# Patient Record
Sex: FEMALE | Race: WHITE | NOT HISPANIC OR LATINO
[De-identification: names, ages, dates, MRNs, and addresses within clinical notes are randomized per-mention and may not be internally consistent; named-entity substitution may affect disease eponyms.]

---

## 2018-03-26 ENCOUNTER — RESULT REVIEW (OUTPATIENT)
Age: 78
End: 2018-03-26

## 2018-03-27 ENCOUNTER — APPOINTMENT (OUTPATIENT)
Dept: OBGYN | Facility: CLINIC | Age: 78
End: 2018-03-27
Payer: MEDICARE

## 2018-03-27 ENCOUNTER — RESULT REVIEW (OUTPATIENT)
Age: 78
End: 2018-03-27

## 2018-03-27 PROCEDURE — 58100 BIOPSY OF UTERUS LINING: CPT

## 2018-03-27 PROCEDURE — 99243 OFF/OP CNSLTJ NEW/EST LOW 30: CPT | Mod: 25

## 2018-03-27 PROCEDURE — 99203 OFFICE O/P NEW LOW 30 MIN: CPT | Mod: 25

## 2018-04-10 ENCOUNTER — APPOINTMENT (OUTPATIENT)
Dept: ULTRASOUND IMAGING | Facility: CLINIC | Age: 78
End: 2018-04-10

## 2018-04-18 ENCOUNTER — OUTPATIENT (OUTPATIENT)
Dept: OUTPATIENT SERVICES | Facility: HOSPITAL | Age: 78
LOS: 1 days | End: 2018-04-18
Payer: COMMERCIAL

## 2018-04-18 ENCOUNTER — APPOINTMENT (OUTPATIENT)
Dept: ULTRASOUND IMAGING | Facility: CLINIC | Age: 78
End: 2018-04-18
Payer: MEDICARE

## 2018-04-18 DIAGNOSIS — Z00.8 ENCOUNTER FOR OTHER GENERAL EXAMINATION: ICD-10-CM

## 2018-04-18 PROCEDURE — 76856 US EXAM PELVIC COMPLETE: CPT

## 2018-04-18 PROCEDURE — 76830 TRANSVAGINAL US NON-OB: CPT | Mod: 26,59

## 2018-04-18 PROCEDURE — 76856 US EXAM PELVIC COMPLETE: CPT | Mod: 26

## 2018-04-18 PROCEDURE — 76830 TRANSVAGINAL US NON-OB: CPT

## 2018-04-23 ENCOUNTER — APPOINTMENT (OUTPATIENT)
Dept: OBGYN | Facility: CLINIC | Age: 78
End: 2018-04-23
Payer: MEDICARE

## 2018-04-23 PROCEDURE — 99214 OFFICE O/P EST MOD 30 MIN: CPT

## 2018-05-01 ENCOUNTER — APPOINTMENT (OUTPATIENT)
Dept: PULMONOLOGY | Facility: CLINIC | Age: 78
End: 2018-05-01
Payer: MEDICARE

## 2018-05-01 VITALS
HEIGHT: 64 IN | WEIGHT: 255 LBS | OXYGEN SATURATION: 95 % | BODY MASS INDEX: 43.54 KG/M2 | DIASTOLIC BLOOD PRESSURE: 70 MMHG | HEART RATE: 103 BPM | SYSTOLIC BLOOD PRESSURE: 120 MMHG

## 2018-05-01 DIAGNOSIS — Z86.79 PERSONAL HISTORY OF OTHER DISEASES OF THE CIRCULATORY SYSTEM: ICD-10-CM

## 2018-05-01 DIAGNOSIS — Z78.9 OTHER SPECIFIED HEALTH STATUS: ICD-10-CM

## 2018-05-01 DIAGNOSIS — Z80.42 FAMILY HISTORY OF MALIGNANT NEOPLASM OF PROSTATE: ICD-10-CM

## 2018-05-01 DIAGNOSIS — Z82.49 FAMILY HISTORY OF ISCHEMIC HEART DISEASE AND OTHER DISEASES OF THE CIRCULATORY SYSTEM: ICD-10-CM

## 2018-05-01 DIAGNOSIS — Z84.1 FAMILY HISTORY OF DISORDERS OF KIDNEY AND URETER: ICD-10-CM

## 2018-05-01 PROCEDURE — 99205 OFFICE O/P NEW HI 60 MIN: CPT | Mod: 25

## 2018-05-01 PROCEDURE — 71046 X-RAY EXAM CHEST 2 VIEWS: CPT

## 2018-05-01 PROCEDURE — 94618 PULMONARY STRESS TESTING: CPT

## 2018-05-01 PROCEDURE — 94729 DIFFUSING CAPACITY: CPT

## 2018-05-01 PROCEDURE — 94010 BREATHING CAPACITY TEST: CPT | Mod: 59

## 2018-05-01 RX ORDER — MESALAMINE 1.2 G/1
1.2 TABLET, DELAYED RELEASE ORAL
Qty: 60 | Refills: 0 | Status: ACTIVE | COMMUNITY
Start: 2017-12-27

## 2018-05-01 RX ORDER — METOLAZONE 2.5 MG/1
2.5 TABLET ORAL
Qty: 30 | Refills: 0 | Status: ACTIVE | COMMUNITY
Start: 2018-01-31

## 2018-05-01 RX ORDER — FENOFIBRIC ACID 135 MG/1
135 CAPSULE, DELAYED RELEASE ORAL
Qty: 90 | Refills: 0 | Status: ACTIVE | COMMUNITY
Start: 2017-03-29

## 2018-05-01 RX ORDER — DULOXETINE HYDROCHLORIDE 60 MG/1
60 CAPSULE, DELAYED RELEASE PELLETS ORAL
Qty: 90 | Refills: 0 | Status: ACTIVE | COMMUNITY
Start: 2017-04-26

## 2018-05-01 RX ORDER — AMIODARONE HYDROCHLORIDE 200 MG/1
200 TABLET ORAL
Qty: 90 | Refills: 0 | Status: ACTIVE | COMMUNITY
Start: 2017-10-18

## 2018-05-01 RX ORDER — CARVEDILOL 12.5 MG/1
12.5 TABLET, FILM COATED ORAL
Qty: 180 | Refills: 0 | Status: ACTIVE | COMMUNITY
Start: 2017-12-27

## 2018-05-01 RX ORDER — LEVOTHYROXINE SODIUM 0.15 MG/1
150 TABLET ORAL
Qty: 30 | Refills: 0 | Status: DISCONTINUED | COMMUNITY
Start: 2018-02-01

## 2018-05-01 RX ORDER — WARFARIN 5 MG/1
5 TABLET ORAL
Qty: 30 | Refills: 0 | Status: ACTIVE | COMMUNITY
Start: 2018-01-02

## 2018-05-01 RX ORDER — UMECLIDINIUM BROMIDE AND VILANTEROL TRIFENATATE 62.5; 25 UG/1; UG/1
62.5-25 POWDER RESPIRATORY (INHALATION) DAILY
Qty: 3 | Refills: 1 | Status: ACTIVE | COMMUNITY
Start: 2018-05-01 | End: 1900-01-01

## 2018-05-01 RX ORDER — LOSARTAN POTASSIUM 25 MG/1
25 TABLET, FILM COATED ORAL
Qty: 90 | Refills: 0 | Status: ACTIVE | COMMUNITY
Start: 2017-04-27

## 2018-05-01 RX ORDER — POLYMYXIN B SULFATE AND TRIMETHOPRIM 10000; 1 [USP'U]/ML; MG/ML
10000-0.1 SOLUTION OPHTHALMIC
Qty: 10 | Refills: 0 | Status: ACTIVE | COMMUNITY
Start: 2018-01-22

## 2018-05-01 RX ORDER — TORSEMIDE 100 MG/1
100 TABLET ORAL
Qty: 30 | Refills: 0 | Status: ACTIVE | COMMUNITY
Start: 2018-04-12

## 2018-05-01 RX ORDER — TORSEMIDE 20 MG/1
20 TABLET ORAL
Qty: 90 | Refills: 0 | Status: ACTIVE | COMMUNITY
Start: 2017-10-18

## 2018-05-01 RX ORDER — LEVOTHYROXINE SODIUM 0.14 MG/1
137 TABLET ORAL
Qty: 30 | Refills: 0 | Status: DISCONTINUED | COMMUNITY
Start: 2018-01-02

## 2018-05-01 RX ORDER — PEN NEEDLE, DIABETIC 29 G X1/2"
32G X 4 MM NEEDLE, DISPOSABLE MISCELLANEOUS
Qty: 200 | Refills: 0 | Status: ACTIVE | COMMUNITY
Start: 2017-08-25

## 2018-05-01 RX ORDER — OLOPATADINE HYDROCHLORIDE 665 UG/1
0.6 SPRAY, METERED NASAL
Qty: 3 | Refills: 1 | Status: ACTIVE | COMMUNITY
Start: 2018-05-01 | End: 1900-01-01

## 2018-05-01 RX ORDER — EXENATIDE 2 MG/.65ML
2 INJECTION, SUSPENSION, EXTENDED RELEASE SUBCUTANEOUS
Qty: 4 | Refills: 0 | Status: ACTIVE | COMMUNITY
Start: 2018-02-05

## 2018-05-01 RX ORDER — LEVOTHYROXINE SODIUM 0.17 MG/1
175 TABLET ORAL
Qty: 30 | Refills: 0 | Status: ACTIVE | COMMUNITY
Start: 2018-03-12

## 2018-05-01 RX ORDER — INSULIN DEGLUDEC INJECTION 100 U/ML
100 INJECTION, SOLUTION SUBCUTANEOUS
Qty: 15 | Refills: 0 | Status: ACTIVE | COMMUNITY
Start: 2017-12-04

## 2018-05-01 RX ORDER — NITROFURANTOIN (MONOHYDRATE/MACROCRYSTALS) 25; 75 MG/1; MG/1
100 CAPSULE ORAL
Qty: 180 | Refills: 0 | Status: ACTIVE | COMMUNITY
Start: 2017-12-05

## 2018-05-01 RX ORDER — ISOSORBIDE MONONITRATE 30 MG/1
30 TABLET, EXTENDED RELEASE ORAL
Qty: 90 | Refills: 0 | Status: ACTIVE | COMMUNITY
Start: 2018-04-12

## 2018-05-01 RX ORDER — INSULIN ASPART 100 [IU]/ML
100 INJECTION, SOLUTION INTRAVENOUS; SUBCUTANEOUS
Qty: 30 | Refills: 0 | Status: ACTIVE | COMMUNITY
Start: 2018-01-02

## 2018-05-11 ENCOUNTER — OUTPATIENT (OUTPATIENT)
Dept: OUTPATIENT SERVICES | Facility: HOSPITAL | Age: 78
LOS: 1 days | End: 2018-05-11
Payer: COMMERCIAL

## 2018-05-11 DIAGNOSIS — E66.2 MORBID (SEVERE) OBESITY WITH ALVEOLAR HYPOVENTILATION: ICD-10-CM

## 2018-05-11 DIAGNOSIS — R06.02 SHORTNESS OF BREATH: ICD-10-CM

## 2018-05-11 LAB
BASE EXCESS BLDA CALC-SCNC: 7.1 MMOL/L — HIGH (ref -2–2)
BLOOD GAS COMMENTS: SIGNIFICANT CHANGE UP
BLOOD GAS SOURCE: SIGNIFICANT CHANGE UP
HCO3 BLDA-SCNC: 31 MMOL/L — HIGH (ref 21–29)
HOROWITZ INDEX BLDA+IHG-RTO: 28 — SIGNIFICANT CHANGE UP
PCO2 BLDA: 47 MMHG — HIGH (ref 32–46)
PH BLD: 7.44 — SIGNIFICANT CHANGE UP (ref 7.35–7.45)
PO2 BLDA: 94 MMHG — SIGNIFICANT CHANGE UP (ref 74–108)
SAO2 % BLDA: 98 % — HIGH (ref 92–96)

## 2018-05-11 PROCEDURE — 82803 BLOOD GASES ANY COMBINATION: CPT

## 2018-05-11 PROCEDURE — 36415 COLL VENOUS BLD VENIPUNCTURE: CPT

## 2018-05-16 ENCOUNTER — OUTPATIENT (OUTPATIENT)
Dept: OUTPATIENT SERVICES | Facility: HOSPITAL | Age: 78
LOS: 1 days | End: 2018-05-16
Payer: COMMERCIAL

## 2018-05-16 VITALS
WEIGHT: 257.94 LBS | TEMPERATURE: 98 F | SYSTOLIC BLOOD PRESSURE: 141 MMHG | DIASTOLIC BLOOD PRESSURE: 82 MMHG | HEART RATE: 66 BPM | HEIGHT: 61 IN | RESPIRATION RATE: 18 BRPM | OXYGEN SATURATION: 100 %

## 2018-05-16 DIAGNOSIS — Z87.42 PERSONAL HISTORY OF OTHER DISEASES OF THE FEMALE GENITAL TRACT: ICD-10-CM

## 2018-05-16 DIAGNOSIS — N95.0 POSTMENOPAUSAL BLEEDING: ICD-10-CM

## 2018-05-16 DIAGNOSIS — D64.9 ANEMIA, UNSPECIFIED: ICD-10-CM

## 2018-05-16 DIAGNOSIS — D63.8 ANEMIA IN OTHER CHRONIC DISEASES CLASSIFIED ELSEWHERE: ICD-10-CM

## 2018-05-16 DIAGNOSIS — N18.9 CHRONIC KIDNEY DISEASE, UNSPECIFIED: ICD-10-CM

## 2018-05-16 DIAGNOSIS — I73.9 PERIPHERAL VASCULAR DISEASE, UNSPECIFIED: ICD-10-CM

## 2018-05-16 DIAGNOSIS — Z01.818 ENCOUNTER FOR OTHER PREPROCEDURAL EXAMINATION: ICD-10-CM

## 2018-05-16 DIAGNOSIS — E11.9 TYPE 2 DIABETES MELLITUS WITHOUT COMPLICATIONS: ICD-10-CM

## 2018-05-16 DIAGNOSIS — I48.0 PAROXYSMAL ATRIAL FIBRILLATION: ICD-10-CM

## 2018-05-16 DIAGNOSIS — G47.33 OBSTRUCTIVE SLEEP APNEA (ADULT) (PEDIATRIC): ICD-10-CM

## 2018-05-16 DIAGNOSIS — Z86.39 PERSONAL HISTORY OF OTHER ENDOCRINE, NUTRITIONAL AND METABOLIC DISEASE: ICD-10-CM

## 2018-05-16 DIAGNOSIS — J45.909 UNSPECIFIED ASTHMA, UNCOMPLICATED: ICD-10-CM

## 2018-05-16 DIAGNOSIS — I10 ESSENTIAL (PRIMARY) HYPERTENSION: ICD-10-CM

## 2018-05-16 DIAGNOSIS — Z95.3 PRESENCE OF XENOGENIC HEART VALVE: Chronic | ICD-10-CM

## 2018-05-16 DIAGNOSIS — Z98.891 HISTORY OF UTERINE SCAR FROM PREVIOUS SURGERY: Chronic | ICD-10-CM

## 2018-05-16 LAB
HBA1C BLD-MCNC: 6.4 % — HIGH (ref 4–5.6)
HCT VFR BLD CALC: 29.8 % — LOW (ref 34.5–45)
HGB BLD-MCNC: 9.3 G/DL — LOW (ref 11.5–15.5)
MCHC RBC-ENTMCNC: 29.8 PG — SIGNIFICANT CHANGE UP (ref 27–34)
MCHC RBC-ENTMCNC: 31.2 GM/DL — LOW (ref 32–36)
MCV RBC AUTO: 95.5 FL — SIGNIFICANT CHANGE UP (ref 80–100)
PLATELET # BLD AUTO: 214 K/UL — SIGNIFICANT CHANGE UP (ref 150–400)
RBC # BLD: 3.12 M/UL — LOW (ref 3.8–5.2)
RBC # FLD: 14.8 % — HIGH (ref 10.3–14.5)
WBC # BLD: 9.74 K/UL — SIGNIFICANT CHANGE UP (ref 3.8–10.5)
WBC # FLD AUTO: 9.74 K/UL — SIGNIFICANT CHANGE UP (ref 3.8–10.5)

## 2018-05-16 PROCEDURE — 83036 HEMOGLOBIN GLYCOSYLATED A1C: CPT

## 2018-05-16 PROCEDURE — G0463: CPT

## 2018-05-16 PROCEDURE — 85027 COMPLETE CBC AUTOMATED: CPT

## 2018-05-16 RX ORDER — INSULIN DEGLUDEC 100 U/ML
0 INJECTION, SOLUTION SUBCUTANEOUS
Qty: 0 | Refills: 0 | COMMUNITY

## 2018-05-16 NOTE — H&P PST ADULT - REASON FOR ADMISSION
"I am having a D&C, I have been bleeding." "I am having a D&C, I have been bleeding sometime it is heavy sometime light." "I am having a D&C, I have been bleeding, sometimes it is heavy."

## 2018-05-16 NOTE — H&P PST ADULT - PROBLEM SELECTOR PLAN 3
has been evaluated by her cardiologist  last dose of coumadin 5/25/2018 has been evaluated by her cardiologist  last dose of coumadin 5/25/2018  INR DOS

## 2018-05-16 NOTE — H&P PST ADULT - PROBLEM SELECTOR PLAN 2
most ekg/ echo on chart   patient to hold coumadin 5 days prior to surgery as per her cardiologist, last INR 3.8 most ekg/ echo on chart   patient to hold coumadin 5 days prior to surgery as per her cardiologist, last INR 3.8; repeat INR DOS

## 2018-05-16 NOTE — H&P PST ADULT - HISTORY OF PRESENT ILLNESS
77 year old female with diastolic CHF (EF 56% (4/2017) and last admission 2 years ago, mitral stenosis s/p MVR (bioprosthetic valve 2014), PAF (coumadin), PAD s/p peripheral stent placed (2-4 years ago), HTN, RAD (oxygen 2 liters nc), VICKY, asthma, T2DM, neuropathy, morbidly obese, spinal stenosis, arthritis, Hypothyroidism, GERD, CKD, is being seen in preadmission testing today for scheduled D&C/ diagnostic hysteroscopy for postmenopausal bleeding. 77 year old female with diastolic CHF (EF 56% (4/2017) and last admission 2 years ago, mitral stenosis s/p MVR (bioprosthetic valve 2014), PAF (coumadin), PAD s/p peripheral stent placed (2-4 years ago), HTN, RAD (oxygen 2 liters nc x24 hrs), VICKY, asthma, anemia of chronic dz, T2DM,  peripheral neuropathy, morbidly obese, spinal stenosis, arthritis, Hypothyroidism, GERD, CKD, is being seen in preadmission testing today for scheduled D&C/ diagnostic hysteroscopy for postmenopausal bleeding on 5/30/18.

## 2018-05-16 NOTE — H&P PST ADULT - OTHER CARE PROVIDERS
Cardiologist: Dr. Feliz Aiken # [phone]                Pulmonologist: Dr. Fito Montgomery # [phone]

## 2018-05-16 NOTE — H&P PST ADULT - PSH
Amputated toe    H/O  section  x 2  History of mitral valve replacement with bioprosthetic valve  x 4 years ago

## 2018-05-16 NOTE — H&P PST ADULT - PMH
Arthritis of spine    Chronic atrial fibrillation  h/o rapid ventricular rate 2 years ago, was admitted at [facility], no intervention, control with medication  Chronic diastolic CHF (congestive heart failure)    Chronic low back pain    Depression (emotion)    Diabetes mellitus  T2DM last A1C 6.7 mg/dl in January   fs range 59- 200 mg/dl  Edema    GERD (gastroesophageal reflux disease)    H/O: hypothyroidism    Herniated disc  lumbar  Hypertension    Macular degeneration of left eye  receiving injection  Morbid obesity with BMI of 45.0-49.9, adult    Neuropathy    VICKY (obstructive sleep apnea)    Peripheral arterial disease  s/p remote stent. not on antiplatelet meds for a while.  Ulcerative colitis Anemia of chronic disease    Arthritis of spine    Asthma  PFT (4/2018)  Chronic diastolic CHF (congestive heart failure)  EF 56% (4/2017)  Chronic kidney disease (CKD)    Chronic low back pain    Depression (emotion)    Diabetes mellitus  T2DM last A1C 6.7 mg/dl in January   fs range 59- 200 mg/dl  Dyslipidemia associated with type 2 diabetes mellitus    Edema    GERD (gastroesophageal reflux disease)    H/O: hypothyroidism    Herniated disc  lumbar  History of postmenopausal bleeding    Hypertension    Macular degeneration of left eye  receiving injection  Morbid obesity with BMI of 45.0-49.9, adult    Neuropathy    On home oxygen therapy  2  liters nasal cannula  VICKY (obstructive sleep apnea)    Paroxysmal atrial fibrillation  h/o rapid ventricular rate 2 years ago, admitted at [facility], controlled with medication as per patient.  last INR 2.0  Peripheral arterial disease  s/p remote stent. not on antiplatelet meds for a while.  Ulcerative colitis

## 2018-05-16 NOTE — H&P PST ADULT - NS MD HP INPLANTS MED DEV
biopros Mitral valve, left peripheral artery stent bioprosthetic Mitral valve, left peripheral artery stent

## 2018-05-16 NOTE — H&P PST ADULT - PROBLEM SELECTOR PLAN 1
D&C/ Diagnostic hysteroscopy D&C/ Diagnostic hysteroscopy.   cardiac evaluation and pulmonologist evaluation report on chart   patient is scheduled to see her pcp prior to surgery

## 2018-05-30 ENCOUNTER — APPOINTMENT (OUTPATIENT)
Dept: OBGYN | Facility: HOSPITAL | Age: 78
End: 2018-05-30

## 2018-06-06 ENCOUNTER — TRANSCRIPTION ENCOUNTER (OUTPATIENT)
Age: 78
End: 2018-06-06

## 2018-06-07 ENCOUNTER — RESULT REVIEW (OUTPATIENT)
Age: 78
End: 2018-06-07

## 2018-06-07 ENCOUNTER — APPOINTMENT (OUTPATIENT)
Dept: OBGYN | Facility: HOSPITAL | Age: 78
End: 2018-06-07

## 2018-06-07 ENCOUNTER — OUTPATIENT (OUTPATIENT)
Dept: OUTPATIENT SERVICES | Facility: HOSPITAL | Age: 78
LOS: 1 days | End: 2018-06-07
Payer: COMMERCIAL

## 2018-06-07 VITALS
SYSTOLIC BLOOD PRESSURE: 145 MMHG | RESPIRATION RATE: 16 BRPM | TEMPERATURE: 98 F | DIASTOLIC BLOOD PRESSURE: 66 MMHG | OXYGEN SATURATION: 100 % | HEART RATE: 76 BPM

## 2018-06-07 VITALS
RESPIRATION RATE: 18 BRPM | HEART RATE: 70 BPM | WEIGHT: 257.94 LBS | TEMPERATURE: 98 F | DIASTOLIC BLOOD PRESSURE: 60 MMHG | SYSTOLIC BLOOD PRESSURE: 121 MMHG | HEIGHT: 61 IN

## 2018-06-07 DIAGNOSIS — N95.0 POSTMENOPAUSAL BLEEDING: ICD-10-CM

## 2018-06-07 DIAGNOSIS — Z98.891 HISTORY OF UTERINE SCAR FROM PREVIOUS SURGERY: Chronic | ICD-10-CM

## 2018-06-07 DIAGNOSIS — Z95.3 PRESENCE OF XENOGENIC HEART VALVE: Chronic | ICD-10-CM

## 2018-06-07 LAB
GLUCOSE BLDC GLUCOMTR-MCNC: 107 MG/DL — HIGH (ref 70–99)
GLUCOSE BLDC GLUCOMTR-MCNC: 78 MG/DL — SIGNIFICANT CHANGE UP (ref 70–99)
GLUCOSE BLDC GLUCOMTR-MCNC: 79 MG/DL — SIGNIFICANT CHANGE UP (ref 70–99)
GLUCOSE BLDC GLUCOMTR-MCNC: 85 MG/DL — SIGNIFICANT CHANGE UP (ref 70–99)
INR BLD: 1.23 RATIO — HIGH (ref 0.88–1.16)
PROTHROM AB SERPL-ACNC: 13.3 SEC — HIGH (ref 9.8–12.7)

## 2018-06-07 PROCEDURE — 86901 BLOOD TYPING SEROLOGIC RH(D): CPT

## 2018-06-07 PROCEDURE — 85610 PROTHROMBIN TIME: CPT

## 2018-06-07 PROCEDURE — 58120 DILATION AND CURETTAGE: CPT

## 2018-06-07 PROCEDURE — 88305 TISSUE EXAM BY PATHOLOGIST: CPT | Mod: 26

## 2018-06-07 PROCEDURE — 86850 RBC ANTIBODY SCREEN: CPT

## 2018-06-07 PROCEDURE — 86900 BLOOD TYPING SEROLOGIC ABO: CPT

## 2018-06-07 PROCEDURE — 58558 HYSTEROSCOPY BIOPSY: CPT

## 2018-06-07 PROCEDURE — 88305 TISSUE EXAM BY PATHOLOGIST: CPT

## 2018-06-07 PROCEDURE — 82962 GLUCOSE BLOOD TEST: CPT

## 2018-06-07 RX ORDER — UMECLIDINIUM BROMIDE AND VILANTEROL TRIFENATATE 62.5; 25 UG/1; UG/1
1 POWDER RESPIRATORY (INHALATION)
Qty: 0 | Refills: 0 | COMMUNITY

## 2018-06-07 RX ORDER — ONDANSETRON 8 MG/1
4 TABLET, FILM COATED ORAL EVERY 8 HOURS
Qty: 0 | Refills: 0 | Status: DISCONTINUED | OUTPATIENT
Start: 2018-06-07 | End: 2018-06-07

## 2018-06-07 RX ORDER — SODIUM CHLORIDE 9 MG/ML
1000 INJECTION, SOLUTION INTRAVENOUS
Qty: 0 | Refills: 0 | Status: DISCONTINUED | OUTPATIENT
Start: 2018-06-07 | End: 2018-06-22

## 2018-06-07 RX ORDER — HYDROMORPHONE HYDROCHLORIDE 2 MG/ML
0.2 INJECTION INTRAMUSCULAR; INTRAVENOUS; SUBCUTANEOUS
Qty: 0 | Refills: 0 | Status: DISCONTINUED | OUTPATIENT
Start: 2018-06-07 | End: 2018-06-07

## 2018-06-07 NOTE — BRIEF OPERATIVE NOTE - PROCEDURE
<<-----Click on this checkbox to enter Procedure Hysteroscopy with dilation and curettage of uterus  06/07/2018    Active  MICHELLE

## 2018-06-07 NOTE — ASU DISCHARGE PLAN (ADULT/PEDIATRIC). - MEDICATION SUMMARY - MEDICATIONS TO TAKE
I will START or STAY ON the medications listed below when I get home from the hospital:    mesalamine 1.2 g oral delayed release tablet  -- Indication: For home med    Imdur 30 mg oral tablet, extended release  -- 1 tab(s) by mouth once a day (in the morning)  -- Indication: For home med    amiodarone 200 mg oral tablet  -- 1 tab(s) by mouth once a day  -- Indication: For home med    warfarin 2.5 mg oral tablet  -- 1 tab(s) by mouth once a day  -- Indication: For home med    DULoxetine 60 mg oral delayed release capsule  -- 1 cap(s) by mouth once a day  -- Indication: For home med    Bydureon Pen 2 mg subcutaneous injection, extended release  -- on Sunday   -- Indication: For home med    NovoLOG FlexPen 100 units/mL injectable solution  -- with meal   -- Indication: For home med    Tresiba FlexTouch 100 units/mL subcutaneous solution  -- at night  -- Indication: For home med    carvedilol 12.5 mg oral tablet  -- 1 tab(s) by mouth 2 times a day  -- Indication: For home med    Coreg 6.25 mg oral tablet  -- 1 tab(s) by mouth 2 times a day  -- Indication: For home med    torsemide  -- 50 mg twice a day   -- Indication: For home med    metOLazone 2.5 mg oral tablet  -- 1 tab(s) by mouth once a day  -- Indication: For home med    bumetanide  -- unknown dosage   -- Indication: For home med    Colace 100 mg oral capsule  -- once a day   -- Indication: For home med    Slow-Mag  -- Indication: For home med    cyclobenzaprine 5 mg oral tablet  -- Indication: For home med    olopatadine 665 mcg/inh nasal spray  -- one spray each nostril twice a day   -- Indication: For home med    Coenzyme Q10 200 mg oral capsule  -- Indication: For home med    omeprazole 40 mg oral delayed release capsule  -- 1 cap(s) by mouth once a day  -- Indication: For home med    levothyroxine 175 mcg (0.175 mg) oral tablet  -- 1 tab(s) by mouth once a day  -- Indication: For home med    Macrobid 100 mg oral capsule  -- daily x 2 years   -- Indication: For home med    Metanx oral tablet  -- 1 tab(s) by mouth once a day  -- Indication: For home med    Vitamin D3 1000 intl units oral tablet  -- 1 tab(s) by mouth once a day  -- Indication: For home med

## 2018-06-07 NOTE — ASU DISCHARGE PLAN (ADULT/PEDIATRIC). - NOTIFY
Inability to Tolerate Liquids or Foods/Pain not relieved by Medications/GYN Fever>100.4/Bleeding that does not stop

## 2018-06-12 ENCOUNTER — APPOINTMENT (OUTPATIENT)
Dept: OBGYN | Facility: CLINIC | Age: 78
End: 2018-06-12

## 2018-06-14 LAB — SURGICAL PATHOLOGY STUDY: SIGNIFICANT CHANGE UP

## 2018-06-25 ENCOUNTER — APPOINTMENT (OUTPATIENT)
Dept: OBGYN | Facility: CLINIC | Age: 78
End: 2018-06-25

## 2018-07-05 ENCOUNTER — APPOINTMENT (OUTPATIENT)
Dept: PULMONOLOGY | Facility: CLINIC | Age: 78
End: 2018-07-05

## 2018-07-17 PROBLEM — I48.0 PAROXYSMAL ATRIAL FIBRILLATION: Chronic | Status: ACTIVE | Noted: 2018-05-16

## 2018-07-17 PROBLEM — I50.32 CHRONIC DIASTOLIC (CONGESTIVE) HEART FAILURE: Chronic | Status: ACTIVE | Noted: 2018-05-16

## 2018-07-23 ENCOUNTER — APPOINTMENT (OUTPATIENT)
Dept: PULMONOLOGY | Facility: CLINIC | Age: 78
End: 2018-07-23
Payer: MEDICARE

## 2018-07-23 VITALS
HEIGHT: 64 IN | HEART RATE: 78 BPM | OXYGEN SATURATION: 98 % | RESPIRATION RATE: 17 BRPM | BODY MASS INDEX: 26.29 KG/M2 | SYSTOLIC BLOOD PRESSURE: 110 MMHG | WEIGHT: 154 LBS | DIASTOLIC BLOOD PRESSURE: 64 MMHG

## 2018-07-23 DIAGNOSIS — R06.89 OTHER ABNORMALITIES OF BREATHING: ICD-10-CM

## 2018-07-23 PROBLEM — Z87.42 PERSONAL HISTORY OF OTHER DISEASES OF THE FEMALE GENITAL TRACT: Chronic | Status: ACTIVE | Noted: 2018-05-16

## 2018-07-23 PROBLEM — Z99.81 DEPENDENCE ON SUPPLEMENTAL OXYGEN: Chronic | Status: ACTIVE | Noted: 2018-05-16

## 2018-07-23 PROBLEM — H35.30 UNSPECIFIED MACULAR DEGENERATION: Chronic | Status: ACTIVE | Noted: 2018-05-16

## 2018-07-23 PROBLEM — F32.9 MAJOR DEPRESSIVE DISORDER, SINGLE EPISODE, UNSPECIFIED: Chronic | Status: ACTIVE | Noted: 2018-05-16

## 2018-07-23 PROBLEM — E11.69 TYPE 2 DIABETES MELLITUS WITH OTHER SPECIFIED COMPLICATION: Chronic | Status: ACTIVE | Noted: 2018-05-16

## 2018-07-23 PROBLEM — J45.909 UNSPECIFIED ASTHMA, UNCOMPLICATED: Chronic | Status: ACTIVE | Noted: 2018-05-16

## 2018-07-23 PROBLEM — M46.90 UNSPECIFIED INFLAMMATORY SPONDYLOPATHY, SITE UNSPECIFIED: Chronic | Status: ACTIVE | Noted: 2018-05-16

## 2018-07-23 PROBLEM — N18.9 CHRONIC KIDNEY DISEASE, UNSPECIFIED: Chronic | Status: ACTIVE | Noted: 2018-05-16

## 2018-07-23 PROBLEM — Z86.39 PERSONAL HISTORY OF OTHER ENDOCRINE, NUTRITIONAL AND METABOLIC DISEASE: Chronic | Status: ACTIVE | Noted: 2018-05-16

## 2018-07-23 PROBLEM — D63.8 ANEMIA IN OTHER CHRONIC DISEASES CLASSIFIED ELSEWHERE: Chronic | Status: ACTIVE | Noted: 2018-05-16

## 2018-07-23 PROBLEM — E66.01 MORBID (SEVERE) OBESITY DUE TO EXCESS CALORIES: Chronic | Status: ACTIVE | Noted: 2018-05-16

## 2018-07-23 PROCEDURE — 94010 BREATHING CAPACITY TEST: CPT

## 2018-07-23 PROCEDURE — 99214 OFFICE O/P EST MOD 30 MIN: CPT | Mod: 25

## 2018-07-23 PROCEDURE — 94729 DIFFUSING CAPACITY: CPT

## 2018-09-05 ENCOUNTER — APPOINTMENT (OUTPATIENT)
Dept: CT IMAGING | Facility: CLINIC | Age: 78
End: 2018-09-05

## 2018-09-26 ENCOUNTER — RESULT REVIEW (OUTPATIENT)
Age: 78
End: 2018-09-26

## 2018-09-26 ENCOUNTER — CLINICAL ADVICE (OUTPATIENT)
Age: 78
End: 2018-09-26

## 2018-09-26 DIAGNOSIS — N63.0 UNSPECIFIED LUMP IN UNSPECIFIED BREAST: ICD-10-CM

## 2018-09-28 ENCOUNTER — LABORATORY RESULT (OUTPATIENT)
Age: 78
End: 2018-09-28

## 2018-10-01 ENCOUNTER — APPOINTMENT (OUTPATIENT)
Dept: OBGYN | Facility: CLINIC | Age: 78
End: 2018-10-01

## 2018-10-01 ENCOUNTER — APPOINTMENT (OUTPATIENT)
Dept: THORACIC SURGERY | Facility: CLINIC | Age: 78
End: 2018-10-01
Payer: MEDICARE

## 2018-10-01 VITALS
HEIGHT: 62 IN | BODY MASS INDEX: 46.01 KG/M2 | HEART RATE: 75 BPM | SYSTOLIC BLOOD PRESSURE: 129 MMHG | DIASTOLIC BLOOD PRESSURE: 72 MMHG | OXYGEN SATURATION: 95 % | WEIGHT: 250 LBS | RESPIRATION RATE: 17 BRPM

## 2018-10-01 DIAGNOSIS — Z86.010 PERSONAL HISTORY OF COLONIC POLYPS: ICD-10-CM

## 2018-10-01 DIAGNOSIS — Z80.9 FAMILY HISTORY OF MALIGNANT NEOPLASM, UNSPECIFIED: ICD-10-CM

## 2018-10-01 DIAGNOSIS — Z99.81 DEPENDENCE ON SUPPLEMENTAL OXYGEN: ICD-10-CM

## 2018-10-01 DIAGNOSIS — Z87.448 PERSONAL HISTORY OF OTHER DISEASES OF URINARY SYSTEM: ICD-10-CM

## 2018-10-01 LAB
CCP AB SER IA-ACNC: <8 UNITS
ENA JO1 AB SER IA-ACNC: <0.2 AL
ENA RNP AB SER IA-ACNC: <0.2 AL
ENA RNP AB SER IA-ACNC: <0.2 AL
ENA SCL70 IGG SER IA-ACNC: <0.2 AL
ENA SM AB SER IA-ACNC: <0.2 AL
ENA SS-A AB SER IA-ACNC: <0.2 AL
ENA SS-B AB SER IA-ACNC: <0.2 AL
ERYTHROCYTE [SEDIMENTATION RATE] IN BLOOD BY WESTERGREN METHOD: 48 MM/HR
MPO AB + PR3 PNL SER: NORMAL
RF+CCP IGG SER-IMP: NEGATIVE
RHEUMATOID FACT SER QL: 17 IU/ML

## 2018-10-01 PROCEDURE — 99205 OFFICE O/P NEW HI 60 MIN: CPT

## 2018-10-02 ENCOUNTER — RESULT REVIEW (OUTPATIENT)
Age: 78
End: 2018-10-02

## 2018-10-02 LAB
ANA SER IF-ACNC: NEGATIVE
ANCA AB SER-IMP: ABNORMAL
C-ANCA SER-ACNC: NEGATIVE
HP PNL SER: NORMAL
P-ANCA TITR SER IF: NEGATIVE

## 2018-10-03 ENCOUNTER — RESULT REVIEW (OUTPATIENT)
Age: 78
End: 2018-10-03

## 2018-10-03 DIAGNOSIS — R76.8 OTHER SPECIFIED ABNORMAL IMMUNOLOGICAL FINDINGS IN SERUM: ICD-10-CM

## 2018-10-22 ENCOUNTER — APPOINTMENT (OUTPATIENT)
Dept: THORACIC SURGERY | Facility: CLINIC | Age: 78
End: 2018-10-22

## 2018-11-23 ENCOUNTER — NON-APPOINTMENT (OUTPATIENT)
Age: 78
End: 2018-11-23

## 2018-11-23 ENCOUNTER — APPOINTMENT (OUTPATIENT)
Dept: PULMONOLOGY | Facility: CLINIC | Age: 78
End: 2018-11-23
Payer: MEDICARE

## 2018-11-23 VITALS
SYSTOLIC BLOOD PRESSURE: 109 MMHG | WEIGHT: 250 LBS | HEART RATE: 101 BPM | HEIGHT: 62 IN | BODY MASS INDEX: 46.01 KG/M2 | OXYGEN SATURATION: 98 % | DIASTOLIC BLOOD PRESSURE: 56 MMHG

## 2018-11-23 DIAGNOSIS — K21.9 GASTRO-ESOPHAGEAL REFLUX DISEASE W/OUT ESOPHAGITIS: ICD-10-CM

## 2018-11-23 DIAGNOSIS — E66.2 MORBID (SEVERE) OBESITY WITH ALVEOLAR HYPOVENTILATION: ICD-10-CM

## 2018-11-23 DIAGNOSIS — J30.9 ALLERGIC RHINITIS, UNSPECIFIED: ICD-10-CM

## 2018-11-23 DIAGNOSIS — R09.02 HYPOXEMIA: ICD-10-CM

## 2018-11-23 DIAGNOSIS — J96.12 CHRONIC RESPIRATORY FAILURE WITH HYPOXIA: ICD-10-CM

## 2018-11-23 DIAGNOSIS — R06.83 SNORING: ICD-10-CM

## 2018-11-23 DIAGNOSIS — R76.8 OTHER SPECIFIED ABNORMAL IMMUNOLOGICAL FINDINGS IN SERUM: ICD-10-CM

## 2018-11-23 DIAGNOSIS — J45.909 UNSPECIFIED ASTHMA, UNCOMPLICATED: ICD-10-CM

## 2018-11-23 DIAGNOSIS — Z79.899 OTHER LONG TERM (CURRENT) DRUG THERAPY: ICD-10-CM

## 2018-11-23 DIAGNOSIS — J96.11 CHRONIC RESPIRATORY FAILURE WITH HYPOXIA: ICD-10-CM

## 2018-11-23 DIAGNOSIS — R06.02 SHORTNESS OF BREATH: ICD-10-CM

## 2018-11-23 DIAGNOSIS — R26.89 OTHER ABNORMALITIES OF GAIT AND MOBILITY: ICD-10-CM

## 2018-11-23 DIAGNOSIS — Z99.81 HYPOXEMIA: ICD-10-CM

## 2018-11-23 DIAGNOSIS — J84.9 INTERSTITIAL PULMONARY DISEASE, UNSPECIFIED: ICD-10-CM

## 2018-11-23 DIAGNOSIS — R93.89 ABNORMAL FINDINGS ON DIAGNOSTIC IMAGING OF OTHER SPECIFIED BODY STRUCTURES: ICD-10-CM

## 2018-11-23 PROCEDURE — 99214 OFFICE O/P EST MOD 30 MIN: CPT | Mod: 25

## 2018-11-23 PROCEDURE — 94010 BREATHING CAPACITY TEST: CPT

## 2018-11-23 RX ORDER — PREDNISONE 10 MG/1
10 TABLET ORAL
Qty: 100 | Refills: 1 | Status: ACTIVE | COMMUNITY
Start: 2018-11-23 | End: 1900-01-01

## 2018-11-23 NOTE — PROCEDURE
[FreeTextEntry1] : Chest CT (9.13.18) revealed increased interstitial markings and peripheral areas of scarring in both lungs. Diffuse coronary artery calcifications. Cholelithiasis\par \par PFT - spi reveals severe restrictive dysfunction; FEV1 is 0.85 which is 45% of predicted, normal flow volume loop

## 2018-11-23 NOTE — ASSESSMENT
[FreeTextEntry1] : Ms. Alvarado is a 76 y/o female with a history of mitral stenosis, bioprosthetic MVR, PAF, HLD, HTN, Type II DM with CKD, CAD, nonsmoker, obesity, hypothyroidism, GERD, ILDz (CT scan 9/18), allergies who now comes in for pulmonary re-evaluation. She is now off of amiodarone therapy. 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 Proper dietary restraint is necessary specifically salt containing foods, if any reaction may occur should be reported.\par \par Etiology will depend on the causative agent possibilities include: idiopathic pulmonary fibrosis (UIP), NSIP (nonspecific interstitial pneumonia) , respiratory bronchiolitis in someone who is a smoker, drug induced lung disease, hypersensitivity pneumonitis, BOOP, sarcoidosis, chronic congestive heart failure, rheumatologic disorder induced interstitial lung disease. Optimal diagnosing will include rheumatological panel which would include ESR, SILVIO, ANCA, ARNP, CCP, rheumatoid factor, hypersensitivity panel, JOE1, scleroderma antibodies, sjogren's syndrome antibodies; biopsy will be necessary to definitively determine the etiology unless the CT scan findings are specific for UIP. Therapy will be based on diagnostics.\par \par problem 2: asthma\par -continue Anoro 1 inhalaton QD- noncompliant\par \par -Inhaler technique reviewed as well as oral hygiene techniques reviewed with patient. Avoidance of cold air, extremes of temperature, rescue inhaler should be used before exercise. Order of medication reviewed with patient. Recommended use of a cool mist humidifier in the bedroom. \par \par Asthma is believed to be caused by inherited (genetic) and environmental factor, but its exact cause is unknown. Asthma may be triggered by allergens, lung infections, or irritants in the air. Asthma triggers are different for each person.\par \par problem 3: GERD\par -Rule of 2's- Avoid eating too much, too late, too poorly, too spicy, or two hours before bed \par -Things to avoid including overeating, spicy foods, tight clothing, eating within three hours of bed, this list is not all inclusive. \par -For treatment of reflux, possible options discussed including diet control, H2 blockers, PPIs, as well as coating motility agents discussed as treatment options. Timing of meals and proximity of last meal to sleep were discussed. If symptoms persist, a formal gastrointestinal evaluation is needed.\par \par problem 4: allergies/sinus\par -add olopatadine 0.6% 1 sniff each nostril BID\par -blood work: asthma panel (+), food allergen panel (-), IgE level (+), eosinophil level (-), vitamin D level (-)\par Environmental measures for allergies were encouraged including mattress and pillow cover, air purifier, and environmental controls.\par \par Problem 5: elevated IgE\par - Due to her elevated IgE level (128), she is a Xolair candidate\par -Xolair is a recombinant DNA- derived humanized IgG1K monoclonal antibody that selectively binds ot human immunoglobulin E (IgE). Xolair is produced by a Chinese hamster ovary cell suspension culture in nutrient medium containing the antibiotic gentamicin. Gentamicin is not detectable in the final product. Xolair is a sterile, white, preservative free, lyophilized powder contained in a single use vial that is reconstituted with sterile water for suspension. Side effects include: wheezing, tightness of the chest, trouble breathing, hives, skin rash, feeling anxious or light-headed, fainting, warmth or tingling under skin, or swelling of face, lips, or tongue \par \par problem 6: overweight\par Weight loss, exercise, and diet control were discussed and are highly encouraged. Treatment options were given such as, aqua therapy, and contacting a nutritionist. Recommended to use the elliptical, stationary bike, less use of treadmill. Mindful eating was explained to the patient Obesity is associated with worsening asthma, shortness of breath, and potential for cardiac disease, diabetes, and other underlying medical conditions.\par \par problem 5: r/o obesity hypoventilation syndrome\par - ABG c/w mild hypercapnia\par \par problem 6: poor breathing mechanics\par -Proper breathing techniques were reviewed with an emphasis of exhalation. Patient instructed to breath in for 1 second and out for four seconds. Patient was encouraged to not talk while walking.\par \par problem 7: cardiac disease\par -f/u with Dr. Millan \par \par problem 6: s/p amiodarone therapy\par -off therapy\par \par problem 7: r/o VICKY\par -set up home sleep study\par \par Sleep apnea is associated with adverse clinical consequences which an affect most organ systems. Cardiovascular disease risk includes arrhythmias, atrial fibrillation, hypertension, coronary artery disease, and stroke. Metabolic disorders include diabetes type 2, non-alcoholic fatty liver disease. Mood disorder especially depression; and cognitive decline especially in the elderly. Associations with chronic reflux/Aiken’s esophagus some but not all inclusive. \par -Reasons include arousal consistent with hypopnea; respiratory events most prominent in REM sleep or supine position; therefore sleep staging and body position are important for accurate diagnosis and estimation of AHI.\par \par problem 8: hypoxemia\par -The patient qualifies for supplemental oxygen and is using 2 L/min O2\par \par problem 9: health maintenance\par -s/p yearly flu shot after October 15 - 2018\par -recommended strep pneumonia vaccines: Prevnar-13 vaccine, followed by Pneumo vaccine 23 on year following\par -recommended early intervention for URIs\par -recommended osteoporosis evaluations\par -recommended early dermatological evaluations\par -recommended after the age of 50 to the age of 70, colonoscopy every 5 years\par -encouraged early intervention\par \par problem 9: preoperation clearance (DNC)\par -at this point in time there are no absolute pulmonary contraindications to go forward with the planned procedure\par -at the time of surgery s/he should have optimal pain control, incentive spirometry, early ambulation, DVT and GI prophylaxis.\par \par Follow up in 6-8 weeks.\par The patient was encouraged to call with any changes, concerns, or questions.

## 2018-11-23 NOTE — PHYSICAL EXAM

## 2018-11-23 NOTE — REASON FOR VISIT
[Follow-Up] : a follow-up visit [FreeTextEntry1] : allergic rhinitis, anemia, GERD, hypoxemia, obesity, poor balance, RAD, snoring, and SOB

## 2018-11-23 NOTE — ADDENDUM
[FreeTextEntry1] : All medical record entries made by seb Monae were at Dr. Fito Montgomery's, direction and personally dictated by me on (11/23/2018). I have reviewed the chart and agree that the record accurately reflects my personal performance of the history, physical exam, assessment and plan. I have also personally directed, reviewed, and agree with the discharge instructions.

## 2018-11-23 NOTE — HISTORY OF PRESENT ILLNESS
[FreeTextEntry1] : Ms. Alvarado is a 78 y/o female who presents to the office for a follow up visit regarding her history of allergic rhinitis, anemia, GERD, hypoxemia, obesity, poor balance, RAD, snoring, and SOB. Her chief complaint is shortness of breath. \par -she state that she has been feeling lousy, as her energy level has been very low\par -she notes that when she stands up after sitting, she will frequently become light headed\par -she states that her mouth is constantly dry\par -she reports that she has constant leg swelling and pains\par -she states that she has been taking 4 stool softener pills per day, and her bowels have been regular\par -she notes that her sense of smell and taste have been normal\par -she states that she has had some voice hoarseness\par -she notes that she has not been having any palpitations\par -she reports that when she is sitting down her breathing is fine, however once she stands up and walks around she will become short of breath\par -she states that she has not been coughing or wheezing\par -she reports that she has been taken off amiodarone therapy since 10/15/18, and has been feeling better since \par -she notes that her weight has decreased slightly\par - He/ she denies any headaches, nausea, vomiting, fever, chills, sweats, chest pain, chest pressure, palpitations, coughing, wheezing, fatigue, diarrhea, constipation, dysphagia, myalgias, dizziness, itchy eyes, itchy ears, heartburn, reflux, or sour taste in the mouth cough, wheeze, palpitations

## 2019-01-29 ENCOUNTER — APPOINTMENT (OUTPATIENT)
Dept: OBGYN | Facility: CLINIC | Age: 79
End: 2019-01-29
Payer: MEDICARE

## 2019-01-29 PROCEDURE — 99215 OFFICE O/P EST HI 40 MIN: CPT

## 2019-03-11 ENCOUNTER — APPOINTMENT (OUTPATIENT)
Dept: OBGYN | Facility: CLINIC | Age: 79
End: 2019-03-11
Payer: MEDICARE

## 2019-03-11 PROCEDURE — 99214 OFFICE O/P EST MOD 30 MIN: CPT

## 2019-03-22 ENCOUNTER — INPATIENT (INPATIENT)
Facility: HOSPITAL | Age: 79
LOS: 4 days | Discharge: HOME | DRG: 291 | End: 2019-03-27
Attending: INTERNAL MEDICINE | Admitting: INTERNAL MEDICINE
Payer: COMMERCIAL

## 2019-03-22 VITALS
TEMPERATURE: 98 F | SYSTOLIC BLOOD PRESSURE: 141 MMHG | HEART RATE: 107 BPM | RESPIRATION RATE: 24 BRPM | WEIGHT: 240.08 LBS | HEIGHT: 62 IN | OXYGEN SATURATION: 90 % | DIASTOLIC BLOOD PRESSURE: 73 MMHG

## 2019-03-22 DIAGNOSIS — R06.02 SHORTNESS OF BREATH: ICD-10-CM

## 2019-03-22 DIAGNOSIS — Z95.3 PRESENCE OF XENOGENIC HEART VALVE: Chronic | ICD-10-CM

## 2019-03-22 DIAGNOSIS — Z98.891 HISTORY OF UTERINE SCAR FROM PREVIOUS SURGERY: Chronic | ICD-10-CM

## 2019-03-22 LAB
ALBUMIN SERPL ELPH-MCNC: 3.8 G/DL — SIGNIFICANT CHANGE UP (ref 3.3–5)
ALP SERPL-CCNC: 75 U/L — SIGNIFICANT CHANGE UP (ref 40–120)
ALT FLD-CCNC: 26 U/L — SIGNIFICANT CHANGE UP (ref 10–45)
ANION GAP SERPL CALC-SCNC: 14 MMOL/L — SIGNIFICANT CHANGE UP (ref 5–17)
ANION GAP SERPL CALC-SCNC: 18 MMOL/L — HIGH (ref 5–17)
APTT BLD: 32.4 SEC — SIGNIFICANT CHANGE UP (ref 27.5–36.3)
APTT BLD: 43 SEC — HIGH (ref 27.5–36.3)
AST SERPL-CCNC: 73 U/L — HIGH (ref 10–40)
BASOPHILS # BLD AUTO: 0 K/UL — SIGNIFICANT CHANGE UP (ref 0–0.2)
BASOPHILS NFR BLD AUTO: 0.3 % — SIGNIFICANT CHANGE UP (ref 0–2)
BILIRUB SERPL-MCNC: 0.4 MG/DL — SIGNIFICANT CHANGE UP (ref 0.2–1.2)
BLD GP AB SCN SERPL QL: NEGATIVE — SIGNIFICANT CHANGE UP
BUN SERPL-MCNC: 43 MG/DL — HIGH (ref 7–23)
BUN SERPL-MCNC: 44 MG/DL — HIGH (ref 7–23)
CALCIUM SERPL-MCNC: 9.5 MG/DL — SIGNIFICANT CHANGE UP (ref 8.4–10.5)
CALCIUM SERPL-MCNC: 9.8 MG/DL — SIGNIFICANT CHANGE UP (ref 8.4–10.5)
CHLORIDE SERPL-SCNC: 101 MMOL/L — SIGNIFICANT CHANGE UP (ref 96–108)
CHLORIDE SERPL-SCNC: 97 MMOL/L — SIGNIFICANT CHANGE UP (ref 96–108)
CO2 SERPL-SCNC: 23 MMOL/L — SIGNIFICANT CHANGE UP (ref 22–31)
CO2 SERPL-SCNC: 26 MMOL/L — SIGNIFICANT CHANGE UP (ref 22–31)
CREAT SERPL-MCNC: 1.83 MG/DL — HIGH (ref 0.5–1.3)
CREAT SERPL-MCNC: 1.84 MG/DL — HIGH (ref 0.5–1.3)
EOSINOPHIL # BLD AUTO: 0 K/UL — SIGNIFICANT CHANGE UP (ref 0–0.5)
EOSINOPHIL NFR BLD AUTO: 0.1 % — SIGNIFICANT CHANGE UP (ref 0–6)
GAS PNL BLDV: SIGNIFICANT CHANGE UP
GAS PNL BLDV: SIGNIFICANT CHANGE UP
GLUCOSE SERPL-MCNC: 197 MG/DL — HIGH (ref 70–99)
GLUCOSE SERPL-MCNC: 213 MG/DL — HIGH (ref 70–99)
HCT VFR BLD CALC: 28.8 % — LOW (ref 34.5–45)
HCT VFR BLD CALC: 31.3 % — LOW (ref 34.5–45)
HGB BLD-MCNC: 9.2 G/DL — LOW (ref 11.5–15.5)
HGB BLD-MCNC: 9.9 G/DL — LOW (ref 11.5–15.5)
INR BLD: 2.16 RATIO — HIGH (ref 0.88–1.16)
INR BLD: 2.19 RATIO — HIGH (ref 0.88–1.16)
LYMPHOCYTES # BLD AUTO: 1.4 K/UL — SIGNIFICANT CHANGE UP (ref 1–3.3)
LYMPHOCYTES # BLD AUTO: 9.5 % — LOW (ref 13–44)
MCHC RBC-ENTMCNC: 29.8 PG — SIGNIFICANT CHANGE UP (ref 27–34)
MCHC RBC-ENTMCNC: 30 PG — SIGNIFICANT CHANGE UP (ref 27–34)
MCHC RBC-ENTMCNC: 31.7 GM/DL — LOW (ref 32–36)
MCHC RBC-ENTMCNC: 32 GM/DL — SIGNIFICANT CHANGE UP (ref 32–36)
MCV RBC AUTO: 93.2 FL — SIGNIFICANT CHANGE UP (ref 80–100)
MCV RBC AUTO: 94.7 FL — SIGNIFICANT CHANGE UP (ref 80–100)
MONOCYTES # BLD AUTO: 0.8 K/UL — SIGNIFICANT CHANGE UP (ref 0–0.9)
MONOCYTES NFR BLD AUTO: 5 % — SIGNIFICANT CHANGE UP (ref 2–14)
NEUTROPHILS # BLD AUTO: 13 K/UL — HIGH (ref 1.8–7.4)
NEUTROPHILS NFR BLD AUTO: 85.2 % — HIGH (ref 43–77)
NT-PROBNP SERPL-SCNC: 3196 PG/ML — HIGH (ref 0–300)
PLATELET # BLD AUTO: 191 K/UL — SIGNIFICANT CHANGE UP (ref 150–400)
PLATELET # BLD AUTO: 232 K/UL — SIGNIFICANT CHANGE UP (ref 150–400)
POTASSIUM SERPL-MCNC: 4.4 MMOL/L — SIGNIFICANT CHANGE UP (ref 3.5–5.3)
POTASSIUM SERPL-MCNC: 5.7 MMOL/L — HIGH (ref 3.5–5.3)
POTASSIUM SERPL-SCNC: 4.4 MMOL/L — SIGNIFICANT CHANGE UP (ref 3.5–5.3)
POTASSIUM SERPL-SCNC: 5.7 MMOL/L — HIGH (ref 3.5–5.3)
PROT SERPL-MCNC: 8 G/DL — SIGNIFICANT CHANGE UP (ref 6–8.3)
PROTHROM AB SERPL-ACNC: 25.4 SEC — HIGH (ref 10–12.9)
PROTHROM AB SERPL-ACNC: 25.8 SEC — HIGH (ref 10–12.9)
RBC # BLD: 3.09 M/UL — LOW (ref 3.8–5.2)
RBC # BLD: 3.31 M/UL — LOW (ref 3.8–5.2)
RBC # FLD: 14.8 % — HIGH (ref 10.3–14.5)
RBC # FLD: 15.2 % — HIGH (ref 10.3–14.5)
RH IG SCN BLD-IMP: POSITIVE — SIGNIFICANT CHANGE UP
SODIUM SERPL-SCNC: 138 MMOL/L — SIGNIFICANT CHANGE UP (ref 135–145)
SODIUM SERPL-SCNC: 141 MMOL/L — SIGNIFICANT CHANGE UP (ref 135–145)
TROPONIN T, HIGH SENSITIVITY RESULT: 67 NG/L — HIGH (ref 0–51)
TROPONIN T, HIGH SENSITIVITY RESULT: 67 NG/L — HIGH (ref 0–51)
WBC # BLD: 12.4 K/UL — HIGH (ref 3.8–10.5)
WBC # BLD: 15.2 K/UL — HIGH (ref 3.8–10.5)
WBC # FLD AUTO: 12.4 K/UL — HIGH (ref 3.8–10.5)
WBC # FLD AUTO: 15.2 K/UL — HIGH (ref 3.8–10.5)

## 2019-03-22 PROCEDURE — 99223 1ST HOSP IP/OBS HIGH 75: CPT

## 2019-03-22 PROCEDURE — 93010 ELECTROCARDIOGRAM REPORT: CPT

## 2019-03-22 PROCEDURE — 99285 EMERGENCY DEPT VISIT HI MDM: CPT | Mod: 25

## 2019-03-22 PROCEDURE — 71045 X-RAY EXAM CHEST 1 VIEW: CPT | Mod: 26

## 2019-03-22 NOTE — ED ADULT TRIAGE NOTE - CCCP TRG CHIEF CMPLNT
diff breathing x 1 month, on home 02, O2 89-90% on 2L NC increased to 98% on 4L NC in triage, received iron infusion today, scheduled for blood transfusion on Monday.

## 2019-03-22 NOTE — ED PROVIDER NOTE - CLINICAL SUMMARY MEDICAL DECISION MAKING FREE TEXT BOX
78F concern is for worsening CHF, last echo in 2017 showing EF 56%, p/w increasing QUESADA, SOB and leg swelling. Non-compliant with meds. Pt is also noted to be tachy and is not taking a/c (for afib?) due to vaginal bleeding (denies h/o DVT/PE). Concern for PE. Will need V/Q in setting of SAPPHIRE.

## 2019-03-22 NOTE — ED PROVIDER NOTE - OBJECTIVE STATEMENT
78F pmh HTN, DM, HFpEF, anemia, p/w shortness of breath. Pt states for past few months she has become increasingly short of breath. She often skips her torsemide doses due to it being inconvenient. Pt states she has been getting iron infusion due to her worsening anemia. She has a h/o vaginal bleeding for which she had a d&c. Bleeding stopped for a period of time but has since returned. Today when pt went to leave iron infusion she felt very sob after only a few steps and came to ER. Denies CP, denies f/c, n/v/d, syncope, numbness/weakness.

## 2019-03-22 NOTE — ED ADULT NURSE NOTE - NSIMPLEMENTINTERV_GEN_ALL_ED
Implemented All Fall Risk Interventions:  [place] to call system. Call bell, personal items and telephone within reach. Instruct patient to call for assistance. Room bathroom lighting operational. Non-slip footwear when patient is off stretcher. Physically safe environment: no spills, clutter or unnecessary equipment. Stretcher in lowest position, wheels locked, appropriate side rails in place. Provide visual cue, wrist band, yellow gown, etc. Monitor gait and stability. Monitor for mental status changes and reorient to person, place, and time. Review medications for side effects contributing to fall risk. Reinforce activity limits and safety measures with patient and family.

## 2019-03-22 NOTE — ED ADULT NURSE NOTE - NSFALLRSKINDICATORS_ED_ALL_ED
----- Message from Gabriela Gonzales DO sent at 4/6/2017  4:06 PM CDT -----  Repeat urine no longer shows any signs of blood or white blood cells.   no

## 2019-03-22 NOTE — ED PROVIDER NOTE - PHYSICAL EXAMINATION
General: Well developed, well nourished, in no acute distress   HEENT: Normocephalic and atraumatic, EOMI, Trachea midline.   Cardiac: Normal S1 and S2 w/ tachycardia. No MRG.  Pulmonary: CTA bilaterally. No increased WOB. satting 95% on RA   Abdominal: Soft, NTND  Neurologic: No focal sensory or motor deficits.  Musculoskeletal: No limited ROM.  Vascular: 2+ pitting edema in bilateral legs.  Skin: Color appropriate for race.   Psychiatric: Appropriate mood and affect. No apparent risk to self or others.  Lucio Proctor, PGY-1

## 2019-03-22 NOTE — ED PROVIDER NOTE - ATTENDING CONTRIBUTION TO CARE
Attending MD Tony.  Agree with above.  Pt is a 77 yo female with pmhx of HTN, DM, heart failure with reportedly preserved ejection fraction and anemia 2/2 iron deficiency, MV replacement 2014, poss paroxysmal afib only on ASA per pt.  Pt presents with increasing SOB over the last couple of mos.  Often skips torsemide 2/2 inconvenience.  Gets frequent iron infusions for this.  Was leaving her iron infusion and began to feel so SOB she felt she needed to come to ED.  Pt endorses persistent vaginal bleeding despite D & C in 2017.  States was evaluated for vaginal bleeding and was told it is a ‘polyp’.  On exam pt is sating 96% on room air.  Pt is on home O2 2L by her allergist for unclear reasons.  No hx of COPD/smoking/asthma.  Breath sounds clear.  Bialteral LE’s are very swollen/edematous.  Pt had an EF of 57% in 2017.

## 2019-03-22 NOTE — ED PROVIDER NOTE - CARE PLAN
Principal Discharge DX:	Shortness of breath  Secondary Diagnosis:	Edema of both lower extremities  Secondary Diagnosis:	Chest discomfort

## 2019-03-22 NOTE — ED PROVIDER NOTE - NS ED ROS FT
Constitution: No Fever or chills  Eyes: No visual changes  HEENT: No URI symptoms  Cardio: No Chest pain  Resp: +SOB, QUESADA  GI: No abdominal pain  : +vaginal bleeding. No dysuria  MSK: No Back pain  Neuro: No Headache  Skin: No rashes  All other ROS as per HPI  Lucio Proctor, PGY-1

## 2019-03-22 NOTE — ED PROVIDER NOTE - PMH
Anemia of chronic disease    Arthritis of spine    Asthma  PFT (4/2018)  Chronic diastolic CHF (congestive heart failure)  EF 56% (4/2017)  Chronic kidney disease (CKD)    Chronic low back pain    Depression (emotion)    Diabetes mellitus  T2DM last A1C 6.7 mg/dl in January   fs range 59- 200 mg/dl  Dyslipidemia associated with type 2 diabetes mellitus    Edema    GERD (gastroesophageal reflux disease)    H/O: hypothyroidism    Herniated disc  lumbar  History of postmenopausal bleeding    Hypertension    Macular degeneration of left eye  receiving injection  Morbid obesity with BMI of 45.0-49.9, adult    Neuropathy    On home oxygen therapy  2  liters nasal cannula  VICKY (obstructive sleep apnea)    Paroxysmal atrial fibrillation  h/o rapid ventricular rate 2 years ago, admitted at [facility], controlled with medication as per patient.  last INR 2.0  Peripheral arterial disease  s/p remote stent. not on antiplatelet meds for a while.  Ulcerative colitis Anemia of chronic disease    Arthritis of spine    Asthma  PFT (4/2018)  Chronic diastolic CHF (congestive heart failure)  EF 56% (4/2017)  Chronic kidney disease (CKD)    Chronic low back pain    Depression (emotion)    Diabetes mellitus  T2DM last A1C 6.7 mg/dl in January   fs range 59- 200 mg/dl  Dyslipidemia associated with type 2 diabetes mellitus    Edema    GERD (gastroesophageal reflux disease)    H/O: hypothyroidism    Herniated disc  lumbar  History of postmenopausal bleeding    Hypertension    Macular degeneration of left eye  receiving injection  Morbid obesity with BMI of 45.0-49.9, adult    Neuropathy    On home oxygen therapy  2  liters nasal cannula  VICKY (obstructive sleep apnea)    Paroxysmal atrial fibrillation  h/o rapid ventricular rate 2 years ago, admitted at [facility], controlled with medication as per patient.  last INR 2.0  Peripheral arterial disease  s/p remote stent. not on antiplatelet meds for a while.  Ulcerative colitis

## 2019-03-22 NOTE — ED PROVIDER NOTE - PROGRESS NOTE DETAILS
Attending MD Tony.  Call placed to Dr. Guerra, unable to reach Dr. Lopez.  Message left for Dr. Lopez.  Dr. Guerra requesting Dr. Lopez be given time to call back. Attending MD Tony.  Dr. Lopez reached and accepting pt to her service.

## 2019-03-22 NOTE — ED ADULT NURSE NOTE - OBJECTIVE STATEMENT
78 yr old female came in with sob, received iron transfusion today scheduled for blood transfusion monday. on assessment a and o x 3 lungs clear on o2 at home and here. no h/o of copd. pt is morbidly obese requires wheelchair. no significant swelling in extremities. no fevers no nausea vomiting no other complaints no chest pain.

## 2019-03-23 DIAGNOSIS — E03.9 HYPOTHYROIDISM, UNSPECIFIED: ICD-10-CM

## 2019-03-23 DIAGNOSIS — N93.9 ABNORMAL UTERINE AND VAGINAL BLEEDING, UNSPECIFIED: ICD-10-CM

## 2019-03-23 DIAGNOSIS — N18.3 CHRONIC KIDNEY DISEASE, STAGE 3 (MODERATE): ICD-10-CM

## 2019-03-23 DIAGNOSIS — K51.919 ULCERATIVE COLITIS, UNSPECIFIED WITH UNSPECIFIED COMPLICATIONS: ICD-10-CM

## 2019-03-23 DIAGNOSIS — E11.59 TYPE 2 DIABETES MELLITUS WITH OTHER CIRCULATORY COMPLICATIONS: ICD-10-CM

## 2019-03-23 DIAGNOSIS — Z87.42 PERSONAL HISTORY OF OTHER DISEASES OF THE FEMALE GENITAL TRACT: ICD-10-CM

## 2019-03-23 DIAGNOSIS — D50.0 IRON DEFICIENCY ANEMIA SECONDARY TO BLOOD LOSS (CHRONIC): ICD-10-CM

## 2019-03-23 DIAGNOSIS — R74.8 ABNORMAL LEVELS OF OTHER SERUM ENZYMES: ICD-10-CM

## 2019-03-23 DIAGNOSIS — I87.2 VENOUS INSUFFICIENCY (CHRONIC) (PERIPHERAL): ICD-10-CM

## 2019-03-23 DIAGNOSIS — I48.0 PAROXYSMAL ATRIAL FIBRILLATION: ICD-10-CM

## 2019-03-23 DIAGNOSIS — I50.33 ACUTE ON CHRONIC DIASTOLIC (CONGESTIVE) HEART FAILURE: ICD-10-CM

## 2019-03-23 LAB
ANION GAP SERPL CALC-SCNC: 13 MMOL/L — SIGNIFICANT CHANGE UP (ref 5–17)
BASE EXCESS BLDV CALC-SCNC: 4.8 MMOL/L — HIGH (ref -2–2)
BUN SERPL-MCNC: 43 MG/DL — HIGH (ref 7–23)
CA-I SERPL-SCNC: 1.16 MMOL/L — SIGNIFICANT CHANGE UP (ref 1.12–1.3)
CALCIUM SERPL-MCNC: 9.9 MG/DL — SIGNIFICANT CHANGE UP (ref 8.4–10.5)
CHLORIDE BLDV-SCNC: 107 MMOL/L — SIGNIFICANT CHANGE UP (ref 96–108)
CHLORIDE SERPL-SCNC: 102 MMOL/L — SIGNIFICANT CHANGE UP (ref 96–108)
CO2 BLDV-SCNC: 33 MMOL/L — HIGH (ref 22–30)
CO2 SERPL-SCNC: 28 MMOL/L — SIGNIFICANT CHANGE UP (ref 22–31)
CREAT SERPL-MCNC: 1.84 MG/DL — HIGH (ref 0.5–1.3)
FERRITIN SERPL-MCNC: 1223 NG/ML — HIGH (ref 15–150)
GAS PNL BLDV: 140 MMOL/L — SIGNIFICANT CHANGE UP (ref 136–145)
GAS PNL BLDV: SIGNIFICANT CHANGE UP
GLUCOSE BLDC GLUCOMTR-MCNC: 123 MG/DL — HIGH (ref 70–99)
GLUCOSE BLDC GLUCOMTR-MCNC: 178 MG/DL — HIGH (ref 70–99)
GLUCOSE BLDC GLUCOMTR-MCNC: 180 MG/DL — HIGH (ref 70–99)
GLUCOSE BLDC GLUCOMTR-MCNC: 281 MG/DL — HIGH (ref 70–99)
GLUCOSE BLDV-MCNC: 115 MG/DL — HIGH (ref 70–99)
GLUCOSE SERPL-MCNC: 119 MG/DL — HIGH (ref 70–99)
HCO3 BLDV-SCNC: 31 MMOL/L — HIGH (ref 21–29)
HCT VFR BLD CALC: 30.6 % — LOW (ref 34.5–45)
HCT VFR BLDA CALC: 38 % — LOW (ref 39–50)
HGB BLD CALC-MCNC: 12.5 G/DL — SIGNIFICANT CHANGE UP (ref 11.5–15.5)
HGB BLD-MCNC: 9.3 G/DL — LOW (ref 11.5–15.5)
INR BLD: 1.76 RATIO — HIGH (ref 0.88–1.16)
IRON SATN MFR SERPL: 236 UG/DL — HIGH (ref 30–160)
LACTATE BLDV-MCNC: 1.7 MMOL/L — SIGNIFICANT CHANGE UP (ref 0.7–2)
MCHC RBC-ENTMCNC: 29.6 PG — SIGNIFICANT CHANGE UP (ref 27–34)
MCHC RBC-ENTMCNC: 30.4 GM/DL — LOW (ref 32–36)
MCV RBC AUTO: 97.5 FL — SIGNIFICANT CHANGE UP (ref 80–100)
PCO2 BLDV: 58 MMHG — HIGH (ref 35–50)
PH BLDV: 7.35 — SIGNIFICANT CHANGE UP (ref 7.35–7.45)
PLATELET # BLD AUTO: 212 K/UL — SIGNIFICANT CHANGE UP (ref 150–400)
PO2 BLDV: 28 MMHG — SIGNIFICANT CHANGE UP (ref 25–45)
POTASSIUM BLDV-SCNC: 3.6 MMOL/L — SIGNIFICANT CHANGE UP (ref 3.5–5.3)
POTASSIUM SERPL-MCNC: 3.9 MMOL/L — SIGNIFICANT CHANGE UP (ref 3.5–5.3)
POTASSIUM SERPL-SCNC: 3.9 MMOL/L — SIGNIFICANT CHANGE UP (ref 3.5–5.3)
PROTHROM AB SERPL-ACNC: 20.2 SEC — HIGH (ref 10–13.1)
RBC # BLD: 3.14 M/UL — LOW (ref 3.8–5.2)
RBC # FLD: 15.9 % — HIGH (ref 10.3–14.5)
SAO2 % BLDV: 39 % — LOW (ref 67–88)
SODIUM SERPL-SCNC: 143 MMOL/L — SIGNIFICANT CHANGE UP (ref 135–145)
TROPONIN T, HIGH SENSITIVITY RESULT: 75 NG/L — HIGH (ref 0–51)
WBC # BLD: 11.98 K/UL — HIGH (ref 3.8–10.5)
WBC # FLD AUTO: 11.98 K/UL — HIGH (ref 3.8–10.5)

## 2019-03-23 RX ORDER — CHOLECALCIFEROL (VITAMIN D3) 125 MCG
1000 CAPSULE ORAL DAILY
Qty: 0 | Refills: 0 | Status: DISCONTINUED | OUTPATIENT
Start: 2019-03-23 | End: 2019-03-27

## 2019-03-23 RX ORDER — ISOSORBIDE MONONITRATE 60 MG/1
30 TABLET, EXTENDED RELEASE ORAL DAILY
Qty: 0 | Refills: 0 | Status: DISCONTINUED | OUTPATIENT
Start: 2019-03-23 | End: 2019-03-27

## 2019-03-23 RX ORDER — PANTOPRAZOLE SODIUM 20 MG/1
40 TABLET, DELAYED RELEASE ORAL
Qty: 0 | Refills: 0 | Status: DISCONTINUED | OUTPATIENT
Start: 2019-03-23 | End: 2019-03-27

## 2019-03-23 RX ORDER — DULOXETINE HYDROCHLORIDE 30 MG/1
60 CAPSULE, DELAYED RELEASE ORAL DAILY
Qty: 0 | Refills: 0 | Status: DISCONTINUED | OUTPATIENT
Start: 2019-03-23 | End: 2019-03-27

## 2019-03-23 RX ORDER — CYCLOBENZAPRINE HYDROCHLORIDE 10 MG/1
0 TABLET, FILM COATED ORAL
Qty: 0 | Refills: 0 | COMMUNITY

## 2019-03-23 RX ORDER — FUROSEMIDE 40 MG
60 TABLET ORAL ONCE
Qty: 0 | Refills: 0 | Status: COMPLETED | OUTPATIENT
Start: 2019-03-23 | End: 2019-03-23

## 2019-03-23 RX ORDER — FUROSEMIDE 40 MG
40 TABLET ORAL DAILY
Qty: 0 | Refills: 0 | Status: DISCONTINUED | OUTPATIENT
Start: 2019-03-23 | End: 2019-03-25

## 2019-03-23 RX ORDER — LEVOTHYROXINE SODIUM 125 MCG
175 TABLET ORAL DAILY
Qty: 0 | Refills: 0 | Status: DISCONTINUED | OUTPATIENT
Start: 2019-03-23 | End: 2019-03-27

## 2019-03-23 RX ORDER — AMIODARONE HYDROCHLORIDE 400 MG/1
200 TABLET ORAL DAILY
Qty: 0 | Refills: 0 | Status: DISCONTINUED | OUTPATIENT
Start: 2019-03-23 | End: 2019-03-27

## 2019-03-23 RX ORDER — DEXTROSE 50 % IN WATER 50 %
25 SYRINGE (ML) INTRAVENOUS ONCE
Qty: 0 | Refills: 0 | Status: DISCONTINUED | OUTPATIENT
Start: 2019-03-23 | End: 2019-03-27

## 2019-03-23 RX ORDER — DEXTROSE 50 % IN WATER 50 %
15 SYRINGE (ML) INTRAVENOUS ONCE
Qty: 0 | Refills: 0 | Status: DISCONTINUED | OUTPATIENT
Start: 2019-03-23 | End: 2019-03-27

## 2019-03-23 RX ORDER — CYCLOBENZAPRINE HYDROCHLORIDE 10 MG/1
5 TABLET, FILM COATED ORAL THREE TIMES A DAY
Qty: 0 | Refills: 0 | Status: DISCONTINUED | OUTPATIENT
Start: 2019-03-23 | End: 2019-03-27

## 2019-03-23 RX ORDER — DEXTROSE 50 % IN WATER 50 %
12.5 SYRINGE (ML) INTRAVENOUS ONCE
Qty: 0 | Refills: 0 | Status: DISCONTINUED | OUTPATIENT
Start: 2019-03-23 | End: 2019-03-27

## 2019-03-23 RX ORDER — INSULIN LISPRO 100/ML
VIAL (ML) SUBCUTANEOUS
Qty: 0 | Refills: 0 | Status: DISCONTINUED | OUTPATIENT
Start: 2019-03-23 | End: 2019-03-25

## 2019-03-23 RX ORDER — MESALAMINE 400 MG
2400 TABLET, DELAYED RELEASE (ENTERIC COATED) ORAL DAILY
Qty: 0 | Refills: 0 | Status: DISCONTINUED | OUTPATIENT
Start: 2019-03-23 | End: 2019-03-23

## 2019-03-23 RX ORDER — INSULIN ASPART 100 [IU]/ML
0 INJECTION, SOLUTION SUBCUTANEOUS
Qty: 0 | Refills: 0 | COMMUNITY

## 2019-03-23 RX ORDER — MESALAMINE 400 MG
2.4 TABLET, DELAYED RELEASE (ENTERIC COATED) ORAL DAILY
Qty: 0 | Refills: 0 | Status: DISCONTINUED | OUTPATIENT
Start: 2019-03-23 | End: 2019-03-27

## 2019-03-23 RX ORDER — CARVEDILOL PHOSPHATE 80 MG/1
6.25 CAPSULE, EXTENDED RELEASE ORAL EVERY 12 HOURS
Qty: 0 | Refills: 0 | Status: DISCONTINUED | OUTPATIENT
Start: 2019-03-23 | End: 2019-03-27

## 2019-03-23 RX ORDER — MESALAMINE 400 MG
0 TABLET, DELAYED RELEASE (ENTERIC COATED) ORAL
Qty: 0 | Refills: 0 | COMMUNITY

## 2019-03-23 RX ORDER — WARFARIN SODIUM 2.5 MG/1
1 TABLET ORAL
Qty: 0 | Refills: 0 | COMMUNITY

## 2019-03-23 RX ORDER — DOCUSATE SODIUM 100 MG
0 CAPSULE ORAL
Qty: 0 | Refills: 0 | COMMUNITY

## 2019-03-23 RX ORDER — BUMETANIDE 0.25 MG/ML
0 INJECTION INTRAMUSCULAR; INTRAVENOUS
Qty: 0 | Refills: 0 | COMMUNITY

## 2019-03-23 RX ORDER — INSULIN LISPRO 100/ML
VIAL (ML) SUBCUTANEOUS AT BEDTIME
Qty: 0 | Refills: 0 | Status: DISCONTINUED | OUTPATIENT
Start: 2019-03-23 | End: 2019-03-27

## 2019-03-23 RX ORDER — CARVEDILOL PHOSPHATE 80 MG/1
1 CAPSULE, EXTENDED RELEASE ORAL
Qty: 0 | Refills: 0 | COMMUNITY

## 2019-03-23 RX ORDER — GLUCAGON INJECTION, SOLUTION 0.5 MG/.1ML
1 INJECTION, SOLUTION SUBCUTANEOUS ONCE
Qty: 0 | Refills: 0 | Status: DISCONTINUED | OUTPATIENT
Start: 2019-03-23 | End: 2019-03-27

## 2019-03-23 RX ADMIN — Medication 2.4 GRAM(S): at 14:29

## 2019-03-23 RX ADMIN — Medication 1: at 14:28

## 2019-03-23 RX ADMIN — Medication 60 MILLIGRAM(S): at 00:34

## 2019-03-23 RX ADMIN — Medication 175 MICROGRAM(S): at 05:00

## 2019-03-23 RX ADMIN — ISOSORBIDE MONONITRATE 30 MILLIGRAM(S): 60 TABLET, EXTENDED RELEASE ORAL at 11:28

## 2019-03-23 RX ADMIN — PANTOPRAZOLE SODIUM 40 MILLIGRAM(S): 20 TABLET, DELAYED RELEASE ORAL at 05:00

## 2019-03-23 RX ADMIN — Medication 1: at 22:09

## 2019-03-23 RX ADMIN — DULOXETINE HYDROCHLORIDE 60 MILLIGRAM(S): 30 CAPSULE, DELAYED RELEASE ORAL at 11:28

## 2019-03-23 RX ADMIN — Medication 1000 UNIT(S): at 11:28

## 2019-03-23 RX ADMIN — CARVEDILOL PHOSPHATE 6.25 MILLIGRAM(S): 80 CAPSULE, EXTENDED RELEASE ORAL at 17:45

## 2019-03-23 RX ADMIN — Medication 1: at 19:02

## 2019-03-23 RX ADMIN — CARVEDILOL PHOSPHATE 6.25 MILLIGRAM(S): 80 CAPSULE, EXTENDED RELEASE ORAL at 05:00

## 2019-03-23 RX ADMIN — Medication 40 MILLIGRAM(S): at 09:10

## 2019-03-23 RX ADMIN — AMIODARONE HYDROCHLORIDE 200 MILLIGRAM(S): 400 TABLET ORAL at 05:00

## 2019-03-23 NOTE — H&P ADULT - PROBLEM SELECTOR PLAN 1
-give furosemide 60mg IV now and then will do 40mg IV BID  -check TTE  -troponin x2 67  -will need cards eval  -daily wt  -i/os  -likely from nonadherence and not following salt restricted diet (eats pretzels, salty steak).

## 2019-03-23 NOTE — H&P ADULT - NSHPPHYSICALEXAM_GEN_ALL_CORE
PHYSICAL EXAM:  Vital Signs Last 24 Hrs  T(C): 37.1 (03-22-19 @ 22:59)  T(F): 98.7 (03-22-19 @ 22:59), Max: 98.7 (03-22-19 @ 22:59)  HR: 89 (03-22-19 @ 22:59) (89 - 107)  BP: 145/76 (03-22-19 @ 22:59)  BP(mean): --  RR: 20 (03-22-19 @ 23:27) (20 - 24)  SpO2: 99% (03-22-19 @ 23:27) (90% - 99%)  Wt(kg): --    Constitutional: NAD, awake and alert, obese appearing   EYES: EOMI  ENT:  Normal Hearing, no tonsillar exudates   Neck: Soft and supple , no thyromegaly   Respiratory: +crackles bilaterally, no distress   Cardiovascular: S1 and S2, regular rate and rhythm, no Murmurs, gallops or rubs, +JVD  Gastrointestinal: Bowel Sounds present, soft, nontender, nondistended, no guarding, no rebound  Extremities: No cyanosis or clubbing; warm to touch  Vascular: 2+ peripheral pulses lower ex  Neurological: No focal deficits, CN II-XII intact bilaterally, sensation to light touch intact in all extremities, gait intact. Pupils are equally reactive to light and symmetrical in size.   Musculoskeletal: 5/5 strength b/l upper and lower extremities; no joint swelling. left foot digit amputation  Skin: +chronic venous stasis of legs   Psych: no depression or anhedonia, AAOx3  HEME: no bruises, no nose bleeds

## 2019-03-23 NOTE — CONSULT NOTE ADULT - SUBJECTIVE AND OBJECTIVE BOX
Patient is a 78y old  Female who presents with a chief complaint of shortness of breath (23 Mar 2019 13:16)    78 year old woman with a history of bioprosthetic MVR for MS, severe pulmonary HTN, on home O2, diastolic CHF, PAF on coumadin, DM, CRI, presenting with progressive increased dyspnea, LE edema, PND and orthopnea.  Patient has a history of medication and dietary noncompliance.        PAST MEDICAL & SURGICAL HISTORY:  Chronic kidney disease (CKD)  Anemia of chronic disease  On home oxygen therapy: 2  liters nasal cannula  Asthma: PFT (2018)  History of postmenopausal bleeding  Dyslipidemia associated with type 2 diabetes mellitus  Paroxysmal atrial fibrillation: h/o rapid ventricular rate 2 years ago, admitted at [facility], controlled with medication as per patient.  last INR 2.0  Morbid obesity with BMI of 45.0-49.9, adult  H/O: hypothyroidism  Chronic diastolic CHF (congestive heart failure): EF 56% (2017)  Depression (emotion)  Arthritis of spine  Macular degeneration of left eye: receiving injection  Neuropathy  Peripheral arterial disease: s/p remote stent. not on antiplatelet meds for a while.  Hypertension  Edema  GERD (gastroesophageal reflux disease)  Chronic low back pain  Herniated disc: lumbar  VICKY (obstructive sleep apnea)  Ulcerative colitis  Diabetes mellitus: T2DM last A1C 6.7 mg/dl in January   fs range 59- 200 mg/dl  History of mitral valve replacement with bioprosthetic valve: x 4 years ago  H/O  section: x 2  Amputated toe    Allergies    Augmentin (Swelling)  cephalexin (Swelling)    Intolerances      MEDICATIONS  (STANDING):  amiodarone    Tablet 200 milliGRAM(s) Oral daily  carvedilol 6.25 milliGRAM(s) Oral every 12 hours  cholecalciferol 1000 Unit(s) Oral daily  dextrose 50% Injectable 12.5 Gram(s) IV Push once  dextrose 50% Injectable 25 Gram(s) IV Push once  dextrose 50% Injectable 25 Gram(s) IV Push once  DULoxetine 60 milliGRAM(s) Oral daily  furosemide   Injectable 40 milliGRAM(s) IV Push daily  insulin lispro (HumaLOG) corrective regimen sliding scale   SubCutaneous three times a day before meals  insulin lispro (HumaLOG) corrective regimen sliding scale   SubCutaneous at bedtime  isosorbide   mononitrate ER Tablet (IMDUR) 30 milliGRAM(s) Oral daily  levothyroxine 175 MICROGram(s) Oral daily  mesalamine DR (24-Hour) Tablet 2.4 Gram(s) Oral daily  pantoprazole    Tablet 40 milliGRAM(s) Oral before breakfast    MEDICATIONS  (PRN):  cyclobenzaprine 5 milliGRAM(s) Oral three times a day PRN Muscle Spasm  dextrose 40% Gel 15 Gram(s) Oral once PRN Blood Glucose LESS THAN 70 milliGRAM(s)/deciliter  glucagon  Injectable 1 milliGRAM(s) IntraMuscular once PRN Glucose LESS THAN 70 milligrams/deciliter    FAMILY HISTORY:  No pertinent family history in first degree relatives      ROS:  Positive:    General: Denies weight loss, fevers, rash, decreased hearing  Cardiac:  See above   Resp: Denies cough, sputum, wheezing, hemoptysis  GI: Denies change in bowel habits, diarrhea, weight loss, melena, tarry stools,   nausea, vomiting, jaundice, abdominal pain, dysphagia  : Denies dysuria, nocturia, hematuria  Neuro: Denies tinnitus, headache, visual changes, weakness, dizziness or vertigo  Musculoskeletal: Denies neck pain back pain joint pain.  Skin: Denies rash, itching, dryness.  Endocrine: Denies polydipsia, polyuria  Psychiatric: Denies depression, anxiety  All other review of systems is negative unless indicated above.    PHYSICAL EXAM:  Vital Signs Last 24 Hrs  T(C): 36.9 (23 Mar 2019 08:47), Max: 37.1 (22 Mar 2019 22:59)  T(F): 98.4 (23 Mar 2019 08:47), Max: 98.7 (22 Mar 2019 22:59)  HR: 104 (23 Mar 2019 08:47) (89 - 109)  BP: 132/82 (23 Mar 2019 08:47) (132/82 - 147/85)  BP(mean): --  RR: 19 (23 Mar 2019 08:47) (19 - 24)  SpO2: 100% (23 Mar 2019 08:47) (90% - 100%)  I&O's Summary      General Appearance: 	 Alert, cooperative, no distress  HEENT: normocephalic, atraumatic, PERRLA, EOMI, conjunctiva normal, sclera anicteric,   Neck: no JVD,  carotid 2+  bilaterally without bruits, thyroid normal to inspection and palpation, no adenopathy, trachea midline  Lungs:  clear to auscultation and percussion bilaterally  Cor:  pmi 5th ICS MCL, regular rate and rhythm, S1 normal intensity, S2 normal intensity, no gallops, mumrus or rubs  Abdomen:	 soft, non-tender; bowel sounds normal; no masses,  no organomegaly  Extremities: without cyanosis, clubbing or edema  Vasc: 2-+ PT and DP pulses; no varicosities  Neurologic: A&O x 3 (time, place, person). Symmetric strength; limited exam  Musculoskeletal: no kyphosis, scoliosis; normal gait, normal tone  Skin: no rashes; limited exam    EKG:  Sinus tachycardia, 107, ST depressions possibly consistent with IL ischemia  Telemetry:    LABS:                        9.3    11.98 )-----------( 212      ( 23 Mar 2019 14:08 )             30.6     03-    143  |  102  |  43<H>  ----------------------------<  119<H>  3.9   |  28  |  1.84<H>    Ca    9.9      23 Mar 2019 11:56    TPro  8.0  /  Alb  3.8  /  TBili  0.4  /  DBili  x   /  AST  73<H>  /  ALT  26  /  AlkPhos  75  03-22    CAPILLARY BLOOD GLUCOSE      POCT Blood Glucose.: 178 mg/dL (23 Mar 2019 13:30)  POCT Blood Glucose.: 123 mg/dL (23 Mar 2019 09:48)  POCT Blood Glucose.: 176 mg/dL (22 Mar 2019 20:15)    PT/INR - ( 22 Mar 2019 19:34 )   PT: 25.8 sec;   INR: 2.19 ratio         PTT - ( 22 Mar 2019 19:34 )  PTT:32.4 sec      CXR: mild pulmonary edema    Trop 67 to 67 to 75  BNP 3107 Patient is a 78y old  Female who presents with a chief complaint of shortness of breath (23 Mar 2019 13:16)    78 year old woman with a history of bioprosthetic MVR for MS, severe pulmonary HTN, on home O2, diastolic CHF, morbid obesity, PAF on coumadin, DM, CRI, chronic anemia followed by hematology presenting with one month history of progressive increased dyspnea, LE edema, PND and orthopnea.  Patient has a history of medication and dietary noncompliance.  She denies chest pain or palpitations, lightheadedness, presyncope, or any GI bleeding.    PAST MEDICAL & SURGICAL HISTORY:  Chronic kidney disease (CKD)  Anemia of chronic disease  On home oxygen therapy: 2  liters nasal cannula  Asthma: PFT (2018)  History of postmenopausal bleeding  Dyslipidemia associated with type 2 diabetes mellitus  Paroxysmal atrial fibrillation: h/o rapid ventricular rate 2 years ago, admitted at [facility], controlled with medication as per patient.  last INR 2.0  Morbid obesity with BMI of 45.0-49.9, adult  H/O: hypothyroidism  Chronic diastolic CHF (congestive heart failure): EF 56% (2017)  Depression (emotion)  Arthritis of spine  Macular degeneration of left eye: receiving injection  Neuropathy  Peripheral arterial disease: s/p remote stent. not on antiplatelet meds for a while.  Hypertension  Edema  GERD (gastroesophageal reflux disease)  Chronic low back pain  Herniated disc: lumbar  VICKY (obstructive sleep apnea)  Ulcerative colitis  Diabetes mellitus: T2DM last A1C 6.7 mg/dl in January   fs range 59- 200 mg/dl  History of mitral valve replacement with bioprosthetic valve: x 4 years ago  H/O  section: x 2  Amputated toe    Allergies    Augmentin (Swelling)  cephalexin (Swelling)    Intolerances      MEDICATIONS  (STANDING):  amiodarone    Tablet 200 milliGRAM(s) Oral daily  carvedilol 6.25 milliGRAM(s) Oral every 12 hours  cholecalciferol 1000 Unit(s) Oral daily  dextrose 50% Injectable 12.5 Gram(s) IV Push once  dextrose 50% Injectable 25 Gram(s) IV Push once  dextrose 50% Injectable 25 Gram(s) IV Push once  DULoxetine 60 milliGRAM(s) Oral daily  furosemide   Injectable 40 milliGRAM(s) IV Push daily  insulin lispro (HumaLOG) corrective regimen sliding scale   SubCutaneous three times a day before meals  insulin lispro (HumaLOG) corrective regimen sliding scale   SubCutaneous at bedtime  isosorbide   mononitrate ER Tablet (IMDUR) 30 milliGRAM(s) Oral daily  levothyroxine 175 MICROGram(s) Oral daily  mesalamine DR (24-Hour) Tablet 2.4 Gram(s) Oral daily  pantoprazole    Tablet 40 milliGRAM(s) Oral before breakfast    MEDICATIONS  (PRN):  cyclobenzaprine 5 milliGRAM(s) Oral three times a day PRN Muscle Spasm  dextrose 40% Gel 15 Gram(s) Oral once PRN Blood Glucose LESS THAN 70 milliGRAM(s)/deciliter  glucagon  Injectable 1 milliGRAM(s) IntraMuscular once PRN Glucose LESS THAN 70 milligrams/deciliter    FAMILY HISTORY:  No pertinent family history in first degree relatives      ROS:  Positive:    General: Denies weight loss, fevers, rash, decreased hearing  Cardiac:  See above   Resp: Denies cough, sputum, wheezing, hemoptysis  GI: Denies change in bowel habits, diarrhea, weight loss, melena, tarry stools,   nausea, vomiting, jaundice, abdominal pain, dysphagia  : Denies dysuria, nocturia, hematuria  Neuro: Denies tinnitus, headache, visual changes, weakness, dizziness or vertigo  Musculoskeletal: Denies neck pain back pain joint pain.  Skin: Denies rash, itching, dryness.  Endocrine: Denies polydipsia, polyuria  Psychiatric: Denies depression, anxiety  All other review of systems is negative unless indicated above.    PHYSICAL EXAM:  Vital Signs Last 24 Hrs  T(C): 36.9 (23 Mar 2019 08:47), Max: 37.1 (22 Mar 2019 22:59)  T(F): 98.4 (23 Mar 2019 08:47), Max: 98.7 (22 Mar 2019 22:59)  HR: 104 (23 Mar 2019 08:47) (89 - 109)  BP: 132/82 (23 Mar 2019 08:47) (132/82 - 147/85)  BP(mean): --  RR: 19 (23 Mar 2019 08:47) (19 - 24)  SpO2: 100% (23 Mar 2019 08:47) (90% - 100%)  I&O's Summary      General Appearance: 	 Alert, cooperative, comfortable at rest, obese  Neck: JVP elevated to about 14 cm  Lungs:  Decreased BS at bases  Cor:  pmi 5th ICS MCL, regular rate and rhythm, S1 normal intensity, S2 normal intensity  Abdomen:	 softly distended, active BS, NT  Extremities: 1-2+ BL edema    EKG:  Sinus tachycardia, 107, ST depressions possibly consistent with IL ischemia  Telemetry:    LABS:                        9.3    11.98 )-----------( 212      ( 23 Mar 2019 14:08 )             30.6         143  |  102  |  43<H>  ----------------------------<  119<H>  3.9   |  28  |  1.84<H>    Ca    9.9      23 Mar 2019 11:56    TPro  8.0  /  Alb  3.8  /  TBili  0.4  /  DBili  x   /  AST  73<H>  /  ALT  26  /  AlkPhos  75      CAPILLARY BLOOD GLUCOSE      POCT Blood Glucose.: 178 mg/dL (23 Mar 2019 13:30)  POCT Blood Glucose.: 123 mg/dL (23 Mar 2019 09:48)  POCT Blood Glucose.: 176 mg/dL (22 Mar 2019 20:15)    PT/INR - ( 22 Mar 2019 19:34 )   PT: 25.8 sec;   INR: 2.19 ratio         PTT - ( 22 Mar 2019 19:34 )  PTT:32.4 sec      CXR: mild pulmonary edema    Trop 67 to 67 to 75  BNP 3107

## 2019-03-23 NOTE — CONSULT NOTE ADULT - SUBJECTIVE AND OBJECTIVE BOX
Patient is a 78y old  Female who presents with a chief complaint of shortness of breath (23 Mar 2019 00:40)      HPI:  77 yo F w/ hx of moderate MS s/p bioprosthetic mitral valve, severe pulmonary HTN, DM2, anemia, diastolic CHF, hypothyroidism, paroxysmal atrial fibrillation, HTN presenting with progressive SOB. Patient reports symptoms have gradually worsened over the course of months. She is not adherent to her torsemide. Sometimes she does not take it if she is out in stores. Also reports eating bag of pretzels and salty foods. She reports weight loss but unable to lay flat. She has to sit up when sleeping. She is not on home O2. She has noticed her legs are both more edematous. She has had no fever, chills, productive cough, and no chest pain/palpitation, nausea/vomiting, abdominal pain. Her ADLs are severely affected. She requires assistance by  for ambulation and is primarily more wheelchair bound. Pt also reports intermittent postmenopausal bleeding. More spotting but not overt hemorrhage. (23 Mar 2019 00:40)       ROS:  Negative except for:    PAST MEDICAL & SURGICAL HISTORY:  Chronic kidney disease (CKD)  Anemia of chronic disease  On home oxygen therapy: 2  liters nasal cannula  Asthma: PFT (2018)  History of postmenopausal bleeding  Dyslipidemia associated with type 2 diabetes mellitus  Paroxysmal atrial fibrillation: h/o rapid ventricular rate 2 years ago, admitted at [facility], controlled with medication as per patient.  last INR 2.0  Morbid obesity with BMI of 45.0-49.9, adult  H/O: hypothyroidism  Chronic diastolic CHF (congestive heart failure): EF 56% (2017)  Depression (emotion)  Arthritis of spine  Macular degeneration of left eye: receiving injection  Neuropathy  Peripheral arterial disease: s/p remote stent. not on antiplatelet meds for a while.  Hypertension  Edema  GERD (gastroesophageal reflux disease)  Chronic low back pain  Herniated disc: lumbar  VICKY (obstructive sleep apnea)  Ulcerative colitis  Diabetes mellitus: T2DM last A1C 6.7 mg/dl in January   fs range 59- 200 mg/dl  History of mitral valve replacement with bioprosthetic valve: x 4 years ago  H/O  section: x 2  Amputated toe      SOCIAL HISTORY:    FAMILY HISTORY:  No pertinent family history in first degree relatives      MEDICATIONS  (STANDING):  amiodarone    Tablet 200 milliGRAM(s) Oral daily  carvedilol 6.25 milliGRAM(s) Oral every 12 hours  cholecalciferol 1000 Unit(s) Oral daily  dextrose 50% Injectable 12.5 Gram(s) IV Push once  dextrose 50% Injectable 25 Gram(s) IV Push once  dextrose 50% Injectable 25 Gram(s) IV Push once  DULoxetine 60 milliGRAM(s) Oral daily  furosemide   Injectable 40 milliGRAM(s) IV Push daily  insulin lispro (HumaLOG) corrective regimen sliding scale   SubCutaneous three times a day before meals  insulin lispro (HumaLOG) corrective regimen sliding scale   SubCutaneous at bedtime  isosorbide   mononitrate ER Tablet (IMDUR) 30 milliGRAM(s) Oral daily  levothyroxine 175 MICROGram(s) Oral daily  mesalamine DR (24-Hour) Tablet 2.4 Gram(s) Oral daily  pantoprazole    Tablet 40 milliGRAM(s) Oral before breakfast    MEDICATIONS  (PRN):  cyclobenzaprine 5 milliGRAM(s) Oral three times a day PRN Muscle Spasm  dextrose 40% Gel 15 Gram(s) Oral once PRN Blood Glucose LESS THAN 70 milliGRAM(s)/deciliter  glucagon  Injectable 1 milliGRAM(s) IntraMuscular once PRN Glucose LESS THAN 70 milligrams/deciliter      Allergies    Augmentin (Swelling)  cephalexin (Swelling)    Intolerances        Vital Signs Last 24 Hrs  T(C): 36.9 (23 Mar 2019 08:47), Max: 37.1 (22 Mar 2019 22:59)  T(F): 98.4 (23 Mar 2019 08:47), Max: 98.7 (22 Mar 2019 22:59)  HR: 104 (23 Mar 2019 08:47) (89 - 109)  BP: 132/82 (23 Mar 2019 08:47) (132/82 - 147/85)  BP(mean): --  RR: 19 (23 Mar 2019 08:47) (19 - 24)  SpO2: 100% (23 Mar 2019 08:47) (90% - 100%)    REVIEW OF SYSTEMS:    CONSTITUTIONAL: No weakness, fevers or chills  EYES/ENT: No visual changes;  No vertigo or throat pain   NECK: No pain or stiffness  RESPIRATORY: No cough, wheezing, hemoptysis; No shortness of breath  CARDIOVASCULAR: No chest pain or palpitations  GASTROINTESTINAL: No abdominal or epigastric pain. No nausea, vomiting, or hematemesis; No diarrhea or constipation. No melena or hematochezia.  GENITOURINARY: No dysuria, frequency or hematuria  NEUROLOGICAL: No numbness or weakness  SKIN: No itching, burning, rashes, or lesions     PHYSICAL EXAM  General: adult in NAD  HEENT: clear oropharynx, anicteric sclera, pink conjunctiva  Neck: supple  CV: normal S1/S2 with no murmur rubs or gallops  Lungs: positive air movement b/l ant lungs,clear to auscultation, no wheezes, no rales  Abdomen: soft non-tender non-distended, no hepatosplenomegaly  Ext: no clubbing cyanosis or edema  Skin: no rashes and no petechiae  Neuro: alert and oriented X 4, no focal deficits      LABS:                          9.2    12.4  )-----------( 191      ( 22 Mar 2019 19:34 )             28.8         Mean Cell Volume : 93.2 fl  Mean Cell Hemoglobin : 29.8 pg  Mean Cell Hemoglobin Concentration : 32.0 gm/dL  Auto Neutrophil # : x  Auto Lymphocyte # : x  Auto Monocyte # : x  Auto Eosinophil # : x  Auto Basophil # : x  Auto Neutrophil % : x  Auto Lymphocyte % : x  Auto Monocyte % : x  Auto Eosinophil % : x  Auto Basophil % : x      03-23    143  |  102  |  43<H>  ----------------------------<  119<H>  3.9   |  28  |  1.84<H>    Ca    9.9      23 Mar 2019 11:56    TPro  8.0  /  Alb  3.8  /  TBili  0.4  /  DBili  x   /  AST  73<H>  /  ALT  26  /  AlkPhos  75  03-22      PT/INR - ( 22 Mar 2019 19:34 )   PT: 25.8 sec;   INR: 2.19 ratio         PTT - ( 22 Mar 2019 19:34 )  PTT:32.4 sec Patient is a 78y old  Female who presents with a chief complaint of shortness of breath (23 Mar 2019 00:40)      HPI:  77 yo F w/ hx of moderate MS s/p bioprosthetic mitral valve, severe pulmonary HTN, DM2, anemia, diastolic CHF, hypothyroidism, paroxysmal atrial fibrillation, HTN presenting with progressive SOB. Patient reports symptoms have gradually worsened over the course of months. Patient has had ongoing vaginal bleeding  s/p D and C last year and scheduled for pelvic US 3/29/19. Patient has received 2 doses of injectafer - 3/15/19, 3/22/19 - Hgb 8.4 on 3/22 - down from 9.7 3/15/19. Patient was scheduled for PRBC transfusion on monday. However patient felt progressively worse at home with increasing SOB and decided to come to ER. She has noticed her legs are both more edematous. She has had no fever, chills, productive cough, and no chest pain/palpitation, nausea/vomiting, abdominal pain. Her ADLs are severely affected. She requires assistance by  for ambulation and is primarily more wheelchair bound.        PAST MEDICAL & SURGICAL HISTORY:  Chronic kidney disease (CKD)  Anemia of chronic disease  On home oxygen therapy: 2  liters nasal cannula  Asthma: PFT (2018)  History of postmenopausal bleeding  Dyslipidemia associated with type 2 diabetes mellitus  Paroxysmal atrial fibrillation: h/o rapid ventricular rate 2 years ago, admitted at [facility], controlled with medication as per patient.  last INR 2.0  Morbid obesity with BMI of 45.0-49.9, adult  H/O: hypothyroidism  Chronic diastolic CHF (congestive heart failure): EF 56% (2017)  Depression (emotion)  Arthritis of spine  Macular degeneration of left eye: receiving injection  Neuropathy  Peripheral arterial disease: s/p remote stent. not on antiplatelet meds for a while.  Hypertension  Edema  GERD (gastroesophageal reflux disease)  Chronic low back pain  Herniated disc: lumbar  VICKY (obstructive sleep apnea)  Ulcerative colitis  Diabetes mellitus: T2DM last A1C 6.7 mg/dl in January   fs range 59- 200 mg/dl  History of mitral valve replacement with bioprosthetic valve: x 4 years ago  H/O  section: x 2  Amputated toe      SOCIAL HISTORY:  former smoker - quit 1963  No etoh  lives with     FAMILY HISTORY:  Mother - stroke, HTN, CAD  Father HTN, prostate cancer  Brother HTN, DM      MEDICATIONS  (STANDING):  amiodarone    Tablet 200 milliGRAM(s) Oral daily  carvedilol 6.25 milliGRAM(s) Oral every 12 hours  cholecalciferol 1000 Unit(s) Oral daily  dextrose 50% Injectable 12.5 Gram(s) IV Push once  dextrose 50% Injectable 25 Gram(s) IV Push once  dextrose 50% Injectable 25 Gram(s) IV Push once  DULoxetine 60 milliGRAM(s) Oral daily  furosemide   Injectable 40 milliGRAM(s) IV Push daily  insulin lispro (HumaLOG) corrective regimen sliding scale   SubCutaneous three times a day before meals  insulin lispro (HumaLOG) corrective regimen sliding scale   SubCutaneous at bedtime  isosorbide   mononitrate ER Tablet (IMDUR) 30 milliGRAM(s) Oral daily  levothyroxine 175 MICROGram(s) Oral daily  mesalamine DR (24-Hour) Tablet 2.4 Gram(s) Oral daily  pantoprazole    Tablet 40 milliGRAM(s) Oral before breakfast    MEDICATIONS  (PRN):  cyclobenzaprine 5 milliGRAM(s) Oral three times a day PRN Muscle Spasm  dextrose 40% Gel 15 Gram(s) Oral once PRN Blood Glucose LESS THAN 70 milliGRAM(s)/deciliter  glucagon  Injectable 1 milliGRAM(s) IntraMuscular once PRN Glucose LESS THAN 70 milligrams/deciliter      Allergies    Augmentin (Swelling)  cephalexin (Swelling)    Intolerances        Vital Signs Last 24 Hrs  T(C): 36.9 (23 Mar 2019 08:47), Max: 37.1 (22 Mar 2019 22:59)  T(F): 98.4 (23 Mar 2019 08:47), Max: 98.7 (22 Mar 2019 22:59)  HR: 104 (23 Mar 2019 08:47) (89 - 109)  BP: 132/82 (23 Mar 2019 08:47) (132/82 - 147/85)  BP(mean): --  RR: 19 (23 Mar 2019 08:47) (19 - 24)  SpO2: 100% (23 Mar 2019 08:47) (90% - 100%)    REVIEW OF SYSTEMS:    CONSTITUTIONAL: No fevers or chills.  weakness +  EYES/ENT: No visual changes;  No vertigo or throat pain   NECK: No pain or stiffness  RESPIRATORY: No wheezing, hemoptysis; chronic cough,  shortness of breath +  CARDIOVASCULAR: No chest pain or palpitations. chronic edema +  GASTROINTESTINAL: No abdominal or epigastric pain. No nausea, vomiting, or hematemesis; No diarrhea or constipation. No melena or hematochezia.  GENITOURINARY: No dysuria, frequency or hematuria  NEUROLOGICAL: No numbness or weakness  SKIN: No itching, burning, rashes, or lesions     PHYSICAL EXAM  General: adult chronically ill, with nasal O2, in wheelchair  HEENT: clear oropharynx, anicteric sclera  Neck: supple  CV: normal S1/S2 with no murmur rubs or gallops  Lungs: positive air movement b/l ant lungs,clear to auscultation, b/l rales +  Abdomen: soft obese non-tender non-distended, no hepatosplenomegaly  Ext: no clubbing cyanosis, b/l edema with chronic stasis changes  Skin: no rashes and no petechiae  Neuro: alert and oriented X 4, no focal deficits      LABS:                          9.2    12.4  )-----------( 191      ( 22 Mar 2019 19:34 )             28.8         Mean Cell Volume : 93.2 fl  Mean Cell Hemoglobin : 29.8 pg  Mean Cell Hemoglobin Concentration : 32.0 gm/dL        143  |  102  |  43<H>  ----------------------------<  119<H>  3.9   |  28  |  1.84<H>    Ca    9.9      23 Mar 2019 11:56    TPro  8.0  /  Alb  3.8  /  TBili  0.4  /  DBili  x   /  AST  73<H>  /  ALT  26  /  AlkPhos  75  03-22      PT/INR - ( 22 Mar 2019 19:34 )   PT: 25.8 sec;   INR: 2.19 ratio         PTT - ( 22 Mar 2019 19:34 )  PTT:32.4 sec    Iron Total, Serum (19 @ 14:29)    Iron Total, Serum: 236 ug/dL    Ferritin, Serum (19 @ 14:21)    Ferritin, Serum: 1223 ng/mL    Troponin T, High Sensitivity (19 @ 11:56)    Troponin T, High Sensitivity Result: 75    < from: Xray Chest 1 View AP/PA (19 @ 18:23) >  IMPRESSION:    Mild interstitial edema.

## 2019-03-23 NOTE — CONSULT NOTE ADULT - PROBLEM SELECTOR RECOMMENDATION 9
with improved Hgb compared to yesterday and worsening sob, likely cardiac issue  clinically no e/o infection  cardiology input re  CHF management

## 2019-03-23 NOTE — H&P ADULT - PROBLEM SELECTOR PLAN 8
-does not appear to be infected despite leukocytosis it is improving . Was 15K now 12K and received no abx. Pt also afebrile and no signs of necrotizing fasciitis. Monitor off abx for now

## 2019-03-23 NOTE — H&P ADULT - NSHPLABSRESULTS_GEN_ALL_CORE
9.2    12.4  )-----------( 191      ( 22 Mar 2019 19:34 )             28.8       03-22    141  |  101  |  44<H>  ----------------------------<  197<H>  4.4   |  26  |  1.83<H>    Ca    9.5      22 Mar 2019 19:34    TPro  8.0  /  Alb  3.8  /  TBili  0.4  /  DBili  x   /  AST  73<H>  /  ALT  26  /  AlkPhos  75  03-22        POCT Blood Glucose.: 176 mg/dL (22 Mar 2019 20:15)      PT/INR - ( 22 Mar 2019 19:34 )   PT: 25.8 sec;   INR: 2.19 ratio         PTT - ( 22 Mar 2019 19:34 )  PTT:32.4 sec    I personally reviewed & interpreted the lab findings above;  I personally reviewed & interpreted the radiographic findings;  I personally reviewed & interpreted the EKG findings;

## 2019-03-23 NOTE — H&P ADULT - PROBLEM SELECTOR PLAN 4
-Pt reports prolonged hx of intermittent bleeding, improved, now reoccurs. H&H stable. Will c/w warfarin for afib for now from risk of stroke higher. Pt had TVUS in 2018 which was not fully complete 2/2 to pt unable to lay flat. Will need to repeat sonogram when pt actually can lay after optimizing diuresis. Please followup inpatient. Pt aware this could be endometrial CA v. hyperplasia.

## 2019-03-23 NOTE — H&P ADULT - PROBLEM SELECTOR PLAN 5
-Hold basal insulin for now given risk of hypoglycemia is greater than hyperglycemia  -do low-dose humalog sliding scale TID and bedtime.

## 2019-03-23 NOTE — H&P ADULT - NSICDXPASTMEDICALHX_GEN_ALL_CORE_FT
PAST MEDICAL HISTORY:  Anemia of chronic disease     Arthritis of spine     Asthma PFT (4/2018)    Chronic diastolic CHF (congestive heart failure) EF 56% (4/2017)    Chronic kidney disease (CKD)     Chronic low back pain     Depression (emotion)     Diabetes mellitus T2DM last A1C 6.7 mg/dl in January   fs range 59- 200 mg/dl    Dyslipidemia associated with type 2 diabetes mellitus     Edema     GERD (gastroesophageal reflux disease)     H/O: hypothyroidism     Herniated disc lumbar    History of postmenopausal bleeding     Hypertension     Macular degeneration of left eye receiving injection    Morbid obesity with BMI of 45.0-49.9, adult     Neuropathy     On home oxygen therapy 2  liters nasal cannula    VICKY (obstructive sleep apnea)     Paroxysmal atrial fibrillation h/o rapid ventricular rate 2 years ago, admitted at [facility], controlled with medication as per patient.  last INR 2.0    Peripheral arterial disease s/p remote stent. not on antiplatelet meds for a while.    Ulcerative colitis

## 2019-03-23 NOTE — H&P ADULT - NSHPREVIEWOFSYSTEMS_GEN_ALL_CORE
REVIEW OF SYSTEMS:    CONSTITUTIONAL: No fevers or chills  ENMT:  No ear pain, No vertigo or throat pain  EYES: No visual changes  or photophobia  NECK: No pain or stiffness  RESPIRATORY: see HPI   CARDIOVASCULAR: No chest pain or palpitations  GASTROINTESTINAL: No abdominal or epigastric pain. No nausea, vomiting, or hematemesis; No diarrhea or constipation. No melena or hematochezia.  MUSCULOSKELETAL: No joint swelling  or warmth.  GENITOURINARY: No dysuria, frequency or hematuria  NEUROLOGICAL: No numbness or syncope   PSYCHIATRIC: No depression or anhedonia.  ENDOCRINE: No sx hypoglycemic episodes.  SKIN: rash of both legs.

## 2019-03-23 NOTE — H&P ADULT - HISTORY OF PRESENT ILLNESS
79 yo F w/ hx of moderate MS s/p bioprosthetic mitral valve, severe pulmonary HTN, DM2, anemia, diastolic CHF, hypothyroidism, paroxysmal atrial fibrillation, HTN presenting with progressive SOB. Patient reports symptoms have gradually worsened over the course of months. She is not adherent to her torsemide. Sometimes she does not take it if she is out in stores. She reports weight loss but unable to lay flat. She has to sit up when sleeping. She is not on home O2. She has noticed her legs are both more edematous. She has had no fever, chills, productive cough, and no chest pain/palpitation, nausea/vomiting, abdominal pain. Her ADLs are severely affected. She requires assistance by  for ambulation and is primarily more wheelchair bound. 79 yo F w/ hx of moderate MS s/p bioprosthetic mitral valve, severe pulmonary HTN, DM2, anemia, diastolic CHF, hypothyroidism, paroxysmal atrial fibrillation, HTN presenting with progressive SOB. Patient reports symptoms have gradually worsened over the course of months. She is not adherent to her torsemide. Sometimes she does not take it if she is out in stores. Also reports eating bag of pretzels and salty foods. She reports weight loss but unable to lay flat. She has to sit up when sleeping. She is not on home O2. She has noticed her legs are both more edematous. She has had no fever, chills, productive cough, and no chest pain/palpitation, nausea/vomiting, abdominal pain. Her ADLs are severely affected. She requires assistance by  for ambulation and is primarily more wheelchair bound. Pt also reports intermittent postmenopausal bleeding. More spotting but not overt hemorrhage.

## 2019-03-23 NOTE — CONSULT NOTE ADULT - ASSESSMENT
Impression:    1. Acute on chronic diastolic CHF Impression:    1. Acute on chronic diastolic CHF  2. Stable CRI  3. Chronic anemia of chronic disease    Plan:  IV diuresis  Transfusions as per hematology  Pulmonary consultation    Russell Jimenez MD  [facility]

## 2019-03-23 NOTE — H&P ADULT - NSICDXPASTSURGICALHX_GEN_ALL_CORE_FT
PAST SURGICAL HISTORY:  Amputated toe     H/O  section x 2    History of mitral valve replacement with bioprosthetic valve x 4 years ago

## 2019-03-23 NOTE — H&P ADULT - ATTENDING COMMENTS
Patient assigned to me by night hospitalist in charge for management and care for patient for this evening only. Care to be resumed by day hospitalist (Dr. Abbott) in the morning and thereafter.     Patient's care rendered on 3/22 at 11:30 PM

## 2019-03-24 DIAGNOSIS — I50.32 CHRONIC DIASTOLIC (CONGESTIVE) HEART FAILURE: ICD-10-CM

## 2019-03-24 LAB
ANION GAP SERPL CALC-SCNC: 15 MMOL/L — SIGNIFICANT CHANGE UP (ref 5–17)
BUN SERPL-MCNC: 45 MG/DL — HIGH (ref 7–23)
CALCIUM SERPL-MCNC: 9.5 MG/DL — SIGNIFICANT CHANGE UP (ref 8.4–10.5)
CHLORIDE SERPL-SCNC: 99 MMOL/L — SIGNIFICANT CHANGE UP (ref 96–108)
CO2 SERPL-SCNC: 26 MMOL/L — SIGNIFICANT CHANGE UP (ref 22–31)
CREAT SERPL-MCNC: 1.9 MG/DL — HIGH (ref 0.5–1.3)
GLUCOSE BLDC GLUCOMTR-MCNC: 173 MG/DL — HIGH (ref 70–99)
GLUCOSE BLDC GLUCOMTR-MCNC: 372 MG/DL — HIGH (ref 70–99)
GLUCOSE BLDC GLUCOMTR-MCNC: 384 MG/DL — HIGH (ref 70–99)
GLUCOSE SERPL-MCNC: 228 MG/DL — HIGH (ref 70–99)
HCT VFR BLD CALC: 28.4 % — LOW (ref 34.5–45)
HGB BLD-MCNC: 8.3 G/DL — LOW (ref 11.5–15.5)
INR BLD: 1.46 RATIO — HIGH (ref 0.88–1.16)
MCHC RBC-ENTMCNC: 28.7 PG — SIGNIFICANT CHANGE UP (ref 27–34)
MCHC RBC-ENTMCNC: 29.2 GM/DL — LOW (ref 32–36)
MCV RBC AUTO: 98.3 FL — SIGNIFICANT CHANGE UP (ref 80–100)
PLATELET # BLD AUTO: 177 K/UL — SIGNIFICANT CHANGE UP (ref 150–400)
POTASSIUM SERPL-MCNC: 3.9 MMOL/L — SIGNIFICANT CHANGE UP (ref 3.5–5.3)
POTASSIUM SERPL-SCNC: 3.9 MMOL/L — SIGNIFICANT CHANGE UP (ref 3.5–5.3)
PROTHROM AB SERPL-ACNC: 17 SEC — HIGH (ref 10–12.9)
RBC # BLD: 2.89 M/UL — LOW (ref 3.8–5.2)
RBC # FLD: 15.9 % — HIGH (ref 10.3–14.5)
SODIUM SERPL-SCNC: 140 MMOL/L — SIGNIFICANT CHANGE UP (ref 135–145)
WBC # BLD: 11.31 K/UL — HIGH (ref 3.8–10.5)
WBC # FLD AUTO: 11.31 K/UL — HIGH (ref 3.8–10.5)

## 2019-03-24 RX ORDER — NORETHINDRONE 0.35 MG/1
5 TABLET ORAL
Qty: 0 | Refills: 0 | Status: DISCONTINUED | OUTPATIENT
Start: 2019-03-24 | End: 2019-03-26

## 2019-03-24 RX ORDER — DOCUSATE SODIUM 100 MG
100 CAPSULE ORAL DAILY
Qty: 0 | Refills: 0 | Status: DISCONTINUED | OUTPATIENT
Start: 2019-03-24 | End: 2019-03-27

## 2019-03-24 RX ORDER — SENNA PLUS 8.6 MG/1
2 TABLET ORAL AT BEDTIME
Qty: 0 | Refills: 0 | Status: DISCONTINUED | OUTPATIENT
Start: 2019-03-24 | End: 2019-03-27

## 2019-03-24 RX ORDER — WARFARIN SODIUM 2.5 MG/1
3 TABLET ORAL ONCE
Qty: 0 | Refills: 0 | Status: COMPLETED | OUTPATIENT
Start: 2019-03-24 | End: 2019-03-24

## 2019-03-24 RX ADMIN — Medication 100 MILLIGRAM(S): at 16:46

## 2019-03-24 RX ADMIN — WARFARIN SODIUM 3 MILLIGRAM(S): 2.5 TABLET ORAL at 21:46

## 2019-03-24 RX ADMIN — SENNA PLUS 2 TABLET(S): 8.6 TABLET ORAL at 21:46

## 2019-03-24 RX ADMIN — ISOSORBIDE MONONITRATE 30 MILLIGRAM(S): 60 TABLET, EXTENDED RELEASE ORAL at 11:38

## 2019-03-24 RX ADMIN — Medication 2.4 GRAM(S): at 11:34

## 2019-03-24 RX ADMIN — DULOXETINE HYDROCHLORIDE 60 MILLIGRAM(S): 30 CAPSULE, DELAYED RELEASE ORAL at 11:34

## 2019-03-24 RX ADMIN — NORETHINDRONE 5 MILLIGRAM(S): 0.35 TABLET ORAL at 21:46

## 2019-03-24 RX ADMIN — Medication 1000 UNIT(S): at 11:34

## 2019-03-24 RX ADMIN — Medication 175 MICROGRAM(S): at 05:20

## 2019-03-24 RX ADMIN — CARVEDILOL PHOSPHATE 6.25 MILLIGRAM(S): 80 CAPSULE, EXTENDED RELEASE ORAL at 18:16

## 2019-03-24 RX ADMIN — Medication 40 MILLIGRAM(S): at 05:20

## 2019-03-24 RX ADMIN — AMIODARONE HYDROCHLORIDE 200 MILLIGRAM(S): 400 TABLET ORAL at 05:20

## 2019-03-24 RX ADMIN — Medication 1: at 11:38

## 2019-03-24 RX ADMIN — Medication 5: at 18:15

## 2019-03-24 RX ADMIN — Medication 3: at 21:47

## 2019-03-24 RX ADMIN — CARVEDILOL PHOSPHATE 6.25 MILLIGRAM(S): 80 CAPSULE, EXTENDED RELEASE ORAL at 05:20

## 2019-03-24 RX ADMIN — PANTOPRAZOLE SODIUM 40 MILLIGRAM(S): 20 TABLET, DELAYED RELEASE ORAL at 05:20

## 2019-03-24 NOTE — PROGRESS NOTE ADULT - SUBJECTIVE AND OBJECTIVE BOX
Patient is a 78y old  Female who presents with a chief complaint of shortness of breath (23 Mar 2019 15:03)       Pt is seen and examined  pt is awake and lying in bed/out of bed to chair  pt seems comfortable and denies any complaints at this time    REVIEW OF SYSTEMS:    CONSTITUTIONAL: No fevers or chills.  weakness +  EYES/ENT: No visual changes;  No vertigo or throat pain   NECK: No pain or stiffness  RESPIRATORY: No wheezing, hemoptysis; chronic cough,  shortness of breath +  CARDIOVASCULAR: No chest pain or palpitations. chronic edema +  GASTROINTESTINAL: No abdominal or epigastric pain. No nausea, vomiting, or hematemesis; No diarrhea or constipation. No melena or hematochezia.  GENITOURINARY: No dysuria, frequency or hematuria  NEUROLOGICAL: No numbness or weakness  SKIN: No itching, burning, rashes, or lesions     PHYSICAL EXAM  General: adult chronically ill, with nasal O2, in wheelchair  HEENT: clear oropharynx, anicteric sclera  Neck: supple  CV: normal S1/S2 with no murmur rubs or gallops  Lungs: positive air movement b/l ant lungs,clear to auscultation, b/l rales +  Abdomen: soft obese non-tender non-distended, no hepatosplenomegaly  Ext: no clubbing cyanosis, b/l edema with chronic stasis changes  Skin: no rashes and no petechiae  Neuro: alert and oriented X 4, no focal deficits        MEDICATIONS  (STANDING):  amiodarone    Tablet 200 milliGRAM(s) Oral daily  carvedilol 6.25 milliGRAM(s) Oral every 12 hours  cholecalciferol 1000 Unit(s) Oral daily  dextrose 50% Injectable 12.5 Gram(s) IV Push once  dextrose 50% Injectable 25 Gram(s) IV Push once  dextrose 50% Injectable 25 Gram(s) IV Push once  DULoxetine 60 milliGRAM(s) Oral daily  furosemide   Injectable 40 milliGRAM(s) IV Push daily  insulin lispro (HumaLOG) corrective regimen sliding scale   SubCutaneous three times a day before meals  insulin lispro (HumaLOG) corrective regimen sliding scale   SubCutaneous at bedtime  isosorbide   mononitrate ER Tablet (IMDUR) 30 milliGRAM(s) Oral daily  levothyroxine 175 MICROGram(s) Oral daily  mesalamine DR (24-Hour) Tablet 2.4 Gram(s) Oral daily  pantoprazole    Tablet 40 milliGRAM(s) Oral before breakfast    MEDICATIONS  (PRN):  cyclobenzaprine 5 milliGRAM(s) Oral three times a day PRN Muscle Spasm  dextrose 40% Gel 15 Gram(s) Oral once PRN Blood Glucose LESS THAN 70 milliGRAM(s)/deciliter  glucagon  Injectable 1 milliGRAM(s) IntraMuscular once PRN Glucose LESS THAN 70 milligrams/deciliter      Allergies    Augmentin (Swelling)  cephalexin (Swelling)    Intolerances        Vital Signs Last 24 Hrs  T(C): 36.7 (24 Mar 2019 08:03), Max: 36.8 (23 Mar 2019 16:30)  T(F): 98.1 (24 Mar 2019 08:03), Max: 98.2 (23 Mar 2019 16:30)  HR: 101 (24 Mar 2019 09:30) (101 - 107)  BP: 134/93 (24 Mar 2019 09:30) (122/69 - 153/89)  BP(mean): --  RR: 20 (24 Mar 2019 09:30) (20 - 21)  SpO2: 100% (24 Mar 2019 09:30) (98% - 100%)    LABS:                        8.3    11.31 )-----------( 177      ( 24 Mar 2019 09:54 )             28.4         Mean Cell Volume : 98.3 fl  Mean Cell Hemoglobin : 28.7 pg  Mean Cell Hemoglobin Concentration : 29.2 gm/dL    03-24    140  |  99  |  45<H>  ----------------------------<  228<H>  3.9   |  26  |  1.90<H>    Ca    9.5      24 Mar 2019 07:12    TPro  8.0  /  Alb  3.8  /  TBili  0.4  /  DBili  x   /  AST  73<H>  /  ALT  26  /  AlkPhos  75  03-22      PT/INR - ( 23 Mar 2019 14:21 )   PT: 20.2 sec;   INR: 1.76 ratio         PTT - ( 22 Mar 2019 19:34 )  PTT:32.4 sec      Ferritin, Serum: 1223 ng/mL (03-23 @ 14:21)    Iron Total, Serum (03.23.19 @ 14:29)    Iron Total, Serum: 236 ug/dL Patient is a 78y old  Female who presents with a chief complaint of shortness of breath (23 Mar 2019 15:03)       Pt is seen and examined  pt is awake and sitting in bed  chronic sob, leg swelling persist  no worsening vaginal bleeding  all other systems reviewed and negative    REVIEW OF SYSTEMS:    CONSTITUTIONAL: No fevers or chills.  weakness +  EYES/ENT: No visual changes;  No vertigo or throat pain   NECK: No pain or stiffness  RESPIRATORY: No wheezing, hemoptysis; chronic cough,  shortness of breath +  CARDIOVASCULAR: No chest pain or palpitations. chronic edema +  GASTROINTESTINAL: No abdominal or epigastric pain. No nausea, vomiting, or hematemesis; No diarrhea or constipation. No melena or hematochezia.  GENITOURINARY: No dysuria, frequency or hematuria  NEUROLOGICAL: No numbness or weakness  SKIN: No itching, burning, rashes, or lesions     PHYSICAL EXAM  General: adult chronically ill, with nasal O2, in wheelchair  HEENT: clear oropharynx, anicteric sclera  Neck: supple  CV: normal S1/S2 with no murmur rubs or gallops  Lungs: positive air movement b/l ant lungs,clear to auscultation, b/l rales +  Abdomen: soft obese non-tender non-distended, no hepatosplenomegaly  Ext: no clubbing cyanosis, b/l edema with chronic stasis changes  Skin: no rashes and no petechiae  Neuro: alert and oriented X 4, no focal deficits        MEDICATIONS  (STANDING):  amiodarone    Tablet 200 milliGRAM(s) Oral daily  carvedilol 6.25 milliGRAM(s) Oral every 12 hours  cholecalciferol 1000 Unit(s) Oral daily  dextrose 50% Injectable 12.5 Gram(s) IV Push once  dextrose 50% Injectable 25 Gram(s) IV Push once  dextrose 50% Injectable 25 Gram(s) IV Push once  DULoxetine 60 milliGRAM(s) Oral daily  furosemide   Injectable 40 milliGRAM(s) IV Push daily  insulin lispro (HumaLOG) corrective regimen sliding scale   SubCutaneous three times a day before meals  insulin lispro (HumaLOG) corrective regimen sliding scale   SubCutaneous at bedtime  isosorbide   mononitrate ER Tablet (IMDUR) 30 milliGRAM(s) Oral daily  levothyroxine 175 MICROGram(s) Oral daily  mesalamine DR (24-Hour) Tablet 2.4 Gram(s) Oral daily  pantoprazole    Tablet 40 milliGRAM(s) Oral before breakfast    MEDICATIONS  (PRN):  cyclobenzaprine 5 milliGRAM(s) Oral three times a day PRN Muscle Spasm  dextrose 40% Gel 15 Gram(s) Oral once PRN Blood Glucose LESS THAN 70 milliGRAM(s)/deciliter  glucagon  Injectable 1 milliGRAM(s) IntraMuscular once PRN Glucose LESS THAN 70 milligrams/deciliter      Allergies    Augmentin (Swelling)  cephalexin (Swelling)    Intolerances        Vital Signs Last 24 Hrs  T(C): 36.7 (24 Mar 2019 08:03), Max: 36.8 (23 Mar 2019 16:30)  T(F): 98.1 (24 Mar 2019 08:03), Max: 98.2 (23 Mar 2019 16:30)  HR: 101 (24 Mar 2019 09:30) (101 - 107)  BP: 134/93 (24 Mar 2019 09:30) (122/69 - 153/89)  BP(mean): --  RR: 20 (24 Mar 2019 09:30) (20 - 21)  SpO2: 100% (24 Mar 2019 09:30) (98% - 100%)    LABS:                        8.3    11.31 )-----------( 177      ( 24 Mar 2019 09:54 )             28.4         Mean Cell Volume : 98.3 fl  Mean Cell Hemoglobin : 28.7 pg  Mean Cell Hemoglobin Concentration : 29.2 gm/dL    03-24    140  |  99  |  45<H>  ----------------------------<  228<H>  3.9   |  26  |  1.90<H>    Ca    9.5      24 Mar 2019 07:12    TPro  8.0  /  Alb  3.8  /  TBili  0.4  /  DBili  x   /  AST  73<H>  /  ALT  26  /  AlkPhos  75  03-22      PT/INR - ( 23 Mar 2019 14:21 )   PT: 20.2 sec;   INR: 1.76 ratio         PTT - ( 22 Mar 2019 19:34 )  PTT:32.4 sec      Ferritin, Serum: 1223 ng/mL (03-23 @ 14:21)    Iron Total, Serum (03.23.19 @ 14:29)    Iron Total, Serum: 236 ug/dL

## 2019-03-24 NOTE — PROGRESS NOTE ADULT - PROBLEM SELECTOR PLAN 4
-Pt reports prolonged hx of intermittent bleeding, improved, now reoccurs. H&H stable. Will c/w warfarin for afib for now from risk of stroke higher. Pt had TVUS in 2018 which was not fully complete 2/2 to pt unable to lay flat. Will need to repeat sonogram when pt actually can lay after optimizing diuresis. ptn requesting 2nd opinion, wants GYN consult.

## 2019-03-24 NOTE — PROGRESS NOTE ADULT - ASSESSMENT
79 yo F w/ hx of moderate MS s/p bioprosthetic mitral valve, severe pulmonary HTN, DM2, anemia, diastolic CHF, hypothyroidism, paroxysmal atrial fibrillation, HTN presenting with progressive SOB 2/2 to acute on chronic diastolic congestive heart failure.

## 2019-03-24 NOTE — PROGRESS NOTE ADULT - PROBLEM SELECTOR PLAN 1
-cont diuresis w Lasix 40mg IV daily, awaiting TTE  ptn w poor compliance w diet and meds    -i/os  -likely from nonadherence and not following salt restricted diet (eats pretzels, salty steak).

## 2019-03-24 NOTE — PROGRESS NOTE ADULT - SUBJECTIVE AND OBJECTIVE BOX
TANIKA OBRIEN    Still dyspnea with minimal exertion.  No chest pain or palpitations.  Apparently had some vaginal bleeding which has improved.    Allergies    Augmentin (Swelling)  cephalexin (Swelling)    MEDICATIONS  (STANDING):  amiodarone    Tablet 200 milliGRAM(s) Oral daily  carvedilol 6.25 milliGRAM(s) Oral every 12 hours  cholecalciferol 1000 Unit(s) Oral daily  dextrose 50% Injectable 12.5 Gram(s) IV Push once  dextrose 50% Injectable 25 Gram(s) IV Push once  dextrose 50% Injectable 25 Gram(s) IV Push once  DULoxetine 60 milliGRAM(s) Oral daily  furosemide   Injectable 40 milliGRAM(s) IV Push daily  insulin lispro (HumaLOG) corrective regimen sliding scale   SubCutaneous three times a day before meals  insulin lispro (HumaLOG) corrective regimen sliding scale   SubCutaneous at bedtime  isosorbide   mononitrate ER Tablet (IMDUR) 30 milliGRAM(s) Oral daily  levothyroxine 175 MICROGram(s) Oral daily  mesalamine DR (24-Hour) Tablet 2.4 Gram(s) Oral daily  pantoprazole    Tablet 40 milliGRAM(s) Oral before breakfast    MEDICATIONS  (PRN):  cyclobenzaprine 5 milliGRAM(s) Oral three times a day PRN Muscle Spasm  dextrose 40% Gel 15 Gram(s) Oral once PRN Blood Glucose LESS THAN 70 milliGRAM(s)/deciliter  glucagon  Injectable 1 milliGRAM(s) IntraMuscular once PRN Glucose LESS THAN 70 milligrams/deciliter    PHYSICAL EXAM:  Vital Signs Last 24 Hrs  T(C): 36.7 (24 Mar 2019 08:03), Max: 36.8 (23 Mar 2019 16:30)  T(F): 98.1 (24 Mar 2019 08:03), Max: 98.2 (23 Mar 2019 16:30)  HR: 101 (24 Mar 2019 09:30) (101 - 107)  BP: 134/93 (24 Mar 2019 09:30) (122/69 - 153/89)  BP(mean): --  RR: 20 (24 Mar 2019 09:30) (20 - 21)  SpO2: 100% (24 Mar 2019 09:30) (98% - 100%)  Daily     Daily   I&O's Summary    24 Mar 2019 07:01  -  24 Mar 2019 16:00  --------------------------------------------------------  IN: 480 mL / OUT: 700 mL / NET: -220 mL    General Appearance: 	 Alert, cooperative, no distress at rest  HEENT: normocephalic, atraumatic, EOMI, conjunctiva normal, sclera anicteric,   Neck: JVP still elevated 12-14 cm  Lungs:  Clear decreased BS at bases  Cor:  pmi 5th ICS MCL, regular rate and rhythm, S1 normal intensity, S2 normal intensity  Abdomen:	 soft, non-tender; obese,bowel sounds normal  Extremities: 1-2+ edema BL      EKG:  Telemetry:  SR/ST    Labs:  CBC Full  -  ( 24 Mar 2019 09:54 )  WBC Count : 11.31 K/uL  Hemoglobin : 8.3 g/dL  Hematocrit : 28.4 %  Platelet Count - Automated : 177 K/uL  Mean Cell Volume : 98.3 fl  Mean Cell Hemoglobin : 28.7 pg  Mean Cell Hemoglobin Concentration : 29.2 gm/dL  Auto Neutrophil # : x  Auto Lymphocyte # : x  Auto Monocyte # : x  Auto Eosinophil # : x  Auto Basophil # : x  Auto Neutrophil % : x  Auto Lymphocyte % : x  Auto Monocyte % : x  Auto Eosinophil % : x  Auto Basophil % : x        PT/INR - ( 23 Mar 2019 14:21 )   PT: 20.2 sec;   INR: 1.76 ratio         PTT - ( 22 Mar 2019 19:34 )  PTT:32.4 sec    Basic Metabolic Panel (03.24.19 @ 07:12)    Sodium, Serum: 140 mmol/L    Potassium, Serum: 3.9 mmol/L    Chloride, Serum: 99 mmol/L    Carbon Dioxide, Serum: 26 mmol/L    Anion Gap, Serum: 15 mmol/L    Blood Urea Nitrogen, Serum: 45 mg/dL    Creatinine, Serum: 1.90 mg/dL    Glucose, Serum: 228 mg/dL    Calcium, Total Serum: 9.5 mg/dL    eGFR if Non : 25: Interpretative comment  The units for eGFR are mL/min/1.73M2 (normalized body surface area). The  eGFR is calculated from a serum creatinine using the CKD-EPI equation.  Other variables required for calculation are race, age and sex. Among  patients with chronic kidney disease (CKD), the eGFR is useful in  determining the stage of disease according to KDOQI CKD classification.  All eGFR results are reported numerically with the following  interpretation.          GFR                    With                 Without     (ml/min/1.73 m2)    Kidney Damage       Kidney Damage        >= 90                    Stage 1                     Normal        60-89                    Stage 2                     Decreased GFR        30-59     Stage 3                     Stage 3        15-29                    Stage 4                     Stage 4        < 15                      Stage 5                     Stage 5  Each stage of CKD assumes that the associated GFR level has been in  effect for at least 3 months. Determination of stages one and two (with  eGFR > 59 ml/min/m2) requires estimation of kidney damage for at least 3  months as defined by structural or functional abnormalities.  Limitations: All estimates of GFR will be less accurate for patients at  extremes of muscle mass (including but not limited to frail elderly,  critically ill, or cancer patients), those with unusual diets, and those  with conditions associated with reduced secretion or extrarenal  elimination of creatinine. The eGFR equation is not recommended for use  in patients with unstable creatinine levels. mL/min/1.73M2    eGFR if African American: 29 mL/min/1.73M2

## 2019-03-24 NOTE — PROGRESS NOTE ADULT - SUBJECTIVE AND OBJECTIVE BOX
Patient is a 78y old  Female who presents with a chief complaint of shortness of breath (24 Mar 2019 15:59)      SUBJECTIVE / OVERNIGHT EVENTS:dyspnea has improves, states at baseline always dyspneic, started having vag bleed this umer, hasnt taken " hormonal gyn" meds since 3/21, wants a second opinion, requests GYN consult    MEDICATIONS  (STANDING):  amiodarone    Tablet 200 milliGRAM(s) Oral daily  carvedilol 6.25 milliGRAM(s) Oral every 12 hours  cholecalciferol 1000 Unit(s) Oral daily  dextrose 50% Injectable 12.5 Gram(s) IV Push once  dextrose 50% Injectable 25 Gram(s) IV Push once  dextrose 50% Injectable 25 Gram(s) IV Push once  docusate sodium 100 milliGRAM(s) Oral daily  DULoxetine 60 milliGRAM(s) Oral daily  furosemide   Injectable 40 milliGRAM(s) IV Push daily  insulin lispro (HumaLOG) corrective regimen sliding scale   SubCutaneous three times a day before meals  insulin lispro (HumaLOG) corrective regimen sliding scale   SubCutaneous at bedtime  isosorbide   mononitrate ER Tablet (IMDUR) 30 milliGRAM(s) Oral daily  levothyroxine 175 MICROGram(s) Oral daily  mesalamine DR (24-Hour) Tablet 2.4 Gram(s) Oral daily  pantoprazole    Tablet 40 milliGRAM(s) Oral before breakfast  senna 2 Tablet(s) Oral at bedtime  warfarin 3 milliGRAM(s) Oral once    MEDICATIONS  (PRN):  cyclobenzaprine 5 milliGRAM(s) Oral three times a day PRN Muscle Spasm  dextrose 40% Gel 15 Gram(s) Oral once PRN Blood Glucose LESS THAN 70 milliGRAM(s)/deciliter  glucagon  Injectable 1 milliGRAM(s) IntraMuscular once PRN Glucose LESS THAN 70 milligrams/deciliter      Vital Signs Last 24 Hrs  T(F): 98 (03-24-19 @ 17:14), Max: 98.2 (03-24-19 @ 01:52)  HR: 102 (03-24-19 @ 17:14) (101 - 104)  BP: 132/86 (03-24-19 @ 17:14) (122/69 - 134/93)  RR: 21 (03-24-19 @ 17:14) (20 - 21)  SpO2: 99% (03-24-19 @ 17:14) (98% - 100%)  Telemetry:   CAPILLARY BLOOD GLUCOSE      POCT Blood Glucose.: 372 mg/dL (24 Mar 2019 18:12)  POCT Blood Glucose.: 173 mg/dL (24 Mar 2019 10:39)  POCT Blood Glucose.: 281 mg/dL (23 Mar 2019 21:52)    I&O's Summary    24 Mar 2019 07:01  -  24 Mar 2019 21:05  --------------------------------------------------------  IN: 480 mL / OUT: 700 mL / NET: -220 mL        PHYSICAL EXAM:  GENERAL: NAD, well-developed  HEAD:  Atraumatic, Normocephalic  EYES: EOMI, PERRLA, conjunctiva and sclera clear  NECK: Supple, No JVD  CHEST/LUNG: Clear to auscultation bilaterally; No wheeze  HEART: Regular rate and rhythm; No murmurs, rubs, or gallops  ABDOMEN: Soft, Nontender, Nondistended; Bowel sounds present  EXTREMITIES:  2+ Peripheral Pulses, No clubbing, cyanosis, or edema  PSYCH: AAOx3  NEUROLOGY: non-focal  SKIN: No rashes or lesions    LABS:                        8.3    11.31 )-----------( 177      ( 24 Mar 2019 09:54 )             28.4     03-24    140  |  99  |  45<H>  ----------------------------<  228<H>  3.9   |  26  |  1.90<H>    Ca    9.5      24 Mar 2019 07:12      PT/INR - ( 24 Mar 2019 19:22 )   PT: 17.0 sec;   INR: 1.46 ratio                   RADIOLOGY & ADDITIONAL TESTS:    Imaging Personally Reviewed:    Consultant(s) Notes Reviewed:      Care Discussed with Consultants/Other Providers:

## 2019-03-24 NOTE — PROGRESS NOTE ADULT - PROBLEM SELECTOR PLAN 1
Hgb dropped  no significant bleeding overnight  adequate iron studies  reassess cbc in am - prn prbc transfusion in view of cardiac co morbidities

## 2019-03-25 DIAGNOSIS — J45.20 MILD INTERMITTENT ASTHMA, UNCOMPLICATED: ICD-10-CM

## 2019-03-25 DIAGNOSIS — E11.65 TYPE 2 DIABETES MELLITUS WITH HYPERGLYCEMIA: ICD-10-CM

## 2019-03-25 DIAGNOSIS — E03.9 HYPOTHYROIDISM, UNSPECIFIED: ICD-10-CM

## 2019-03-25 DIAGNOSIS — J96.11 CHRONIC RESPIRATORY FAILURE WITH HYPOXIA: ICD-10-CM

## 2019-03-25 DIAGNOSIS — J84.9 INTERSTITIAL PULMONARY DISEASE, UNSPECIFIED: ICD-10-CM

## 2019-03-25 DIAGNOSIS — Z29.9 ENCOUNTER FOR PROPHYLACTIC MEASURES, UNSPECIFIED: ICD-10-CM

## 2019-03-25 DIAGNOSIS — R06.02 SHORTNESS OF BREATH: ICD-10-CM

## 2019-03-25 DIAGNOSIS — I27.20 PULMONARY HYPERTENSION, UNSPECIFIED: ICD-10-CM

## 2019-03-25 DIAGNOSIS — E66.2 MORBID (SEVERE) OBESITY WITH ALVEOLAR HYPOVENTILATION: ICD-10-CM

## 2019-03-25 LAB
GLUCOSE BLDC GLUCOMTR-MCNC: 214 MG/DL — HIGH (ref 70–99)
GLUCOSE BLDC GLUCOMTR-MCNC: 266 MG/DL — HIGH (ref 70–99)
GLUCOSE BLDC GLUCOMTR-MCNC: 284 MG/DL — HIGH (ref 70–99)
GLUCOSE BLDC GLUCOMTR-MCNC: 315 MG/DL — HIGH (ref 70–99)
HBA1C BLD-MCNC: 7.4 % — HIGH (ref 4–5.6)
HCT VFR BLD CALC: 26.3 % — LOW (ref 34.5–45)
HGB BLD-MCNC: 8.6 G/DL — LOW (ref 11.5–15.5)
INR BLD: 1.34 RATIO — HIGH (ref 0.88–1.16)
MCHC RBC-ENTMCNC: 30.6 PG — SIGNIFICANT CHANGE UP (ref 27–34)
MCHC RBC-ENTMCNC: 32.6 GM/DL — SIGNIFICANT CHANGE UP (ref 32–36)
MCV RBC AUTO: 93.7 FL — SIGNIFICANT CHANGE UP (ref 80–100)
PLATELET # BLD AUTO: 192 K/UL — SIGNIFICANT CHANGE UP (ref 150–400)
PROTHROM AB SERPL-ACNC: 15.6 SEC — HIGH (ref 10–12.9)
RBC # BLD: 2.81 M/UL — LOW (ref 3.8–5.2)
RBC # FLD: 14.8 % — HIGH (ref 10.3–14.5)
TSH SERPL-MCNC: 5.27 UIU/ML — HIGH (ref 0.27–4.2)
WBC # BLD: 10.2 K/UL — SIGNIFICANT CHANGE UP (ref 3.8–10.5)
WBC # FLD AUTO: 10.2 K/UL — SIGNIFICANT CHANGE UP (ref 3.8–10.5)

## 2019-03-25 PROCEDURE — 93306 TTE W/DOPPLER COMPLETE: CPT | Mod: 26

## 2019-03-25 PROCEDURE — 99223 1ST HOSP IP/OBS HIGH 75: CPT

## 2019-03-25 RX ORDER — INSULIN LISPRO 100/ML
16 VIAL (ML) SUBCUTANEOUS
Qty: 0 | Refills: 0 | Status: DISCONTINUED | OUTPATIENT
Start: 2019-03-25 | End: 2019-03-25

## 2019-03-25 RX ORDER — INSULIN GLARGINE 100 [IU]/ML
24 INJECTION, SOLUTION SUBCUTANEOUS AT BEDTIME
Qty: 0 | Refills: 0 | Status: DISCONTINUED | OUTPATIENT
Start: 2019-03-25 | End: 2019-03-26

## 2019-03-25 RX ORDER — SPIRONOLACTONE 25 MG/1
25 TABLET, FILM COATED ORAL
Qty: 0 | Refills: 0 | Status: DISCONTINUED | OUTPATIENT
Start: 2019-03-25 | End: 2019-03-27

## 2019-03-25 RX ORDER — INSULIN LISPRO 100/ML
8 VIAL (ML) SUBCUTANEOUS
Qty: 0 | Refills: 0 | Status: DISCONTINUED | OUTPATIENT
Start: 2019-03-25 | End: 2019-03-26

## 2019-03-25 RX ORDER — WARFARIN SODIUM 2.5 MG/1
6 TABLET ORAL ONCE
Qty: 0 | Refills: 0 | Status: DISCONTINUED | OUTPATIENT
Start: 2019-03-25 | End: 2019-03-25

## 2019-03-25 RX ORDER — INSULIN LISPRO 100/ML
VIAL (ML) SUBCUTANEOUS
Qty: 0 | Refills: 0 | Status: DISCONTINUED | OUTPATIENT
Start: 2019-03-25 | End: 2019-03-27

## 2019-03-25 RX ORDER — WARFARIN SODIUM 2.5 MG/1
5 TABLET ORAL ONCE
Qty: 0 | Refills: 0 | Status: COMPLETED | OUTPATIENT
Start: 2019-03-25 | End: 2019-03-25

## 2019-03-25 RX ADMIN — Medication 2: at 21:43

## 2019-03-25 RX ADMIN — INSULIN GLARGINE 24 UNIT(S): 100 INJECTION, SOLUTION SUBCUTANEOUS at 21:53

## 2019-03-25 RX ADMIN — Medication 40 MILLIGRAM(S): at 18:15

## 2019-03-25 RX ADMIN — Medication 175 MICROGRAM(S): at 05:19

## 2019-03-25 RX ADMIN — NORETHINDRONE 5 MILLIGRAM(S): 0.35 TABLET ORAL at 05:21

## 2019-03-25 RX ADMIN — PANTOPRAZOLE SODIUM 40 MILLIGRAM(S): 20 TABLET, DELAYED RELEASE ORAL at 05:19

## 2019-03-25 RX ADMIN — Medication 2: at 08:11

## 2019-03-25 RX ADMIN — DULOXETINE HYDROCHLORIDE 60 MILLIGRAM(S): 30 CAPSULE, DELAYED RELEASE ORAL at 11:12

## 2019-03-25 RX ADMIN — Medication 100 MILLIGRAM(S): at 11:12

## 2019-03-25 RX ADMIN — SENNA PLUS 2 TABLET(S): 8.6 TABLET ORAL at 21:43

## 2019-03-25 RX ADMIN — CARVEDILOL PHOSPHATE 6.25 MILLIGRAM(S): 80 CAPSULE, EXTENDED RELEASE ORAL at 05:19

## 2019-03-25 RX ADMIN — SPIRONOLACTONE 25 MILLIGRAM(S): 25 TABLET, FILM COATED ORAL at 18:15

## 2019-03-25 RX ADMIN — Medication 1000 UNIT(S): at 11:12

## 2019-03-25 RX ADMIN — CARVEDILOL PHOSPHATE 6.25 MILLIGRAM(S): 80 CAPSULE, EXTENDED RELEASE ORAL at 18:15

## 2019-03-25 RX ADMIN — AMIODARONE HYDROCHLORIDE 200 MILLIGRAM(S): 400 TABLET ORAL at 05:19

## 2019-03-25 RX ADMIN — Medication 40 MILLIGRAM(S): at 05:20

## 2019-03-25 RX ADMIN — Medication 2.4 GRAM(S): at 11:12

## 2019-03-25 RX ADMIN — ISOSORBIDE MONONITRATE 30 MILLIGRAM(S): 60 TABLET, EXTENDED RELEASE ORAL at 11:12

## 2019-03-25 RX ADMIN — WARFARIN SODIUM 5 MILLIGRAM(S): 2.5 TABLET ORAL at 21:43

## 2019-03-25 RX ADMIN — Medication 3: at 11:55

## 2019-03-25 RX ADMIN — Medication 3: at 18:14

## 2019-03-25 RX ADMIN — NORETHINDRONE 5 MILLIGRAM(S): 0.35 TABLET ORAL at 18:15

## 2019-03-25 NOTE — CONSULT NOTE ADULT - ASSESSMENT
79 yo F w/ hx of moderate MS s/p bioprosthetic mitral valve, severe pulmonary HTN, chronic hypoxemic respiratory failure and suspected ILD, DM2, anemia, diastolic CHF, hypothyroidism, paroxysmal atrial fibrillation, HTN presenting with progressive SOB 2/2 to acute on chronic diastolic congestive heart failure in setting of medication nonadherence.

## 2019-03-25 NOTE — CONSULT NOTE ADULT - PROBLEM SELECTOR RECOMMENDATION 4
- patient is reported as having some evidence of ILD but I do not see any CT scans in PACS  - she does not have any noticeable inspiratory crackles on my exam  - would suggest a noncontrast CT chest for further evaluation - if in fact there is evidence of ILD may need to consider discontinuing Amiodarone  - Patient was previously scheduled for a lung biopsy 10/2018 but this never happened  - Prior serologic workup only notable for a slightly elevated RF and ESR

## 2019-03-25 NOTE — PROGRESS NOTE ADULT - PROBLEM SELECTOR PLAN 1
Hgb stable  adequate iron studies  reassess cbc in am - prn prbc transfusion in view of cardiac co morbidities

## 2019-03-25 NOTE — CONSULT NOTE ADULT - PROBLEM SELECTOR RECOMMENDATION 3
- Continue rate control  - Would consider holding AC in setting of vaginal bleeding  - Watchman evaluation as per Dr. Aiken

## 2019-03-25 NOTE — PROGRESS NOTE ADULT - ASSESSMENT
77 yo female with prior bioprosthetic MVR,  PAF on amiodarone, CKD, VICKY, Obesity and past 3-6 months main issues with recurrent vaginal bleeding and anemia with increasing QUESADA. BNP as outpatient has been normal with stable CKD on chronic diuretic therapy. She has not felt to be candidate for RAFAELA if indicated.  Again her symptoms are likely multifactorial. No CAD prior to MVR.    Rec:  To review outpatient diuretic regimen and to resume zaroxlyn and/or aldactone at that time  Continue IV lasix  Monitor CBC  Gyn evaluation  Hold coumadin; was stopped as outpatient 2 weeks ago.  TTE; usually very limited studies.  Will consider watchman device electively.

## 2019-03-25 NOTE — PROGRESS NOTE ADULT - SUBJECTIVE AND OBJECTIVE BOX
Patient is a 78y old  Female who presents with a chief complaint of shortness of breath (23 Mar 2019 15:03)       Pt is seen and examined  pt is awake and sitting in bed  chronic sob, leg swelling persist  no worsening vaginal bleeding  all other systems reviewed and negative    REVIEW OF SYSTEMS:    CONSTITUTIONAL: No fevers or chills.  weakness +  EYES/ENT: No visual changes;  No vertigo or throat pain   NECK: No pain or stiffness  RESPIRATORY: No wheezing, hemoptysis; chronic cough,  shortness of breath +  CARDIOVASCULAR: No chest pain or palpitations. chronic edema +  GASTROINTESTINAL: No abdominal or epigastric pain. No nausea, vomiting, or hematemesis; No diarrhea or constipation. No melena or hematochezia.  GENITOURINARY: No dysuria, frequency or hematuria  NEUROLOGICAL: No numbness or weakness  SKIN: No itching, burning, rashes, or lesions     PHYSICAL EXAM  General: adult chronically ill, with nasal O2, in wheelchair  HEENT: clear oropharynx, anicteric sclera  Neck: supple  CV: normal S1/S2 with no murmur rubs or gallops  Lungs: positive air movement b/l ant lungs,clear to auscultation, b/l rales +  Abdomen: soft obese non-tender non-distended, no hepatosplenomegaly  Ext: no clubbing cyanosis, b/l edema with chronic stasis changes  Skin: no rashes and no petechiae  Neuro: alert and oriented X 4, no focal deficits        MEDICATIONS  (STANDING):  amiodarone    Tablet 200 milliGRAM(s) Oral daily  carvedilol 6.25 milliGRAM(s) Oral every 12 hours  cholecalciferol 1000 Unit(s) Oral daily  dextrose 50% Injectable 12.5 Gram(s) IV Push once  dextrose 50% Injectable 25 Gram(s) IV Push once  dextrose 50% Injectable 25 Gram(s) IV Push once  docusate sodium 100 milliGRAM(s) Oral daily  DULoxetine 60 milliGRAM(s) Oral daily  furosemide   Injectable 40 milliGRAM(s) IV Push daily  insulin lispro (HumaLOG) corrective regimen sliding scale   SubCutaneous three times a day before meals  insulin lispro (HumaLOG) corrective regimen sliding scale   SubCutaneous at bedtime  isosorbide   mononitrate ER Tablet (IMDUR) 30 milliGRAM(s) Oral daily  levothyroxine 175 MICROGram(s) Oral daily  mesalamine DR (24-Hour) Tablet 2.4 Gram(s) Oral daily  norethindrone acetate 5 milliGRAM(s) Oral two times a day  pantoprazole    Tablet 40 milliGRAM(s) Oral before breakfast  senna 2 Tablet(s) Oral at bedtime    MEDICATIONS  (PRN):  cyclobenzaprine 5 milliGRAM(s) Oral three times a day PRN Muscle Spasm  dextrose 40% Gel 15 Gram(s) Oral once PRN Blood Glucose LESS THAN 70 milliGRAM(s)/deciliter  glucagon  Injectable 1 milliGRAM(s) IntraMuscular once PRN Glucose LESS THAN 70 milligrams/deciliter      Allergies    Augmentin (Swelling)  cephalexin (Swelling)    Intolerances        Vital Signs Last 24 Hrs  T(C): 36.8 (25 Mar 2019 05:06), Max: 36.8 (25 Mar 2019 05:06)  T(F): 98.3 (25 Mar 2019 05:06), Max: 98.3 (25 Mar 2019 05:06)  HR: 102 (25 Mar 2019 05:06) (101 - 104)  BP: 128/86 (25 Mar 2019 05:06) (122/78 - 134/93)  BP(mean): --  RR: 20 (25 Mar 2019 05:06) (20 - 21)  SpO2: 99% (25 Mar 2019 05:06) (99% - 100%)      LABS:                          8.6    10.2  )-----------( 192      ( 25 Mar 2019 07:05 )             26.3         Mean Cell Volume : 93.7 fl  Mean Cell Hemoglobin : 30.6 pg  Mean Cell Hemoglobin Concentration : 32.6 gm/dL  Auto Neutrophil # : x  Auto Lymphocyte # : x  Auto Monocyte # : x  Auto Eosinophil # : x  Auto Basophil # : x  Auto Neutrophil % : x  Auto Lymphocyte % : x  Auto Monocyte % : x  Auto Eosinophil % : x  Auto Basophil % : x    Serial CBC's  03-25 @ 07:05  Hct-26.3 / Hgb-8.6 / Plat-192 / RBC-2.81 / WBC-10.2    03-24    140  |  99  |  45<H>  ----------------------------<  228<H>  3.9   |  26  |  1.90<H>    Ca    9.5      24 Mar 2019 07:12        PT/INR - ( 25 Mar 2019 07:05 )   PT: 15.6 sec;   INR: 1.34 ratio        Ferritin, Serum: 1223 ng/mL (03-23 @ 14:21)    Iron Total, Serum (03.23.19 @ 14:29)    Iron Total, Serum: 236 ug/dL Patient is a 78y old  Female who presents with a chief complaint of shortness of breath (23 Mar 2019 15:03)       Pt is seen and examined  pt is awake and sitting in bed  chronic sob, leg swelling persist  increase in  vaginal bleeding  all other systems reviewed and negative    REVIEW OF SYSTEMS:    CONSTITUTIONAL: No fevers or chills.  weakness +  EYES/ENT: No visual changes;  No vertigo or throat pain   NECK: No pain or stiffness  RESPIRATORY: No wheezing, hemoptysis; chronic cough,  shortness of breath +  CARDIOVASCULAR: No chest pain or palpitations. chronic edema +  GASTROINTESTINAL: No abdominal or epigastric pain. No nausea, vomiting, or hematemesis; No diarrhea or constipation. No melena or hematochezia.  GENITOURINARY: No dysuria, frequency or hematuria  NEUROLOGICAL: No numbness or weakness  SKIN: No itching, burning, rashes, or lesions     PHYSICAL EXAM  General: adult chronically ill, with nasal O2, in wheelchair  HEENT: clear oropharynx, anicteric sclera  Neck: supple  CV: normal S1/S2 with no murmur rubs or gallops  Lungs: positive air movement b/l ant lungs,clear to auscultation, b/l rales +  Abdomen: soft obese non-tender non-distended, no hepatosplenomegaly  Ext: no clubbing cyanosis, b/l edema with chronic stasis changes  Skin: no rashes and no petechiae  Neuro: alert and oriented X 4, no focal deficits        MEDICATIONS  (STANDING):  amiodarone    Tablet 200 milliGRAM(s) Oral daily  carvedilol 6.25 milliGRAM(s) Oral every 12 hours  cholecalciferol 1000 Unit(s) Oral daily  dextrose 50% Injectable 12.5 Gram(s) IV Push once  dextrose 50% Injectable 25 Gram(s) IV Push once  dextrose 50% Injectable 25 Gram(s) IV Push once  docusate sodium 100 milliGRAM(s) Oral daily  DULoxetine 60 milliGRAM(s) Oral daily  furosemide   Injectable 40 milliGRAM(s) IV Push daily  insulin lispro (HumaLOG) corrective regimen sliding scale   SubCutaneous three times a day before meals  insulin lispro (HumaLOG) corrective regimen sliding scale   SubCutaneous at bedtime  isosorbide   mononitrate ER Tablet (IMDUR) 30 milliGRAM(s) Oral daily  levothyroxine 175 MICROGram(s) Oral daily  mesalamine DR (24-Hour) Tablet 2.4 Gram(s) Oral daily  norethindrone acetate 5 milliGRAM(s) Oral two times a day  pantoprazole    Tablet 40 milliGRAM(s) Oral before breakfast  senna 2 Tablet(s) Oral at bedtime    MEDICATIONS  (PRN):  cyclobenzaprine 5 milliGRAM(s) Oral three times a day PRN Muscle Spasm  dextrose 40% Gel 15 Gram(s) Oral once PRN Blood Glucose LESS THAN 70 milliGRAM(s)/deciliter  glucagon  Injectable 1 milliGRAM(s) IntraMuscular once PRN Glucose LESS THAN 70 milligrams/deciliter      Allergies    Augmentin (Swelling)  cephalexin (Swelling)    Intolerances        Vital Signs Last 24 Hrs  T(C): 36.8 (25 Mar 2019 05:06), Max: 36.8 (25 Mar 2019 05:06)  T(F): 98.3 (25 Mar 2019 05:06), Max: 98.3 (25 Mar 2019 05:06)  HR: 102 (25 Mar 2019 05:06) (101 - 104)  BP: 128/86 (25 Mar 2019 05:06) (122/78 - 134/93)  BP(mean): --  RR: 20 (25 Mar 2019 05:06) (20 - 21)  SpO2: 99% (25 Mar 2019 05:06) (99% - 100%)      LABS:                          8.6    10.2  )-----------( 192      ( 25 Mar 2019 07:05 )             26.3         Mean Cell Volume : 93.7 fl  Mean Cell Hemoglobin : 30.6 pg  Mean Cell Hemoglobin Concentration : 32.6 gm/dL  Auto Neutrophil # : x  Auto Lymphocyte # : x  Auto Monocyte # : x  Auto Eosinophil # : x  Auto Basophil # : x  Auto Neutrophil % : x  Auto Lymphocyte % : x  Auto Monocyte % : x  Auto Eosinophil % : x  Auto Basophil % : x    Serial CBC's  03-25 @ 07:05  Hct-26.3 / Hgb-8.6 / Plat-192 / RBC-2.81 / WBC-10.2    03-24    140  |  99  |  45<H>  ----------------------------<  228<H>  3.9   |  26  |  1.90<H>    Ca    9.5      24 Mar 2019 07:12        PT/INR - ( 25 Mar 2019 07:05 )   PT: 15.6 sec;   INR: 1.34 ratio        Ferritin, Serum: 1223 ng/mL (03-23 @ 14:21)    Iron Total, Serum (03.23.19 @ 14:29)    Iron Total, Serum: 236 ug/dL

## 2019-03-25 NOTE — CONSULT NOTE ADULT - SUBJECTIVE AND OBJECTIVE BOX
HPI:  77 yo F w/ hx of moderate MS s/p bioprosthetic mitral valve, severe pulmonary HTN, DM2, anemia, diastolic CHF, hypothyroidism, paroxysmal atrial fibrillation, HTN presenting with progressive SOB. Patient reports symptoms have gradually worsened over the course of months. She is not adherent to her torsemide. Sometimes she does not take it if she is out in stores. Also reports eating bag of pretzels and salty foods. She reports weight loss but unable to lay flat. She has to sit up when sleeping. She is not on home O2. She has noticed her legs are both more edematous. She has had no fever, chills, productive cough, and no chest pain/palpitation, nausea/vomiting, abdominal pain. Her ADLs are severely affected. She requires assistance by  for ambulation and is primarily more wheelchair bound. Pt also reports intermittent postmenopausal bleeding. More spotting but not overt hemorrhage. (23 Mar 2019 00:40)      Admit Diagnosis  Shortness of breath      ENDOCRINE HPI: 77 y/o Type 2 DM, hypothyroid, admitted with CHF exacerbation.    Type 2 DM insulin resistant at home on Tresiba insulin 60 units daily, and humalog 30 units before meals. Other treatment includes Bydureon 2 mg weekly.  Pt's HbA1c was 7.4 % on admission. Says her PO intake here is small. Patient started on scale insulin only, FS were 100-200 mg/dL the first day but increasing since.  Patient afebrile. Treated with increased diuresis.    Hypothyroidism- treated with synthroid 175 mcg daily. Last test as outpatient recently reported Good, Here with mild elevated TSH 5.27.  C/O fatigue, weight stable till recently. Says she is compliant with medications.       PAST MEDICAL & SURGICAL HISTORY:  Chronic kidney disease (CKD)  Anemia of chronic disease  On home oxygen therapy: 2  liters nasal cannula  Asthma: PFT (2018)  History of postmenopausal bleeding  Dyslipidemia associated with type 2 diabetes mellitus  Paroxysmal atrial fibrillation: h/o rapid ventricular rate 2 years ago, admitted at [facility], controlled with medication as per patient.  last INR 2.0  Morbid obesity with BMI of 45.0-49.9, adult  H/O: hypothyroidism  Chronic diastolic CHF (congestive heart failure): EF 56% (2017)  Depression (emotion)  Arthritis of spine  Macular degeneration of left eye: receiving injection  Neuropathy  Peripheral arterial disease: s/p remote stent. not on antiplatelet meds for a while.  Hypertension  Edema  GERD (gastroesophageal reflux disease)  Chronic low back pain  Herniated disc: lumbar  VICKY (obstructive sleep apnea)  Ulcerative colitis  Diabetes mellitus: T2DM last A1C 6.7 mg/dl in January   fs range 59- 200 mg/dl  History of mitral valve replacement with bioprosthetic valve: x 4 years ago  H/O  section: x 2  Amputated toe      FAMILY HISTORY:  No pertinent family history in first degree relatives      Social History: lives home with .    Outpatient Medications: as above.    MEDICATIONS  (STANDING):  amiodarone    Tablet 200 milliGRAM(s) Oral daily  carvedilol 6.25 milliGRAM(s) Oral every 12 hours  cholecalciferol 1000 Unit(s) Oral daily  dextrose 50% Injectable 12.5 Gram(s) IV Push once  dextrose 50% Injectable 25 Gram(s) IV Push once  dextrose 50% Injectable 25 Gram(s) IV Push once  docusate sodium 100 milliGRAM(s) Oral daily  DULoxetine 60 milliGRAM(s) Oral daily  insulin lispro (HumaLOG) corrective regimen sliding scale   SubCutaneous three times a day before meals  insulin lispro (HumaLOG) corrective regimen sliding scale   SubCutaneous at bedtime  insulin lispro Injectable (HumaLOG) 16 Unit(s) SubCutaneous three times a day before meals  isosorbide   mononitrate ER Tablet (IMDUR) 30 milliGRAM(s) Oral daily  levothyroxine 175 MICROGram(s) Oral daily  mesalamine DR (24-Hour) Tablet 2.4 Gram(s) Oral daily  metolazone 2.5 milliGRAM(s) Oral <User Schedule>  norethindrone acetate 5 milliGRAM(s) Oral two times a day  pantoprazole    Tablet 40 milliGRAM(s) Oral before breakfast  senna 2 Tablet(s) Oral at bedtime  spironolactone 25 milliGRAM(s) Oral two times a day  torsemide 40 milliGRAM(s) Oral two times a day    MEDICATIONS  (PRN):  cyclobenzaprine 5 milliGRAM(s) Oral three times a day PRN Muscle Spasm  dextrose 40% Gel 15 Gram(s) Oral once PRN Blood Glucose LESS THAN 70 milliGRAM(s)/deciliter  glucagon  Injectable 1 milliGRAM(s) IntraMuscular once PRN Glucose LESS THAN 70 milligrams/deciliter      Allergies    Augmentin (Swelling)  cephalexin (Swelling)    Intolerances        Review of Systems:  Constitutional: No fever, no chills, SOB Weight stable.  Eyes: No blurry vision  Neuro: some tremors  HEENT: No pain  Cardiovascular: No chest pain, palpitations  Respiratory: SOB, orthopnea. Some cough  GI: No nausea, vomiting, abdominal pain  : No dysuria  Skin: no rash  Psych: no depression  Endocrine: no polyuria, polydipsia    ALL OTHER SYSTEMS REVIEWED AND NEGATIVE    PHYSICAL EXAM:  VITALS: T(C): 36.8 (19 @ 05:06)  T(F): 98.3 (19 @ 05:06), Max: 98.3 (19 @ 05:06)  HR: 101 (19 @ 15:45) (101 - 102)  BP: 148/80 (19 @ 15:45) (128/86 - 148/80)  RR:  (20 - 20)  SpO2:  (99% - 100%)  GENERAL: obese, SOB, NAD, well-groomed.  EYES: No proptosis, no lid lag, anicteric  HEENT:  Atraumatic, Normocephalic, moist mucous membranes  THYROID: Normal size, no palpable nodules  RESPIRATORY: Clear to auscultation bilaterally; decreased breath sounds bases, mild wheezing.  CARDIOVASCULAR: Regular rate and rhythm; No murmurs; + peripheral edema  GI: Soft, nontender, non distended, normal bowel sounds  SKIN: Dry, intact, No rashes or lesions  MUSCULOSKELETAL: Full range of motion, normal strength  NEURO: sensation intact, extraocular movements intact, no tremor  PSYCH: Alert and oriented x 3, normal affect, normal mood    POCT Blood Glucose.: 266 mg/dL (19 @ 17:52)  POCT Blood Glucose.: 284 mg/dL (19 @ 11:46)  POCT Blood Glucose.: 214 mg/dL (19 @ 07:57)  POCT Blood Glucose.: 384 mg/dL (19 @ 21:28)  POCT Blood Glucose.: 372 mg/dL (19 @ 18:12)  POCT Blood Glucose.: 173 mg/dL (19 @ 10:39)  POCT Blood Glucose.: 281 mg/dL (19 @ 21:52)  POCT Blood Glucose.: 180 mg/dL (19 @ 18:13)  POCT Blood Glucose.: 178 mg/dL (19 @ 13:30)  POCT Blood Glucose.: 123 mg/dL (19 @ 09:48)                            8.6    10.2  )-----------( 192      ( 25 Mar 2019 07:05 )             26.3           140  |  99  |  45<H>  ----------------------------<  228<H>  3.9   |  26  |  1.90<H>    Ca    9.5      24 Mar 2019 07:12        Thyroid Function Tests:   @ 08:52 TSH 5.27 FreeT4 -- T3 -- Anti TPO -- Anti Thyroglobulin Ab -- TSI --      Hemoglobin A1C, Whole Blood: 7.4 % <H> [4.0 - 5.6] (19 @ 08:55)          Radiology:

## 2019-03-25 NOTE — PROGRESS NOTE ADULT - SUBJECTIVE AND OBJECTIVE BOX
Patient is a 78y old  Female who presents with a chief complaint of shortness of breath (23 Mar 2019 15:03)       Pt is seen and examined  pt is awake and sitting in bed  chronic sob, leg swelling persist  no worsening vaginal bleeding  all other systems reviewed and negative    REVIEW OF SYSTEMS:    CONSTITUTIONAL: No fevers or chills.  weakness +  EYES/ENT: No visual changes;  No vertigo or throat pain   NECK: No pain or stiffness  RESPIRATORY: No wheezing, hemoptysis; chronic cough,  shortness of breath +  CARDIOVASCULAR: No chest pain or palpitations. chronic edema +  GASTROINTESTINAL: No abdominal or epigastric pain. No nausea, vomiting, or hematemesis; No diarrhea or constipation. No melena or hematochezia.  GENITOURINARY: No dysuria, frequency or hematuria  NEUROLOGICAL: No numbness or weakness  SKIN: No itching, burning, rashes, or lesions     PHYSICAL EXAM  General: adult chronically ill, with nasal O2, in wheelchair  HEENT: clear oropharynx, anicteric sclera  Neck: supple  CV: normal S1/S2 with no murmur rubs or gallops  Lungs: positive air movement b/l ant lungs,clear to auscultation, b/l rales +  Abdomen: soft obese non-tender non-distended, no hepatosplenomegaly  Ext: no clubbing cyanosis, b/l edema with chronic stasis changes  Skin: no rashes and no petechiae  Neuro: alert and oriented X 4, no focal deficits        MEDICATIONS  (STANDING):  amiodarone    Tablet 200 milliGRAM(s) Oral daily  carvedilol 6.25 milliGRAM(s) Oral every 12 hours  cholecalciferol 1000 Unit(s) Oral daily  dextrose 50% Injectable 12.5 Gram(s) IV Push once  dextrose 50% Injectable 25 Gram(s) IV Push once  dextrose 50% Injectable 25 Gram(s) IV Push once  docusate sodium 100 milliGRAM(s) Oral daily  DULoxetine 60 milliGRAM(s) Oral daily  insulin lispro (HumaLOG) corrective regimen sliding scale   SubCutaneous three times a day before meals  insulin lispro (HumaLOG) corrective regimen sliding scale   SubCutaneous at bedtime  isosorbide   mononitrate ER Tablet (IMDUR) 30 milliGRAM(s) Oral daily  levothyroxine 175 MICROGram(s) Oral daily  mesalamine DR (24-Hour) Tablet 2.4 Gram(s) Oral daily  metolazone 2.5 milliGRAM(s) Oral <User Schedule>  norethindrone acetate 5 milliGRAM(s) Oral two times a day  pantoprazole    Tablet 40 milliGRAM(s) Oral before breakfast  senna 2 Tablet(s) Oral at bedtime  spironolactone 25 milliGRAM(s) Oral two times a day  torsemide 40 milliGRAM(s) Oral two times a day    MEDICATIONS  (PRN):  cyclobenzaprine 5 milliGRAM(s) Oral three times a day PRN Muscle Spasm  dextrose 40% Gel 15 Gram(s) Oral once PRN Blood Glucose LESS THAN 70 milliGRAM(s)/deciliter  glucagon  Injectable 1 milliGRAM(s) IntraMuscular once PRN Glucose LESS THAN 70 milligrams/deciliter      Allergies    Augmentin (Swelling)  cephalexin (Swelling)    Intolerances        Vital Signs Last 24 Hrs  T(C): 36.8 (25 Mar 2019 05:06), Max: 36.8 (25 Mar 2019 05:06)  T(F): 98.3 (25 Mar 2019 05:06), Max: 98.3 (25 Mar 2019 05:06)  HR: 102 (25 Mar 2019 05:06) (102 - 102)  BP: 128/86 (25 Mar 2019 05:06) (128/86 - 132/86)  BP(mean): --  RR: 20 (25 Mar 2019 05:06) (20 - 21)  SpO2: 99% (25 Mar 2019 05:06) (99% - 99%)      LABS:                          8.6    10.2  )-----------( 192      ( 25 Mar 2019 07:05 )             26.3         Mean Cell Volume : 93.7 fl  Mean Cell Hemoglobin : 30.6 pg  Mean Cell Hemoglobin Concentration : 32.6 gm/dL    03-24    140  |  99  |  45<H>  ----------------------------<  228<H>  3.9   |  26  |  1.90<H>    Ca    9.5      24 Mar 2019 07:12        PT/INR - ( 25 Mar 2019 07:05 )   PT: 15.6 sec;   INR: 1.34 ratio        PT/INR - ( 25 Mar 2019 07:05 )   PT: 15.6 sec;   INR: 1.34 ratio        Ferritin, Serum: 1223 ng/mL (03-23 @ 14:21)    Iron Total, Serum (03.23.19 @ 14:29)    Iron Total, Serum: 236 ug/dL

## 2019-03-25 NOTE — PROGRESS NOTE ADULT - SUBJECTIVE AND OBJECTIVE BOX
Patient is a 78y old  Female who presents with a chief complaint of shortness of breath (25 Mar 2019 11:18)      SUBJECTIVE / OVERNIGHT EVENTS: awaiting gyn consult for DUB    MEDICATIONS  (STANDING):  amiodarone    Tablet 200 milliGRAM(s) Oral daily  carvedilol 6.25 milliGRAM(s) Oral every 12 hours  cholecalciferol 1000 Unit(s) Oral daily  dextrose 50% Injectable 12.5 Gram(s) IV Push once  dextrose 50% Injectable 25 Gram(s) IV Push once  dextrose 50% Injectable 25 Gram(s) IV Push once  docusate sodium 100 milliGRAM(s) Oral daily  DULoxetine 60 milliGRAM(s) Oral daily  insulin lispro (HumaLOG) corrective regimen sliding scale   SubCutaneous three times a day before meals  insulin lispro (HumaLOG) corrective regimen sliding scale   SubCutaneous at bedtime  isosorbide   mononitrate ER Tablet (IMDUR) 30 milliGRAM(s) Oral daily  levothyroxine 175 MICROGram(s) Oral daily  mesalamine DR (24-Hour) Tablet 2.4 Gram(s) Oral daily  metolazone 2.5 milliGRAM(s) Oral <User Schedule>  norethindrone acetate 5 milliGRAM(s) Oral two times a day  pantoprazole    Tablet 40 milliGRAM(s) Oral before breakfast  senna 2 Tablet(s) Oral at bedtime  spironolactone 25 milliGRAM(s) Oral two times a day  torsemide 40 milliGRAM(s) Oral two times a day  warfarin 6 milliGRAM(s) Oral once    MEDICATIONS  (PRN):  cyclobenzaprine 5 milliGRAM(s) Oral three times a day PRN Muscle Spasm  dextrose 40% Gel 15 Gram(s) Oral once PRN Blood Glucose LESS THAN 70 milliGRAM(s)/deciliter  glucagon  Injectable 1 milliGRAM(s) IntraMuscular once PRN Glucose LESS THAN 70 milligrams/deciliter      Vital Signs Last 24 Hrs  T(F): 98.3 (03-25-19 @ 05:06), Max: 98.3 (03-25-19 @ 05:06)  HR: 102 (03-25-19 @ 05:06) (102 - 102)  BP: 128/86 (03-25-19 @ 05:06) (128/86 - 132/86)  RR: 20 (03-25-19 @ 05:06) (20 - 21)  SpO2: 99% (03-25-19 @ 05:06) (99% - 99%)  Telemetry:   CAPILLARY BLOOD GLUCOSE      POCT Blood Glucose.: 284 mg/dL (25 Mar 2019 11:46)  POCT Blood Glucose.: 214 mg/dL (25 Mar 2019 07:57)  POCT Blood Glucose.: 384 mg/dL (24 Mar 2019 21:28)  POCT Blood Glucose.: 372 mg/dL (24 Mar 2019 18:12)    I&O's Summary    24 Mar 2019 07:01  -  25 Mar 2019 07:00  --------------------------------------------------------  IN: 480 mL / OUT: 700 mL / NET: -220 mL    25 Mar 2019 07:01  -  25 Mar 2019 14:38  --------------------------------------------------------  IN: 240 mL / OUT: 0 mL / NET: 240 mL        PHYSICAL EXAM:  GENERAL: NAD, well-developed  HEAD:  Atraumatic, Normocephalic  EYES: EOMI, PERRLA, conjunctiva and sclera clear  NECK: Supple, No JVD  CHEST/LUNG: Clear to auscultation bilaterally; No wheeze  HEART: Regular rate and rhythm; No murmurs, rubs, or gallops  ABDOMEN: Soft, Nontender, Nondistended; Bowel sounds present  EXTREMITIES:  2+ Peripheral Pulses, No clubbing, cyanosis, or edema  PSYCH: AAOx3  NEUROLOGY: non-focal  SKIN: No rashes or lesions    LABS:                        8.6    10.2  )-----------( 192      ( 25 Mar 2019 07:05 )             26.3     03-24    140  |  99  |  45<H>  ----------------------------<  228<H>  3.9   |  26  |  1.90<H>    Ca    9.5      24 Mar 2019 07:12      PT/INR - ( 25 Mar 2019 07:05 )   PT: 15.6 sec;   INR: 1.34 ratio                   RADIOLOGY & ADDITIONAL TESTS:    Imaging Personally Reviewed:    Consultant(s) Notes Reviewed:      Care Discussed with Consultants/Other Providers:

## 2019-03-25 NOTE — CHART NOTE - NSCHARTNOTEFT_GEN_A_CORE
Patient was taking:  Demadex 50mg bid  Aldactone 25mg bid  Zaroxlyn 2.5mg tiw    meds adjusted.    Feliz Aiken MD.

## 2019-03-25 NOTE — CONSULT NOTE ADULT - PROBLEM SELECTOR RECOMMENDATION 6
- Continue supplemental O2   - Maintain O2 sat > 90%  - Incentive spirometer and Acapella  - OOB and PT

## 2019-03-25 NOTE — CONSULT NOTE ADULT - SUBJECTIVE AND OBJECTIVE BOX
[facility] DIVISION OF PULMONARY, CRITICAL CARE AND SLEEP MEDICINE  PULMONARY CONSULTATION NOTE  OFFICE NUMBER: [phone]    NAME: TANIKA OBRIEN  MEDICAL RECORD NUMBER: [id number]    CHIEF COMPLAINT: Patient is a 78y old  Female who presents with a chief complaint of shortness of breath (25 Mar 2019 08:33)      HISTORY OF PRESENT ILLNESS:   78F moderate MS s/p bioprosthetic MV, severe pulmonary HTN, DM2, diastolic CHF, PAF, HTN< obesity who presents for progressive worsening of SOB. She has had chronic dyspnea and is mostly limited to a wheelchair. She has been on supplemental O2 for a few months no and uses 2L )2 ATC. She is supposed to be on a diuretic regimen but is not adherent to therapy and is also not adherent to a low salt diet. She has noted orthopnea  and has had increasing LE edema.    With regards to her pulmonary history she is suspected of having ILD. She was seen by Dr. Rizvi of [facility]  and was being pepared for a VATs/lung biopsy but this never occurred. She had a serologic workup for ILD which was mostly negative except for an elevated RF and ESR. At that time he was advised by Dr. Montgomery to stop the Amiodarone as this was thought to bbe a potential cause. She also is noted to have restriction on her PFTS and is supsected of having asthma vs small airway disease. She was started on Anoro but does not use this. She presently denies any cough, sputum production, or heptysis. She has noted that her SOB is limiting her quality of life.    She has had a history of vaginal bleeding which is thought due to her AC.    ====================BACKGROUND INFORMATION============================    FAMILY HISTORY: FAMILY HISTORY:  Father - CKD and Bladder Cancer -       PAST MEDICAL AND SURGICAL HISTORY: PAST MEDICAL & SURGICAL HISTORY:  Chronic kidney disease (CKD)  Anemia of chronic disease  On home oxygen therapy: 2  liters nasal cannula  Asthma: PFT (2018)  History of postmenopausal bleeding  Dyslipidemia associated with type 2 diabetes mellitus  Paroxysmal atrial fibrillation: h/o rapid ventricular rate 2 years ago, admitted at [facility], controlled with medication as per patient.  last INR 2.0  Morbid obesity with BMI of 45.0-49.9, adult  H/O: hypothyroidism  Chronic diastolic CHF (congestive heart failure): EF 56% (2017)  Depression (emotion)  Arthritis of spine  Macular degeneration of left eye: receiving injection  Neuropathy  Peripheral arterial disease: s/p remote stent. not on antiplatelet meds for a while.  Hypertension  Edema  GERD (gastroesophageal reflux disease)  Chronic low back pain  Herniated disc: lumbar  VICKY (obstructive sleep apnea)  Ulcerative colitis  Diabetes mellitus: T2DM last A1C 6.7 mg/dl in January   fs range 59- 200 mg/dl  History of mitral valve replacement with bioprosthetic valve: x 4 years ago  H/O  section: x 2  Amputated toe      ALLERGIES:Allergies    Augmentin (Swelling)  cephalexin (Swelling)    Intolerances        HOME MEDICATIONS: Reviewed     OUTPATIENT PULMONARY DOCTOR: Fito Montgomery MD    SOCIAL HISTORY:  TOBACCO USE: Denied   ALCOHOL: Denied   DRUGS: Denied   MARITAL STATUS:    EMPLOYMENT: Retired   EXPOSURES: Denied   RECENT TRAVELS: Denied   PETS: Denie d  CODE STATUS: Full Code     ====================REVIEW OF SYSTEMS=====================================  CONSTITUTIONAL: Feels tired   CARDIOVASCULAR: Denies chest pain, Intermittent palpitations  PULMONARY: Baseline SOB, denies cough or hemoptysis   GASTROINTESTINAL: Denies nausea, vomiting, or abdominal pain.   [x] ALL OTHER REVIEW OF SYSTEMS ARE NEGATIVE except for vaginal bleeding  [] UNABLE TO OBTAIN REVIEW OF SYSTEMS DUE TO ______________      ====================PHYSICAL EXAM=========================================    VITALS: ICU Vital Signs Last 24 Hrs  T(C): 36.8 (25 Mar 2019 05:06), Max: 36.8 (25 Mar 2019 05:06)  T(F): 98.3 (25 Mar 2019 05:06), Max: 98.3 (25 Mar 2019 05:06)  HR: 102 (25 Mar 2019 05:06) (102 - 102)  BP: 128/86 (25 Mar 2019 05:06) (128/86 - 132/86)  RR: 20 (25 Mar 2019 05:06) (20 - 21)  SpO2: 99% (25 Mar 2019 05:06) (99% - 99%)      INTAKE and OUTPUT: I&O's Summary    24 Mar 2019 07:01  -  25 Mar 2019 07:00  --------------------------------------------------------  IN: 480 mL / OUT: 700 mL / NET: -220 mL        WEIGHT: Daily     Daily     GLUCOSE: CAPILLARY BLOOD GLUCOSE: POCT Blood Glucose.: 214 mg/dL (25 Mar 2019 07:57)      VENTILATOR SETTINGS: N/A     GENERAL: AAOx3, NAD sitting in wheelchair, on O2  HEENT: NCAT, EOMI, anicteric, MMM, nares clear, trachea midline   NECK: supple, no JVD   LYMPH NODES: no palpable supraclavicular LAD   CARDIOVASCULAR: irreg, S1S2   PULMONARY: decrease BS at base, no wheeze or rhonchi, no accessory muscle use   ABDOMEN: soft, obese, NT, ND, +BS  SKIN: warm and dry   EXTREMITIES: no clubbing or cyanosis, chronic venous stasis changes LE   NEUROLOGIC: nonfocal exam   PSYCHIATRIC: calm    ====================MEDICATIONS===========================================  MEDICATIONS  (STANDING):  amiodarone    Tablet 200 milliGRAM(s) Oral daily  carvedilol 6.25 milliGRAM(s) Oral every 12 hours  cholecalciferol 1000 Unit(s) Oral daily  dextrose 50% Injectable 12.5 Gram(s) IV Push once  dextrose 50% Injectable 25 Gram(s) IV Push once  dextrose 50% Injectable 25 Gram(s) IV Push once  docusate sodium 100 milliGRAM(s) Oral daily  DULoxetine 60 milliGRAM(s) Oral daily  insulin lispro (HumaLOG) corrective regimen sliding scale   SubCutaneous three times a day before meals  insulin lispro (HumaLOG) corrective regimen sliding scale   SubCutaneous at bedtime  isosorbide   mononitrate ER Tablet (IMDUR) 30 milliGRAM(s) Oral daily  levothyroxine 175 MICROGram(s) Oral daily  mesalamine DR (24-Hour) Tablet 2.4 Gram(s) Oral daily  metolazone 2.5 milliGRAM(s) Oral <User Schedule>  norethindrone acetate 5 milliGRAM(s) Oral two times a day  pantoprazole    Tablet 40 milliGRAM(s) Oral before breakfast  senna 2 Tablet(s) Oral at bedtime  spironolactone 25 milliGRAM(s) Oral two times a day  torsemide 40 milliGRAM(s) Oral two times a day      MEDICATIONS  (PRN):  cyclobenzaprine 5 milliGRAM(s) Oral three times a day PRN Muscle Spasm  dextrose 40% Gel 15 Gram(s) Oral once PRN Blood Glucose LESS THAN 70 milliGRAM(s)/deciliter  glucagon  Injectable 1 milliGRAM(s) IntraMuscular once PRN Glucose LESS THAN 70 milligrams/deciliter      ====================LABORATORY RESULTS====================================  CBC Full  -  ( 25 Mar 2019 07:05 )  WBC Count : 10.2 K/uL  Hemoglobin : 8.6 g/dL  Hematocrit : 26.3 %  Platelet Count - Automated : 192 K/uL  Mean Cell Volume : 93.7 fl  Mean Cell Hemoglobin : 30.6 pg  Mean Cell Hemoglobin Concentration : 32.6 gm/dL                                          140  |  99  |  45<H>  ----------------------------<  228<H>  3.9   |  26  |  1.90<H>    Ca    9.5      24 Mar 2019 07:12          PT/INR - ( 25 Mar 2019 07:05 )   PT: 15.6 sec;   INR: 1.34 ratio        Creatinine Trend: 1.90<--, 1.84<--, 1.83<--, 1.84<--      ====================RADIOLOGY and ECHOCARDIOGRAPHY=======================    CXR: < from: Xray Chest 1 View AP/PA (19 @ 18:23) >  IMPRESSION:    Mild interstitial edema.    < end of copied text >      CT CHEST:     ECHOCARDIOGRAPHY:

## 2019-03-25 NOTE — CONSULT NOTE ADULT - PROBLEM SELECTOR RECOMMENDATION 2
As above
- Continue diuretics as per Dr. Aiken  - Monitor I/O and daily weights  - Dietary adherence discussed with patient  - Consider 2D echo
Hgb improved  s/p iron supplementation

## 2019-03-25 NOTE — PROGRESS NOTE ADULT - PROBLEM SELECTOR PLAN 4
-Pt reports prolonged hx of intermittent bleeding, improved, now reoccurs. H&H stable. Will c/w warfarin for afib for now from risk of stroke higher. Pt had TVUS in 2018 which was not fully complete 2/2 to pt unable to lay flat. Will need to repeat sonogram when pt actually can lay after optimizing diuresis. ptn requesting 2nd opinion, awaiting  GYN consult.

## 2019-03-25 NOTE — CONSULT NOTE ADULT - PROBLEM SELECTOR RECOMMENDATION 7
- No evidence of asthma exacerbation at present and patient does not use any bronchodilator therapy regularly  - She was prescribed Anoro in 2018 but has not been using it  - Can use Duoneb as necessary but at this time would hold bronchodilator therapy unless patient develops evidence of asthma or reactive airway disease

## 2019-03-25 NOTE — CONSULT NOTE ADULT - PROBLEM SELECTOR RECOMMENDATION 9
- Shortness of breath is likely multifactorial due to underlying heart disease, lung disease, obesity and OHS, pulmonary HTN, deconditioning, and poor breathing mechanics  - Maintain o2 sat > 90%  - Incentive spirometer  - OOB and PT as able - consider holding AC  - consider GYN evaluation

## 2019-03-25 NOTE — PROGRESS NOTE ADULT - PROBLEM SELECTOR PLAN 3
in SR, cont amiodarone and  warfarin, check PT/INR daily in SR, cont amiodarone and  DC warfarin 2/2 vaginal bleeding, today nearly normal INR at 1.3

## 2019-03-25 NOTE — CONSULT NOTE ADULT - PROBLEM SELECTOR PROBLEM 1
Uncontrolled type 2 diabetes mellitus with hyperglycemia
Acute on chronic diastolic congestive heart failure
Shortness of breath

## 2019-03-25 NOTE — PROGRESS NOTE ADULT - PROBLEM SELECTOR PLAN 1
-cont diuresis w Zarosxlyn Aldactone and Torsemide po, awaiting TTE  ptn w poor compliance w diet and meds

## 2019-03-25 NOTE — CONSULT NOTE ADULT - PROBLEM SELECTOR RECOMMENDATION 5
- 2D echo  - cardiac cath 2013 - mPAP 70 with PCWP 32 (though in report it states mPAP 56). The TPG was > 15 (38 or 24 depending on mPAP) and PVR was 7.8 Woods unit suggesting a combined precapillary pulmonary HTN and PH of LHD

## 2019-03-25 NOTE — CONSULT NOTE ADULT - ASSESSMENT
79 yo F w/ hx of moderate MS s/p bioprosthetic mitral valve, severe pulmonary HTN, DM2, anemia, diastolic CHF, hypothyroidism, paroxysmal atrial fibrillation, HTN presenting with progressive SOB 2/2 to acute on chronic diastolic congestive heart failure.     Type 2 DM insulin resistant at home on Tresiba insulin 60 units daily, and humalog 30 units before meals. Other treatment includes Bydureon 2 mg weekly.  Pt's HbA1c was 7.4 % on admission. Says her PO intake here is small. Patient started on scale insulin only, FS were 100-200 mg/dL the first day but increasing since.  Patient afebrile. Treated with increased diuresis.    Hypothyroidism- treated with synthroid 175 mcg daily. Last test as outpatient recently reported Good, Here with mild elevated TSH 5.27.  C/O fatigue, weight stable till recently. Says she is compliant with medications.     Expect high insulin requirements patient is on to decrease due to CHF, with decreased insulin clearance, and long action of Tresiba insulin. As patient hyperglycemic will start lower dose insulin basal bolus with higher scale coverage.  As out-patient consider SGLT-2's if not failed prior and no UTI risk, to decrease insulin requirements and moderate weight loss in this patient with CHF.    Hypothyroidism- despite mild increase TSH keep synthroid at 175 mcg daily. Mild elevation could be due to decreased absorption due to CHF.    Suggest:  Lantus insulin 24 units at HS.  Humalog insulin 8 units per meal.  Mid scale humalog.  Continue synthroid 175 mcg daily.  Will follow to adjust.

## 2019-03-25 NOTE — CONSULT NOTE ADULT - PROBLEM SELECTOR RECOMMENDATION 8
- outpatient followup for PSG  - would check ABG if patient has change in respiratory status to evaluate for hypercapnia

## 2019-03-25 NOTE — PROGRESS NOTE ADULT - SUBJECTIVE AND OBJECTIVE BOX
Patient is a 78y old  Female who presents with a chief complaint of shortness of breath (24 Mar 2019 21:04)    She denies c/o chest pain, increasing SOB or palpitations. She is sitting in a chair. She has chronic QUESADA.  Further gyn bleeding yesterday.     Allergies    Augmentin (Swelling)  cephalexin (Swelling)    Intolerances      MEDICATIONS  (STANDING):  amiodarone    Tablet 200 milliGRAM(s) Oral daily  carvedilol 6.25 milliGRAM(s) Oral every 12 hours  cholecalciferol 1000 Unit(s) Oral daily  dextrose 50% Injectable 12.5 Gram(s) IV Push once  dextrose 50% Injectable 25 Gram(s) IV Push once  dextrose 50% Injectable 25 Gram(s) IV Push once  docusate sodium 100 milliGRAM(s) Oral daily  DULoxetine 60 milliGRAM(s) Oral daily  furosemide   Injectable 40 milliGRAM(s) IV Push daily  insulin lispro (HumaLOG) corrective regimen sliding scale   SubCutaneous three times a day before meals  insulin lispro (HumaLOG) corrective regimen sliding scale   SubCutaneous at bedtime  isosorbide   mononitrate ER Tablet (IMDUR) 30 milliGRAM(s) Oral daily  levothyroxine 175 MICROGram(s) Oral daily  mesalamine DR (24-Hour) Tablet 2.4 Gram(s) Oral daily  norethindrone acetate 5 milliGRAM(s) Oral two times a day  pantoprazole    Tablet 40 milliGRAM(s) Oral before breakfast  senna 2 Tablet(s) Oral at bedtime    MEDICATIONS  (PRN):  cyclobenzaprine 5 milliGRAM(s) Oral three times a day PRN Muscle Spasm  dextrose 40% Gel 15 Gram(s) Oral once PRN Blood Glucose LESS THAN 70 milliGRAM(s)/deciliter  glucagon  Injectable 1 milliGRAM(s) IntraMuscular once PRN Glucose LESS THAN 70 milligrams/deciliter      PHYSICAL EXAM:  Vital Signs Last 24 Hrs  T(C): 36.8 (25 Mar 2019 05:06), Max: 36.8 (25 Mar 2019 05:06)  T(F): 98.3 (25 Mar 2019 05:06), Max: 98.3 (25 Mar 2019 05:06)  HR: 102 (25 Mar 2019 05:06) (101 - 104)  BP: 128/86 (25 Mar 2019 05:06) (122/78 - 134/93)  BP(mean): --  RR: 20 (25 Mar 2019 05:06) (20 - 21)  SpO2: 99% (25 Mar 2019 05:06) (99% - 100%)  Daily     Daily   I&O's Summary    24 Mar 2019 07:01  -  25 Mar 2019 07:00  --------------------------------------------------------  IN: 480 mL / OUT: 700 mL / NET: -220 mL        General Appearance: 	 Alert, cooperative, no distress  HEENT: normocephalic, atraumatic,   Neck: no JVD,  carotid 2+  bilaterally without bruits  Lungs:  clear to auscultation and percussion bilaterally  Cor:  pmi 5th ICS MCL, regular rate and rhythm, S1 normal intensity, S2 normal intensity, no gallops, murmurs or rubs  Abdomen: obese, soft, non-tender; bowel sounds normal; no masses,  no organomegaly  Extremities: without cyanosis, clubbing; 2+ LE edema  Vasc: trace PT and DP pulses;     Telemetry: probable sinus tachycardia  Labs:  CBC Full  -  ( 24 Mar 2019 09:54 )  WBC Count : 11.31 K/uL  Hemoglobin : 8.3 g/dL  Hematocrit : 28.4 %  Platelet Count - Automated : 177 K/uL  Mean Cell Volume : 98.3 fl  Mean Cell Hemoglobin : 28.7 pg  Mean Cell Hemoglobin Concentration : 29.2 gm/dL  Auto Neutrophil # : x  Auto Lymphocyte # : x  Auto Monocyte # : x  Auto Eosinophil # : x  Auto Basophil # : x  Auto Neutrophil % : x  Auto Lymphocyte % : x  Auto Monocyte % : x  Auto Eosinophil % : x  Auto Basophil % : x    03-24    140  |  99  |  45<H>  ----------------------------<  228<H>  3.9   |  26  |  1.90<H>    Ca    9.5      24 Mar 2019 07:12          PT/INR - ( 24 Mar 2019 19:22 )   PT: 17.0 sec;   INR: 1.46 ratio               Radiology/Imaging:        < from: Xray Chest 1 View AP/PA (03.22.19 @ 18:23) >  EXAM:  XR CHEST AP OR PA 1V                            PROCEDURE DATE:  03/22/2019            INTERPRETATION:  CLINICAL INFORMATION: Shortness of breath.    TECHNIQUE: AP view of the chest.    COMPARISON: CT chest 9/13/2018.    FINDINGS:     Sternotomy with CABG and mitral valve replacement.    The lungs are clear. No pleural effusions or pneumothoraces. Interstitial   edema. Pulmonary vascular congestion.    The heart remains enlarged    IMPRESSION:    Mild interstitial edema.                AR PRAKASH M.D., RADIOLOGY RESIDENT  This document has been electronically signed.  BEBETO BLOUNT M.D., ATTENDING RADIOLOGIST  This document has been electronically signed. Mar 22 2019  7:29PM    < end of copied text >          Feliz Aiken MD [facility]  [phone]

## 2019-03-26 LAB
ANION GAP SERPL CALC-SCNC: 13 MMOL/L — SIGNIFICANT CHANGE UP (ref 5–17)
APTT BLD: 28.8 SEC — SIGNIFICANT CHANGE UP (ref 27.5–36.3)
BUN SERPL-MCNC: 46 MG/DL — HIGH (ref 7–23)
CALCIUM SERPL-MCNC: 9.3 MG/DL — SIGNIFICANT CHANGE UP (ref 8.4–10.5)
CHLORIDE SERPL-SCNC: 98 MMOL/L — SIGNIFICANT CHANGE UP (ref 96–108)
CO2 SERPL-SCNC: 28 MMOL/L — SIGNIFICANT CHANGE UP (ref 22–31)
CREAT SERPL-MCNC: 1.76 MG/DL — HIGH (ref 0.5–1.3)
GLUCOSE BLDC GLUCOMTR-MCNC: 261 MG/DL — HIGH (ref 70–99)
GLUCOSE BLDC GLUCOMTR-MCNC: 265 MG/DL — HIGH (ref 70–99)
GLUCOSE BLDC GLUCOMTR-MCNC: 281 MG/DL — HIGH (ref 70–99)
GLUCOSE BLDC GLUCOMTR-MCNC: 94 MG/DL — SIGNIFICANT CHANGE UP (ref 70–99)
GLUCOSE SERPL-MCNC: 271 MG/DL — HIGH (ref 70–99)
HCT VFR BLD CALC: 25.6 % — LOW (ref 34.5–45)
HGB BLD-MCNC: 8.3 G/DL — LOW (ref 11.5–15.5)
INR BLD: 1.41 RATIO — HIGH (ref 0.88–1.16)
MAGNESIUM SERPL-MCNC: 2.2 MG/DL — SIGNIFICANT CHANGE UP (ref 1.6–2.6)
MCHC RBC-ENTMCNC: 30.1 PG — SIGNIFICANT CHANGE UP (ref 27–34)
MCHC RBC-ENTMCNC: 32.3 GM/DL — SIGNIFICANT CHANGE UP (ref 32–36)
MCV RBC AUTO: 93.2 FL — SIGNIFICANT CHANGE UP (ref 80–100)
PLATELET # BLD AUTO: 154 K/UL — SIGNIFICANT CHANGE UP (ref 150–400)
POTASSIUM SERPL-MCNC: 3.6 MMOL/L — SIGNIFICANT CHANGE UP (ref 3.5–5.3)
POTASSIUM SERPL-SCNC: 3.6 MMOL/L — SIGNIFICANT CHANGE UP (ref 3.5–5.3)
PROTHROM AB SERPL-ACNC: 16.3 SEC — HIGH (ref 10–12.9)
RBC # BLD: 2.75 M/UL — LOW (ref 3.8–5.2)
RBC # FLD: 15.3 % — HIGH (ref 10.3–14.5)
SODIUM SERPL-SCNC: 139 MMOL/L — SIGNIFICANT CHANGE UP (ref 135–145)
WBC # BLD: 10.1 K/UL — SIGNIFICANT CHANGE UP (ref 3.8–10.5)
WBC # FLD AUTO: 10.1 K/UL — SIGNIFICANT CHANGE UP (ref 3.8–10.5)

## 2019-03-26 PROCEDURE — 71250 CT THORAX DX C-: CPT | Mod: 26

## 2019-03-26 PROCEDURE — 99232 SBSQ HOSP IP/OBS MODERATE 35: CPT

## 2019-03-26 PROCEDURE — 76856 US EXAM PELVIC COMPLETE: CPT | Mod: 26

## 2019-03-26 RX ORDER — NORETHINDRONE 0.35 MG/1
5 TABLET ORAL DAILY
Qty: 0 | Refills: 0 | Status: DISCONTINUED | OUTPATIENT
Start: 2019-03-26 | End: 2019-03-27

## 2019-03-26 RX ORDER — INSULIN LISPRO 100/ML
5 VIAL (ML) SUBCUTANEOUS
Qty: 0 | Refills: 0 | Status: DISCONTINUED | OUTPATIENT
Start: 2019-03-26 | End: 2019-03-27

## 2019-03-26 RX ORDER — INSULIN GLARGINE 100 [IU]/ML
28 INJECTION, SOLUTION SUBCUTANEOUS AT BEDTIME
Qty: 0 | Refills: 0 | Status: DISCONTINUED | OUTPATIENT
Start: 2019-03-26 | End: 2019-03-27

## 2019-03-26 RX ADMIN — Medication 1000 UNIT(S): at 18:31

## 2019-03-26 RX ADMIN — Medication 6: at 18:32

## 2019-03-26 RX ADMIN — Medication 2.4 GRAM(S): at 18:30

## 2019-03-26 RX ADMIN — CARVEDILOL PHOSPHATE 6.25 MILLIGRAM(S): 80 CAPSULE, EXTENDED RELEASE ORAL at 18:31

## 2019-03-26 RX ADMIN — Medication 1: at 22:15

## 2019-03-26 RX ADMIN — SPIRONOLACTONE 25 MILLIGRAM(S): 25 TABLET, FILM COATED ORAL at 18:31

## 2019-03-26 RX ADMIN — Medication 100 MILLIGRAM(S): at 05:00

## 2019-03-26 RX ADMIN — INSULIN GLARGINE 28 UNIT(S): 100 INJECTION, SOLUTION SUBCUTANEOUS at 22:15

## 2019-03-26 RX ADMIN — CARVEDILOL PHOSPHATE 6.25 MILLIGRAM(S): 80 CAPSULE, EXTENDED RELEASE ORAL at 05:00

## 2019-03-26 RX ADMIN — ISOSORBIDE MONONITRATE 30 MILLIGRAM(S): 60 TABLET, EXTENDED RELEASE ORAL at 22:15

## 2019-03-26 RX ADMIN — Medication 40 MILLIGRAM(S): at 18:30

## 2019-03-26 RX ADMIN — NORETHINDRONE 5 MILLIGRAM(S): 0.35 TABLET ORAL at 18:31

## 2019-03-26 RX ADMIN — DULOXETINE HYDROCHLORIDE 60 MILLIGRAM(S): 30 CAPSULE, DELAYED RELEASE ORAL at 18:31

## 2019-03-26 RX ADMIN — SPIRONOLACTONE 25 MILLIGRAM(S): 25 TABLET, FILM COATED ORAL at 05:00

## 2019-03-26 RX ADMIN — PANTOPRAZOLE SODIUM 40 MILLIGRAM(S): 20 TABLET, DELAYED RELEASE ORAL at 05:00

## 2019-03-26 RX ADMIN — Medication 40 MILLIGRAM(S): at 04:59

## 2019-03-26 RX ADMIN — Medication 8 UNIT(S): at 07:59

## 2019-03-26 RX ADMIN — Medication 175 MICROGRAM(S): at 04:59

## 2019-03-26 RX ADMIN — Medication 6: at 07:59

## 2019-03-26 RX ADMIN — AMIODARONE HYDROCHLORIDE 200 MILLIGRAM(S): 400 TABLET ORAL at 05:00

## 2019-03-26 RX ADMIN — NORETHINDRONE 5 MILLIGRAM(S): 0.35 TABLET ORAL at 05:00

## 2019-03-26 RX ADMIN — Medication 8 UNIT(S): at 18:32

## 2019-03-26 NOTE — PROGRESS NOTE ADULT - SUBJECTIVE AND OBJECTIVE BOX
Chief Complaint: 79 y/o Type 2 DM, hypothyroid, admitted with CHF exacerbation.    Patient started on Lantus 24 units at HS, Humalog 8 units per meal.  PO intake good.  FS improved, low at lunch- humalog held high again at pm.      MEDICATIONS  (STANDING):  amiodarone    Tablet 200 milliGRAM(s) Oral daily  carvedilol 6.25 milliGRAM(s) Oral every 12 hours  cholecalciferol 1000 Unit(s) Oral daily  dextrose 50% Injectable 12.5 Gram(s) IV Push once  dextrose 50% Injectable 25 Gram(s) IV Push once  dextrose 50% Injectable 25 Gram(s) IV Push once  docusate sodium 100 milliGRAM(s) Oral daily  DULoxetine 60 milliGRAM(s) Oral daily  insulin glargine Injectable (LANTUS) 28 Unit(s) SubCutaneous at bedtime  insulin lispro (HumaLOG) corrective regimen sliding scale   SubCutaneous three times a day before meals  insulin lispro (HumaLOG) corrective regimen sliding scale   SubCutaneous at bedtime  insulin lispro Injectable (HumaLOG) 5 Unit(s) SubCutaneous three times a day before meals  isosorbide   mononitrate ER Tablet (IMDUR) 30 milliGRAM(s) Oral daily  levothyroxine 175 MICROGram(s) Oral daily  mesalamine DR (24-Hour) Tablet 2.4 Gram(s) Oral daily  metolazone 2.5 milliGRAM(s) Oral <User Schedule>  norethindrone acetate 5 milliGRAM(s) Oral daily  pantoprazole    Tablet 40 milliGRAM(s) Oral before breakfast  senna 2 Tablet(s) Oral at bedtime  spironolactone 25 milliGRAM(s) Oral two times a day  torsemide 40 milliGRAM(s) Oral two times a day    MEDICATIONS  (PRN):  cyclobenzaprine 5 milliGRAM(s) Oral three times a day PRN Muscle Spasm  dextrose 40% Gel 15 Gram(s) Oral once PRN Blood Glucose LESS THAN 70 milliGRAM(s)/deciliter  glucagon  Injectable 1 milliGRAM(s) IntraMuscular once PRN Glucose LESS THAN 70 milligrams/deciliter      Allergies    Augmentin (Swelling)  cephalexin (Swelling)    Intolerances        PHYSICAL EXAM:  VITALS: T(C): 37 (03-26-19 @ 19:44)  T(F): 98.6 (03-26-19 @ 19:44), Max: 98.6 (03-26-19 @ 19:44)  HR: 103 (03-26-19 @ 19:44) (102 - 107)  BP: 157/73 (03-26-19 @ 19:44) (116/48 - 157/73)  RR:  (20 - 20)  SpO2:  (100% - 100%)  GENERAL: obese, SOB, NAD, well-groomed.  EYES: No proptosis, no lid lag, anicteric  HEENT:  Atraumatic, Normocephalic, moist mucous membranes  THYROID: Normal size, no palpable nodules  RESPIRATORY: Clear to auscultation bilaterally; decreased breath sounds bases, mild wheezing.  CARDIOVASCULAR: Regular rate and rhythm; No murmurs; + peripheral edema  GI: Soft, nontender, non distended, normal bowel sounds  SKIN: Dry, intact, No rashes or lesions  MUSCULOSKELETAL: Full range of motion, normal strength  NEURO: sensation intact, extraocular movements intact, no tremor  PSYCH: Alert and oriented x 3, normal affect, normal mood      POCT Blood Glucose.: 281 mg/dL (03-26-19 @ 21:46)  POCT Blood Glucose.: 261 mg/dL (03-26-19 @ 17:58)  POCT Blood Glucose.: 94 mg/dL (03-26-19 @ 12:23)  POCT Blood Glucose.: 265 mg/dL (03-26-19 @ 07:18)  POCT Blood Glucose.: 315 mg/dL (03-25-19 @ 21:20)  POCT Blood Glucose.: 266 mg/dL (03-25-19 @ 17:52)  POCT Blood Glucose.: 284 mg/dL (03-25-19 @ 11:46)  POCT Blood Glucose.: 214 mg/dL (03-25-19 @ 07:57)  POCT Blood Glucose.: 384 mg/dL (03-24-19 @ 21:28)  POCT Blood Glucose.: 372 mg/dL (03-24-19 @ 18:12)  POCT Blood Glucose.: 173 mg/dL (03-24-19 @ 10:39)                            8.3    10.1  )-----------( 154      ( 26 Mar 2019 04:59 )             25.6       03-26    139  |  98  |  46<H>  ----------------------------<  271<H>  3.6   |  28  |  1.76<H>    EGFR if : 32<L>  EGFR if non : 28<L>    Ca    9.3      03-26  Mg     2.2     03-26        Thyroid Function Tests:  03-25 @ 08:52 TSH 5.27 FreeT4 -- T3 -- Anti TPO -- Anti Thyroglobulin Ab -- TSI --      Hemoglobin A1C, Whole Blood: 7.4 % <H> [4.0 - 5.6] (03-25-19 @ 08:55)

## 2019-03-26 NOTE — PROGRESS NOTE ADULT - PROBLEM SELECTOR PLAN 5
-Pt reports prolonged hx of intermittent bleeding, improved, now reoccurs. H&H stable. Will c/w warfarin for afib for now from risk of stroke higher. Pt had TVUS in 2018 which was not fully complete 2/2 to pt unable to lay flat. Will need to repeat sonogram when pt actually can lay after optimizing diuresis. ptn requesting 2nd opinion, awaiting  GYN consult. pelvic sono nondiagnostic

## 2019-03-26 NOTE — PROGRESS NOTE ADULT - ASSESSMENT
77 yo F w/ hx of moderate MS s/p bioprosthetic mitral valve, severe pulmonary HTN, DM2, anemia, diastolic CHF, hypothyroidism, paroxysmal atrial fibrillation, HTN presenting with progressive SOB 2/2 to acute on chronic diastolic congestive heart failure.

## 2019-03-26 NOTE — PROGRESS NOTE ADULT - ASSESSMENT
77 yo F w/ hx of moderate MS s/p bioprosthetic mitral valve, severe pulmonary HTN, DM2, anemia, diastolic CHF, hypothyroidism, paroxysmal atrial fibrillation, HTN presenting with progressive SOB 2/2 to acute on chronic diastolic congestive heart failure.     Type 2 DM insulin resistant at home on Tresiba insulin 60 units daily, and humalog 30 units before meals. Other treatment includes Bydureon 2 mg weekly.  Pt's HbA1c was 7.4 % on admission. Says her PO intake here is small. Patient started on scale insulin only, FS were 100-200 mg/dL the first day but increasing since.  Patient afebrile. Treated with increased diuresis.    Hypothyroidism- treated with synthroid 175 mcg daily. Last test as outpatient recently reported Good, Here with mild elevated TSH 5.27.  C/O fatigue, weight stable till recently. Says she is compliant with medications.     Expect high insulin requirements patient is on to decrease due to CHF, with decreased insulin clearance, and long action of Tresiba insulin. As patient hyperglycemic will start lower dose insulin basal bolus with higher scale coverage.  As out-patient consider SGLT-2's if not failed prior and no UTI risk, to decrease insulin requirements and moderate weight loss in this patient with CHF.    Hypothyroidism- despite mild increase TSH keep synthroid at 175 mcg daily. Mild elevation could be due to decreased absorption due to CHF.    Patient started on Lantus 24 units at HS, Humalog 8 units per meal.  PO intake good.  FS improved, low at lunch- humalog held high again at pm.      Suggest:  Increase Lantus insulin 24 units at HS.  Lower Humalog insulin 5 units per meal.  Mid scale humalog.  Continue synthroid 175 mcg daily.  Can be D/Sixto on this dose insulin (switch lantus to Tresiba)

## 2019-03-26 NOTE — CONSULT NOTE ADULT - SUBJECTIVE AND OBJECTIVE BOX
R2 GYNECOLOGIC CONSULT NOTE    77yo , postmenopausal, PMH significant for  prior bioprosthetic MVR,  PAF on amiodarone, CKD, VICKY, Obesity, abnormal uterine bleeding and anemia, presented on  with increasing QUESADA on 3/22/19, a/w acute on chronic diastolic congestive heart failure in setting of medication nonadherence. GYN consulted as pt c/o of intermittent heavy vaginal bleeding since Friday.     Pt states she has had spotting that has required use of 1-2 pads/day. She was last seen by Dr. Chavira on 3/11, when she was prescribed norethindrone 5mg. She was compliant as recommended for about 5 days, VB improved. Since admission, pt has not taken her norethindrone, and VB restarted. She has no pain w the bleeding.  Pt denied abd pain, fever, dizziness, lightheadedness. Pt states she has SOB 2/2 her cardiac issues.     Pt is s/p D&C for AUB in 2018-pathology significant for benign mucinous mucosa with marked acute and chronic inflammation and endometrial and mucinous tissue with marked acute and chronic inflammation and features compatible with lower uterine segment /cervical polyp      OB/GYN HISTORY: GYN=Cait  G1-, pltcs  G2- rltcs  menarche at age 13, menopause in her late 40's. Of note, she had a D+C in 2018 for endometrial polyp removal.   Pt denied hx of fibroids, cysts, abnormal pap smears, STI's.     Surgical History:    History of mitral valve replacement with bioprosthetic valve  H/O  section  Amputated toe      Past Medical History:   Chronic kidney disease (CKD)  Anemia of chronic disease  On home oxygen therapy  VICKY (obstructive sleep apnea)  Asthma  History of postmenopausal bleeding  Dyslipidemia associated with type 2 diabetes mellitus  Paroxysmal atrial fibrillation  Morbid obesity with BMI of 45.0-49.9, adult  Chronic atrial fibrillation  H/O: hypothyroidism  Chronic diastolic CHF (congestive heart failure)  Depression (emotion)  Arthritis of spine  Colitis, nonspecific  Macular degeneration of left eye  Neuropathy  Peripheral arterial disease  Hypertension  Edema  GERD (gastroesophageal reflux disease)  Chronic low back pain  Herniated disc  VICKY (obstructive sleep apnea)  Ulcerative colitis  Diabetes mellitus    Allergy  Augmentin (Swelling)  cephalexin (Swelling)      amiodarone    Tablet 200 milliGRAM(s) Oral daily  carvedilol 6.25 milliGRAM(s) Oral every 12 hours  cholecalciferol 1000 Unit(s) Oral daily  cyclobenzaprine 5 milliGRAM(s) Oral three times a day PRN  dextrose 40% Gel 15 Gram(s) Oral once PRN  dextrose 50% Injectable 12.5 Gram(s) IV Push once  dextrose 50% Injectable 25 Gram(s) IV Push once  dextrose 50% Injectable 25 Gram(s) IV Push once  docusate sodium 100 milliGRAM(s) Oral daily  DULoxetine 60 milliGRAM(s) Oral daily  glucagon  Injectable 1 milliGRAM(s) IntraMuscular once PRN  insulin glargine Injectable (LANTUS) 24 Unit(s) SubCutaneous at bedtime  insulin lispro (HumaLOG) corrective regimen sliding scale   SubCutaneous three times a day before meals  insulin lispro (HumaLOG) corrective regimen sliding scale   SubCutaneous at bedtime  insulin lispro Injectable (HumaLOG) 8 Unit(s) SubCutaneous three times a day before meals  isosorbide   mononitrate ER Tablet (IMDUR) 30 milliGRAM(s) Oral daily  levothyroxine 175 MICROGram(s) Oral daily  mesalamine DR (24-Hour) Tablet 2.4 Gram(s) Oral daily  metolazone 2.5 milliGRAM(s) Oral <User Schedule>  norethindrone acetate 5 milliGRAM(s) Oral two times a day  pantoprazole    Tablet 40 milliGRAM(s) Oral before breakfast  senna 2 Tablet(s) Oral at bedtime  spironolactone 25 milliGRAM(s) Oral two times a day  torsemide 40 milliGRAM(s) Oral two times a day      FAMILY HISTORY:  No pertinent family history in first degree relatives      Social History:   Denies    REVIEW OF SYSTEMS: WNL except as stated in HPI    MEDICATIONS  (STANDING):  amiodarone    Tablet 200 milliGRAM(s) Oral daily  carvedilol 6.25 milliGRAM(s) Oral every 12 hours  cholecalciferol 1000 Unit(s) Oral daily  dextrose 50% Injectable 12.5 Gram(s) IV Push once  dextrose 50% Injectable 25 Gram(s) IV Push once  dextrose 50% Injectable 25 Gram(s) IV Push once  docusate sodium 100 milliGRAM(s) Oral daily  DULoxetine 60 milliGRAM(s) Oral daily  insulin glargine Injectable (LANTUS) 24 Unit(s) SubCutaneous at bedtime  insulin lispro (HumaLOG) corrective regimen sliding scale   SubCutaneous three times a day before meals  insulin lispro (HumaLOG) corrective regimen sliding scale   SubCutaneous at bedtime  insulin lispro Injectable (HumaLOG) 8 Unit(s) SubCutaneous three times a day before meals  isosorbide   mononitrate ER Tablet (IMDUR) 30 milliGRAM(s) Oral daily  levothyroxine 175 MICROGram(s) Oral daily  mesalamine DR (24-Hour) Tablet 2.4 Gram(s) Oral daily  metolazone 2.5 milliGRAM(s) Oral <User Schedule>  norethindrone acetate 5 milliGRAM(s) Oral two times a day  pantoprazole    Tablet 40 milliGRAM(s) Oral before breakfast  senna 2 Tablet(s) Oral at bedtime  spironolactone 25 milliGRAM(s) Oral two times a day  torsemide 40 milliGRAM(s) Oral two times a day    MEDICATIONS  (PRN):  cyclobenzaprine 5 milliGRAM(s) Oral three times a day PRN Muscle Spasm  dextrose 40% Gel 15 Gram(s) Oral once PRN Blood Glucose LESS THAN 70 milliGRAM(s)/deciliter  glucagon  Injectable 1 milliGRAM(s) IntraMuscular once PRN Glucose LESS THAN 70 milligrams/deciliter      OBJECTIVE FINDINGS:    Vital Signs Last 24 Hrs  T(C): 36.8 (26 Mar 2019 14:12), Max: 37.1 (25 Mar 2019 21:33)  T(F): 98.3 (26 Mar 2019 14:12), Max: 98.8 (25 Mar 2019 21:33)  HR: 107 (26 Mar 2019 14:12) (101 - 107)  BP: 140/72 (26 Mar 2019 14:12) (116/48 - 144/68)  BP(mean): --  RR: 20 (26 Mar 2019 14:12) (20 - 20)  SpO2: 100% (26 Mar 2019 14:12) (100% - 100%)    PHYSICAL EXAM:    GENERAL: NAD, well-developed  ABDOMEN: Soft, Nontender, Nondistended; Bowel sounds present, No rebound, No guarding  PELVIC: deferred  : examination of pad shows no blood on pad. Pt states she has had it on for 2 hours.      LABS:               8.3    10.1  )-----------( 154      (  @ 04:59 )             25.6                8.6    10.2  )-----------( 192      (  @ 07:05 )             26.3                8.3    11.31 )-----------( 177      (  @ 09:54 )             28.4             139  |  98  |  46<H>  ----------------------------<  271<H>  3.6   |  28  |  1.76<H>    Ca    9.3      26 Mar 2019 04:59  Mg     2.2           PT/INR - ( 26 Mar 2019 04:59 )   PT: 16.3 sec;   INR: 1.41 ratio         PTT - ( 26 Mar 2019 04:59 )  PTT:28.8 sec      RADIOLOGY & ADDITIONAL STUDIES:  < from: US Transvaginal (18 @ 12:24) >  EXAM:  US TRANSVAGINAL      EXAM:  US PELVIC COMPLETE        PROCEDURE DATE:  2018           INTERPRETATION:  CLINICAL INFORMATION: Postmenopausal bleeding.    LMP: Postmenopausal.    COMPARISON: None available.    TECHNIQUE:     Transabdominal and Endovaginal pelvic sonogram. Markedly limited   evaluation due to patient's inability to lay supine for the examination.    FINDINGS:    Uterus: Approximately 7 x 3.3 cm. Trace fluid is present within the   endometrial cavity. cm. Within normallimits.    Endometrium: Approximately 5 mm. Poorly visualized.    Ovaries not visualized.    Fluid: None.    IMPRESSION:    Markedly limited evaluation.    < end of copied text >

## 2019-03-26 NOTE — PROGRESS NOTE ADULT - PROBLEM SELECTOR PLAN 4
multifactorial, prob 2/2 CKD( not a candidate for procrit 2/2 possible uterine Ca), iron deff from blood loss or AOCD, s/p iron infusions

## 2019-03-26 NOTE — PROGRESS NOTE ADULT - PROBLEM SELECTOR PLAN 2
gyn f/up for post menopausal bleeding gyn f/up, pelvic US for post menopausal bleeding gyn f/up, pelvic US for post menopausal bleeding  coumadin on hold per cardiology

## 2019-03-26 NOTE — PROGRESS NOTE ADULT - PROBLEM SELECTOR PLAN 1
-cont diuresis w Zarosxlyn Aldactone and Torsemide po,  TTE is benign, minimal urine output as per charted Is and Os  ptn w poor compliance w diet and meds on outptn

## 2019-03-26 NOTE — CONSULT NOTE ADULT - ASSESSMENT
79yo , postmenopausal, PMH significant for  prior bioprosthetic MVR,  PAF on amiodarone, CKD, VICKY, Obesity, abnormal uterine bleeding and anemia, presented on  with increasing QUESADA on 3/22/19, a/w acute on chronic diastolic congestive heart failure in setting of medication nonadherence. GYN consulted as pt c/o of intermittent heavy vaginal bleeding since Friday. VB likely not 2/2 anticoagulation, given INR nontherapeutic, and pt has known h/o AUB.    -Abnormal uterine bleeding known, and in the process of being further evaluated.  -Restart norethindrone 5mg qD. Pt to continue indefinitely  -Origin of bleeding unknown, however pt is a very poor surgical candidate for hysterectomy given her significant co-morbidities  -F/u outpatient with Dr. Chavira after discharge for possible outpatient referral to GYN oncology.    D/w Dr. Cait Barnett PGY2

## 2019-03-26 NOTE — PROVIDER CONTACT NOTE (OTHER) - ACTION/TREATMENT ORDERED:
NP aware, no interventions at this time as pt has CKD and is on potassium sparing diuretic. Will cont to monitor.

## 2019-03-26 NOTE — PROGRESS NOTE ADULT - SUBJECTIVE AND OBJECTIVE BOX
Patient is a 78y old  Female who presents with a chief complaint of shortness of breath (23 Mar 2019 15:03)       Pt is seen and examined  pt is awake and sitting in bed  chronic sob, leg swelling persist  no worsening vaginal bleeding  all other systems reviewed and negative    REVIEW OF SYSTEMS:    CONSTITUTIONAL: No fevers or chills.  weakness +  EYES/ENT: No visual changes;  No vertigo or throat pain   NECK: No pain or stiffness  RESPIRATORY: No wheezing, hemoptysis; chronic cough,  shortness of breath +  CARDIOVASCULAR: No chest pain or palpitations. chronic edema +  GASTROINTESTINAL: No abdominal or epigastric pain. No nausea, vomiting, or hematemesis; No diarrhea or constipation. No melena or hematochezia.  GENITOURINARY: No dysuria, frequency or hematuria  NEUROLOGICAL: No numbness or weakness  SKIN: No itching, burning, rashes, or lesions     PHYSICAL EXAM  General: adult chronically ill, with nasal O2, in wheelchair  HEENT: clear oropharynx, anicteric sclera  Neck: supple  CV: normal S1/S2 with no murmur rubs or gallops  Lungs: positive air movement b/l ant lungs,clear to auscultation, b/l rales +  Abdomen: soft obese non-tender non-distended, no hepatosplenomegaly  Ext: no clubbing cyanosis, b/l edema with chronic stasis changes  Skin: no rashes and no petechiae  Neuro: alert and oriented X 4, no focal deficits          MEDICATIONS  (STANDING):  amiodarone    Tablet 200 milliGRAM(s) Oral daily  carvedilol 6.25 milliGRAM(s) Oral every 12 hours  cholecalciferol 1000 Unit(s) Oral daily  dextrose 50% Injectable 12.5 Gram(s) IV Push once  dextrose 50% Injectable 25 Gram(s) IV Push once  dextrose 50% Injectable 25 Gram(s) IV Push once  docusate sodium 100 milliGRAM(s) Oral daily  DULoxetine 60 milliGRAM(s) Oral daily  insulin glargine Injectable (LANTUS) 24 Unit(s) SubCutaneous at bedtime  insulin lispro (HumaLOG) corrective regimen sliding scale   SubCutaneous three times a day before meals  insulin lispro (HumaLOG) corrective regimen sliding scale   SubCutaneous at bedtime  insulin lispro Injectable (HumaLOG) 8 Unit(s) SubCutaneous three times a day before meals  isosorbide   mononitrate ER Tablet (IMDUR) 30 milliGRAM(s) Oral daily  levothyroxine 175 MICROGram(s) Oral daily  mesalamine DR (24-Hour) Tablet 2.4 Gram(s) Oral daily  metolazone 2.5 milliGRAM(s) Oral <User Schedule>  norethindrone acetate 5 milliGRAM(s) Oral two times a day  pantoprazole    Tablet 40 milliGRAM(s) Oral before breakfast  senna 2 Tablet(s) Oral at bedtime  spironolactone 25 milliGRAM(s) Oral two times a day  torsemide 40 milliGRAM(s) Oral two times a day    MEDICATIONS  (PRN):  cyclobenzaprine 5 milliGRAM(s) Oral three times a day PRN Muscle Spasm  dextrose 40% Gel 15 Gram(s) Oral once PRN Blood Glucose LESS THAN 70 milliGRAM(s)/deciliter  glucagon  Injectable 1 milliGRAM(s) IntraMuscular once PRN Glucose LESS THAN 70 milligrams/deciliter      Allergies    Augmentin (Swelling)  cephalexin (Swelling)    Intolerances        Vital Signs Last 24 Hrs  T(C): 36.8 (26 Mar 2019 04:57), Max: 37.1 (25 Mar 2019 21:33)  T(F): 98.3 (26 Mar 2019 04:57), Max: 98.8 (25 Mar 2019 21:33)  HR: 102 (26 Mar 2019 04:57) (101 - 102)  BP: 144/68 (26 Mar 2019 04:57) (118/59 - 148/80)  BP(mean): --  RR: 20 (26 Mar 2019 04:57) (20 - 20)  SpO2: 100% (26 Mar 2019 04:57) (100% - 100%)      LABS:                          8.3    10.1  )-----------( 154      ( 26 Mar 2019 04:59 )             25.6         Mean Cell Volume : 93.2 fl  Mean Cell Hemoglobin : 30.1 pg  Mean Cell Hemoglobin Concentration : 32.3 gm/dL    03-26    139  |  98  |  46<H>  ----------------------------<  271<H>  3.6   |  28  |  1.76<H>    Ca    9.3      26 Mar 2019 04:59  Mg     2.2     03-26        PT/INR - ( 26 Mar 2019 04:59 )   PT: 16.3 sec;   INR: 1.41 ratio         PTT - ( 26 Mar 2019 04:59 )  PTT:28.8 sec    Ferritin, Serum: 1223 ng/mL (03-23 @ 14:21)    Iron Total, Serum (03.23.19 @ 14:29)    Iron Total, Serum: 236 ug/dL Patient is a 78y old  Female who presents with a chief complaint of shortness of breath (23 Mar 2019 15:03)       Pt is seen and examined  pt is awake and sitting in bed  chronic sob, leg swelling persist  minor vaginal bleeding  all other systems reviewed and negative    REVIEW OF SYSTEMS:    CONSTITUTIONAL: No fevers or chills.  weakness +  EYES/ENT: No visual changes;  No vertigo or throat pain   NECK: No pain or stiffness  RESPIRATORY: No wheezing, hemoptysis; chronic cough,  shortness of breath +  CARDIOVASCULAR: No chest pain or palpitations. chronic edema +  GASTROINTESTINAL: No abdominal or epigastric pain. No nausea, vomiting, or hematemesis; No diarrhea or constipation. No melena or hematochezia.  GENITOURINARY: No dysuria, frequency or hematuria  NEUROLOGICAL: No numbness or weakness  SKIN: No itching, burning, rashes, or lesions     PHYSICAL EXAM  General: adult chronically ill, with nasal O2, in wheelchair  HEENT: clear oropharynx, anicteric sclera  Neck: supple  CV: normal S1/S2 with no murmur rubs or gallops  Lungs: positive air movement b/l ant lungs,clear to auscultation, b/l rales +  Abdomen: soft obese non-tender non-distended, no hepatosplenomegaly  Ext: no clubbing cyanosis, b/l edema with chronic stasis changes - dressing right leg +  Skin: no rashes and no petechiae.   Neuro: alert and oriented X 4, no focal deficits          MEDICATIONS  (STANDING):  amiodarone    Tablet 200 milliGRAM(s) Oral daily  carvedilol 6.25 milliGRAM(s) Oral every 12 hours  cholecalciferol 1000 Unit(s) Oral daily  dextrose 50% Injectable 12.5 Gram(s) IV Push once  dextrose 50% Injectable 25 Gram(s) IV Push once  dextrose 50% Injectable 25 Gram(s) IV Push once  docusate sodium 100 milliGRAM(s) Oral daily  DULoxetine 60 milliGRAM(s) Oral daily  insulin glargine Injectable (LANTUS) 24 Unit(s) SubCutaneous at bedtime  insulin lispro (HumaLOG) corrective regimen sliding scale   SubCutaneous three times a day before meals  insulin lispro (HumaLOG) corrective regimen sliding scale   SubCutaneous at bedtime  insulin lispro Injectable (HumaLOG) 8 Unit(s) SubCutaneous three times a day before meals  isosorbide   mononitrate ER Tablet (IMDUR) 30 milliGRAM(s) Oral daily  levothyroxine 175 MICROGram(s) Oral daily  mesalamine DR (24-Hour) Tablet 2.4 Gram(s) Oral daily  metolazone 2.5 milliGRAM(s) Oral <User Schedule>  norethindrone acetate 5 milliGRAM(s) Oral two times a day  pantoprazole    Tablet 40 milliGRAM(s) Oral before breakfast  senna 2 Tablet(s) Oral at bedtime  spironolactone 25 milliGRAM(s) Oral two times a day  torsemide 40 milliGRAM(s) Oral two times a day    MEDICATIONS  (PRN):  cyclobenzaprine 5 milliGRAM(s) Oral three times a day PRN Muscle Spasm  dextrose 40% Gel 15 Gram(s) Oral once PRN Blood Glucose LESS THAN 70 milliGRAM(s)/deciliter  glucagon  Injectable 1 milliGRAM(s) IntraMuscular once PRN Glucose LESS THAN 70 milligrams/deciliter      Allergies    Augmentin (Swelling)  cephalexin (Swelling)    Intolerances        Vital Signs Last 24 Hrs  T(C): 36.8 (26 Mar 2019 04:57), Max: 37.1 (25 Mar 2019 21:33)  T(F): 98.3 (26 Mar 2019 04:57), Max: 98.8 (25 Mar 2019 21:33)  HR: 102 (26 Mar 2019 04:57) (101 - 102)  BP: 144/68 (26 Mar 2019 04:57) (118/59 - 148/80)  BP(mean): --  RR: 20 (26 Mar 2019 04:57) (20 - 20)  SpO2: 100% (26 Mar 2019 04:57) (100% - 100%)      LABS:                          8.3    10.1  )-----------( 154      ( 26 Mar 2019 04:59 )             25.6         Mean Cell Volume : 93.2 fl  Mean Cell Hemoglobin : 30.1 pg  Mean Cell Hemoglobin Concentration : 32.3 gm/dL    03-26    139  |  98  |  46<H>  ----------------------------<  271<H>  3.6   |  28  |  1.76<H>    Ca    9.3      26 Mar 2019 04:59  Mg     2.2     03-26        PT/INR - ( 26 Mar 2019 04:59 )   PT: 16.3 sec;   INR: 1.41 ratio         PTT - ( 26 Mar 2019 04:59 )  PTT:28.8 sec    Ferritin, Serum: 1223 ng/mL (03-23 @ 14:21)    Iron Total, Serum (03.23.19 @ 14:29)    Iron Total, Serum: 236 ug/dL Patient is a 78y old  Female who presents with a chief complaint of shortness of breath (23 Mar 2019 15:03)       Pt is seen and examined  pt is awake and sitting in chair  chronic sob better, leg swelling persist  minor vaginal bleeding  all other systems reviewed and negative    REVIEW OF SYSTEMS:    CONSTITUTIONAL: No fevers or chills.  weakness +  EYES/ENT: No visual changes;  No vertigo or throat pain   NECK: No pain or stiffness  RESPIRATORY: No wheezing, hemoptysis; chronic cough,  shortness of breath +  CARDIOVASCULAR: No chest pain or palpitations. chronic edema +  GASTROINTESTINAL: No abdominal or epigastric pain. No nausea, vomiting, or hematemesis; No diarrhea or constipation. No melena or hematochezia.  GENITOURINARY: No dysuria, frequency or hematuria  NEUROLOGICAL: No numbness or weakness  SKIN: No itching, burning, rashes, or lesions     PHYSICAL EXAM  General: adult chronically ill, with nasal O2, in wheelchair  HEENT: clear oropharynx, anicteric sclera  Neck: supple  CV: normal S1/S2 with no murmur rubs or gallops  Lungs: positive air movement b/l ant lungs,clear to auscultation, b/l rales +  Abdomen: soft obese non-tender non-distended, no hepatosplenomegaly  Ext: no clubbing cyanosis, b/l edema with chronic stasis changes - dressing right leg +  Skin: no rashes and no petechiae.   Neuro: alert and oriented X 4, no focal deficits          MEDICATIONS  (STANDING):  amiodarone    Tablet 200 milliGRAM(s) Oral daily  carvedilol 6.25 milliGRAM(s) Oral every 12 hours  cholecalciferol 1000 Unit(s) Oral daily  dextrose 50% Injectable 12.5 Gram(s) IV Push once  dextrose 50% Injectable 25 Gram(s) IV Push once  dextrose 50% Injectable 25 Gram(s) IV Push once  docusate sodium 100 milliGRAM(s) Oral daily  DULoxetine 60 milliGRAM(s) Oral daily  insulin glargine Injectable (LANTUS) 24 Unit(s) SubCutaneous at bedtime  insulin lispro (HumaLOG) corrective regimen sliding scale   SubCutaneous three times a day before meals  insulin lispro (HumaLOG) corrective regimen sliding scale   SubCutaneous at bedtime  insulin lispro Injectable (HumaLOG) 8 Unit(s) SubCutaneous three times a day before meals  isosorbide   mononitrate ER Tablet (IMDUR) 30 milliGRAM(s) Oral daily  levothyroxine 175 MICROGram(s) Oral daily  mesalamine DR (24-Hour) Tablet 2.4 Gram(s) Oral daily  metolazone 2.5 milliGRAM(s) Oral <User Schedule>  norethindrone acetate 5 milliGRAM(s) Oral two times a day  pantoprazole    Tablet 40 milliGRAM(s) Oral before breakfast  senna 2 Tablet(s) Oral at bedtime  spironolactone 25 milliGRAM(s) Oral two times a day  torsemide 40 milliGRAM(s) Oral two times a day    MEDICATIONS  (PRN):  cyclobenzaprine 5 milliGRAM(s) Oral three times a day PRN Muscle Spasm  dextrose 40% Gel 15 Gram(s) Oral once PRN Blood Glucose LESS THAN 70 milliGRAM(s)/deciliter  glucagon  Injectable 1 milliGRAM(s) IntraMuscular once PRN Glucose LESS THAN 70 milligrams/deciliter      Allergies    Augmentin (Swelling)  cephalexin (Swelling)    Intolerances        Vital Signs Last 24 Hrs  T(C): 36.8 (26 Mar 2019 04:57), Max: 37.1 (25 Mar 2019 21:33)  T(F): 98.3 (26 Mar 2019 04:57), Max: 98.8 (25 Mar 2019 21:33)  HR: 102 (26 Mar 2019 04:57) (101 - 102)  BP: 144/68 (26 Mar 2019 04:57) (118/59 - 148/80)  BP(mean): --  RR: 20 (26 Mar 2019 04:57) (20 - 20)  SpO2: 100% (26 Mar 2019 04:57) (100% - 100%)      LABS:                          8.3    10.1  )-----------( 154      ( 26 Mar 2019 04:59 )             25.6         Mean Cell Volume : 93.2 fl  Mean Cell Hemoglobin : 30.1 pg  Mean Cell Hemoglobin Concentration : 32.3 gm/dL    03-26    139  |  98  |  46<H>  ----------------------------<  271<H>  3.6   |  28  |  1.76<H>    Ca    9.3      26 Mar 2019 04:59  Mg     2.2     03-26        PT/INR - ( 26 Mar 2019 04:59 )   PT: 16.3 sec;   INR: 1.41 ratio         PTT - ( 26 Mar 2019 04:59 )  PTT:28.8 sec    Ferritin, Serum: 1223 ng/mL (03-23 @ 14:21)    Iron Total, Serum (03.23.19 @ 14:29)    Iron Total, Serum: 236 ug/dL

## 2019-03-26 NOTE — CONSULT NOTE ADULT - REASON FOR ADMISSION
shortness of breath
2

## 2019-03-26 NOTE — PROGRESS NOTE ADULT - ASSESSMENT
77 yo F w/ hx of moderate MS s/p bioprosthetic mitral valve, severe pulmonary HTN, chronic hypoxemic respiratory failure and suspected ILD, RADS, DM2, anemia, diastolic CHF, hypothyroidism, paroxysmal atrial fibrillation, HTN presenting with progressive SOB 2/2 to acute on chronic diastolic congestive heart failure in setting of medication nonadherence.

## 2019-03-26 NOTE — PROGRESS NOTE ADULT - PROBLEM SELECTOR PLAN 1
Hgb stable  adequate iron studies  reassess cbc in am - prn prbc transfusion in view of cardiac co morbidities Hgb stable - no improvement with iv iron supplementataion  check retic count in am  EPO level pending  With CRI, may benfit from procrit supplementataion, printed info given to patient and potential side effects reviewed  adequate iron studies  reassess cbc in am - prn prbc transfusion in view of cardiac co morbidities Hgb stable - no improvement with iv iron supplementataion  check retic count in am  EPO level pending  With CRI, may benfit from procrit supplementataion, printed info given to patient and potential side effects reviewed  adequate iron studies  reassess cbc in am - prn prbc transfusion in view of cardiac co morbidities  D/w NP

## 2019-03-26 NOTE — PROGRESS NOTE ADULT - SUBJECTIVE AND OBJECTIVE BOX
Patient is a 78y old  Female who presents with a chief complaint of shortness of breath (26 Mar 2019 06:13)    She denies c/o chest pain, increasing SOB or palpitations. Still with vaginal bleeding.     Allergies    Augmentin (Swelling)  cephalexin (Swelling)    Intolerances      MEDICATIONS  (STANDING):  amiodarone    Tablet 200 milliGRAM(s) Oral daily  carvedilol 6.25 milliGRAM(s) Oral every 12 hours  cholecalciferol 1000 Unit(s) Oral daily  dextrose 50% Injectable 12.5 Gram(s) IV Push once  dextrose 50% Injectable 25 Gram(s) IV Push once  dextrose 50% Injectable 25 Gram(s) IV Push once  docusate sodium 100 milliGRAM(s) Oral daily  DULoxetine 60 milliGRAM(s) Oral daily  insulin glargine Injectable (LANTUS) 24 Unit(s) SubCutaneous at bedtime  insulin lispro (HumaLOG) corrective regimen sliding scale   SubCutaneous three times a day before meals  insulin lispro (HumaLOG) corrective regimen sliding scale   SubCutaneous at bedtime  insulin lispro Injectable (HumaLOG) 8 Unit(s) SubCutaneous three times a day before meals  isosorbide   mononitrate ER Tablet (IMDUR) 30 milliGRAM(s) Oral daily  levothyroxine 175 MICROGram(s) Oral daily  mesalamine DR (24-Hour) Tablet 2.4 Gram(s) Oral daily  metolazone 2.5 milliGRAM(s) Oral <User Schedule>  norethindrone acetate 5 milliGRAM(s) Oral two times a day  pantoprazole    Tablet 40 milliGRAM(s) Oral before breakfast  senna 2 Tablet(s) Oral at bedtime  spironolactone 25 milliGRAM(s) Oral two times a day  torsemide 40 milliGRAM(s) Oral two times a day    MEDICATIONS  (PRN):  cyclobenzaprine 5 milliGRAM(s) Oral three times a day PRN Muscle Spasm  dextrose 40% Gel 15 Gram(s) Oral once PRN Blood Glucose LESS THAN 70 milliGRAM(s)/deciliter  glucagon  Injectable 1 milliGRAM(s) IntraMuscular once PRN Glucose LESS THAN 70 milligrams/deciliter      PHYSICAL EXAM:  Vital Signs Last 24 Hrs  T(C): 36.8 (26 Mar 2019 04:57), Max: 37.1 (25 Mar 2019 21:33)  T(F): 98.3 (26 Mar 2019 04:57), Max: 98.8 (25 Mar 2019 21:33)  HR: 102 (26 Mar 2019 04:57) (101 - 102)  BP: 144/68 (26 Mar 2019 04:57) (118/59 - 148/80)  BP(mean): --  RR: 20 (26 Mar 2019 04:57) (20 - 20)  SpO2: 100% (26 Mar 2019 04:57) (100% - 100%)  Daily     Daily   I&O's Summary    24 Mar 2019 07:01  -  25 Mar 2019 07:00  --------------------------------------------------------  IN: 480 mL / OUT: 700 mL / NET: -220 mL    25 Mar 2019 07:01  -  26 Mar 2019 06:59  --------------------------------------------------------  IN: 740 mL / OUT: 0 mL / NET: 740 mL    General Appearance: 	 Alert, cooperative, no distress  HEENT: normocephalic, atraumatic,   Neck: no JVD,  carotid 2+  bilaterally without bruits  Lungs:  clear to auscultation and percussion bilaterally  Cor:  pmi 5th ICS MCL, regular rate and rhythm, S1 normal intensity, S2 normal intensity, no gallops, murmurs or rubs  Abdomen: obese, soft, non-tender; bowel sounds normal; no masses,  no organomegaly  Extremities: without cyanosis, clubbing; 1+ LE edema  Vasc: trace PT and DP pulses;       Telemetry: probable sinus tachyardia  Labs:  CBC Full  -  ( 26 Mar 2019 04:59 )  WBC Count : 10.1 K/uL  Hemoglobin : 8.3 g/dL  Hematocrit : 25.6 %  Platelet Count - Automated : 154 K/uL  Mean Cell Volume : 93.2 fl  Mean Cell Hemoglobin : 30.1 pg  Mean Cell Hemoglobin Concentration : 32.3 gm/dL  Auto Neutrophil # : x  Auto Lymphocyte # : x  Auto Monocyte # : x  Auto Eosinophil # : x  Auto Basophil # : x  Auto Neutrophil % : x  Auto Lymphocyte % : x  Auto Monocyte % : x  Auto Eosinophil % : x  Auto Basophil % : x    03-26    139  |  98  |  46<H>  ----------------------------<  271<H>  3.6   |  28  |  1.76<H>    Ca    9.3      26 Mar 2019 04:59  Mg     2.2     03-26          PT/INR - ( 26 Mar 2019 04:59 )   PT: 16.3 sec;   INR: 1.41 ratio         PTT - ( 26 Mar 2019 04:59 )  PTT:28.8 sec      Radiology/Imaging:                Feliz Aiken MD [facility]  [phone]

## 2019-03-26 NOTE — CONSULT NOTE ADULT - CONSULT REASON
Pt of Dr. Aiken/CHF
Shortness of Breath    (Evaluation on behalf of Dr. Fito Montgomery)
Type 2 DM uncontrolled
postmenopausal bleeding
iron deficiency anemia, vaginal bleeding, CHF

## 2019-03-26 NOTE — PROGRESS NOTE ADULT - ASSESSMENT
· Assessment		  77 yo female with prior bioprosthetic MVR,  PAF on amiodarone, CKD, VICKY, Obesity and past 3-6 months main issues with recurrent vaginal bleeding and anemia with increasing QUESADA. BNP as outpatient has been normal with stable CKD on chronic diuretic therapy. She has not felt to be candidate for RAFAELA if indicated due to obesity. Her symptoms are likely multifactorial. No CAD prior to MVR. TTE has been TDS and NICOLA in past with normal functioning MV prosthesis. 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 Optimally patient should be on anticoagulation once gyn status and plan clarified.     Rec:  Continue current CV meds  Monitor CBC; lytes  Gyn evaluation  Hold coumadin; was stopped as outpatient 2 weeks ago.  TTE; usually very limited studies.  Will consider watchman device electively.  Pulmonary evaluation noted.

## 2019-03-26 NOTE — CONSULT NOTE ADULT - ATTENDING COMMENTS
I have discussed this patient with the resident. She is a patient well known to me with a long history of intermittent postmenopausal bleeding. Currently being managed with norethindrone due to being a very poor candidate for hysterectomy. Agree with restarting norethindrone if okay per primary team. Ultimately patient will need GYN Oncology consultation.
Would consider Noncontrast CT chest if patient can lay flat. Continue diuresis as per Dr. Aiken.  Dr. Montgomery to follow

## 2019-03-26 NOTE — PROGRESS NOTE ADULT - SUBJECTIVE AND OBJECTIVE BOX
CHIEF COMPLAINT: f/up for sob, PAH, RESP FAILURE,ILDz, amiodarone use--better overnight; no cough or wheeze    Interval Events: CSSU    REVIEW OF SYSTEMS:  Constitutional: No fevers or chills. No weight loss. + fatigue or generalized malaise.  Eyes: No itching or discharge from the eyes  ENT: No ear pain. No ear discharge. No nasal congestion. No post nasal drip. No epistaxis. No throat pain. No sore throat. No difficulty swallowing.   CV: No chest pain. No palpitations. No lightheadedness or dizziness.   Resp: No dyspnea at rest. + dyspnea on exertion. No orthopnea. No wheezing. No cough. No stridor. No sputum production. No chest pain with respiration.  GI: No nausea. No vomiting. No diarrhea.  MSK: No joint pain or pain in any extremities  Integumentary: No skin lesions. + pedal edema.  Neurological: No gross motor weakness. No sensory changes.  [ +] All other systems negative  [ ] Unable to assess ROS because ________    OBJECTIVE:  ICU Vital Signs Last 24 Hrs  T(C): 36.8 (26 Mar 2019 04:57), Max: 37.1 (25 Mar 2019 21:33)  T(F): 98.3 (26 Mar 2019 04:57), Max: 98.8 (25 Mar 2019 21:33)  HR: 102 (26 Mar 2019 04:57) (101 - 102)  BP: 144/68 (26 Mar 2019 04:57) (118/59 - 148/80)  BP(mean): --  ABP: --  ABP(mean): --  RR: 20 (26 Mar 2019 04:57) (20 - 20)  SpO2: 100% (26 Mar 2019 04:57) (100% - 100%)        03-24 @ 07:01  -  03-25 @ 07:00  --------------------------------------------------------  IN: 480 mL / OUT: 700 mL / NET: -220 mL    03-25 @ 07:01  -  03-26 @ 06:13  --------------------------------------------------------  IN: 620 mL / OUT: 0 mL / NET: 620 mL      CAPILLARY BLOOD GLUCOSE      POCT Blood Glucose.: 315 mg/dL (25 Mar 2019 21:20)      PHYSICAL EXAM: NAD in bed on O2 NC  General: Awake, alert, oriented X 3.   HEENT: Atraumatic, normocephalic.                 Mallampatti Grade 3                No nasal congestion.                No tonsillar or pharyngeal exudates.  Lymph Nodes: No palpable lymphadenopathy  Neck: No JVD. No carotid bruit.   Respiratory: abnormal chest expansion-reduced BS bases                         Normal percussion                         Normal and equal air entry                         No wheeze, rhonchi but mild bibasilar rales.  Cardiovascular: S1 S2 normal. No murmurs, rubs or gallops.   Abdomen: Soft, non-tender, non-distended. No organomegaly. Normoactive bowel sounds.  Extremities: Warm to touch. Peripheral pulse palpable. + pedal edema.   Skin: No rashes or skin lesions  Neurological: Motor and sensory examination equal and normal in all four extremities.  Psychiatry: Appropriate mood and affect.    HOSPITAL MEDICATIONS:  MEDICATIONS  (STANDING):  amiodarone    Tablet 200 milliGRAM(s) Oral daily  carvedilol 6.25 milliGRAM(s) Oral every 12 hours  cholecalciferol 1000 Unit(s) Oral daily  dextrose 50% Injectable 12.5 Gram(s) IV Push once  dextrose 50% Injectable 25 Gram(s) IV Push once  dextrose 50% Injectable 25 Gram(s) IV Push once  docusate sodium 100 milliGRAM(s) Oral daily  DULoxetine 60 milliGRAM(s) Oral daily  insulin glargine Injectable (LANTUS) 24 Unit(s) SubCutaneous at bedtime  insulin lispro (HumaLOG) corrective regimen sliding scale   SubCutaneous three times a day before meals  insulin lispro (HumaLOG) corrective regimen sliding scale   SubCutaneous at bedtime  insulin lispro Injectable (HumaLOG) 8 Unit(s) SubCutaneous three times a day before meals  isosorbide   mononitrate ER Tablet (IMDUR) 30 milliGRAM(s) Oral daily  levothyroxine 175 MICROGram(s) Oral daily  mesalamine DR (24-Hour) Tablet 2.4 Gram(s) Oral daily  metolazone 2.5 milliGRAM(s) Oral <User Schedule>  norethindrone acetate 5 milliGRAM(s) Oral two times a day  pantoprazole    Tablet 40 milliGRAM(s) Oral before breakfast  senna 2 Tablet(s) Oral at bedtime  spironolactone 25 milliGRAM(s) Oral two times a day  torsemide 40 milliGRAM(s) Oral two times a day    MEDICATIONS  (PRN):  cyclobenzaprine 5 milliGRAM(s) Oral three times a day PRN Muscle Spasm  dextrose 40% Gel 15 Gram(s) Oral once PRN Blood Glucose LESS THAN 70 milliGRAM(s)/deciliter  glucagon  Injectable 1 milliGRAM(s) IntraMuscular once PRN Glucose LESS THAN 70 milligrams/deciliter      LABS:                        8.3    10.1  )-----------( 154      ( 26 Mar 2019 04:59 )             25.6     03-26    139  |  98  |  46<H>  ----------------------------<  271<H>  3.6   |  28  |  1.76<H>    Ca    9.3      26 Mar 2019 04:59  Mg     2.2     03-26      PT/INR - ( 26 Mar 2019 04:59 )   PT: 16.3 sec;   INR: 1.41 ratio         PTT - ( 26 Mar 2019 04:59 )  PTT:28.8 sec          MICROBIOLOGY:     RADIOLOGY:  [ ] Reviewed and interpreted by me    Point of Care Ultrasound Findings:    PFT:    EKG:

## 2019-03-27 ENCOUNTER — TRANSCRIPTION ENCOUNTER (OUTPATIENT)
Age: 79
End: 2019-03-27

## 2019-03-27 VITALS
OXYGEN SATURATION: 100 % | RESPIRATION RATE: 19 BRPM | HEART RATE: 101 BPM | SYSTOLIC BLOOD PRESSURE: 138 MMHG | TEMPERATURE: 98 F | DIASTOLIC BLOOD PRESSURE: 76 MMHG

## 2019-03-27 LAB
ANION GAP SERPL CALC-SCNC: 13 MMOL/L — SIGNIFICANT CHANGE UP (ref 5–17)
APTT BLD: 28.3 SEC — SIGNIFICANT CHANGE UP (ref 27.5–36.3)
BUN SERPL-MCNC: 49 MG/DL — HIGH (ref 7–23)
CALCIUM SERPL-MCNC: 9.4 MG/DL — SIGNIFICANT CHANGE UP (ref 8.4–10.5)
CHLORIDE SERPL-SCNC: 97 MMOL/L — SIGNIFICANT CHANGE UP (ref 96–108)
CO2 SERPL-SCNC: 30 MMOL/L — SIGNIFICANT CHANGE UP (ref 22–31)
CREAT SERPL-MCNC: 1.89 MG/DL — HIGH (ref 0.5–1.3)
GLUCOSE BLDC GLUCOMTR-MCNC: 198 MG/DL — HIGH (ref 70–99)
GLUCOSE BLDC GLUCOMTR-MCNC: 256 MG/DL — HIGH (ref 70–99)
GLUCOSE SERPL-MCNC: 215 MG/DL — HIGH (ref 70–99)
HCT VFR BLD CALC: 25.6 % — LOW (ref 34.5–45)
HGB BLD-MCNC: 8.6 G/DL — LOW (ref 11.5–15.5)
INR BLD: 1.55 RATIO — HIGH (ref 0.88–1.16)
MCHC RBC-ENTMCNC: 31.5 PG — SIGNIFICANT CHANGE UP (ref 27–34)
MCHC RBC-ENTMCNC: 33.4 GM/DL — SIGNIFICANT CHANGE UP (ref 32–36)
MCV RBC AUTO: 94.4 FL — SIGNIFICANT CHANGE UP (ref 80–100)
PLATELET # BLD AUTO: 170 K/UL — SIGNIFICANT CHANGE UP (ref 150–400)
POTASSIUM SERPL-MCNC: 3.7 MMOL/L — SIGNIFICANT CHANGE UP (ref 3.5–5.3)
POTASSIUM SERPL-SCNC: 3.7 MMOL/L — SIGNIFICANT CHANGE UP (ref 3.5–5.3)
PROTHROM AB SERPL-ACNC: 18.1 SEC — HIGH (ref 10–12.9)
RBC # BLD: 2.71 M/UL — LOW (ref 3.8–5.2)
RBC # FLD: 15.7 % — HIGH (ref 10.3–14.5)
SODIUM SERPL-SCNC: 140 MMOL/L — SIGNIFICANT CHANGE UP (ref 135–145)
WBC # BLD: 11.9 K/UL — HIGH (ref 3.8–10.5)
WBC # FLD AUTO: 11.9 K/UL — HIGH (ref 3.8–10.5)

## 2019-03-27 PROCEDURE — 83540 ASSAY OF IRON: CPT

## 2019-03-27 PROCEDURE — 85730 THROMBOPLASTIN TIME PARTIAL: CPT

## 2019-03-27 PROCEDURE — 99285 EMERGENCY DEPT VISIT HI MDM: CPT

## 2019-03-27 PROCEDURE — 85014 HEMATOCRIT: CPT

## 2019-03-27 PROCEDURE — 80053 COMPREHEN METABOLIC PANEL: CPT

## 2019-03-27 PROCEDURE — 76856 US EXAM PELVIC COMPLETE: CPT

## 2019-03-27 PROCEDURE — 84443 ASSAY THYROID STIM HORMONE: CPT

## 2019-03-27 PROCEDURE — 84295 ASSAY OF SERUM SODIUM: CPT

## 2019-03-27 PROCEDURE — 83605 ASSAY OF LACTIC ACID: CPT

## 2019-03-27 PROCEDURE — 82435 ASSAY OF BLOOD CHLORIDE: CPT

## 2019-03-27 PROCEDURE — C8929: CPT

## 2019-03-27 PROCEDURE — 71250 CT THORAX DX C-: CPT

## 2019-03-27 PROCEDURE — 82803 BLOOD GASES ANY COMBINATION: CPT

## 2019-03-27 PROCEDURE — 86900 BLOOD TYPING SEROLOGIC ABO: CPT

## 2019-03-27 PROCEDURE — 82728 ASSAY OF FERRITIN: CPT

## 2019-03-27 PROCEDURE — 86850 RBC ANTIBODY SCREEN: CPT

## 2019-03-27 PROCEDURE — 71045 X-RAY EXAM CHEST 1 VIEW: CPT

## 2019-03-27 PROCEDURE — 83735 ASSAY OF MAGNESIUM: CPT

## 2019-03-27 PROCEDURE — 83036 HEMOGLOBIN GLYCOSYLATED A1C: CPT

## 2019-03-27 PROCEDURE — 84132 ASSAY OF SERUM POTASSIUM: CPT

## 2019-03-27 PROCEDURE — 85610 PROTHROMBIN TIME: CPT

## 2019-03-27 PROCEDURE — 83880 ASSAY OF NATRIURETIC PEPTIDE: CPT

## 2019-03-27 PROCEDURE — 99232 SBSQ HOSP IP/OBS MODERATE 35: CPT

## 2019-03-27 PROCEDURE — 80048 BASIC METABOLIC PNL TOTAL CA: CPT

## 2019-03-27 PROCEDURE — 84484 ASSAY OF TROPONIN QUANT: CPT

## 2019-03-27 PROCEDURE — 82330 ASSAY OF CALCIUM: CPT

## 2019-03-27 PROCEDURE — 97162 PT EVAL MOD COMPLEX 30 MIN: CPT

## 2019-03-27 PROCEDURE — 85027 COMPLETE CBC AUTOMATED: CPT

## 2019-03-27 PROCEDURE — 82947 ASSAY GLUCOSE BLOOD QUANT: CPT

## 2019-03-27 PROCEDURE — 86901 BLOOD TYPING SEROLOGIC RH(D): CPT

## 2019-03-27 PROCEDURE — 93005 ELECTROCARDIOGRAM TRACING: CPT

## 2019-03-27 PROCEDURE — 82962 GLUCOSE BLOOD TEST: CPT

## 2019-03-27 RX ORDER — WARFARIN SODIUM 2.5 MG/1
2 TABLET ORAL
Qty: 0 | Refills: 0 | COMMUNITY

## 2019-03-27 RX ORDER — PANTOPRAZOLE SODIUM 20 MG/1
1 TABLET, DELAYED RELEASE ORAL
Qty: 0 | Refills: 0 | COMMUNITY
Start: 2019-03-27

## 2019-03-27 RX ADMIN — Medication 2.4 GRAM(S): at 11:32

## 2019-03-27 RX ADMIN — Medication 6: at 11:33

## 2019-03-27 RX ADMIN — DULOXETINE HYDROCHLORIDE 60 MILLIGRAM(S): 30 CAPSULE, DELAYED RELEASE ORAL at 11:33

## 2019-03-27 RX ADMIN — Medication 40 MILLIGRAM(S): at 05:33

## 2019-03-27 RX ADMIN — Medication 1000 UNIT(S): at 11:32

## 2019-03-27 RX ADMIN — ISOSORBIDE MONONITRATE 30 MILLIGRAM(S): 60 TABLET, EXTENDED RELEASE ORAL at 11:32

## 2019-03-27 RX ADMIN — SPIRONOLACTONE 25 MILLIGRAM(S): 25 TABLET, FILM COATED ORAL at 05:33

## 2019-03-27 RX ADMIN — Medication 5 UNIT(S): at 11:33

## 2019-03-27 RX ADMIN — NORETHINDRONE 5 MILLIGRAM(S): 0.35 TABLET ORAL at 11:32

## 2019-03-27 RX ADMIN — Medication 175 MICROGRAM(S): at 05:34

## 2019-03-27 RX ADMIN — AMIODARONE HYDROCHLORIDE 200 MILLIGRAM(S): 400 TABLET ORAL at 05:33

## 2019-03-27 RX ADMIN — Medication 5 UNIT(S): at 07:59

## 2019-03-27 RX ADMIN — Medication 2: at 08:00

## 2019-03-27 RX ADMIN — PANTOPRAZOLE SODIUM 40 MILLIGRAM(S): 20 TABLET, DELAYED RELEASE ORAL at 05:34

## 2019-03-27 RX ADMIN — Medication 100 MILLIGRAM(S): at 05:34

## 2019-03-27 RX ADMIN — CARVEDILOL PHOSPHATE 6.25 MILLIGRAM(S): 80 CAPSULE, EXTENDED RELEASE ORAL at 05:34

## 2019-03-27 NOTE — DISCHARGE NOTE PROVIDER - CARE PROVIDERS DIRECT ADDRESSES
[email or web address]

## 2019-03-27 NOTE — PROGRESS NOTE ADULT - SUBJECTIVE AND OBJECTIVE BOX
Patient is a 78y old  Female who presents with a chief complaint of shortness of breath (27 Mar 2019 06:12)    She denies c/o chest pain, increasing SOB or palpitations.    Allergies    Augmentin (Swelling)  cephalexin (Swelling)    Intolerances      MEDICATIONS  (STANDING):  amiodarone    Tablet 200 milliGRAM(s) Oral daily  carvedilol 6.25 milliGRAM(s) Oral every 12 hours  cholecalciferol 1000 Unit(s) Oral daily  dextrose 50% Injectable 12.5 Gram(s) IV Push once  dextrose 50% Injectable 25 Gram(s) IV Push once  dextrose 50% Injectable 25 Gram(s) IV Push once  docusate sodium 100 milliGRAM(s) Oral daily  DULoxetine 60 milliGRAM(s) Oral daily  insulin glargine Injectable (LANTUS) 28 Unit(s) SubCutaneous at bedtime  insulin lispro (HumaLOG) corrective regimen sliding scale   SubCutaneous three times a day before meals  insulin lispro (HumaLOG) corrective regimen sliding scale   SubCutaneous at bedtime  insulin lispro Injectable (HumaLOG) 5 Unit(s) SubCutaneous three times a day before meals  isosorbide   mononitrate ER Tablet (IMDUR) 30 milliGRAM(s) Oral daily  levothyroxine 175 MICROGram(s) Oral daily  mesalamine DR (24-Hour) Tablet 2.4 Gram(s) Oral daily  metolazone 2.5 milliGRAM(s) Oral <User Schedule>  norethindrone acetate 5 milliGRAM(s) Oral daily  pantoprazole    Tablet 40 milliGRAM(s) Oral before breakfast  senna 2 Tablet(s) Oral at bedtime  spironolactone 25 milliGRAM(s) Oral two times a day  torsemide 40 milliGRAM(s) Oral two times a day    MEDICATIONS  (PRN):  cyclobenzaprine 5 milliGRAM(s) Oral three times a day PRN Muscle Spasm  dextrose 40% Gel 15 Gram(s) Oral once PRN Blood Glucose LESS THAN 70 milliGRAM(s)/deciliter  glucagon  Injectable 1 milliGRAM(s) IntraMuscular once PRN Glucose LESS THAN 70 milligrams/deciliter      PHYSICAL EXAM:  Vital Signs Last 24 Hrs  T(C): 36.8 (27 Mar 2019 05:14), Max: 37 (26 Mar 2019 19:44)  T(F): 98.2 (27 Mar 2019 05:14), Max: 98.6 (26 Mar 2019 19:44)  HR: 102 (27 Mar 2019 05:14) (102 - 107)  BP: 130/74 (27 Mar 2019 05:14) (116/48 - 157/73)  BP(mean): --  RR: 20 (27 Mar 2019 05:14) (20 - 20)  SpO2: 100% (27 Mar 2019 05:14) (100% - 100%)  Daily     Daily   I&O's Summary    25 Mar 2019 07:01  -  26 Mar 2019 07:00  --------------------------------------------------------  IN: 740 mL / OUT: 0 mL / NET: 740 mL    26 Mar 2019 07:01  -  27 Mar 2019 06:56  --------------------------------------------------------  IN: 600 mL / OUT: 0 mL / NET: 600 mL        General Appearance: 	 Alert, cooperative, no distress  HEENT: normocephalic, atraumatic,   Neck: no JVD,  carotid 2+  bilaterally without bruits  Lungs:  clear to auscultation and percussion bilaterally  Cor:  pmi 5th ICS MCL, regular rate and rhythm, S1 normal intensity, S2 normal intensity, no gallops, murmurs or rubs  Abdomen: obese, soft, non-tender; bowel sounds normal; no masses,  no organomegaly  Extremities: without cyanosis, clubbing; 1+ LE edema  Vasc: trace PT and DP pulses;       Labs:  CBC Full  -  ( 27 Mar 2019 04:00 )  WBC Count : 11.9 K/uL  RBC Count : 2.71 M/uL  Hemoglobin : 8.6 g/dL  Hematocrit : 25.6 %  Platelet Count - Automated : 170 K/uL  Mean Cell Volume : 94.4 fl  Mean Cell Hemoglobin : 31.5 pg  Mean Cell Hemoglobin Concentration : 33.4 gm/dL  Auto Neutrophil # : x  Auto Lymphocyte # : x  Auto Monocyte # : x  Auto Eosinophil # : x  Auto Basophil # : x  Auto Neutrophil % : x  Auto Lymphocyte % : x  Auto Monocyte % : x  Auto Eosinophil % : x  Auto Basophil % : x    03-27    140  |  97  |  49<H>  ----------------------------<  215<H>  3.7   |  30  |  1.89<H>    Ca    9.4      27 Mar 2019 04:00  Mg     2.2     03-26          PT/INR - ( 27 Mar 2019 04:00 )   PT: 18.1 sec;   INR: 1.55 ratio         PTT - ( 27 Mar 2019 04:00 )  PTT:28.3 sec      Radiology/Imaging:                Feliz Aiken MD [facility]  [phone]

## 2019-03-27 NOTE — PHYSICAL THERAPY INITIAL EVALUATION ADULT - GAIT DEVIATIONS NOTED, PT EVAL
decreased velocity of limb motion/decreased stride length/decreased weight-shifting ability/decreased tyra/increased time in double stance

## 2019-03-27 NOTE — PROGRESS NOTE ADULT - REASON FOR ADMISSION
shortness of breath

## 2019-03-27 NOTE — PROGRESS NOTE ADULT - ATTENDING COMMENTS
as above-  Multifactorial sob-  Obesity hypoventilation-nutrition evaluation, O2 2-3 liters NC  Cards/CHF/AF--as per Jada et al-? CHF evaln  PAH-?who grp 2 or 3--?RHC while here  Asthma-initiate symbicort 160 bid and spiriva 1 q day, incentive spirometry  OSAS-out pt SS  ILDz-HRCT here (concern for amio toxicity)  PPI/DVT proph, PT  Fito Montgomery MD-Pulmonary   [phone] as above-stable  Multifactorial sob-  Obesity hypoventilation-nutrition evaluation, O2 2-3 liters NC  Cards/CHF/AF--as per Jada et al-?continue amio or DC  PAH-?who grp 2 or 3-as per Jada et al  Asthma-continue symbicort 160 bid and spiriva 1 q day, incentive spirometry  OSAS-out pt SS  ILDz-HRCT shows no evidence of ILDZ  PPI/DVT proph, PT  Fito Montgomery MD-Pulmonary   [phone]

## 2019-03-27 NOTE — PROGRESS NOTE ADULT - SUBJECTIVE AND OBJECTIVE BOX
Patient is a 78y old  Female who presents with a chief complaint of shortness of breath (23 Mar 2019 15:03)       Pt is seen and examined  pt is awake and sitting in bed  chronic sob, leg swelling persist  minor vaginal bleeding  all other systems reviewed and negative    REVIEW OF SYSTEMS:    CONSTITUTIONAL: No fevers or chills.  weakness +  EYES/ENT: No visual changes;  No vertigo or throat pain   NECK: No pain or stiffness  RESPIRATORY: No wheezing, hemoptysis; chronic cough,  shortness of breath +  CARDIOVASCULAR: No chest pain or palpitations. chronic edema +  GASTROINTESTINAL: No abdominal or epigastric pain. No nausea, vomiting, or hematemesis; No diarrhea or constipation. No melena or hematochezia.  GENITOURINARY: No dysuria, frequency or hematuria  NEUROLOGICAL: No numbness or weakness  SKIN: No itching, burning, rashes, or lesions     PHYSICAL EXAM  General: adult chronically ill, with nasal O2, in wheelchair  HEENT: clear oropharynx, anicteric sclera  Neck: supple  CV: normal S1/S2 with no murmur rubs or gallops  Lungs: positive air movement b/l ant lungs,clear to auscultation, b/l rales +  Abdomen: soft obese non-tender non-distended, no hepatosplenomegaly  Ext: no clubbing cyanosis, b/l edema with chronic stasis changes - dressing right leg +  Skin: no rashes and no petechiae.   Neuro: alert and oriented X 4, no focal deficits        MEDICATIONS  (STANDING):  amiodarone    Tablet 200 milliGRAM(s) Oral daily  carvedilol 6.25 milliGRAM(s) Oral every 12 hours  cholecalciferol 1000 Unit(s) Oral daily  dextrose 50% Injectable 12.5 Gram(s) IV Push once  dextrose 50% Injectable 25 Gram(s) IV Push once  dextrose 50% Injectable 25 Gram(s) IV Push once  docusate sodium 100 milliGRAM(s) Oral daily  DULoxetine 60 milliGRAM(s) Oral daily  insulin glargine Injectable (LANTUS) 28 Unit(s) SubCutaneous at bedtime  insulin lispro (HumaLOG) corrective regimen sliding scale   SubCutaneous three times a day before meals  insulin lispro (HumaLOG) corrective regimen sliding scale   SubCutaneous at bedtime  insulin lispro Injectable (HumaLOG) 5 Unit(s) SubCutaneous three times a day before meals  isosorbide   mononitrate ER Tablet (IMDUR) 30 milliGRAM(s) Oral daily  levothyroxine 175 MICROGram(s) Oral daily  mesalamine DR (24-Hour) Tablet 2.4 Gram(s) Oral daily  metolazone 2.5 milliGRAM(s) Oral <User Schedule>  norethindrone acetate 5 milliGRAM(s) Oral daily  pantoprazole    Tablet 40 milliGRAM(s) Oral before breakfast  senna 2 Tablet(s) Oral at bedtime  spironolactone 25 milliGRAM(s) Oral two times a day  torsemide 40 milliGRAM(s) Oral two times a day    MEDICATIONS  (PRN):  cyclobenzaprine 5 milliGRAM(s) Oral three times a day PRN Muscle Spasm  dextrose 40% Gel 15 Gram(s) Oral once PRN Blood Glucose LESS THAN 70 milliGRAM(s)/deciliter  glucagon  Injectable 1 milliGRAM(s) IntraMuscular once PRN Glucose LESS THAN 70 milligrams/deciliter      Allergies    Augmentin (Swelling)  cephalexin (Swelling)    Intolerances        Vital Signs Last 24 Hrs  T(C): 36.8 (27 Mar 2019 05:14), Max: 37 (26 Mar 2019 19:44)  T(F): 98.2 (27 Mar 2019 05:14), Max: 98.6 (26 Mar 2019 19:44)  HR: 102 (27 Mar 2019 05:14) (102 - 107)  BP: 130/74 (27 Mar 2019 05:14) (130/74 - 157/73)  BP(mean): --  RR: 20 (27 Mar 2019 05:14) (20 - 20)  SpO2: 100% (27 Mar 2019 05:14) (100% - 100%)      LABS:                          8.6    11.9  )-----------( 170      ( 27 Mar 2019 04:00 )             25.6         Mean Cell Volume : 94.4 fl  Mean Cell Hemoglobin : 31.5 pg  Mean Cell Hemoglobin Concentration : 33.4 gm/dL    03-27    140  |  97  |  49<H>  ----------------------------<  215<H>  3.7   |  30  |  1.89<H>    Ca    9.4      27 Mar 2019 04:00  Mg     2.2     03-26        PT/INR - ( 27 Mar 2019 04:00 )   PT: 18.1 sec;   INR: 1.55 ratio         PTT - ( 27 Mar 2019 04:00 )  PTT:28.3 sec    Ferritin, Serum: 1223 ng/mL (03-23 @ 14:21)    Iron Total, Serum (03.23.19 @ 14:29)    Iron Total, Serum: 236 ug/dL    < from: US Pelvis Complete (03.26.19 @ 11:54) >  Impression: Endometrial canal and vagina appear distended with fluid.   This is increased compared to prior study. The ovaries werenot   visualized. Correlation with pelvic MR can be performed for further   evaluation.    < end of copied text > Patient is a 78y old  Female who presents with a chief complaint of shortness of breath (23 Mar 2019 15:03)       Pt is seen and examined  pt is awake and sitting in chair  chronic sob better, leg swelling persist  minor vaginal bleeding  all other systems reviewed and negative    REVIEW OF SYSTEMS:    CONSTITUTIONAL: No fevers or chills.  weakness +  EYES/ENT: No visual changes;  No vertigo or throat pain   NECK: No pain or stiffness  RESPIRATORY: No wheezing, hemoptysis; chronic cough,  shortness of breath +  CARDIOVASCULAR: No chest pain or palpitations. chronic edema +  GASTROINTESTINAL: No abdominal or epigastric pain. No nausea, vomiting, or hematemesis; No diarrhea or constipation. No melena or hematochezia.  GENITOURINARY: No dysuria, frequency or hematuria  NEUROLOGICAL: No numbness or weakness  SKIN: No itching, burning, rashes, or lesions     PHYSICAL EXAM  General: adult chronically ill, with nasal O2, in wheelchair  HEENT: clear oropharynx, anicteric sclera  Neck: supple  CV: normal S1/S2 with no murmur rubs or gallops  Lungs: positive air movement b/l ant lungs,clear to auscultation, b/l rales +  Abdomen: soft obese non-tender non-distended, no hepatosplenomegaly  Ext: no clubbing cyanosis, b/l edema with chronic stasis changes - dressing right leg +  Skin: no rashes and no petechiae.   Neuro: alert and oriented X 4, no focal deficits        MEDICATIONS  (STANDING):  amiodarone    Tablet 200 milliGRAM(s) Oral daily  carvedilol 6.25 milliGRAM(s) Oral every 12 hours  cholecalciferol 1000 Unit(s) Oral daily  dextrose 50% Injectable 12.5 Gram(s) IV Push once  dextrose 50% Injectable 25 Gram(s) IV Push once  dextrose 50% Injectable 25 Gram(s) IV Push once  docusate sodium 100 milliGRAM(s) Oral daily  DULoxetine 60 milliGRAM(s) Oral daily  insulin glargine Injectable (LANTUS) 28 Unit(s) SubCutaneous at bedtime  insulin lispro (HumaLOG) corrective regimen sliding scale   SubCutaneous three times a day before meals  insulin lispro (HumaLOG) corrective regimen sliding scale   SubCutaneous at bedtime  insulin lispro Injectable (HumaLOG) 5 Unit(s) SubCutaneous three times a day before meals  isosorbide   mononitrate ER Tablet (IMDUR) 30 milliGRAM(s) Oral daily  levothyroxine 175 MICROGram(s) Oral daily  mesalamine DR (24-Hour) Tablet 2.4 Gram(s) Oral daily  metolazone 2.5 milliGRAM(s) Oral <User Schedule>  norethindrone acetate 5 milliGRAM(s) Oral daily  pantoprazole    Tablet 40 milliGRAM(s) Oral before breakfast  senna 2 Tablet(s) Oral at bedtime  spironolactone 25 milliGRAM(s) Oral two times a day  torsemide 40 milliGRAM(s) Oral two times a day    MEDICATIONS  (PRN):  cyclobenzaprine 5 milliGRAM(s) Oral three times a day PRN Muscle Spasm  dextrose 40% Gel 15 Gram(s) Oral once PRN Blood Glucose LESS THAN 70 milliGRAM(s)/deciliter  glucagon  Injectable 1 milliGRAM(s) IntraMuscular once PRN Glucose LESS THAN 70 milligrams/deciliter      Allergies    Augmentin (Swelling)  cephalexin (Swelling)    Intolerances        Vital Signs Last 24 Hrs  T(C): 36.8 (27 Mar 2019 05:14), Max: 37 (26 Mar 2019 19:44)  T(F): 98.2 (27 Mar 2019 05:14), Max: 98.6 (26 Mar 2019 19:44)  HR: 102 (27 Mar 2019 05:14) (102 - 107)  BP: 130/74 (27 Mar 2019 05:14) (130/74 - 157/73)  BP(mean): --  RR: 20 (27 Mar 2019 05:14) (20 - 20)  SpO2: 100% (27 Mar 2019 05:14) (100% - 100%)      LABS:                          8.6    11.9  )-----------( 170      ( 27 Mar 2019 04:00 )             25.6         Mean Cell Volume : 94.4 fl  Mean Cell Hemoglobin : 31.5 pg  Mean Cell Hemoglobin Concentration : 33.4 gm/dL    03-27    140  |  97  |  49<H>  ----------------------------<  215<H>  3.7   |  30  |  1.89<H>    Ca    9.4      27 Mar 2019 04:00  Mg     2.2     03-26        PT/INR - ( 27 Mar 2019 04:00 )   PT: 18.1 sec;   INR: 1.55 ratio         PTT - ( 27 Mar 2019 04:00 )  PTT:28.3 sec    Ferritin, Serum: 1223 ng/mL (03-23 @ 14:21)    Iron Total, Serum (03.23.19 @ 14:29)    Iron Total, Serum: 236 ug/dL    < from: US Pelvis Complete (03.26.19 @ 11:54) >  Impression: Endometrial canal and vagina appear distended with fluid.   This is increased compared to prior study. The ovaries werenot   visualized. Correlation with pelvic MR can be performed for further   evaluation.    < end of copied text >

## 2019-03-27 NOTE — PROGRESS NOTE ADULT - PROBLEM SELECTOR PROBLEM 7
Hypothyroidism, unspecified type
Hypothyroidism, unspecified type
Mild intermittent asthma without complication
Mild intermittent asthma without complication
Stage 3 chronic kidney disease
Stage 3 chronic kidney disease

## 2019-03-27 NOTE — PROGRESS NOTE ADULT - PROBLEM SELECTOR PROBLEM 6
Chronic hypoxemic respiratory failure
Chronic hypoxemic respiratory failure
Stage 3 chronic kidney disease
Stage 3 chronic kidney disease
Type 2 diabetes mellitus with other circulatory complication, with long-term current use of insulin
Type 2 diabetes mellitus with other circulatory complication, with long-term current use of insulin

## 2019-03-27 NOTE — PROGRESS NOTE ADULT - ASSESSMENT
77 yo F w/ hx of moderate MS s/p bioprosthetic mitral valve, severe pulmonary HTN, chronic hypoxemic respiratory failure and suspected ILD, RADS, DM2, anemia, diastolic CHF, hypothyroidism, paroxysmal atrial fibrillation, HTN presenting with progressive SOB 2/2 to acute on chronic diastolic congestive heart failure in setting of medication nonadherence. 79 yo F w/ hx of moderate MS s/p bioprosthetic mitral valve, severe pulmonary HTN, chronic hypoxemic respiratory failure and suspected ILD, RADS, DM2, anemia, diastolic CHF, hypothyroidism, paroxysmal atrial fibrillation, HTN presenting with progressive SOB 2/2 to acute on chronic diastolic congestive heart failure in setting of medication nonadherence.     3/27-endo, cards, heme onc f/up--CT chest done-atelectasis/effusion (no ildz)

## 2019-03-27 NOTE — DISCHARGE NOTE PROVIDER - HOSPITAL COURSE
HPI:    77 yo F w/ hx of moderate MS s/p bioprosthetic mitral valve, severe pulmonary HTN, DM2, anemia, diastolic CHF, hypothyroidism, paroxysmal atrial fibrillation, HTN presenting with progressive SOB. Patient reports symptoms have gradually worsened over the course of months. She is not adherent to her torsemide. Sometimes she does not take it if she is out in stores. Also reports eating bag of pretzels and salty foods. She reports weight loss but unable to lay flat. She has to sit up when sleeping. She is not on home O2. She has noticed her legs are both more edematous. She has had no fever, chills, productive cough, and no chest pain/palpitation, nausea/vomiting, abdominal pain. Her ADLs are severely affected. She requires assistance by  for ambulation and is primarily more wheelchair bound. Pt also reports intermittent postmenopausal bleeding. More spotting but not overt hemorrhage. (23 Mar 2019 00:40)    Continue current CV meds    cont to Monitor CBC; lytes would try to maintain hemoglobin > 10     Gyn evaluation noted and US    Abnormal uterine bleeding known,     Restart norethindrone 5mg qD. Pt to continue indefinitely    to restart coumadin; was stopped as outpatient 2 weeks ago.    plan to consider watchman device electively.    D/C planning

## 2019-03-27 NOTE — PROGRESS NOTE ADULT - PROBLEM SELECTOR PLAN 1
Hgb stable - no improvement with iv iron supplementation  With CRI, may benfit from procrit supplementataion, printed info given to patient and potential side effects reviewed  adequate iron studies  reassess cbc in am - prn prbc transfusion in view of cardiac co morbidities  D/w NP  f/up with Dr. Michel next week Hgb stable - no improvement with iv iron supplementation  With CRI, may benfit from procrit supplementation, printed info given to patient and potential side effects reviewed  adequate iron studies  D/w NP  f/up with Dr. Michel next week

## 2019-03-27 NOTE — PROGRESS NOTE ADULT - PROBLEM SELECTOR PROBLEM 10
Ulcerative colitis with complication, unspecified location
Ulcerative colitis with complication, unspecified location

## 2019-03-27 NOTE — DISCHARGE NOTE PROVIDER - NSDCCPCAREPLAN_GEN_ALL_CORE_FT
PRINCIPAL DISCHARGE DIAGNOSIS  Diagnosis: Shortness of breath  Assessment and Plan of Treatment: continue current meds

## 2019-03-27 NOTE — PROGRESS NOTE ADULT - PROBLEM SELECTOR PLAN 6
-renally does meds, avoid nephrotoxins, monitor lytes.
-renally does meds, avoid nephrotoxins, monitor lytes.
O2 NC 2-3 liters NC
O2 NC 2-3 liters NC
con't low-dose humalog sliding scale TID and bedtime.
con't low-dose humalog sliding scale TID and bedtime.

## 2019-03-27 NOTE — PROGRESS NOTE ADULT - PROBLEM SELECTOR PLAN 9
-does not appear to be infected despite leukocytosis it is improving .
-c/w home mesalamine   -denies breakthrough GI sx
-c/w home mesalamine   -denies breakthrough GI sx
-does not appear to be infected despite leukocytosis it is improving .
GYN evaluation
GYN evaluation

## 2019-03-27 NOTE — PROGRESS NOTE ADULT - PROBLEM SELECTOR PLAN 7
initiate symbicort 160 2 bid and spiriva 1 puff q day initiated symbicort 160 2 bid and spiriva 1 puff q day

## 2019-03-27 NOTE — PROGRESS NOTE ADULT - SUBJECTIVE AND OBJECTIVE BOX
CHIEF COMPLAINT:  f/up for sob, PAH, RESP FAILURE,ILDz, amiodarone use-    Interval Events:    REVIEW OF SYSTEMS:  Constitutional: No fevers or chills. No weight loss. No fatigue or generalized malaise.  Eyes: No itching or discharge from the eyes  ENT: No ear pain. No ear discharge. No nasal congestion. No post nasal drip. No epistaxis. No throat pain. No sore throat. No difficulty swallowing.   CV: No chest pain. No palpitations. No lightheadedness or dizziness.   Resp: No dyspnea at rest. No dyspnea on exertion. No orthopnea. No wheezing. No cough. No stridor. No sputum production. No chest pain with respiration.  GI: No nausea. No vomiting. No diarrhea.  MSK: No joint pain or pain in any extremities  Integumentary: No skin lesions. No pedal edema.  Neurological: No gross motor weakness. No sensory changes.  [ ] All other systems negative  [ ] Unable to assess ROS because ________    OBJECTIVE:  ICU Vital Signs Last 24 Hrs  T(C): 36.8 (27 Mar 2019 05:14), Max: 37 (26 Mar 2019 19:44)  T(F): 98.2 (27 Mar 2019 05:14), Max: 98.6 (26 Mar 2019 19:44)  HR: 102 (27 Mar 2019 05:14) (102 - 107)  BP: 130/74 (27 Mar 2019 05:14) (116/48 - 157/73)  BP(mean): --  ABP: --  ABP(mean): --  RR: 20 (27 Mar 2019 05:14) (20 - 20)  SpO2: 100% (27 Mar 2019 05:14) (100% - 100%)        03-25 @ 07:01  -  03-26 @ 07:00  --------------------------------------------------------  IN: 740 mL / OUT: 0 mL / NET: 740 mL    03-26 @ 07:01  -  03-27 @ 06:12  --------------------------------------------------------  IN: 600 mL / OUT: 0 mL / NET: 600 mL      CAPILLARY BLOOD GLUCOSE      POCT Blood Glucose.: 281 mg/dL (26 Mar 2019 21:46)      PHYSICAL EXAM:  General: Awake, alert, oriented X 3.   HEENT: Atraumatic, normocephalic.                 Mallampatti Grade                 No nasal congestion.                No tonsillar or pharyngeal exudates.  Lymph Nodes: No palpable lymphadenopathy  Neck: No JVD. No carotid bruit.   Respiratory: Normal chest expansion                         Normal percussion                         Normal and equal air entry                         No wheeze, rhonchi or rales.  Cardiovascular: S1 S2 normal. No murmurs, rubs or gallops.   Abdomen: Soft, non-tender, non-distended. No organomegaly. Normoactive bowel sounds.  Extremities: Warm to touch. Peripheral pulse palpable. No pedal edema.   Skin: No rashes or skin lesions  Neurological: Motor and sensory examination equal and normal in all four extremities.  Psychiatry: Appropriate mood and affect.    HOSPITAL MEDICATIONS:  MEDICATIONS  (STANDING):  amiodarone    Tablet 200 milliGRAM(s) Oral daily  carvedilol 6.25 milliGRAM(s) Oral every 12 hours  cholecalciferol 1000 Unit(s) Oral daily  dextrose 50% Injectable 12.5 Gram(s) IV Push once  dextrose 50% Injectable 25 Gram(s) IV Push once  dextrose 50% Injectable 25 Gram(s) IV Push once  docusate sodium 100 milliGRAM(s) Oral daily  DULoxetine 60 milliGRAM(s) Oral daily  insulin glargine Injectable (LANTUS) 28 Unit(s) SubCutaneous at bedtime  insulin lispro (HumaLOG) corrective regimen sliding scale   SubCutaneous three times a day before meals  insulin lispro (HumaLOG) corrective regimen sliding scale   SubCutaneous at bedtime  insulin lispro Injectable (HumaLOG) 5 Unit(s) SubCutaneous three times a day before meals  isosorbide   mononitrate ER Tablet (IMDUR) 30 milliGRAM(s) Oral daily  levothyroxine 175 MICROGram(s) Oral daily  mesalamine DR (24-Hour) Tablet 2.4 Gram(s) Oral daily  metolazone 2.5 milliGRAM(s) Oral <User Schedule>  norethindrone acetate 5 milliGRAM(s) Oral daily  pantoprazole    Tablet 40 milliGRAM(s) Oral before breakfast  senna 2 Tablet(s) Oral at bedtime  spironolactone 25 milliGRAM(s) Oral two times a day  torsemide 40 milliGRAM(s) Oral two times a day    MEDICATIONS  (PRN):  cyclobenzaprine 5 milliGRAM(s) Oral three times a day PRN Muscle Spasm  dextrose 40% Gel 15 Gram(s) Oral once PRN Blood Glucose LESS THAN 70 milliGRAM(s)/deciliter  glucagon  Injectable 1 milliGRAM(s) IntraMuscular once PRN Glucose LESS THAN 70 milligrams/deciliter      LABS:                        8.6    11.9  )-----------( 170      ( 27 Mar 2019 04:00 )             25.6     03-27    140  |  97  |  49<H>  ----------------------------<  215<H>  3.7   |  30  |  1.89<H>    Ca    9.4      27 Mar 2019 04:00  Mg     2.2     03-26      PT/INR - ( 27 Mar 2019 04:00 )   PT: 18.1 sec;   INR: 1.55 ratio         PTT - ( 27 Mar 2019 04:00 )  PTT:28.3 sec          MICROBIOLOGY:     RADIOLOGY:  [ ] Reviewed and interpreted by me    Point of Care Ultrasound Findings:    PFT:    EKG: CHIEF COMPLAINT:  f/up for sob, PAH, RESP FAILURE,ILDz, amiodarone use-better over all-wants to go home    Interval Events: cards/endo/heme onc    REVIEW OF SYSTEMS:  Constitutional: No fevers or chills. No weight loss. + fatigue or generalized malaise.  Eyes: No itching or discharge from the eyes  ENT: No ear pain. No ear discharge. No nasal congestion. No post nasal drip. No epistaxis. No throat pain. No sore throat. No difficulty swallowing.   CV: No chest pain. No palpitations. No lightheadedness or dizziness.   Resp: No dyspnea at rest. + dyspnea on exertion. No orthopnea. No wheezing. No cough. No stridor. No sputum production. No chest pain with respiration.  GI: No nausea. No vomiting. No diarrhea.  MSK: No joint pain or pain in any extremities  Integumentary: No skin lesions. No pedal edema.  Neurological: + gross motor weakness. No sensory changes.  [+ ] All other systems negative  [ ] Unable to assess ROS because ________    OBJECTIVE:  ICU Vital Signs Last 24 Hrs  T(C): 36.8 (27 Mar 2019 05:14), Max: 37 (26 Mar 2019 19:44)  T(F): 98.2 (27 Mar 2019 05:14), Max: 98.6 (26 Mar 2019 19:44)  HR: 102 (27 Mar 2019 05:14) (102 - 107)  BP: 130/74 (27 Mar 2019 05:14) (116/48 - 157/73)  BP(mean): --  ABP: --  ABP(mean): --  RR: 20 (27 Mar 2019 05:14) (20 - 20)  SpO2: 100% (27 Mar 2019 05:14) (100% - 100%)        03-25 @ 07:01  -  03-26 @ 07:00  --------------------------------------------------------  IN: 740 mL / OUT: 0 mL / NET: 740 mL    03-26 @ 07:01  -  03-27 @ 06:12  --------------------------------------------------------  IN: 600 mL / OUT: 0 mL / NET: 600 mL      CAPILLARY BLOOD GLUCOSE      POCT Blood Glucose.: 281 mg/dL (26 Mar 2019 21:46)      PHYSICAL EXAM: NAD in bed on O2  General: Awake, alert, oriented X 3.   HEENT: Atraumatic, normocephalic.                 Mallampatti Grade 3                No nasal congestion.                No tonsillar or pharyngeal exudates.  Lymph Nodes: No palpable lymphadenopathy  Neck: No JVD. No carotid bruit.   Respiratory: abnormal chest expansion-reduced BS bases                         Normal percussion                         Normal and equal air entry                         No wheeze, rhonchi or rales.  Cardiovascular: S1 S2 normal. No murmurs, rubs or gallops.   Abdomen: Soft, non-tender, non-distended. No organomegaly. Normoactive bowel sounds.  Extremities: Warm to touch. Peripheral pulse palpable. + pedal edema.   Skin: No rashes or skin lesions  Neurological: Motor and sensory examination equal and normal in all four extremities.  Psychiatry: Appropriate mood and affect.    HOSPITAL MEDICATIONS:  MEDICATIONS  (STANDING):  amiodarone    Tablet 200 milliGRAM(s) Oral daily  carvedilol 6.25 milliGRAM(s) Oral every 12 hours  cholecalciferol 1000 Unit(s) Oral daily  dextrose 50% Injectable 12.5 Gram(s) IV Push once  dextrose 50% Injectable 25 Gram(s) IV Push once  dextrose 50% Injectable 25 Gram(s) IV Push once  docusate sodium 100 milliGRAM(s) Oral daily  DULoxetine 60 milliGRAM(s) Oral daily  insulin glargine Injectable (LANTUS) 28 Unit(s) SubCutaneous at bedtime  insulin lispro (HumaLOG) corrective regimen sliding scale   SubCutaneous three times a day before meals  insulin lispro (HumaLOG) corrective regimen sliding scale   SubCutaneous at bedtime  insulin lispro Injectable (HumaLOG) 5 Unit(s) SubCutaneous three times a day before meals  isosorbide   mononitrate ER Tablet (IMDUR) 30 milliGRAM(s) Oral daily  levothyroxine 175 MICROGram(s) Oral daily  mesalamine DR (24-Hour) Tablet 2.4 Gram(s) Oral daily  metolazone 2.5 milliGRAM(s) Oral <User Schedule>  norethindrone acetate 5 milliGRAM(s) Oral daily  pantoprazole    Tablet 40 milliGRAM(s) Oral before breakfast  senna 2 Tablet(s) Oral at bedtime  spironolactone 25 milliGRAM(s) Oral two times a day  torsemide 40 milliGRAM(s) Oral two times a day    MEDICATIONS  (PRN):  cyclobenzaprine 5 milliGRAM(s) Oral three times a day PRN Muscle Spasm  dextrose 40% Gel 15 Gram(s) Oral once PRN Blood Glucose LESS THAN 70 milliGRAM(s)/deciliter  glucagon  Injectable 1 milliGRAM(s) IntraMuscular once PRN Glucose LESS THAN 70 milligrams/deciliter      LABS:                        8.6    11.9  )-----------( 170      ( 27 Mar 2019 04:00 )             25.6     03-27    140  |  97  |  49<H>  ----------------------------<  215<H>  3.7   |  30  |  1.89<H>    Ca    9.4      27 Mar 2019 04:00  Mg     2.2     03-26      PT/INR - ( 27 Mar 2019 04:00 )   PT: 18.1 sec;   INR: 1.55 ratio         PTT - ( 27 Mar 2019 04:00 )  PTT:28.3 sec          MICROBIOLOGY:     RADIOLOGY:  < from: CT Chest No Cont (03.26.19 @ 13:08) >  INTERPRETATION:  CLINICAL INFORMATION: Idiopathic lung disease    COMPARISON: CT chest dated 9/13/2018    PROCEDURE:   CT of the Chest was performed without intravenous contrast.  Sagittal and coronal reformats were performed.      FINDINGS:    CHEST:     LUNGS AND LARGE AIRWAYS: Patent central airways.  No pulmonary nodules.   Bibasilar linear atelectasis.  PLEURA: Trace right pleural effusion.  VESSELS: Atherosclerotic calcifications of the thoracic aorta and   coronary arteries.  HEART: Heart size is normal. No pericardial effusion. Aortic valvular   calcifications. Mitral replacement.  MEDIASTINUM AND JULIET: No lymphadenopathy.  CHEST WALL AND LOWER NECK: Sternotomy.  VISUALIZED UPPER ABDOMEN: Increased density of the liver which may be   seen in the setting of amiodarone use.  BONES: Degenerative changes.    IMPRESSION:   Trace right pleural effusion.    Bibasilar linear atelectasis.        < end of copied text >    [ ] Reviewed and interpreted by me    Point of Care Ultrasound Findings:    PFT:    EKG:

## 2019-03-27 NOTE — PROGRESS NOTE ADULT - PROBLEM SELECTOR PLAN 8
-c/w synthroid
-does not appear to be infected despite leukocytosis it is improving .
-does not appear to be infected despite leukocytosis it is improving .
nutrition evaluation
nutrition evaluation
-c/w synthroid

## 2019-03-27 NOTE — DISCHARGE NOTE PROVIDER - CARE PROVIDER_API CALL
Fito Montgomery)  Internal Medicine; Pulmonary Disease  [address], [address]  [place] [address]  Phone: [phone]  Fax: [phone]  Follow Up Time:     Feliz Aiken)  Cardiovascular Disease; Internal Medicine; Nuclear Cardiology  [address], [address]  [place], [place] [id number]  Phone: [phone]  Fax: [phone]  Follow Up Time:     Romana Chavira)  Obstetrics and Gynecology  [address], [address]  [place] [address]  Phone: [phone]  Fax: [phone]  Follow Up Time:

## 2019-03-27 NOTE — PROGRESS NOTE ADULT - PROBLEM SELECTOR PLAN 4
HRCT of chest to evaluate lung parenchyma HRCT of chest to evaluate lung parenchyma--no evidence of ILDZ

## 2019-03-27 NOTE — DISCHARGE NOTE NURSING/CASE MANAGEMENT/SOCIAL WORK - NSDCDPATPORTLINK_GEN_ALL_CORE
You can access the [facility] Patient Portal, offered by [facility], by registering with the following website: [email or web address]

## 2019-03-27 NOTE — PROGRESS NOTE ADULT - PROBLEM SELECTOR PROBLEM 3
Stage 3 chronic kidney disease
AF (paroxysmal atrial fibrillation)
Stage 3 chronic kidney disease
AF (paroxysmal atrial fibrillation)

## 2019-03-27 NOTE — PHYSICAL THERAPY INITIAL EVALUATION ADULT - ADDITIONAL COMMENTS
Pt lives in private home with spouse, 2 half steps to enter +HR. 1st floor set up. Pt uses wheelchair for ambulation. Pt's  assists with all functional mobility. Pt uses a rolling walker assist for short distances around the home.

## 2019-03-27 NOTE — PROGRESS NOTE ADULT - PROBLEM SELECTOR PROBLEM 9
Chronic venous stasis dermatitis
Ulcerative colitis with complication, unspecified location
Ulcerative colitis with complication, unspecified location
Vaginal bleeding
Vaginal bleeding
Chronic venous stasis dermatitis

## 2019-03-27 NOTE — PROGRESS NOTE ADULT - PROBLEM SELECTOR PLAN 5
? repeat RHC here to better evaluate pressures ? repeat RHC here to better evaluate pressures at some point

## 2019-03-27 NOTE — PROGRESS NOTE ADULT - PROBLEM SELECTOR PROBLEM 8
Chronic venous stasis dermatitis
Chronic venous stasis dermatitis
Hypothyroidism, unspecified type
Obesity hypoventilation syndrome
Obesity hypoventilation syndrome
Hypothyroidism, unspecified type

## 2019-03-27 NOTE — PROGRESS NOTE ADULT - SUBJECTIVE AND OBJECTIVE BOX
Patient is a 78y old  Female who presents with a chief complaint of shortness of breath (27 Mar 2019 10:33)      SUBJECTIVE / OVERNIGHT EVENTS: dc home today, OFF COUMADIN! min vag bleeding, ptn is nonsurgical    MEDICATIONS  (STANDING):  amiodarone    Tablet 200 milliGRAM(s) Oral daily  carvedilol 6.25 milliGRAM(s) Oral every 12 hours  cholecalciferol 1000 Unit(s) Oral daily  dextrose 50% Injectable 12.5 Gram(s) IV Push once  dextrose 50% Injectable 25 Gram(s) IV Push once  dextrose 50% Injectable 25 Gram(s) IV Push once  docusate sodium 100 milliGRAM(s) Oral daily  DULoxetine 60 milliGRAM(s) Oral daily  insulin glargine Injectable (LANTUS) 28 Unit(s) SubCutaneous at bedtime  insulin lispro (HumaLOG) corrective regimen sliding scale   SubCutaneous three times a day before meals  insulin lispro (HumaLOG) corrective regimen sliding scale   SubCutaneous at bedtime  insulin lispro Injectable (HumaLOG) 5 Unit(s) SubCutaneous three times a day before meals  isosorbide   mononitrate ER Tablet (IMDUR) 30 milliGRAM(s) Oral daily  levothyroxine 175 MICROGram(s) Oral daily  mesalamine DR (24-Hour) Tablet 2.4 Gram(s) Oral daily  metolazone 2.5 milliGRAM(s) Oral <User Schedule>  norethindrone acetate 5 milliGRAM(s) Oral daily  pantoprazole    Tablet 40 milliGRAM(s) Oral before breakfast  senna 2 Tablet(s) Oral at bedtime  spironolactone 25 milliGRAM(s) Oral two times a day  torsemide 40 milliGRAM(s) Oral two times a day    MEDICATIONS  (PRN):  cyclobenzaprine 5 milliGRAM(s) Oral three times a day PRN Muscle Spasm  dextrose 40% Gel 15 Gram(s) Oral once PRN Blood Glucose LESS THAN 70 milliGRAM(s)/deciliter  glucagon  Injectable 1 milliGRAM(s) IntraMuscular once PRN Glucose LESS THAN 70 milligrams/deciliter      Vital Signs Last 24 Hrs  T(F): 98 (03-27-19 @ 11:35), Max: 98.6 (03-26-19 @ 19:44)  HR: 101 (03-27-19 @ 11:35) (97 - 103)  BP: 138/76 (03-27-19 @ 11:35) (117/66 - 157/73)  RR: 19 (03-27-19 @ 11:35) (19 - 20)  SpO2: 100% (03-27-19 @ 11:35) (100% - 100%)  Telemetry:   CAPILLARY BLOOD GLUCOSE      POCT Blood Glucose.: 256 mg/dL (27 Mar 2019 11:32)  POCT Blood Glucose.: 198 mg/dL (27 Mar 2019 07:32)  POCT Blood Glucose.: 281 mg/dL (26 Mar 2019 21:46)  POCT Blood Glucose.: 261 mg/dL (26 Mar 2019 17:58)    I&O's Summary    26 Mar 2019 07:01  -  27 Mar 2019 07:00  --------------------------------------------------------  IN: 600 mL / OUT: 0 mL / NET: 600 mL    27 Mar 2019 07:01  -  27 Mar 2019 17:57  --------------------------------------------------------  IN: 260 mL / OUT: 0 mL / NET: 260 mL        PHYSICAL EXAM:  GENERAL: NAD, well-developed  HEAD:  Atraumatic, Normocephalic  EYES: EOMI, PERRLA, conjunctiva and sclera clear  NECK: Supple, No JVD  CHEST/LUNG: Clear to auscultation bilaterally; No wheeze  HEART: Regular rate and rhythm; No murmurs, rubs, or gallops  ABDOMEN: Soft, Nontender, Nondistended; Bowel sounds present  EXTREMITIES:  2+ Peripheral Pulses, No clubbing, cyanosis, or edema  PSYCH: AAOx3  NEUROLOGY: non-focal  SKIN: No rashes or lesions    LABS:                        8.6    11.9  )-----------( 170      ( 27 Mar 2019 04:00 )             25.6     03-27    140  |  97  |  49<H>  ----------------------------<  215<H>  3.7   |  30  |  1.89<H>    Ca    9.4      27 Mar 2019 04:00  Mg     2.2     03-26      PT/INR - ( 27 Mar 2019 04:00 )   PT: 18.1 sec;   INR: 1.55 ratio         PTT - ( 27 Mar 2019 04:00 )  PTT:28.3 sec          RADIOLOGY & ADDITIONAL TESTS:    Imaging Personally Reviewed:    Consultant(s) Notes Reviewed:      Care Discussed with Consultants/Other Providers:

## 2019-03-27 NOTE — PROGRESS NOTE ADULT - PROBLEM SELECTOR PROBLEM 5
History of postmenopausal bleeding
Pulmonary hypertension
Pulmonary hypertension
Type 2 diabetes mellitus with other circulatory complication, with long-term current use of insulin
Type 2 diabetes mellitus with other circulatory complication, with long-term current use of insulin
History of postmenopausal bleeding

## 2019-03-27 NOTE — PROGRESS NOTE ADULT - PROBLEM SELECTOR PROBLEM 1
Anemia due to blood loss
Acute on chronic diastolic congestive heart failure
Shortness of breath
Shortness of breath
Uncontrolled type 2 diabetes mellitus with hyperglycemia
Anemia due to blood loss

## 2019-03-27 NOTE — PROGRESS NOTE ADULT - ASSESSMENT
77 yo female with prior bioprosthetic MVR,  PAF on amiodarone, CKD, VICKY, Obesity and past 3-6 months main issues with recurrent vaginal bleeding and anemia with increasing QUESADA. BNP as outpatient has been normal with stable CKD on chronic diuretic therapy. She has not felt to be candidate for RAFAELA if indicated due to obesity. Her symptoms are likely multifactorial. No CAD prior to MVR. TTE unremarkable and  NICOLA in past with normal functioning MV prosthesis. 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 Optimally patient should be on anticoagulation once gyn status and plan clarified.     Rec:  Continue current CV meds  Monitor CBC; lytes; would try to maintain hemoglobin > 10   Gyn evaluation noted and US  Will not restart coumadin; was stopped as outpatient 2 weeks ago.  Will consider watchman device electively.  D/C planning

## 2019-03-27 NOTE — PROGRESS NOTE ADULT - PROBLEM SELECTOR PROBLEM 2
Acquired hypothyroidism
Vaginal bleeding
Acute on chronic diastolic congestive heart failure
Acute on chronic diastolic congestive heart failure
Elevated troponin
Vaginal bleeding
Elevated troponin

## 2019-03-27 NOTE — PHYSICAL THERAPY INITIAL EVALUATION ADULT - PLANNED THERAPY INTERVENTIONS, PT EVAL
bed mobility training/gait training/GOAL: Pt will negotiate 2 steps with 1 HR and step to pattern Min A in 4 weeks./balance training

## 2019-03-27 NOTE — PHYSICAL THERAPY INITIAL EVALUATION ADULT - PERTINENT HX OF CURRENT PROBLEM, REHAB EVAL
77 yo F p/w progressive SOB, worsening over several months; sleeps sitting up. She is not adherent to her torsemide.  Found to have Acute on chronic diastolic CHF. Of note, pt a/w vaginal bleeding. CT Chest 3/26: Trace right pleural effusion. Bibasilar linear atelectasis. US PElvis 3/26: Endometrial canal and vagina appear distended with fluid. Increased compared to prior study.

## 2019-03-29 ENCOUNTER — EMERGENCY (EMERGENCY)
Facility: HOSPITAL | Age: 79
LOS: 1 days | Discharge: HOME | End: 2019-03-29
Attending: EMERGENCY MEDICINE | Admitting: EMERGENCY MEDICINE
Payer: COMMERCIAL

## 2019-03-29 ENCOUNTER — APPOINTMENT (OUTPATIENT)
Dept: ULTRASOUND IMAGING | Facility: CLINIC | Age: 79
End: 2019-03-29

## 2019-03-29 VITALS
HEART RATE: 84 BPM | HEIGHT: 61 IN | OXYGEN SATURATION: 98 % | WEIGHT: 240.08 LBS | TEMPERATURE: 99 F | RESPIRATION RATE: 16 BRPM | DIASTOLIC BLOOD PRESSURE: 60 MMHG | SYSTOLIC BLOOD PRESSURE: 130 MMHG

## 2019-03-29 VITALS
SYSTOLIC BLOOD PRESSURE: 108 MMHG | RESPIRATION RATE: 16 BRPM | OXYGEN SATURATION: 99 % | DIASTOLIC BLOOD PRESSURE: 62 MMHG | TEMPERATURE: 98 F | HEART RATE: 100 BPM

## 2019-03-29 DIAGNOSIS — Z95.3 PRESENCE OF XENOGENIC HEART VALVE: Chronic | ICD-10-CM

## 2019-03-29 DIAGNOSIS — Z98.891 HISTORY OF UTERINE SCAR FROM PREVIOUS SURGERY: Chronic | ICD-10-CM

## 2019-03-29 PROCEDURE — 99283 EMERGENCY DEPT VISIT LOW MDM: CPT | Mod: 25

## 2019-03-29 PROCEDURE — 73562 X-RAY EXAM OF KNEE 3: CPT

## 2019-03-29 PROCEDURE — 99283 EMERGENCY DEPT VISIT LOW MDM: CPT

## 2019-03-29 PROCEDURE — 73562 X-RAY EXAM OF KNEE 3: CPT | Mod: 26,RT

## 2019-03-29 RX ORDER — LIDOCAINE 4 G/100G
1 CREAM TOPICAL
Qty: 10 | Refills: 0 | OUTPATIENT
Start: 2019-03-29 | End: 2019-04-07

## 2019-03-29 RX ORDER — LIDOCAINE 4 G/100G
1 CREAM TOPICAL ONCE
Qty: 0 | Refills: 0 | Status: COMPLETED | OUTPATIENT
Start: 2019-03-29 | End: 2019-03-29

## 2019-03-29 RX ADMIN — LIDOCAINE 1 PATCH: 4 CREAM TOPICAL at 18:33

## 2019-03-29 NOTE — ED ADULT NURSE NOTE - INTERVENTIONS DEFINITIONS
[place] to call system/Provide visual cue, wrist band, yellow gown, etc./Non-slip footwear when patient is off stretcher

## 2019-03-29 NOTE — ED PROVIDER NOTE - CARE PROVIDER_API CALL
Tony Howard)  Orthopaedic Surgery  [address] [place] [address]  Phone: [phone]  Fax: [phone]  Follow Up Time: 1-3 Days

## 2019-03-29 NOTE — ED PROVIDER NOTE - MUSCULOSKELETAL, MLM
RLE: no TTP to hip, +diffuse TTP to superior aspect of anterior patella with small abrasion to inferior aspect, Pt with full extension but limited flexion of knee secondary to pain, No TTP to tib fib, ankle or foot, Sensation intact, +DP pulse. RLE: no TTP to hip, +diffuse TTP to superior aspect of anterior patella with small abrasion to inferior aspect, Pt with full extension but limited flexion of knee secondary to pain, No TTP to tib fib, ankle or foot, Sensation intact, +DP pulse. cap refill less then 2 seconds

## 2019-03-29 NOTE — ED PROVIDER NOTE - PROGRESS NOTE DETAILS
PA NOTE: Pt seen by attending and orders/plan reviewed. agree with hpi.   PE: GEN: Awake, alert,  NAD,  obese Skin: Consistent color, well perfused. head: NC/AT, EYES: clear.  CARDIAC:  regular rate, irregular rhythm. S1,S2, RRR  RESP: No distress noted. Lungs with decreased bs bl. no wheeze, ronchi, rales. ABD: soft, supple, non-tender, no guarding.  MS RLE:+ R KNEE TTP. NO DEFORMITY. REST OF EXTREMITY NONTENDER. DECREASED FLEXION DUE TO PAIN. SENSATION INTACT PULSES INTACT. . NEURO: AOx3, no focal deficits   PLAN: follow up mdm. pt improved. pt walking around ed without issue with walker. pt advised ortho follow up. pt splinted. All imaging and labs reviewed. all results reviewed with pt including abnormal results. pt given a copy of results. pt advised to follow up with pmd regarding abnormal results. All questions answered and concerns addressed. pt verbalized understanding and agreement with plan and dx. pt advised on next step and when/where to follow up. pt advised on all take home and otc medications. pt advised to follow up with PMD. pt advised to return to ed for worsenng symptoms including fever, cp, sob. will dc.

## 2019-03-29 NOTE — ED PROVIDER NOTE - NSFOLLOWUPINSTRUCTIONS_ED_ALL_ED_FT
1. FOLLOW UP WITH YOUR PRIMARY DOCTOR IN 24-48 HOURS.   2. FOLLOW UP WITH ALL SPECIALIST DISCUSSED DURING YOUR VISIT.   3. TAKE ALL MEDICATIONS PRESCRIBED IN THE ER IF ANY ARE PRESCRIBED. CONTINUE YOUR HOME MEDICATIONS UNLESS OTHERWISE ADVISED DIFFERENTLY.   4. RETURN FOR WORSENING SYMPTOMS OR CONCERNS INCLUDING BUT NOT LIMITED TO FEVER, CHEST PAIN, OR TROUBLE BREATHING OR ANY OTHER CONCERNS  5. take tylenol 650mg every 6 hours as needed for pain.  6. use over the counter lidoderm patches as prescribed.

## 2019-03-29 NOTE — ED PROVIDER NOTE - PMH
Anemia of chronic disease    Arthritis of spine    Asthma  PFT (4/2018)  Chronic diastolic CHF (congestive heart failure)  EF 56% (4/2017)  Chronic kidney disease (CKD)    Chronic low back pain    Depression (emotion)    Diabetes mellitus  T2DM last A1C 6.7 mg/dl in January   fs range 59- 200 mg/dl  Dyslipidemia associated with type 2 diabetes mellitus    Edema    GERD (gastroesophageal reflux disease)    H/O: hypothyroidism    Herniated disc  lumbar  History of postmenopausal bleeding    Hypertension    Macular degeneration of left eye  receiving injection  Morbid obesity with BMI of 45.0-49.9, adult    Neuropathy    On home oxygen therapy  2  liters nasal cannula  VICKY (obstructive sleep apnea)    Paroxysmal atrial fibrillation  h/o rapid ventricular rate 2 years ago, admitted at [facility], controlled with medication as per patient.  last INR 2.0  Peripheral arterial disease  s/p remote stent. not on antiplatelet meds for a while.  Ulcerative colitis

## 2019-03-29 NOTE — ED PROVIDER NOTE - CONSTITUTIONAL, MLM
normal... Well appearing, well nourished, awake, alert, oriented to person, place, time/situation and in no apparent distress. Well appearing, well nourished, awake, alert, oriented to person, place, time/situation and in no apparent distress. Morbid obesity

## 2019-03-29 NOTE — ED ADULT NURSE REASSESSMENT NOTE - NS ED NURSE REASSESS COMMENT FT1
Pt received awake and alert on stretcher, is c/o right knee pain with movement, 0 at rest. Pt with lower extremity edema and dry cracking skin on her feet, weak palpable pulses.
Pt assisted to stand and ambulated several steps using walker. Pt has a slow steady gait, moderate pain with ambulation, but as usual per family as prior to fall. Plan explained to pt and her  re mobility, pain control. Understanding verbalized.

## 2019-03-29 NOTE — ED PROVIDER NOTE - OBJECTIVE STATEMENT
79 y/o female with PMHx of asthma, CHF, CKD, DM, HLD, GERD, HTN, VICKY, Afib presents to the ED s/p mechanical fall PTA. Pt states she tripped on her porch and fell forward onto right knee, now c/o right knee pain. Pt states thar her right knee has been bothering her for around a week after she twisted it trying to get out of her car. No previous injury otherwise. On 3L chronic home O2. Denies SOB, CP or any other sx. No head trauma during fall, no LOC.

## 2019-03-29 NOTE — ED PROCEDURE NOTE - ATTENDING CONTRIBUTION TO CARE
I was available for key portions of th procedure and agree with above.  Pt was NVI pre and post procedure

## 2019-03-29 NOTE — ED PROVIDER NOTE - CLINICAL SUMMARY MEDICAL DECISION MAKING FREE TEXT BOX
79 y/o morbidly obese female presents to the ED with mechanical fall and right knee pain. No head injury or LOC. Will get XR, pain medication, and attempt ambulation.

## 2019-03-30 ENCOUNTER — INPATIENT (INPATIENT)
Facility: HOSPITAL | Age: 79
LOS: 15 days | Discharge: NURSING, REHAB OR LONG-TERM CARE | DRG: 501 | End: 2019-04-15
Attending: INTERNAL MEDICINE | Admitting: INTERNAL MEDICINE
Payer: COMMERCIAL

## 2019-03-30 VITALS
SYSTOLIC BLOOD PRESSURE: 166 MMHG | OXYGEN SATURATION: 100 % | RESPIRATION RATE: 18 BRPM | TEMPERATURE: 98 F | WEIGHT: 240.08 LBS | HEART RATE: 105 BPM | HEIGHT: 62 IN | DIASTOLIC BLOOD PRESSURE: 77 MMHG

## 2019-03-30 DIAGNOSIS — R53.1 WEAKNESS: ICD-10-CM

## 2019-03-30 DIAGNOSIS — Z29.9 ENCOUNTER FOR PROPHYLACTIC MEASURES, UNSPECIFIED: ICD-10-CM

## 2019-03-30 DIAGNOSIS — Z95.3 PRESENCE OF XENOGENIC HEART VALVE: Chronic | ICD-10-CM

## 2019-03-30 DIAGNOSIS — E11.8 TYPE 2 DIABETES MELLITUS WITH UNSPECIFIED COMPLICATIONS: ICD-10-CM

## 2019-03-30 DIAGNOSIS — I48.0 PAROXYSMAL ATRIAL FIBRILLATION: ICD-10-CM

## 2019-03-30 DIAGNOSIS — I50.32 CHRONIC DIASTOLIC (CONGESTIVE) HEART FAILURE: ICD-10-CM

## 2019-03-30 DIAGNOSIS — J96.10 CHRONIC RESPIRATORY FAILURE, UNSPECIFIED WHETHER WITH HYPOXIA OR HYPERCAPNIA: ICD-10-CM

## 2019-03-30 DIAGNOSIS — Z87.42 PERSONAL HISTORY OF OTHER DISEASES OF THE FEMALE GENITAL TRACT: ICD-10-CM

## 2019-03-30 DIAGNOSIS — M25.561 PAIN IN RIGHT KNEE: ICD-10-CM

## 2019-03-30 DIAGNOSIS — Z98.891 HISTORY OF UTERINE SCAR FROM PREVIOUS SURGERY: Chronic | ICD-10-CM

## 2019-03-30 LAB
ALBUMIN SERPL ELPH-MCNC: 3.6 G/DL — SIGNIFICANT CHANGE UP (ref 3.3–5)
ALP SERPL-CCNC: 76 U/L — SIGNIFICANT CHANGE UP (ref 40–120)
ALT FLD-CCNC: 25 U/L — SIGNIFICANT CHANGE UP (ref 10–45)
ANION GAP SERPL CALC-SCNC: 16 MMOL/L — SIGNIFICANT CHANGE UP (ref 5–17)
APPEARANCE UR: ABNORMAL
APTT BLD: 27.5 SEC — SIGNIFICANT CHANGE UP (ref 27.5–36.3)
AST SERPL-CCNC: 30 U/L — SIGNIFICANT CHANGE UP (ref 10–40)
BACTERIA # UR AUTO: ABNORMAL
BASE EXCESS BLDV CALC-SCNC: 9.6 MMOL/L — HIGH (ref -2–2)
BASOPHILS # BLD AUTO: 0.1 K/UL — SIGNIFICANT CHANGE UP (ref 0–0.2)
BASOPHILS NFR BLD AUTO: 0.4 % — SIGNIFICANT CHANGE UP (ref 0–2)
BILIRUB SERPL-MCNC: 0.6 MG/DL — SIGNIFICANT CHANGE UP (ref 0.2–1.2)
BILIRUB UR-MCNC: NEGATIVE — SIGNIFICANT CHANGE UP
BUN SERPL-MCNC: 51 MG/DL — HIGH (ref 7–23)
CA-I SERPL-SCNC: 1.22 MMOL/L — SIGNIFICANT CHANGE UP (ref 1.12–1.3)
CALCIUM SERPL-MCNC: 9.8 MG/DL — SIGNIFICANT CHANGE UP (ref 8.4–10.5)
CHLORIDE BLDV-SCNC: 96 MMOL/L — SIGNIFICANT CHANGE UP (ref 96–108)
CHLORIDE SERPL-SCNC: 94 MMOL/L — LOW (ref 96–108)
CK SERPL-CCNC: 134 U/L — SIGNIFICANT CHANGE UP (ref 25–170)
CO2 BLDV-SCNC: 37 MMOL/L — HIGH (ref 22–30)
CO2 SERPL-SCNC: 29 MMOL/L — SIGNIFICANT CHANGE UP (ref 22–31)
COLOR SPEC: YELLOW — SIGNIFICANT CHANGE UP
CREAT SERPL-MCNC: 1.87 MG/DL — HIGH (ref 0.5–1.3)
DIFF PNL FLD: ABNORMAL
EOSINOPHIL # BLD AUTO: 0 K/UL — SIGNIFICANT CHANGE UP (ref 0–0.5)
EOSINOPHIL NFR BLD AUTO: 0.2 % — SIGNIFICANT CHANGE UP (ref 0–6)
EPI CELLS # UR: SIGNIFICANT CHANGE UP
GAS PNL BLDV: 137 MMOL/L — SIGNIFICANT CHANGE UP (ref 136–145)
GAS PNL BLDV: SIGNIFICANT CHANGE UP
GAS PNL BLDV: SIGNIFICANT CHANGE UP
GLUCOSE BLDC GLUCOMTR-MCNC: 345 MG/DL — HIGH (ref 70–99)
GLUCOSE BLDV-MCNC: 218 MG/DL — HIGH (ref 70–99)
GLUCOSE SERPL-MCNC: 223 MG/DL — HIGH (ref 70–99)
GLUCOSE UR QL: NEGATIVE — SIGNIFICANT CHANGE UP
HCO3 BLDV-SCNC: 35 MMOL/L — HIGH (ref 21–29)
HCT VFR BLD CALC: 29.4 % — LOW (ref 34.5–45)
HCT VFR BLDA CALC: 27 % — LOW (ref 39–50)
HGB BLD CALC-MCNC: 8.6 G/DL — LOW (ref 11.5–15.5)
HGB BLD-MCNC: 9.5 G/DL — LOW (ref 11.5–15.5)
INR BLD: 1.23 RATIO — HIGH (ref 0.88–1.16)
KETONES UR-MCNC: NEGATIVE — SIGNIFICANT CHANGE UP
LACTATE BLDV-MCNC: 1.6 MMOL/L — SIGNIFICANT CHANGE UP (ref 0.7–2)
LEUKOCYTE ESTERASE UR-ACNC: ABNORMAL
LYMPHOCYTES # BLD AUTO: 1.4 K/UL — SIGNIFICANT CHANGE UP (ref 1–3.3)
LYMPHOCYTES # BLD AUTO: 10.7 % — LOW (ref 13–44)
MCHC RBC-ENTMCNC: 30.8 PG — SIGNIFICANT CHANGE UP (ref 27–34)
MCHC RBC-ENTMCNC: 32.4 GM/DL — SIGNIFICANT CHANGE UP (ref 32–36)
MCV RBC AUTO: 95 FL — SIGNIFICANT CHANGE UP (ref 80–100)
MONOCYTES # BLD AUTO: 0.9 K/UL — SIGNIFICANT CHANGE UP (ref 0–0.9)
MONOCYTES NFR BLD AUTO: 6.7 % — SIGNIFICANT CHANGE UP (ref 2–14)
NEUTROPHILS # BLD AUTO: 10.8 K/UL — HIGH (ref 1.8–7.4)
NEUTROPHILS NFR BLD AUTO: 81.9 % — HIGH (ref 43–77)
NITRITE UR-MCNC: NEGATIVE — SIGNIFICANT CHANGE UP
PCO2 BLDV: 58 MMHG — HIGH (ref 35–50)
PH BLDV: 7.4 — SIGNIFICANT CHANGE UP (ref 7.35–7.45)
PH UR: 6.5 — SIGNIFICANT CHANGE UP (ref 5–8)
PLATELET # BLD AUTO: 191 K/UL — SIGNIFICANT CHANGE UP (ref 150–400)
PO2 BLDV: 29 MMHG — SIGNIFICANT CHANGE UP (ref 25–45)
POTASSIUM BLDV-SCNC: 3.3 MMOL/L — LOW (ref 3.5–5.3)
POTASSIUM SERPL-MCNC: 3.4 MMOL/L — LOW (ref 3.5–5.3)
POTASSIUM SERPL-SCNC: 3.4 MMOL/L — LOW (ref 3.5–5.3)
PROT SERPL-MCNC: 7.4 G/DL — SIGNIFICANT CHANGE UP (ref 6–8.3)
PROT UR-MCNC: 100 — SIGNIFICANT CHANGE UP
PROTHROM AB SERPL-ACNC: 14.2 SEC — HIGH (ref 10–12.9)
RBC # BLD: 3.09 M/UL — LOW (ref 3.8–5.2)
RBC # FLD: 16.2 % — HIGH (ref 10.3–14.5)
RBC CASTS # UR COMP ASSIST: SIGNIFICANT CHANGE UP /HPF (ref 0–4)
SAO2 % BLDV: 50 % — LOW (ref 67–88)
SODIUM SERPL-SCNC: 139 MMOL/L — SIGNIFICANT CHANGE UP (ref 135–145)
SP GR SPEC: 1.02 — SIGNIFICANT CHANGE UP (ref 1.01–1.02)
UROBILINOGEN FLD QL: NEGATIVE — SIGNIFICANT CHANGE UP
WBC # BLD: 13.2 K/UL — HIGH (ref 3.8–10.5)
WBC # FLD AUTO: 13.2 K/UL — HIGH (ref 3.8–10.5)
WBC UR QL: >50 /HPF — SIGNIFICANT CHANGE UP (ref 0–5)

## 2019-03-30 PROCEDURE — 99285 EMERGENCY DEPT VISIT HI MDM: CPT | Mod: 25

## 2019-03-30 PROCEDURE — 99223 1ST HOSP IP/OBS HIGH 75: CPT

## 2019-03-30 PROCEDURE — 73590 X-RAY EXAM OF LOWER LEG: CPT | Mod: 26,LT

## 2019-03-30 PROCEDURE — 73564 X-RAY EXAM KNEE 4 OR MORE: CPT | Mod: 26,RT

## 2019-03-30 PROCEDURE — 73502 X-RAY EXAM HIP UNI 2-3 VIEWS: CPT | Mod: 26,RT

## 2019-03-30 PROCEDURE — 71045 X-RAY EXAM CHEST 1 VIEW: CPT | Mod: 26

## 2019-03-30 PROCEDURE — 93010 ELECTROCARDIOGRAM REPORT: CPT

## 2019-03-30 RX ORDER — ACETAMINOPHEN 500 MG
975 TABLET ORAL ONCE
Qty: 0 | Refills: 0 | Status: COMPLETED | OUTPATIENT
Start: 2019-03-30 | End: 2019-03-30

## 2019-03-30 RX ORDER — CARVEDILOL PHOSPHATE 80 MG/1
6.25 CAPSULE, EXTENDED RELEASE ORAL EVERY 12 HOURS
Qty: 0 | Refills: 0 | Status: DISCONTINUED | OUTPATIENT
Start: 2019-03-30 | End: 2019-04-09

## 2019-03-30 RX ORDER — MESALAMINE 400 MG
2.4 TABLET, DELAYED RELEASE (ENTERIC COATED) ORAL DAILY
Qty: 0 | Refills: 0 | Status: DISCONTINUED | OUTPATIENT
Start: 2019-03-30 | End: 2019-04-09

## 2019-03-30 RX ORDER — DULOXETINE HYDROCHLORIDE 30 MG/1
60 CAPSULE, DELAYED RELEASE ORAL DAILY
Qty: 0 | Refills: 0 | Status: DISCONTINUED | OUTPATIENT
Start: 2019-03-30 | End: 2019-04-09

## 2019-03-30 RX ORDER — VANCOMYCIN HCL 1 G
1500 VIAL (EA) INTRAVENOUS ONCE
Qty: 0 | Refills: 0 | Status: COMPLETED | OUTPATIENT
Start: 2019-03-30 | End: 2019-03-30

## 2019-03-30 RX ORDER — POTASSIUM CHLORIDE 20 MEQ
40 PACKET (EA) ORAL ONCE
Qty: 0 | Refills: 0 | Status: COMPLETED | OUTPATIENT
Start: 2019-03-30 | End: 2019-03-30

## 2019-03-30 RX ORDER — AMIODARONE HYDROCHLORIDE 400 MG/1
200 TABLET ORAL DAILY
Qty: 0 | Refills: 0 | Status: DISCONTINUED | OUTPATIENT
Start: 2019-03-30 | End: 2019-04-09

## 2019-03-30 RX ORDER — LEVOTHYROXINE SODIUM 125 MCG
175 TABLET ORAL DAILY
Qty: 0 | Refills: 0 | Status: DISCONTINUED | OUTPATIENT
Start: 2019-03-30 | End: 2019-04-02

## 2019-03-30 RX ORDER — SPIRONOLACTONE 25 MG/1
25 TABLET, FILM COATED ORAL DAILY
Qty: 0 | Refills: 0 | Status: DISCONTINUED | OUTPATIENT
Start: 2019-03-30 | End: 2019-04-09

## 2019-03-30 RX ORDER — INSULIN GLARGINE 100 [IU]/ML
20 INJECTION, SOLUTION SUBCUTANEOUS AT BEDTIME
Qty: 0 | Refills: 0 | Status: DISCONTINUED | OUTPATIENT
Start: 2019-03-30 | End: 2019-04-01

## 2019-03-30 RX ORDER — INSULIN ASPART 100 [IU]/ML
30 INJECTION, SOLUTION SUBCUTANEOUS
Qty: 0 | Refills: 0 | COMMUNITY

## 2019-03-30 RX ORDER — INSULIN LISPRO 100/ML
VIAL (ML) SUBCUTANEOUS AT BEDTIME
Qty: 0 | Refills: 0 | Status: DISCONTINUED | OUTPATIENT
Start: 2019-03-30 | End: 2019-03-31

## 2019-03-30 RX ORDER — GLUCAGON INJECTION, SOLUTION 0.5 MG/.1ML
1 INJECTION, SOLUTION SUBCUTANEOUS ONCE
Qty: 0 | Refills: 0 | Status: DISCONTINUED | OUTPATIENT
Start: 2019-03-30 | End: 2019-04-09

## 2019-03-30 RX ORDER — VANCOMYCIN HCL 1 G
1000 VIAL (EA) INTRAVENOUS EVERY 24 HOURS
Qty: 0 | Refills: 0 | Status: DISCONTINUED | OUTPATIENT
Start: 2019-03-30 | End: 2019-04-01

## 2019-03-30 RX ORDER — PANTOPRAZOLE SODIUM 20 MG/1
40 TABLET, DELAYED RELEASE ORAL
Qty: 0 | Refills: 0 | Status: DISCONTINUED | OUTPATIENT
Start: 2019-03-30 | End: 2019-04-09

## 2019-03-30 RX ORDER — POTASSIUM CHLORIDE 20 MEQ
40 PACKET (EA) ORAL ONCE
Qty: 0 | Refills: 0 | Status: DISCONTINUED | OUTPATIENT
Start: 2019-03-30 | End: 2019-03-30

## 2019-03-30 RX ORDER — SODIUM CHLORIDE 9 MG/ML
1000 INJECTION, SOLUTION INTRAVENOUS
Qty: 0 | Refills: 0 | Status: DISCONTINUED | OUTPATIENT
Start: 2019-03-30 | End: 2019-04-09

## 2019-03-30 RX ORDER — DEXTROSE 50 % IN WATER 50 %
25 SYRINGE (ML) INTRAVENOUS ONCE
Qty: 0 | Refills: 0 | Status: DISCONTINUED | OUTPATIENT
Start: 2019-03-30 | End: 2019-04-09

## 2019-03-30 RX ORDER — DEXTROSE 50 % IN WATER 50 %
15 SYRINGE (ML) INTRAVENOUS ONCE
Qty: 0 | Refills: 0 | Status: DISCONTINUED | OUTPATIENT
Start: 2019-03-30 | End: 2019-04-09

## 2019-03-30 RX ORDER — BUDESONIDE AND FORMOTEROL FUMARATE DIHYDRATE 160; 4.5 UG/1; UG/1
2 AEROSOL RESPIRATORY (INHALATION)
Qty: 0 | Refills: 0 | Status: DISCONTINUED | OUTPATIENT
Start: 2019-03-30 | End: 2019-04-09

## 2019-03-30 RX ORDER — ACETAMINOPHEN 500 MG
650 TABLET ORAL EVERY 6 HOURS
Qty: 0 | Refills: 0 | Status: DISCONTINUED | OUTPATIENT
Start: 2019-03-30 | End: 2019-04-09

## 2019-03-30 RX ORDER — DOCUSATE SODIUM 100 MG
100 CAPSULE ORAL THREE TIMES A DAY
Qty: 0 | Refills: 0 | Status: DISCONTINUED | OUTPATIENT
Start: 2019-03-30 | End: 2019-04-09

## 2019-03-30 RX ORDER — INSULIN LISPRO 100/ML
5 VIAL (ML) SUBCUTANEOUS
Qty: 0 | Refills: 0 | Status: DISCONTINUED | OUTPATIENT
Start: 2019-03-30 | End: 2019-04-01

## 2019-03-30 RX ORDER — DEXTROSE 50 % IN WATER 50 %
12.5 SYRINGE (ML) INTRAVENOUS ONCE
Qty: 0 | Refills: 0 | Status: DISCONTINUED | OUTPATIENT
Start: 2019-03-30 | End: 2019-04-09

## 2019-03-30 RX ORDER — TIOTROPIUM BROMIDE 18 UG/1
1 CAPSULE ORAL; RESPIRATORY (INHALATION) DAILY
Qty: 0 | Refills: 0 | Status: DISCONTINUED | OUTPATIENT
Start: 2019-03-30 | End: 2019-04-09

## 2019-03-30 RX ORDER — INSULIN LISPRO 100/ML
VIAL (ML) SUBCUTANEOUS
Qty: 0 | Refills: 0 | Status: DISCONTINUED | OUTPATIENT
Start: 2019-03-30 | End: 2019-04-02

## 2019-03-30 RX ORDER — ISOSORBIDE MONONITRATE 60 MG/1
30 TABLET, EXTENDED RELEASE ORAL DAILY
Qty: 0 | Refills: 0 | Status: DISCONTINUED | OUTPATIENT
Start: 2019-03-30 | End: 2019-04-09

## 2019-03-30 RX ADMIN — Medication 975 MILLIGRAM(S): at 17:06

## 2019-03-30 RX ADMIN — Medication 40 MILLIEQUIVALENT(S): at 17:38

## 2019-03-30 RX ADMIN — Medication 975 MILLIGRAM(S): at 16:36

## 2019-03-30 RX ADMIN — Medication 100 MILLIGRAM(S): at 21:54

## 2019-03-30 RX ADMIN — INSULIN GLARGINE 20 UNIT(S): 100 INJECTION, SOLUTION SUBCUTANEOUS at 22:10

## 2019-03-30 RX ADMIN — Medication 300 MILLIGRAM(S): at 20:23

## 2019-03-30 RX ADMIN — AMIODARONE HYDROCHLORIDE 200 MILLIGRAM(S): 400 TABLET ORAL at 21:54

## 2019-03-30 RX ADMIN — CARVEDILOL PHOSPHATE 6.25 MILLIGRAM(S): 80 CAPSULE, EXTENDED RELEASE ORAL at 21:54

## 2019-03-30 RX ADMIN — SPIRONOLACTONE 25 MILLIGRAM(S): 25 TABLET, FILM COATED ORAL at 22:11

## 2019-03-30 NOTE — H&P ADULT - NSHPPHYSICALEXAM_GEN_ALL_CORE
Vital Signs Last 24 Hrs  T(C): 36.7 (30 Mar 2019 21:44), Max: 36.7 (30 Mar 2019 19:30)  T(F): 98 (30 Mar 2019 21:44), Max: 98 (30 Mar 2019 19:30)  HR: 96 (30 Mar 2019 21:44) (96 - 105)  BP: 137/78 (30 Mar 2019 21:56) (122/64 - 166/77)  BP(mean): --  RR: 20 (30 Mar 2019 21:56) (18 - 20)  SpO2: 98% (30 Mar 2019 21:56) (96% - 100%)      PHYSICAL EXAM:  GENERAL: NAD, well-groomed, obese  HEAD:  Atraumatic, Normocephalic  EYES: EOMI, PERRLA, conjunctiva and sclera clear  ENMT: Moist mucous membranes  NECK: Supple, No JVD, Normal thyroid  NERVOUS SYSTEM:  Alert & Oriented X3, Good concentration, non-focal, cranial nerves grossly intact. 5/5 strength of UE. Difficult to assess RLE in setting of right knee pain, Able to raise Right hip against resistance. 4-5/5 strength of LLE throughout  CHEST/LUNG: Respirations non-labored, scant bibasilar rales. No  rhonchi or wheezing  HEART: +Tachycardia + S1 S2  ABDOMEN: Soft, Nontender, Nondistended; Bowel sounds present  EXTREMITIES:  2+ Peripheral Pulses, No clubbing, cyanosis +2 LE edema. Right knee: difficult to assess ROM in setting of pain. No appreciable erythema or effusion of right knee. No TTP of B/L hip. No TTP to B/L ankles.   LYMPH: No lymphadenopathy noted  SKIN: +Venous stasis +open wound to right shin, and left lateral shin. dressings with dried drainage. +surrounding erythema and warm

## 2019-03-30 NOTE — H&P ADULT - NSHPLABSRESULTS_GEN_ALL_CORE
Personally reviewed labs and noted in detail below: notable for leukocytosis, hypokalemia and pyuria.    Personally reviewed EK bpm, no ST segment elevations    Personally reviewed CXR: mild pulmonary edema Personally reviewed labs and noted in detail below: notable for leukocytosis, hypokalemia and pyuria.    Personally reviewed EK bpm, no ST segment elevations    Personally reviewed CXR: mild pulmonary edema    Preliminary Xray reports of R knee, R Tib/fib, R hip and pelvis noted below

## 2019-03-30 NOTE — ED PROVIDER NOTE - LOWER EXTREMITY EXAM, LEFT
+ erythema and mild warmth from below knee to ankle + wound with purulent/bloody drainage to mid lateral shin, sensation intact, DP pulse 2+; s/p L toe amputation

## 2019-03-30 NOTE — H&P ADULT - PROBLEM SELECTOR PLAN 5
Abnormal uterine bleeding known, and in the process of being further evaluated.  Patient has not undergone outpatient US  Continue norethindrone 5mg qD.  GYN consult per primary day team Abnormal uterine bleeding known, and in the process of being further evaluated.  Patient has not undergone outpatient US  Patient suppposed to be on norethindrone 5mg qD?  Primary day team to clarify with GYN: GYN consult per primary day team

## 2019-03-30 NOTE — ED PROVIDER NOTE - LOWER EXTREMITY EXAM, RIGHT
+ erythema and mild warmth from below knee to ankle + superficial wound to R shin; + diffuse ttp to patella, no ttp to hip/ankle, + decreased ROM of knee 2/2 pain; able to range hip/ankle; sensation intact, +DP pulse

## 2019-03-30 NOTE — H&P ADULT - PROBLEM SELECTOR PLAN 2
F/U final read of Xrays.   If pain persistent, consider CT of knee  Pain control, prn  Ortho consult per primary day team  Fall precautions

## 2019-03-30 NOTE — ED ADULT NURSE REASSESSMENT NOTE - NS ED NURSE REASSESS COMMENT FT1
Redness and swelling with multiple venous ulcers noted b/l lower extremities - covered with gauze and wrapped around with ACE wrap. Pt is A&Ox3.  at bedside. Pt is in no current distress. Comfort and safety provided. Will continue to monitor. Pending lab results. Redness and swelling with multiple venous ulcers noted b/l lower extremities - covered with gauze and wrapped around with ACE wrap. Skin tear noted on L knee - not actively bleeding. Pt is A&Ox3.  at bedside. Pt is in no current distress. Comfort and safety provided. Will continue to monitor. Pending lab results.

## 2019-03-30 NOTE — ED PROVIDER NOTE - PMH
Anemia of chronic disease    Arthritis of spine    Asthma  PFT (4/2018)  Chronic diastolic CHF (congestive heart failure)  EF 56% (4/2017)  Chronic kidney disease (CKD)    Chronic low back pain    Depression (emotion)    Diabetes mellitus  T2DM last A1C 6.7 mg/dl in January   fs range 59- 200 mg/dl  Dyslipidemia associated with type 2 diabetes mellitus    Edema    GERD (gastroesophageal reflux disease)    H/O: hypothyroidism    Herniated disc  lumbar  History of postmenopausal bleeding    Hypertension    Macular degeneration of left eye  receiving injection  Morbid obesity with BMI of 45.0-49.9, adult    Neuropathy    On home oxygen therapy  2  liters nasal cannula  VICKY (obstructive sleep apnea)    Paroxysmal atrial fibrillation  h/o rapid ventricular rate 2 years ago, admitted at [facility], controlled with medication as per patient.  last INR 2.0  Peripheral arterial disease  s/p remote stent. not on antiplatelet meds for a while.  Ulcerative colitis Anemia of chronic disease    Arthritis of spine    Asthma  PFT (4/2018)  Chronic diastolic CHF (congestive heart failure)  EF 56% (4/2017)  Chronic kidney disease (CKD)    Chronic low back pain    Depression (emotion)    Diabetes mellitus  T2DM last A1C 6.7 mg/dl in January   fs range 59- 200 mg/dl  Dyslipidemia associated with type 2 diabetes mellitus    Edema    GERD (gastroesophageal reflux disease)    H/O: hypothyroidism    Herniated disc  lumbar  History of postmenopausal bleeding    Hypertension    Macular degeneration of left eye  receiving injection  Morbid obesity with BMI of 45.0-49.9, adult    Neuropathy    On home oxygen therapy  2  liters nasal cannula  VICKY (obstructive sleep apnea)    Paroxysmal atrial fibrillation  h/o rapid ventricular rate 2 years ago, admitted at [facility], controlled with medication as per patient.  last INR 2.0  Peripheral arterial disease  s/p remote stent. not on antiplatelet meds for a while.  Ulcerative colitis Anemia of chronic disease    Arthritis of spine    Asthma  PFT (4/2018)  Chronic diastolic CHF (congestive heart failure)  EF 56% (4/2017)  Chronic kidney disease (CKD)    Chronic low back pain    Depression (emotion)    Diabetes mellitus  T2DM last A1C 6.7 mg/dl in January   fs range 59- 200 mg/dl  Dyslipidemia associated with type 2 diabetes mellitus    Edema    GERD (gastroesophageal reflux disease)    H/O: hypothyroidism    Herniated disc  lumbar  History of postmenopausal bleeding    Hypertension    Macular degeneration of left eye  receiving injection  Morbid obesity with BMI of 45.0-49.9, adult    Neuropathy    On home oxygen therapy  2  liters nasal cannula  VICKY (obstructive sleep apnea)    Paroxysmal atrial fibrillation  h/o rapid ventricular rate 2 years ago, admitted at [facility], controlled with medication as per patient.  last INR 2.0  Peripheral arterial disease  s/p remote stent. not on antiplatelet meds for a while.  Ulcerative colitis

## 2019-03-30 NOTE — H&P ADULT - PROBLEM SELECTOR PLAN 1
Right lower extremity with redness and warmth with open wound  S/P vancomycin in ED  Continue with vancomycin  Monitor trough  ID consult per primary day team Right lower extremity with redness and warmth with open wound  S/P vancomycin in ED  Continue with vancomycin- Renally dosed  Monitor trough  ID consult per primary day team  Check LE dopplers  As for work up for LE: F/U Blood cultures and Urine cultures

## 2019-03-30 NOTE — H&P ADULT - PROBLEM SELECTOR PLAN 4
Continue amiodarone and coreg  Per prior discharge patient is off coumadin in setting of postmenopausal bleeding  Continue to hold coumadin per primary team's assessment

## 2019-03-30 NOTE — ED ADULT NURSE NOTE - NSIMPLEMENTINTERV_GEN_ALL_ED
Implemented All Fall Risk Interventions:  [place] to call system. Call bell, personal items and telephone within reach. Instruct patient to call for assistance. Room bathroom lighting operational. Non-slip footwear when patient is off stretcher. Physically safe environment: no spills, clutter or unnecessary equipment. Stretcher in lowest position, wheels locked, appropriate side rails in place. Provide visual cue, wrist band, yellow gown, etc. Monitor gait and stability. Monitor for mental status changes and reorient to person, place, and time. Review medications for side effects contributing to fall risk. Reinforce activity limits and safety measures with patient and family.

## 2019-03-30 NOTE — ED PROVIDER NOTE - ATTENDING CONTRIBUTION TO CARE
Attending MD [place]:  I personally have seen and examined this patient.  Resident note reviewed and agree on plan of care and except where noted.  See HPI, PE, and MDM for details. Attending MD [place]:  I personally have seen and examined this patient. PA note reviewed and agree on plan of care and except where noted.  See HPI, PE, and MDM for details.

## 2019-03-30 NOTE — H&P ADULT - PROBLEM SELECTOR PLAN 3
Patient does not appear to be in acute decompensation  States she has been compliant with medication  C/W Torsemide, Metolazone, Spirinolactone  Monitor I/O and Daily weights  Cardiology consult per primary team

## 2019-03-30 NOTE — H&P ADULT - NSICDXFAMILYHX_GEN_ALL_CORE_FT
FAMILY HISTORY:  No pertinent family history in first degree relatives FAMILY HISTORY:  FH: heart disease, mother

## 2019-03-30 NOTE — ED PROVIDER NOTE - PHYSICAL EXAMINATION
Attending [place]anca [place]: Gen: chronically ill appearing, heent: atrauamtic, eomi, perrla, mmm, op pink, uvula midline, neck; nttp, no nuchal rigidity, chest: nttp, no crepitus, cv: rrr,  lungs: decreased at bases, abd: soft, nontender, nondistended, no peritoneal signs, obese no guarding, ext: LE edema, erythema to b/l LE, open wound to left lateral shin with small amount of drainage,  neuro: awake and alert, following commands, speech clear, sensation and strength intact, no focal deficits

## 2019-03-30 NOTE — H&P ADULT - PROBLEM SELECTOR PLAN 8
SCDS in setting of vaginal bleeding SCDS in setting of vaginal bleeding. Primary day team to re-eval pharmacologic DVT ppx

## 2019-03-30 NOTE — H&P ADULT - ATTENDING COMMENTS
Dr. Vale Roach accepted patient's case from the ED and requested in house hospitalist team to complete admission. Patient was previously unknown to me. Patient was assigned to me by hospitalist in charge. My involvement in this case consisted of initial history, physical and management plan. Dr. Roach to assume care in AM and thereafter. Case discussed in detail with overnight medicine NP/PA Dr. Vale Roach accepted patient's case from the ED and requested in house hospitalist team to complete admission. Patient was previously unknown to me. Patient was assigned to me by hospitalist in charge. My involvement in this case consisted of initial history, physical and management plan. Dr. Roach to assume care in AM and thereafter. Case discussed in detail with overnight medicine NP/RACHEL Swanson [id number]

## 2019-03-30 NOTE — ED PROVIDER NOTE - PROGRESS NOTE DETAILS
Attending [place]anca [place]: labs shows up trending wbc. consider chronic wounds. will dress, treat with abx pending wound care eval.

## 2019-03-30 NOTE — H&P ADULT - PROBLEM SELECTOR PLAN 7
Patient with baseline SOB on chronic O2, presume to be 2/2 to obesity hypoventilation syndrome, pulmonary HTN, asthma, deconditioning, ?ILD per prior pulm assessment  Continue supplemental O2 at 2-3 L via NC  Outpatient inhaler non formulary: Continue with Symbicort and Spiriva

## 2019-03-30 NOTE — ED ADULT NURSE NOTE - OBJECTIVE STATEMENT
78 year old female presents to ED via wheel chair through waiting room from home with  complaining of weakness, bilateral knee pain s/p fall yesterday and today. Was scheduled pelvic US yesterday for evaluation of abnormal vaginal bleeding, fell while walking in to the imaging center - went to [facility] with negative knee x-ray. Still having continued pain in bilateral knees R>L. History of CKD, anemia, CHF on supplemental O2 at home - 3LPM NC, asthma, HLD, afib, hypothyroidism, PAD, HTN, edema, GERD, herniated disc, VICKY, ulcerative colitis, DM. On flexeril for her chronic back pain as needed - states she has needed it more often recently. Took it yesterday and this morning.  Had another fall this morning while walking in to the bathroom.  at bedside states she was ambulating with her walker, the walker doesn't fit through the bathroom door so he was assisting her and she fell to the ground - forward to the left - banged foot. Denies hitting head/LOC. Denies ha, neck pain, new back pain, chest pain, shortness of breath, abdominal pain, NVD, fevers, chills, vaginal bleeding, dysuria, hematuria. Recent admission to [facility] for heart failure, discharged 3/27.

## 2019-03-30 NOTE — H&P ADULT - HISTORY OF PRESENT ILLNESS
79 yo woman w/ hx of moderate MS s/p bioprosthetic mitral valve, diastolic CHF, paroxsmal afib (not on A/C in setting of bleeding), HTN, severe pulmonary HTN, DMT2, CKD III, anemia, with post menopausal vaginal bleeding, UC, VICKY on 3L NC (not on CPAP/BIPAP) presents from home in setting of multiple falls the past 2 days resulting with right knee pain. Patient had a fall yesterday from 1 step while walking out of the house and tripped. Per patient states that her right leg had a buckling/collapsing sensation. Patient went to [facility] ED on 3/29 and had an xray which did not reveal fracture and sent home with cyclobenzaprine and Lidoderm patch. Patient last use of medication on 3/29. Patient had another fall while attempting to ambulate to the bathroom. She described that her right leg buckled under her causing her to fall. It was difficult for her to get up on her own and required the assistance of her  and son-in-law to get up. Patient denies any preceding symptoms of dizziness/lightheadedness, shortness of breath, CP, palpitations preceding the events. Endorses chronic back pain with no new characteristics. Patient has been getting around home with use of wheelchair the past 4 months. Requires assistance of her  to get around because of chronic weakness. Denies development of new numbness of lower extremities. Does not utilize any other assistive devices. Has not had PT outpatient. Patient also has had chronic LE wounds that were dressed from last discharge on 3/27. Denies changing of dressing. Denies fevers, chills, sweats.

## 2019-03-30 NOTE — H&P ADULT - NSHPREVIEWOFSYSTEMS_GEN_ALL_CORE
REVIEW OF SYSTEMS:  CONSTITUTIONAL: No fever, chills, sweats.   EYES: No eye pain or visual disturbances  ENMT:  No vertigo.  No rhinorrhea, sinus congestion, or throat pain  NECK: No pain   RESPIRATORY: No cough, wheezing, or worsening shortness of breath  CARDIOVASCULAR: No chest pain, palpitations or dizziness. +Chronic LE swelling  GASTROINTESTINAL: No abdomina pain. No nausea, vomiting, diarrhea or constipation. No melena or hematochezia.  GENITOURINARY: No dysuria or change of  frequency  NEUROLOGICAL: No headaches, or tremors  SKIN: + chronic venous stasis changes and LE wounds  LYMPH NODES: No enlarged glands  MUSCULOSKELETAL: See HPI  HEME/LYMPH: No easy bruising or bleeding gums. + hx of postmenopausal bleeding.

## 2019-03-30 NOTE — H&P ADULT - PROBLEM SELECTOR PLAN 6
Reviewed outpatient regimen with patient's   Per review of last inpatient orders on 3/27 Patient was on Humalog 5U TID, Corrective SSI and Lantus 28U   Will resume with Humalog 5U TID, Corrective SSI and Lantus 20U tonight  Monitor FS  Hypoglycemia protocol  Endo consult per primary day team

## 2019-03-30 NOTE — H&P ADULT - ASSESSMENT
77 yo woman w/ hx of moderate MS s/p bioprosthetic mitral valve, diastolic CHF, paroxsmal afib (not on A/C in setting of bleeding), HTN, severe pulmonary HTN, DMT2, CKD III, anemia, with post menopausal vaginal bleeding, UC, VICKY on 3L NC (not on CPAP/BIPAP) presents from home in setting of multiple falls the past 2 days resulting with right knee pain, found with leukocytosis and LE wounds concerning for cellulitis.

## 2019-03-30 NOTE — ED PROVIDER NOTE - OBJECTIVE STATEMENT
77yo obese F with PMH MS s/p MVR, pulm HTN, A.fib, CKD, ulcerative colitis, postmenopausal vaginal bleeding, anemia, chronic venous stasis dermatitis, recent admission for CHF exacerbation, presenting with R knee pain s/p fall yesterday landing onto R knee. Pt reports she tripped and fell, however reports chronic b/l LE weakness R > L and uses wheelchair mostly to get around.   Coumadin d/c 2 weeks ago 2/2 vaginal bleeding. Takes amiodarone. 77yo obese F with PMH MS s/p MVR, pulm HTN, A.fib, CKD, DM, ulcerative colitis, postmenopausal vaginal bleeding, anemia, chronic venous stasis dermatitis, recent admission for CHF exacerbation, presenting with R knee pain s/p fall yesterday landing onto R knee. Pt reports she tripped and fell, however reports chronic b/l LE weakness R > L and uses wheelchair mostly to get around. Pt presented to  ED yesterday with unremarkable xrays and d/c home. Last took 2 tylenol this AM. Pt reports chronic back pain with no new changes. Also has chronic LE wounds, no VNS or home care. Of note, Coumadin d/c 2 weeks ago 2/2 vaginal bleeding. Takes amiodarone. Denies any fever/chills, CP, SOB, cough, abd pain, n/v, numbness/tingling, R hip/ankle pain.

## 2019-03-30 NOTE — ED ADULT NURSE NOTE - PMH
Anemia of chronic disease    Arthritis of spine    Asthma  PFT (4/2018)  Chronic diastolic CHF (congestive heart failure)  EF 56% (4/2017)  Chronic kidney disease (CKD)    Chronic low back pain    Depression (emotion)    Diabetes mellitus  T2DM last A1C 6.7 mg/dl in January   fs range 59- 200 mg/dl  Dyslipidemia associated with type 2 diabetes mellitus    Edema    GERD (gastroesophageal reflux disease)    H/O: hypothyroidism    Herniated disc  lumbar  History of postmenopausal bleeding    Hypertension    Macular degeneration of left eye  receiving injection  Morbid obesity with BMI of 45.0-49.9, adult    Neuropathy    On home oxygen therapy  2  liters nasal cannula  VICKY (obstructive sleep apnea)    Paroxysmal atrial fibrillation  h/o rapid ventricular rate 2 years ago, admitted at [facility], controlled with medication as per patient.  last INR 2.0  Peripheral arterial disease  s/p remote stent. not on antiplatelet meds for a while.  Ulcerative colitis

## 2019-03-30 NOTE — ED PROVIDER NOTE - CLINICAL SUMMARY MEDICAL DECISION MAKING FREE TEXT BOX
Attending [place]: 77 y/o female with multiple medical issues including chronic LE edema, CKD, recent admission to the hospital presenting with worsening weakness and difficulty with ambulation. pt recently tripped and fell onto right knee. went to [facility] where had xrays performed which were ngative. pt states has been feeling persistently weak. on exam pt with wounds to lower extremities, left worse then right with some surrounding erythema. unclear whether supra infection, elevated wbc therefore will start vanco. will obtain xrays of hip and and lower extremities. abd soft and BP stable. no chest wall ttp. pt will likely need admission, PT eval and wound care

## 2019-03-30 NOTE — H&P ADULT - NSICDXPASTMEDICALHX_GEN_ALL_CORE_FT
PAST MEDICAL HISTORY:  Anemia of chronic disease     Arthritis of spine     Asthma PFT (4/2018)    Chronic diastolic CHF (congestive heart failure) EF 56% (4/2017)    Chronic kidney disease (CKD)     Chronic low back pain     Depression (emotion)     Diabetes mellitus T2DM last A1C 6.7 mg/dl in January   fs range 59- 200 mg/dl    Dyslipidemia associated with type 2 diabetes mellitus     Edema     GERD (gastroesophageal reflux disease)     H/O: hypothyroidism     Herniated disc lumbar    History of postmenopausal bleeding     Hypertension     Macular degeneration of left eye receiving injection    Morbid obesity with BMI of 45.0-49.9, adult     Neuropathy     On home oxygen therapy 2  liters nasal cannula    VICKY (obstructive sleep apnea)     Paroxysmal atrial fibrillation h/o rapid ventricular rate 2 years ago, admitted at [facility], controlled with medication as per patient.  last INR 2.0    Peripheral arterial disease s/p remote stent. not on antiplatelet meds for a while.    Ulcerative colitis PAST MEDICAL HISTORY:  Anemia of chronic disease     Arthritis of spine     Asthma PFT (4/2018)    Chronic diastolic CHF (congestive heart failure) EF 56% (4/2017)    Chronic kidney disease (CKD)     Chronic low back pain     Depression (emotion)     Diabetes mellitus T2DM last A1C 6.7 mg/dl in January   fs range 59- 200 mg/dl    Dyslipidemia associated with type 2 diabetes mellitus     Edema     GERD (gastroesophageal reflux disease)     H/O: hypothyroidism     Herniated disc lumbar    History of postmenopausal bleeding     Hypertension     Macular degeneration of left eye receiving injection    Morbid obesity with BMI of 45.0-49.9, adult     Neuropathy     On home oxygen therapy 2  liters nasal cannula    VICKY (obstructive sleep apnea)     Paroxysmal atrial fibrillation h/o rapid ventricular rate 2 years ago, admitted at [facility], controlled with medication as per patient.  last INR 2.0    Peripheral arterial disease s/p remote stent. not on antiplatelet meds for a while.    Ulcerative colitis

## 2019-03-31 LAB
ANION GAP SERPL CALC-SCNC: 12 MMOL/L — SIGNIFICANT CHANGE UP (ref 5–17)
BASOPHILS # BLD AUTO: 0.03 K/UL — SIGNIFICANT CHANGE UP (ref 0–0.2)
BASOPHILS NFR BLD AUTO: 0.3 % — SIGNIFICANT CHANGE UP (ref 0–2)
BUN SERPL-MCNC: 51 MG/DL — HIGH (ref 7–23)
CALCIUM SERPL-MCNC: 9.7 MG/DL — SIGNIFICANT CHANGE UP (ref 8.4–10.5)
CHLORIDE SERPL-SCNC: 96 MMOL/L — SIGNIFICANT CHANGE UP (ref 96–108)
CO2 SERPL-SCNC: 31 MMOL/L — SIGNIFICANT CHANGE UP (ref 22–31)
CREAT SERPL-MCNC: 1.92 MG/DL — HIGH (ref 0.5–1.3)
EOSINOPHIL # BLD AUTO: 0 K/UL — SIGNIFICANT CHANGE UP (ref 0–0.5)
EOSINOPHIL NFR BLD AUTO: 0 % — SIGNIFICANT CHANGE UP (ref 0–6)
GLUCOSE BLDC GLUCOMTR-MCNC: 220 MG/DL — HIGH (ref 70–99)
GLUCOSE BLDC GLUCOMTR-MCNC: 231 MG/DL — HIGH (ref 70–99)
GLUCOSE BLDC GLUCOMTR-MCNC: 268 MG/DL — HIGH (ref 70–99)
GLUCOSE BLDC GLUCOMTR-MCNC: 298 MG/DL — HIGH (ref 70–99)
GLUCOSE BLDC GLUCOMTR-MCNC: 350 MG/DL — HIGH (ref 70–99)
GLUCOSE SERPL-MCNC: 240 MG/DL — HIGH (ref 70–99)
HCT VFR BLD CALC: 27 % — LOW (ref 34.5–45)
HGB BLD-MCNC: 8.3 G/DL — LOW (ref 11.5–15.5)
IMM GRANULOCYTES NFR BLD AUTO: 0.4 % — SIGNIFICANT CHANGE UP (ref 0–1.5)
LYMPHOCYTES # BLD AUTO: 1.19 K/UL — SIGNIFICANT CHANGE UP (ref 1–3.3)
LYMPHOCYTES # BLD AUTO: 13.2 % — SIGNIFICANT CHANGE UP (ref 13–44)
MAGNESIUM SERPL-MCNC: 2.1 MG/DL — SIGNIFICANT CHANGE UP (ref 1.6–2.6)
MCHC RBC-ENTMCNC: 29.7 PG — SIGNIFICANT CHANGE UP (ref 27–34)
MCHC RBC-ENTMCNC: 30.7 GM/DL — LOW (ref 32–36)
MCV RBC AUTO: 96.8 FL — SIGNIFICANT CHANGE UP (ref 80–100)
MONOCYTES # BLD AUTO: 0.85 K/UL — SIGNIFICANT CHANGE UP (ref 0–0.9)
MONOCYTES NFR BLD AUTO: 9.4 % — SIGNIFICANT CHANGE UP (ref 2–14)
NEUTROPHILS # BLD AUTO: 6.9 K/UL — SIGNIFICANT CHANGE UP (ref 1.8–7.4)
NEUTROPHILS NFR BLD AUTO: 76.7 % — SIGNIFICANT CHANGE UP (ref 43–77)
PHOSPHATE SERPL-MCNC: 1.7 MG/DL — LOW (ref 2.5–4.5)
PLATELET # BLD AUTO: 162 K/UL — SIGNIFICANT CHANGE UP (ref 150–400)
POTASSIUM SERPL-MCNC: 3.6 MMOL/L — SIGNIFICANT CHANGE UP (ref 3.5–5.3)
POTASSIUM SERPL-SCNC: 3.6 MMOL/L — SIGNIFICANT CHANGE UP (ref 3.5–5.3)
RBC # BLD: 2.79 M/UL — LOW (ref 3.8–5.2)
RBC # FLD: 17.3 % — HIGH (ref 10.3–14.5)
SODIUM SERPL-SCNC: 139 MMOL/L — SIGNIFICANT CHANGE UP (ref 135–145)
WBC # BLD: 9.01 K/UL — SIGNIFICANT CHANGE UP (ref 3.8–10.5)
WBC # FLD AUTO: 9.01 K/UL — SIGNIFICANT CHANGE UP (ref 3.8–10.5)

## 2019-03-31 RX ORDER — AZTREONAM 2 G
1000 VIAL (EA) INJECTION EVERY 12 HOURS
Qty: 0 | Refills: 0 | Status: DISCONTINUED | OUTPATIENT
Start: 2019-03-31 | End: 2019-04-01

## 2019-03-31 RX ORDER — INSULIN LISPRO 100/ML
2 VIAL (ML) SUBCUTANEOUS ONCE
Qty: 0 | Refills: 0 | Status: COMPLETED | OUTPATIENT
Start: 2019-03-31 | End: 2019-03-31

## 2019-03-31 RX ORDER — INSULIN LISPRO 100/ML
VIAL (ML) SUBCUTANEOUS AT BEDTIME
Qty: 0 | Refills: 0 | Status: DISCONTINUED | OUTPATIENT
Start: 2019-03-31 | End: 2019-04-09

## 2019-03-31 RX ADMIN — Medication 5 UNIT(S): at 08:43

## 2019-03-31 RX ADMIN — TIOTROPIUM BROMIDE 1 CAPSULE(S): 18 CAPSULE ORAL; RESPIRATORY (INHALATION) at 11:37

## 2019-03-31 RX ADMIN — INSULIN GLARGINE 20 UNIT(S): 100 INJECTION, SOLUTION SUBCUTANEOUS at 21:57

## 2019-03-31 RX ADMIN — Medication 50 MILLIGRAM(S): at 17:28

## 2019-03-31 RX ADMIN — Medication 100 MILLIGRAM(S): at 05:08

## 2019-03-31 RX ADMIN — Medication 50 MILLIGRAM(S): at 05:08

## 2019-03-31 RX ADMIN — Medication 3: at 11:56

## 2019-03-31 RX ADMIN — PANTOPRAZOLE SODIUM 40 MILLIGRAM(S): 20 TABLET, DELAYED RELEASE ORAL at 05:08

## 2019-03-31 RX ADMIN — BUDESONIDE AND FORMOTEROL FUMARATE DIHYDRATE 2 PUFF(S): 160; 4.5 AEROSOL RESPIRATORY (INHALATION) at 17:24

## 2019-03-31 RX ADMIN — AMIODARONE HYDROCHLORIDE 200 MILLIGRAM(S): 400 TABLET ORAL at 05:08

## 2019-03-31 RX ADMIN — Medication 2: at 17:23

## 2019-03-31 RX ADMIN — SPIRONOLACTONE 25 MILLIGRAM(S): 25 TABLET, FILM COATED ORAL at 05:08

## 2019-03-31 RX ADMIN — Medication 100 MILLIGRAM(S): at 14:17

## 2019-03-31 RX ADMIN — ISOSORBIDE MONONITRATE 30 MILLIGRAM(S): 60 TABLET, EXTENDED RELEASE ORAL at 11:37

## 2019-03-31 RX ADMIN — Medication 2 UNIT(S): at 01:20

## 2019-03-31 RX ADMIN — Medication 100 MILLIGRAM(S): at 21:57

## 2019-03-31 RX ADMIN — Medication 2: at 08:41

## 2019-03-31 RX ADMIN — Medication 2.4 GRAM(S): at 11:37

## 2019-03-31 RX ADMIN — CARVEDILOL PHOSPHATE 6.25 MILLIGRAM(S): 80 CAPSULE, EXTENDED RELEASE ORAL at 17:24

## 2019-03-31 RX ADMIN — Medication 5 UNIT(S): at 17:23

## 2019-03-31 RX ADMIN — Medication 1: at 21:58

## 2019-03-31 RX ADMIN — Medication 250 MILLIGRAM(S): at 20:06

## 2019-03-31 RX ADMIN — Medication 50 MILLIGRAM(S): at 17:25

## 2019-03-31 RX ADMIN — Medication 175 MICROGRAM(S): at 05:08

## 2019-03-31 RX ADMIN — CARVEDILOL PHOSPHATE 6.25 MILLIGRAM(S): 80 CAPSULE, EXTENDED RELEASE ORAL at 05:08

## 2019-03-31 RX ADMIN — BUDESONIDE AND FORMOTEROL FUMARATE DIHYDRATE 2 PUFF(S): 160; 4.5 AEROSOL RESPIRATORY (INHALATION) at 05:08

## 2019-03-31 RX ADMIN — Medication 5 UNIT(S): at 11:56

## 2019-03-31 RX ADMIN — DULOXETINE HYDROCHLORIDE 60 MILLIGRAM(S): 30 CAPSULE, DELAYED RELEASE ORAL at 11:37

## 2019-03-31 NOTE — PHYSICAL THERAPY INITIAL EVALUATION ADULT - MODALITIES TREATMENT COMMENTS
BLE partial thickness ruptured bullae; RLE medial measuring 2cm x 2cm x 0cm; R lateral measuring 3.5 cm x 3 cm, LLE posterior measuring 4cm x 9cmx 0cm, LLE lateral measuring 7cm x 5cm x0cm. No purulence, no erythema, no malodor. +Bullae to L great toe, +abrasion to L 5th toe.

## 2019-03-31 NOTE — CONSULT NOTE ADULT - SUBJECTIVE AND OBJECTIVE BOX
Patient is a 78y old  Female who presents with a chief complaint of fall, right knee pain (30 Mar 2019 21:08)      HPI:    79 yo woman w/ hx of moderate MS s/p bioprosthetic mitral valve, diastolic CHF, paroxsmal afib (not on A/C in setting of bleeding), HTN, severe pulmonary HTN, DMT2, CKD III, anemia, with post menopausal vaginal bleeding, UC, VICKY on 3L NC (not on CPAP/BIPAP) presents from home in setting of multiple falls the past 2 days resulting with right knee pain. Patient had a fall yesterday from 1 step while walking out of the house and tripped. Per patient states that her right leg had a buckling/collapsing sensation. Patient went to [facility] on 3/29 and had an xray which did not reveal fracture and sent home with cyclobenzaprine and Lidoderm patch. Patient last use of medication on 3/29. Patient had another fall while attempting to ambulate to the bathroom. She described that her right leg buckled under her causing her to fall. It was difficult for her to get up on her own and required the assistance of her  and son-in-law to get up. Patient denies any preceding symptoms of dizziness/lightheadedness, shortness of breath, CP, palpitations preceding the events. Endorses chronic back pain with no new characteristics. Patient has been getting around home with use of wheelchair the past 4 months. Requires assistance of her  to get around because of chronic weakness. Denies development of new numbness of lower extremities. Does not utilize any other assistive devices. Has not had PT outpatient. Patient also has had chronic LE wounds that were dressed from last discharge on 3/27. Denies changing of dressing. Denies fevers, chills, sweats. (30 Mar 2019 21:08)    ER vss, pt afebrile.  WBC 13.2 --> 9.0.  UA large LE/ (-) nit.  Ucx >100K GNR.  No acute findings on cxr.   Pt is currently on vancomycin for cellulitis.       REVIEW OF SYSTEMS:    CONSTITUTIONAL: No fever, weight loss, or fatigue  EYES: No eye pain, visual disturbances, or discharge  ENMT:  No sore throat  NECK: No pain or stiffness  RESPIRATORY: No cough, wheezing, chills or hemoptysis; No shortness of breath  CARDIOVASCULAR: No chest pain, palpitations, dizziness, or leg swelling  GASTROINTESTINAL: No abdominal or epigastric pain. No nausea, vomiting, or hematemesis; No diarrhea or constipation. No melena or hematochezia.  GENITOURINARY: No dysuria, frequency, hematuria, or incontinence  NEUROLOGICAL: No headaches, memory loss, loss of strength, numbness, or tremors  SKIN: No itching, burning, rashes, or lesions   LYMPH NODES: No enlarged glands  MUSCULOSKELETAL: No joint pain or swelling; No muscle, back, or extremity pain      PAST MEDICAL & SURGICAL HISTORY:  Chronic kidney disease (CKD)  Anemia of chronic disease  On home oxygen therapy: 2  liters nasal cannula  Asthma: PFT (2018)  History of postmenopausal bleeding  Dyslipidemia associated with type 2 diabetes mellitus  Paroxysmal atrial fibrillation: h/o rapid ventricular rate 2 years ago, admitted at [facility], controlled with medication as per patient.  last INR 2.0  Morbid obesity with BMI of 45.0-49.9, adult  H/O: hypothyroidism  Chronic diastolic CHF (congestive heart failure): EF 56% (2017)  Depression (emotion)  Arthritis of spine  Macular degeneration of left eye: receiving injection  Neuropathy  Peripheral arterial disease: s/p remote stent. not on antiplatelet meds for a while.  Hypertension  Edema  GERD (gastroesophageal reflux disease)  Chronic low back pain  Herniated disc: lumbar  VICKY (obstructive sleep apnea)  Ulcerative colitis  Diabetes mellitus: T2DM last A1C 6.7 mg/dl in January   fs range 59- 200 mg/dl  History of mitral valve replacement with bioprosthetic valve: x 4 years ago  H/O  section: x 2  Amputated toe      Allergies    Augmentin (Swelling)  cephalexin (Swelling)    Intolerances        FAMILY HISTORY:  FH: heart disease: mother      SOCIAL HISTORY:        MEDICATIONS  (STANDING):  amiodarone    Tablet 200 milliGRAM(s) Oral daily  buDESOnide 160 MICROgram(s)/formoterol 4.5 MICROgram(s) Inhaler 2 Puff(s) Inhalation two times a day  carvedilol 6.25 milliGRAM(s) Oral every 12 hours  dextrose 5%. 1000 milliLiter(s) (50 mL/Hr) IV Continuous <Continuous>  dextrose 50% Injectable 12.5 Gram(s) IV Push once  dextrose 50% Injectable 25 Gram(s) IV Push once  dextrose 50% Injectable 25 Gram(s) IV Push once  docusate sodium 100 milliGRAM(s) Oral three times a day  DULoxetine 60 milliGRAM(s) Oral daily  insulin glargine Injectable (LANTUS) 20 Unit(s) SubCutaneous at bedtime  insulin lispro (HumaLOG) corrective regimen sliding scale   SubCutaneous three times a day before meals  insulin lispro (HumaLOG) corrective regimen sliding scale   SubCutaneous at bedtime  insulin lispro Injectable (HumaLOG) 5 Unit(s) SubCutaneous three times a day before meals  isosorbide   mononitrate ER Tablet (IMDUR) 30 milliGRAM(s) Oral daily  levothyroxine 175 MICROGram(s) Oral daily  mesalamine DR (24-Hour) Tablet 2.4 Gram(s) Oral daily  metolazone 2.5 milliGRAM(s) Oral <User Schedule>  pantoprazole    Tablet 40 milliGRAM(s) Oral before breakfast  spironolactone 25 milliGRAM(s) Oral daily  tiotropium 18 MICROgram(s) Capsule 1 Capsule(s) Inhalation daily  torsemide 50 milliGRAM(s) Oral every 12 hours  vancomycin  IVPB 1000 milliGRAM(s) IV Intermittent every 24 hours    MEDICATIONS  (PRN):  acetaminophen   Tablet .. 650 milliGRAM(s) Oral every 6 hours PRN Mild Pain (1 - 3), Moderate Pain (4 - 6)  dextrose 40% Gel 15 Gram(s) Oral once PRN Blood Glucose LESS THAN 70 milliGRAM(s)/deciliter  glucagon  Injectable 1 milliGRAM(s) IntraMuscular once PRN Glucose LESS THAN 70 milligrams/deciliter      Vital Signs Last 24 Hrs  T(C): 37.1 (31 Mar 2019 12:00), Max: 37.2 (30 Mar 2019 23:39)  T(F): 98.7 (31 Mar 2019 12:00), Max: 99 (30 Mar 2019 23:39)  HR: 92 (31 Mar 2019 12:00) (91 - 100)  BP: 127/79 (31 Mar 2019 12:00) (122/64 - 138/85)  BP(mean): --  RR: 18 (31 Mar 2019 12:00) (18 - 20)  SpO2: 100% (31 Mar 2019 12:00) (96% - 100%)    PHYSICAL EXAM:    GENERAL: NAD, well-groomed  HEAD:  Atraumatic, Normocephalic  EYES: EOMI, PERRLA, conjunctiva and sclera clear  ENMT: No tonsillar erythema, exudates, or enlargement; Moist mucous membranes  NECK: Supple, No JVD  CHEST/LUNG: Clear to percussion bilaterally; No rales, rhonchi, wheezing, or rubs  HEART: Regular rate and rhythm; No murmurs, rubs, or gallops  ABDOMEN: Soft, Nontender, Nondistended; Bowel sounds present  EXTREMITIES:  2+ Peripheral Pulses, No clubbing, cyanosis, or edema  LYMPH: No lymphadenopathy noted  SKIN: No rashes or lesions    LABS:  CBC Full  -  ( 31 Mar 2019 09:08 )  WBC Count : 9.01 K/uL  RBC Count : 2.79 M/uL  Hemoglobin : 8.3 g/dL  Hematocrit : 27.0 %  Platelet Count - Automated : 162 K/uL  Mean Cell Volume : 96.8 fl  Mean Cell Hemoglobin : 29.7 pg  Mean Cell Hemoglobin Concentration : 30.7 gm/dL  Auto Neutrophil # : 6.90 K/uL  Auto Lymphocyte # : 1.19 K/uL  Auto Monocyte # : 0.85 K/uL  Auto Eosinophil # : 0.00 K/uL  Auto Basophil # : 0.03 K/uL  Auto Neutrophil % : 76.7 %  Auto Lymphocyte % : 13.2 %  Auto Monocyte % : 9.4 %  Auto Eosinophil % : 0.0 %  Auto Basophil % : 0.3 %          139  |  96  |  51<H>  ----------------------------<  240<H>  3.6   |  31  |  1.92<H>    Ca    9.7      31 Mar 2019 06:52  Phos  1.7       Mg     2.1         TPro  7.4  /  Alb  3.6  /  TBili  0.6  /  DBili  x   /  AST  30  /  ALT  25  /  AlkPhos  76  03-30      LIVER FUNCTIONS - ( 30 Mar 2019 16:26 )  Alb: 3.6 g/dL / Pro: 7.4 g/dL / ALK PHOS: 76 U/L / ALT: 25 U/L / AST: 30 U/L / GGT: x                               MICROBIOLOGY:        Urinalysis Basic - ( 30 Mar 2019 18:05 )    Color: Yellow / Appearance: Turbid / S.016 / pH: x  Gluc: x / Ketone: Negative  / Bili: Negative / Urobili: Negative   Blood: x / Protein: 100 / Nitrite: Negative   Leuk Esterase: Large / RBC: 15-25 /hpf / WBC >50 /HPF   Sq Epi: x / Non Sq Epi: OCC / Bacteria: Few      Culture - Urine (19 @ 21:18)    Specimen Source: .Urine Clean Catch (Midstream)    Culture Results:   >100,000 CFU/ml Gram Negative Rods          RADIOLOGY:    < from: Xray Chest 1 View AP/PA (19 @ 17:42) >  IMPRESSION:    Status post median sternotomy and CABG. Valve repair. Heart size cannot   be accurately assessed in this projection. Mild pulmonary edema. No   pleural effusions or pneumothorax. The visualized bones and soft tissues   show no acute findings.    < end of copied text >          < from: Xray Pelvis AP only (19 @ 17:42) >  IMPRESSION:    No acute displaced fracture or dislocation.   Joint spaces are maintained.   Vascular calcifications.    < end of copied text >          < from: CT Chest No Cont (19 @ 13:08) >  CHEST:     LUNGS AND LARGE AIRWAYS: Patent central airways.  No pulmonary nodules.   Bibasilar linear atelectasis.  PLEURA: Trace right pleural effusion.  VESSELS: Atherosclerotic calcifications of the thoracic aorta and   coronary arteries.  HEART: Heart size is normal. No pericardial effusion. Aortic valvular   calcifications. Mitral replacement.  MEDIASTINUM AND JULIET: No lymphadenopathy.  CHEST WALL AND LOWER NECK: Sternotomy.  VISUALIZED UPPER ABDOMEN: Increased density of the liver which may be   seen in the setting of amiodarone use.  BONES: Degenerative changes.    IMPRESSION:   Trace right pleural effusion.    Bibasilar linear atelectasis.    < end of copied text >            < from: US Pelvis Complete (19 @ 11:54) >  COMPARISON: Comparison is made with prior study from 2018.    Transabdominal ultrasound examination of the pelvis was performed.    The uterus measures 10 cm in length x 3.7 cm AP x 4.8 cm transversely.   The endometrial canal and vagina are distended with fluid. This is   increased compared to the prior study.    The ovaries were not visualized.    No free fluid or adnexal masses seen.    Impression: Endometrial canal and vagina appear distended with fluid.   This is increased compared to prior study. The ovaries werenot   visualized. Correlation with pelvic MR can be performed for further   evaluation.    < end of copied text > Patient is a 78y old  Female who presents with a chief complaint of fall, right knee pain (30 Mar 2019 21:08)      HPI:    79 yo woman w/ hx of moderate MS s/p bioprosthetic mitral valve, diastolic CHF, paroxsmal afib (not on A/C in setting of bleeding), HTN, severe pulmonary HTN, DMT2, CKD III, anemia, with post menopausal vaginal bleeding, UC, VICKY on 3L NC (not on CPAP/BIPAP) presents from home in setting of multiple falls the past 2 days resulting with right knee pain. Patient had a fall yesterday from 1 step while walking out of the house and tripped. Per patient states that her right leg had a buckling/collapsing sensation. Patient went to [facility] ED on 3/29 and had an xray which did not reveal fracture and sent home with cyclobenzaprine and Lidoderm patch. Patient last use of medication on 3/29. Patient had another fall while attempting to ambulate to the bathroom. She described that her right leg buckled under her causing her to fall. It was difficult for her to get up on her own and required the assistance of her  and son-in-law to get up. Patient denies any preceding symptoms of dizziness/lightheadedness, shortness of breath, CP, palpitations preceding the events. Endorses chronic back pain with no new characteristics. Patient has been getting around home with use of wheelchair the past 4 months. Requires assistance of her  to get around because of chronic weakness. Denies development of new numbness of lower extremities. Does not utilize any other assistive devices. Has not had PT outpatient. Patient also has had chronic LE wounds that were dressed from last discharge on 3/27. Denies changing of dressing. Denies fevers, chills, sweats. (30 Mar 2019 21:08)    ER vss, pt afebrile.  WBC 13.2 --> 9.0.  UA large LE/ (-) nit.  Ucx >100K GNR.  No acute findings on cxr.   Pt is currently on vancomycin for cellulitis.   Pt with venous stasis and noted open wound to right shin, and left lateral shin with dried drainage and surrounding erythema and warmth.       REVIEW OF SYSTEMS:    CONSTITUTIONAL: No fever, weight loss, or fatigue  EYES: No eye pain, visual disturbances, or discharge  ENMT:  No sore throat  NECK: No pain or stiffness  RESPIRATORY: No cough, wheezing, chills or hemoptysis; No shortness of breath  CARDIOVASCULAR: No chest pain, palpitations, dizziness, or leg swelling  GASTROINTESTINAL: No abdominal or epigastric pain. No nausea, vomiting, or hematemesis; No diarrhea or constipation. No melena or hematochezia.  GENITOURINARY: No dysuria, frequency, hematuria, or incontinence  NEUROLOGICAL: No headaches, memory loss, loss of strength, numbness, or tremors  SKIN: No itching, burning, rashes, or lesions   LYMPH NODES: No enlarged glands  MUSCULOSKELETAL: No joint pain or swelling; No muscle, back, or extremity pain      PAST MEDICAL & SURGICAL HISTORY:  Chronic kidney disease (CKD)  Anemia of chronic disease  On home oxygen therapy: 2  liters nasal cannula  Asthma: PFT (2018)  History of postmenopausal bleeding  Dyslipidemia associated with type 2 diabetes mellitus  Paroxysmal atrial fibrillation: h/o rapid ventricular rate 2 years ago, admitted at [facility], controlled with medication as per patient.  last INR 2.0  Morbid obesity with BMI of 45.0-49.9, adult  H/O: hypothyroidism  Chronic diastolic CHF (congestive heart failure): EF 56% (2017)  Depression (emotion)  Arthritis of spine  Macular degeneration of left eye: receiving injection  Neuropathy  Peripheral arterial disease: s/p remote stent. not on antiplatelet meds for a while.  Hypertension  Edema  GERD (gastroesophageal reflux disease)  Chronic low back pain  Herniated disc: lumbar  VICKY (obstructive sleep apnea)  Ulcerative colitis  Diabetes mellitus: T2DM last A1C 6.7 mg/dl in January   fs range 59- 200 mg/dl  History of mitral valve replacement with bioprosthetic valve: x 4 years ago  H/O  section: x 2  Amputated toe      Allergies    Augmentin (Swelling)  cephalexin (Swelling)    Intolerances        FAMILY HISTORY:  FH: heart disease: mother      SOCIAL HISTORY:    No ivdu, smoking, etoh    MEDICATIONS  (STANDING):  amiodarone    Tablet 200 milliGRAM(s) Oral daily  buDESOnide 160 MICROgram(s)/formoterol 4.5 MICROgram(s) Inhaler 2 Puff(s) Inhalation two times a day  carvedilol 6.25 milliGRAM(s) Oral every 12 hours  dextrose 5%. 1000 milliLiter(s) (50 mL/Hr) IV Continuous <Continuous>  dextrose 50% Injectable 12.5 Gram(s) IV Push once  dextrose 50% Injectable 25 Gram(s) IV Push once  dextrose 50% Injectable 25 Gram(s) IV Push once  docusate sodium 100 milliGRAM(s) Oral three times a day  DULoxetine 60 milliGRAM(s) Oral daily  insulin glargine Injectable (LANTUS) 20 Unit(s) SubCutaneous at bedtime  insulin lispro (HumaLOG) corrective regimen sliding scale   SubCutaneous three times a day before meals  insulin lispro (HumaLOG) corrective regimen sliding scale   SubCutaneous at bedtime  insulin lispro Injectable (HumaLOG) 5 Unit(s) SubCutaneous three times a day before meals  isosorbide   mononitrate ER Tablet (IMDUR) 30 milliGRAM(s) Oral daily  levothyroxine 175 MICROGram(s) Oral daily  mesalamine DR (24-Hour) Tablet 2.4 Gram(s) Oral daily  metolazone 2.5 milliGRAM(s) Oral <User Schedule>  pantoprazole    Tablet 40 milliGRAM(s) Oral before breakfast  spironolactone 25 milliGRAM(s) Oral daily  tiotropium 18 MICROgram(s) Capsule 1 Capsule(s) Inhalation daily  torsemide 50 milliGRAM(s) Oral every 12 hours  vancomycin  IVPB 1000 milliGRAM(s) IV Intermittent every 24 hours    MEDICATIONS  (PRN):  acetaminophen   Tablet .. 650 milliGRAM(s) Oral every 6 hours PRN Mild Pain (1 - 3), Moderate Pain (4 - 6)  dextrose 40% Gel 15 Gram(s) Oral once PRN Blood Glucose LESS THAN 70 milliGRAM(s)/deciliter  glucagon  Injectable 1 milliGRAM(s) IntraMuscular once PRN Glucose LESS THAN 70 milligrams/deciliter      Vital Signs Last 24 Hrs  T(C): 37.1 (31 Mar 2019 12:00), Max: 37.2 (30 Mar 2019 23:39)  T(F): 98.7 (31 Mar 2019 12:00), Max: 99 (30 Mar 2019 23:39)  HR: 92 (31 Mar 2019 12:00) (91 - 100)  BP: 127/79 (31 Mar 2019 12:00) (122/64 - 138/85)  BP(mean): --  RR: 18 (31 Mar 2019 12:00) (18 - 20)  SpO2: 100% (31 Mar 2019 12:00) (96% - 100%)    PHYSICAL EXAM:    GENERAL: NAD, well-groomed  HEAD:  Atraumatic, Normocephalic  EYES: EOMI, PERRLA, conjunctiva and sclera clear  ENMT: No tonsillar erythema, exudates, or enlargement; Moist mucous membranes  NECK: Supple, No JVD  CHEST/LUNG: Clear to percussion bilaterally; No rales, rhonchi, wheezing, or rubs  HEART: Regular rate and rhythm; No murmurs, rubs, or gallops  ABDOMEN: Soft, Nontender, Nondistended; Bowel sounds present  EXTREMITIES:  2+ Peripheral Pulses, No clubbing, cyanosis, or edema  LYMPH: No lymphadenopathy noted  SKIN: No rashes or lesions    LABS:  CBC Full  -  ( 31 Mar 2019 09:08 )  WBC Count : 9.01 K/uL  RBC Count : 2.79 M/uL  Hemoglobin : 8.3 g/dL  Hematocrit : 27.0 %  Platelet Count - Automated : 162 K/uL  Mean Cell Volume : 96.8 fl  Mean Cell Hemoglobin : 29.7 pg  Mean Cell Hemoglobin Concentration : 30.7 gm/dL  Auto Neutrophil # : 6.90 K/uL  Auto Lymphocyte # : 1.19 K/uL  Auto Monocyte # : 0.85 K/uL  Auto Eosinophil # : 0.00 K/uL  Auto Basophil # : 0.03 K/uL  Auto Neutrophil % : 76.7 %  Auto Lymphocyte % : 13.2 %  Auto Monocyte % : 9.4 %  Auto Eosinophil % : 0.0 %  Auto Basophil % : 0.3 %          139  |  96  |  51<H>  ----------------------------<  240<H>  3.6   |  31  |  1.92<H>    Ca    9.7      31 Mar 2019 06:52  Phos  1.7     -  Mg     2.1     -    TPro  7.4  /  Alb  3.6  /  TBili  0.6  /  DBili  x   /  AST  30  /  ALT  25  /  AlkPhos  76  03-30      LIVER FUNCTIONS - ( 30 Mar 2019 16:26 )  Alb: 3.6 g/dL / Pro: 7.4 g/dL / ALK PHOS: 76 U/L / ALT: 25 U/L / AST: 30 U/L / GGT: x                               MICROBIOLOGY:        Urinalysis Basic - ( 30 Mar 2019 18:05 )    Color: Yellow / Appearance: Turbid / S.016 / pH: x  Gluc: x / Ketone: Negative  / Bili: Negative / Urobili: Negative   Blood: x / Protein: 100 / Nitrite: Negative   Leuk Esterase: Large / RBC: 15-25 /hpf / WBC >50 /HPF   Sq Epi: x / Non Sq Epi: OCC / Bacteria: Few      Culture - Urine (19 @ 21:18)    Specimen Source: .Urine Clean Catch (Midstream)    Culture Results:   >100,000 CFU/ml Gram Negative Rods          RADIOLOGY:    < from: Xray Chest 1 View AP/PA (19 @ 17:42) >  IMPRESSION:    Status post median sternotomy and CABG. Valve repair. Heart size cannot   be accurately assessed in this projection. Mild pulmonary edema. No   pleural effusions or pneumothorax. The visualized bones and soft tissues   show no acute findings.    < end of copied text >          < from: Xray Pelvis AP only (19 @ 17:42) >  IMPRESSION:    No acute displaced fracture or dislocation.   Joint spaces are maintained.   Vascular calcifications.    < end of copied text >          < from: CT Chest No Cont (19 @ 13:08) >  CHEST:     LUNGS AND LARGE AIRWAYS: Patent central airways.  No pulmonary nodules.   Bibasilar linear atelectasis.  PLEURA: Trace right pleural effusion.  VESSELS: Atherosclerotic calcifications of the thoracic aorta and   coronary arteries.  HEART: Heart size is normal. No pericardial effusion. Aortic valvular   calcifications. Mitral replacement.  MEDIASTINUM AND JULIET: No lymphadenopathy.  CHEST WALL AND LOWER NECK: Sternotomy.  VISUALIZED UPPER ABDOMEN: Increased density of the liver which may be   seen in the setting of amiodarone use.  BONES: Degenerative changes.    IMPRESSION:   Trace right pleural effusion.    Bibasilar linear atelectasis.    < end of copied text >            < from: US Pelvis Complete (19 @ 11:54) >  COMPARISON: Comparison is made with prior study from 2018.    Transabdominal ultrasound examination of the pelvis was performed.    The uterus measures 10 cm in length x 3.7 cm AP x 4.8 cm transversely.   The endometrial canal and vagina are distended with fluid. This is   increased compared to the prior study.    The ovaries were not visualized.    No free fluid or adnexal masses seen.    Impression: Endometrial canal and vagina appear distended with fluid.   This is increased compared to prior study. The ovaries werenot   visualized. Correlation with pelvic MR can be performed for further   evaluation.    < end of copied text > Patient is a 78y old  Female who presents with a chief complaint of fall, right knee pain (30 Mar 2019 21:08)      HPI:    77 yo woman w/ hx of moderate MS s/p bioprosthetic mitral valve, diastolic CHF, paroxsmal afib (not on A/C in setting of bleeding), HTN, severe pulmonary HTN, DMT2, CKD III, anemia, with post menopausal vaginal bleeding, UC, VICKY on 3L NC (not on CPAP/BIPAP) presents from home in setting of multiple falls the past 2 days resulting with right knee pain. Patient had a fall yesterday from 1 step while walking out of the house and tripped. Per patient states that her right leg had a buckling/collapsing sensation. Patient went to [place] ED on 3/29 and had an xray which did not reveal fracture and sent home with cyclobenzaprine and Lidoderm patch. Patient last use of medication on 3/29. Patient had another fall while attempting to ambulate to the bathroom. She described that her right leg buckled under her causing her to fall. It was difficult for her to get up on her own and required the assistance of her  and son-in-law to get up. Patient denies any preceding symptoms of dizziness/lightheadedness, shortness of breath, CP, palpitations preceding the events. Endorses chronic back pain with no new characteristics. Patient has been getting around home with use of wheelchair the past 4 months. Requires assistance of her  to get around because of chronic weakness. Denies development of new numbness of lower extremities. Does not utilize any other assistive devices. Has not had PT outpatient. Patient also has had chronic LE wounds that were dressed from last discharge on 3/27. Denies changing of dressing. Denies fevers, chills, sweats. (30 Mar 2019 21:08)    ER vss, pt afebrile.  WBC 13.2 --> 9.0.  UA large LE/ (-) nit.  Ucx >100K GNR.  No acute findings on cxr.   Pt is currently on vancomycin for cellulitis.   Pt with venous stasis and several open blisters on b/l LE.      ID consult called for further abx management.        REVIEW OF SYSTEMS:    CONSTITUTIONAL: No fever, weight loss, or fatigue  EYES: No eye pain, visual disturbances, or discharge  ENMT:  No sore throat  NECK: No pain or stiffness  RESPIRATORY: No cough, wheezing, chills or hemoptysis; No shortness of breath  CARDIOVASCULAR: No chest pain, palpitations, dizziness, or leg swelling  GASTROINTESTINAL: No abdominal or epigastric pain. No nausea, vomiting, or hematemesis; No diarrhea or constipation. No melena or hematochezia.  GENITOURINARY: No dysuria, frequency, hematuria, or incontinence  NEUROLOGICAL: No headaches, memory loss, loss of strength, numbness, or tremors. (+) leg weakness  SKIN: LE redness with blisters  LYMPH NODES: No enlarged glands  MUSCULOSKELETAL: No joint pain or swelling; No muscle, back, or extremity pain      PAST MEDICAL & SURGICAL HISTORY:  Chronic kidney disease (CKD)  Anemia of chronic disease  On home oxygen therapy: 2  liters nasal cannula  Asthma: PFT (2018)  History of postmenopausal bleeding  Dyslipidemia associated with type 2 diabetes mellitus  Paroxysmal atrial fibrillation: h/o rapid ventricular rate 2 years ago, admitted at [facility], controlled with medication as per patient.  last INR 2.0  Morbid obesity with BMI of 45.0-49.9, adult  H/O: hypothyroidism  Chronic diastolic CHF (congestive heart failure): EF 56% (2017)  Depression (emotion)  Arthritis of spine  Macular degeneration of left eye: receiving injection  Neuropathy  Peripheral arterial disease: s/p remote stent. not on antiplatelet meds for a while.  Hypertension  Edema  GERD (gastroesophageal reflux disease)  Chronic low back pain  Herniated disc: lumbar  VICKY (obstructive sleep apnea)  Ulcerative colitis  Diabetes mellitus: T2DM last A1C 6.7 mg/dl in January   fs range 59- 200 mg/dl  History of mitral valve replacement with bioprosthetic valve: x 4 years ago  H/O  section: x 2  Amputated toe      Allergies    Augmentin (Swelling)  cephalexin (Swelling)    Intolerances        FAMILY HISTORY:  FH: heart disease: mother      SOCIAL HISTORY:    No ivdu, smoking, etoh    MEDICATIONS  (STANDING):  amiodarone    Tablet 200 milliGRAM(s) Oral daily  buDESOnide 160 MICROgram(s)/formoterol 4.5 MICROgram(s) Inhaler 2 Puff(s) Inhalation two times a day  carvedilol 6.25 milliGRAM(s) Oral every 12 hours  dextrose 5%. 1000 milliLiter(s) (50 mL/Hr) IV Continuous <Continuous>  dextrose 50% Injectable 12.5 Gram(s) IV Push once  dextrose 50% Injectable 25 Gram(s) IV Push once  dextrose 50% Injectable 25 Gram(s) IV Push once  docusate sodium 100 milliGRAM(s) Oral three times a day  DULoxetine 60 milliGRAM(s) Oral daily  insulin glargine Injectable (LANTUS) 20 Unit(s) SubCutaneous at bedtime  insulin lispro (HumaLOG) corrective regimen sliding scale   SubCutaneous three times a day before meals  insulin lispro (HumaLOG) corrective regimen sliding scale   SubCutaneous at bedtime  insulin lispro Injectable (HumaLOG) 5 Unit(s) SubCutaneous three times a day before meals  isosorbide   mononitrate ER Tablet (IMDUR) 30 milliGRAM(s) Oral daily  levothyroxine 175 MICROGram(s) Oral daily  mesalamine DR (24-Hour) Tablet 2.4 Gram(s) Oral daily  metolazone 2.5 milliGRAM(s) Oral <User Schedule>  pantoprazole    Tablet 40 milliGRAM(s) Oral before breakfast  spironolactone 25 milliGRAM(s) Oral daily  tiotropium 18 MICROgram(s) Capsule 1 Capsule(s) Inhalation daily  torsemide 50 milliGRAM(s) Oral every 12 hours  vancomycin  IVPB 1000 milliGRAM(s) IV Intermittent every 24 hours    MEDICATIONS  (PRN):  acetaminophen   Tablet .. 650 milliGRAM(s) Oral every 6 hours PRN Mild Pain (1 - 3), Moderate Pain (4 - 6)  dextrose 40% Gel 15 Gram(s) Oral once PRN Blood Glucose LESS THAN 70 milliGRAM(s)/deciliter  glucagon  Injectable 1 milliGRAM(s) IntraMuscular once PRN Glucose LESS THAN 70 milligrams/deciliter      Vital Signs Last 24 Hrs  T(C): 37.1 (31 Mar 2019 12:00), Max: 37.2 (30 Mar 2019 23:39)  T(F): 98.7 (31 Mar 2019 12:00), Max: 99 (30 Mar 2019 23:39)  HR: 92 (31 Mar 2019 12:00) (91 - 100)  BP: 127/79 (31 Mar 2019 12:00) (122/64 - 138/85)  BP(mean): --  RR: 18 (31 Mar 2019 12:00) (18 - 20)  SpO2: 100% (31 Mar 2019 12:00) (96% - 100%)    PHYSICAL EXAM:    GENERAL: NAD,  obese  HEAD:  Atraumatic, Normocephalic  EYES: EOMI, PERRLA, conjunctiva and sclera clear  ENMT: No tonsillar erythema, exudates, or enlargement; Moist mucous membranes  NECK: Supple, No JVD  CHEST/LUNG: Clear to percussion bilaterally; No rales, rhonchi, wheezing, or rubs  HEART: Regular rate and rhythm; No murmurs, rubs, or gallops  ABDOMEN: Soft, Nontender, Nondistended; Bowel sounds present  EXTREMITIES:  2+ Peripheral Pulses, No clubbing, cyanosis, or edema  LYMPH: No lymphadenopathy noted  SKIN: b/l LE venous stasis changes and discoloration with erythema, R>L.  several open blisters with denuded skin.  Unopen blister over L ft hallux.  No ttp    LABS:  CBC Full  -  ( 31 Mar 2019 09:08 )  WBC Count : 9.01 K/uL  RBC Count : 2.79 M/uL  Hemoglobin : 8.3 g/dL  Hematocrit : 27.0 %  Platelet Count - Automated : 162 K/uL  Mean Cell Volume : 96.8 fl  Mean Cell Hemoglobin : 29.7 pg  Mean Cell Hemoglobin Concentration : 30.7 gm/dL  Auto Neutrophil # : 6.90 K/uL  Auto Lymphocyte # : 1.19 K/uL  Auto Monocyte # : 0.85 K/uL  Auto Eosinophil # : 0.00 K/uL  Auto Basophil # : 0.03 K/uL  Auto Neutrophil % : 76.7 %  Auto Lymphocyte % : 13.2 %  Auto Monocyte % : 9.4 %  Auto Eosinophil % : 0.0 %  Auto Basophil % : 0.3 %          139  |  96  |  51<H>  ----------------------------<  240<H>  3.6   |  31  |  1.92<H>    Ca    9.7      31 Mar 2019 06:52  Phos  1.7       Mg     2.1         TPro  7.4  /  Alb  3.6  /  TBili  0.6  /  DBili  x   /  AST  30  /  ALT  25  /  AlkPhos  76        LIVER FUNCTIONS - ( 30 Mar 2019 16:26 )  Alb: 3.6 g/dL / Pro: 7.4 g/dL / ALK PHOS: 76 U/L / ALT: 25 U/L / AST: 30 U/L / GGT: x                               MICROBIOLOGY:        Urinalysis Basic - ( 30 Mar 2019 18:05 )    Color: Yellow / Appearance: Turbid / S.016 / pH: x  Gluc: x / Ketone: Negative  / Bili: Negative / Urobili: Negative   Blood: x / Protein: 100 / Nitrite: Negative   Leuk Esterase: Large / RBC: 15-25 /hpf / WBC >50 /HPF   Sq Epi: x / Non Sq Epi: OCC / Bacteria: Few      Culture - Urine (19 @ 21:18)    Specimen Source: .Urine Clean Catch (Midstream)    Culture Results:   >100,000 CFU/ml Gram Negative Rods          RADIOLOGY:    < from: Xray Chest 1 View AP/PA (19 @ 17:42) >  IMPRESSION:    Status post median sternotomy and CABG. Valve repair. Heart size cannot   be accurately assessed in this projection. Mild pulmonary edema. No   pleural effusions or pneumothorax. The visualized bones and soft tissues   show no acute findings.    < end of copied text >          < from: Xray Pelvis AP only (19 @ 17:42) >  IMPRESSION:    No acute displaced fracture or dislocation.   Joint spaces are maintained.   Vascular calcifications.    < end of copied text >          < from: CT Chest No Cont (19 @ 13:08) >  CHEST:     LUNGS AND LARGE AIRWAYS: Patent central airways.  No pulmonary nodules.   Bibasilar linear atelectasis.  PLEURA: Trace right pleural effusion.  VESSELS: Atherosclerotic calcifications of the thoracic aorta and   coronary arteries.  HEART: Heart size is normal. No pericardial effusion. Aortic valvular   calcifications. Mitral replacement.  MEDIASTINUM AND JULIET: No lymphadenopathy.  CHEST WALL AND LOWER NECK: Sternotomy.  VISUALIZED UPPER ABDOMEN: Increased density of the liver which may be   seen in the setting of amiodarone use.  BONES: Degenerative changes.    IMPRESSION:   Trace right pleural effusion.    Bibasilar linear atelectasis.    < end of copied text >            < from: US Pelvis Complete (19 @ 11:54) >  COMPARISON: Comparison is made with prior study from 2018.    Transabdominal ultrasound examination of the pelvis was performed.    The uterus measures 10 cm in length x 3.7 cm AP x 4.8 cm transversely.   The endometrial canal and vagina are distended with fluid. This is   increased compared to the prior study.    The ovaries were not visualized.    No free fluid or adnexal masses seen.    Impression: Endometrial canal and vagina appear distended with fluid.   This is increased compared to prior study. The ovaries werenot   visualized. Correlation with pelvic MR can be performed for further   evaluation.    < end of copied text >

## 2019-03-31 NOTE — PHYSICAL THERAPY INITIAL EVALUATION ADULT - PERTINENT HX OF CURRENT PROBLEM, REHAB EVAL
79 yo F w/ hx of mod MS s/p bioprosthetic mitral valve, diastolic CHF, paroxsmal afib, HTN, severe pulmonary HTN, DMT2, CKD III, anemia, with post menopausal vaginal bleeding, UC, VICKY on 3L NC (not on CPAP/BIPAP) presents from home in setting of multiple falls the past 2 days (pt reports RLE buckling) resulting with right knee pain. Pt also has had chronic LE wounds that were dressed from last discharge on 3/27. A/w leukocytosis and LE wounds concerning for cellulitis. 77 yo F w/ hx of mod MS s/p bioprosthetic mitral valve, diastolic CHF, paroxsimal afib, HTN, severe pulmonary HTN, DMT2, CKD III, anemia, with post menopausal vaginal bleeding, UC, VICKY on 3L NC (not on CPAP/BIPAP) presents from home in setting of multiple falls the past 2 days (pt reports RLE buckling) resulting with right knee pain. Pt also has had chronic LE wounds that were dressed from last discharge on 3/27. A/w leukocytosis and LE wounds concerning for cellulitis.

## 2019-03-31 NOTE — CONSULT NOTE ADULT - ASSESSMENT
77 yo woman w/ hx of moderate MS s/p bioprosthetic mitral valve, diastolic CHF, paroxsmal afib (not on A/C in setting of bleeding), HTN, severe pulmonary HTN, DMT2, CKD III, anemia, with post menopausal vaginal bleeding, UC, VICKY on 3L NC (not on CPAP/BIPAP) presents from home in setting of multiple falls the past 2 days resulting with right knee pain. Patient had a fall yesterday from 1 step while walking out of the house and tripped. Per patient states that her right leg had a buckling/collapsing sensation. Patient went to [facility] ED on 3/29 and had an xray which did not reveal fracture and sent home with cyclobenzaprine and Lidoderm patch. Patient last use of medication on 3/29. Patient had another fall while attempting to ambulate to the bathroom. She described that her right leg buckled under her causing her to fall. It was difficult for her to get up on her own and required the assistance of her  and son-in-law to get up. Patient denies any preceding symptoms of dizziness/lightheadedness, shortness of breath, CP, palpitations preceding the events. Endorses chronic back pain with no new characteristics. Patient has been getting around home with use of wheelchair the past 4 months. Requires assistance of her  to get around because of chronic weakness. Denies development of new numbness of lower extremities. Does not utilize any other assistive devices. Has not had PT outpatient. Patient also has had chronic LE wounds that were dressed from last discharge on 3/27. Denies changing of dressing. Denies fevers, chills, sweats. (30 Mar 2019 21:08)    ER vss, pt afebrile.  WBC 13.2 --> 9.0.  UA large LE/ (-) nit.  Ucx >100K GNR.  No acute findings on cxr.   Pt is currently on vancomycin for cellulitis.   Pt with venous stasis and several open blisters on b/l LE.      ID consult called for further abx management.        Recommend:    - UTI:  pt denies increased freq/urgency/flank pain/dysuria.  (+) UA and cultures.  Pt a/w fall and leg weakness.  Will treat, and add azactam (pt with allergy to augmentin, keflex).  f/u urine cx data and adjust abx as appropriate.    - b/L LE venous stasis and blisters.  Low suspicion for superimposed cellulitis.  Pt with several open blisters and denuded skin, cont local wound care.  Cont vancomycin for now.  Monitor trough, maintain between 10-15.    - Monitor WBC and temp curve.  If febrile >100.4, send blood cx x 2      Will follow,    Christal Reinoso  [phone]

## 2019-03-31 NOTE — PROGRESS NOTE ADULT - ASSESSMENT
79 yo woman w/ hx of moderate MS s/p bioprosthetic mitral valve, diastolic CHF, paroxsmal afib (not on A/C in setting of bleeding), HTN, severe pulmonary HTN, DMT2, CKD III, anemia, with post menopausal vaginal bleeding, UC, VICKY on 3L NC (not on CPAP/BIPAP) presents from home in setting of multiple falls the past 2 days resulting with right knee pain, found with leukocytosis and LE wounds concerning for cellulitis.

## 2019-03-31 NOTE — PHYSICAL THERAPY INITIAL EVALUATION ADULT - GAIT DEVIATIONS NOTED, PT EVAL
decreased velocity of limb motion/decreased step length/decreased weight-shifting ability/decreased tyra

## 2019-04-01 ENCOUNTER — TRANSCRIPTION ENCOUNTER (OUTPATIENT)
Age: 79
End: 2019-04-01

## 2019-04-01 LAB
-  AMIKACIN: SIGNIFICANT CHANGE UP
-  AMPICILLIN/SULBACTAM: SIGNIFICANT CHANGE UP
-  AMPICILLIN: SIGNIFICANT CHANGE UP
-  AZTREONAM: SIGNIFICANT CHANGE UP
-  CEFAZOLIN: SIGNIFICANT CHANGE UP
-  CEFEPIME: SIGNIFICANT CHANGE UP
-  CEFOXITIN: SIGNIFICANT CHANGE UP
-  CEFTRIAXONE: SIGNIFICANT CHANGE UP
-  CIPROFLOXACIN: SIGNIFICANT CHANGE UP
-  ERTAPENEM: SIGNIFICANT CHANGE UP
-  GENTAMICIN: SIGNIFICANT CHANGE UP
-  LEVOFLOXACIN: SIGNIFICANT CHANGE UP
-  MEROPENEM: SIGNIFICANT CHANGE UP
-  NITROFURANTOIN: SIGNIFICANT CHANGE UP
-  PIPERACILLIN/TAZOBACTAM: SIGNIFICANT CHANGE UP
-  TOBRAMYCIN: SIGNIFICANT CHANGE UP
-  TRIMETHOPRIM/SULFAMETHOXAZOLE: SIGNIFICANT CHANGE UP
ANION GAP SERPL CALC-SCNC: 14 MMOL/L — SIGNIFICANT CHANGE UP (ref 5–17)
BUN SERPL-MCNC: 52 MG/DL — HIGH (ref 7–23)
CALCIUM SERPL-MCNC: 9.9 MG/DL — SIGNIFICANT CHANGE UP (ref 8.4–10.5)
CHLORIDE SERPL-SCNC: 93 MMOL/L — LOW (ref 96–108)
CO2 SERPL-SCNC: 31 MMOL/L — SIGNIFICANT CHANGE UP (ref 22–31)
CREAT SERPL-MCNC: 1.84 MG/DL — HIGH (ref 0.5–1.3)
CULTURE RESULTS: SIGNIFICANT CHANGE UP
GLUCOSE BLDC GLUCOMTR-MCNC: 179 MG/DL — HIGH (ref 70–99)
GLUCOSE BLDC GLUCOMTR-MCNC: 204 MG/DL — HIGH (ref 70–99)
GLUCOSE BLDC GLUCOMTR-MCNC: 267 MG/DL — HIGH (ref 70–99)
GLUCOSE BLDC GLUCOMTR-MCNC: 286 MG/DL — HIGH (ref 70–99)
GLUCOSE BLDC GLUCOMTR-MCNC: 300 MG/DL — HIGH (ref 70–99)
GLUCOSE BLDC GLUCOMTR-MCNC: 336 MG/DL — HIGH (ref 70–99)
GLUCOSE BLDC GLUCOMTR-MCNC: 352 MG/DL — HIGH (ref 70–99)
GLUCOSE SERPL-MCNC: 181 MG/DL — HIGH (ref 70–99)
HCT VFR BLD CALC: 27.6 % — LOW (ref 34.5–45)
HGB BLD-MCNC: 8.4 G/DL — LOW (ref 11.5–15.5)
MCHC RBC-ENTMCNC: 30.1 PG — SIGNIFICANT CHANGE UP (ref 27–34)
MCHC RBC-ENTMCNC: 30.4 GM/DL — LOW (ref 32–36)
MCV RBC AUTO: 98.9 FL — SIGNIFICANT CHANGE UP (ref 80–100)
METHOD TYPE: SIGNIFICANT CHANGE UP
ORGANISM # SPEC MICROSCOPIC CNT: SIGNIFICANT CHANGE UP
ORGANISM # SPEC MICROSCOPIC CNT: SIGNIFICANT CHANGE UP
PLATELET # BLD AUTO: 172 K/UL — SIGNIFICANT CHANGE UP (ref 150–400)
POTASSIUM SERPL-MCNC: 3.1 MMOL/L — LOW (ref 3.5–5.3)
POTASSIUM SERPL-SCNC: 3.1 MMOL/L — LOW (ref 3.5–5.3)
RBC # BLD: 2.79 M/UL — LOW (ref 3.8–5.2)
RBC # FLD: 17.2 % — HIGH (ref 10.3–14.5)
SODIUM SERPL-SCNC: 138 MMOL/L — SIGNIFICANT CHANGE UP (ref 135–145)
SPECIMEN SOURCE: SIGNIFICANT CHANGE UP
VANCOMYCIN TROUGH SERPL-MCNC: 11.2 UG/ML — SIGNIFICANT CHANGE UP (ref 10–20)
WBC # BLD: 9.39 K/UL — SIGNIFICANT CHANGE UP (ref 3.8–10.5)
WBC # FLD AUTO: 9.39 K/UL — SIGNIFICANT CHANGE UP (ref 3.8–10.5)

## 2019-04-01 RX ORDER — INSULIN GLARGINE 100 [IU]/ML
24 INJECTION, SOLUTION SUBCUTANEOUS AT BEDTIME
Qty: 0 | Refills: 0 | Status: DISCONTINUED | OUTPATIENT
Start: 2019-04-01 | End: 2019-04-02

## 2019-04-01 RX ORDER — AZTREONAM 2 G
1000 VIAL (EA) INJECTION EVERY 12 HOURS
Qty: 0 | Refills: 0 | Status: DISCONTINUED | OUTPATIENT
Start: 2019-04-01 | End: 2019-04-06

## 2019-04-01 RX ORDER — VANCOMYCIN HCL 1 G
1000 VIAL (EA) INTRAVENOUS
Qty: 0 | Refills: 0 | Status: DISCONTINUED | OUTPATIENT
Start: 2019-04-01 | End: 2019-04-04

## 2019-04-01 RX ORDER — POTASSIUM CHLORIDE 20 MEQ
20 PACKET (EA) ORAL ONCE
Qty: 0 | Refills: 0 | Status: COMPLETED | OUTPATIENT
Start: 2019-04-01 | End: 2019-04-01

## 2019-04-01 RX ORDER — INSULIN LISPRO 100/ML
8 VIAL (ML) SUBCUTANEOUS
Qty: 0 | Refills: 0 | Status: DISCONTINUED | OUTPATIENT
Start: 2019-04-01 | End: 2019-04-02

## 2019-04-01 RX ADMIN — Medication 100 MILLIGRAM(S): at 21:45

## 2019-04-01 RX ADMIN — Medication 3: at 12:31

## 2019-04-01 RX ADMIN — BUDESONIDE AND FORMOTEROL FUMARATE DIHYDRATE 2 PUFF(S): 160; 4.5 AEROSOL RESPIRATORY (INHALATION) at 17:39

## 2019-04-01 RX ADMIN — BUDESONIDE AND FORMOTEROL FUMARATE DIHYDRATE 2 PUFF(S): 160; 4.5 AEROSOL RESPIRATORY (INHALATION) at 06:02

## 2019-04-01 RX ADMIN — Medication 20 MILLIEQUIVALENT(S): at 14:59

## 2019-04-01 RX ADMIN — PANTOPRAZOLE SODIUM 40 MILLIGRAM(S): 20 TABLET, DELAYED RELEASE ORAL at 06:03

## 2019-04-01 RX ADMIN — DULOXETINE HYDROCHLORIDE 60 MILLIGRAM(S): 30 CAPSULE, DELAYED RELEASE ORAL at 14:56

## 2019-04-01 RX ADMIN — Medication 100 MILLIGRAM(S): at 14:57

## 2019-04-01 RX ADMIN — Medication 5 UNIT(S): at 08:24

## 2019-04-01 RX ADMIN — Medication 5 UNIT(S): at 12:31

## 2019-04-01 RX ADMIN — Medication 50 MILLIGRAM(S): at 18:21

## 2019-04-01 RX ADMIN — Medication 3: at 17:38

## 2019-04-01 RX ADMIN — Medication 50 MILLIGRAM(S): at 06:03

## 2019-04-01 RX ADMIN — SPIRONOLACTONE 25 MILLIGRAM(S): 25 TABLET, FILM COATED ORAL at 06:03

## 2019-04-01 RX ADMIN — Medication 250 MILLIGRAM(S): at 21:18

## 2019-04-01 RX ADMIN — Medication 2.4 GRAM(S): at 12:32

## 2019-04-01 RX ADMIN — TIOTROPIUM BROMIDE 1 CAPSULE(S): 18 CAPSULE ORAL; RESPIRATORY (INHALATION) at 12:33

## 2019-04-01 RX ADMIN — AMIODARONE HYDROCHLORIDE 200 MILLIGRAM(S): 400 TABLET ORAL at 06:03

## 2019-04-01 RX ADMIN — ISOSORBIDE MONONITRATE 30 MILLIGRAM(S): 60 TABLET, EXTENDED RELEASE ORAL at 12:33

## 2019-04-01 RX ADMIN — Medication 1: at 08:24

## 2019-04-01 RX ADMIN — Medication 175 MICROGRAM(S): at 06:03

## 2019-04-01 RX ADMIN — Medication 1: at 21:46

## 2019-04-01 RX ADMIN — Medication 8 UNIT(S): at 18:11

## 2019-04-01 RX ADMIN — Medication 100 MILLIGRAM(S): at 06:03

## 2019-04-01 RX ADMIN — CARVEDILOL PHOSPHATE 6.25 MILLIGRAM(S): 80 CAPSULE, EXTENDED RELEASE ORAL at 06:03

## 2019-04-01 RX ADMIN — CARVEDILOL PHOSPHATE 6.25 MILLIGRAM(S): 80 CAPSULE, EXTENDED RELEASE ORAL at 17:39

## 2019-04-01 RX ADMIN — Medication 50 MILLIGRAM(S): at 19:03

## 2019-04-01 RX ADMIN — INSULIN GLARGINE 24 UNIT(S): 100 INJECTION, SOLUTION SUBCUTANEOUS at 21:45

## 2019-04-01 NOTE — DISCHARGE NOTE NURSING/CASE MANAGEMENT/SOCIAL WORK - NSDCPEPT PROEDHF_GEN_ALL_CORE
Monitor weight daily/Low salt diet/Call primary care provider for follow up after discharge/Report signs and symptoms to primary care provider/Activities as tolerated

## 2019-04-01 NOTE — PROGRESS NOTE ADULT - ASSESSMENT
79 yo woman w/ hx of moderate MS s/p bioprosthetic mitral valve, diastolic CHF, paroxsmal afib (not on A/C in setting of bleeding), HTN, severe pulmonary HTN, DMT2, CKD III, anemia, with post menopausal vaginal bleeding, UC, VICKY on 3L NC (not on CPAP/BIPAP) presents from home in setting of multiple falls the past 2 days resulting with right knee pain. Patient had a fall yesterday from 1 step while walking out of the house and tripped. Per patient states that her right leg had a buckling/collapsing sensation. Patient went to [facility] ED on 3/29 and had an xray which did not reveal fracture and sent home with cyclobenzaprine and Lidoderm patch. Patient last use of medication on 3/29. Patient had another fall while attempting to ambulate to the bathroom. She described that her right leg buckled under her causing her to fall. It was difficult for her to get up on her own and required the assistance of her  and son-in-law to get up. Patient denies any preceding symptoms of dizziness/lightheadedness, shortness of breath, CP, palpitations preceding the events. Endorses chronic back pain with no new characteristics. Patient has been getting around home with use of wheelchair the past 4 months. Requires assistance of her  to get around because of chronic weakness. Denies development of new numbness of lower extremities. Does not utilize any other assistive devices. Has not had PT outpatient. Patient also has had chronic LE wounds that were dressed from last discharge on 3/27. Denies changing of dressing. Denies fevers, chills, sweats. (30 Mar 2019 21:08)    ER vss, pt afebrile.  WBC 13.2 --> 9.0.  UA large LE/ (-) nit.  Ucx >100K GNR.  No acute findings on cxr.   Pt is currently on vancomycin for cellulitis.   Pt with venous stasis and several open blisters on b/l LE.      ID consult called for further abx management.        Recommend:    - UTI:  pt denies increased freq/urgency/flank pain/dysuria.  (+) UA and cultures.  Pt a/w fall and leg weakness.  Will treat,cont azactam (pt with allergy to augmentin, keflex).   urine cx results noted, can de-escalate to PO cipro 250mg Qdaily upon discharge (through 4/6)    - b/L LE venous stasis and blisters.  Low suspicion for superimposed cellulitis.  Pt with several open blisters and denuded skin, cont local wound care.  Cont vancomycin for now for possible superimposed bacterial cellulitis.  Monitor trough, maintain between 10-15.    - Monitor WBC and temp curve.  If febrile >100.4, send blood cx x 2    - Pt c/o RLE weakness, pt seen by Neurology - recs appreciated.  Pt pending MRI rt knee and lumbar spine      Will follow,     Christal Reinoso  [phone]

## 2019-04-01 NOTE — PHARMACOTHERAPY INTERVENTION NOTE - COMMENTS
79 yo F with DM A1C 7.4 takes bydureon, tresiba 60 units subcutaneously at bedtime, and novolog 16-28 units subcutaneously three times daily. She is currently on lantus 20 units subcutaneously at bedtime, humalog 5 units subcutaneously three times daily, and low dose correctional scale. BG consistently in 200s. Recommendation made to increase humalog to 8 units subcutaneously three times daily, and switch D5W vehicle for antibiotics to NS.    Keaton Loco, PharmD  [phone]

## 2019-04-01 NOTE — CHART NOTE - NSCHARTNOTEFT_GEN_A_CORE
called by Radiology.  radiology recommends MRI of spine with IV contrast to r/o epidural mass.  pt with elevated Cr and BUN.   left voice message for Dr. Lopez with call back number     - will endorse to primary team

## 2019-04-01 NOTE — DISCHARGE NOTE NURSING/CASE MANAGEMENT/SOCIAL WORK - NSDCPEPTCAREGIVEDUMATLIST _GEN_ALL_CORE
Coumadin/Warfarin/Influenza Vaccination/Diabetes/Heart Failure Heart Failure/Influenza Vaccination/Diabetes Heart Failure/Influenza Vaccination/Diabetes/Coumadin/Warfarin

## 2019-04-01 NOTE — DISCHARGE NOTE NURSING/CASE MANAGEMENT/SOCIAL WORK - NSDCDPATPORTLINK_GEN_ALL_CORE
You can access the [facility], offered by [facility], by registering with the following website: [email or web address]

## 2019-04-01 NOTE — PROGRESS NOTE ADULT - ASSESSMENT
79yo female with chronic diastolic CHF, bioprosthetic MVR, PAF, VICKY, PAD, CKD, DM, HLD and LSS admitted iwth recurrent falls. She has prior history of LSS. CV stable.    Rec:  Continue current CV meds  Monitor lytes, renal function  Imaging of LS spine and neurology evaluation  PT  Likely SAMANTHA

## 2019-04-01 NOTE — CONSULT NOTE ADULT - SUBJECTIVE AND OBJECTIVE BOX
[facility] [place]([facility]        Patient is a 78y old  Female who presents with a chief complaint of fall, right knee pain (2019 07:14)      HPI:  77 yo woman w/ hx of moderate MS s/p bioprosthetic mitral valve, diastolic CHF, paroxsmal afib (not on A/C in setting of bleeding), HTN, severe pulmonary HTN, DMT2, CKD III, anemia, with post menopausal vaginal bleeding, UC, VICKY on 3L NC (not on CPAP/BIPAP) presents from home in setting of multiple falls the past 2 days resulting with right knee pain. Patient had a fall yesterday from 1 step while walking out of the house and tripped. Per patient states that her right leg had a buckling/collapsing sensation. Patient went to [facility] on 3/29 and had an xray which did not reveal fracture and sent home with cyclobenzaprine and Lidoderm patch. Patient last use of medication on 3/29. Patient had another fall while attempting to ambulate to the bathroom. She described that her right leg buckled under her causing her to fall. It was difficult for her to get up on her own and required the assistance of her  and son-in-law to get up. Patient denies any preceding symptoms of dizziness/lightheadedness, shortness of breath, CP, palpitations preceding the events. Endorses chronic back pain with no new characteristics. Patient has been getting around home with use of wheelchair the past 4 months. Requires assistance of her  to get around because of chronic weakness. Denies development of new numbness of lower extremities. Does not utilize any other assistive devices. Has not had PT outpatient. Patient also has had chronic LE wounds that were dressed from last discharge on 3/27. Denies changing of dressing. Denies fevers, chills, sweats.    pt with hx of spinal stenosis recently has been using a wheelchair for 2 weeks, then got up and fell taken to [facility],   fell again at home and was brought to [facility].            *****PAST MEDICAL / Surgical  HISTORY:  PAST MEDICAL & SURGICAL HISTORY:  Chronic kidney disease (CKD)  Anemia of chronic disease  On home oxygen therapy: 2  liters nasal cannula  Asthma: PFT (2018)  History of postmenopausal bleeding  Dyslipidemia associated with type 2 diabetes mellitus  Paroxysmal atrial fibrillation: h/o rapid ventricular rate 2 years ago, admitted at [facility], controlled with medication as per patient.  last INR 2.0  Morbid obesity with BMI of 45.0-49.9, adult  H/O: hypothyroidism  Chronic diastolic CHF (congestive heart failure): EF 56% (2017)  Depression (emotion)  Arthritis of spine  Macular degeneration of left eye: receiving injection  Neuropathy  Peripheral arterial disease: s/p remote stent. not on antiplatelet meds for a while.  Hypertension  Edema  GERD (gastroesophageal reflux disease)  Chronic low back pain  Herniated disc: lumbar  VICKY (obstructive sleep apnea)  Ulcerative colitis  Diabetes mellitus: T2DM last A1C 6.7 mg/dl in January   fs range 59- 200 mg/dl  History of mitral valve replacement with bioprosthetic valve: x 4 years ago  H/O  section: x 2  Amputated toe           *****FAMILY HISTORY:  FAMILY HISTORY:  FH: heart disease: mother           *****SOCIAL HISTORY:  Alcohol: None  Smoking: None         *****ALLERGIES:   Allergies    Augmentin (Swelling)  cephalexin (Swelling)    Intolerances             *****MEDICATIONS: current medication reviewed and documented.   MEDICATIONS  (STANDING):  amiodarone    Tablet 200 milliGRAM(s) Oral daily  aztreonam  IVPB 1000 milliGRAM(s) IV Intermittent every 12 hours  buDESOnide 160 MICROgram(s)/formoterol 4.5 MICROgram(s) Inhaler 2 Puff(s) Inhalation two times a day  carvedilol 6.25 milliGRAM(s) Oral every 12 hours  dextrose 5%. 1000 milliLiter(s) (50 mL/Hr) IV Continuous <Continuous>  dextrose 50% Injectable 12.5 Gram(s) IV Push once  dextrose 50% Injectable 25 Gram(s) IV Push once  dextrose 50% Injectable 25 Gram(s) IV Push once  docusate sodium 100 milliGRAM(s) Oral three times a day  DULoxetine 60 milliGRAM(s) Oral daily  insulin glargine Injectable (LANTUS) 20 Unit(s) SubCutaneous at bedtime  insulin lispro (HumaLOG) corrective regimen sliding scale   SubCutaneous three times a day before meals  insulin lispro (HumaLOG) corrective regimen sliding scale   SubCutaneous at bedtime  insulin lispro Injectable (HumaLOG) 8 Unit(s) SubCutaneous three times a day before meals  isosorbide   mononitrate ER Tablet (IMDUR) 30 milliGRAM(s) Oral daily  levothyroxine 175 MICROGram(s) Oral daily  mesalamine DR (24-Hour) Tablet 2.4 Gram(s) Oral daily  metolazone 2.5 milliGRAM(s) Oral <User Schedule>  pantoprazole    Tablet 40 milliGRAM(s) Oral before breakfast  spironolactone 25 milliGRAM(s) Oral daily  tiotropium 18 MICROgram(s) Capsule 1 Capsule(s) Inhalation daily  torsemide 50 milliGRAM(s) Oral every 12 hours  vancomycin  IVPB 1000 milliGRAM(s) IV Intermittent <User Schedule>    MEDICATIONS  (PRN):  acetaminophen   Tablet .. 650 milliGRAM(s) Oral every 6 hours PRN Mild Pain (1 - 3), Moderate Pain (4 - 6)  dextrose 40% Gel 15 Gram(s) Oral once PRN Blood Glucose LESS THAN 70 milliGRAM(s)/deciliter  glucagon  Injectable 1 milliGRAM(s) IntraMuscular once PRN Glucose LESS THAN 70 milligrams/deciliter           *****REVIEW OF SYSTEM:  GEN: no fever, no chills, no pain  RESP: no SOB, no cough, no sputum  CVS: no chest pain, no palpitations, no edema  GI: no abdominal pain, no nausea, no vomiting, no constipation, no diarrhea  : no dysurea, no frequency, no hematurea  Neuro: no headache, no dizziness  PSYCH: no anxiety, no depression  Derm : no itching, no rash         *****VITAL SIGNS:  T(C): 37.2 (19 @ 12:03), Max: 37.2 (19 @ 12:03)  HR: 98 (19 @ 12:03) (96 - 98)  BP: 123/73 (19 @ 12:03) (123/73 - 147/79)  RR: 18 (19 @ 12:03) (18 - 18)  SpO2: 99% (19 @ 12:03) (99% - 99%)  Wt(kg): --     @ 07:01  -   @ 07:00  --------------------------------------------------------  IN: 420 mL / OUT: 1100 mL / NET: -680 mL     @ 07:01  -   @ 17:40  --------------------------------------------------------  IN: 480 mL / OUT: 2250 mL / NET: -1770 mL             *****PHYSICAL EXAM:   Alert oriented x 3   Attention comprehension are fair. Able to name, repeat, read without any difficulty.   Able to follow 3 step commands.     EOMI fundi not visualized,  VFF to confrontration  No facial asymmetry   Tongue is midline   Palate elevates symmetrically   Moving both upper ext spont  LE   left iliposoas 3/5 hamstrings/quads 3/5 dorsiflexion 5/5   Right iliopsoas 2/5 hamstrings/quad 3/5 dorsiflexion 5/5     Reflexes are diminished  throughout   sensation is decreased in a patchy distribution   pt has pain over R knee with swelling/warmth   Gait : not assessed.  B/L down going toes   b./l le with pitting edema R progresses to the knee             *****LAB AND IMAGIN.4    9.39  )-----------( 172      ( 2019 09:09 )             27.6               04-01    138  |  93<L>  |  52<H>  ----------------------------<  181<H>  3.1<L>   |  31  |  1.84<H>    Ca    9.9      2019 07:13  Phos  1.7     03-31  Mg     2.1     03-31      PT/INR - ( 30 Mar 2019 18:25 )   PT: 14.2 sec;   INR: 1.23 ratio         PTT - ( 30 Mar 2019 18:25 )  PTT:27.5 sec                        Urinalysis Basic - ( 30 Mar 2019 18:05 )    Color: Yellow / Appearance: Turbid / S.016 / pH: x  Gluc: x / Ketone: Negative  / Bili: Negative / Urobili: Negative   Blood: x / Protein: 100 / Nitrite: Negative   Leuk Esterase: Large / RBC: 15-25 /hpf / WBC >50 /HPF   Sq Epi: x / Non Sq Epi: OCC / Bacteria: Few        [All pertinent recent Imaging reports reviewed]         *****A S S E S S M E N T   A N D   P L A N :        77 yo woman w/ hx of moderate MS s/p bioprosthetic mitral valve, diastolic CHF, paroxsmal afib (not on A/C in setting of bleeding), HTN, severe pulmonary HTN, DMT2, CKD III, anemia, with post menopausal vaginal bleeding, UC, VICKY on 3L NC (not on CPAP/BIPAP) presents from home in setting of multiple falls the past 2 days resulting with right knee pain. Patient had a fall yesterday from 1 step while walking out of the house and tripped. Per patient states that her right leg had a buckling/collapsing sensation. Patient went to [facility] on 3/29 and had an xray which did not reveal fracture and sent home with cyclobenzaprine and Lidoderm patch. Patient last use of medication on 3/29. Patient had another fall while attempting to ambulate to the bathroom. She described that her right leg buckled under her causing her to fall. It was difficult for her to get up on her own and required the assistance of her  and son-in-law to get up. Patient denies any preceding symptoms of dizziness/lightheadedness, shortness of breath, CP, palpitations preceding the events. Endorses chronic back pain with no new characteristics. Patient has been getting around home with use of wheelchair the past 4 months. Requires assistance of her  to get around because of chronic weakness. Denies development of new numbness of lower extremities. Does not utilize any other assistive devices. Has not had PT outpatient. Patient also has had chronic LE wounds that were dressed from last discharge on 3/27. Denies changing of dressing. Denies fevers, chills, sweats.    pt with hx of spinal stenosis recently has been using a wheelchair for 2 weeks, then got up and fell taken to [facility], discharged then fell again at home and was brought to [facility].       Problem/Recommendations 1: traumatic pain over R knee   would get ct of the R knee   consider rheum consult       Problem/Recommendations 2: spinal stenosis with symptoms of R leg buckling likely due to spinal stenosis +/- deconditioning   no sign of radiculopathy   would get mri l spine with and without contrast.          ___________________________  Will follow with you.  Thank you,  Simran Mattson MD  Diplomate of the American Board of Neurology and Psychiatry.  Diplomate of the American Board of Vascular Neurology.   [facility] ([facility]), [facility]   Ph: [phone]    Differential diagnosis and plan of care discussed with patient after the evaluation.   Advanced care planning options discussed.   Pain assessed and judicious use of narcotics when appropriate was discussed.  Importance of Fall prevention discussed.  Counseling on Smoking and Alcohol cessation was offered when appropriate.  Counseling on Diet, exercise, and medication compliance was done.     62 minutes spent on the total encounter;  more than 50 % of the visit was spent on counseling  and or coordinating care by the attending physician.    Thank you for allowing me to participate in the care of this tico patient. Please do not hesitate to call me if you have any questions.     This and subsequent notes were partially created using voice recognition software and will  inherently be subject to errors including those of syntax and sound alike substitutions which may escape proofreading. In such instances original meaning may be extrapolated by contextual derivation.

## 2019-04-01 NOTE — PROGRESS NOTE ADULT - SUBJECTIVE AND OBJECTIVE BOX
Patient is a 78y old  Female who presents with a chief complaint of fall, right knee pain (2019 07:14)      SUBJECTIVE / OVERNIGHT EVENTS: no new events, has ongoing right knee pain and LE weakness    MEDICATIONS  (STANDING):  amiodarone    Tablet 200 milliGRAM(s) Oral daily  aztreonam  IVPB 1000 milliGRAM(s) IV Intermittent every 12 hours  buDESOnide 160 MICROgram(s)/formoterol 4.5 MICROgram(s) Inhaler 2 Puff(s) Inhalation two times a day  carvedilol 6.25 milliGRAM(s) Oral every 12 hours  dextrose 5%. 1000 milliLiter(s) (50 mL/Hr) IV Continuous <Continuous>  dextrose 50% Injectable 12.5 Gram(s) IV Push once  dextrose 50% Injectable 25 Gram(s) IV Push once  dextrose 50% Injectable 25 Gram(s) IV Push once  docusate sodium 100 milliGRAM(s) Oral three times a day  DULoxetine 60 milliGRAM(s) Oral daily  insulin glargine Injectable (LANTUS) 20 Unit(s) SubCutaneous at bedtime  insulin lispro (HumaLOG) corrective regimen sliding scale   SubCutaneous three times a day before meals  insulin lispro (HumaLOG) corrective regimen sliding scale   SubCutaneous at bedtime  insulin lispro Injectable (HumaLOG) 8 Unit(s) SubCutaneous three times a day before meals  isosorbide   mononitrate ER Tablet (IMDUR) 30 milliGRAM(s) Oral daily  levothyroxine 175 MICROGram(s) Oral daily  mesalamine DR (24-Hour) Tablet 2.4 Gram(s) Oral daily  metolazone 2.5 milliGRAM(s) Oral <User Schedule>  pantoprazole    Tablet 40 milliGRAM(s) Oral before breakfast  spironolactone 25 milliGRAM(s) Oral daily  tiotropium 18 MICROgram(s) Capsule 1 Capsule(s) Inhalation daily  torsemide 50 milliGRAM(s) Oral every 12 hours  vancomycin  IVPB 1000 milliGRAM(s) IV Intermittent <User Schedule>    MEDICATIONS  (PRN):  acetaminophen   Tablet .. 650 milliGRAM(s) Oral every 6 hours PRN Mild Pain (1 - 3), Moderate Pain (4 - 6)  dextrose 40% Gel 15 Gram(s) Oral once PRN Blood Glucose LESS THAN 70 milliGRAM(s)/deciliter  glucagon  Injectable 1 milliGRAM(s) IntraMuscular once PRN Glucose LESS THAN 70 milligrams/deciliter      Vital Signs Last 24 Hrs  T(F): 99 (19 @ 12:03), Max: 99 (19 @ 12:03)  HR: 98 (19 @ 12:03) (96 - 98)  BP: 123/73 (19 @ 12:03) (123/73 - 147/79)  RR: 18 (19 @ 12:03) (18 - 18)  SpO2: 99% (19 @ 12:03) (99% - 99%)  Telemetry:   CAPILLARY BLOOD GLUCOSE      POCT Blood Glucose.: 286 mg/dL (2019 17:17)  POCT Blood Glucose.: 267 mg/dL (2019 12:05)  POCT Blood Glucose.: 179 mg/dL (2019 07:57)  POCT Blood Glucose.: 298 mg/dL (31 Mar 2019 21:43)    I&O's Summary    31 Mar 2019 07:  -  2019 07:00  --------------------------------------------------------  IN: 420 mL / OUT: 1100 mL / NET: -680 mL    2019 07:01  -  2019 17:40  --------------------------------------------------------  IN: 480 mL / OUT: 2250 mL / NET: -1770 mL        PHYSICAL EXAM:  GENERAL: NAD, well-developed  HEAD:  Atraumatic, Normocephalic  EYES: EOMI, PERRLA, conjunctiva and sclera clear  NECK: Supple, No JVD  CHEST/LUNG: Clear to auscultation bilaterally; No wheeze  HEART: Regular rate and rhythm; No murmurs, rubs, or gallops  ABDOMEN: Soft, Nontender, Nondistended; Bowel sounds present  EXTREMITIES:  2+ Peripheral Pulses, No clubbing, cyanosis, or edema  PSYCH: AAOx3  NEUROLOGY: non-focal  SKIN: No rashes or lesions    LABS:                        8.4    9.39  )-----------( 172      ( 2019 09:09 )             27.6     04-    138  |  93<L>  |  52<H>  ----------------------------<  181<H>  3.1<L>   |  31  |  1.84<H>    Ca    9.9      2019 07:13  Phos  1.7       Mg     2.1           PT/INR - ( 30 Mar 2019 18:25 )   PT: 14.2 sec;   INR: 1.23 ratio         PTT - ( 30 Mar 2019 18:25 )  PTT:27.5 sec      Urinalysis Basic - ( 30 Mar 2019 18:05 )    Color: Yellow / Appearance: Turbid / S.016 / pH: x  Gluc: x / Ketone: Negative  / Bili: Negative / Urobili: Negative   Blood: x / Protein: 100 / Nitrite: Negative   Leuk Esterase: Large / RBC: 15-25 /hpf / WBC >50 /HPF   Sq Epi: x / Non Sq Epi: OCC / Bacteria: Few        RADIOLOGY & ADDITIONAL TESTS:    Imaging Personally Reviewed:    Consultant(s) Notes Reviewed:      Care Discussed with Consultants/Other Providers:

## 2019-04-01 NOTE — DISCHARGE NOTE NURSING/CASE MANAGEMENT/SOCIAL WORK - NSDCPEPTCOWAR_GEN_ALL_CORE
Coumadin/Warfarin - Dietary Advice/Coumadin/Warfarin - Follow up monitoring/Coumadin/Warfarin - Potential for adverse drug reactions and interactions/Coumadin/Warfarin - Compliance

## 2019-04-01 NOTE — PROGRESS NOTE ADULT - SUBJECTIVE AND OBJECTIVE BOX
Chief Complaint: 77 y/o Type 2 DM, hypothyroid, recently admitted with CHF exacerbation, re-admitted with recurrent falls and suspected UTI.    Type 2 DM insulin resistant at home on Tresiba insulin 60 units daily, and humalog 30 units before meals. Other treatment includes Bydureon 2 mg weekly. HbA1c during recent admission was 7.4 %. During [facility] admission noted with significantly reduced insulin requirenents, discharged on Lantus insulin 24 units at HS and Humalog 5 units per meal.  Patient re-admitted after recurrent falls at home, and susp. UTI, started on similar dose insulin, noted with hyperglycemia.     MEDICATIONS  (STANDING):  amiodarone    Tablet 200 milliGRAM(s) Oral daily  aztreonam  IVPB 1000 milliGRAM(s) IV Intermittent every 12 hours  buDESOnide 160 MICROgram(s)/formoterol 4.5 MICROgram(s) Inhaler 2 Puff(s) Inhalation two times a day  carvedilol 6.25 milliGRAM(s) Oral every 12 hours  dextrose 5%. 1000 milliLiter(s) (50 mL/Hr) IV Continuous <Continuous>  dextrose 50% Injectable 12.5 Gram(s) IV Push once  dextrose 50% Injectable 25 Gram(s) IV Push once  dextrose 50% Injectable 25 Gram(s) IV Push once  docusate sodium 100 milliGRAM(s) Oral three times a day  DULoxetine 60 milliGRAM(s) Oral daily  insulin glargine Injectable (LANTUS) 24 Unit(s) SubCutaneous at bedtime  insulin lispro (HumaLOG) corrective regimen sliding scale   SubCutaneous three times a day before meals  insulin lispro (HumaLOG) corrective regimen sliding scale   SubCutaneous at bedtime  insulin lispro Injectable (HumaLOG) 8 Unit(s) SubCutaneous three times a day before meals  isosorbide   mononitrate ER Tablet (IMDUR) 30 milliGRAM(s) Oral daily  levothyroxine 175 MICROGram(s) Oral daily  mesalamine DR (24-Hour) Tablet 2.4 Gram(s) Oral daily  metolazone 2.5 milliGRAM(s) Oral <User Schedule>  pantoprazole    Tablet 40 milliGRAM(s) Oral before breakfast  spironolactone 25 milliGRAM(s) Oral daily  tiotropium 18 MICROgram(s) Capsule 1 Capsule(s) Inhalation daily  torsemide 50 milliGRAM(s) Oral every 12 hours  vancomycin  IVPB 1000 milliGRAM(s) IV Intermittent <User Schedule>    MEDICATIONS  (PRN):  acetaminophen   Tablet .. 650 milliGRAM(s) Oral every 6 hours PRN Mild Pain (1 - 3), Moderate Pain (4 - 6)  dextrose 40% Gel 15 Gram(s) Oral once PRN Blood Glucose LESS THAN 70 milliGRAM(s)/deciliter  glucagon  Injectable 1 milliGRAM(s) IntraMuscular once PRN Glucose LESS THAN 70 milligrams/deciliter      Allergies    Augmentin (Swelling)  cephalexin (Swelling)    Intolerances        PHYSICAL EXAM:  VITALS: T(C): 37 (04-01-19 @ 19:54)  T(F): 98.6 (04-01-19 @ 19:54), Max: 99 (04-01-19 @ 12:03)  HR: 99 (04-01-19 @ 19:54) (96 - 99)  BP: 129/78 (04-01-19 @ 19:54) (123/73 - 147/79)  RR:  (18 - 18)  SpO2:  (98% - 99%)  GENERAL: NAD,   EYES: No proptosis,  HEENT:  Atraumatic, Normocephalic,   RESPIRATORY: Clear to auscultation bilaterally, decreased breath sounds both bases.  CARDIOVASCULAR: Regular rhythm; No murmurs; trace peripheral edema  GI: Soft, nontender, non distended, normal bowel sounds  SKIN: Dry, intact, No rashes or lesions  MUSCULOSKELETAL: decreased strength  NEURO: No focal deficits.  PSYCH: Alert and oriented x 3, normal affect, normal mood      POCT Blood Glucose.: 286 mg/dL (04-01-19 @ 17:17)  POCT Blood Glucose.: 267 mg/dL (04-01-19 @ 12:05)  POCT Blood Glucose.: 179 mg/dL (04-01-19 @ 07:57)  POCT Blood Glucose.: 298 mg/dL (03-31-19 @ 21:43)  POCT Blood Glucose.: 220 mg/dL (03-31-19 @ 17:06)  POCT Blood Glucose.: 268 mg/dL (03-31-19 @ 11:49)  POCT Blood Glucose.: 231 mg/dL (03-31-19 @ 08:23)  POCT Blood Glucose.: 204 mg/dL (03-31-19 @ 07:46)  POCT Blood Glucose.: 350 mg/dL (03-31-19 @ 01:02)  POCT Blood Glucose.: 336 mg/dL (03-31-19 @ 00:01)  POCT Blood Glucose.: 352 mg/dL (03-30-19 @ 23:58)  POCT Blood Glucose.: 345 mg/dL (03-30-19 @ 21:45)                            8.4    9.39  )-----------( 172      ( 01 Apr 2019 09:09 )             27.6       04-01    138  |  93<L>  |  52<H>  ----------------------------<  181<H>  3.1<L>   |  31  |  1.84<H>    EGFR if : 30<L>  EGFR if non : 26<L>    Ca    9.9      04-01  Mg     2.1     03-31  Phos  1.7     03-31    TPro  7.4  /  Alb  3.6  /  TBili  0.6  /  DBili  x   /  AST  30  /  ALT  25  /  AlkPhos  76  03-30      Thyroid Function Tests:  03-25 @ 08:52 TSH 5.27 FreeT4 -- T3 -- Anti TPO -- Anti Thyroglobulin Ab -- TSI --      Hemoglobin A1C, Whole Blood: 7.4 % <H> [4.0 - 5.6] (03-25-19 @ 08:55)

## 2019-04-01 NOTE — DISCHARGE NOTE NURSING/CASE MANAGEMENT/SOCIAL WORK - NSDCPEWEB_GEN_ALL_CORE
[facility] for Tobacco Control website --- [email or web address] website --- [email or web address]

## 2019-04-01 NOTE — PROGRESS NOTE ADULT - ASSESSMENT
77 yo F w/ hx of moderate MS s/p bioprosthetic mitral valve, severe pulmonary HTN, DM2, anemia, diastolic CHF, hypothyroidism, paroxysmal atrial fibrillation, HTN78 y/o Type 2 DM, hypothyroid, recently admitted with CHF exacerbation, re-admitted with recurrent falls and suspected UTI.    Type 2 DM insulin resistant at home on Tresiba insulin 60 units daily, and humalog 30 units before meals. Other treatment includes Bydureon 2 mg weekly. HbA1c during recent admission was 7.4 %. During [facility] admission noted with significantly reduced insulin requirenents, discharged on Lantus insulin 24 units at HS and Humalog 5 units per meal.  Patient re-admitted after recurrent falls at home, and susp. UTI, started on similar dose insulin, noted with hyperglycemia.     Hypothyroidism- treated with synthroid 175 mcg daily. Last test as outpatient recently reported Good, Here with mild elevated TSH 5.27.  C/O fatigue, weight stable till recently. Says she is compliant with medications.     Expect high insulin requirements patient is on to decrease due to CHF, with decreased insulin clearance, and long action of Tresiba insulin. As patient hyperglycemic will start lower dose insulin basal bolus with higher scale coverage.  As out-patient consider SGLT-2's if not failed prior and no UTI risk, to decrease insulin requirements and moderate weight loss in this patient with CHF.    Hypothyroidism- on synthroid 175 mcg daily, TSH mildly elevated recent admission, will repeat.    Suggest:  Increase Lantus insulin 24 units at HS.  Increased Humalog insulin 5 to 8 units per meal noted.  Mid scale humalog.  Continue synthroid 175 mcg daily, repeat TFT's

## 2019-04-01 NOTE — PROGRESS NOTE ADULT - SUBJECTIVE AND OBJECTIVE BOX
Infectious Diseases progress note:    Subjective: Events noted.  Pt c/o weakness in rt leg for past 1-2 days.  No fever or leukocytosis.  No dysuria.  Urine cx (+) proteus.      ROS:  CONSTITUTIONAL:  No fever, chills, rigors  CARDIOVASCULAR:  No chest pain or palpitations  RESPIRATORY:   No SOB, cough, dyspnea on exertion.  No wheezing  GASTROINTESTINAL:  No abd pain, N/V, diarrhea/constipation  EXTREMITIES:  No swelling or joint pain  GENITOURINARY:  No burning on urination, increased frequency or urgency.  No flank pain  NEUROLOGIC:  RLE weakness  SKIN: No rashes    Allergies    Augmentin (Swelling)  cephalexin (Swelling)    Intolerances        ANTIBIOTICS/RELEVANT:  antimicrobials  aztreonam  IVPB 1000 milliGRAM(s) IV Intermittent every 12 hours  vancomycin  IVPB 1000 milliGRAM(s) IV Intermittent <User Schedule>    immunologic:    OTHER:  acetaminophen   Tablet .. 650 milliGRAM(s) Oral every 6 hours PRN  amiodarone    Tablet 200 milliGRAM(s) Oral daily  buDESOnide 160 MICROgram(s)/formoterol 4.5 MICROgram(s) Inhaler 2 Puff(s) Inhalation two times a day  carvedilol 6.25 milliGRAM(s) Oral every 12 hours  dextrose 40% Gel 15 Gram(s) Oral once PRN  dextrose 5%. 1000 milliLiter(s) IV Continuous <Continuous>  dextrose 50% Injectable 12.5 Gram(s) IV Push once  dextrose 50% Injectable 25 Gram(s) IV Push once  dextrose 50% Injectable 25 Gram(s) IV Push once  docusate sodium 100 milliGRAM(s) Oral three times a day  DULoxetine 60 milliGRAM(s) Oral daily  glucagon  Injectable 1 milliGRAM(s) IntraMuscular once PRN  insulin glargine Injectable (LANTUS) 24 Unit(s) SubCutaneous at bedtime  insulin lispro (HumaLOG) corrective regimen sliding scale   SubCutaneous three times a day before meals  insulin lispro (HumaLOG) corrective regimen sliding scale   SubCutaneous at bedtime  insulin lispro Injectable (HumaLOG) 8 Unit(s) SubCutaneous three times a day before meals  isosorbide   mononitrate ER Tablet (IMDUR) 30 milliGRAM(s) Oral daily  levothyroxine 175 MICROGram(s) Oral daily  mesalamine DR (24-Hour) Tablet 2.4 Gram(s) Oral daily  metolazone 2.5 milliGRAM(s) Oral <User Schedule>  pantoprazole    Tablet 40 milliGRAM(s) Oral before breakfast  spironolactone 25 milliGRAM(s) Oral daily  tiotropium 18 MICROgram(s) Capsule 1 Capsule(s) Inhalation daily  torsemide 50 milliGRAM(s) Oral every 12 hours      Objective:  Vital Signs Last 24 Hrs  T(C): 37.2 (01 Apr 2019 12:03), Max: 37.2 (01 Apr 2019 12:03)  T(F): 99 (01 Apr 2019 12:03), Max: 99 (01 Apr 2019 12:03)  HR: 98 (01 Apr 2019 12:03) (96 - 98)  BP: 123/73 (01 Apr 2019 12:03) (123/73 - 147/79)  BP(mean): --  RR: 18 (01 Apr 2019 12:03) (18 - 18)  SpO2: 99% (01 Apr 2019 12:03) (99% - 99%)    PHYSICAL EXAM:  Constitutional:NAD  Eyes:HANNAH, EOMI  Ear/Nose/Throat: no thrush, mucositis.  Moist mucous membranes	  Neck:no JVD, no lymphadenopathy, supple  Respiratory: CTA saad  Cardiovascular: S1S2 RRR, no murmurs  Gastrointestinal:soft, nontender,  nondistended (+) BS  Extremities:no e/e/c  Skin:  b/l LE fading erythema, superficial ulcers, left hallux fluid filled blister        LABS:                        8.4    9.39  )-----------( 172      ( 01 Apr 2019 09:09 )             27.6     04-01    138  |  93<L>  |  52<H>  ----------------------------<  181<H>  3.1<L>   |  31  |  1.84<H>    Ca    9.9      01 Apr 2019 07:13  Phos  1.7     03-31  Mg     2.1     03-31            MICROBIOLOGY:    Culture - Blood (03.30.19 @ 22:36)    Specimen Source: .Blood Blood    Culture Results:   No growth to date.    Culture - Blood (03.30.19 @ 22:36)    Specimen Source: .Blood Blood    Culture Results:   No growth to date.    Culture - Urine (03.30.19 @ 21:18)    -  Gentamicin: S <=4    -  Levofloxacin: S <=2    -  Meropenem: S <=1    -  Nitrofurantoin: R >64 Should not be used to treat pyelonephritis    -  Piperacillin/Tazobactam: S <=16    -  Tobramycin: S <=4    -  Trimethoprim/Sulfamethoxazole: S <=2/38    -  Amikacin: S <=16    -  Ampicillin: S <=8 These ampicillin results predict results for amoxicillin    -  Ampicillin/Sulbactam: S <=8/4    -  Aztreonam: S <=4    -  Cefazolin: S <=8 For uncomplicated UTI with K. pneumoniae, E. coli, or P. mirablis: LÓPEZ <=16 is sensitive and LÓPEZ >=32 is resistant. This also predicts results for oral agents cefaclor, cefdinir, cefpodoxime, cefprozil, cefuroxime axetil, cephalexin and locarbef for uncomplicated UTI. Note that some isolates may be susceptible to these agents while testing resistant to cefazolin.    -  Cefepime: S <=4    -  Cefoxitin: S <=8    -  Ceftriaxone: S <=1 Enterobacter, Citrobacter, and Serratia may develop resistance during prolonged therapy    -  Ciprofloxacin: S <=1    -  Ertapenem: S <=1    Specimen Source: .Urine Clean Catch (Midstream)    Culture Results:   >100,000 CFU/ml Proteus mirabilis    Organism Identification: Proteus mirabilis    Organism: Proteus mirabilis    Method Type: [facility]          RADIOLOGY & ADDITIONAL STUDIES:    < from: Xray Chest 1 View AP/PA (03.30.19 @ 17:42) >  IMPRESSION:    Status post median sternotomy and CABG. Valve repair. Heart size cannot   be accurately assessed in this projection. Mild pulmonary edema. No   pleural effusions or pneumothorax. The visualized bones and soft tissues   show no acute findings.    < end of copied text >        < from: Xray Pelvis AP only (03.30.19 @ 17:42) >  IMPRESSION:    No acute displaced fracture or dislocation.   Joint spaces are maintained.   Vascular calcifications.    < end of copied text >        < from: Xray Hip 2-3 Views, Right (03.30.19 @ 17:41) >  IMPRESSION:    No acute displaced fracture or dislocation.   Joint spaces are maintained.   Vascular calcifications.    < end of copied text >        < from: Xray Tibia + Fibula 2 Views, Left (03.30.19 @ 17:40) >  IMPRESSION:    No acute fracture or dislocation.   Joint spaces are maintained.   No soft tissue swelling.   Superior and inferior patellar and calcaneal enthesophytes.  Extensive vascular calcifications     < end of copied text >

## 2019-04-01 NOTE — PROGRESS NOTE ADULT - SUBJECTIVE AND OBJECTIVE BOX
Patient is a 78y old  Female who presents with a chief complaint of fall, right knee pain (31 Mar 2019 19:40)    She denies c/o chest pain, increasing  SOB or palpitations. States right leg gave out at home.,     Allergies    Augmentin (Swelling)  cephalexin (Swelling)    Intolerances      MEDICATIONS  (STANDING):  amiodarone    Tablet 200 milliGRAM(s) Oral daily  aztreonam  IVPB 1000 milliGRAM(s) IV Intermittent every 12 hours  buDESOnide 160 MICROgram(s)/formoterol 4.5 MICROgram(s) Inhaler 2 Puff(s) Inhalation two times a day  carvedilol 6.25 milliGRAM(s) Oral every 12 hours  dextrose 5%. 1000 milliLiter(s) (50 mL/Hr) IV Continuous <Continuous>  dextrose 50% Injectable 12.5 Gram(s) IV Push once  dextrose 50% Injectable 25 Gram(s) IV Push once  dextrose 50% Injectable 25 Gram(s) IV Push once  docusate sodium 100 milliGRAM(s) Oral three times a day  DULoxetine 60 milliGRAM(s) Oral daily  insulin glargine Injectable (LANTUS) 20 Unit(s) SubCutaneous at bedtime  insulin lispro (HumaLOG) corrective regimen sliding scale   SubCutaneous three times a day before meals  insulin lispro (HumaLOG) corrective regimen sliding scale   SubCutaneous at bedtime  insulin lispro Injectable (HumaLOG) 5 Unit(s) SubCutaneous three times a day before meals  isosorbide   mononitrate ER Tablet (IMDUR) 30 milliGRAM(s) Oral daily  levothyroxine 175 MICROGram(s) Oral daily  mesalamine DR (24-Hour) Tablet 2.4 Gram(s) Oral daily  metolazone 2.5 milliGRAM(s) Oral <User Schedule>  pantoprazole    Tablet 40 milliGRAM(s) Oral before breakfast  spironolactone 25 milliGRAM(s) Oral daily  tiotropium 18 MICROgram(s) Capsule 1 Capsule(s) Inhalation daily  torsemide 50 milliGRAM(s) Oral every 12 hours  vancomycin  IVPB 1000 milliGRAM(s) IV Intermittent every 24 hours    MEDICATIONS  (PRN):  acetaminophen   Tablet .. 650 milliGRAM(s) Oral every 6 hours PRN Mild Pain (1 - 3), Moderate Pain (4 - 6)  dextrose 40% Gel 15 Gram(s) Oral once PRN Blood Glucose LESS THAN 70 milliGRAM(s)/deciliter  glucagon  Injectable 1 milliGRAM(s) IntraMuscular once PRN Glucose LESS THAN 70 milligrams/deciliter      PHYSICAL EXAM:  Vital Signs Last 24 Hrs  T(C): 36.6 (2019 05:00), Max: 37.1 (31 Mar 2019 12:00)  T(F): 97.8 (2019 05:00), Max: 98.7 (31 Mar 2019 12:00)  HR: 96 (2019 05:00) (92 - 98)  BP: 147/79 (2019 05:00) (127/79 - 147/79)  BP(mean): --  RR: 18 (2019 05:00) (18 - 18)  SpO2: 99% (2019 05:00) (99% - 100%)  Daily     Daily   I&O's Summary    31 Mar 2019 07:01  -  2019 07:00  --------------------------------------------------------  IN: 420 mL / OUT: 1100 mL / NET: -680 mL        General Appearance: 	 Alert, cooperative,  HEENT: normocephalic, atraumatic  Neck: no JVD,  carotid 2+  bilaterally without bruits  Lungs:  clear to auscultation and percussion bilaterally  Cor:  pmi 5th ICS MCL, regular rate and rhythm, S1 normal intensity, S2 normal intensity, no gallops, murmurs or rubs  Abdomen: soft, non-tender; bowel sounds normal; no masses,  no organomegaly  Extremities: without cyanosis, clubbing., 1-2+ LE edema  Vasc: 1-+ PT and DP pulses;       Labs:  CBC Full  -  ( 31 Mar 2019 09:08 )  WBC Count : 9.01 K/uL  RBC Count : 2.79 M/uL  Hemoglobin : 8.3 g/dL  Hematocrit : 27.0 %  Platelet Count - Automated : 162 K/uL  Mean Cell Volume : 96.8 fl  Mean Cell Hemoglobin : 29.7 pg  Mean Cell Hemoglobin Concentration : 30.7 gm/dL  Auto Neutrophil # : 6.90 K/uL  Auto Lymphocyte # : 1.19 K/uL  Auto Monocyte # : 0.85 K/uL  Auto Eosinophil # : 0.00 K/uL  Auto Basophil # : 0.03 K/uL  Auto Neutrophil % : 76.7 %  Auto Lymphocyte % : 13.2 %  Auto Monocyte % : 9.4 %  Auto Eosinophil % : 0.0 %  Auto Basophil % : 0.3 %        139  |  96  |  51<H>  ----------------------------<  240<H>  3.6   |  31  |  1.92<H>    Ca    9.7      31 Mar 2019 06:52  Phos  1.7       Mg     2.1         TPro  7.4  /  Alb  3.6  /  TBili  0.6  /  DBili  x   /  AST  30  /  ALT  25  /  AlkPhos  76  03-30    CARDIAC MARKERS ( 30 Mar 2019 16:26 )  x     / x     / 134 U/L / x     / x          PT/INR - ( 30 Mar 2019 18:25 )   PT: 14.2 sec;   INR: 1.23 ratio         PTT - ( 30 Mar 2019 18:25 )  PTT:27.5 sec  Urinalysis Basic - ( 30 Mar 2019 18:05 )    Color: Yellow / Appearance: Turbid / S.016 / pH: x  Gluc: x / Ketone: Negative  / Bili: Negative / Urobili: Negative   Blood: x / Protein: 100 / Nitrite: Negative   Leuk Esterase: Large / RBC: 15-25 /hpf / WBC >50 /HPF   Sq Epi: x / Non Sq Epi: OCC / Bacteria: Few        Radiology/Imaging:                Feliz Aiken MD [facility]  [phone]

## 2019-04-02 ENCOUNTER — APPOINTMENT (OUTPATIENT)
Dept: OBGYN | Facility: CLINIC | Age: 79
End: 2019-04-02

## 2019-04-02 LAB
ANION GAP SERPL CALC-SCNC: 14 MMOL/L — SIGNIFICANT CHANGE UP (ref 5–17)
BUN SERPL-MCNC: 53 MG/DL — HIGH (ref 7–23)
CALCIUM SERPL-MCNC: 9.9 MG/DL — SIGNIFICANT CHANGE UP (ref 8.4–10.5)
CHLORIDE SERPL-SCNC: 93 MMOL/L — LOW (ref 96–108)
CO2 SERPL-SCNC: 32 MMOL/L — HIGH (ref 22–31)
CREAT SERPL-MCNC: 1.9 MG/DL — HIGH (ref 0.5–1.3)
GLUCOSE BLDC GLUCOMTR-MCNC: 211 MG/DL — HIGH (ref 70–99)
GLUCOSE BLDC GLUCOMTR-MCNC: 242 MG/DL — HIGH (ref 70–99)
GLUCOSE BLDC GLUCOMTR-MCNC: 295 MG/DL — HIGH (ref 70–99)
GLUCOSE BLDC GLUCOMTR-MCNC: 373 MG/DL — HIGH (ref 70–99)
GLUCOSE SERPL-MCNC: 316 MG/DL — HIGH (ref 70–99)
HCT VFR BLD CALC: 27.4 % — LOW (ref 34.5–45)
HCT VFR BLD CALC: 28.2 % — LOW (ref 34.5–45)
HGB BLD-MCNC: 8.6 G/DL — LOW (ref 11.5–15.5)
HGB BLD-MCNC: 9.3 G/DL — LOW (ref 11.5–15.5)
MAGNESIUM SERPL-MCNC: 2 MG/DL — SIGNIFICANT CHANGE UP (ref 1.6–2.6)
MCHC RBC-ENTMCNC: 29.9 PG — SIGNIFICANT CHANGE UP (ref 27–34)
MCHC RBC-ENTMCNC: 30.5 GM/DL — LOW (ref 32–36)
MCHC RBC-ENTMCNC: 32.4 PG — SIGNIFICANT CHANGE UP (ref 27–34)
MCHC RBC-ENTMCNC: 34 GM/DL — SIGNIFICANT CHANGE UP (ref 32–36)
MCV RBC AUTO: 95.2 FL — SIGNIFICANT CHANGE UP (ref 80–100)
MCV RBC AUTO: 97.9 FL — SIGNIFICANT CHANGE UP (ref 80–100)
PLATELET # BLD AUTO: 171 K/UL — SIGNIFICANT CHANGE UP (ref 150–400)
PLATELET # BLD AUTO: 187 K/UL — SIGNIFICANT CHANGE UP (ref 150–400)
POTASSIUM SERPL-MCNC: 3.3 MMOL/L — LOW (ref 3.5–5.3)
POTASSIUM SERPL-SCNC: 3.3 MMOL/L — LOW (ref 3.5–5.3)
RBC # BLD: 2.88 M/UL — LOW (ref 3.8–5.2)
RBC # BLD: 2.88 M/UL — LOW (ref 3.8–5.2)
RBC # FLD: 15.9 % — HIGH (ref 10.3–14.5)
RBC # FLD: 16.9 % — HIGH (ref 10.3–14.5)
SODIUM SERPL-SCNC: 139 MMOL/L — SIGNIFICANT CHANGE UP (ref 135–145)
T4 FREE SERPL-MCNC: 1.5 NG/DL — SIGNIFICANT CHANGE UP (ref 0.9–1.8)
TSH SERPL-MCNC: 9.7 UIU/ML — HIGH (ref 0.27–4.2)
WBC # BLD: 12.3 K/UL — HIGH (ref 3.8–10.5)
WBC # BLD: 9.37 K/UL — SIGNIFICANT CHANGE UP (ref 3.8–10.5)
WBC # FLD AUTO: 12.3 K/UL — HIGH (ref 3.8–10.5)
WBC # FLD AUTO: 9.37 K/UL — SIGNIFICANT CHANGE UP (ref 3.8–10.5)

## 2019-04-02 PROCEDURE — 72148 MRI LUMBAR SPINE W/O DYE: CPT | Mod: 26

## 2019-04-02 RX ORDER — INSULIN LISPRO 100/ML
VIAL (ML) SUBCUTANEOUS
Qty: 0 | Refills: 0 | Status: DISCONTINUED | OUTPATIENT
Start: 2019-04-02 | End: 2019-04-09

## 2019-04-02 RX ORDER — INSULIN GLARGINE 100 [IU]/ML
32 INJECTION, SOLUTION SUBCUTANEOUS AT BEDTIME
Qty: 0 | Refills: 0 | Status: DISCONTINUED | OUTPATIENT
Start: 2019-04-02 | End: 2019-04-03

## 2019-04-02 RX ORDER — INSULIN LISPRO 100/ML
12 VIAL (ML) SUBCUTANEOUS
Qty: 0 | Refills: 0 | Status: DISCONTINUED | OUTPATIENT
Start: 2019-04-02 | End: 2019-04-03

## 2019-04-02 RX ORDER — LEVOTHYROXINE SODIUM 125 MCG
188 TABLET ORAL DAILY
Qty: 0 | Refills: 0 | Status: DISCONTINUED | OUTPATIENT
Start: 2019-04-02 | End: 2019-04-09

## 2019-04-02 RX ORDER — POTASSIUM CHLORIDE 20 MEQ
20 PACKET (EA) ORAL ONCE
Qty: 0 | Refills: 0 | Status: COMPLETED | OUTPATIENT
Start: 2019-04-02 | End: 2019-04-02

## 2019-04-02 RX ADMIN — Medication 250 MILLIGRAM(S): at 21:24

## 2019-04-02 RX ADMIN — Medication 3: at 08:15

## 2019-04-02 RX ADMIN — Medication 50 MILLIGRAM(S): at 05:07

## 2019-04-02 RX ADMIN — Medication 8 UNIT(S): at 12:45

## 2019-04-02 RX ADMIN — Medication 100 MILLIGRAM(S): at 05:07

## 2019-04-02 RX ADMIN — Medication 100 MILLIGRAM(S): at 15:56

## 2019-04-02 RX ADMIN — INSULIN GLARGINE 32 UNIT(S): 100 INJECTION, SOLUTION SUBCUTANEOUS at 21:51

## 2019-04-02 RX ADMIN — Medication 175 MICROGRAM(S): at 05:07

## 2019-04-02 RX ADMIN — PANTOPRAZOLE SODIUM 40 MILLIGRAM(S): 20 TABLET, DELAYED RELEASE ORAL at 05:08

## 2019-04-02 RX ADMIN — Medication 12 UNIT(S): at 17:23

## 2019-04-02 RX ADMIN — BUDESONIDE AND FORMOTEROL FUMARATE DIHYDRATE 2 PUFF(S): 160; 4.5 AEROSOL RESPIRATORY (INHALATION) at 17:25

## 2019-04-02 RX ADMIN — Medication 2.4 GRAM(S): at 12:46

## 2019-04-02 RX ADMIN — Medication 50 MILLIGRAM(S): at 17:24

## 2019-04-02 RX ADMIN — Medication 100 MILLIGRAM(S): at 21:25

## 2019-04-02 RX ADMIN — DULOXETINE HYDROCHLORIDE 60 MILLIGRAM(S): 30 CAPSULE, DELAYED RELEASE ORAL at 12:45

## 2019-04-02 RX ADMIN — Medication 4: at 17:23

## 2019-04-02 RX ADMIN — Medication 50 MILLIGRAM(S): at 19:38

## 2019-04-02 RX ADMIN — CARVEDILOL PHOSPHATE 6.25 MILLIGRAM(S): 80 CAPSULE, EXTENDED RELEASE ORAL at 19:38

## 2019-04-02 RX ADMIN — ISOSORBIDE MONONITRATE 30 MILLIGRAM(S): 60 TABLET, EXTENDED RELEASE ORAL at 12:45

## 2019-04-02 RX ADMIN — Medication 20 MILLIEQUIVALENT(S): at 17:23

## 2019-04-02 RX ADMIN — AMIODARONE HYDROCHLORIDE 200 MILLIGRAM(S): 400 TABLET ORAL at 05:07

## 2019-04-02 RX ADMIN — BUDESONIDE AND FORMOTEROL FUMARATE DIHYDRATE 2 PUFF(S): 160; 4.5 AEROSOL RESPIRATORY (INHALATION) at 05:08

## 2019-04-02 RX ADMIN — Medication 5: at 12:45

## 2019-04-02 RX ADMIN — TIOTROPIUM BROMIDE 1 CAPSULE(S): 18 CAPSULE ORAL; RESPIRATORY (INHALATION) at 12:47

## 2019-04-02 RX ADMIN — Medication 8 UNIT(S): at 08:15

## 2019-04-02 RX ADMIN — CARVEDILOL PHOSPHATE 6.25 MILLIGRAM(S): 80 CAPSULE, EXTENDED RELEASE ORAL at 05:07

## 2019-04-02 RX ADMIN — SPIRONOLACTONE 25 MILLIGRAM(S): 25 TABLET, FILM COATED ORAL at 05:07

## 2019-04-02 NOTE — PROGRESS NOTE ADULT - SUBJECTIVE AND OBJECTIVE BOX
Chief Complaint: 79 y/o Type 2 DM, hypothyroid, recently admitted with CHF exacerbation, re-admitted with recurrent falls and suspected UTI.    FS much higher today, PO intake good.  Patient afebrile.    MEDICATIONS  (STANDING):  amiodarone    Tablet 200 milliGRAM(s) Oral daily  aztreonam  IVPB 1000 milliGRAM(s) IV Intermittent every 12 hours  buDESOnide 160 MICROgram(s)/formoterol 4.5 MICROgram(s) Inhaler 2 Puff(s) Inhalation two times a day  carvedilol 6.25 milliGRAM(s) Oral every 12 hours  dextrose 5%. 1000 milliLiter(s) (50 mL/Hr) IV Continuous <Continuous>  dextrose 50% Injectable 12.5 Gram(s) IV Push once  dextrose 50% Injectable 25 Gram(s) IV Push once  dextrose 50% Injectable 25 Gram(s) IV Push once  docusate sodium 100 milliGRAM(s) Oral three times a day  DULoxetine 60 milliGRAM(s) Oral daily  insulin glargine Injectable (LANTUS) 32 Unit(s) SubCutaneous at bedtime  insulin lispro (HumaLOG) corrective regimen sliding scale   SubCutaneous three times a day before meals  insulin lispro (HumaLOG) corrective regimen sliding scale   SubCutaneous at bedtime  insulin lispro Injectable (HumaLOG) 12 Unit(s) SubCutaneous three times a day before meals  isosorbide   mononitrate ER Tablet (IMDUR) 30 milliGRAM(s) Oral daily  levothyroxine 175 MICROGram(s) Oral daily  mesalamine DR (24-Hour) Tablet 2.4 Gram(s) Oral daily  metolazone 2.5 milliGRAM(s) Oral <User Schedule>  pantoprazole    Tablet 40 milliGRAM(s) Oral before breakfast  spironolactone 25 milliGRAM(s) Oral daily  tiotropium 18 MICROgram(s) Capsule 1 Capsule(s) Inhalation daily  torsemide 50 milliGRAM(s) Oral every 12 hours  vancomycin  IVPB 1000 milliGRAM(s) IV Intermittent <User Schedule>    MEDICATIONS  (PRN):  acetaminophen   Tablet .. 650 milliGRAM(s) Oral every 6 hours PRN Mild Pain (1 - 3), Moderate Pain (4 - 6)  dextrose 40% Gel 15 Gram(s) Oral once PRN Blood Glucose LESS THAN 70 milliGRAM(s)/deciliter  glucagon  Injectable 1 milliGRAM(s) IntraMuscular once PRN Glucose LESS THAN 70 milligrams/deciliter      Allergies    Augmentin (Swelling)  cephalexin (Swelling)    Intolerances        PHYSICAL EXAM:  VITALS: T(C): 36.9 (04-02-19 @ 20:01)  T(F): 98.4 (04-02-19 @ 20:01), Max: 99.2 (04-02-19 @ 12:01)  HR: 101 (04-02-19 @ 20:01) (98 - 101)  BP: 139/74 (04-02-19 @ 20:01) (139/74 - 149/80)  RR:  (17 - 17)  SpO2:  (97% - 100%)  GENERAL: NAD,   EYES: No proptosis,  HEENT:  Atraumatic, Normocephalic,   RESPIRATORY: Clear to auscultation bilaterally  CARDIOVASCULAR: Regular rhythm; No murmurs; tr. peripheral edema  GI: Soft, nontender, non distended, normal bowel sounds  SKIN: Dry, intact, No rashes or lesions  MUSCULOSKELETAL: decreased strength  NEURO: No focal deficits.  PSYCH: Alert and oriented x 3, normal affect, normal mood      POCT Blood Glucose.: 242 mg/dL (04-02-19 @ 16:44)  POCT Blood Glucose.: 373 mg/dL (04-02-19 @ 12:00)  POCT Blood Glucose.: 295 mg/dL (04-02-19 @ 07:24)  POCT Blood Glucose.: 300 mg/dL (04-01-19 @ 21:34)  POCT Blood Glucose.: 286 mg/dL (04-01-19 @ 17:17)  POCT Blood Glucose.: 267 mg/dL (04-01-19 @ 12:05)  POCT Blood Glucose.: 179 mg/dL (04-01-19 @ 07:57)  POCT Blood Glucose.: 298 mg/dL (03-31-19 @ 21:43)  POCT Blood Glucose.: 220 mg/dL (03-31-19 @ 17:06)  POCT Blood Glucose.: 268 mg/dL (03-31-19 @ 11:49)  POCT Blood Glucose.: 231 mg/dL (03-31-19 @ 08:23)  POCT Blood Glucose.: 204 mg/dL (03-31-19 @ 07:46)  POCT Blood Glucose.: 350 mg/dL (03-31-19 @ 01:02)  POCT Blood Glucose.: 336 mg/dL (03-31-19 @ 00:01)  POCT Blood Glucose.: 352 mg/dL (03-30-19 @ 23:58)  POCT Blood Glucose.: 345 mg/dL (03-30-19 @ 21:45)                            8.6    9.37  )-----------( 171      ( 02 Apr 2019 09:16 )             28.2       04-02    139  |  93<L>  |  53<H>  ----------------------------<  316<H>  3.3<L>   |  32<H>  |  1.90<H>    EGFR if : 29<L>  EGFR if non : 25<L>    Ca    9.9      04-02  Mg     2.0     04-02  Phos  1.7     03-31        Thyroid Function Tests:  04-02 @ 09:19 TSH 9.70 FreeT4 1.5 T3 -- Anti TPO -- Anti Thyroglobulin Ab -- TSI --  03-25 @ 08:52 TSH 5.27 FreeT4 -- T3 -- Anti TPO -- Anti Thyroglobulin Ab -- TSI --      Hemoglobin A1C, Whole Blood: 7.4 % <H> [4.0 - 5.6] (03-25-19 @ 08:55)

## 2019-04-02 NOTE — PROGRESS NOTE ADULT - ASSESSMENT
77yo female with chronic diastolic CHF, bioprosthetic MVR, PAF, VICKY, PAD, CKD, DM, HLD and LSS admitted iwth recurrent falls. She has prior history of LSS. CV stable.  MRI L/S spine confirmed extensive lumbar spinal stenosis.    Rec:  Continue current CV meds  Monitor lytes, renal function, Cr is stable  Neurology followup  PT  Likely SAMANTHA Jimenez MD  [facility]

## 2019-04-02 NOTE — PROGRESS NOTE ADULT - ASSESSMENT
77 yo F w/ hx of moderate MS s/p bioprosthetic mitral valve, severe pulmonary HTN, DM2, anemia, diastolic CHF, hypothyroidism, paroxysmal atrial fibrillation, HTN78 y/o Type 2 DM, hypothyroid, recently admitted with CHF exacerbation, re-admitted with recurrent falls and suspected UTI.    Type 2 DM insulin resistant at home on Tresiba insulin 60 units daily, and humalog 30 units before meals. Other treatment includes Bydureon 2 mg weekly. HbA1c during recent admission was 7.4 %. During psast admission noted with significantly reduced insulin requirenents, discharged on Lantus insulin 24 units at HS and Humalog 5 units per meal.  Patient re-admitted after recurrent falls at home, and susp. UTI, started on similar dose insulin, noted with hyperglycemia.     Hypothyroidism- treated with synthroid 175 mcg daily. Last test as outpatient recently reported Good, Here with mild elevated TSH 5.27. repeat TSH this admission  9.70 uIU/mL, FT4 1.5.  C/O fatigue, weight stable till recently. Says she is compliant with medications.     Patient on very high amounts of insulin at home. Decreased requirements last admission are most probably related to CHF, and as it resolves expect insulin requirements sanna increase.    Suggest:  Increase Lantus insulin 32 units at HS.  Increase Humalog insulin to 12 units per meal.  Mid scale humalog.  Increase synthroid 188 mcg daily, repeat TFT's in 3 weeks.

## 2019-04-02 NOTE — PROGRESS NOTE ADULT - ASSESSMENT
79yo female with chronic diastolic CHF, bioprosthetic MVR, PAF, VICKY, PAD, CKD, DM, HLD and LSS admitted iwth recurrent falls. She has prior history of LSS. CV stable.  MRI L/S spine confirmed extensive lumbar spinal stenosis.    Rec:  Continue current CV meds  Monitor lytes, renal function  Neurology followup  PT  Likely SAMANTHA Jimenez MD  [facility]

## 2019-04-02 NOTE — PROGRESS NOTE ADULT - SUBJECTIVE AND OBJECTIVE BOX
TANIKA OBRIEN    Still with LE pain and weakness and R knee pain.  Has not ambulated.  No new cardiac symptoms.       Allergies    Augmentin (Swelling)  cephalexin (Swelling)    MEDICATIONS  (STANDING):  amiodarone    Tablet 200 milliGRAM(s) Oral daily  aztreonam  IVPB 1000 milliGRAM(s) IV Intermittent every 12 hours  buDESOnide 160 MICROgram(s)/formoterol 4.5 MICROgram(s) Inhaler 2 Puff(s) Inhalation two times a day  carvedilol 6.25 milliGRAM(s) Oral every 12 hours  dextrose 5%. 1000 milliLiter(s) (50 mL/Hr) IV Continuous <Continuous>  dextrose 50% Injectable 12.5 Gram(s) IV Push once  dextrose 50% Injectable 25 Gram(s) IV Push once  dextrose 50% Injectable 25 Gram(s) IV Push once  docusate sodium 100 milliGRAM(s) Oral three times a day  DULoxetine 60 milliGRAM(s) Oral daily  insulin glargine Injectable (LANTUS) 24 Unit(s) SubCutaneous at bedtime  insulin lispro (HumaLOG) corrective regimen sliding scale   SubCutaneous three times a day before meals  insulin lispro (HumaLOG) corrective regimen sliding scale   SubCutaneous at bedtime  insulin lispro Injectable (HumaLOG) 8 Unit(s) SubCutaneous three times a day before meals  isosorbide   mononitrate ER Tablet (IMDUR) 30 milliGRAM(s) Oral daily  levothyroxine 175 MICROGram(s) Oral daily  mesalamine DR (24-Hour) Tablet 2.4 Gram(s) Oral daily  metolazone 2.5 milliGRAM(s) Oral <User Schedule>  pantoprazole    Tablet 40 milliGRAM(s) Oral before breakfast  spironolactone 25 milliGRAM(s) Oral daily  tiotropium 18 MICROgram(s) Capsule 1 Capsule(s) Inhalation daily  torsemide 50 milliGRAM(s) Oral every 12 hours  vancomycin  IVPB 1000 milliGRAM(s) IV Intermittent <User Schedule>    MEDICATIONS  (PRN):  acetaminophen   Tablet .. 650 milliGRAM(s) Oral every 6 hours PRN Mild Pain (1 - 3), Moderate Pain (4 - 6)  dextrose 40% Gel 15 Gram(s) Oral once PRN Blood Glucose LESS THAN 70 milliGRAM(s)/deciliter  glucagon  Injectable 1 milliGRAM(s) IntraMuscular once PRN Glucose LESS THAN 70 milligrams/deciliter    PHYSICAL EXAM:  Vital Signs Last 24 Hrs  T(C): 37.3 (02 Apr 2019 12:01), Max: 37.3 (02 Apr 2019 12:01)  T(F): 99.2 (02 Apr 2019 12:01), Max: 99.2 (02 Apr 2019 12:01)  HR: 99 (02 Apr 2019 12:01) (98 - 99)  BP: 142/76 (02 Apr 2019 12:01) (129/78 - 149/80)  BP(mean): --  RR: 17 (02 Apr 2019 12:01) (17 - 18)  SpO2: 99% (02 Apr 2019 12:01) (98% - 100%)  Daily     Daily   I&O's Summary    01 Apr 2019 07:01  -  02 Apr 2019 07:00  --------------------------------------------------------  IN: 840 mL / OUT: 4050 mL / NET: -3210 mL    General Appearance: 	 Alert, cooperative, no distress  Neck: no JVD  Lungs:  clear to auscultation and percussion bilaterally  Cor:  pmi 5th ICS MCL, regular rate and rhythm, S1 normal intensity, S2 normal intensity  Abdomen:	 soft, non-tender; bowel sounds normal  Extremities: No significant edema      EKG:  Telemetry:    Labs:  CBC Full  -  ( 02 Apr 2019 09:16 )  WBC Count : 9.37 K/uL  RBC Count : 2.88 M/uL  Hemoglobin : 8.6 g/dL  Hematocrit : 28.2 %  Platelet Count - Automated : 171 K/uL  Mean Cell Volume : 97.9 fl  Mean Cell Hemoglobin : 29.9 pg  Mean Cell Hemoglobin Concentration : 30.5 gm/dL  Auto Neutrophil # : x  Auto Lymphocyte # : x  Auto Monocyte # : x  Auto Eosinophil # : x  Auto Basophil # : x  Auto Neutrophil % : x  Auto Lymphocyte % : x  Auto Monocyte % : x  Auto Eosinophil % : x  Auto Basophil % : x

## 2019-04-02 NOTE — PHARMACOTHERAPY INTERVENTION NOTE - COMMENTS
79 yo F with DM A1C 7.4 takes bydureon, tresiba 60 units subcutaneously at bedtime, and novolog 16-28 units subcutaneously three times daily. She is currently on lantus 24 units subcutaneously at bedtime, humalog 8 units subcutaneously three times daily, and low dose correctional scale. BG consistently in 200s-300s likely due to decreased insulin use and bydureon effect wearing off from last Sunday's dose. Recommendation made to increase lantus to 34 units subcutaneously at bedtime, humalog to 12 units subcutaneously three times daily, and humalog low dose correctional scale.    Keaton Loco, PharmD  [phone]

## 2019-04-02 NOTE — PROGRESS NOTE ADULT - SUBJECTIVE AND OBJECTIVE BOX
TANIKA OBRIEN    Still with BL LE pain and weakness and R knee pain.  No new cardiac symptoms.    Allergies    Augmentin (Swelling)  cephalexin (Swelling)    MEDICATIONS  (STANDING):  amiodarone    Tablet 200 milliGRAM(s) Oral daily  aztreonam  IVPB 1000 milliGRAM(s) IV Intermittent every 12 hours  buDESOnide 160 MICROgram(s)/formoterol 4.5 MICROgram(s) Inhaler 2 Puff(s) Inhalation two times a day  carvedilol 6.25 milliGRAM(s) Oral every 12 hours  dextrose 5%. 1000 milliLiter(s) (50 mL/Hr) IV Continuous <Continuous>  dextrose 50% Injectable 12.5 Gram(s) IV Push once  dextrose 50% Injectable 25 Gram(s) IV Push once  dextrose 50% Injectable 25 Gram(s) IV Push once  docusate sodium 100 milliGRAM(s) Oral three times a day  DULoxetine 60 milliGRAM(s) Oral daily  insulin glargine Injectable (LANTUS) 24 Unit(s) SubCutaneous at bedtime  insulin lispro (HumaLOG) corrective regimen sliding scale   SubCutaneous three times a day before meals  insulin lispro (HumaLOG) corrective regimen sliding scale   SubCutaneous at bedtime  insulin lispro Injectable (HumaLOG) 8 Unit(s) SubCutaneous three times a day before meals  isosorbide   mononitrate ER Tablet (IMDUR) 30 milliGRAM(s) Oral daily  levothyroxine 175 MICROGram(s) Oral daily  mesalamine DR (24-Hour) Tablet 2.4 Gram(s) Oral daily  metolazone 2.5 milliGRAM(s) Oral <User Schedule>  pantoprazole    Tablet 40 milliGRAM(s) Oral before breakfast  spironolactone 25 milliGRAM(s) Oral daily  tiotropium 18 MICROgram(s) Capsule 1 Capsule(s) Inhalation daily  torsemide 50 milliGRAM(s) Oral every 12 hours  vancomycin  IVPB 1000 milliGRAM(s) IV Intermittent <User Schedule>    MEDICATIONS  (PRN):  acetaminophen   Tablet .. 650 milliGRAM(s) Oral every 6 hours PRN Mild Pain (1 - 3), Moderate Pain (4 - 6)  dextrose 40% Gel 15 Gram(s) Oral once PRN Blood Glucose LESS THAN 70 milliGRAM(s)/deciliter  glucagon  Injectable 1 milliGRAM(s) IntraMuscular once PRN Glucose LESS THAN 70 milligrams/deciliter    PHYSICAL EXAM:  Vital Signs Last 24 Hrs  T(C): 37.3 (02 Apr 2019 12:01), Max: 37.3 (02 Apr 2019 12:01)  T(F): 99.2 (02 Apr 2019 12:01), Max: 99.2 (02 Apr 2019 12:01)  HR: 99 (02 Apr 2019 12:01) (98 - 99)  BP: 142/76 (02 Apr 2019 12:01) (129/78 - 149/80)  BP(mean): --  RR: 17 (02 Apr 2019 12:01) (17 - 18)  SpO2: 99% (02 Apr 2019 12:01) (98% - 100%)  Daily     Daily   I&O's Summary    01 Apr 2019 07:01  -  02 Apr 2019 07:00  --------------------------------------------------------  IN: 840 mL / OUT: 4050 mL / NET: -3210 mL    General Appearance: 	 Alert, cooperative, no distress  Neck: no JVD  Lungs:  clear to auscultation and percussion bilaterally  Cor:  pmi 5th ICS MCL, regular rate and rhythm, S1 normal intensity, S2 normal intensity  Abdomen:	 soft, non-tender; bowel sounds normal  Extremities: No significant edema      EKG:  Telemetry:    Labs:  CBC Full  -  ( 02 Apr 2019 09:16 )  WBC Count : 9.37 K/uL  RBC Count : 2.88 M/uL  Hemoglobin : 8.6 g/dL  Hematocrit : 28.2 %  Platelet Count - Automated : 171 K/uL  Mean Cell Volume : 97.9 fl  Mean Cell Hemoglobin : 29.9 pg  Mean Cell Hemoglobin Concentration : 30.5 gm/dL  Auto Neutrophil # : x  Auto Lymphocyte # : x  Auto Monocyte # : x  Auto Eosinophil # : x  Auto Basophil # : x  Auto Neutrophil % : x  Auto Lymphocyte % : x  Auto Monocyte % : x  Auto Eosinophil % : x  Auto Basophil % : x    Basic Metabolic Panel in AM (04.02.19 @ 06:29)    Sodium, Serum: 139 mmol/L    Potassium, Serum: 3.3 mmol/L    Chloride, Serum: 93 mmol/L    Carbon Dioxide, Serum: 32 mmol/L    Anion Gap, Serum: 14 mmol/L    Blood Urea Nitrogen, Serum: 53 mg/dL    Creatinine, Serum: 1.90 mg/dL    Glucose, Serum: 316 mg/dL    Calcium, Total Serum: 9.9 mg/dL    eGFR if Non : 25: Interpretative comment  The units for eGFR are mL/min/1.73M2 (normalized body surface area). The  eGFR is calculated from a serum creatinine using the CKD-EPI equation.  Other variables required for calculation are race, age and sex. Among  patients with chronic kidney disease (CKD), the eGFR is useful in  determining the stage of disease according to KDOQI CKD classification.  All eGFR results are reported numerically with the following  interpretation.          GFR                    With                 Without     (ml/min/1.73 m2)    Kidney Damage       Kidney Damage        >= 90                    Stage 1                     Normal        60-89                    Stage 2                     Decreased GFR        30-59     Stage 3                     Stage 3        15-29                    Stage 4                     Stage 4        < 15                      Stage 5                     Stage 5  Each stage of CKD assumes that the associated GFR level has been in  effect for at least 3 months. Determination of stages one and two (with  eGFR > 59 ml/min/m2) requires estimation of kidney damage for at least 3  months as defined by structural or functional abnormalities.  Limitations: All estimates of GFR will be less accurate for patients at  extremes of muscle mass (including but not limited to frail elderly,  critically ill, or cancer patients), those with unusual diets, and those  with conditions associated with reduced secretion or extrarenal  elimination of creatinine. The eGFR equation is not recommended for use  in patients with unstable creatinine levels. mL/min/1.73M2    eGFR if African American: 29 mL/min/1.73M2

## 2019-04-02 NOTE — CHART NOTE - NSCHARTNOTEFT_GEN_A_CORE
Patient noted to have blood tinged urine in canister of female catheter.  Notified Dr. Lopez - no intervention ordered at this time.  Discussed with BAL Perdue who will follow up repeat CBC tonight.

## 2019-04-02 NOTE — PROGRESS NOTE ADULT - SUBJECTIVE AND OBJECTIVE BOX
Patient is a 78y old  Female who presents with a chief complaint of fall, right knee pain (02 Apr 2019 12:42)      SUBJECTIVE / OVERNIGHT EVENTS: MRI LS spine c/w spinal stenosis, refuses MRI right knee, on Azactam for UTI, awaiting PT eval    MEDICATIONS  (STANDING):  amiodarone    Tablet 200 milliGRAM(s) Oral daily  aztreonam  IVPB 1000 milliGRAM(s) IV Intermittent every 12 hours  buDESOnide 160 MICROgram(s)/formoterol 4.5 MICROgram(s) Inhaler 2 Puff(s) Inhalation two times a day  carvedilol 6.25 milliGRAM(s) Oral every 12 hours  dextrose 5%. 1000 milliLiter(s) (50 mL/Hr) IV Continuous <Continuous>  dextrose 50% Injectable 12.5 Gram(s) IV Push once  dextrose 50% Injectable 25 Gram(s) IV Push once  dextrose 50% Injectable 25 Gram(s) IV Push once  docusate sodium 100 milliGRAM(s) Oral three times a day  DULoxetine 60 milliGRAM(s) Oral daily  insulin glargine Injectable (LANTUS) 24 Unit(s) SubCutaneous at bedtime  insulin lispro (HumaLOG) corrective regimen sliding scale   SubCutaneous three times a day before meals  insulin lispro (HumaLOG) corrective regimen sliding scale   SubCutaneous at bedtime  insulin lispro Injectable (HumaLOG) 8 Unit(s) SubCutaneous three times a day before meals  isosorbide   mononitrate ER Tablet (IMDUR) 30 milliGRAM(s) Oral daily  levothyroxine 175 MICROGram(s) Oral daily  mesalamine DR (24-Hour) Tablet 2.4 Gram(s) Oral daily  metolazone 2.5 milliGRAM(s) Oral <User Schedule>  pantoprazole    Tablet 40 milliGRAM(s) Oral before breakfast  spironolactone 25 milliGRAM(s) Oral daily  tiotropium 18 MICROgram(s) Capsule 1 Capsule(s) Inhalation daily  torsemide 50 milliGRAM(s) Oral every 12 hours  vancomycin  IVPB 1000 milliGRAM(s) IV Intermittent <User Schedule>    MEDICATIONS  (PRN):  acetaminophen   Tablet .. 650 milliGRAM(s) Oral every 6 hours PRN Mild Pain (1 - 3), Moderate Pain (4 - 6)  dextrose 40% Gel 15 Gram(s) Oral once PRN Blood Glucose LESS THAN 70 milliGRAM(s)/deciliter  glucagon  Injectable 1 milliGRAM(s) IntraMuscular once PRN Glucose LESS THAN 70 milligrams/deciliter      Vital Signs Last 24 Hrs  T(F): 99.2 (04-02-19 @ 12:01), Max: 99.2 (04-02-19 @ 12:01)  HR: 99 (04-02-19 @ 12:01) (98 - 99)  BP: 142/76 (04-02-19 @ 12:01) (129/78 - 149/80)  RR: 17 (04-02-19 @ 12:01) (17 - 18)  SpO2: 99% (04-02-19 @ 12:01) (98% - 100%)  Telemetry:   CAPILLARY BLOOD GLUCOSE      POCT Blood Glucose.: 373 mg/dL (02 Apr 2019 12:00)  POCT Blood Glucose.: 295 mg/dL (02 Apr 2019 07:24)  POCT Blood Glucose.: 300 mg/dL (01 Apr 2019 21:34)  POCT Blood Glucose.: 286 mg/dL (01 Apr 2019 17:17)    I&O's Summary    01 Apr 2019 07:01  -  02 Apr 2019 07:00  --------------------------------------------------------  IN: 840 mL / OUT: 4050 mL / NET: -3210 mL        PHYSICAL EXAM:  GENERAL: NAD, well-developed  HEAD:  Atraumatic, Normocephalic  EYES: EOMI, PERRLA, conjunctiva and sclera clear  NECK: Supple, No JVD  CHEST/LUNG: Clear to auscultation bilaterally; No wheeze  HEART: Regular rate and rhythm; No murmurs, rubs, or gallops  ABDOMEN: Soft, Nontender, Nondistended; Bowel sounds present  EXTREMITIES:  2+ Peripheral Pulses, No clubbing, cyanosis, or edema  PSYCH: AAOx3  NEUROLOGY: non-focal  SKIN: No rashes or lesions    LABS:                        8.6    9.37  )-----------( 171      ( 02 Apr 2019 09:16 )             28.2     04-02    139  |  93<L>  |  53<H>  ----------------------------<  316<H>  3.3<L>   |  32<H>  |  1.90<H>    Ca    9.9      02 Apr 2019 06:29  Mg     2.0     04-02                RADIOLOGY & ADDITIONAL TESTS:    Imaging Personally Reviewed:    Consultant(s) Notes Reviewed:      Care Discussed with Consultants/Other Providers:

## 2019-04-02 NOTE — PROGRESS NOTE ADULT - SUBJECTIVE AND OBJECTIVE BOX
[facility]      Seen earlier today, and examined.  - Today, patient is without complaints.           *****MEDICATIONS: Current medication reviewed and documented.    MEDICATIONS  (STANDING):  amiodarone    Tablet 200 milliGRAM(s) Oral daily  aztreonam  IVPB 1000 milliGRAM(s) IV Intermittent every 12 hours  buDESOnide 160 MICROgram(s)/formoterol 4.5 MICROgram(s) Inhaler 2 Puff(s) Inhalation two times a day  carvedilol 6.25 milliGRAM(s) Oral every 12 hours  dextrose 5%. 1000 milliLiter(s) (50 mL/Hr) IV Continuous <Continuous>  dextrose 50% Injectable 12.5 Gram(s) IV Push once  dextrose 50% Injectable 25 Gram(s) IV Push once  dextrose 50% Injectable 25 Gram(s) IV Push once  docusate sodium 100 milliGRAM(s) Oral three times a day  DULoxetine 60 milliGRAM(s) Oral daily  insulin glargine Injectable (LANTUS) 24 Unit(s) SubCutaneous at bedtime  insulin lispro (HumaLOG) corrective regimen sliding scale   SubCutaneous three times a day before meals  insulin lispro (HumaLOG) corrective regimen sliding scale   SubCutaneous at bedtime  insulin lispro Injectable (HumaLOG) 12 Unit(s) SubCutaneous three times a day before meals  isosorbide   mononitrate ER Tablet (IMDUR) 30 milliGRAM(s) Oral daily  levothyroxine 175 MICROGram(s) Oral daily  mesalamine DR (24-Hour) Tablet 2.4 Gram(s) Oral daily  metolazone 2.5 milliGRAM(s) Oral <User Schedule>  pantoprazole    Tablet 40 milliGRAM(s) Oral before breakfast  potassium chloride    Tablet ER 20 milliEquivalent(s) Oral once  spironolactone 25 milliGRAM(s) Oral daily  tiotropium 18 MICROgram(s) Capsule 1 Capsule(s) Inhalation daily  torsemide 50 milliGRAM(s) Oral every 12 hours  vancomycin  IVPB 1000 milliGRAM(s) IV Intermittent <User Schedule>    MEDICATIONS  (PRN):  acetaminophen   Tablet .. 650 milliGRAM(s) Oral every 6 hours PRN Mild Pain (1 - 3), Moderate Pain (4 - 6)  dextrose 40% Gel 15 Gram(s) Oral once PRN Blood Glucose LESS THAN 70 milliGRAM(s)/deciliter  glucagon  Injectable 1 milliGRAM(s) IntraMuscular once PRN Glucose LESS THAN 70 milligrams/deciliter          ***** VITAL SIGNS:  T(F): 99.2 (19 @ 12:01), Max: 99.2 (19 @ 12:01)  HR: 99 (19 @ 12:01) (98 - 99)  BP: 142/76 (19 @ 12:01) (129/78 - 149/80)  RR: 17 (19 @ 12:01) (17 - 18)  SpO2: 99% (19 @ 12:01) (98% - 100%)  Wt(kg): --  ,   I&O's Summary    2019 07:01  -  2019 07:00  --------------------------------------------------------  IN: 840 mL / OUT: 4050 mL / NET: -3210 mL             *****PHYSICAL EXAM:   Alert oriented x 3   Attention comprehension are fair. Able to name, repeat, read without any difficulty.   Able to follow 3 step commands.     EOMI fundi not visualized,  VFF to confrontration  No facial asymmetry   Tongue is midline   Palate elevates symmetrically   Moving both upper ext spont  LE   left iliposoas 3/5 hamstrings/quads 3/5 dorsiflexion 5/5   Right iliopsoas 2/5 hamstrings/quad 3/5 dorsiflexion 5/5     Reflexes are diminished  throughout   sensation is decreased in a patchy distribution   pt has pain over R knee with swelling/warmth   Gait : not assessed.  B/L down going toes   b./l le with pitting edema R progresses to the knee             *****LAB AND IMAGIN.6    9.37  )-----------( 171      ( 2019 09:16 )             28.2               -    139  |  93<L>  |  53<H>  ----------------------------<  316<H>  3.3<L>   |  32<H>  |  1.90<H>    Ca    9.9      2019 06:29  Mg     2.0                < from: MR Lumbar Spine No Cont (19 @ 06:44) >  This is a limited study, the   patient aborted the rest of this study.    Loss of normal lumbar lordosis is seen which could be due to positioning   or muscle spasm.    Disc desiccation is seen throughout the lower thoracic and lumbar spine   region which is secondary to degenerative changes.    Disc desiccation is seen throughout the lower thoracic lumbar spine   region which is secondary to degenerative changes.    There is evidence of prominent spinal stenosis seen involving the 12   L1-L5 S1 levels. Complete MRI of the lumbar spine is recommended to   better evaluate these findings.    Impression: Prominent spinal stenosis at multiple levels as described   above.          < end of copied text >                  [All pertinent recent Imaging/Reports reviewed]           *****A S S E S S M E N T   A N D   P L A N :    79 yo woman w/ hx of moderate MS s/p bioprosthetic mitral valve, diastolic CHF, paroxsmal afib (not on A/C in setting of bleeding), HTN, severe pulmonary HTN, DMT2, CKD III, anemia, with post menopausal vaginal bleeding, UC, VICKY on 3L NC (not on CPAP/BIPAP) presents from home in setting of multiple falls the past 2 days resulting with right knee pain. Patient had a fall yesterday from 1 step while walking out of the house and tripped. Per patient states that her right leg had a buckling/collapsing sensation. Patient went to [facility] ED on 3/29 and had an xray which did not reveal fracture and sent home with cyclobenzaprine and Lidoderm patch. Patient last use of medication on 3/29. Patient had another fall while attempting to ambulate to the bathroom. She described that her right leg buckled under her causing her to fall. It was difficult for her to get up on her own and required the assistance of her  and son-in-law to get up. Patient denies any preceding symptoms of dizziness/lightheadedness, shortness of breath, CP, palpitations preceding the events. Endorses chronic back pain with no new characteristics. Patient has been getting around home with use of wheelchair the past 4 months. Requires assistance of her  to get around because of chronic weakness. Denies development of new numbness of lower extremities. Does not utilize any other assistive devices. Has not had PT outpatient. Patient also has had chronic LE wounds that were dressed from last discharge on 3/27. Denies changing of dressing. Denies fevers, chills, sweats.    pt with hx of spinal stenosis recently has been using a wheelchair for 2 weeks, then got up and fell taken to [facility], discharged then fell again at home and was brought to [facility].       Problem/Recommendations 1: traumatic pain over R knee   would get ct of the R knee   consider rheum consult   pt refusing mri of the knee, wants to do open mri as outpt.   PT consult for discharge safety.       Problem/Recommendations 2: spinal stenosis with symptoms of R leg buckling likely due to spinal stenosis +/- deconditioning   no sign of radiculopathy   pt unable to tolerate the mri due to claustrophobia.   evidence of multi level degenerative changes, with moderate spinal stenosis form l1- l5            Thank you for allowing me to participate in the care of this patient. Please do not hesitate to call me if you have any  questions.        ________________  Simran Mattson MD  [facility] ([facility]  [phone]      30 minutes spent on total encounter; more than 50 % of the visit was  spent counseling about plan of care, compliance to diet/exercise and medication regimen and or  coordinating care by the attending physician.      It is advised that s stroke patients follow up with BAL De Jesus @ [phone] in 1- 2 weeks.   Others please follow up with Dr. Michael Nissenbaum [phone]

## 2019-04-03 DIAGNOSIS — R31.0 GROSS HEMATURIA: ICD-10-CM

## 2019-04-03 DIAGNOSIS — N93.9 ABNORMAL UTERINE AND VAGINAL BLEEDING, UNSPECIFIED: ICD-10-CM

## 2019-04-03 LAB
ANION GAP SERPL CALC-SCNC: 14 MMOL/L — SIGNIFICANT CHANGE UP (ref 5–17)
BUN SERPL-MCNC: 56 MG/DL — HIGH (ref 7–23)
CALCIUM SERPL-MCNC: 10.1 MG/DL — SIGNIFICANT CHANGE UP (ref 8.4–10.5)
CHLORIDE SERPL-SCNC: 93 MMOL/L — LOW (ref 96–108)
CO2 SERPL-SCNC: 34 MMOL/L — HIGH (ref 22–31)
CREAT SERPL-MCNC: 1.94 MG/DL — HIGH (ref 0.5–1.3)
GLUCOSE BLDC GLUCOMTR-MCNC: 172 MG/DL — HIGH (ref 70–99)
GLUCOSE BLDC GLUCOMTR-MCNC: 220 MG/DL — HIGH (ref 70–99)
GLUCOSE BLDC GLUCOMTR-MCNC: 258 MG/DL — HIGH (ref 70–99)
GLUCOSE BLDC GLUCOMTR-MCNC: 265 MG/DL — HIGH (ref 70–99)
GLUCOSE BLDC GLUCOMTR-MCNC: 304 MG/DL — HIGH (ref 70–99)
GLUCOSE SERPL-MCNC: 174 MG/DL — HIGH (ref 70–99)
HCT VFR BLD CALC: 29.9 % — LOW (ref 34.5–45)
HGB BLD-MCNC: 9.2 G/DL — LOW (ref 11.5–15.5)
MCHC RBC-ENTMCNC: 30.5 PG — SIGNIFICANT CHANGE UP (ref 27–34)
MCHC RBC-ENTMCNC: 30.8 GM/DL — LOW (ref 32–36)
MCV RBC AUTO: 99 FL — SIGNIFICANT CHANGE UP (ref 80–100)
PLATELET # BLD AUTO: 182 K/UL — SIGNIFICANT CHANGE UP (ref 150–400)
POTASSIUM SERPL-MCNC: 3.6 MMOL/L — SIGNIFICANT CHANGE UP (ref 3.5–5.3)
POTASSIUM SERPL-SCNC: 3.6 MMOL/L — SIGNIFICANT CHANGE UP (ref 3.5–5.3)
RBC # BLD: 3.02 M/UL — LOW (ref 3.8–5.2)
RBC # FLD: 16.9 % — HIGH (ref 10.3–14.5)
SODIUM SERPL-SCNC: 141 MMOL/L — SIGNIFICANT CHANGE UP (ref 135–145)
WBC # BLD: 10.36 K/UL — SIGNIFICANT CHANGE UP (ref 3.8–10.5)
WBC # FLD AUTO: 10.36 K/UL — SIGNIFICANT CHANGE UP (ref 3.8–10.5)

## 2019-04-03 PROCEDURE — 99223 1ST HOSP IP/OBS HIGH 75: CPT

## 2019-04-03 PROCEDURE — 76377 3D RENDER W/INTRP POSTPROCES: CPT | Mod: 26

## 2019-04-03 PROCEDURE — 73700 CT LOWER EXTREMITY W/O DYE: CPT | Mod: 26,RT

## 2019-04-03 RX ORDER — INSULIN GLARGINE 100 [IU]/ML
38 INJECTION, SOLUTION SUBCUTANEOUS AT BEDTIME
Qty: 0 | Refills: 0 | Status: DISCONTINUED | OUTPATIENT
Start: 2019-04-03 | End: 2019-04-05

## 2019-04-03 RX ORDER — INSULIN LISPRO 100/ML
18 VIAL (ML) SUBCUTANEOUS
Qty: 0 | Refills: 0 | Status: DISCONTINUED | OUTPATIENT
Start: 2019-04-03 | End: 2019-04-04

## 2019-04-03 RX ADMIN — Medication 12 UNIT(S): at 17:20

## 2019-04-03 RX ADMIN — Medication 100 MILLIGRAM(S): at 14:55

## 2019-04-03 RX ADMIN — Medication 50 MILLIGRAM(S): at 05:35

## 2019-04-03 RX ADMIN — BUDESONIDE AND FORMOTEROL FUMARATE DIHYDRATE 2 PUFF(S): 160; 4.5 AEROSOL RESPIRATORY (INHALATION) at 05:39

## 2019-04-03 RX ADMIN — Medication 100 MILLIGRAM(S): at 05:38

## 2019-04-03 RX ADMIN — CARVEDILOL PHOSPHATE 6.25 MILLIGRAM(S): 80 CAPSULE, EXTENDED RELEASE ORAL at 05:37

## 2019-04-03 RX ADMIN — PANTOPRAZOLE SODIUM 40 MILLIGRAM(S): 20 TABLET, DELAYED RELEASE ORAL at 05:38

## 2019-04-03 RX ADMIN — Medication 2.4 GRAM(S): at 12:19

## 2019-04-03 RX ADMIN — Medication 6: at 17:20

## 2019-04-03 RX ADMIN — Medication 12 UNIT(S): at 08:10

## 2019-04-03 RX ADMIN — Medication 50 MILLIGRAM(S): at 05:38

## 2019-04-03 RX ADMIN — ISOSORBIDE MONONITRATE 30 MILLIGRAM(S): 60 TABLET, EXTENDED RELEASE ORAL at 12:19

## 2019-04-03 RX ADMIN — INSULIN GLARGINE 38 UNIT(S): 100 INJECTION, SOLUTION SUBCUTANEOUS at 23:05

## 2019-04-03 RX ADMIN — Medication 50 MILLIGRAM(S): at 18:06

## 2019-04-03 RX ADMIN — Medication 250 MILLIGRAM(S): at 21:12

## 2019-04-03 RX ADMIN — DULOXETINE HYDROCHLORIDE 60 MILLIGRAM(S): 30 CAPSULE, DELAYED RELEASE ORAL at 12:18

## 2019-04-03 RX ADMIN — Medication 8: at 12:18

## 2019-04-03 RX ADMIN — Medication 100 MILLIGRAM(S): at 21:12

## 2019-04-03 RX ADMIN — CARVEDILOL PHOSPHATE 6.25 MILLIGRAM(S): 80 CAPSULE, EXTENDED RELEASE ORAL at 17:55

## 2019-04-03 RX ADMIN — Medication 188 MICROGRAM(S): at 05:37

## 2019-04-03 RX ADMIN — Medication 50 MILLIGRAM(S): at 17:55

## 2019-04-03 RX ADMIN — AMIODARONE HYDROCHLORIDE 200 MILLIGRAM(S): 400 TABLET ORAL at 05:39

## 2019-04-03 RX ADMIN — Medication 12 UNIT(S): at 12:18

## 2019-04-03 RX ADMIN — SPIRONOLACTONE 25 MILLIGRAM(S): 25 TABLET, FILM COATED ORAL at 05:38

## 2019-04-03 RX ADMIN — BUDESONIDE AND FORMOTEROL FUMARATE DIHYDRATE 2 PUFF(S): 160; 4.5 AEROSOL RESPIRATORY (INHALATION) at 17:56

## 2019-04-03 RX ADMIN — Medication 2: at 08:09

## 2019-04-03 RX ADMIN — TIOTROPIUM BROMIDE 1 CAPSULE(S): 18 CAPSULE ORAL; RESPIRATORY (INHALATION) at 12:19

## 2019-04-03 NOTE — PROGRESS NOTE ADULT - SUBJECTIVE AND OBJECTIVE BOX
TUCKEREMANUELTANIKA MOJICA    Patient is a 78y old  Female who presents with a chief complaint of fall, right knee pain (2019 12:37)    Still with BL LE weakness and pain R knee.  Has not ambulated yet.  No new cardiac symptoms.  Allergies    Augmentin (Swelling)  cephalexin (Swelling)    MEDICATIONS  (STANDING):  amiodarone    Tablet 200 milliGRAM(s) Oral daily  aztreonam  IVPB 1000 milliGRAM(s) IV Intermittent every 12 hours  buDESOnide 160 MICROgram(s)/formoterol 4.5 MICROgram(s) Inhaler 2 Puff(s) Inhalation two times a day  carvedilol 6.25 milliGRAM(s) Oral every 12 hours  dextrose 5%. 1000 milliLiter(s) (50 mL/Hr) IV Continuous <Continuous>  dextrose 50% Injectable 12.5 Gram(s) IV Push once  dextrose 50% Injectable 25 Gram(s) IV Push once  dextrose 50% Injectable 25 Gram(s) IV Push once  docusate sodium 100 milliGRAM(s) Oral three times a day  DULoxetine 60 milliGRAM(s) Oral daily  insulin glargine Injectable (LANTUS) 32 Unit(s) SubCutaneous at bedtime  insulin lispro (HumaLOG) corrective regimen sliding scale   SubCutaneous three times a day before meals  insulin lispro (HumaLOG) corrective regimen sliding scale   SubCutaneous at bedtime  insulin lispro Injectable (HumaLOG) 12 Unit(s) SubCutaneous three times a day before meals  isosorbide   mononitrate ER Tablet (IMDUR) 30 milliGRAM(s) Oral daily  levothyroxine 188 MICROGram(s) Oral daily  mesalamine DR (24-Hour) Tablet 2.4 Gram(s) Oral daily  metolazone 2.5 milliGRAM(s) Oral <User Schedule>  pantoprazole    Tablet 40 milliGRAM(s) Oral before breakfast  spironolactone 25 milliGRAM(s) Oral daily  tiotropium 18 MICROgram(s) Capsule 1 Capsule(s) Inhalation daily  torsemide 50 milliGRAM(s) Oral every 12 hours  vancomycin  IVPB 1000 milliGRAM(s) IV Intermittent <User Schedule>    MEDICATIONS  (PRN):  acetaminophen   Tablet .. 650 milliGRAM(s) Oral every 6 hours PRN Mild Pain (1 - 3), Moderate Pain (4 - 6)  dextrose 40% Gel 15 Gram(s) Oral once PRN Blood Glucose LESS THAN 70 milliGRAM(s)/deciliter  glucagon  Injectable 1 milliGRAM(s) IntraMuscular once PRN Glucose LESS THAN 70 milligrams/deciliter    PHYSICAL EXAM:  Vital Signs Last 24 Hrs  T(C): 36.8 (2019 12:22), Max: 36.9 (2019 20:01)  T(F): 98.3 (2019 12:22), Max: 98.4 (2019 20:01)  HR: 100 (2019 12:22) (92 - 101)  BP: 125/80 (2019 12:22) (113/73 - 139/74)  BP(mean): --  RR: 18 (2019 12:22) (17 - 18)  SpO2: 100% (2019 12:22) (97% - 100%)  Daily     Daily Weight in k.8 (2019 06:16)  I&O's Summary    2019 07:01  -  2019 07:00  --------------------------------------------------------  IN: 590 mL / OUT: 600 mL / NET: -10 mL      General Appearance: 	 Alert, cooperative, no distress  Neck: JVP estiamted at 10 cm  Lungs:  clear to auscultation and percussion bilaterally  Cor:  pmi 5th ICS MCL, regular rate and rhythm, S1 normal intensity, S2 normal intensity  Abdomen:	 soft, non-tender; bowel sounds normal  Extremities: without cyanosis, clubbing or edema  R knee swelling, possible traumatic effusion.      EKG:  Telemetry:    Labs:  CBC Full  -  ( 2019 09:26 )  WBC Count : 10.36 K/uL  RBC Count : 3.02 M/uL  Hemoglobin : 9.2 g/dL  Hematocrit : 29.9 %  Platelet Count - Automated : 182 K/uL  Mean Cell Volume : 99.0 fl  Mean Cell Hemoglobin : 30.5 pg  Mean Cell Hemoglobin Concentration : 30.8 gm/dL  Auto Neutrophil # : x  Auto Lymphocyte # : x  Auto Monocyte # : x  Auto Eosinophil # : x  Auto Basophil # : x  Auto Neutrophil % : x  Auto Lymphocyte % : x  Auto Monocyte % : x  Auto Eosinophil % : x  Auto Basophil % : x      Basic Metabolic Panel in AM (19 @ 06:17)    Sodium, Serum: 141 mmol/L    Potassium, Serum: 3.6 mmol/L    Chloride, Serum: 93 mmol/L    Carbon Dioxide, Serum: 34 mmol/L    Anion Gap, Serum: 14 mmol/L    Blood Urea Nitrogen, Serum: 56 mg/dL    Creatinine, Serum: 1.94 mg/dL    Glucose, Serum: 174 mg/dL    Calcium, Total Serum: 10.1 mg/dL    eGFR if Non : 25: Interpretative comment  The units for eGFR are mL/min/1.73M2 (normalized body surface area). The  eGFR is calculated from a serum creatinine using the CKD-EPI equation.  Other variables required for calculation are race, age and sex. Among  patients with chronic kidney disease (CKD), the eGFR is useful in  determining the stage of disease according to KDOQI CKD classification.  All eGFR results are reported numerically with the following  interpretation.          GFR                    With                 Without     (ml/min/1.73 m2)    Kidney Damage       Kidney Damage        >= 90                    Stage 1                     Normal        60-89                    Stage 2                     Decreased GFR        30-59     Stage 3                     Stage 3        15-29                    Stage 4                     Stage 4        < 15                      Stage 5                     Stage 5  Each stage of CKD assumes that the associated GFR level has been in  effect for at least 3 months. Determination of stages one and two (with  eGFR > 59 ml/min/m2) requires estimation of kidney damage for at least 3  months as defined by structural or functional abnormalities.  Limitations: All estimates of GFR will be less accurate for patients at  extremes of muscle mass (including but not limited to frail elderly,  critically ill, or cancer patients), those with unusual diets, and those  with conditions associated with reduced secretion or extrarenal  elimination of creatinine. The eGFR equation is not recommended for use  in patients with unstable creatinine levels. mL/min/1.73M2    eGFR if African American: 28 mL/min/1.73M2

## 2019-04-03 NOTE — CONSULT NOTE ADULT - SUBJECTIVE AND OBJECTIVE BOX
HISTORY OF PRESENT ILLNESS:  77 yo female with multiple medical problems; admitted with vaginal bleeding and ffalls.   has been c/o R knee pains.  Says she injured her knee during the last fall.   some degree of generalized joint pain.    PAST MEDICAL & SURGICAL HISTORY:  Chronic kidney disease (CKD)  Anemia of chronic disease  On home oxygen therapy: 2  liters nasal cannula  Asthma: PFT (2018)  History of postmenopausal bleeding  Dyslipidemia associated with type 2 diabetes mellitus  Paroxysmal atrial fibrillation: h/o rapid ventricular rate 2 years ago, admitted at [facility], controlled with medication as per patient.  last INR 2.0  Morbid obesity with BMI of 45.0-49.9, adult  H/O: hypothyroidism  Chronic diastolic CHF (congestive heart failure): EF 56% (2017)  Depression (emotion)  Arthritis of spine  Macular degeneration of left eye: receiving injection  Neuropathy  Peripheral arterial disease: s/p remote stent. not on antiplatelet meds for a while.  Hypertension  Edema  GERD (gastroesophageal reflux disease)  Chronic low back pain  Herniated disc: lumbar  VICKY (obstructive sleep apnea)  Ulcerative colitis  Diabetes mellitus: T2DM last A1C 6.7 mg/dl in January   fs range 59- 200 mg/dl  History of mitral valve replacement with bioprosthetic valve: x 4 years ago  H/O  section: x 2  Amputated toe        MEDICATIONS  (STANDING):  amiodarone    Tablet 200 milliGRAM(s) Oral daily  aztreonam  IVPB 1000 milliGRAM(s) IV Intermittent every 12 hours  buDESOnide 160 MICROgram(s)/formoterol 4.5 MICROgram(s) Inhaler 2 Puff(s) Inhalation two times a day  carvedilol 6.25 milliGRAM(s) Oral every 12 hours  dextrose 5%. 1000 milliLiter(s) (50 mL/Hr) IV Continuous <Continuous>  dextrose 50% Injectable 12.5 Gram(s) IV Push once  dextrose 50% Injectable 25 Gram(s) IV Push once  dextrose 50% Injectable 25 Gram(s) IV Push once  docusate sodium 100 milliGRAM(s) Oral three times a day  DULoxetine 60 milliGRAM(s) Oral daily  insulin glargine Injectable (LANTUS) 38 Unit(s) SubCutaneous at bedtime  insulin lispro (HumaLOG) corrective regimen sliding scale   SubCutaneous three times a day before meals  insulin lispro (HumaLOG) corrective regimen sliding scale   SubCutaneous at bedtime  insulin lispro Injectable (HumaLOG) 18 Unit(s) SubCutaneous three times a day before meals  isosorbide   mononitrate ER Tablet (IMDUR) 30 milliGRAM(s) Oral daily  levothyroxine 188 MICROGram(s) Oral daily  mesalamine DR (24-Hour) Tablet 2.4 Gram(s) Oral daily  metolazone 2.5 milliGRAM(s) Oral <User Schedule>  pantoprazole    Tablet 40 milliGRAM(s) Oral before breakfast  spironolactone 25 milliGRAM(s) Oral daily  tiotropium 18 MICROgram(s) Capsule 1 Capsule(s) Inhalation daily  torsemide 50 milliGRAM(s) Oral every 12 hours  vancomycin  IVPB 1000 milliGRAM(s) IV Intermittent <User Schedule>    MEDICATIONS  (PRN):  acetaminophen   Tablet .. 650 milliGRAM(s) Oral every 6 hours PRN Mild Pain (1 - 3), Moderate Pain (4 - 6)  dextrose 40% Gel 15 Gram(s) Oral once PRN Blood Glucose LESS THAN 70 milliGRAM(s)/deciliter  glucagon  Injectable 1 milliGRAM(s) IntraMuscular once PRN Glucose LESS THAN 70 milligrams/deciliter      Allergies    Augmentin (Swelling)  cephalexin (Swelling)    Intolerances        PERTINENT MEDICATION HISTORY:    SOCIAL HISTORY:    FAMILY HISTORY:  FH: heart disease: mother      Vital Signs Last 24 Hrs  T(C): 36.9 (2019 19:48), Max: 36.9 (2019 19:48)  T(F): 98.4 (2019 19:48), Max: 98.4 (2019 19:48)  HR: 99 (2019 19:48) (92 - 100)  BP: 139/82 (2019 19:48) (113/73 - 139/82)  BP(mean): --  RR: 18 (2019 19:48) (18 - 18)  SpO2: 98% (2019 19:48) (98% - 100%)    Daily     Daily Weight in k.8 (2019 06:16)    PHYSICAL EXAM:      Constitutional:  Fair appearing    ENMT:    Neck:  supple, no masses    Back:    Gastrointestinal:  abd soft nt    Genitourinary:  +bleeding noted with pads in place    Rectal:    Extremities:  +LE bandages, some degree of edema      Skin:    Lymph Nodes:    Musculoskeletal:  R knee; +warmth, pain with ROm, no significant effusion, mild redness  No other synovitis    Psychiatric:  IN good spirits      LABS:                        9.2    10.36 )-----------( 182      ( 2019 09:26 )             29.9     04-03    141  |  93<L>  |  56<H>  ----------------------------<  174<H>  3.6   |  34<H>  |  1.94<H>    Ca    10.1      2019 06:17  Mg     2.0     04-02            RADIOLOGY & ADDITIONAL STUDIES:  < from: Xray Knee 4 Views, Right (19 @ 17:39) >  IMPRESSION:    No acute fracture or dislocation.   Joint spaces are maintained.   No soft tissue swelling.  Superior and inferior patellar enthesophytes.  Vascular calcifications.< from: Xray Hip 2-3 Views, Right (19 @ 17:41) >    Hip Xray:  IMPRESSION:    No acute displaced fracture or dislocation.   Joint spaces are maintained.   Vascular calcifications.      < end of copied text >      < end of copied text >

## 2019-04-03 NOTE — PROGRESS NOTE ADULT - ASSESSMENT
77 yo woman w/ hx of moderate MS s/p bioprosthetic mitral valve, diastolic CHF, paroxsmal afib (not on A/C in setting of bleeding), HTN, severe pulmonary HTN, DMT2, CKD III, anemia, with post menopausal vaginal bleeding, UC, VICKY on 3L NC (not on CPAP/BIPAP) presents from home in setting of multiple falls the past 2 days resulting with right knee pain. Patient had a fall yesterday from 1 step while walking out of the house and tripped. Per patient states that her right leg had a buckling/collapsing sensation. Patient went to [place] ED on 3/29 and had an xray which did not reveal fracture and sent home with cyclobenzaprine and Lidoderm patch. Patient last use of medication on 3/29. Patient had another fall while attempting to ambulate to the bathroom. She described that her right leg buckled under her causing her to fall. It was difficult for her to get up on her own and required the assistance of her  and son-in-law to get up. Patient denies any preceding symptoms of dizziness/lightheadedness, shortness of breath, CP, palpitations preceding the events. Endorses chronic back pain with no new characteristics. Patient has been getting around home with use of wheelchair the past 4 months. Requires assistance of her  to get around because of chronic weakness. Denies development of new numbness of lower extremities. Does not utilize any other assistive devices. Has not had PT outpatient. Patient also has had chronic LE wounds that were dressed from last discharge on 3/27. Denies changing of dressing. Denies fevers, chills, sweats. (30 Mar 2019 21:08)    ER vss, pt afebrile.  WBC 13.2 --> 9.0.  UA large LE/ (-) nit.  Ucx >100K GNR.  No acute findings on cxr.   Pt is currently on vancomycin for cellulitis.   Pt with venous stasis and several open blisters on b/l LE.      ID consult called for further abx management.        Recommend:    - UTI:  pt denies increased freq/urgency/flank pain/dysuria.  (+) UA and cultures.  Pt a/w fall and leg weakness.  Will treat,cont azactam (pt with allergy to augmentin, keflex)  (through 4/6).  Urine cx results noted.      - Avoid fluoroquinolone (correction from last note), as pt is on amiodarone and increased risk for qt prolongation    - b/L LE venous stasis and blisters.  Low suspicion for superimposed cellulitis.  Pt with several open blisters and denuded skin, cont local wound care.  Cont vancomycin for now for possible superimposed bacterial cellulitis.  Monitor trough, maintain between 10-15.  Complete 7 day course.    - Monitor WBC and temp curve.  If febrile >100.4, send blood cx x 2    - Pt c/o RLE weakness, pt seen by Neurology - recs appreciated.  Pt pending MR vs CT rt knee.  Possible hematoma given clinical history and exam.  Consider Rheum evaluation.        Will follow,     Christal Reinoso  [phone]

## 2019-04-03 NOTE — CONSULT NOTE ADULT - ASSESSMENT
79 y/o post-menopausal women w/ multiple medical co-morbidities presents w/ post-menopausal bleeding- chronic.  Patient is s/p outpatient work up in June 2018 w/ benign endometrial pathology.  TVUS on last admission showed endometrial canal and vagina distended w/ fluid.   Differential for bleeding including malignancy vs. atrophic endometrium vs. uterine structural abnormality (eg: polyp).   Distended endometrium can be 2/2 malignancy vs. cervical stenosis.     H/H stable since admission.  Vaginal bleeding noted on exam of moderate quantity.  Patient is hemodynamically stable.

## 2019-04-03 NOTE — PROGRESS NOTE ADULT - ASSESSMENT
79yo female with chronic diastolic CHF, bioprosthetic MVR, PAF, VICKY, PAD, CKD, DM, HLD and LSS admitted iwth recurrent falls. She has prior history of LSS. CV stable.  MRI L/S spine confirmed extensive lumbar spinal stenosis.    Rec:  Continue current CV meds  Monitor lytes, renal function, Cr is stable  Neurology followup  PT  Likely SAMANTHA Jimenez MD  [facility]

## 2019-04-03 NOTE — CONSULT NOTE ADULT - ASSESSMENT
Dyspnea - multifactorial - related to underlying ILD, obesity, immobility 2/2 lumbar pain, HFpEF  Chronic respiratory failure with hypoxemia 79 yo F with ILD, VICKY/OHS with chronic hypercapnic and hypoxic respiratory failure on 3L NC (not on CPAP), PAD/PVD with chronic LE wounds, h/o MS s/p bioprosthetic MVR, HFpEF, paroxsmal afib (not on A/C in setting of bleeding), HTN,  DM, CKD III, anemia, post menopausal vaginal bleeding s/p D&C in July 2018 (on pathology found to have polyps, started on hormonal therapy with resolution of bleeding), UC, , morbid obesity p/w right knee pain and edema after multiple falls.  Patient denies any preceding symptoms of dizziness/lightheadedness, shortness of breath, CP, palpitations preceding the events. Endorses chronic back pain, has a history of spinal stenosis and has been using a wheelchair the past 4 months. Requires assistance of her  for ADLS    UA +, urine cultures growing Proteus and is being treated with Aztreonam and Vanco.  +vaginal bleeding over the past 24 hrs (has not been on Progesterone since admission)    Nonsmoker, has been following with Dr. Montgomery over the past year for symptoms of progressive dyspnea, now with minimal exertion. Prior HRCT chest showed evidence of ILD - unclear if related to underlying RA vs. amiodarone.  Has been on supplemental O2 since May 2018, 2LNC around the clock.   H/o VICKY diagnosed years ago - not on therapy  Home inhaler regimen: Albuterol nebs, anoro ellipta    A/P:  Dyspnea - multifactorial - related to underlying ILD, obesity, immobility 2/2 lumbar pain, HFpEF, anemia  Chronic respiratory failure with hypoxemia and hypercapnia  VICKY/OHS - untreated  Post menopausal Vaginal bleeding  UTI  Spinal stenosis, gait instability    Rec:  1. Dyspnea - Breathing at baseline - not dyspneic at rest, saturating well with 2LNC; CT chest and CXR unchanged  c/w current inhaler regimen- Symbicort, bronchodilators. Incentive spirometer  Out of bed to chair as tolerated  Given the patient's underlying VICKY/OHS with evidence of chronic hypercapnia, a trial of CPAP therapy should be considered and I explained this to the patient and her family - she will require titration study as an outpatient. Agree with supplemental O2 24/7 - goal sats low 90s    2. Vaginal bleeding: GYN consulted for further eval and treatment. Pelvic sono ordered    3. UTI- antibiotics as per ID    4. Afib - cardiac optimization- rate control, BP management, Cardiology following    5. PPX - DVT ppx    Thank you for the consult  Dr. Montgomery to follow up on 4/4/19

## 2019-04-03 NOTE — PROGRESS NOTE ADULT - SUBJECTIVE AND OBJECTIVE BOX
[facility]      Seen earlier today, and examined.  - Today, patient is without complaints.           *****MEDICATIONS: Current medication reviewed and documented.    MEDICATIONS  (STANDING):  amiodarone    Tablet 200 milliGRAM(s) Oral daily  aztreonam  IVPB 1000 milliGRAM(s) IV Intermittent every 12 hours  buDESOnide 160 MICROgram(s)/formoterol 4.5 MICROgram(s) Inhaler 2 Puff(s) Inhalation two times a day  carvedilol 6.25 milliGRAM(s) Oral every 12 hours  dextrose 5%. 1000 milliLiter(s) (50 mL/Hr) IV Continuous <Continuous>  dextrose 50% Injectable 12.5 Gram(s) IV Push once  dextrose 50% Injectable 25 Gram(s) IV Push once  dextrose 50% Injectable 25 Gram(s) IV Push once  docusate sodium 100 milliGRAM(s) Oral three times a day  DULoxetine 60 milliGRAM(s) Oral daily  insulin glargine Injectable (LANTUS) 38 Unit(s) SubCutaneous at bedtime  insulin lispro (HumaLOG) corrective regimen sliding scale   SubCutaneous three times a day before meals  insulin lispro (HumaLOG) corrective regimen sliding scale   SubCutaneous at bedtime  insulin lispro Injectable (HumaLOG) 18 Unit(s) SubCutaneous three times a day before meals  isosorbide   mononitrate ER Tablet (IMDUR) 30 milliGRAM(s) Oral daily  levothyroxine 188 MICROGram(s) Oral daily  mesalamine DR (24-Hour) Tablet 2.4 Gram(s) Oral daily  metolazone 2.5 milliGRAM(s) Oral <User Schedule>  pantoprazole    Tablet 40 milliGRAM(s) Oral before breakfast  spironolactone 25 milliGRAM(s) Oral daily  tiotropium 18 MICROgram(s) Capsule 1 Capsule(s) Inhalation daily  torsemide 50 milliGRAM(s) Oral every 12 hours  vancomycin  IVPB 1000 milliGRAM(s) IV Intermittent <User Schedule>    MEDICATIONS  (PRN):  acetaminophen   Tablet .. 650 milliGRAM(s) Oral every 6 hours PRN Mild Pain (1 - 3), Moderate Pain (4 - 6)  dextrose 40% Gel 15 Gram(s) Oral once PRN Blood Glucose LESS THAN 70 milliGRAM(s)/deciliter  glucagon  Injectable 1 milliGRAM(s) IntraMuscular once PRN Glucose LESS THAN 70 milligrams/deciliter          ***** VITAL SIGNS:  T(F): 98.3 (19 @ 12:22), Max: 98.4 (19 @ 20:01)  HR: 100 (19 @ 12:22) (92 - 101)  BP: 125/80 (19 @ 12:22) (113/73 - 139/74)  RR: 18 (19 @ 12:22) (17 - 18)  SpO2: 100% (19 @ 12:22) (97% - 100%)  Wt(kg): --  ,   I&O's Summary    2019 07:01  -  2019 07:00  --------------------------------------------------------  IN: 590 mL / OUT: 600 mL / NET: -10 mL    2019 07:01  -  2019 18:34  --------------------------------------------------------  IN: 240 mL / OUT: 800 mL / NET: -560 mL             *****PHYSICAL EXAM:   alert oriented x 3 attention comprehension are fair.  Able to name, repeat.   EOmi fundi not visualized   no nystagmus VFF to confrontation  Tongue is midline  Palate elevates symmetrically   Moving both upper ext  limited mvt of both le   R is weaker than left   able bend the R knee   ilipsoas is weak on the R   Gait not assessed.            *****LAB AND IMAGIN.2    10.36 )-----------( 182      ( 2019 09:26 )             29.9               -03    141  |  93<L>  |  56<H>  ----------------------------<  174<H>  3.6   |  34<H>  |  1.94<H>    Ca    10.1      2019 06:17  Mg     2.0     04-                           [All pertinent recent Imaging/Reports reviewed]           *****A S S E S S M E N T   A N D   P L A N :      77 yo woman w/ hx of moderate MS s/p bioprosthetic mitral valve, diastolic CHF, paroxsmal afib (not on A/C in setting of bleeding), HTN, severe pulmonary HTN, DMT2, CKD III, anemia, with post menopausal vaginal bleeding, UC, VICKY on 3L NC (not on CPAP/BIPAP) presents from home in setting of multiple falls the past 2 days resulting with right knee pain. Patient had a fall yesterday from 1 step while walking out of the house and tripped. Per patient states that her right leg had a buckling/collapsing sensation. Patient went to [facility] ED on 3/29 and had an xray which did not reveal fracture and sent home with cyclobenzaprine and Lidoderm patch. Patient last use of medication on 3/29. Patient had another fall while attempting to ambulate to the bathroom. She described that her right leg buckled under her causing her to fall. It was difficult for her to get up on her own and required the assistance of her  and son-in-law to get up. Patient denies any preceding symptoms of dizziness/lightheadedness, shortness of breath, CP, palpitations preceding the events. Endorses chronic back pain with no new characteristics. Patient has been getting around home with use of wheelchair the past 4 months. Requires assistance of her  to get around because of chronic weakness. Denies development of new numbness of lower extremities. Does not utilize any other assistive devices. Has not had PT outpatient. Patient also has had chronic LE wounds that were dressed from last discharge on 3/27. Denies changing of dressing. Denies fevers, chills, sweats.    pt with hx of spinal stenosis recently has been using a wheelchair for 2 weeks, then got up and fell taken to [place] hospital, discharged then fell again at home and was brought to [facility].       Problem/Recommendations 1: R knee  pain of unclear etiology      pt refusing mri of the knee, wants to do open mri as outpt.   PT consult for discharge safety.       Problem/Recommendations 2: spinal stenosis with symptoms of R leg buckling likely due to spinal stenosis +/- deconditioning   no sign of radiculopathy   pt unable to tolerate the mri due to claustrophobia.   evidence of multi level degenerative changes, with moderate spinal stenosis form l1- l5        Thank you for allowing me to participate in the care of this patient. Please do not hesitate to call me if you have any  questions.        ________________  Simran Mattson MD  [facility] (PN)[facility]  [phone]      30 minutes spent on total encounter; more than 50 % of the visit was  spent counseling about plan of care, compliance to diet/exercise and medication regimen and or  coordinating care by the attending physician.      It is advised that s stroke patients follow up with NP Vi De Jesus @ [phone] in 1- 2 weeks.   Others please follow up with Dr. Michael Nissenbaum [phone]

## 2019-04-03 NOTE — PROGRESS NOTE ADULT - ASSESSMENT
77 yo woman w/ hx of moderate MS s/p bioprosthetic mitral valve, diastolic CHF, paroxsmal afib (not on A/C in setting of vaginal bleeding), HTN, severe pulmonary HTN, DMT2, CKD III, anemia, with post menopausal vaginal bleeding, UC, VICKY on 3L NC (not on CPAP/BIPAP) presents from home in setting of multiple falls the past 2 days resulting with right knee pain, found with leukocytosis and LE wounds concerning for cellulitis.

## 2019-04-03 NOTE — PROGRESS NOTE ADULT - SUBJECTIVE AND OBJECTIVE BOX
Infectious Diseases progress note:    Subjective: Events noted.  Pt still with RLE weakness and Rt knee swelling - states knee appears black and blue.  Pt states prior to admission, she fell on rt knee.  No new fevers.  No dysuria or flank pain.  LE wounds improving.     ROS:  CONSTITUTIONAL:  No fever, chills, rigors  CARDIOVASCULAR:  No chest pain or palpitations  RESPIRATORY:   No SOB, cough, dyspnea on exertion.  No wheezing  GASTROINTESTINAL:  No abd pain, N/V, diarrhea/constipation  EXTREMITIES:  rt knee swelling/bruising  GENITOURINARY:  No burning on urination, increased frequency or urgency.  No flank pain  NEUROLOGIC:  No HA, visual disturbances  SKIN: No rashes    Allergies    Augmentin (Swelling)  cephalexin (Swelling)    Intolerances        ANTIBIOTICS/RELEVANT:  antimicrobials  aztreonam  IVPB 1000 milliGRAM(s) IV Intermittent every 12 hours  vancomycin  IVPB 1000 milliGRAM(s) IV Intermittent <User Schedule>    immunologic:    OTHER:  acetaminophen   Tablet .. 650 milliGRAM(s) Oral every 6 hours PRN  amiodarone    Tablet 200 milliGRAM(s) Oral daily  buDESOnide 160 MICROgram(s)/formoterol 4.5 MICROgram(s) Inhaler 2 Puff(s) Inhalation two times a day  carvedilol 6.25 milliGRAM(s) Oral every 12 hours  dextrose 40% Gel 15 Gram(s) Oral once PRN  dextrose 5%. 1000 milliLiter(s) IV Continuous <Continuous>  dextrose 50% Injectable 12.5 Gram(s) IV Push once  dextrose 50% Injectable 25 Gram(s) IV Push once  dextrose 50% Injectable 25 Gram(s) IV Push once  docusate sodium 100 milliGRAM(s) Oral three times a day  DULoxetine 60 milliGRAM(s) Oral daily  glucagon  Injectable 1 milliGRAM(s) IntraMuscular once PRN  insulin glargine Injectable (LANTUS) 32 Unit(s) SubCutaneous at bedtime  insulin lispro (HumaLOG) corrective regimen sliding scale   SubCutaneous three times a day before meals  insulin lispro (HumaLOG) corrective regimen sliding scale   SubCutaneous at bedtime  insulin lispro Injectable (HumaLOG) 12 Unit(s) SubCutaneous three times a day before meals  isosorbide   mononitrate ER Tablet (IMDUR) 30 milliGRAM(s) Oral daily  levothyroxine 188 MICROGram(s) Oral daily  mesalamine DR (24-Hour) Tablet 2.4 Gram(s) Oral daily  metolazone 2.5 milliGRAM(s) Oral <User Schedule>  pantoprazole    Tablet 40 milliGRAM(s) Oral before breakfast  spironolactone 25 milliGRAM(s) Oral daily  tiotropium 18 MICROgram(s) Capsule 1 Capsule(s) Inhalation daily  torsemide 50 milliGRAM(s) Oral every 12 hours      Objective:  Vital Signs Last 24 Hrs  T(C): 36.8 (03 Apr 2019 12:22), Max: 36.9 (02 Apr 2019 20:01)  T(F): 98.3 (03 Apr 2019 12:22), Max: 98.4 (02 Apr 2019 20:01)  HR: 100 (03 Apr 2019 12:22) (92 - 101)  BP: 125/80 (03 Apr 2019 12:22) (113/73 - 139/74)  BP(mean): --  RR: 18 (03 Apr 2019 12:22) (17 - 18)  SpO2: 100% (03 Apr 2019 12:22) (97% - 100%)    PHYSICAL EXAM:  Constitutional:NAD, awake, obese  Eyes:HANNAH, EOMI  Ear/Nose/Throat: no thrush, mucositis.  Moist mucous membranes	  Neck:no JVD, no lymphadenopathy, supple  Respiratory: CTA saad  Cardiovascular: S1S2 RRR, no murmurs  Gastrointestinal:soft, nontender,  nondistended (+) BS  Extremities:no e/e/c.  rt knee with swelling, brusing  Skin:  b/l LE venous stasis changes, superficial skin wounds, dsg c/d/i.          LABS:                        9.2    10.36 )-----------( 182      ( 03 Apr 2019 09:26 )             29.9     04-03    141  |  93<L>  |  56<H>  ----------------------------<  174<H>  3.6   |  34<H>  |  1.94<H>    Ca    10.1      03 Apr 2019 06:17  Mg     2.0     04-02                  Vancomycin Level, Trough: 11.2 ug/mL (04-01 @ 20:18)              MICROBIOLOGY:    Culture - Blood (03.30.19 @ 22:36)    Specimen Source: .Blood Blood    Culture Results:   No growth to date.    Culture - Blood (03.30.19 @ 22:36)    Specimen Source: .Blood Blood    Culture Results:   No growth to date.    Culture - Urine (03.30.19 @ 21:18)    -  Tobramycin: S <=4    -  Trimethoprim/Sulfamethoxazole: S <=2/38    -  Ciprofloxacin: S <=1    -  Ertapenem: S <=1    -  Gentamicin: S <=4    -  Levofloxacin: S <=2    -  Meropenem: S <=1    -  Nitrofurantoin: R >64 Should not be used to treat pyelonephritis    -  Piperacillin/Tazobactam: S <=16    -  Amikacin: S <=16    -  Ampicillin: S <=8 These ampicillin results predict results for amoxicillin    -  Ampicillin/Sulbactam: S <=8/4    -  Aztreonam: S <=4    -  Cefazolin: S <=8 For uncomplicated UTI with K. pneumoniae, E. coli, or P. mirablis: LÓPEZ <=16 is sensitive and LÓPEZ >=32 is resistant. This also predicts results for oral agents cefaclor, cefdinir, cefpodoxime, cefprozil, cefuroxime axetil, cephalexin and locarbef for uncomplicated UTI. Note that some isolates may be susceptible to these agents while testing resistant to cefazolin.    -  Cefepime: S <=4    -  Cefoxitin: S <=8    -  Ceftriaxone: S <=1 Enterobacter, Citrobacter, and Serratia may develop resistance during prolonged therapy    Specimen Source: .Urine Clean Catch (Midstream)    Culture Results:   >100,000 CFU/ml Proteus mirabilis    Organism Identification: Proteus mirabilis    Organism: Proteus mirabilis    Method Type: LÓPEZ              RADIOLOGY & ADDITIONAL STUDIES:    < from: MR Lumbar Spine No Cont (04.02.19 @ 06:44) >  INTERPRETATION:  History: Fall.    MRI of the lumbar spine was performed using sagittal T1-T2 and   T2-weighted sequence fat suppression. This is a limited study, the   patient aborted the rest of this study.    Loss of normal lumbar lordosis is seen which could be due to positioning   or muscle spasm.    Disc desiccation is seen throughout the lower thoracic and lumbar spine   region which is secondary to degenerative changes.    Disc desiccation is seen throughout the lower thoracic lumbar spine   region which is secondary to degenerative changes.    There is evidence of prominent spinal stenosis seen involving the 12   L1-L5 S1 levels. Complete MRI of the lumbar spine is recommended to   better evaluate these findings.    Impression: Prominent spinal stenosis at multiple levels as described   above.    < end of copied text >

## 2019-04-03 NOTE — CONSULT NOTE ADULT - SUBJECTIVE AND OBJECTIVE BOX
HPI   Patient is a 77 yo woman w/ hx of  MS s/p bioprosthetic mitral valve, diastolic CHF, paroxsymal afib (not on A/C in setting of bleeding), HTN, severe pulmonary HTN, DMT2, CKD III, anemia, with post menopausal vaginal bleeding, UC, VICKY on 3L NC (not on CPAP/BIPAP) presents from home due to multiple falls resulting with right knee pain. Patient had a fall yesterday from 1 step while walking out of the house and tripped. Per patient states that her right leg had a buckling/collapsing sensation. Patient went to [place] [facility] on 3/29 and had an xray which did not reveal fracture and sent home with cyclobenzaprine and Lidoderm patch. Patient last use of medication on 3/29. Patient had another fall while attempting to ambulate to the bathroom. She described that her right leg buckled under her causing her to fall. It was difficult for her to get up on her own and required the assistance of her  and son-in-law to get up. Patient denies any preceding symptoms of dizziness/lightheadedness, shortness of breath, CP, palpitations preceding the events. Endorses chronic back pain with no new characteristics. Patient has been getting around home with use of wheelchair the past 4 months. Requires assistance of her  to get around because of chronic weakness. Denies development of new numbness of lower extremities. Does not utilize any other assistive devices. Has not had PT outpatient. Patient also has had chronic LE wounds that were dressed from last discharge on 3/27. Denies changing of dressing. Denies fevers, chills, sweats.  She now had gross painless hematuria with frequency, incontinence and no dysuria.  She has been followed by outside Urologist Dr Yi with chronic uti and voiding difficulty       PAST MEDICAL & SURGICAL HISTORY:  Chronic kidney disease (CKD)  Anemia of chronic disease  On home oxygen therapy: 2  liters nasal cannula  Asthma: PFT (2018)  History of postmenopausal bleeding  Dyslipidemia associated with type 2 diabetes mellitus  Paroxysmal atrial fibrillation: h/o rapid ventricular rate 2 years ago, admitted at [facility], controlled with medication as per patient.  last INR 2.0  Morbid obesity with BMI of 45.0-49.9, adult  H/O: hypothyroidism  Chronic diastolic CHF (congestive heart failure): EF 56% (2017)  Depression (emotion)  Arthritis of spine  Macular degeneration of left eye: receiving injection  Neuropathy  Peripheral arterial disease: s/p remote stent. not on antiplatelet meds for a while.  Hypertension  Edema  GERD (gastroesophageal reflux disease)  Chronic low back pain  Herniated disc: lumbar  VICKY (obstructive sleep apnea)  Ulcerative colitis  Diabetes mellitus: T2DM last A1C 6.7 mg/dl in January   fs range 59- 200 mg/dl  History of mitral valve replacement with bioprosthetic valve: x 4 years ago  H/O  section: x 2  Amputated toe      MEDICATIONS  (STANDING):  amiodarone    Tablet 200 milliGRAM(s) Oral daily  aztreonam  IVPB 1000 milliGRAM(s) IV Intermittent every 12 hours  buDESOnide 160 MICROgram(s)/formoterol 4.5 MICROgram(s) Inhaler 2 Puff(s) Inhalation two times a day  carvedilol 6.25 milliGRAM(s) Oral every 12 hours  dextrose 5%. 1000 milliLiter(s) (50 mL/Hr) IV Continuous <Continuous>  dextrose 50% Injectable 12.5 Gram(s) IV Push once  dextrose 50% Injectable 25 Gram(s) IV Push once  dextrose 50% Injectable 25 Gram(s) IV Push once  docusate sodium 100 milliGRAM(s) Oral three times a day  DULoxetine 60 milliGRAM(s) Oral daily  insulin glargine Injectable (LANTUS) 38 Unit(s) SubCutaneous at bedtime  insulin lispro (HumaLOG) corrective regimen sliding scale   SubCutaneous three times a day before meals  insulin lispro (HumaLOG) corrective regimen sliding scale   SubCutaneous at bedtime  insulin lispro Injectable (HumaLOG) 18 Unit(s) SubCutaneous three times a day before meals  isosorbide   mononitrate ER Tablet (IMDUR) 30 milliGRAM(s) Oral daily  levothyroxine 188 MICROGram(s) Oral daily  mesalamine DR (24-Hour) Tablet 2.4 Gram(s) Oral daily  metolazone 2.5 milliGRAM(s) Oral <User Schedule>  pantoprazole    Tablet 40 milliGRAM(s) Oral before breakfast  spironolactone 25 milliGRAM(s) Oral daily  tiotropium 18 MICROgram(s) Capsule 1 Capsule(s) Inhalation daily  torsemide 50 milliGRAM(s) Oral every 12 hours  vancomycin  IVPB 1000 milliGRAM(s) IV Intermittent <User Schedule>    MEDICATIONS  (PRN):  acetaminophen   Tablet .. 650 milliGRAM(s) Oral every 6 hours PRN Mild Pain (1 - 3), Moderate Pain (4 - 6)  dextrose 40% Gel 15 Gram(s) Oral once PRN Blood Glucose LESS THAN 70 milliGRAM(s)/deciliter  glucagon  Injectable 1 milliGRAM(s) IntraMuscular once PRN Glucose LESS THAN 70 milligrams/deciliter      FAMILY HISTORY:  FH: heart disease: mother      Allergies    Augmentin (Swelling)  cephalexin (Swelling)    SOCIAL HISTORY: lives with  no tobacco etoh    REVIEW OF SYSTEMS:   	CONSTITUTIONAL: No fever, chills, sweats.   	EYES: No eye pain or visual disturbances  	ENMT:  No vertigo.  No rhinorrhea, sinus congestion, or throat pain  	NECK: No pain   	RESPIRATORY: No cough, wheezing, or worsening shortness of breath  	CARDIOVASCULAR: No chest pain, palpitations or dizziness. +Chronic LE swelling  	GASTROINTESTINAL: No abdomina pain. No nausea, vomiting, diarrhea or constipation. No melena or hematochezia.  	GENITOURINARY: per hpi  	NEUROLOGICAL: No headaches, or tremors  	SKIN: + chronic venous stasis changes and LE wounds  	LYMPH NODES: No enlarged glands  	MUSCULOSKELETAL: See HPI  	HEME/LYMPH: No easy bruising or bleeding gums. + hx of postmenopausal bleeding.    Home Medications:   * Incomplete Medication History as of 30-Mar-2019 21:23 documented in Structured Notes  · 	Lidoderm 5% topical film: Apply topically to affected area once a day , Last Dose Taken:    · 	metOLazone 2.5 mg oral tablet: 1 tab(s) orally 3 times a week, Mon/Wed/Friday, Last Dose Taken:    · 	NovoLOG FlexPen 100 units/mL injectable solution: 18-28 unit(s) injectable 3 times a day, Last Dose Taken:    · 	torsemide: 50 mg twice a day , Last Dose Taken:    · 	Imdur 30 mg oral tablet, extended release:     Physical Exam  Vital signs  T(C): 36.9 (19 @ 19:48), Max: 36.9 (19 @ 19:48)  HR: 99 (19 @ 19:48)  BP: 139/82 (19 @ 19:48)  SpO2: 98% (19 @ 19:48)    Physical Exam  	GENERAL: NAD, obese  	HEAD:  NCAT  	EYES: EOMI, PERRLA,   	ENMT: Moist mucous membranes  	NECK: Supple, No JVD, Normal thyroid  	NERVOUS SYSTEM:  Alert & Oriented X3,   	 RLE in setting of right knee pain, Able to raise Right hip against resistance. 4-5/5 strength of LLE throughout  	CHEST/LUNG: Respirations non-labored, scant bibasilar rales. No  rhonchi or wheezing  	HEART: +Tachycardia + S1 S2  	ABDOMEN: Soft, Nontender, Nondistended; Bowel sounds present  	EXTREMITIES:  2+ Peripheral Pulses, No clubbing, cyanosis +2 LE edema. Right knee: difficult to assess ROM in setting of pain. No appreciable erythema or effusion of right knee. No TTP of B/L hip. No TTP to B/L ankles.   	LYMPH: No lymphadenopathy noted    SKIN: +Venous stasis +open wound to right shin, and left lateral shin. dressings with dried drainage. +surrounding erythema and warm      external urethral device to suction with gross hematuria  	  LABS:       @ 09:26    WBC 10.36 / Hct 29.9  / SCr --        @ 06:17    WBC --    / Hct --    / SCr 1.94         141  |  93<L>  |  56<H>  ----------------------------<  174<H>  3.6   |  34<H>  |  1.94<H>    Ca    10.1      2019 06:17  Mg     2.0

## 2019-04-03 NOTE — PROGRESS NOTE ADULT - SUBJECTIVE AND OBJECTIVE BOX
Patient is a 78y old  Female who presents with a chief complaint of fall, right knee pain (03 Apr 2019 15:54)      SUBJECTIVE / OVERNIGHT EVENTS: ongoing on and off vag bleed. ptn states she has had this on and off x 21 yrs,  very upset about it and wants GYN to resolve the problem, outside of hysterectomy there re few choices, not a good surgical candidate, this was discussed w GYN x 40 min and PCP x 20 min and ptn x 25 min. awaiting GYn ONC consult. refuses MRI right knee, awaiting CT right knee, and awaiting PT eval, on Aztreonam day 3 (uti), on Vanco day 5(cellulitis)    MEDICATIONS  (STANDING):  amiodarone    Tablet 200 milliGRAM(s) Oral daily  aztreonam  IVPB 1000 milliGRAM(s) IV Intermittent every 12 hours  buDESOnide 160 MICROgram(s)/formoterol 4.5 MICROgram(s) Inhaler 2 Puff(s) Inhalation two times a day  carvedilol 6.25 milliGRAM(s) Oral every 12 hours  dextrose 5%. 1000 milliLiter(s) (50 mL/Hr) IV Continuous <Continuous>  dextrose 50% Injectable 12.5 Gram(s) IV Push once  dextrose 50% Injectable 25 Gram(s) IV Push once  dextrose 50% Injectable 25 Gram(s) IV Push once  docusate sodium 100 milliGRAM(s) Oral three times a day  DULoxetine 60 milliGRAM(s) Oral daily  insulin glargine Injectable (LANTUS) 32 Unit(s) SubCutaneous at bedtime  insulin lispro (HumaLOG) corrective regimen sliding scale   SubCutaneous three times a day before meals  insulin lispro (HumaLOG) corrective regimen sliding scale   SubCutaneous at bedtime  insulin lispro Injectable (HumaLOG) 12 Unit(s) SubCutaneous three times a day before meals  isosorbide   mononitrate ER Tablet (IMDUR) 30 milliGRAM(s) Oral daily  levothyroxine 188 MICROGram(s) Oral daily  mesalamine DR (24-Hour) Tablet 2.4 Gram(s) Oral daily  metolazone 2.5 milliGRAM(s) Oral <User Schedule>  pantoprazole    Tablet 40 milliGRAM(s) Oral before breakfast  spironolactone 25 milliGRAM(s) Oral daily  tiotropium 18 MICROgram(s) Capsule 1 Capsule(s) Inhalation daily  torsemide 50 milliGRAM(s) Oral every 12 hours  vancomycin  IVPB 1000 milliGRAM(s) IV Intermittent <User Schedule>    MEDICATIONS  (PRN):  acetaminophen   Tablet .. 650 milliGRAM(s) Oral every 6 hours PRN Mild Pain (1 - 3), Moderate Pain (4 - 6)  dextrose 40% Gel 15 Gram(s) Oral once PRN Blood Glucose LESS THAN 70 milliGRAM(s)/deciliter  glucagon  Injectable 1 milliGRAM(s) IntraMuscular once PRN Glucose LESS THAN 70 milligrams/deciliter      Vital Signs Last 24 Hrs  T(F): 98.3 (04-03-19 @ 12:22), Max: 98.4 (04-02-19 @ 20:01)  HR: 100 (04-03-19 @ 12:22) (92 - 101)  BP: 125/80 (04-03-19 @ 12:22) (113/73 - 139/74)  RR: 18 (04-03-19 @ 12:22) (17 - 18)  SpO2: 100% (04-03-19 @ 12:22) (97% - 100%)  Telemetry:   CAPILLARY BLOOD GLUCOSE      POCT Blood Glucose.: 265 mg/dL (03 Apr 2019 16:41)  POCT Blood Glucose.: 304 mg/dL (03 Apr 2019 11:46)  POCT Blood Glucose.: 172 mg/dL (03 Apr 2019 07:33)  POCT Blood Glucose.: 211 mg/dL (02 Apr 2019 21:27)    I&O's Summary    02 Apr 2019 07:01  -  03 Apr 2019 07:00  --------------------------------------------------------  IN: 590 mL / OUT: 600 mL / NET: -10 mL    03 Apr 2019 07:01  -  03 Apr 2019 17:07  --------------------------------------------------------  IN: 0 mL / OUT: 800 mL / NET: -800 mL        PHYSICAL EXAM:  GENERAL: NAD, well-developed  HEAD:  Atraumatic, Normocephalic  EYES: EOMI, PERRLA, conjunctiva and sclera clear  NECK: Supple, No JVD  CHEST/LUNG: Clear to auscultation bilaterally; No wheeze  HEART: Regular rate and rhythm; No murmurs, rubs, or gallops  ABDOMEN: Soft, Nontender, Nondistended; Bowel sounds present  EXTREMITIES:  2+ Peripheral Pulses, No clubbing, cyanosis, or edema  PSYCH: AAOx3  NEUROLOGY: non-focal  SKIN: No rashes or lesions    LABS:                        9.2    10.36 )-----------( 182      ( 03 Apr 2019 09:26 )             29.9     04-03    141  |  93<L>  |  56<H>  ----------------------------<  174<H>  3.6   |  34<H>  |  1.94<H>    Ca    10.1      03 Apr 2019 06:17  Mg     2.0     04-02                RADIOLOGY & ADDITIONAL TESTS:    Imaging Personally Reviewed:    Consultant(s) Notes Reviewed:      Care Discussed with Consultants/Other Providers:

## 2019-04-03 NOTE — CONSULT NOTE ADULT - SUBJECTIVE AND OBJECTIVE BOX
[facility] - Division of Pulmonary, Critical Care and Sleep Medicine   Please call [phone] between 8am-pm weekdays, [phone] after hours and weekends    CHIEF COMPLAINT: Right knee pain and edema, s/p falls    HPI: 77 yo F with ILD, VICKY/OHS with chronic hypercapnic and hypoxic respiratory failure on 3L NC (not on CPAP), PAD/PVD with chronic LE wounds, h/o MS s/p bioprosthetic MVR, HFpEF, paroxsmal afib (not on A/C in setting of bleeding), HTN,  DM, CKD III, anemia, post menopausal vaginal bleeding s/p D&C in 2018, UC, , morbid obesity p/w right knee pain and edema after multiple falls at home.  Patient denies any preceding symptoms of dizziness/lightheadedness, shortness of breath, CP, palpitations preceding the events. Endorses chronic back pain, has a history of spinal stenosis and has been using a wheelchair the past 4 months. Requires assistance of her  for ADLS    UA +, urine cultures growing Proteus and is being treated with Aztreonam and Vanco.    Nonsmoker, has been following with Dr. Montgomery over the past year for symptoms of progressive dyspnea, now with minimal exertion. Prior HRCT chest showed evidence of ILD - unclear if related to underlying RA vs. amiodarone.  Has been on supplemental O2 since May 2018, 2LNC around the clock.   Home inhaler regimen: Albuterol andreia, anoro ellipta      PAST MEDICAL & SURGICAL HISTORY:  Chronic kidney disease (CKD)  Anemia of chronic disease  On home oxygen therapy: 2  liters nasal cannula  Asthma: PFT (2018)  History of postmenopausal bleeding  Dyslipidemia associated with type 2 diabetes mellitus  Paroxysmal atrial fibrillation: h/o rapid ventricular rate 2 years ago, admitted at [facility], controlled with medication as per patient.  last INR 2.0  Morbid obesity with BMI of 45.0-49.9, adult  H/O: hypothyroidism  Chronic diastolic CHF (congestive heart failure): EF 56% (2017)  Depression (emotion)  Arthritis of spine  Macular degeneration of left eye: receiving injection  Neuropathy  Peripheral arterial disease: s/p remote stent. not on antiplatelet meds for a while.  Hypertension  Edema  GERD (gastroesophageal reflux disease)  Chronic low back pain  Herniated disc: lumbar  VICKY (obstructive sleep apnea)  Ulcerative colitis  Diabetes mellitus: T2DM last A1C 6.7 mg/dl in January   fs range 59- 200 mg/dl  History of mitral valve replacement with bioprosthetic valve: x 4 years ago  H/O  section: x 2  Amputated toe      FAMILY HISTORY:  FH: heart disease: mother      SOCIAL HISTORY:  Smoking: [x ] Never Smoked [ ] Former Smoker (__ packs x ___ years) [ ] Current Smoker  (__ packs x ___ years)  Substance Use: [x ] Never Used [ ] Used ____  EtOH Use: denies  Marital Status: [ ] Single [x ]  [ ]  [ ]   Occupation: Retired   Recent Travel: Denies    Allergies    Augmentin (Swelling)  cephalexin (Swelling)    HOME MEDICATIONS: reviewed    REVIEW OF SYSTEMS:  Constitutional: [ ] fevers [ ] chills [ ] weight loss [ ] weight gain  HEENT: [ ] dry eyes [ ] eye irritation [ ] postnasal drip [ ] nasal congestion  CV: [ ] chest pain [ ] orthopnea [ ] palpitations [ ] murmur  Resp: [ ] cough [ ] shortness of breath [ ] wheezing [ ] sputum [ ] hemoptysis  GI: [ ] nausea [ ] vomiting [ ] diarrhea [ ] constipation [ ] abd pain [ ] dysphagia   : [ ] dysuria [ ] nocturia [ ] hematuria [ ] increased urinary frequency  Musculoskeletal: [ ] myalgias [ ] arthralgias   Skin: [ ] rash [ ] itch  Neurological: [ ] headache [ ] dizziness [ ] syncope [ ] weakness [ ] numbness  Psychiatric: [ ] anxiety [ ] depression  Endocrine: [ ] diabetes [ ] thyroid problem  Hematologic/Lymphatic: [ ] anemia [ ] bleeding problem  Allergic/Immunologic: [ ] itchy eyes [ ] nasal discharge [ ] hives [ ] angioedema  [ ] All other systems negative  [ ] Unable to assess ROS because ________    OBJECTIVE:  ICU Vital Signs Last 24 Hrs  T(C): 36.8 (2019 12:22), Max: 36.9 (2019 20:01)  T(F): 98.3 (2019 12:22), Max: 98.4 (2019 20:01)  HR: 100 (2019 12:22) (92 - 101)  BP: 125/80 (2019 12:22) (113/73 - 139/74)  BP(mean): --  ABP: --  ABP(mean): --  RR: 18 (2019 12:22) (17 - 18)  SpO2: 100% (2019 12:22) (97% - 100%)         @ 07: @ 07:00  --------------------------------------------------------  IN: 590 mL / OUT: 600 mL / NET: -10 mL     @ 07: @ 15:55  --------------------------------------------------------  IN: 0 mL / OUT: 800 mL / NET: -800 mL      CAPILLARY BLOOD GLUCOSE      POCT Blood Glucose.: 304 mg/dL (2019 11:46)      PHYSICAL EXAM:  General:  NAD  HEENT:  NC/AT  Lymph Nodes: No cervical or supraclavicular lymphadenopathy   Neck: Supple  Respiratory:  CTA B/L, no wheezes, crackles or rhonchi  Cardiovascular:  RRR, no m/r/g  Abdomen: soft, NT/ND, +BS  Extremities: no clubbing, cyanosis or edema, warm  Skin: no rash  Neurological: AAOx3, no focal deficits  Psychiatry: not anxious, normal affect    HOSPITAL MEDICATIONS:  MEDICATIONS  (STANDING):  amiodarone    Tablet 200 milliGRAM(s) Oral daily  aztreonam  IVPB 1000 milliGRAM(s) IV Intermittent every 12 hours  buDESOnide 160 MICROgram(s)/formoterol 4.5 MICROgram(s) Inhaler 2 Puff(s) Inhalation two times a day  carvedilol 6.25 milliGRAM(s) Oral every 12 hours  dextrose 5%. 1000 milliLiter(s) (50 mL/Hr) IV Continuous <Continuous>  dextrose 50% Injectable 12.5 Gram(s) IV Push once  dextrose 50% Injectable 25 Gram(s) IV Push once  dextrose 50% Injectable 25 Gram(s) IV Push once  docusate sodium 100 milliGRAM(s) Oral three times a day  DULoxetine 60 milliGRAM(s) Oral daily  insulin glargine Injectable (LANTUS) 32 Unit(s) SubCutaneous at bedtime  insulin lispro (HumaLOG) corrective regimen sliding scale   SubCutaneous three times a day before meals  insulin lispro (HumaLOG) corrective regimen sliding scale   SubCutaneous at bedtime  insulin lispro Injectable (HumaLOG) 12 Unit(s) SubCutaneous three times a day before meals  isosorbide   mononitrate ER Tablet (IMDUR) 30 milliGRAM(s) Oral daily  levothyroxine 188 MICROGram(s) Oral daily  mesalamine DR (24-Hour) Tablet 2.4 Gram(s) Oral daily  metolazone 2.5 milliGRAM(s) Oral <User Schedule>  pantoprazole    Tablet 40 milliGRAM(s) Oral before breakfast  spironolactone 25 milliGRAM(s) Oral daily  tiotropium 18 MICROgram(s) Capsule 1 Capsule(s) Inhalation daily  torsemide 50 milliGRAM(s) Oral every 12 hours  vancomycin  IVPB 1000 milliGRAM(s) IV Intermittent <User Schedule>    MEDICATIONS  (PRN):  acetaminophen   Tablet .. 650 milliGRAM(s) Oral every 6 hours PRN Mild Pain (1 - 3), Moderate Pain (4 - 6)  dextrose 40% Gel 15 Gram(s) Oral once PRN Blood Glucose LESS THAN 70 milliGRAM(s)/deciliter  glucagon  Injectable 1 milliGRAM(s) IntraMuscular once PRN Glucose LESS THAN 70 milligrams/deciliter      LABS:                        9.2    10.36 )-----------( 182      ( 2019 09:26 )             29.9     Hgb Trend: 9.2<--, 9.3<--, 8.6<--, 8.4<--, 8.3<--  04-03    141  |  93<L>  |  56<H>  ----------------------------<  174<H>  3.6   |  34<H>  |  1.94<H>    Ca    10.1      2019 06:17  Mg     2.0     04-02      Creatinine Trend: 1.94<--, 1.90<--, 1.84<--, 1.92<--, 1.87<--, 1.89<--      MICROBIOLOGY:   Culture - Blood (19 @ 22:36)    Specimen Source: .Blood Blood    Culture Results:   No growth to date.    Culture - Urine (19 @ 21:18)    -  Tobramycin: S <=4    -  Trimethoprim/Sulfamethoxazole: S <=2/38    -  Ciprofloxacin: S <=1    -  Ertapenem: S <=1    -  Gentamicin: S <=4    -  Levofloxacin: S <=2    -  Meropenem: S <=1    -  Nitrofurantoin: R >64 Should not be used to treat pyelonephritis    -  Piperacillin/Tazobactam: S <=16    -  Amikacin: S <=16    -  Ampicillin: S <=8 These ampicillin results predict results for amoxicillin    -  Ampicillin/Sulbactam: S <=8/4    -  Aztreonam: S <=4    -  Cefazolin: S <=8 For uncomplicated UTI with K. pneumoniae, E. coli, or P. mirablis: LÓPEZ <=16 is sensitive and LÓPEZ >=32 is resistant. This also predicts results for oral agents cefaclor, cefdinir, cefpodoxime, cefprozil, cefuroxime axetil, cephalexin and locarbef for uncomplicated UTI. Note that some isolates may be susceptible to these agents while testing resistant to cefazolin.    -  Cefepime: S <=4    -  Cefoxitin: S <=8    -  Ceftriaxone: S <=1 Enterobacter, Citrobacter, and Serratia may develop resistance during prolonged therapy    Specimen Source: .Urine Clean Catch (Midstream)    Culture Results:   >100,000 CFU/ml Proteus mirabilis    Organism Identification: Proteus mirabilis    Organism: Proteus mirabilis    Method Type: LÓPEZ      RADIOLOGY:  [x ] Reviewed and interpreted by me    PULMONARY FUNCTION TESTS: 2018: severe restrictive disorder with severely reduced DLCO.     < from: TTE with Doppler (w/Cont) (19 @ 13:12) >  PROCEDURE: Transthoracic echocardiogram with 2-D, M-Mode  and complete spectral and color flowDoppler. Verbal  consent was obtained for injection of  Ultrasonic Enhancing  Agent following a discussion of risks and benefits.  Following intravenous injection of Ultrasonic Enhancing  Agent , harmonic imaging was performed.  INDICATION: Heart failure, unspecified (I50.9)  ------------------------------------------------------------------------  Dimensions:    Normal Values:  LA:            2.0 - 4.0 cm  Ao:     3.3    2.0 - 3.8 cm  SEPTUM:        0.6 - 1.2 cm  PWT:           0.6 - 1.1 cm  LVIDd:         3.0 - 5.6 cm  LVIDs:         1.8 - 4.0 cm  Doppler Peak Velocity (m/sec): MV=2.1  ------------------------------------------------------------------------  Observations:  Mitral Valve: Bioprosthetic mitral valve replacement with  normal function. Mean gradient is 7.5 mmHg. Heart rate  during assessment was 100/min.  Aortic Valve/Aorta: Calcified aortic valve with normal  opening.  Normal aortic root size. (Ao: 3.3 cm at the sinuses of  Valsalva).  Left Atrium: Normal left atrium.  LeftVentricle: Endocardial visualization enhanced with  intravenous injection of Ultrasonic Enhancing Agent  (Definity). Normal left ventricular systolic function.  Normal left ventricular systolic function. No segmental  wall motion abnormalities. Normal left ventricular internal  dimensions and wall thicknesses.  Right Heart: Normal right atrium. Normal right ventricular  size and function. Normal tricuspid valve.  Normal pulmonic  valve.  Pericardium/Pleura: Normal pericardium with no pericardial  effusion.  Hemodynamic: No evidence of pulmonary hypertension.  ------------------------------------------------------------------------  Conclusions:  Endocardial visualization enhanced with intravenous  injection of Ultrasonic Enhancing Agent (Definity).  Normal left ventricular systolic function. No segmental  wall motion abnormalities.  Bioprosthetic mitral valve replacement with normal  function.  ------------------------------------------------------------------------  Confirmed on  3/25/2019 - 16:25:04 by Jordan Cobb M.D.    < end of copied text >    < from: CT Chest No Cont (19 @ 13:08) >  EXAM:  CT CHEST                            PROCEDURE DATE:  2019            INTERPRETATION:  CLINICAL INFORMATION: Idiopathic lung disease    COMPARISON: CT chest dated 2018    PROCEDURE:   CT of the Chest was performed without intravenous contrast.  Sagittal and coronal reformats were performed.      FINDINGS:    CHEST:     LUNGS AND LARGE AIRWAYS: Patent central airways.  No pulmonary nodules.   Bibasilar linear atelectasis.  PLEURA: Trace right pleural effusion.  VESSELS: Atherosclerotic calcifications of the thoracic aorta and   coronary arteries.  HEART: Heart size is normal. No pericardial effusion. Aortic valvular   calcifications. Mitral replacement.  MEDIASTINUM AND JULIET: No lymphadenopathy.  CHEST WALL AND LOWER NECK: Sternotomy.  VISUALIZED UPPER ABDOMEN: Increased density of the liver which may be   seen in the setting of amiodarone use.  BONES: Degenerative changes.    IMPRESSION:   Trace right pleural effusion.    Bibasilar linear atelectasis.    HERBIE KHAN M.D., RADIOLOGY RESIDENT  This document has been electronically signed.  VERONICA MCKEON M.D., ATTENDING RADIOLOGIST  This document has been electronically signed. Mar 26 2019  2:58PM    < end of copied text >    < from: Xray Chest 1 View AP/PA (19 @ 17:42) >  EXAM:  XR CHEST AP OR PA 1V                            PROCEDURE DATE:  2019      INTERPRETATION:  CLINICAL INFORMATION: sob    EXAM: AP Chest    COMPARISON: Chest x-ray 3/22/2019.    FINDINGS/  IMPRESSION:    Status post median sternotomy and CABG. Valve repair. Heart size cannot   be accurately assessed in this projection. Mild pulmonary edema. No   pleural effusions or pneumothorax. The visualized bones and soft tissues   show no acute findings.      BABS ABBOTT M.D., RADIOLOGIST RESIDENT  This document has been electronically signed.  HITESH NGUYEN M.D., ATTENDING RADIOLOGIST  This document has been electronically signed. Mar 31 2019  8:40AM    < end of copied text > [facility] - Division of Pulmonary, Critical Care and Sleep Medicine   Please call [phone] between 8am-pm weekdays, [phone] after hours and weekends    THe patient was seen by me with  at bedside earlier today at 4 pm.    CHIEF COMPLAINT: Right knee pain and edema, s/p falls    HPI: 79 yo F with ILD, VICKY/OHS with chronic hypercapnic and hypoxic respiratory failure on 3L NC (not on CPAP), PAD/PVD with chronic LE wounds, h/o MS s/p bioprosthetic MVR, HFpEF, paroxsmal afib (not on A/C in setting of bleeding), HTN,  DM, CKD III, anemia, post menopausal vaginal bleeding s/p D&C in 2018 (on pathology found to have polyps, started on hormonal therapy with resolution of bleeding), UC, , morbid obesity p/w right knee pain and edema after multiple falls.  Patient denies any preceding symptoms of dizziness/lightheadedness, shortness of breath, CP, palpitations preceding the events. Endorses chronic back pain, has a history of spinal stenosis and has been using a wheelchair the past 4 months. Requires assistance of her  for ADLS    UA +, urine cultures growing Proteus and is being treated with Aztreonam and Vanco.  +vaginal bleeding over the past 24 hrs (has not been on Progesterone since admission)    Nonsmoker, has been following with Dr. Montgomery over the past year for symptoms of progressive dyspnea, now with minimal exertion. Prior HRCT chest showed evidence of ILD - unclear if related to underlying RA vs. amiodarone.  Has been on supplemental O2 since May 2018, 2LNC around the clock.   H/o VICKY diagnosed years ago - not on therapy  Home inhaler regimen: Albuterol nebs, anoro ellipta      PAST MEDICAL & SURGICAL HISTORY:  Chronic kidney disease (CKD)  Anemia of chronic disease  On home oxygen therapy: 2  liters nasal cannula  Asthma: PFT (2018)  History of postmenopausal bleeding  Dyslipidemia associated with type 2 diabetes mellitus  Paroxysmal atrial fibrillation: h/o rapid ventricular rate 2 years ago, admitted at [facility], controlled with medication as per patient.  last INR 2.0  Morbid obesity with BMI of 45.0-49.9, adult  H/O: hypothyroidism  Chronic diastolic CHF (congestive heart failure): EF 56% (2017)  Depression (emotion)  Arthritis of spine  Macular degeneration of left eye: receiving injection  Neuropathy  Peripheral arterial disease: s/p remote stent. not on antiplatelet meds for a while.  Hypertension  Edema  GERD (gastroesophageal reflux disease)  Chronic low back pain  Herniated disc: lumbar  VICKY (obstructive sleep apnea)  Ulcerative colitis  Diabetes mellitus: T2DM last A1C 6.7 mg/dl in January   fs range 59- 200 mg/dl  History of mitral valve replacement with bioprosthetic valve: x 4 years ago  H/O  section: x 2  Amputated toe      FAMILY HISTORY:  FH: heart disease: mother      SOCIAL HISTORY:  Smoking: [x ] Never Smoked [ ] Former Smoker (__ packs x ___ years) [ ] Current Smoker  (__ packs x ___ years)  Substance Use: [x ] Never Used [ ] Used ____  EtOH Use: denies  Marital Status: [ ] Single [x ]  [ ]  [ ]   Occupation: Retired   Recent Travel: Denies    Allergies    Augmentin (Swelling)  cephalexin (Swelling)    HOME MEDICATIONS: reviewed    REVIEW OF SYSTEMS:  Constitutional: [ ] fevers [ ] chills [ ] weight loss [ ] weight gain  HEENT: [ ] dry eyes [ ] eye irritation [ ] postnasal drip [ ] nasal congestion  CV: [ ] chest pain [ ] orthopnea [ ] palpitations [ ] murmur  Resp: [ ] cough [ ] shortness of breath [ ] wheezing [ ] sputum [ ] hemoptysis  GI: [ ] nausea [ ] vomiting [ ] diarrhea [ ] constipation [ ] abd pain [ ] dysphagia   : [ ] dysuria [ ] nocturia [ ] hematuria [ ] increased urinary frequency  Musculoskeletal: [ ] myalgias [ ] arthralgias   Skin: [ ] rash [ ] itch  Neurological: [ ] headache [ ] dizziness [ ] syncope [ ] weakness [ ] numbness  Psychiatric: [ ] anxiety [ ] depression  Endocrine: [ ] diabetes [ ] thyroid problem  Hematologic/Lymphatic: [ ] anemia [ ] bleeding problem  Allergic/Immunologic: [ ] itchy eyes [ ] nasal discharge [ ] hives [ ] angioedema  [ ] All other systems negative  [ ] Unable to assess ROS because ________    OBJECTIVE:  ICU Vital Signs Last 24 Hrs  T(C): 36.8 (2019 12:22), Max: 36.9 (2019 20:01)  T(F): 98.3 (2019 12:22), Max: 98.4 (2019 20:01)  HR: 100 (2019 12:22) (92 - 101)  BP: 125/80 (2019 12:22) (113/73 - 139/74)  BP(mean): --  ABP: --  ABP(mean): --  RR: 18 (2019 12:22) (17 - 18)  SpO2: 100% (2019 12:22) (97% - 100%)         @ 07:  -   @ 07:00  --------------------------------------------------------  IN: 590 mL / OUT: 600 mL / NET: -10 mL     @ 07:01  -  03 @ 15:55  --------------------------------------------------------  IN: 0 mL / OUT: 800 mL / NET: -800 mL      CAPILLARY BLOOD GLUCOSE      POCT Blood Glucose.: 304 mg/dL (2019 11:46)      PHYSICAL EXAM:  General:  NAD  HEENT:  NC/AT  Lymph Nodes: No cervical or supraclavicular lymphadenopathy   Neck: Supple  Respiratory:  CTA B/L, no wheezes, crackles or rhonchi  Cardiovascular:  RRR, no m/r/g  Abdomen: soft, NT/ND, +BS  Extremities: no clubbing, cyanosis or edema, warm  Skin: no rash  Neurological: AAOx3, no focal deficits  Psychiatry: not anxious, normal affect    HOSPITAL MEDICATIONS:  MEDICATIONS  (STANDING):  amiodarone    Tablet 200 milliGRAM(s) Oral daily  aztreonam  IVPB 1000 milliGRAM(s) IV Intermittent every 12 hours  buDESOnide 160 MICROgram(s)/formoterol 4.5 MICROgram(s) Inhaler 2 Puff(s) Inhalation two times a day  carvedilol 6.25 milliGRAM(s) Oral every 12 hours  dextrose 5%. 1000 milliLiter(s) (50 mL/Hr) IV Continuous <Continuous>  dextrose 50% Injectable 12.5 Gram(s) IV Push once  dextrose 50% Injectable 25 Gram(s) IV Push once  dextrose 50% Injectable 25 Gram(s) IV Push once  docusate sodium 100 milliGRAM(s) Oral three times a day  DULoxetine 60 milliGRAM(s) Oral daily  insulin glargine Injectable (LANTUS) 32 Unit(s) SubCutaneous at bedtime  insulin lispro (HumaLOG) corrective regimen sliding scale   SubCutaneous three times a day before meals  insulin lispro (HumaLOG) corrective regimen sliding scale   SubCutaneous at bedtime  insulin lispro Injectable (HumaLOG) 12 Unit(s) SubCutaneous three times a day before meals  isosorbide   mononitrate ER Tablet (IMDUR) 30 milliGRAM(s) Oral daily  levothyroxine 188 MICROGram(s) Oral daily  mesalamine DR (24-Hour) Tablet 2.4 Gram(s) Oral daily  metolazone 2.5 milliGRAM(s) Oral <User Schedule>  pantoprazole    Tablet 40 milliGRAM(s) Oral before breakfast  spironolactone 25 milliGRAM(s) Oral daily  tiotropium 18 MICROgram(s) Capsule 1 Capsule(s) Inhalation daily  torsemide 50 milliGRAM(s) Oral every 12 hours  vancomycin  IVPB 1000 milliGRAM(s) IV Intermittent <User Schedule>    MEDICATIONS  (PRN):  acetaminophen   Tablet .. 650 milliGRAM(s) Oral every 6 hours PRN Mild Pain (1 - 3), Moderate Pain (4 - 6)  dextrose 40% Gel 15 Gram(s) Oral once PRN Blood Glucose LESS THAN 70 milliGRAM(s)/deciliter  glucagon  Injectable 1 milliGRAM(s) IntraMuscular once PRN Glucose LESS THAN 70 milligrams/deciliter      LABS:                        9.2    10.36 )-----------( 182      ( 2019 09:26 )             29.9     Hgb Trend: 9.2<--, 9.3<--, 8.6<--, 8.4<--, 8.3<--  04-03    141  |  93<L>  |  56<H>  ----------------------------<  174<H>  3.6   |  34<H>  |  1.94<H>    Ca    10.1      2019 06:17  Mg     2.0     -      Creatinine Trend: 1.94<--, 1.90<--, 1.84<--, 1.92<--, 1.87<--, 1.89<--      MICROBIOLOGY:   Culture - Blood (19 @ 22:36)    Specimen Source: .Blood Blood    Culture Results:   No growth to date.    Culture - Urine (19 @ 21:18)    -  Tobramycin: S <=4    -  Trimethoprim/Sulfamethoxazole: S <=2/38    -  Ciprofloxacin: S <=1    -  Ertapenem: S <=1    -  Gentamicin: S <=4    -  Levofloxacin: S <=2    -  Meropenem: S <=1    -  Nitrofurantoin: R >64 Should not be used to treat pyelonephritis    -  Piperacillin/Tazobactam: S <=16    -  Amikacin: S <=16    -  Ampicillin: S <=8 These ampicillin results predict results for amoxicillin    -  Ampicillin/Sulbactam: S <=8/4    -  Aztreonam: S <=4    -  Cefazolin: S <=8 For uncomplicated UTI with K. pneumoniae, E. coli, or P. mirablis: LÓPEZ <=16 is sensitive and LÓPEZ >=32 is resistant. This also predicts results for oral agents cefaclor, cefdinir, cefpodoxime, cefprozil, cefuroxime axetil, cephalexin and locarbef for uncomplicated UTI. Note that some isolates may be susceptible to these agents while testing resistant to cefazolin.    -  Cefepime: S <=4    -  Cefoxitin: S <=8    -  Ceftriaxone: S <=1 Enterobacter, Citrobacter, and Serratia may develop resistance during prolonged therapy    Specimen Source: .Urine Clean Catch (Midstream)    Culture Results:   >100,000 CFU/ml Proteus mirabilis    Organism Identification: Proteus mirabilis    Organism: Proteus mirabilis    Method Type: LÓPEZ      RADIOLOGY:  [x ] Reviewed and interpreted by me    PULMONARY FUNCTION TESTS: 2018: severe restrictive disorder with severely reduced DLCO.     < from: TTE with Doppler (w/Cont) (03.25.19 @ 13:12) >  PROCEDURE: Transthoracic echocardiogram with 2-D, M-Mode  and complete spectral and color flowDoppler. Verbal  consent was obtained for injection of  Ultrasonic Enhancing  Agent following a discussion of risks and benefits.  Following intravenous injection of Ultrasonic Enhancing  Agent , harmonic imaging was performed.  INDICATION: Heart failure, unspecified (I50.9)  ------------------------------------------------------------------------  Dimensions:    Normal Values:  LA:            2.0 - 4.0 cm  Ao:     3.3    2.0 - 3.8 cm  SEPTUM:        0.6 - 1.2 cm  PWT:           0.6 - 1.1 cm  LVIDd:         3.0 - 5.6 cm  LVIDs:         1.8 - 4.0 cm  Doppler Peak Velocity (m/sec): MV=2.1  ------------------------------------------------------------------------  Observations:  Mitral Valve: Bioprosthetic mitral valve replacement with  normal function. Mean gradient is 7.5 mmHg. Heart rate  during assessment was 100/min.  Aortic Valve/Aorta: Calcified aortic valve with normal  opening.  Normal aortic root size. (Ao: 3.3 cm at the sinuses of  Valsalva).  Left Atrium: Normal left atrium.  LeftVentricle: Endocardial visualization enhanced with  intravenous injection of Ultrasonic Enhancing Agent  (Definity). Normal left ventricular systolic function.  Normal left ventricular systolic function. No segmental  wall motion abnormalities. Normal left ventricular internal  dimensions and wall thicknesses.  Right Heart: Normal right atrium. Normal right ventricular  size and function. Normal tricuspid valve.  Normal pulmonic  valve.  Pericardium/Pleura: Normal pericardium with no pericardial  effusion.  Hemodynamic: No evidence of pulmonary hypertension.  ------------------------------------------------------------------------  Conclusions:  Endocardial visualization enhanced with intravenous  injection of Ultrasonic Enhancing Agent (Definity).  Normal left ventricular systolic function. No segmental  wall motion abnormalities.  Bioprosthetic mitral valve replacement with normal  function.  ------------------------------------------------------------------------  Confirmed on  3/25/2019 - 16:25:04 by Jordan Cobb M.D.    < end of copied text >    < from: CT Chest No Cont (19 @ 13:08) >  EXAM:  CT CHEST                            PROCEDURE DATE:  2019            INTERPRETATION:  CLINICAL INFORMATION: Idiopathic lung disease    COMPARISON: CT chest dated 2018    PROCEDURE:   CT of the Chest was performed without intravenous contrast.  Sagittal and coronal reformats were performed.      FINDINGS:    CHEST:     LUNGS AND LARGE AIRWAYS: Patent central airways.  No pulmonary nodules.   Bibasilar linear atelectasis.  PLEURA: Trace right pleural effusion.  VESSELS: Atherosclerotic calcifications of the thoracic aorta and   coronary arteries.  HEART: Heart size is normal. No pericardial effusion. Aortic valvular   calcifications. Mitral replacement.  MEDIASTINUM AND JULIET: No lymphadenopathy.  CHEST WALL AND LOWER NECK: Sternotomy.  VISUALIZED UPPER ABDOMEN: Increased density of the liver which may be   seen in the setting of amiodarone use.  BONES: Degenerative changes.    IMPRESSION:   Trace right pleural effusion.    Bibasilar linear atelectasis.    HERBIE KHAN M.D., RADIOLOGY RESIDENT  This document has been electronically signed.  VERONICA MCKEON M.D., ATTENDING RADIOLOGIST  This document has been electronically signed. Mar 26 2019  2:58PM    < end of copied text >    < from: Xray Chest 1 View AP/PA (19 @ 17:42) >  EXAM:  XR CHEST AP OR PA 1V                            PROCEDURE DATE:  2019      INTERPRETATION:  CLINICAL INFORMATION: sob    EXAM: AP Chest    COMPARISON: Chest x-ray 3/22/2019.    FINDINGS/  IMPRESSION:    Status post median sternotomy and CABG. Valve repair. Heart size cannot   be accurately assessed in this projection. Mild pulmonary edema. No   pleural effusions or pneumothorax. The visualized bones and soft tissues   show no acute findings.      BABS ABBOTT M.D., RADIOLOGIST RESIDENT  This document has been electronically signed.  HITESH NGUYEN M.D., ATTENDING RADIOLOGIST  This document has been electronically signed. Mar 31 2019  8:40AM    < end of copied text >

## 2019-04-03 NOTE — CONSULT NOTE ADULT - ASSESSMENT
Chronic urinary tract infection now with gross hematuria and external voiding device    Urine for culture and cytology  ct urogram   outpatient cystoscopy

## 2019-04-03 NOTE — CONSULT NOTE ADULT - PROBLEM SELECTOR RECOMMENDATION 9
-recs in AM pending discussion w/ attending  -pad counts for now    Meagan Escamilla PGY-3  to be d/w attending

## 2019-04-03 NOTE — PROGRESS NOTE ADULT - SUBJECTIVE AND OBJECTIVE BOX
Chief Complaint: 79 y/o Type 2 DM, hypothyroid, recently admitted with CHF exacerbation, re-admitted with recurrent falls and suspected UTI.    Patient with vaginal bleeding.  FS still high today, despite increased insulin dose.  PO intake good.  Patient afebrile.      MEDICATIONS  (STANDING):  amiodarone    Tablet 200 milliGRAM(s) Oral daily  aztreonam  IVPB 1000 milliGRAM(s) IV Intermittent every 12 hours  buDESOnide 160 MICROgram(s)/formoterol 4.5 MICROgram(s) Inhaler 2 Puff(s) Inhalation two times a day  carvedilol 6.25 milliGRAM(s) Oral every 12 hours  dextrose 5%. 1000 milliLiter(s) (50 mL/Hr) IV Continuous <Continuous>  dextrose 50% Injectable 12.5 Gram(s) IV Push once  dextrose 50% Injectable 25 Gram(s) IV Push once  dextrose 50% Injectable 25 Gram(s) IV Push once  docusate sodium 100 milliGRAM(s) Oral three times a day  DULoxetine 60 milliGRAM(s) Oral daily  insulin glargine Injectable (LANTUS) 32 Unit(s) SubCutaneous at bedtime  insulin lispro (HumaLOG) corrective regimen sliding scale   SubCutaneous three times a day before meals  insulin lispro (HumaLOG) corrective regimen sliding scale   SubCutaneous at bedtime  insulin lispro Injectable (HumaLOG) 12 Unit(s) SubCutaneous three times a day before meals  isosorbide   mononitrate ER Tablet (IMDUR) 30 milliGRAM(s) Oral daily  levothyroxine 188 MICROGram(s) Oral daily  mesalamine DR (24-Hour) Tablet 2.4 Gram(s) Oral daily  metolazone 2.5 milliGRAM(s) Oral <User Schedule>  pantoprazole    Tablet 40 milliGRAM(s) Oral before breakfast  spironolactone 25 milliGRAM(s) Oral daily  tiotropium 18 MICROgram(s) Capsule 1 Capsule(s) Inhalation daily  torsemide 50 milliGRAM(s) Oral every 12 hours  vancomycin  IVPB 1000 milliGRAM(s) IV Intermittent <User Schedule>    MEDICATIONS  (PRN):  acetaminophen   Tablet .. 650 milliGRAM(s) Oral every 6 hours PRN Mild Pain (1 - 3), Moderate Pain (4 - 6)  dextrose 40% Gel 15 Gram(s) Oral once PRN Blood Glucose LESS THAN 70 milliGRAM(s)/deciliter  glucagon  Injectable 1 milliGRAM(s) IntraMuscular once PRN Glucose LESS THAN 70 milligrams/deciliter      Allergies    Augmentin (Swelling)  cephalexin (Swelling)    Intolerances        PHYSICAL EXAM:  VITALS: T(C): 36.8 (04-03-19 @ 12:22)  T(F): 98.3 (04-03-19 @ 12:22), Max: 98.4 (04-02-19 @ 20:01)  HR: 100 (04-03-19 @ 12:22) (92 - 101)  BP: 125/80 (04-03-19 @ 12:22) (113/73 - 139/74)  RR:  (17 - 18)  SpO2:  (97% - 100%)  GENERAL: NAD,   EYES: No proptosis,  HEENT:  Atraumatic, Normocephalic,   RESPIRATORY: Clear to auscultation bilaterally  CARDIOVASCULAR: Regular rhythm; No murmurs; tr. peripheral edema  GI: Soft, nontender, non distended, normal bowel sounds  SKIN: Dry, intact, No rashes or lesions  MUSCULOSKELETAL: decreased strength  NEURO: No focal deficits.  Vaginal bleeding noted.  PSYCH: Alert and oriented x 3, normal affect, normal mood      POCT Blood Glucose.: 265 mg/dL (04-03-19 @ 16:41)  POCT Blood Glucose.: 304 mg/dL (04-03-19 @ 11:46)  POCT Blood Glucose.: 172 mg/dL (04-03-19 @ 07:33)  POCT Blood Glucose.: 211 mg/dL (04-02-19 @ 21:27)  POCT Blood Glucose.: 242 mg/dL (04-02-19 @ 16:44)  POCT Blood Glucose.: 373 mg/dL (04-02-19 @ 12:00)  POCT Blood Glucose.: 295 mg/dL (04-02-19 @ 07:24)  POCT Blood Glucose.: 300 mg/dL (04-01-19 @ 21:34)  POCT Blood Glucose.: 286 mg/dL (04-01-19 @ 17:17)  POCT Blood Glucose.: 267 mg/dL (04-01-19 @ 12:05)  POCT Blood Glucose.: 179 mg/dL (04-01-19 @ 07:57)  POCT Blood Glucose.: 298 mg/dL (03-31-19 @ 21:43)                            9.2    10.36 )-----------( 182      ( 03 Apr 2019 09:26 )             29.9       04-03    141  |  93<L>  |  56<H>  ----------------------------<  174<H>  3.6   |  34<H>  |  1.94<H>    EGFR if : 28<L>  EGFR if non : 25<L>    Ca    10.1      04-03  Mg     2.0     04-02        Thyroid Function Tests:  04-02 @ 09:19 TSH 9.70 FreeT4 1.5 T3 -- Anti TPO -- Anti Thyroglobulin Ab -- TSI --  03-25 @ 08:52 TSH 5.27 FreeT4 -- T3 -- Anti TPO -- Anti Thyroglobulin Ab -- TSI --      Hemoglobin A1C, Whole Blood: 7.4 % <H> [4.0 - 5.6] (03-25-19 @ 08:55)

## 2019-04-03 NOTE — CONSULT NOTE ADULT - ATTENDING COMMENTS
This patient is known to me for the past year. She came to me with 10 year history of intermittent postmenopausal bleeding. She had hysteroscopy D&C under spinal anesthesia in June 2018 with benign pathology showing polyp and inflammation. She was not compliant with prolonged course of Doxycycline for treatment of chronic endometritis and was then started on norethindrone for recurrent bleeding. She reports being sometimes compliant with this medication due to "being on too many medications to keep track". Currently her bleeding is not heavy but persistent. I do not believe the extent of her bleeding can completely explain her anemia. After consultation with GYN Oncology, hysterectomy is not warranted at this time and carries significant anesthetic and surgical risks given her obesity and multiple medical problems. I am recommending repeat pelvic sono (most recent sono not conclusive and needs transvaginal component) and will plan for repeat endometrial sampling with hysteroscopy D&C with possible Mirena IUD placement. I have discussed this with the patient and she agrees to this plan. This patient is known to me for the past year. She came to me with 10 year history of intermittent postmenopausal bleeding. She had hysteroscopy D&C under spinal anesthesia in June 2018 with benign pathology showing polyp and inflammation. She was not compliant with prolonged course of Doxycycline for treatment of chronic endometritis and was then started on norethindrone for recurrent bleeding. She reports being sometimes compliant with this medication due to "being on too many medications to keep track" however she does note that her bleeding did improve when she was taking the norethindrone, which has since been stopped on admission. Currently her bleeding is not heavy but persistent. I do not believe the extent of her bleeding can completely explain her anemia. After consultation with GYN Oncology, hysterectomy is not warranted at this time and carries significant anesthetic and surgical risks given her obesity and multiple medical problems. Recommendation is for repeat pelvic sono (most recent sono not conclusive and needs transvaginal component) and will plan for repeat endometrial sampling with hysteroscopy D&C with possible Mirena IUD placement. I have discussed this with the patient and she agrees to this plan.

## 2019-04-03 NOTE — CONSULT NOTE ADULT - SUBJECTIVE AND OBJECTIVE BOX
TANIKA OBRIEN  78y  Female [id number]    HPI:  79 yo woman w/ hx of moderate MS s/p bioprosthetic mitral valve, diastolic CHF, paroxsmal afib (not on A/C in setting of bleeding), HTN, severe pulmonary HTN, DMT2, CKD III, anemia, with post menopausal vaginal bleeding, UC, VICKY on 3L NC (not on CPAP/BIPAP) presents from home in setting of multiple falls the past 2 days resulting with right knee pain. Patient had a fall yesterday from 1 step while walking out of the house and tripped. Per patient states that her right leg had a buckling/collapsing sensation. Patient went to [place] ED on 3/29 and had an xray which did not reveal fracture and sent home with cyclobenzaprine and Lidoderm patch. Patient last use of medication on 3/29. Patient had another fall while attempting to ambulate to the bathroom. She described that her right leg buckled under her causing her to fall. It was difficult for her to get up on her own and required the assistance of her  and son-in-law to get up. Patient denies any preceding symptoms of dizziness/lightheadedness, shortness of breath, CP, palpitations preceding the events. Endorses chronic back pain with no new characteristics. Patient has been getting around home with use of wheelchair the past 4 months. Requires assistance of her  to get around because of chronic weakness. Denies development of new numbness of lower extremities. Does not utilize any other assistive devices. Has not had PT outpatient. Patient also has had chronic LE wounds that were dressed from last discharge on 3/27. Denies changing of dressing. Denies fevers, chills, sweats. (30 Mar 2019 21:08)    GYN HPI:   77yo , postmenopausal, PMH significant for  prior bioprosthetic MVR,  PAF on amiodarone, CKD, VICKY, Obesity, abnormal uterine bleeding and anemia, presented with LE weakness now w/ consult for vaginal bleeding.  Patient was previously seen by GYN team at end of March when she was admitted for a CHF exacerbation.   Patient also is a known patient of Dr. Cait OROZCO- who has been following the patient for AUB outpatient and started her on the norethindrone.   Patient and  report compliance with the norethindrone outpatient  They state she last took a dose Saturday prior to being hospitalized.   Patient and  report that when the patient is on norethindrone she does not experience vaginal bleeding.  Patient reports that since coming to the hospital she stopped taking norethindrone and started bleeding a few days ago.  Reports the bleeding as "heavy" however states she is not changing pads as she has an external catheter on.   Per nurse at bedside the external catheter has been suctioning moderate amounts of vaginal bleeding.     Patient denies pelvic pain/pressure.  Pt denies abd pain, fever, dizziness, lightheadedness. Pt states she has SOB 2/2 her cardiac issues.  Denies increasing abdominal girth with exception of increased weight due to decreased ambulation.     Pt is s/p D&C for AUB in 2018-pathology significant for benign mucinous mucosa with marked acute and chronic inflammation and endometrial and mucinous tissue with marked acute and chronic inflammation and features compatible with lower uterine segment /cervical polyp    Name of GYN Physician: Dr. Romana Chavira    POB:    G1-, pltcs  G2- rltcs  menarche at age 13, menopause in her late 40's. Of note, she had a D+C in 2018 for endometrial polyp removal.   Pt denied hx of fibroids, cysts, abnormal pap smears, STI's.     Surgical History:    History of mitral valve replacement with bioprosthetic valve  [facility]    [facility] Meds:   MEDICATIONS  (STANDING):  amiodarone    Tablet 200 milliGRAM(s) Oral daily  aztreonam  IVPB 1000 milliGRAM(s) IV Intermittent every 12 hours  buDESOnide 160 MICROgram(s)/formoterol 4.5 MICROgram(s) Inhaler 2 Puff(s) Inhalation two times a day  carvedilol 6.25 milliGRAM(s) Oral every 12 hours  dextrose 5%. 1000 milliLiter(s) (50 mL/Hr) IV Continuous <Continuous>  dextrose 50% Injectable 12.5 Gram(s) IV Push once  dextrose 50% Injectable 25 Gram(s) IV Push once  dextrose 50% Injectable 25 Gram(s) IV Push once  docusate sodium 100 milliGRAM(s) Oral three times a day  DULoxetine 60 milliGRAM(s) Oral daily  insulin glargine Injectable (LANTUS) 32 Unit(s) SubCutaneous at bedtime  insulin lispro (HumaLOG) corrective regimen sliding scale   SubCutaneous three times a day before meals  insulin lispro (HumaLOG) corrective regimen sliding scale   SubCutaneous at bedtime  insulin lispro Injectable (HumaLOG) 12 Unit(s) SubCutaneous three times a day before meals  isosorbide   mononitrate ER Tablet (IMDUR) 30 milliGRAM(s) Oral daily  levothyroxine 188 MICROGram(s) Oral daily  mesalamine DR (24-Hour) Tablet 2.4 Gram(s) Oral daily  metolazone 2.5 milliGRAM(s) Oral <User Schedule>  pantoprazole    Tablet 40 milliGRAM(s) Oral before breakfast  spironolactone 25 milliGRAM(s) Oral daily  tiotropium 18 MICROgram(s) Capsule 1 Capsule(s) Inhalation daily  torsemide 50 milliGRAM(s) Oral every 12 hours  vancomycin  IVPB 1000 milliGRAM(s) IV Intermittent <User Schedule>    MEDICATIONS  (PRN):  acetaminophen   Tablet .. 650 milliGRAM(s) Oral every 6 hours PRN Mild Pain (1 - 3), Moderate Pain (4 - 6)  dextrose 40% Gel 15 Gram(s) Oral once PRN Blood Glucose LESS THAN 70 milliGRAM(s)/deciliter  glucagon  Injectable 1 milliGRAM(s) IntraMuscular once PRN Glucose LESS THAN 70 milligrams/deciliter      Allergies    Augmentin (Swelling)  cephalexin (Swelling)    Intolerances        PAST MEDICAL & SURGICAL HISTORY:  Chronic kidney disease (CKD)  Anemia of chronic disease  On home oxygen therapy: 2  liters nasal cannula  Asthma: PFT (2018)  History of postmenopausal bleeding  Dyslipidemia associated with type 2 diabetes mellitus  Paroxysmal atrial fibrillation: h/o rapid ventricular rate 2 years ago, admitted at [facility], controlled with medication as per patient.  last INR 2.0  Morbid obesity with BMI of 45.0-49.9, adult  H/O: hypothyroidism  Chronic diastolic CHF (congestive heart failure): EF 56% (2017)  Depression (emotion)  Arthritis of spine  Macular degeneration of left eye: receiving injection  Neuropathy  Peripheral arterial disease: s/p remote stent. not on antiplatelet meds for a while.  Hypertension  Edema  GERD (gastroesophageal reflux disease)  Chronic low back pain  Herniated disc: lumbar  VICKY (obstructive sleep apnea)  Ulcerative colitis  Diabetes mellitus: T2DM last A1C 6.7 mg/dl in January   fs range 59- 200 mg/dl  History of mitral valve replacement with bioprosthetic valve: x 4 years ago  H/O  section: x 2  Amputated toe      FAMILY HISTORY:  FH: heart disease: mother    Social History:  Denies smoking use, drug use, alcohol use.       Vital Signs Last 24 Hrs  T(C): 36.8 (2019 12:22), Max: 36.9 (2019 20:01)  T(F): 98.3 (2019 12:22), Max: 98.4 (2019 20:01)  HR: 100 (2019 12:22) (92 - 101)  BP: 125/80 (2019 12:22) (113/73 - 139/74)  BP(mean): --  RR: 18 (2019 12:22) (17 - 18)  SpO2: 100% (2019 12:22) (97% - 100%)    Physical Exam:   GENERAL: NAD, well-developed  ABDOMEN: Soft, Nontender, Nondistended; Bowel sounds present, No rebound, No guarding  PELVIC: deferred  : examination of pad shows blood mixed w/ urine.  Entire pad below patient soaked with blood tinged urine.  Per patient pad last changed about 6 hours prior.     LABS:                              9.2    10.36 )-----------( 182      ( 2019 09:26 )             29.9     04-03    141  |  93<L>  |  56<H>  ----------------------------<  174<H>  3.6   |  34<H>  |  1.94<H>    Ca    10.1      2019 06:17  Mg     2.0     02      I&O's Detail    2019 07:01  -  2019 07:00  --------------------------------------------------------  IN:    IV PiggyBack: 50 mL    Oral Fluid: 290 mL    Solution: 250 mL  Total IN: 590 mL    OUT:    Voided: 600 mL  Total OUT: 600 mL    Total NET: -10 mL      2019 07:  -  2019 17:43  --------------------------------------------------------  IN:  Total IN: 0 mL    OUT:    Voided: 800 mL  Total OUT: 800 mL    Total NET: -800 mL              RADIOLOGY & ADDITIONAL STUDIES: TANIKA OBRIEN  78y  Female [id number]    HPI:  79 yo woman w/ hx of moderate MS s/p bioprosthetic mitral valve, diastolic CHF, paroxsmal afib (not on A/C in setting of bleeding), HTN, severe pulmonary HTN, DMT2, CKD III, anemia, with post menopausal vaginal bleeding, UC, VICKY on 3L NC (not on CPAP/BIPAP) presents from home in setting of multiple falls the past 2 days resulting with right knee pain. Patient had a fall yesterday from 1 step while walking out of the house and tripped. Per patient states that her right leg had a buckling/collapsing sensation. Patient went to [place] ED on 3/29 and had an xray which did not reveal fracture and sent home with cyclobenzaprine and Lidoderm patch. Patient last use of medication on 3/29. Patient had another fall while attempting to ambulate to the bathroom. She described that her right leg buckled under her causing her to fall. It was difficult for her to get up on her own and required the assistance of her  and son-in-law to get up. Patient denies any preceding symptoms of dizziness/lightheadedness, shortness of breath, CP, palpitations preceding the events. Endorses chronic back pain with no new characteristics. Patient has been getting around home with use of wheelchair the past 4 months. Requires assistance of her  to get around because of chronic weakness. Denies development of new numbness of lower extremities. Does not utilize any other assistive devices. Has not had PT outpatient. Patient also has had chronic LE wounds that were dressed from last discharge on 3/27. Denies changing of dressing. Denies fevers, chills, sweats. (30 Mar 2019 21:08)    GYN HPI:   79yo , postmenopausal, PMH significant for  prior bioprosthetic MVR,  PAF on amiodarone, CKD, VIKCY, Obesity, abnormal uterine bleeding and anemia, presented with LE weakness now w/ consult for vaginal bleeding.  Patient was previously seen by GYN team at end of March when she was admitted for a CHF exacerbation.   Patient also is a known patient of Dr. Cait OROZCO- who has been following the patient for AUB outpatient and started her on the norethindrone.   Patient and  report compliance with the norethindrone outpatient  They state she last took a dose Saturday prior to being hospitalized.   Patient and  report that when the patient is on norethindrone she does not experience vaginal bleeding.  Patient reports that since coming to the hospital she stopped taking norethindrone and started bleeding a few days ago.  Reports the bleeding as "heavy" however states she is not changing pads as she has an external catheter on.   Per nurse at bedside the external catheter has been suctioning moderate amounts of vaginal bleeding.     Patient denies pelvic pain/pressure.  Pt denies abd pain, fever, dizziness, lightheadedness. Pt states she has SOB 2/2 her cardiac issues.  Denies increasing abdominal girth with exception of increased weight due to decreased ambulation.     Pt is s/p D&C for AUB in 2018-pathology significant for benign mucinous mucosa with marked acute and chronic inflammation and endometrial and mucinous tissue with marked acute and chronic inflammation and features compatible with lower uterine segment /cervical polyp    Name of GYN Physician: Dr. Romana Chavira    POB:    G1-, pltcs  G2- rltcs  menarche at age 13, menopause in her late 40's. Of note, she had a D+C in 2018 for endometrial polyp removal.   Pt denied hx of fibroids, cysts, abnormal pap smears, STI's.     Surgical History:    History of mitral valve replacement with bioprosthetic valve  [facility]    Hospital Meds:   MEDICATIONS  (STANDING):  amiodarone    Tablet 200 milliGRAM(s) Oral daily  aztreonam  IVPB 1000 milliGRAM(s) IV Intermittent every 12 hours  buDESOnide 160 MICROgram(s)/formoterol 4.5 MICROgram(s) Inhaler 2 Puff(s) Inhalation two times a day  carvedilol 6.25 milliGRAM(s) Oral every 12 hours  dextrose 5%. 1000 milliLiter(s) (50 mL/Hr) IV Continuous <Continuous>  dextrose 50% Injectable 12.5 Gram(s) IV Push once  dextrose 50% Injectable 25 Gram(s) IV Push once  dextrose 50% Injectable 25 Gram(s) IV Push once  docusate sodium 100 milliGRAM(s) Oral three times a day  DULoxetine 60 milliGRAM(s) Oral daily  insulin glargine Injectable (LANTUS) 32 Unit(s) SubCutaneous at bedtime  insulin lispro (HumaLOG) corrective regimen sliding scale   SubCutaneous three times a day before meals  insulin lispro (HumaLOG) corrective regimen sliding scale   SubCutaneous at bedtime  insulin lispro Injectable (HumaLOG) 12 Unit(s) SubCutaneous three times a day before meals  isosorbide   mononitrate ER Tablet (IMDUR) 30 milliGRAM(s) Oral daily  levothyroxine 188 MICROGram(s) Oral daily  mesalamine DR (24-Hour) Tablet 2.4 Gram(s) Oral daily  metolazone 2.5 milliGRAM(s) Oral <User Schedule>  pantoprazole    Tablet 40 milliGRAM(s) Oral before breakfast  spironolactone 25 milliGRAM(s) Oral daily  tiotropium 18 MICROgram(s) Capsule 1 Capsule(s) Inhalation daily  torsemide 50 milliGRAM(s) Oral every 12 hours  vancomycin  IVPB 1000 milliGRAM(s) IV Intermittent <User Schedule>    MEDICATIONS  (PRN):  acetaminophen   Tablet .. 650 milliGRAM(s) Oral every 6 hours PRN Mild Pain (1 - 3), Moderate Pain (4 - 6)  dextrose 40% Gel 15 Gram(s) Oral once PRN Blood Glucose LESS THAN 70 milliGRAM(s)/deciliter  glucagon  Injectable 1 milliGRAM(s) IntraMuscular once PRN Glucose LESS THAN 70 milligrams/deciliter      Allergies    Augmentin (Swelling)  cephalexin (Swelling)    Intolerances        PAST MEDICAL & SURGICAL HISTORY:  Chronic kidney disease (CKD)  Anemia of chronic disease  On home oxygen therapy: 2  liters nasal cannula  Asthma: PFT (2018)  History of postmenopausal bleeding  Dyslipidemia associated with type 2 diabetes mellitus  Paroxysmal atrial fibrillation: h/o rapid ventricular rate 2 years ago, admitted at [facility], controlled with medication as per patient.  last INR 2.0  Morbid obesity with BMI of 45.0-49.9, adult  H/O: hypothyroidism  Chronic diastolic CHF (congestive heart failure): EF 56% (2017)  Depression (emotion)  Arthritis of spine  Macular degeneration of left eye: receiving injection  Neuropathy  Peripheral arterial disease: s/p remote stent. not on antiplatelet meds for a while.  Hypertension  Edema  GERD (gastroesophageal reflux disease)  Chronic low back pain  Herniated disc: lumbar  VICKY (obstructive sleep apnea)  Ulcerative colitis  Diabetes mellitus: T2DM last A1C 6.7 mg/dl in January   fs range 59- 200 mg/dl  History of mitral valve replacement with bioprosthetic valve: x 4 years ago  H/O  section: x 2  Amputated toe      FAMILY HISTORY:  FH: heart disease: mother    Social History:  Denies smoking use, drug use, alcohol use.       Vital Signs Last 24 Hrs  T(C): 36.8 (2019 12:22), Max: 36.9 (2019 20:01)  T(F): 98.3 (2019 12:22), Max: 98.4 (2019 20:01)  HR: 100 (2019 12:22) (92 - 101)  BP: 125/80 (2019 12:22) (113/73 - 139/74)  BP(mean): --  RR: 18 (2019 12:22) (17 - 18)  SpO2: 100% (2019 12:22) (97% - 100%)    Physical Exam:   GENERAL: NAD, well-developed  ABDOMEN: Soft, Nontender, Nondistended; Bowel sounds present, No rebound, No guarding  PELVIC: deferred  : examination of pad shows blood mixed w/ urine.  Entire pad below patient soaked with blood tinged urine.  Per patient pad last changed about 6 hours prior.     LABS:                              9.2    10.36 )-----------( 182      ( 2019 09:26 )             29.9     04-03    141  |  93<L>  |  56<H>  ----------------------------<  174<H>  3.6   |  34<H>  |  1.94<H>    Ca    10.1      2019 06:17  Mg     2.0     02      I&O's Detail    2019 07:01  -  2019 07:00  --------------------------------------------------------  IN:    IV PiggyBack: 50 mL    Oral Fluid: 290 mL    Solution: 250 mL  Total IN: 590 mL    OUT:    Voided: 600 mL  Total OUT: 600 mL    Total NET: -10 mL      2019 07:  -  2019 17:43  --------------------------------------------------------  IN:  Total IN: 0 mL    OUT:    Voided: 800 mL  Total OUT: 800 mL    Total NET: -800 mL              RADIOLOGY & ADDITIONAL STUDIES:    EXAM:  US PELVIC COMPLETE                            PROCEDURE DATE:  2019            INTERPRETATION:  CLINICAL INFORMATION: 78-year-old with history of   vaginal bleeding. History of D&C in  for polypectomy    COMPARISON: Comparison is made with prior study from 2018.    Transabdominal ultrasound examination of the pelvis was performed.    The uterus measures 10 cm in length x 3.7 cm AP x 4.8 cm transversely.   The endometrial canal and vagina are distended with fluid. This is   increased compared to the prior study.    The ovaries were not visualized.    No free fluid or adnexal masses seen.    Impression: Endometrial canal and vagina appear distended with fluid.   This is increased compared to prior study. The ovaries were not   visualized. Correlation with pelvic MR can be performed for further   evaluation.                    JANELLE FABIAN M.D., ATTENDING RADIOLOGIST  This document has been electronically signed. Mar 26 2019 12:05PM

## 2019-04-03 NOTE — CONSULT NOTE ADULT - ASSESSMENT
ImP;  77 yo female with mult medical issues and active vaginal bleeding, who p/w falls and R knee pains, difficulty moving knee joint. Says she had injury post fall, xrays unremarkable, though she has signs of injury on exam.    REc:  Agree with MRI of CT (if pt agrees) of the knee to further evaluate  Ortho evaluation

## 2019-04-03 NOTE — PROGRESS NOTE ADULT - ASSESSMENT
77 yo F w/ hx of moderate MS s/p bioprosthetic mitral valve, severe pulmonary HTN, DM2, anemia, diastolic CHF, hypothyroidism, paroxysmal atrial fibrillation, HTN78 y/o Type 2 DM, hypothyroid, recently admitted with CHF exacerbation, re-admitted with recurrent falls and suspected UTI.    Type 2 DM insulin resistant at home on Tresiba insulin 60 units daily, and humalog 30 units before meals. Other treatment includes Bydureon 2 mg weekly. HbA1c during recent admission was 7.4 %. During psast admission noted with significantly reduced insulin requirenents, discharged on Lantus insulin 24 units at HS and Humalog 5 units per meal.  Patient re-admitted after recurrent falls at home, and susp. UTI, started on similar dose insulin, noted with hyperglycemia.     Hypothyroidism- treated with synthroid 175 mcg daily. Last test as outpatient recently reported Good, Here with mild elevated TSH 5.27. repeat TSH this admission  9.70 uIU/mL, FT4 1.5.  C/O fatigue, weight stable till recently. Says she is compliant with medications. Synthroid increased to 188 mcg daily.    Patient on very high amounts of insulin at home. Decreased requirements last admission are most probably related to CHF, and as it resolves expect insulin requirements sanna increase.  Patient with vaginal bleeding.  FS still high today, despite increased insulin dose.  PO intake good.  Patient afebrile.    Suggest:  Increase Lantus insulin 38 units at HS.  Increase Humalog insulin to 18 units per meal.  Mid scale humalog.  Synthroid 188 mcg daily, repeat TFT's in 3 weeks.

## 2019-04-04 LAB
ANION GAP SERPL CALC-SCNC: 13 MMOL/L — SIGNIFICANT CHANGE UP (ref 5–17)
BUN SERPL-MCNC: 62 MG/DL — HIGH (ref 7–23)
CALCIUM SERPL-MCNC: 10 MG/DL — SIGNIFICANT CHANGE UP (ref 8.4–10.5)
CHLORIDE SERPL-SCNC: 92 MMOL/L — LOW (ref 96–108)
CO2 SERPL-SCNC: 35 MMOL/L — HIGH (ref 22–31)
CREAT SERPL-MCNC: 1.85 MG/DL — HIGH (ref 0.5–1.3)
CULTURE RESULTS: SIGNIFICANT CHANGE UP
CULTURE RESULTS: SIGNIFICANT CHANGE UP
GLUCOSE BLDC GLUCOMTR-MCNC: 119 MG/DL — HIGH (ref 70–99)
GLUCOSE BLDC GLUCOMTR-MCNC: 152 MG/DL — HIGH (ref 70–99)
GLUCOSE BLDC GLUCOMTR-MCNC: 216 MG/DL — HIGH (ref 70–99)
GLUCOSE BLDC GLUCOMTR-MCNC: 89 MG/DL — SIGNIFICANT CHANGE UP (ref 70–99)
GLUCOSE SERPL-MCNC: 167 MG/DL — HIGH (ref 70–99)
HCT VFR BLD CALC: 28.7 % — LOW (ref 34.5–45)
HGB BLD-MCNC: 8.9 G/DL — LOW (ref 11.5–15.5)
MCHC RBC-ENTMCNC: 30.2 PG — SIGNIFICANT CHANGE UP (ref 27–34)
MCHC RBC-ENTMCNC: 31 GM/DL — LOW (ref 32–36)
MCV RBC AUTO: 97.3 FL — SIGNIFICANT CHANGE UP (ref 80–100)
PLATELET # BLD AUTO: 198 K/UL — SIGNIFICANT CHANGE UP (ref 150–400)
POTASSIUM SERPL-MCNC: 3.2 MMOL/L — LOW (ref 3.5–5.3)
POTASSIUM SERPL-SCNC: 3.2 MMOL/L — LOW (ref 3.5–5.3)
RBC # BLD: 2.95 M/UL — LOW (ref 3.8–5.2)
RBC # FLD: 16.7 % — HIGH (ref 10.3–14.5)
SODIUM SERPL-SCNC: 140 MMOL/L — SIGNIFICANT CHANGE UP (ref 135–145)
SPECIMEN SOURCE: SIGNIFICANT CHANGE UP
SPECIMEN SOURCE: SIGNIFICANT CHANGE UP
VANCOMYCIN TROUGH SERPL-MCNC: 19 UG/ML — SIGNIFICANT CHANGE UP (ref 10–20)
WBC # BLD: 11.58 K/UL — HIGH (ref 3.8–10.5)
WBC # FLD AUTO: 11.58 K/UL — HIGH (ref 3.8–10.5)

## 2019-04-04 PROCEDURE — 99233 SBSQ HOSP IP/OBS HIGH 50: CPT

## 2019-04-04 RX ORDER — VANCOMYCIN HCL 1 G
750 VIAL (EA) INTRAVENOUS EVERY 12 HOURS
Qty: 0 | Refills: 0 | Status: DISCONTINUED | OUTPATIENT
Start: 2019-04-04 | End: 2019-04-04

## 2019-04-04 RX ORDER — INSULIN LISPRO 100/ML
14 VIAL (ML) SUBCUTANEOUS
Qty: 0 | Refills: 0 | Status: DISCONTINUED | OUTPATIENT
Start: 2019-04-05 | End: 2019-04-05

## 2019-04-04 RX ORDER — POTASSIUM CHLORIDE 20 MEQ
40 PACKET (EA) ORAL ONCE
Qty: 0 | Refills: 0 | Status: COMPLETED | OUTPATIENT
Start: 2019-04-04 | End: 2019-04-04

## 2019-04-04 RX ORDER — NORETHINDRONE 0.35 MG/1
5 TABLET ORAL DAILY
Qty: 0 | Refills: 0 | Status: DISCONTINUED | OUTPATIENT
Start: 2019-04-04 | End: 2019-04-09

## 2019-04-04 RX ORDER — INSULIN LISPRO 100/ML
14 VIAL (ML) SUBCUTANEOUS
Qty: 0 | Refills: 0 | Status: DISCONTINUED | OUTPATIENT
Start: 2019-04-04 | End: 2019-04-04

## 2019-04-04 RX ORDER — INSULIN LISPRO 100/ML
10 VIAL (ML) SUBCUTANEOUS
Qty: 0 | Refills: 0 | Status: COMPLETED | OUTPATIENT
Start: 2019-04-04 | End: 2019-04-04

## 2019-04-04 RX ORDER — VANCOMYCIN HCL 1 G
750 VIAL (EA) INTRAVENOUS EVERY 24 HOURS
Qty: 0 | Refills: 0 | Status: DISCONTINUED | OUTPATIENT
Start: 2019-04-04 | End: 2019-04-07

## 2019-04-04 RX ADMIN — NORETHINDRONE 5 MILLIGRAM(S): 0.35 TABLET ORAL at 17:34

## 2019-04-04 RX ADMIN — AMIODARONE HYDROCHLORIDE 200 MILLIGRAM(S): 400 TABLET ORAL at 06:08

## 2019-04-04 RX ADMIN — INSULIN GLARGINE 38 UNIT(S): 100 INJECTION, SOLUTION SUBCUTANEOUS at 22:30

## 2019-04-04 RX ADMIN — Medication 40 MILLIEQUIVALENT(S): at 11:56

## 2019-04-04 RX ADMIN — Medication 250 MILLIGRAM(S): at 22:34

## 2019-04-04 RX ADMIN — CARVEDILOL PHOSPHATE 6.25 MILLIGRAM(S): 80 CAPSULE, EXTENDED RELEASE ORAL at 17:31

## 2019-04-04 RX ADMIN — PANTOPRAZOLE SODIUM 40 MILLIGRAM(S): 20 TABLET, DELAYED RELEASE ORAL at 06:09

## 2019-04-04 RX ADMIN — Medication 18 UNIT(S): at 08:35

## 2019-04-04 RX ADMIN — BUDESONIDE AND FORMOTEROL FUMARATE DIHYDRATE 2 PUFF(S): 160; 4.5 AEROSOL RESPIRATORY (INHALATION) at 06:10

## 2019-04-04 RX ADMIN — Medication 100 MILLIGRAM(S): at 22:32

## 2019-04-04 RX ADMIN — ISOSORBIDE MONONITRATE 30 MILLIGRAM(S): 60 TABLET, EXTENDED RELEASE ORAL at 11:31

## 2019-04-04 RX ADMIN — Medication 10 UNIT(S): at 17:36

## 2019-04-04 RX ADMIN — Medication 50 MILLIGRAM(S): at 17:30

## 2019-04-04 RX ADMIN — BUDESONIDE AND FORMOTEROL FUMARATE DIHYDRATE 2 PUFF(S): 160; 4.5 AEROSOL RESPIRATORY (INHALATION) at 17:30

## 2019-04-04 RX ADMIN — Medication 50 MILLIGRAM(S): at 06:07

## 2019-04-04 RX ADMIN — DULOXETINE HYDROCHLORIDE 60 MILLIGRAM(S): 30 CAPSULE, DELAYED RELEASE ORAL at 11:28

## 2019-04-04 RX ADMIN — TIOTROPIUM BROMIDE 1 CAPSULE(S): 18 CAPSULE ORAL; RESPIRATORY (INHALATION) at 11:28

## 2019-04-04 RX ADMIN — Medication 18 UNIT(S): at 12:55

## 2019-04-04 RX ADMIN — Medication 2.4 GRAM(S): at 11:28

## 2019-04-04 RX ADMIN — Medication 100 MILLIGRAM(S): at 06:09

## 2019-04-04 RX ADMIN — SPIRONOLACTONE 25 MILLIGRAM(S): 25 TABLET, FILM COATED ORAL at 06:08

## 2019-04-04 RX ADMIN — Medication 2: at 08:35

## 2019-04-04 RX ADMIN — Medication 50 MILLIGRAM(S): at 17:31

## 2019-04-04 RX ADMIN — Medication 4: at 12:55

## 2019-04-04 RX ADMIN — CARVEDILOL PHOSPHATE 6.25 MILLIGRAM(S): 80 CAPSULE, EXTENDED RELEASE ORAL at 06:09

## 2019-04-04 RX ADMIN — Medication 50 MILLIGRAM(S): at 06:08

## 2019-04-04 RX ADMIN — Medication 188 MICROGRAM(S): at 06:08

## 2019-04-04 RX ADMIN — Medication 100 MILLIGRAM(S): at 14:09

## 2019-04-04 NOTE — PROGRESS NOTE ADULT - SUBJECTIVE AND OBJECTIVE BOX
[facility] - Division of Pulmonary, Critical Care and Sleep Medicine   Please call [phone] between 8am-pm weekdays, [phone] after hours and weekends    Interval Events: Continues to complain of vaginal bleeding and right knee pain/edema.  Seen by Gyn/onc, Rheum and Urology  Denies dyspnea at rest or cough- has not ambulated.    REVIEW OF SYSTEMS:  CV: [ ] chest pain   Resp: [ ] cough [ ] shortness of breath   [ ] All other systems negative  [ ] Unable to assess ROS because ________    OBJECTIVE:  ICU Vital Signs Last 24 Hrs  T(C): 36.6 (04 Apr 2019 06:08), Max: 36.9 (03 Apr 2019 19:48)  T(F): 97.8 (04 Apr 2019 06:08), Max: 98.4 (03 Apr 2019 19:48)  HR: 98 (04 Apr 2019 06:08) (98 - 100)  BP: 143/73 (04 Apr 2019 06:08) (125/80 - 143/73)  BP(mean): --  ABP: --  ABP(mean): --  RR: 18 (04 Apr 2019 06:08) (18 - 18)  SpO2: 98% (04 Apr 2019 06:08) (98% - 100%)        04-03 @ 07:01  -  04-04 @ 07:00  --------------------------------------------------------  IN: 240 mL / OUT: 2250 mL / NET: -2010 mL      CAPILLARY BLOOD GLUCOSE      POCT Blood Glucose.: 152 mg/dL (04 Apr 2019 08:13)      PHYSICAL EXAM:  General: NAD  HEENT: NC/AT  Lymph Nodes: no cervical or supraclavicular lymphadenopathy  Neck: supple  Respiratory:  CTA b/l, no wheezes, crackles or rhonchi  Cardiovascular:  RRR, no m/r/g  Abdomen: soft, NT/ND, +BS  Extremities: no clubbing, cyanosis or edema, warm  Skin: no rash  Neurological: AAOx3, non focal exam  Psychiatry: not anxious appearing, normal affect and mood    HOSPITAL MEDICATIONS:  MEDICATIONS  (STANDING):  amiodarone    Tablet 200 milliGRAM(s) Oral daily  aztreonam  IVPB 1000 milliGRAM(s) IV Intermittent every 12 hours  buDESOnide 160 MICROgram(s)/formoterol 4.5 MICROgram(s) Inhaler 2 Puff(s) Inhalation two times a day  carvedilol 6.25 milliGRAM(s) Oral every 12 hours  dextrose 5%. 1000 milliLiter(s) (50 mL/Hr) IV Continuous <Continuous>  dextrose 50% Injectable 12.5 Gram(s) IV Push once  dextrose 50% Injectable 25 Gram(s) IV Push once  dextrose 50% Injectable 25 Gram(s) IV Push once  docusate sodium 100 milliGRAM(s) Oral three times a day  DULoxetine 60 milliGRAM(s) Oral daily  insulin glargine Injectable (LANTUS) 38 Unit(s) SubCutaneous at bedtime  insulin lispro (HumaLOG) corrective regimen sliding scale   SubCutaneous three times a day before meals  insulin lispro (HumaLOG) corrective regimen sliding scale   SubCutaneous at bedtime  insulin lispro Injectable (HumaLOG) 18 Unit(s) SubCutaneous three times a day before meals  isosorbide   mononitrate ER Tablet (IMDUR) 30 milliGRAM(s) Oral daily  levothyroxine 188 MICROGram(s) Oral daily  mesalamine DR (24-Hour) Tablet 2.4 Gram(s) Oral daily  metolazone 2.5 milliGRAM(s) Oral <User Schedule>  pantoprazole    Tablet 40 milliGRAM(s) Oral before breakfast  spironolactone 25 milliGRAM(s) Oral daily  tiotropium 18 MICROgram(s) Capsule 1 Capsule(s) Inhalation daily  torsemide 50 milliGRAM(s) Oral every 12 hours  vancomycin  IVPB 1000 milliGRAM(s) IV Intermittent <User Schedule>    MEDICATIONS  (PRN):  acetaminophen   Tablet .. 650 milliGRAM(s) Oral every 6 hours PRN Mild Pain (1 - 3), Moderate Pain (4 - 6)  dextrose 40% Gel 15 Gram(s) Oral once PRN Blood Glucose LESS THAN 70 milliGRAM(s)/deciliter  glucagon  Injectable 1 milliGRAM(s) IntraMuscular once PRN Glucose LESS THAN 70 milligrams/deciliter      LABS:                        8.9    11.58 )-----------( 198      ( 04 Apr 2019 09:45 )             28.7     Hgb Trend: 8.9<--, 9.2<--, 9.3<--, 8.6<--, 8.4<--  04-04    140  |  92<L>  |  62<H>  ----------------------------<  167<H>  3.2<L>   |  35<H>  |  1.85<H>    Ca    10.0      04 Apr 2019 06:42      Creatinine Trend: 1.85<--, 1.94<--, 1.90<--, 1.84<--, 1.92<--, 1.87<--            MICROBIOLOGY:     RADIOLOGY:  [ ] Reviewed and interpreted by me [facility] - Division of Pulmonary, Critical Care and Sleep Medicine   Please call [phone] between 8am-pm weekdays, [phone] after hours and weekends    Interval Events: Continues to complain of vaginal bleeding and right knee pain/edema.  Seen by Gyn/onc, Rheum and Urology  Denies dyspnea at rest or cough- has not ambulated.    REVIEW OF SYSTEMS:  CV: [- ] chest pain   Resp: [- ] cough [- ] shortness of breath   [x ] All other systems negative  [ ] Unable to assess ROS because ________    OBJECTIVE:  ICU Vital Signs Last 24 Hrs  T(C): 36.6 (04 Apr 2019 06:08), Max: 36.9 (03 Apr 2019 19:48)  T(F): 97.8 (04 Apr 2019 06:08), Max: 98.4 (03 Apr 2019 19:48)  HR: 98 (04 Apr 2019 06:08) (98 - 100)  BP: 143/73 (04 Apr 2019 06:08) (125/80 - 143/73)  BP(mean): --  ABP: --  ABP(mean): --  RR: 18 (04 Apr 2019 06:08) (18 - 18)  SpO2: 98% (04 Apr 2019 06:08) (98% - 100%)        04-03 @ 07:01  -  04-04 @ 07:00  --------------------------------------------------------  IN: 240 mL / OUT: 2250 mL / NET: -2010 mL      CAPILLARY BLOOD GLUCOSE      POCT Blood Glucose.: 152 mg/dL (04 Apr 2019 08:13)      PHYSICAL EXAM:  General: NAD  HEENT: NC/AT  Lymph Nodes: no cervical or supraclavicular lymphadenopathy  Neck: supple  Respiratory:  CTA b/l, no wheezes, crackles or rhonchi  Cardiovascular:  RRR, no m/r/g  Abdomen: soft, NT/ND, +BS  Extremities: no clubbing, cyanosis or edema, warm  Skin: no rash  Neurological: AAOx3, non focal exam  Psychiatry: not anxious appearing, normal affect and mood    HOSPITAL MEDICATIONS:  MEDICATIONS  (STANDING):  amiodarone    Tablet 200 milliGRAM(s) Oral daily  aztreonam  IVPB 1000 milliGRAM(s) IV Intermittent every 12 hours  buDESOnide 160 MICROgram(s)/formoterol 4.5 MICROgram(s) Inhaler 2 Puff(s) Inhalation two times a day  carvedilol 6.25 milliGRAM(s) Oral every 12 hours  dextrose 5%. 1000 milliLiter(s) (50 mL/Hr) IV Continuous <Continuous>  dextrose 50% Injectable 12.5 Gram(s) IV Push once  dextrose 50% Injectable 25 Gram(s) IV Push once  dextrose 50% Injectable 25 Gram(s) IV Push once  docusate sodium 100 milliGRAM(s) Oral three times a day  DULoxetine 60 milliGRAM(s) Oral daily  insulin glargine Injectable (LANTUS) 38 Unit(s) SubCutaneous at bedtime  insulin lispro (HumaLOG) corrective regimen sliding scale   SubCutaneous three times a day before meals  insulin lispro (HumaLOG) corrective regimen sliding scale   SubCutaneous at bedtime  insulin lispro Injectable (HumaLOG) 18 Unit(s) SubCutaneous three times a day before meals  isosorbide   mononitrate ER Tablet (IMDUR) 30 milliGRAM(s) Oral daily  levothyroxine 188 MICROGram(s) Oral daily  mesalamine DR (24-Hour) Tablet 2.4 Gram(s) Oral daily  metolazone 2.5 milliGRAM(s) Oral <User Schedule>  pantoprazole    Tablet 40 milliGRAM(s) Oral before breakfast  spironolactone 25 milliGRAM(s) Oral daily  tiotropium 18 MICROgram(s) Capsule 1 Capsule(s) Inhalation daily  torsemide 50 milliGRAM(s) Oral every 12 hours  vancomycin  IVPB 1000 milliGRAM(s) IV Intermittent <User Schedule>    MEDICATIONS  (PRN):  acetaminophen   Tablet .. 650 milliGRAM(s) Oral every 6 hours PRN Mild Pain (1 - 3), Moderate Pain (4 - 6)  dextrose 40% Gel 15 Gram(s) Oral once PRN Blood Glucose LESS THAN 70 milliGRAM(s)/deciliter  glucagon  Injectable 1 milliGRAM(s) IntraMuscular once PRN Glucose LESS THAN 70 milligrams/deciliter      LABS:                        8.9    11.58 )-----------( 198      ( 04 Apr 2019 09:45 )             28.7     Hgb Trend: 8.9<--, 9.2<--, 9.3<--, 8.6<--, 8.4<--  04-04    140  |  92<L>  |  62<H>  ----------------------------<  167<H>  3.2<L>   |  35<H>  |  1.85<H>    Ca    10.0      04 Apr 2019 06:42      Creatinine Trend: 1.85<--, 1.94<--, 1.90<--, 1.84<--, 1.92<--, 1.87<--            MICROBIOLOGY:     RADIOLOGY:  [x ] Reviewed and interpreted by me

## 2019-04-04 NOTE — DIETITIAN INITIAL EVALUATION ADULT. - NS AS NUTRI INTERV COLLABORAT
BMI >40 notification placed in chart - spoke to provider. Continue to obtain weights as pt with CHF and to identify changes if any.

## 2019-04-04 NOTE — DIETITIAN INITIAL EVALUATION ADULT. - NS AS NUTRI INTERV MEALS SNACK
Recommend continue current diet. Will continue to monitor electrolytes and adjust diet as needed. Encourage PO intake and obtain food preferences (pt did not have any at this time).

## 2019-04-04 NOTE — PROGRESS NOTE ADULT - SUBJECTIVE AND OBJECTIVE BOX
Patient is a 78y old  Female who presents with a chief complaint of fall, right knee pain (04 Apr 2019 17:24)      SUBJECTIVE / OVERNIGHT EVENTS: ongoing right knee pain, ct done, results pending    MEDICATIONS  (STANDING):  amiodarone    Tablet 200 milliGRAM(s) Oral daily  aztreonam  IVPB 1000 milliGRAM(s) IV Intermittent every 12 hours  buDESOnide 160 MICROgram(s)/formoterol 4.5 MICROgram(s) Inhaler 2 Puff(s) Inhalation two times a day  carvedilol 6.25 milliGRAM(s) Oral every 12 hours  dextrose 5%. 1000 milliLiter(s) (50 mL/Hr) IV Continuous <Continuous>  dextrose 50% Injectable 12.5 Gram(s) IV Push once  dextrose 50% Injectable 25 Gram(s) IV Push once  dextrose 50% Injectable 25 Gram(s) IV Push once  docusate sodium 100 milliGRAM(s) Oral three times a day  DULoxetine 60 milliGRAM(s) Oral daily  insulin glargine Injectable (LANTUS) 38 Unit(s) SubCutaneous at bedtime  insulin lispro (HumaLOG) corrective regimen sliding scale   SubCutaneous three times a day before meals  insulin lispro (HumaLOG) corrective regimen sliding scale   SubCutaneous at bedtime  isosorbide   mononitrate ER Tablet (IMDUR) 30 milliGRAM(s) Oral daily  levothyroxine 188 MICROGram(s) Oral daily  mesalamine DR (24-Hour) Tablet 2.4 Gram(s) Oral daily  metolazone 2.5 milliGRAM(s) Oral <User Schedule>  norethindrone acetate 5 milliGRAM(s) Oral daily  pantoprazole    Tablet 40 milliGRAM(s) Oral before breakfast  spironolactone 25 milliGRAM(s) Oral daily  tiotropium 18 MICROgram(s) Capsule 1 Capsule(s) Inhalation daily  torsemide 50 milliGRAM(s) Oral every 12 hours  vancomycin  IVPB 1000 milliGRAM(s) IV Intermittent <User Schedule>    MEDICATIONS  (PRN):  acetaminophen   Tablet .. 650 milliGRAM(s) Oral every 6 hours PRN Mild Pain (1 - 3), Moderate Pain (4 - 6)  dextrose 40% Gel 15 Gram(s) Oral once PRN Blood Glucose LESS THAN 70 milliGRAM(s)/deciliter  glucagon  Injectable 1 milliGRAM(s) IntraMuscular once PRN Glucose LESS THAN 70 milligrams/deciliter      Vital Signs Last 24 Hrs  T(F): 98.4 (04-04-19 @ 13:28), Max: 98.4 (04-03-19 @ 19:48)  HR: 88 (04-04-19 @ 17:25) (87 - 102)  BP: 134/61 (04-04-19 @ 17:25) (119/75 - 143/73)  RR: 18 (04-04-19 @ 13:28) (18 - 18)  SpO2: 100% (04-04-19 @ 13:28) (98% - 100%)  Telemetry:   CAPILLARY BLOOD GLUCOSE      POCT Blood Glucose.: 89 mg/dL (04 Apr 2019 16:48)  POCT Blood Glucose.: 216 mg/dL (04 Apr 2019 12:08)  POCT Blood Glucose.: 152 mg/dL (04 Apr 2019 08:13)  POCT Blood Glucose.: 220 mg/dL (03 Apr 2019 23:04)  POCT Blood Glucose.: 258 mg/dL (03 Apr 2019 21:33)    I&O's Summary    03 Apr 2019 07:01  -  04 Apr 2019 07:00  --------------------------------------------------------  IN: 240 mL / OUT: 2250 mL / NET: -2010 mL    04 Apr 2019 07:01  -  04 Apr 2019 17:54  --------------------------------------------------------  IN: 0 mL / OUT: 800 mL / NET: -800 mL        PHYSICAL EXAM:  GENERAL: NAD, well-developed  HEAD:  Atraumatic, Normocephalic  EYES: EOMI, PERRLA, conjunctiva and sclera clear  NECK: Supple, No JVD  CHEST/LUNG: Clear to auscultation bilaterally; No wheeze  HEART: Regular rate and rhythm; No murmurs, rubs, or gallops  ABDOMEN: Soft, Nontender, Nondistended; Bowel sounds present  EXTREMITIES:  2+ Peripheral Pulses, No clubbing, cyanosis, or edema  PSYCH: AAOx3  NEUROLOGY: non-focal  SKIN: No rashes or lesions    LABS:                        8.9    11.58 )-----------( 198      ( 04 Apr 2019 09:45 )             28.7     04-04    140  |  92<L>  |  62<H>  ----------------------------<  167<H>  3.2<L>   |  35<H>  |  1.85<H>    Ca    10.0      04 Apr 2019 06:42                RADIOLOGY & ADDITIONAL TESTS:    Imaging Personally Reviewed:    Consultant(s) Notes Reviewed:      Care Discussed with Consultants/Other Providers:

## 2019-04-04 NOTE — PROGRESS NOTE ADULT - SUBJECTIVE AND OBJECTIVE BOX
[facility]rological [facility]      Seen earlier today, and examined.  - Today, patient is without complaints.           *****MEDICATIONS: Current medication reviewed and documented.    MEDICATIONS  (STANDING):  amiodarone    Tablet 200 milliGRAM(s) Oral daily  aztreonam  IVPB 1000 milliGRAM(s) IV Intermittent every 12 hours  buDESOnide 160 MICROgram(s)/formoterol 4.5 MICROgram(s) Inhaler 2 Puff(s) Inhalation two times a day  carvedilol 6.25 milliGRAM(s) Oral every 12 hours  dextrose 5%. 1000 milliLiter(s) (50 mL/Hr) IV Continuous <Continuous>  dextrose 50% Injectable 12.5 Gram(s) IV Push once  dextrose 50% Injectable 25 Gram(s) IV Push once  dextrose 50% Injectable 25 Gram(s) IV Push once  docusate sodium 100 milliGRAM(s) Oral three times a day  DULoxetine 60 milliGRAM(s) Oral daily  insulin glargine Injectable (LANTUS) 38 Unit(s) SubCutaneous at bedtime  insulin lispro (HumaLOG) corrective regimen sliding scale   SubCutaneous three times a day before meals  insulin lispro (HumaLOG) corrective regimen sliding scale   SubCutaneous at bedtime  isosorbide   mononitrate ER Tablet (IMDUR) 30 milliGRAM(s) Oral daily  levothyroxine 188 MICROGram(s) Oral daily  mesalamine DR (24-Hour) Tablet 2.4 Gram(s) Oral daily  metolazone 2.5 milliGRAM(s) Oral <User Schedule>  norethindrone acetate 5 milliGRAM(s) Oral daily  pantoprazole    Tablet 40 milliGRAM(s) Oral before breakfast  spironolactone 25 milliGRAM(s) Oral daily  tiotropium 18 MICROgram(s) Capsule 1 Capsule(s) Inhalation daily  torsemide 50 milliGRAM(s) Oral every 12 hours  vancomycin  IVPB 1000 milliGRAM(s) IV Intermittent <User Schedule>    MEDICATIONS  (PRN):  acetaminophen   Tablet .. 650 milliGRAM(s) Oral every 6 hours PRN Mild Pain (1 - 3), Moderate Pain (4 - 6)  dextrose 40% Gel 15 Gram(s) Oral once PRN Blood Glucose LESS THAN 70 milliGRAM(s)/deciliter  glucagon  Injectable 1 milliGRAM(s) IntraMuscular once PRN Glucose LESS THAN 70 milligrams/deciliter          ***** VITAL SIGNS:  T(F): 98.4 (19 @ 13:28), Max: 98.4 (19 @ 19:48)  HR: 88 (19 @ 17:25) (87 - 102)  BP: 134/61 (19 @ 17:25) (119/75 - 143/73)  RR: 18 (19 @ 13:28) (18 - 18)  SpO2: 100% (19 @ 13:28) (98% - 100%)  Wt(kg): --  ,   I&O's Summary    2019 07:  -  2019 07:00  --------------------------------------------------------  IN: 240 mL / OUT: 2250 mL / NET: -2010 mL    2019 07:01  -  2019 18:38  --------------------------------------------------------  IN: 290 mL / OUT: 800 mL / NET: -510 mL             *****PHYSICAL EXAM:   alert oriented x 3 attention comprehension are fair.  Able to name, repeat.   EOmi fundi not visualized   no nystagmus VFF to confrontation  Tongue is midline  Palate elevates symmetrically   Moving all 4 ext spontaneously no drift appreciated    Gait not assessed.            *****LAB AND IMAGIN.9    11.58 )-----------( 198      ( 2019 09:45 )             28.7                   140  |  92<L>  |  62<H>  ----------------------------<  167<H>  3.2<L>   |  35<H>  |  1.85<H>    Ca    10.0      2019 06:42                           [All pertinent recent Imaging/Reports reviewed]           *****A S S E S S M E N T   A N D   P L A N :          79 yo woman w/ hx of moderate MS s/p bioprosthetic mitral valve, diastolic CHF, paroxsmal afib (not on A/C in setting of bleeding), HTN, severe pulmonary HTN, DMT2, CKD III, anemia, with post menopausal vaginal bleeding, UC, VICKY on 3L NC (not on CPAP/BIPAP) presents from home in setting of multiple falls the past 2 days resulting with right knee pain. Patient had a fall yesterday from 1 step while walking out of the house and tripped. Per patient states that her right leg had a buckling/collapsing sensation. Patient went to [place] ED on 3/29 and had an xray which did not reveal fracture and sent home with cyclobenzaprine and Lidoderm patch. Patient last use of medication on 3/29. Patient had another fall while attempting to ambulate to the bathroom. She described that her right leg buckled under her causing her to fall. It was difficult for her to get up on her own and required the assistance of her  and son-in-law to get up. Patient denies any preceding symptoms of dizziness/lightheadedness, shortness of breath, CP, palpitations preceding the events. Endorses chronic back pain with no new characteristics. Patient has been getting around home with use of wheelchair the past 4 months. Requires assistance of her  to get around because of chronic weakness. Denies development of new numbness of lower extremities. Does not utilize any other assistive devices. Has not had PT outpatient. Patient also has had chronic LE wounds that were dressed from last discharge on 3/27. Denies changing of dressing. Denies fevers, chills, sweats.    pt with hx of spinal stenosis recently has been using a wheelchair for 2 weeks, then got up and fell taken to [place] hospital, discharged then fell again at home and was brought to [facility].       Problem/Recommendations 1: R knee  pain of unclear etiology      pt refusing mri of the knee, wants to do open mri as outpt.   PT consult for discharge safety.       Problem/Recommendations 2: spinal stenosis with symptoms of R leg buckling likely due to spinal stenosis +/- deconditioning   no sign of radiculopathy   pt unable to tolerate the mri due to claustrophobia.   evidence of multi level degenerative changes, with moderate spinal stenosis form l1- l5        Thank you for allowing me to participate in the care of this patient. Please do not hesitate to call me if you have any  questions.        ________________  Simran Mattson MD  [facility] ([facility]  [phone]      30 minutes spent on total encounter; more than 50 % of the visit was  spent counseling about plan of care, compliance to diet/exercise and medication regimen and or  coordinating care by the attending physician.      It is advised that s stroke patients follow up with NP Vi De Jesus @ [phone] in 1- 2 weeks.   Others please follow up with Dr. Michael Nissenbaum [phone]

## 2019-04-04 NOTE — PROGRESS NOTE ADULT - ASSESSMENT
77 yo F w/ hx of moderate MS s/p bioprosthetic mitral valve, severe pulmonary HTN, DM2, anemia, diastolic CHF, hypothyroidism, paroxysmal atrial fibrillation, HTN78 y/o Type 2 DM, hypothyroid, recently admitted with CHF exacerbation, re-admitted with recurrent falls and suspected UTI.    Type 2 DM insulin resistant at home on Tresiba insulin 60 units daily, and humalog 30 units before meals. Other treatment includes Bydureon 2 mg weekly. HbA1c during recent admission was 7.4 %. During psast admission noted with significantly reduced insulin requirenents, discharged on Lantus insulin 24 units at HS and Humalog 5 units per meal.  Patient re-admitted after recurrent falls at home, and susp. UTI, started on similar dose insulin, noted with hyperglycemia.     Hypothyroidism- treated with synthroid 175 mcg daily. Last test as outpatient recently reported Good, Here with mild elevated TSH 5.27. repeat TSH this admission  9.70 uIU/mL, FT4 1.5.  C/O fatigue, weight stable till recently. Says she is compliant with medications. Synthroid increased to 188 mcg daily.    Patient on very high amounts of insulin at home. Decreased requirements last admission are most probably related to CHF, and as it resolves expect insulin requirements sanna increase.  Patient with vaginal bleeding.  FS better at am, but low before dinner today- 89 mg/dL.  Patient says she is eating well.  Patient afebrile.    Suggest:  Keep Lantus insulin 38 units at HS.  Humalog insulin 10 units only before dinner today, 14 units per meal from am.  Mid scale humalog.  Synthroid 188 mcg daily, repeat TFT's in 3 weeks.

## 2019-04-04 NOTE — DIETITIAN INITIAL EVALUATION ADULT. - PERTINENT LABORATORY DATA
(03/25) HbA1c 7.4%; Finger sticks: (04/04) 152-216 (04/03) 172-304; (04/04) K+ 3.2, BUN 62, Cr 1.8,

## 2019-04-04 NOTE — DIETITIAN INITIAL EVALUATION ADULT. - ADHERENCE
fair/Pt reports not following any type of diet or restriction at home; states "I watch what I eat but at my age I don't care". Reports taking Vitamin D3 PTA. Reports monitoring BG 3-4 x day with ranges between 120 - 300 mg/dl and states taking Novolog and Tresiba at home; HbA1c per chart (03/25) 7.4% - indicates good BG control. Pt reports sometimes having BG of 40 mg/dl and drinks juice or takes glucose tablets to increase. Denies obtaining daily weights at home.

## 2019-04-04 NOTE — DIETITIAN INITIAL EVALUATION ADULT. - PROBLEM SELECTOR PLAN 5
Abnormal uterine bleeding known, and in the process of being further evaluated.  Patient has not undergone outpatient US  Patient suppposed to be on norethindrone 5mg qD?  Primary day team to clarify with GYN: GYN consult per primary day team

## 2019-04-04 NOTE — PROGRESS NOTE ADULT - SUBJECTIVE AND OBJECTIVE BOX
Patient is a 78y old  Female who presents with a chief complaint of fall, right knee pain (03 Apr 2019 21:02)    She continue to note vaginal bleeding. She denies c/o chest pain, increasing SOB or palpitations.    Allergies    Augmentin (Swelling)  cephalexin (Swelling)    Intolerances      MEDICATIONS  (STANDING):  amiodarone    Tablet 200 milliGRAM(s) Oral daily  aztreonam  IVPB 1000 milliGRAM(s) IV Intermittent every 12 hours  buDESOnide 160 MICROgram(s)/formoterol 4.5 MICROgram(s) Inhaler 2 Puff(s) Inhalation two times a day  carvedilol 6.25 milliGRAM(s) Oral every 12 hours  dextrose 5%. 1000 milliLiter(s) (50 mL/Hr) IV Continuous <Continuous>  dextrose 50% Injectable 12.5 Gram(s) IV Push once  dextrose 50% Injectable 25 Gram(s) IV Push once  dextrose 50% Injectable 25 Gram(s) IV Push once  docusate sodium 100 milliGRAM(s) Oral three times a day  DULoxetine 60 milliGRAM(s) Oral daily  insulin glargine Injectable (LANTUS) 38 Unit(s) SubCutaneous at bedtime  insulin lispro (HumaLOG) corrective regimen sliding scale   SubCutaneous three times a day before meals  insulin lispro (HumaLOG) corrective regimen sliding scale   SubCutaneous at bedtime  insulin lispro Injectable (HumaLOG) 18 Unit(s) SubCutaneous three times a day before meals  isosorbide   mononitrate ER Tablet (IMDUR) 30 milliGRAM(s) Oral daily  levothyroxine 188 MICROGram(s) Oral daily  mesalamine DR (24-Hour) Tablet 2.4 Gram(s) Oral daily  metolazone 2.5 milliGRAM(s) Oral <User Schedule>  pantoprazole    Tablet 40 milliGRAM(s) Oral before breakfast  spironolactone 25 milliGRAM(s) Oral daily  tiotropium 18 MICROgram(s) Capsule 1 Capsule(s) Inhalation daily  torsemide 50 milliGRAM(s) Oral every 12 hours  vancomycin  IVPB 1000 milliGRAM(s) IV Intermittent <User Schedule>    MEDICATIONS  (PRN):  acetaminophen   Tablet .. 650 milliGRAM(s) Oral every 6 hours PRN Mild Pain (1 - 3), Moderate Pain (4 - 6)  dextrose 40% Gel 15 Gram(s) Oral once PRN Blood Glucose LESS THAN 70 milliGRAM(s)/deciliter  glucagon  Injectable 1 milliGRAM(s) IntraMuscular once PRN Glucose LESS THAN 70 milligrams/deciliter      PHYSICAL EXAM:  Vital Signs Last 24 Hrs  T(C): 36.6 (04 Apr 2019 06:08), Max: 36.9 (03 Apr 2019 19:48)  T(F): 97.8 (04 Apr 2019 06:08), Max: 98.4 (03 Apr 2019 19:48)  HR: 98 (04 Apr 2019 06:08) (98 - 100)  BP: 143/73 (04 Apr 2019 06:08) (125/80 - 143/73)  BP(mean): --  RR: 18 (04 Apr 2019 06:08) (18 - 18)  SpO2: 98% (04 Apr 2019 06:08) (98% - 100%)  Daily     Daily   I&O's Summary    02 Apr 2019 07:01  -  03 Apr 2019 07:00  --------------------------------------------------------  IN: 590 mL / OUT: 600 mL / NET: -10 mL    03 Apr 2019 07:01  -  04 Apr 2019 06:49  --------------------------------------------------------  IN: 240 mL / OUT: 2250 mL / NET: -2010 mL    General Appearance: 	 Alert, cooperative,  HEENT: normocephalic, atraumatic  Neck: no JVD,  carotid 2+  bilaterally without bruits  Lungs:  clear to auscultation and percussion bilaterally  Cor:  pmi 5th ICS MCL, regular rate and rhythm, S1 normal intensity, S2 normal intensity, no gallops, murmurs or rubs  Abdomen: soft, non-tender; bowel sounds normal; no masses,  no organomegaly  Extremities: without cyanosis, clubbing., 1-2+ LE edema  Vasc: 1-+ PT and DP pulses;     Labs:  CBC Full  -  ( 03 Apr 2019 09:26 )  WBC Count : 10.36 K/uL  RBC Count : 3.02 M/uL  Hemoglobin : 9.2 g/dL  Hematocrit : 29.9 %  Platelet Count - Automated : 182 K/uL  Mean Cell Volume : 99.0 fl  Mean Cell Hemoglobin : 30.5 pg  Mean Cell Hemoglobin Concentration : 30.8 gm/dL  Auto Neutrophil # : x  Auto Lymphocyte # : x  Auto Monocyte # : x  Auto Eosinophil # : x  Auto Basophil # : x  Auto Neutrophil % : x  Auto Lymphocyte % : x  Auto Monocyte % : x  Auto Eosinophil % : x  Auto Basophil % : x    04-03    141  |  93<L>  |  56<H>  ----------------------------<  174<H>  3.6   |  34<H>  |  1.94<H>    Ca    10.1      03 Apr 2019 06:17                Radiology/Imaging:                Feliz Aiken MD [facility]  [phone]

## 2019-04-04 NOTE — DIETITIAN INITIAL EVALUATION ADULT. - PROBLEM SELECTOR PLAN 1
Right lower extremity with redness and warmth with open wound  S/P vancomycin in ED  Continue with vancomycin- Renally dosed  Monitor trough  ID consult per primary day team  Check LE dopplers  As for work up for LE: F/U Blood cultures and Urine cultures

## 2019-04-04 NOTE — DIETITIAN INITIAL EVALUATION ADULT. - PHYSICAL APPEARANCE
HISTORY OF PRESENT ILLNESS:  This is a 70-year-old white gentleman known to   Dr. Johnson in Family Medicine for a diagnosis of iron deficiency anemia related to   recent GI blood loss.  The patient was also recently diagnosed with atrial fib   and placed on Eliquis.  The patient presented with complaints of fatigue and dizziness.    He received 2 infusions of Injectafer and presents to the clinic 3 months post for   reevaluation.  He denies any headaches, blurred vision, fatigue, dizziniess, chest pain,   irregular heartbeat, bleeding, cold hands or feet, pica, etc. He reports feeling well.   No other new complaints or pertinent findings on a 10-point review of systems.    PHYSICAL EXAMINATION:  GENERAL:  Well-developed, well-nourished, obese, white gentleman in no acute   distress.  Alert and oriented x4.  VITAL SIGNS:  Weight:  Loss of 19 pounds in 3 months with diet  Wt Readings from Last 3 Encounters:   10/18/18 128.7 kg (283 lb 11.7 oz)   07/18/18 (!) 137.2 kg (302 lb 7.5 oz)   06/29/18 136.1 kg (300 lb)     Temp Readings from Last 3 Encounters:   10/18/18 98.5 °F (36.9 °C) (Oral)   07/18/18 97.9 °F (36.6 °C) (Oral)   07/02/18 98.1 °F (36.7 °C) (Skin)     BP Readings from Last 3 Encounters:   10/18/18 96/67   07/18/18 113/71   07/02/18 130/80     Pulse Readings from Last 3 Encounters:   10/18/18 (!) 57   07/18/18 (!) 56   07/02/18 70       HEENT:  Normocephalic, atraumatic.  Oral mucosa pink and moist.  Lips without   lesions.  Tongue midline.  Oropharynx clear.  Nonicteric sclerae.   NECK:  Supple, no adenopathy.  No carotid bruits, thyromegaly or thyroid nodule.  HEART:  Irregular rate and rhythm without murmur, gallop or rub.                LUNGS:  Clear to auscultation bilaterally.  Normal respiratory effort.       ABDOMEN:  Soft, nontender, nondistended with positive normoactive bowel sounds,   no hepatosplenomegaly.    EXTREMITIES:  No cyanosis, clubbing or edema.  Distal pulses are intact.             LABORATORY:    Lab Results   Component Value Date    WBC 6.21 10/18/2018    HGB 18.0 10/18/2018    HCT 53.7 10/18/2018    MCV 89 10/18/2018     10/18/2018     Hgb improved from 18.0 (16.4, 11.8)    IMPRESSION:    1.  Iron deficiency anemia due to blood loss - S/P Injectafer with excellent results  2.  AFib - on Eliquis    PLAN:  1.  Return in 6 months with interval CBC for reevaluation.  2.  The patient will call the office for any worsening signs and symptoms of anemia.      Assessment/plan reviewed and approved by Dr. Luna.             obese/No visual signs of muscle/fat loss noted

## 2019-04-04 NOTE — DIETITIAN INITIAL EVALUATION ADULT. - ENERGY NEEDS
Ht: 63 inches Wt: 250.8 pounds BMI: 44.2 kg/m2 IBW: 115 (+/-10%) 217.9 %IBW  Pertinent information: Pt 79 y/o F with PMH: moderate MS S/P bioprosthetic mitral valve, CHF, A-fib, HTN, T2DM, CKD3, anemia, UC, VICKY, depression, HLD, admitted S/P fall, right knee pain, found with leukocytosis and lower extremities cellulitis, UTI.   Noted +2 saad. leg edema as per flow sheets. Skin: tear in left posterior shin; no noted pressure injuries as per documentation.

## 2019-04-04 NOTE — CHART NOTE - NSCHARTNOTEFT_GEN_A_CORE
Notified by VINAY Hurley though at 19.0  Pt on vanco for cellulitis.  Followed by ID. Vaco dose decreased. Next vanco though ordered.   - will endorse to primary team

## 2019-04-04 NOTE — CHART NOTE - NSCHARTNOTEFT_GEN_A_CORE
Upon Nutritional Assessment by the Registered Dietitian your patient was determined to meet criteria / has evidence of the following diagnosis/diagnoses:          [ ]  Mild Protein Calorie Malnutrition        [ ]  Moderate Protein Calorie Malnutrition        [ ] Severe Protein Calorie Malnutrition        [ ] Unspecified Protein Calorie Malnutrition        [ ] Underweight / BMI <19        [x] Morbid Obesity / BMI > 40      Findings as based on:  [x] Comprehensive nutrition assessment   [ ] Nutrition Focused Physical Exam  [x] Other: BMI 44.2       Nutrition Plan/Recommendations:    1. Recommend continue current diet. Will continue to monitor electrolytes and adjust diet as needed.   2. Encourage PO intake and obtain food preferences (pt did not have any at this time).  3. Provided education on T2DM, weight loss, and heart failure nutrition therapy.  4. Provided recommendations to help with constipation.  5. Continue to obtain weights as pt with CHF and to identify changes if any.     RD remains available,   Isabella Mathis MS, RDN, #[phone]     PROVIDER Section:     By signing this assessment you are acknowledging and agree with the diagnosis/diagnoses assigned by the Registered Dietitian    Comments:

## 2019-04-04 NOTE — PROGRESS NOTE ADULT - ASSESSMENT
79yo female with chronic diastolic CHF, bioprosthetic MVR, PAF, VICKY, PAD, CKD, DM, HLD and LSS admitted with recurrent falls. She has severe lumbar spine disease and will need SAMANTHA.  She continue with vaginal bleeding and anemia. She is more SOB due to anemia in setting of obesity and chronic diastolic CHF.  She has been off coumadin past 4 weeks with continued bleeding.     Rec:  Continue current CV meds  Monitor lytes, renal function  Gyn oncology recommendations pending  PT  SAMANTHA after above.

## 2019-04-04 NOTE — DIETITIAN INITIAL EVALUATION ADULT. - OTHER INFO
Pt seen for BMI >40 referral. Pt reports good appetite and PO intake. Noted 100% x 1 time on (04/01) as per flow sheets. Denies difficulty chewing/swallowing. Pt denies nausea or vomiting. Reports constipation with last BM PTA - discussed with provider. Denies weight changes PTA, reports  pounds. Weight as per flow sheets (04/03) 250.8 pounds -?accuracy due to fluid shifts, will continue to monitor.

## 2019-04-04 NOTE — PROGRESS NOTE ADULT - ASSESSMENT
79 yo F with ILD, VICKY/OHS with chronic hypercapnic and hypoxic respiratory failure on 3L NC (not on CPAP), PAD/PVD with chronic LE wounds, h/o MS s/p bioprosthetic MVR, HFpEF, paroxsmal afib (not on A/C in setting of bleeding), HTN,  DM, CKD III, anemia, post menopausal vaginal bleeding s/p D&C in July 2018 (on pathology found to have polyps, started on hormonal therapy with resolution of bleeding), UC, , morbid obesity p/w right knee pain and edema after multiple falls.  Patient denies any preceding symptoms of dizziness/lightheadedness, shortness of breath, CP, palpitations preceding the events. Endorses chronic back pain, has a history of spinal stenosis and has been using a wheelchair the past 4 months. Requires assistance of her  for ADLS    UA +, urine cultures growing Proteus and is being treated with Aztreonam and Vanco.    +vaginal bleeding (has not been on Progesterone since admission)    Nonsmoker, has been following with Dr. Montgomery over the past year for symptoms of progressive dyspnea, now with minimal exertion. Prior HRCT chest showed evidence of ILD - unclear if related to underlying RA vs. amiodarone.  Has been on supplemental O2 since May 2018, 2LNC around the clock.   H/o VICKY diagnosed years ago - not on therapy  Home inhaler regimen: Albuterol nebs, anoro ellipta    A/P:  Dyspnea - multifactorial - related to underlying ILD, obesity, immobility 2/2 lumbar pain, HFpEF, anemia  Chronic respiratory failure with hypoxemia and hypercapnia  VICKY/OHS - untreated  Post menopausal Vaginal bleeding  UTI  Spinal stenosis, gait instability    Rec:  1. Dyspnea - Breathing at baseline - not dyspneic at rest, saturating well with 2LNC; CT chest and CXR unchanged  c/w current inhaler regimen- Symbicort, bronchodilators. Incentive spirometer  Out of bed to chair as tolerated  Given the patient's underlying VICKY/OHS with evidence of chronic hypercapnia (Bicarb >28), a trial of CPAP therapy should be considered and I explained this to the patient and her family - she will require titration study as an outpatient. Agree with supplemental O2 24/7 - goal sats low 90s  If any changes in mental status or breathing status, would check stat ABG and consider NIPPV    2. Vaginal bleeding: GYN consulted for further eval and treatment. Pelvic sono ordered-  continues to question why the patient has been off of Progesterone since admission- I reassured him that this is being addressed and defer to GYN and the primary team; vitals and H&H stable    3. Right knee pain after fall - imaging results pending. Pain control and out of bed as tolerated.    4. UTI- antibiotics as per ID; urine cultures and cytology as per     5. Afib - cardiac optimization- rate control, BP management, Cardiology following    6. PPX - DVT ppx    Will follow up.

## 2019-04-04 NOTE — PROGRESS NOTE ADULT - SUBJECTIVE AND OBJECTIVE BOX
Chief Complaint: 79 y/o Type 2 DM, hypothyroid, recently admitted with CHF exacerbation, re-admitted with recurrent falls and suspected UTI.    Patient with vaginal bleeding.  FS better at am, but low before dinner today- 89 mg/dL.  Patient says she is eating well.  Patient afebrile.      MEDICATIONS  (STANDING):  amiodarone    Tablet 200 milliGRAM(s) Oral daily  aztreonam  IVPB 1000 milliGRAM(s) IV Intermittent every 12 hours  buDESOnide 160 MICROgram(s)/formoterol 4.5 MICROgram(s) Inhaler 2 Puff(s) Inhalation two times a day  carvedilol 6.25 milliGRAM(s) Oral every 12 hours  dextrose 5%. 1000 milliLiter(s) (50 mL/Hr) IV Continuous <Continuous>  dextrose 50% Injectable 12.5 Gram(s) IV Push once  dextrose 50% Injectable 25 Gram(s) IV Push once  dextrose 50% Injectable 25 Gram(s) IV Push once  docusate sodium 100 milliGRAM(s) Oral three times a day  DULoxetine 60 milliGRAM(s) Oral daily  insulin glargine Injectable (LANTUS) 38 Unit(s) SubCutaneous at bedtime  insulin lispro (HumaLOG) corrective regimen sliding scale   SubCutaneous three times a day before meals  insulin lispro (HumaLOG) corrective regimen sliding scale   SubCutaneous at bedtime  insulin lispro Injectable (HumaLOG) 10 Unit(s) SubCutaneous before dinner  isosorbide   mononitrate ER Tablet (IMDUR) 30 milliGRAM(s) Oral daily  levothyroxine 188 MICROGram(s) Oral daily  mesalamine DR (24-Hour) Tablet 2.4 Gram(s) Oral daily  metolazone 2.5 milliGRAM(s) Oral <User Schedule>  norethindrone acetate 5 milliGRAM(s) Oral daily  pantoprazole    Tablet 40 milliGRAM(s) Oral before breakfast  spironolactone 25 milliGRAM(s) Oral daily  tiotropium 18 MICROgram(s) Capsule 1 Capsule(s) Inhalation daily  torsemide 50 milliGRAM(s) Oral every 12 hours  vancomycin  IVPB 1000 milliGRAM(s) IV Intermittent <User Schedule>    MEDICATIONS  (PRN):  acetaminophen   Tablet .. 650 milliGRAM(s) Oral every 6 hours PRN Mild Pain (1 - 3), Moderate Pain (4 - 6)  dextrose 40% Gel 15 Gram(s) Oral once PRN Blood Glucose LESS THAN 70 milliGRAM(s)/deciliter  glucagon  Injectable 1 milliGRAM(s) IntraMuscular once PRN Glucose LESS THAN 70 milligrams/deciliter      Allergies    Augmentin (Swelling)  cephalexin (Swelling)    Intolerances        PHYSICAL EXAM:  VITALS: T(C): 36.9 (04-04-19 @ 13:28)  T(F): 98.4 (04-04-19 @ 13:28), Max: 98.4 (04-03-19 @ 19:48)  HR: 102 (04-04-19 @ 13:28) (98 - 102)  BP: 119/75 (04-04-19 @ 13:28) (119/75 - 143/73)  RR:  (18 - 18)  SpO2:  (98% - 100%)  GENERAL: NAD,   EYES: No proptosis,  HEENT:  Atraumatic, Normocephalic,   RESPIRATORY: Clear to auscultation bilaterally  CARDIOVASCULAR: Regular rhythm; No murmurs; no peripheral edema  GI: Soft, nontender, non distended, normal bowel sounds  SKIN: Dry, intact, No rashes or lesions  MUSCULOSKELETAL: decreased strength  NEURO: No focal deficits.  Vaginal bleeding noted.  PSYCH: Alert and oriented x 3, normal affect, normal mood      POCT Blood Glucose.: 89 mg/dL (04-04-19 @ 16:48)  POCT Blood Glucose.: 216 mg/dL (04-04-19 @ 12:08)  POCT Blood Glucose.: 152 mg/dL (04-04-19 @ 08:13)  POCT Blood Glucose.: 220 mg/dL (04-03-19 @ 23:04)  POCT Blood Glucose.: 258 mg/dL (04-03-19 @ 21:33)  POCT Blood Glucose.: 265 mg/dL (04-03-19 @ 16:41)  POCT Blood Glucose.: 304 mg/dL (04-03-19 @ 11:46)  POCT Blood Glucose.: 172 mg/dL (04-03-19 @ 07:33)  POCT Blood Glucose.: 211 mg/dL (04-02-19 @ 21:27)  POCT Blood Glucose.: 242 mg/dL (04-02-19 @ 16:44)  POCT Blood Glucose.: 373 mg/dL (04-02-19 @ 12:00)  POCT Blood Glucose.: 295 mg/dL (04-02-19 @ 07:24)  POCT Blood Glucose.: 300 mg/dL (04-01-19 @ 21:34)                            8.9    11.58 )-----------( 198      ( 04 Apr 2019 09:45 )             28.7       04-04    140  |  92<L>  |  62<H>  ----------------------------<  167<H>  3.2<L>   |  35<H>  |  1.85<H>    EGFR if : 30<L>  EGFR if non : 26<L>    Ca    10.0      04-04  Mg     2.0     04-02        Thyroid Function Tests:  04-02 @ 09:19 TSH 9.70 FreeT4 1.5 T3 -- Anti TPO -- Anti Thyroglobulin Ab -- TSI --  03-25 @ 08:52 TSH 5.27 FreeT4 -- T3 -- Anti TPO -- Anti Thyroglobulin Ab -- TSI --      Hemoglobin A1C, Whole Blood: 7.4 % <H> [4.0 - 5.6] (03-25-19 @ 08:55)

## 2019-04-04 NOTE — DIETITIAN INITIAL EVALUATION ADULT. - NS AS NUTRI INTERV ED CONTENT
1) Provided education on T2DM, weight loss, and heart failure nutrition therapy. Provided education on foods containing carbohydrates, foods containing proteins, and portion sizes. Stressed the importance of a balanced meal to maintain blood glucose. Encouraged vegetables consumption. Described the benefits of gradual weight loss in BG and overall health. Described HbA1c and stressed importance of its normal levels. Encouraged Pt to continue monitoring blood glucose at home. Discussed how to manage hypoglycemia. Recommended water consumption with avoidance of soda and juice. Recommended limited salt intake. Reviewed foods high in salt. Discussed nutrition label reading. Stressed the importance of limited fried foods and saturated fat consumption. Discussed the importance of daily weights at home and weight gain parameters to contact MD. Reviewed menu order procedure in hospital. 2) Provided recommendations to help with constipation, encouraged fruits and vegetables consumption and to drink water throughout the day. Pt amenable for education. Dietitian remains available.

## 2019-04-05 ENCOUNTER — RESULT REVIEW (OUTPATIENT)
Age: 79
End: 2019-04-05

## 2019-04-05 LAB
ANION GAP SERPL CALC-SCNC: 15 MMOL/L — SIGNIFICANT CHANGE UP (ref 5–17)
BUN SERPL-MCNC: 67 MG/DL — HIGH (ref 7–23)
CALCIUM SERPL-MCNC: 10.3 MG/DL — SIGNIFICANT CHANGE UP (ref 8.4–10.5)
CHLORIDE SERPL-SCNC: 91 MMOL/L — LOW (ref 96–108)
CO2 SERPL-SCNC: 33 MMOL/L — HIGH (ref 22–31)
CREAT SERPL-MCNC: 2.02 MG/DL — HIGH (ref 0.5–1.3)
CULTURE RESULTS: NO GROWTH — SIGNIFICANT CHANGE UP
GLUCOSE BLDC GLUCOMTR-MCNC: 196 MG/DL — HIGH (ref 70–99)
GLUCOSE BLDC GLUCOMTR-MCNC: 211 MG/DL — HIGH (ref 70–99)
GLUCOSE BLDC GLUCOMTR-MCNC: 239 MG/DL — HIGH (ref 70–99)
GLUCOSE BLDC GLUCOMTR-MCNC: 333 MG/DL — HIGH (ref 70–99)
GLUCOSE SERPL-MCNC: 187 MG/DL — HIGH (ref 70–99)
HCT VFR BLD CALC: 29 % — LOW (ref 34.5–45)
HGB BLD-MCNC: 10 G/DL — LOW (ref 11.5–15.5)
MAGNESIUM SERPL-MCNC: 1.9 MG/DL — SIGNIFICANT CHANGE UP (ref 1.6–2.6)
MCHC RBC-ENTMCNC: 32.7 PG — SIGNIFICANT CHANGE UP (ref 27–34)
MCHC RBC-ENTMCNC: 34.4 GM/DL — SIGNIFICANT CHANGE UP (ref 32–36)
MCV RBC AUTO: 95.3 FL — SIGNIFICANT CHANGE UP (ref 80–100)
PLATELET # BLD AUTO: 201 K/UL — SIGNIFICANT CHANGE UP (ref 150–400)
POTASSIUM SERPL-MCNC: 3.2 MMOL/L — LOW (ref 3.5–5.3)
POTASSIUM SERPL-SCNC: 3.2 MMOL/L — LOW (ref 3.5–5.3)
RBC # BLD: 3.04 M/UL — LOW (ref 3.8–5.2)
RBC # FLD: 16.3 % — HIGH (ref 10.3–14.5)
SODIUM SERPL-SCNC: 139 MMOL/L — SIGNIFICANT CHANGE UP (ref 135–145)
SPECIMEN SOURCE: SIGNIFICANT CHANGE UP
WBC # BLD: 12.2 K/UL — HIGH (ref 3.8–10.5)
WBC # FLD AUTO: 12.2 K/UL — HIGH (ref 3.8–10.5)

## 2019-04-05 PROCEDURE — 76830 TRANSVAGINAL US NON-OB: CPT | Mod: 26

## 2019-04-05 PROCEDURE — 76856 US EXAM PELVIC COMPLETE: CPT | Mod: 26,59

## 2019-04-05 PROCEDURE — 88112 CYTOPATH CELL ENHANCE TECH: CPT | Mod: 26

## 2019-04-05 PROCEDURE — 99233 SBSQ HOSP IP/OBS HIGH 50: CPT

## 2019-04-05 RX ORDER — INSULIN LISPRO 100/ML
15 VIAL (ML) SUBCUTANEOUS
Qty: 0 | Refills: 0 | Status: DISCONTINUED | OUTPATIENT
Start: 2019-04-05 | End: 2019-04-07

## 2019-04-05 RX ORDER — POTASSIUM CHLORIDE 20 MEQ
40 PACKET (EA) ORAL ONCE
Qty: 0 | Refills: 0 | Status: COMPLETED | OUTPATIENT
Start: 2019-04-05 | End: 2019-04-05

## 2019-04-05 RX ORDER — INSULIN GLARGINE 100 [IU]/ML
42 INJECTION, SOLUTION SUBCUTANEOUS AT BEDTIME
Qty: 0 | Refills: 0 | Status: DISCONTINUED | OUTPATIENT
Start: 2019-04-05 | End: 2019-04-07

## 2019-04-05 RX ADMIN — AMIODARONE HYDROCHLORIDE 200 MILLIGRAM(S): 400 TABLET ORAL at 06:24

## 2019-04-05 RX ADMIN — Medication 188 MICROGRAM(S): at 06:24

## 2019-04-05 RX ADMIN — Medication 14 UNIT(S): at 12:31

## 2019-04-05 RX ADMIN — Medication 50 MILLIGRAM(S): at 06:24

## 2019-04-05 RX ADMIN — Medication 40 MILLIEQUIVALENT(S): at 08:29

## 2019-04-05 RX ADMIN — Medication 650 MILLIGRAM(S): at 21:21

## 2019-04-05 RX ADMIN — BUDESONIDE AND FORMOTEROL FUMARATE DIHYDRATE 2 PUFF(S): 160; 4.5 AEROSOL RESPIRATORY (INHALATION) at 17:10

## 2019-04-05 RX ADMIN — DULOXETINE HYDROCHLORIDE 60 MILLIGRAM(S): 30 CAPSULE, DELAYED RELEASE ORAL at 11:15

## 2019-04-05 RX ADMIN — INSULIN GLARGINE 42 UNIT(S): 100 INJECTION, SOLUTION SUBCUTANEOUS at 21:54

## 2019-04-05 RX ADMIN — SPIRONOLACTONE 25 MILLIGRAM(S): 25 TABLET, FILM COATED ORAL at 06:34

## 2019-04-05 RX ADMIN — Medication 50 MILLIGRAM(S): at 17:10

## 2019-04-05 RX ADMIN — Medication 250 MILLIGRAM(S): at 21:55

## 2019-04-05 RX ADMIN — Medication 100 MILLIGRAM(S): at 14:41

## 2019-04-05 RX ADMIN — CARVEDILOL PHOSPHATE 6.25 MILLIGRAM(S): 80 CAPSULE, EXTENDED RELEASE ORAL at 06:24

## 2019-04-05 RX ADMIN — Medication 14 UNIT(S): at 08:29

## 2019-04-05 RX ADMIN — Medication 8: at 12:31

## 2019-04-05 RX ADMIN — PANTOPRAZOLE SODIUM 40 MILLIGRAM(S): 20 TABLET, DELAYED RELEASE ORAL at 06:24

## 2019-04-05 RX ADMIN — Medication 650 MILLIGRAM(S): at 21:51

## 2019-04-05 RX ADMIN — Medication 14 UNIT(S): at 17:11

## 2019-04-05 RX ADMIN — Medication 2.4 GRAM(S): at 11:04

## 2019-04-05 RX ADMIN — BUDESONIDE AND FORMOTEROL FUMARATE DIHYDRATE 2 PUFF(S): 160; 4.5 AEROSOL RESPIRATORY (INHALATION) at 06:25

## 2019-04-05 RX ADMIN — Medication 50 MILLIGRAM(S): at 06:35

## 2019-04-05 RX ADMIN — CARVEDILOL PHOSPHATE 6.25 MILLIGRAM(S): 80 CAPSULE, EXTENDED RELEASE ORAL at 17:11

## 2019-04-05 RX ADMIN — ISOSORBIDE MONONITRATE 30 MILLIGRAM(S): 60 TABLET, EXTENDED RELEASE ORAL at 12:28

## 2019-04-05 RX ADMIN — Medication 4: at 17:11

## 2019-04-05 RX ADMIN — Medication 100 MILLIGRAM(S): at 21:21

## 2019-04-05 RX ADMIN — Medication 100 MILLIGRAM(S): at 06:24

## 2019-04-05 RX ADMIN — NORETHINDRONE 5 MILLIGRAM(S): 0.35 TABLET ORAL at 11:14

## 2019-04-05 RX ADMIN — TIOTROPIUM BROMIDE 1 CAPSULE(S): 18 CAPSULE ORAL; RESPIRATORY (INHALATION) at 12:27

## 2019-04-05 RX ADMIN — Medication 2: at 08:28

## 2019-04-05 NOTE — CHART NOTE - NSCHARTNOTEFT_GEN_A_CORE
D/w attending Dr. Roach re: pt's  request for further evaluation of right knee pain. Per family , pt is claustrophic and will need sedation.   -Per attending: consult danyel Caicedo [facility] [id number]

## 2019-04-05 NOTE — PROGRESS NOTE ADULT - ASSESSMENT
77yo female with chronic diastolic CHF, bioprosthetic MVR, PAF, VICKY, PAD, CKD, DM, HLD and LSS admitted with recurrent falls. She has severe lumbar spine disease and will need SAMANTHA.  She continue with vaginal bleeding and anemia. She has been more SOB due to anemia in setting of obesity and chronic diastolic CHF.  She has been off coumadin past 4 weeks with continued bleeding.  Low K due to diuretics and increased prerenal azotemia.     Rec:  Reduce torsemide to 50mg once per day.   Monitor lytes, renal function  Gyn oncology recommendations pending  PT  SAMANTHA after above.

## 2019-04-05 NOTE — PROGRESS NOTE ADULT - SUBJECTIVE AND OBJECTIVE BOX
[facility] - Division of Pulmonary, Critical Care and Sleep Medicine   Please call [phone] between 8am-pm weekdays, [phone] after hours and weekends    Interval Events: No new events overnight, continues to complain of vaginal bleeding and right knee pain. CT does not show fracture. Awaiting pelvic ultrasound/transvaginal     REVIEW OF SYSTEMS:  CV: [ ] chest pain   Resp: [ ] cough [ ] shortness of breath   [ ] All other systems negative  [ ] Unable to assess ROS because ________    OBJECTIVE:  ICU Vital Signs Last 24 Hrs  T(C): 36.9 (05 Apr 2019 05:04), Max: 36.9 (04 Apr 2019 13:28)  T(F): 98.5 (05 Apr 2019 05:04), Max: 98.5 (05 Apr 2019 05:04)  HR: 102 (05 Apr 2019 05:04) (87 - 102)  BP: 144/79 (05 Apr 2019 05:04) (119/75 - 144/79)  BP(mean): --  ABP: --  ABP(mean): --  RR: 18 (05 Apr 2019 05:04) (18 - 18)  SpO2: 99% (05 Apr 2019 05:04) (99% - 100%)        04-04 @ 07:01  -  04-05 @ 07:00  --------------------------------------------------------  IN: 290 mL / OUT: 2450 mL / NET: -2160 mL      CAPILLARY BLOOD GLUCOSE      POCT Blood Glucose.: 196 mg/dL (05 Apr 2019 08:19)      PHYSICAL EXAM:  General: NAD  HEENT: NC/AT  Lymph Nodes: no cervical or supraclavicular lymphadenopathy  Neck: supple  Respiratory:  CTA b/l, no wheezes, crackles or rhonchi  Cardiovascular:  RRR, no m/r/g  Abdomen: soft, NT/ND, +BS  Extremities: no clubbing, cyanosis or edema, warm  Skin: no rash  Neurological: AAOx3, non focal exam  Psychiatry: not anxious appearing, normal affect and mood    HOSPITAL MEDICATIONS:  MEDICATIONS  (STANDING):  amiodarone    Tablet 200 milliGRAM(s) Oral daily  aztreonam  IVPB 1000 milliGRAM(s) IV Intermittent every 12 hours  buDESOnide 160 MICROgram(s)/formoterol 4.5 MICROgram(s) Inhaler 2 Puff(s) Inhalation two times a day  carvedilol 6.25 milliGRAM(s) Oral every 12 hours  dextrose 5%. 1000 milliLiter(s) (50 mL/Hr) IV Continuous <Continuous>  dextrose 50% Injectable 12.5 Gram(s) IV Push once  dextrose 50% Injectable 25 Gram(s) IV Push once  dextrose 50% Injectable 25 Gram(s) IV Push once  docusate sodium 100 milliGRAM(s) Oral three times a day  DULoxetine 60 milliGRAM(s) Oral daily  insulin glargine Injectable (LANTUS) 38 Unit(s) SubCutaneous at bedtime  insulin lispro (HumaLOG) corrective regimen sliding scale   SubCutaneous three times a day before meals  insulin lispro (HumaLOG) corrective regimen sliding scale   SubCutaneous at bedtime  insulin lispro Injectable (HumaLOG) 14 Unit(s) SubCutaneous three times a day before meals  isosorbide   mononitrate ER Tablet (IMDUR) 30 milliGRAM(s) Oral daily  levothyroxine 188 MICROGram(s) Oral daily  mesalamine DR (24-Hour) Tablet 2.4 Gram(s) Oral daily  metolazone 2.5 milliGRAM(s) Oral <User Schedule>  norethindrone acetate 5 milliGRAM(s) Oral daily  pantoprazole    Tablet 40 milliGRAM(s) Oral before breakfast  spironolactone 25 milliGRAM(s) Oral daily  tiotropium 18 MICROgram(s) Capsule 1 Capsule(s) Inhalation daily  torsemide 50 milliGRAM(s) Oral daily  vancomycin  IVPB 750 milliGRAM(s) IV Intermittent every 24 hours    MEDICATIONS  (PRN):  acetaminophen   Tablet .. 650 milliGRAM(s) Oral every 6 hours PRN Mild Pain (1 - 3), Moderate Pain (4 - 6)  dextrose 40% Gel 15 Gram(s) Oral once PRN Blood Glucose LESS THAN 70 milliGRAM(s)/deciliter  glucagon  Injectable 1 milliGRAM(s) IntraMuscular once PRN Glucose LESS THAN 70 milligrams/deciliter      LABS:                        10.0   12.2  )-----------( 201      ( 05 Apr 2019 06:40 )             29.0     Hgb Trend: 10.0<--, 8.9<--, 9.2<--, 9.3<--, 8.6<--  04-05    139  |  91<L>  |  67<H>  ----------------------------<  187<H>  3.2<L>   |  33<H>  |  2.02<H>    Ca    10.3      05 Apr 2019 06:41  Mg     1.9     04-05      Creatinine Trend: 2.02<--, 1.85<--, 1.94<--, 1.90<--, 1.84<--, 1.92<--            MICROBIOLOGY:     RADIOLOGY:  [x ] Reviewed and interpreted by me  < from: CT Knee No Cont, Right (04.03.19 @ 23:25) >  EXAM:  CT KNEE ONLY RT                          EXAM:  CT 3D RECONSTRUCT W TARAS                            PROCEDURE DATE:  04/03/2019            INTERPRETATION:    CT OF THE RIGHT KNEE    CLINICAL INFORMATION: Right knee pain status post fall2-3 days ago.    TECHNIQUE: CT of the right knee was obtained without the administration   of IV contrast.  Multiplanar reformats were generated for review. Three   dimensional reconstructions were obtained on an independent workstation.   The interpretation of these images is included in the body of the report   of the main portion of the study.    COMPARISON: Right knee radiographs the 30 March 2019 and 29 March 2019.    FINDINGS:    BONE: No acute fracture. Tricompartmental arthrosis, most advanced in the   medial compartment. Quadriceps and patellar tendon enthesophytes are   noted. Well-corticated densities in the distal quadriceps tendon are   favored to be secondary to chronic degenerative changes of the distal   quadriceps tendon or sequelae of old injury. The patellar tendon is   intact.    SOFT TISSUE: No joint effusion. Fatty atrophy of the distal   semimembranous and the long head of biceps femoris muscles.   Atherosclerotic changes of the visualized arteries. Subcutaneous edema.    IMPRESSION:  No acute fracture of the right knee.    AKASH GAYTAN M.D., RADIOLOGY RESIDENT  This document has been electronically signed.  QUINTON TEMPLE M.D., ATTENDING RADIOLOGIST  This document has been electronically signed. Apr 4 2019  2:30PM    < end of copied text >    Culture - Urine (03.30.19 @ 21:18)    -  Gentamicin: S <=4    -  Levofloxacin: S <=2    -  Meropenem: S <=1    -  Nitrofurantoin: R >64 Should not be used to treat pyelonephritis    -  Piperacillin/Tazobactam: S <=16    -  Tobramycin: S <=4    -  Trimethoprim/Sulfamethoxazole: S <=2/38    -  Amikacin: S <=16    -  Ampicillin: S <=8 These ampicillin results predict results for amoxicillin    -  Ampicillin/Sulbactam: S <=8/4    -  Aztreonam: S <=4    -  Cefazolin: S <=8 For uncomplicated UTI with K. pneumoniae, E. coli, or P. mirablis: LÓPEZ <=16 is sensitive and LÓPEZ >=32 is resistant. This also predicts results for oral agents cefaclor, cefdinir, cefpodoxime, cefprozil, cefuroxime axetil, cephalexin and locarbef for uncomplicated UTI. Note that some isolates may be susceptible to these agents while testing resistant to cefazolin.    -  Cefepime: S <=4    -  Cefoxitin: S <=8    -  Ceftriaxone: S <=1 Enterobacter, Citrobacter, and Serratia may develop resistance during prolonged therapy    -  Ciprofloxacin: S <=1    -  Ertapenem: S <=1    Specimen Source: .Urine Clean Catch (Midstream)    Culture Results:   >100,000 CFU/ml Proteus mirabilis    Organism Identification: Proteus mirabilis    Organism: Proteus mirabilis    Method Type: LÓPEZ [facility] - Division of Pulmonary, Critical Care and Sleep Medicine   Please call [phone] between 8am-pm weekdays, [phone] after hours and weekends    Interval Events: No new events overnight, continues to complain of vaginal bleeding and right knee pain. CT does not show fracture. Awaiting pelvic ultrasound/transvaginal     REVIEW OF SYSTEMS:  CV: [- ] chest pain   Resp: [- ] cough [- ] shortness of breath   [x ] All other systems negative  [ ] Unable to assess ROS because ________    OBJECTIVE:  ICU Vital Signs Last 24 Hrs  T(C): 36.9 (05 Apr 2019 05:04), Max: 36.9 (04 Apr 2019 13:28)  T(F): 98.5 (05 Apr 2019 05:04), Max: 98.5 (05 Apr 2019 05:04)  HR: 102 (05 Apr 2019 05:04) (87 - 102)  BP: 144/79 (05 Apr 2019 05:04) (119/75 - 144/79)  BP(mean): --  ABP: --  ABP(mean): --  RR: 18 (05 Apr 2019 05:04) (18 - 18)  SpO2: 99% (05 Apr 2019 05:04) (99% - 100%)        04-04 @ 07:01  -  04-05 @ 07:00  --------------------------------------------------------  IN: 290 mL / OUT: 2450 mL / NET: -2160 mL      CAPILLARY BLOOD GLUCOSE      POCT Blood Glucose.: 196 mg/dL (05 Apr 2019 08:19)      PHYSICAL EXAM:  General: NAD  HEENT: NC/AT  Neck: supple  Respiratory:  CTA b/l, no wheezes, crackles or rhonchi  Cardiovascular:  RRR, no m/r/g  Abdomen: soft, NT/ND, +BS  Extremities: no clubbing, cyanosis or edema, warm  Skin: no rash  Neurological: AAOx3, non focal exam  Psychiatry: not anxious appearing, normal affect and mood    HOSPITAL MEDICATIONS:  MEDICATIONS  (STANDING):  amiodarone    Tablet 200 milliGRAM(s) Oral daily  aztreonam  IVPB 1000 milliGRAM(s) IV Intermittent every 12 hours  buDESOnide 160 MICROgram(s)/formoterol 4.5 MICROgram(s) Inhaler 2 Puff(s) Inhalation two times a day  carvedilol 6.25 milliGRAM(s) Oral every 12 hours  dextrose 5%. 1000 milliLiter(s) (50 mL/Hr) IV Continuous <Continuous>  dextrose 50% Injectable 12.5 Gram(s) IV Push once  dextrose 50% Injectable 25 Gram(s) IV Push once  dextrose 50% Injectable 25 Gram(s) IV Push once  docusate sodium 100 milliGRAM(s) Oral three times a day  DULoxetine 60 milliGRAM(s) Oral daily  insulin glargine Injectable (LANTUS) 38 Unit(s) SubCutaneous at bedtime  insulin lispro (HumaLOG) corrective regimen sliding scale   SubCutaneous three times a day before meals  insulin lispro (HumaLOG) corrective regimen sliding scale   SubCutaneous at bedtime  insulin lispro Injectable (HumaLOG) 14 Unit(s) SubCutaneous three times a day before meals  isosorbide   mononitrate ER Tablet (IMDUR) 30 milliGRAM(s) Oral daily  levothyroxine 188 MICROGram(s) Oral daily  mesalamine DR (24-Hour) Tablet 2.4 Gram(s) Oral daily  metolazone 2.5 milliGRAM(s) Oral <User Schedule>  norethindrone acetate 5 milliGRAM(s) Oral daily  pantoprazole    Tablet 40 milliGRAM(s) Oral before breakfast  spironolactone 25 milliGRAM(s) Oral daily  tiotropium 18 MICROgram(s) Capsule 1 Capsule(s) Inhalation daily  torsemide 50 milliGRAM(s) Oral daily  vancomycin  IVPB 750 milliGRAM(s) IV Intermittent every 24 hours    MEDICATIONS  (PRN):  acetaminophen   Tablet .. 650 milliGRAM(s) Oral every 6 hours PRN Mild Pain (1 - 3), Moderate Pain (4 - 6)  dextrose 40% Gel 15 Gram(s) Oral once PRN Blood Glucose LESS THAN 70 milliGRAM(s)/deciliter  glucagon  Injectable 1 milliGRAM(s) IntraMuscular once PRN Glucose LESS THAN 70 milligrams/deciliter      LABS:                        10.0   12.2  )-----------( 201      ( 05 Apr 2019 06:40 )             29.0     Hgb Trend: 10.0<--, 8.9<--, 9.2<--, 9.3<--, 8.6<--  04-05    139  |  91<L>  |  67<H>  ----------------------------<  187<H>  3.2<L>   |  33<H>  |  2.02<H>    Ca    10.3      05 Apr 2019 06:41  Mg     1.9     04-05      Creatinine Trend: 2.02<--, 1.85<--, 1.94<--, 1.90<--, 1.84<--, 1.92<--            MICROBIOLOGY:     RADIOLOGY:  [x ] Reviewed and interpreted by me  < from: CT Knee No Cont, Right (04.03.19 @ 23:25) >  EXAM:  CT KNEE ONLY RT                          EXAM:  CT 3D RECONSTRUCT W TARAS                            PROCEDURE DATE:  04/03/2019            INTERPRETATION:    CT OF THE RIGHT KNEE    CLINICAL INFORMATION: Right knee pain status post fall2-3 days ago.    TECHNIQUE: CT of the right knee was obtained without the administration   of IV contrast.  Multiplanar reformats were generated for review. Three   dimensional reconstructions were obtained on an independent workstation.   The interpretation of these images is included in the body of the report   of the main portion of the study.    COMPARISON: Right knee radiographs the 30 March 2019 and 29 March 2019.    FINDINGS:    BONE: No acute fracture. Tricompartmental arthrosis, most advanced in the   medial compartment. Quadriceps and patellar tendon enthesophytes are   noted. Well-corticated densities in the distal quadriceps tendon are   favored to be secondary to chronic degenerative changes of the distal   quadriceps tendon or sequelae of old injury. The patellar tendon is   intact.    SOFT TISSUE: No joint effusion. Fatty atrophy of the distal   semimembranous and the long head of biceps femoris muscles.   Atherosclerotic changes of the visualized arteries. Subcutaneous edema.    IMPRESSION:  No acute fracture of the right knee.    AKASH GAYTAN M.D., RADIOLOGY RESIDENT  This document has been electronically signed.  QUINTON TEMPLE M.D., ATTENDING RADIOLOGIST  This document has been electronically signed. Apr 4 2019  2:30PM    < end of copied text >    Culture - Urine (03.30.19 @ 21:18)    -  Gentamicin: S <=4    -  Levofloxacin: S <=2    -  Meropenem: S <=1    -  Nitrofurantoin: R >64 Should not be used to treat pyelonephritis    -  Piperacillin/Tazobactam: S <=16    -  Tobramycin: S <=4    -  Trimethoprim/Sulfamethoxazole: S <=2/38    -  Amikacin: S <=16    -  Ampicillin: S <=8 These ampicillin results predict results for amoxicillin    -  Ampicillin/Sulbactam: S <=8/4    -  Aztreonam: S <=4    -  Cefazolin: S <=8 For uncomplicated UTI with K. pneumoniae, E. coli, or P. mirablis: LÓPEZ <=16 is sensitive and LÓPEZ >=32 is resistant. This also predicts results for oral agents cefaclor, cefdinir, cefpodoxime, cefprozil, cefuroxime axetil, cephalexin and locarbef for uncomplicated UTI. Note that some isolates may be susceptible to these agents while testing resistant to cefazolin.    -  Cefepime: S <=4    -  Cefoxitin: S <=8    -  Ceftriaxone: S <=1 Enterobacter, Citrobacter, and Serratia may develop resistance during prolonged therapy    -  Ciprofloxacin: S <=1    -  Ertapenem: S <=1    Specimen Source: .Urine Clean Catch (Midstream)    Culture Results:   >100,000 CFU/ml Proteus mirabilis    Organism Identification: Proteus mirabilis    Organism: Proteus mirabilis    Method Type: LÓPEZ

## 2019-04-05 NOTE — CONSULT NOTE ADULT - SUBJECTIVE AND OBJECTIVE BOX
78y Female, minimal ambulator, presents c/o R knee pain and inability to ambulate sp mechanical fall. Pt fell 1 week ago 3/29 and has multiple falls surrounding that date.  She tripped and fell onto both knees, states she had a buckling/collapsing sensation.  Patient went to [place] ED on 3/29 and had an xray which did not reveal fracture Patient had another fall while attempting to ambulate to the bathroom. She described that her right leg buckled under her causing her to fall. It was difficult for her to get up on her own and required the assistance of her  and son-in-law to get up. Patient denies any preceding symptoms of dizziness/lightheadedness, shortness of breath, CP, palpitations preceding the events. Pt has chronic back pain with no new characteristics. Patient has been getting around home with use of wheelchair the past 4 months. Requires assistance of her  to get around because of chronic weakness. Denies development of new numbness of lower extremities. Patient also has had chronic LE wounds venous stasis wounds. Denies fevers, chills, sweats.         HEALTH ISSUES - PROBLEM Dx:  pulmonary HTN  HTN  CKD III  VICKY not on cpap  Gross hematuria: Gross hematuria  Vaginal bleeding: Vaginal bleeding  Prophylactic measure: Prophylactic measure  Chronic respiratory failure: Chronic respiratory failure  Type 2 diabetes mellitus with complication, unspecified whether long term insulin use: Type 2 diabetes mellitus with complication, unspecified whether long term insulin use  History of postmenopausal bleeding: History of postmenopausal bleeding  Paroxysmal atrial fibrillation: Paroxysmal atrial fibrillation  mitral valve replacment  Chronic diastolic CHF (congestive heart failure): Chronic diastolic CHF (congestive heart failure)  Acute pain of right knee: Acute pain of right knee  R 3rd toe amp      MEDICATIONS  (STANDING):  amiodarone    Tablet 200 milliGRAM(s) Oral daily  aztreonam  IVPB 1000 milliGRAM(s) IV Intermittent every 12 hours  buDESOnide 160 MICROgram(s)/formoterol 4.5 MICROgram(s) Inhaler 2 Puff(s) Inhalation two times a day  carvedilol 6.25 milliGRAM(s) Oral every 12 hours  dextrose 5%. 1000 milliLiter(s) IV Continuous <Continuous>  dextrose 50% Injectable 12.5 Gram(s) IV Push once  dextrose 50% Injectable 25 Gram(s) IV Push once  dextrose 50% Injectable 25 Gram(s) IV Push once  docusate sodium 100 milliGRAM(s) Oral three times a day  DULoxetine 60 milliGRAM(s) Oral daily  insulin glargine Injectable (LANTUS) 38 Unit(s) SubCutaneous at bedtime  insulin lispro (HumaLOG) corrective regimen sliding scale   SubCutaneous three times a day before meals  insulin lispro (HumaLOG) corrective regimen sliding scale   SubCutaneous at bedtime  insulin lispro Injectable (HumaLOG) 14 Unit(s) SubCutaneous three times a day before meals  isosorbide   mononitrate ER Tablet (IMDUR) 30 milliGRAM(s) Oral daily  levothyroxine 188 MICROGram(s) Oral daily  mesalamine DR (24-Hour) Tablet 2.4 Gram(s) Oral daily  metolazone 2.5 milliGRAM(s) Oral <User Schedule>  norethindrone acetate 5 milliGRAM(s) Oral daily  pantoprazole    Tablet 40 milliGRAM(s) Oral before breakfast  spironolactone 25 milliGRAM(s) Oral daily  tiotropium 18 MICROgram(s) Capsule 1 Capsule(s) Inhalation daily  torsemide 50 milliGRAM(s) Oral daily  vancomycin  IVPB 750 milliGRAM(s) IV Intermittent every 24 hours    Allergies    Augmentin (Swelling)  cephalexin (Swelling)    Intolerances                              10.0   12.2  )-----------( 201      ( 05 Apr 2019 06:40 )             29.0     05 Apr 2019 06:41    139    |  91     |  67     ----------------------------<  187    3.2     |  33     |  2.02     Ca    10.3       05 Apr 2019 06:41  Mg     1.9       05 Apr 2019 06:41        Vital Signs Last 24 Hrs  T(C): 36.8 (04-05-19 @ 13:53), Max: 36.9 (04-05-19 @ 05:04)  T(F): 98.2 (04-05-19 @ 13:53), Max: 98.5 (04-05-19 @ 05:04)  HR: 100 (04-05-19 @ 13:53) (88 - 102)  BP: 121/74 (04-05-19 @ 13:53) (121/74 - 147/83)  BP(mean): --  RR: 18 (04-05-19 @ 13:53) (18 - 18)  SpO2: 100% (04-05-19 @ 13:53) (99% - 100%)    Imaging: XR r knee demonstates no fx/dislocation  CT R knee: no fx/dislocation, some calcified bone fragments at superior part of patella favor chronic quad tendon injury    Physical Exam  Gen: NAD  R LE: skin: venous stasis ulcers w/ wounds, dressings c/d/i,, mild ecchymosis around anterior knee, unable to SLR R LE, neg log roll R LE, +mild ttp over superior aspect of patella, +palpable defect over quad tendon insertion, minimal effusion, no ttp elsewhere, +ehl/fhl/ta/gs function, no calf ttp, dp/pt pulse intact, compartments soft  Secondary survey: benign, nv intact, able to SLR contralateral leg, negative log roll contralateral leg, no bony ttp elsewhere    A/P: 78y Female with R knee pain, likely quadriceps tendon injury but unknown if acute vs chronic injury    Pain control  WBAT RLE in knee immobilizer  recommend MRI R knee to evaluate quad tendon  FU labs/imaging  care per primary team  Please document medical optimization/risk stratification for OR   will advise on plan for surgery vs no surgery pending MRI  d/w attending

## 2019-04-05 NOTE — PROGRESS NOTE ADULT - SUBJECTIVE AND OBJECTIVE BOX
Patient is a 78y old  Female who presents with a chief complaint of fall, right knee pain (2019 17:53)    She denies c/o chest pain, increasing SOB or palpitations. Concerned about right leg weakness and vaginal bleeding.     Allergies    Augmentin (Swelling)  cephalexin (Swelling)    Intolerances      MEDICATIONS  (STANDING):  amiodarone    Tablet 200 milliGRAM(s) Oral daily  aztreonam  IVPB 1000 milliGRAM(s) IV Intermittent every 12 hours  buDESOnide 160 MICROgram(s)/formoterol 4.5 MICROgram(s) Inhaler 2 Puff(s) Inhalation two times a day  carvedilol 6.25 milliGRAM(s) Oral every 12 hours  dextrose 5%. 1000 milliLiter(s) (50 mL/Hr) IV Continuous <Continuous>  dextrose 50% Injectable 12.5 Gram(s) IV Push once  dextrose 50% Injectable 25 Gram(s) IV Push once  dextrose 50% Injectable 25 Gram(s) IV Push once  docusate sodium 100 milliGRAM(s) Oral three times a day  DULoxetine 60 milliGRAM(s) Oral daily  insulin glargine Injectable (LANTUS) 38 Unit(s) SubCutaneous at bedtime  insulin lispro (HumaLOG) corrective regimen sliding scale   SubCutaneous three times a day before meals  insulin lispro (HumaLOG) corrective regimen sliding scale   SubCutaneous at bedtime  insulin lispro Injectable (HumaLOG) 14 Unit(s) SubCutaneous three times a day before meals  isosorbide   mononitrate ER Tablet (IMDUR) 30 milliGRAM(s) Oral daily  levothyroxine 188 MICROGram(s) Oral daily  mesalamine DR (24-Hour) Tablet 2.4 Gram(s) Oral daily  metolazone 2.5 milliGRAM(s) Oral <User Schedule>  norethindrone acetate 5 milliGRAM(s) Oral daily  pantoprazole    Tablet 40 milliGRAM(s) Oral before breakfast  spironolactone 25 milliGRAM(s) Oral daily  tiotropium 18 MICROgram(s) Capsule 1 Capsule(s) Inhalation daily  torsemide 50 milliGRAM(s) Oral every 12 hours  vancomycin  IVPB 750 milliGRAM(s) IV Intermittent every 24 hours    MEDICATIONS  (PRN):  acetaminophen   Tablet .. 650 milliGRAM(s) Oral every 6 hours PRN Mild Pain (1 - 3), Moderate Pain (4 - 6)  dextrose 40% Gel 15 Gram(s) Oral once PRN Blood Glucose LESS THAN 70 milliGRAM(s)/deciliter  glucagon  Injectable 1 milliGRAM(s) IntraMuscular once PRN Glucose LESS THAN 70 milligrams/deciliter      PHYSICAL EXAM:  Vital Signs Last 24 Hrs  T(C): 36.9 (2019 05:04), Max: 36.9 (2019 13:28)  T(F): 98.5 (2019 05:04), Max: 98.5 (2019 05:04)  HR: 102 (2019 05:04) (87 - 102)  BP: 144/79 (2019 05:04) (119/75 - 144/79)  BP(mean): --  RR: 18 (2019 05:04) (18 - 18)  SpO2: 99% (2019 05:04) (99% - 100%)  Daily     Daily Weight in k.1 (2019 17:08)  I&O's Summary    2019 07:01  -  2019 07:00  --------------------------------------------------------  IN: 240 mL / OUT: 2250 mL / NET: - mL    2019 07:01  -  2019 06:54  --------------------------------------------------------  IN: 290 mL / OUT: 2450 mL / NET: -2160 mL        General Appearance: 	 Alert, cooperative,  HEENT: normocephalic, atraumatic  Neck: no JVD,  carotid 2+  bilaterally without bruits  Lungs:  clear to auscultation and percussion bilaterally  Cor:  pmi 5th ICS MCL, regular rate and rhythm, S1 normal intensity, S2 normal intensity, no gallops, murmurs or rubs  Abdomen: soft, non-tender; bowel sounds normal; no masses,  no organomegaly  Extremities: without cyanosis, clubbing., 1+ LE edema  Vasc: 1-+ PT and DP pulses;       Labs:  CBC Full  -  ( 2019 09:45 )  WBC Count : 11.58 K/uL  RBC Count : 2.95 M/uL  Hemoglobin : 8.9 g/dL  Hematocrit : 28.7 %  Platelet Count - Automated : 198 K/uL  Mean Cell Volume : 97.3 fl  Mean Cell Hemoglobin : 30.2 pg  Mean Cell Hemoglobin Concentration : 31.0 gm/dL  Auto Neutrophil # : x  Auto Lymphocyte # : x  Auto Monocyte # : x  Auto Eosinophil # : x  Auto Basophil # : x  Auto Neutrophil % : x  Auto Lymphocyte % : x  Auto Monocyte % : x  Auto Eosinophil % : x  Auto Basophil % : x    04-04    140  |  92<L>  |  62<H>  ----------------------------<  167<H>  3.2<L>   |  35<H>  |  1.85<H>    Ca    10.0      2019 06:42                Radiology/Imaging:                Feliz Aiken MD [facility]  [phone]

## 2019-04-05 NOTE — PROGRESS NOTE ADULT - ASSESSMENT
79 yo woman w/ hx of moderate MS s/p bioprosthetic mitral valve, diastolic CHF, paroxsmal afib (not on A/C in setting of bleeding), HTN, severe pulmonary HTN, DMT2, CKD III, anemia, with post menopausal vaginal bleeding, UC, VICKY on 3L NC (not on CPAP/BIPAP) presents from home in setting of multiple falls the past 2 days resulting with right knee pain. Patient had a fall yesterday from 1 step while walking out of the house and tripped. Per patient states that her right leg had a buckling/collapsing sensation. Patient went to [facility] on 3/29 and had an xray which did not reveal fracture and sent home with cyclobenzaprine and Lidoderm patch. Patient last use of medication on 3/29. Patient had another fall while attempting to ambulate to the bathroom. She described that her right leg buckled under her causing her to fall. It was difficult for her to get up on her own and required the assistance of her  and son-in-law to get up. Patient denies any preceding symptoms of dizziness/lightheadedness, shortness of breath, CP, palpitations preceding the events. Endorses chronic back pain with no new characteristics. Patient has been getting around home with use of wheelchair the past 4 months. Requires assistance of her  to get around because of chronic weakness. Denies development of new numbness of lower extremities. Does not utilize any other assistive devices. Has not had PT outpatient. Patient also has had chronic LE wounds that were dressed from last discharge on 3/27. Denies changing of dressing. Denies fevers, chills, sweats. (30 Mar 2019 21:08)    ER vss, pt afebrile.  WBC 13.2 --> 9.0.  UA large LE/ (-) nit.  Ucx >100K GNR.  No acute findings on cxr.   Pt is currently on vancomycin for cellulitis.   Pt with venous stasis and several open blisters on b/l LE.      ID consult called for further abx management.        Recommend:    UTI    - pt denies increased freq/urgency/flank pain/dysuria.  (+) UA and cultures.  Pt a/w fall and leg weakness.  Will treat,cont azactam (pt with allergy to augmentin, keflex)  (through 4/6).  Urine cx results noted.      - Avoid fluoroquinolone  as pt is on amiodarone and increased risk for qt prolongation    - Pt seen by Urology - f/u recs.  Pt is already on abx for UTI.     CELLULITIS    - b/L LE venous stasis and blisters.  Low suspicion for superimposed cellulitis.  Pt with several open blisters and denuded skin, cont local wound care.  Cont vancomycin for now for possible superimposed bacterial cellulitis.  Wounds healing well, discoloration and swelling improved.    -  Monitor vanco trough, maintain between 10-15.  Complete 7 day course through 4/6.      Rt KNEE PAIN      - Pt c/o RLE weakness and knee pain, pt seen by Neurology - recs appreciated.  CT rt knee  without joint effusion or fracture, (+) subcut edema.      - Pt pending MRI knee for further evaluation.  No fever, increased warmth or erythema to suggest septic joint.        Vaginal bleeding    - GYN consulted for further eval and treatment. Pelvic and transvaginal US performed - s/o vaginal outlet obstruction.    - Pt has restarted norethindrone for postmenopausal bleeding.   Pt for possible d&c with hysteroscopy and endometrial sampling/IUD placement        Will follow,     Christal Reinoso  [phone]

## 2019-04-05 NOTE — PROGRESS NOTE ADULT - SUBJECTIVE AND OBJECTIVE BOX
Infectious Diseases progress note:    Subjective: Resting comfortably,  at bedside.  Pt denies abd pain, dysuria, fever/chills.  Leg wounds improving, swelling decreased.  Pt with rt knee pain as before.      ROS:  CONSTITUTIONAL:  No fever, chills, rigors  CARDIOVASCULAR:  No chest pain or palpitations  RESPIRATORY:   No SOB, cough, dyspnea on exertion.  No wheezing  GASTROINTESTINAL:  No abd pain, N/V, diarrhea/constipation  EXTREMITIES:  rt knee pain  GENITOURINARY:  No burning on urination, increased frequency or urgency.  No flank pain  NEUROLOGIC:  No HA, visual disturbances  SKIN: No rashes    Allergies    Augmentin (Swelling)  cephalexin (Swelling)    Intolerances        ANTIBIOTICS/RELEVANT:  antimicrobials  aztreonam  IVPB 1000 milliGRAM(s) IV Intermittent every 12 hours  vancomycin  IVPB 750 milliGRAM(s) IV Intermittent every 24 hours    immunologic:    OTHER:  acetaminophen   Tablet .. 650 milliGRAM(s) Oral every 6 hours PRN  amiodarone    Tablet 200 milliGRAM(s) Oral daily  buDESOnide 160 MICROgram(s)/formoterol 4.5 MICROgram(s) Inhaler 2 Puff(s) Inhalation two times a day  carvedilol 6.25 milliGRAM(s) Oral every 12 hours  dextrose 40% Gel 15 Gram(s) Oral once PRN  dextrose 5%. 1000 milliLiter(s) IV Continuous <Continuous>  dextrose 50% Injectable 12.5 Gram(s) IV Push once  dextrose 50% Injectable 25 Gram(s) IV Push once  dextrose 50% Injectable 25 Gram(s) IV Push once  docusate sodium 100 milliGRAM(s) Oral three times a day  DULoxetine 60 milliGRAM(s) Oral daily  glucagon  Injectable 1 milliGRAM(s) IntraMuscular once PRN  insulin glargine Injectable (LANTUS) 38 Unit(s) SubCutaneous at bedtime  insulin lispro (HumaLOG) corrective regimen sliding scale   SubCutaneous three times a day before meals  insulin lispro (HumaLOG) corrective regimen sliding scale   SubCutaneous at bedtime  insulin lispro Injectable (HumaLOG) 14 Unit(s) SubCutaneous three times a day before meals  isosorbide   mononitrate ER Tablet (IMDUR) 30 milliGRAM(s) Oral daily  levothyroxine 188 MICROGram(s) Oral daily  mesalamine DR (24-Hour) Tablet 2.4 Gram(s) Oral daily  metolazone 2.5 milliGRAM(s) Oral <User Schedule>  norethindrone acetate 5 milliGRAM(s) Oral daily  pantoprazole    Tablet 40 milliGRAM(s) Oral before breakfast  spironolactone 25 milliGRAM(s) Oral daily  tiotropium 18 MICROgram(s) Capsule 1 Capsule(s) Inhalation daily  torsemide 50 milliGRAM(s) Oral daily      Objective:  Vital Signs Last 24 Hrs  T(C): 36.9 (05 Apr 2019 05:04), Max: 36.9 (04 Apr 2019 13:28)  T(F): 98.5 (05 Apr 2019 05:04), Max: 98.5 (05 Apr 2019 05:04)  HR: 91 (05 Apr 2019 12:26) (88 - 102)  BP: 147/83 (05 Apr 2019 12:26) (119/75 - 147/83)  BP(mean): --  RR: 18 (05 Apr 2019 05:04) (18 - 18)  SpO2: 99% (05 Apr 2019 05:04) (99% - 100%)    PHYSICAL EXAM:  Constitutional:NAD  Eyes:HANNAH, EOMI  Ear/Nose/Throat: no thrush, mucositis.  Moist mucous membranes	  Neck:no JVD, no lymphadenopathy, supple  Respiratory: CTA saad  Cardiovascular: S1S2 RRR, no murmurs  Gastrointestinal:soft, nontender,  nondistended (+) BS  Extremities: LE b/l edema improved.  Rt knee TTP, no effusion, eccymosis, warmth or erythema noted.    Skin:  no rashes, open wounds or ulcerations  :  Urinary device in place, dark yellow urine        LABS:                        10.0   12.2  )-----------( 201      ( 05 Apr 2019 06:40 )             29.0     04-05    139  |  91<L>  |  67<H>  ----------------------------<  187<H>  3.2<L>   |  33<H>  |  2.02<H>    Ca    10.3      05 Apr 2019 06:41  Mg     1.9     04-05                  Vancomycin Level, Trough: 19.0 ug/mL (04-04 @ 20:17)  Vancomycin Level, Trough: 11.2 ug/mL (04-01 @ 20:18)              MICROBIOLOGY:    Culture - Blood (03.30.19 @ 22:36)    Specimen Source: .Blood Blood    Culture Results:   No growth at 5 days.    Culture - Blood (03.30.19 @ 22:36)    Specimen Source: .Blood Blood    Culture Results:   No growth at 5 days.    Culture - Urine (03.30.19 @ 21:18)    -  Gentamicin: S <=4    -  Levofloxacin: S <=2    -  Meropenem: S <=1    -  Nitrofurantoin: R >64 Should not be used to treat pyelonephritis    -  Piperacillin/Tazobactam: S <=16    -  Tobramycin: S <=4    -  Trimethoprim/Sulfamethoxazole: S <=2/38    -  Amikacin: S <=16    -  Ampicillin: S <=8 These ampicillin results predict results for amoxicillin    -  Ampicillin/Sulbactam: S <=8/4    -  Aztreonam: S <=4    -  Cefazolin: S <=8 For uncomplicated UTI with K. pneumoniae, E. coli, or P. mirablis: LÓPEZ <=16 is sensitive and LÓPEZ >=32 is resistant. This also predicts results for oral agents cefaclor, cefdinir, cefpodoxime, cefprozil, cefuroxime axetil, cephalexin and locarbef for uncomplicated UTI. Note that some isolates may be susceptible to these agents while testing resistant to cefazolin.    -  Cefepime: S <=4    -  Cefoxitin: S <=8    -  Ceftriaxone: S <=1 Enterobacter, Citrobacter, and Serratia may develop resistance during prolonged therapy    -  Ciprofloxacin: S <=1    -  Ertapenem: S <=1    Specimen Source: .Urine Clean Catch (Midstream)    Culture Results:   >100,000 CFU/ml Proteus mirabilis    Organism Identification: Proteus mirabilis    Organism: Proteus mirabilis    Method Type: LÓPEZ          RADIOLOGY & ADDITIONAL STUDIES:    < from: US Pelvis Complete (04.05.19 @ 12:11) >    IMPRESSION:    Severely limited evaluation. No mass is seen.    Markedly distended endometrial cavity with fluid, suggesting   cervical/vaginal outlet obstruction.    < end of copied text >        < from: US Transvaginal (04.05.19 @ 12:11) >  FINDINGS:    Evaluation is markedly limited due to difficulty with patient positioning.    Uterus: 9.6 x 3.4 x 5.5 cm.    Endometrium: Markedly distended with fluid containing scattered low-level   internal echoes. No mass is seen.    Right ovary: Not visualized.    Left ovary: Not visualized.     Fluid: None.    IMPRESSION:    Severely limited evaluation. No mass is seen.    Markedly distended endometrial cavity with fluid, suggesting   cervical/vaginal outlet obstruction.    < end of copied text >        < from: CT Knee No Cont, Right (04.03.19 @ 23:25) >  FINDINGS:    BONE: No acute fracture. Tricompartmental arthrosis, most advanced in the   medial compartment. Quadriceps and patellar tendon enthesophytes are   noted. Well-corticated densities in the distal quadriceps tendon are   favored to be secondary to chronic degenerative changes of the distal   quadriceps tendon or sequelae of old injury. The patellar tendon is   intact.    SOFT TISSUE: No joint effusion. Fatty atrophy of the distal   semimembranous and the long head of biceps femoris muscles.   Atherosclerotic changes of the visualized arteries. Subcutaneous edema.    IMPRESSION:  No acute fracture of the right knee.    < end of copied text >          < from: MR Lumbar Spine No Cont (04.02.19 @ 06:44) >  INTERPRETATION:  History: Fall.    MRI of the lumbar spine was performed using sagittal T1-T2 and   T2-weighted sequence fat suppression. This is a limited study, the   patient aborted the rest of this study.    Loss of normal lumbar lordosis is seen which could be due to positioning   or muscle spasm.    Disc desiccation is seen throughout the lower thoracic and lumbar spine   region which is secondary to degenerative changes.    Disc desiccation is seen throughout the lower thoracic lumbar spine   region which is secondary to degenerative changes.    There is evidence of prominent spinal stenosis seen involving the 12   L1-L5 S1 levels. Complete MRI of the lumbar spine is recommended to   better evaluate these findings.    Impression: Prominent spinal stenosis at multiple levels as described   above.    < end of copied text >

## 2019-04-05 NOTE — PROGRESS NOTE ADULT - ASSESSMENT
79 yo F with ILD, VICKY/OHS with chronic hypercapnic and hypoxic respiratory failure on 3L NC (not on CPAP), PAD/PVD with chronic LE wounds, h/o MS s/p bioprosthetic MVR, HFpEF, paroxsmal afib (not on A/C in setting of bleeding), HTN,  DM, CKD III, anemia, post menopausal vaginal bleeding s/p D&C in July 2018 (on pathology found to have polyps, started on hormonal therapy with resolution of bleeding), UC, , morbid obesity p/w right knee pain and edema after multiple falls.  Patient denies any preceding symptoms of dizziness/lightheadedness, shortness of breath, CP, palpitations preceding the events. Endorses chronic back pain, has a history of spinal stenosis and has been using a wheelchair the past 4 months. Requires assistance of her  for ADLS    UA +, urine cultures growing Proteus and is being treated with Aztreonam and Vanco.    +vaginal bleeding (has not been on Progesterone since admission)    Nonsmoker, has been following with Dr. Montgomery over the past year for symptoms of progressive dyspnea, now with minimal exertion. Prior HRCT chest showed evidence of ILD - unclear if related to underlying RA vs. amiodarone.  Has been on supplemental O2 since May 2018, 2LNC around the clock.   H/o VICKY diagnosed years ago - not on therapy  Home inhaler regimen: Albuterol nebs, anoro ellipta    A/P:  Dyspnea - multifactorial - related to underlying ILD, obesity, immobility 2/2 lumbar pain, HFpEF, anemia  Chronic respiratory failure with hypoxemia and hypercapnia  VICKY/OHS - untreated  Post menopausal Vaginal bleeding  UTI  Spinal stenosis, gait instability    Rec:  1. Dyspnea - Breathing at baseline - not dyspneic at rest, saturating well with 2LNC; CT chest and CXR unchanged  c/w current inhaler regimen- Symbicort, bronchodilators. Incentive spirometer  Out of bed to chair as tolerated  Given the patient's underlying VICKY/OHS with evidence of chronic hypercapnia (Bicarb >28), a trial of CPAP therapy should be considered and I explained this to the patient and her family - she will require titration study as an outpatient.   Agree with supplemental O2 24/7 - goal sats low 90s  If any changes in mental status or breathing status, would check stat ABG and consider NIPPV    2. Vaginal bleeding: GYN consulted for further eval and treatment. Pelvic sono pending and there is a plan for d&c with IUD placement; vitals and H&H stable    3. Right knee pain after fall - CT without fracture and evidence of muscle atrophy  Pain control and out of bed as tolerated; PT eval    4. UTI- antibiotics as per ID; urine cultures and cytology as per     5. Afib - cardiac optimization- rate control, BP management, Cardiology following    6. PPX - DVT ppx    Will follow up. 79 yo F with ILD, VICKY/OHS with chronic hypercapnic and hypoxic respiratory failure on 3L NC (not on CPAP), PAD/PVD with chronic LE wounds, h/o MS s/p bioprosthetic MVR, HFpEF, paroxsmal afib (not on A/C in setting of bleeding), HTN,  DM, CKD III, anemia, post menopausal vaginal bleeding s/p D&C in July 2018 (on pathology found to have polyps, started on hormonal therapy with resolution of bleeding), UC, , morbid obesity p/w right knee pain and edema after multiple falls.  Patient denies any preceding symptoms of dizziness/lightheadedness, shortness of breath, CP, palpitations preceding the events. Endorses chronic back pain, has a history of spinal stenosis and has been using a wheelchair the past 4 months. Requires assistance of her  for ADLS    UA +, urine cultures growing Proteus and is being treated with Aztreonam and Vanco.    +vaginal bleeding (has not been on Progesterone since admission)    Nonsmoker, has been following with Dr. Montgomery over the past year for symptoms of progressive dyspnea, now with minimal exertion. Prior HRCT chest showed evidence of ILD - unclear if related to underlying RA vs. amiodarone.  Has been on supplemental O2 since May 2018, 2LNC around the clock.   H/o VICKY diagnosed years ago - not on therapy  Home inhaler regimen: Albuterol nebs, anoro ellipta    A/P:  Dyspnea - multifactorial - related to underlying ILD, obesity, immobility 2/2 lumbar pain, HFpEF, anemia  Chronic respiratory failure with hypoxemia and hypercapnia  VICKY/OHS - untreated  Post menopausal Vaginal bleeding  UTI  Spinal stenosis, gait instability    Rec:  1. Dyspnea - Breathing at baseline - not dyspneic at rest, saturating well with 2LNC; CT chest and CXR unchanged  c/w current inhaler regimen- Symbicort, bronchodilators. Incentive spirometer  Out of bed to chair as tolerated  Given the patient's underlying VICKY/OHS with evidence of chronic hypercapnia (Bicarb >28), a trial of CPAP therapy should be considered and I explained this to the patient and her family - she will require titration study as an outpatient.   Agree with supplemental O2 24/7 - goal sats low 90s  If any changes in mental status or breathing status, would check stat ABG and consider NIPPV    2. Vaginal bleeding: GYN consulted for further eval and treatment. Pelvic sono pending and plan is likely for d&c with IUD placement; vitals and H&H stable    3. Right knee pain after fall - CT without fracture and evidence of muscle atrophy  Pain control and out of bed as tolerated; PT eval    4. UTI- antibiotics as per ID; urine cultures and cytology as per     5. Afib - cardiac optimization- rate control, BP management, Cardiology following    6. PPX - DVT ppx    From a pulmonary standpoint, the patient is at increased risk for respiratory failure for any procedure requiring sedation, given her multiple comorbidities as per above. She is however, medically optimized at this time       Please call the answering service with any questions over the weekend at [phone].

## 2019-04-05 NOTE — PROGRESS NOTE ADULT - SUBJECTIVE AND OBJECTIVE BOX
Chief Complaint: 79 y/o Type 2 DM, hypothyroid, recently admitted with CHF exacerbation, re-admitted with recurrent falls and cellulitis after a recent fall.    Patient upset about vaginal bleeding.  PO intake variable.  FS post meals were low set all day yesterday with Humalog 18-->10 units pre meals, High again today on 14 units per meal.  Patient afebrile, finishing antibiotics course for cellulitis, WBC 12.2.      MEDICATIONS  (STANDING):  amiodarone    Tablet 200 milliGRAM(s) Oral daily  aztreonam  IVPB 1000 milliGRAM(s) IV Intermittent every 12 hours  buDESOnide 160 MICROgram(s)/formoterol 4.5 MICROgram(s) Inhaler 2 Puff(s) Inhalation two times a day  carvedilol 6.25 milliGRAM(s) Oral every 12 hours  dextrose 5%. 1000 milliLiter(s) (50 mL/Hr) IV Continuous <Continuous>  dextrose 50% Injectable 12.5 Gram(s) IV Push once  dextrose 50% Injectable 25 Gram(s) IV Push once  dextrose 50% Injectable 25 Gram(s) IV Push once  docusate sodium 100 milliGRAM(s) Oral three times a day  DULoxetine 60 milliGRAM(s) Oral daily  insulin glargine Injectable (LANTUS) 38 Unit(s) SubCutaneous at bedtime  insulin lispro (HumaLOG) corrective regimen sliding scale   SubCutaneous three times a day before meals  insulin lispro (HumaLOG) corrective regimen sliding scale   SubCutaneous at bedtime  insulin lispro Injectable (HumaLOG) 14 Unit(s) SubCutaneous three times a day before meals  isosorbide   mononitrate ER Tablet (IMDUR) 30 milliGRAM(s) Oral daily  levothyroxine 188 MICROGram(s) Oral daily  mesalamine DR (24-Hour) Tablet 2.4 Gram(s) Oral daily  metolazone 2.5 milliGRAM(s) Oral <User Schedule>  norethindrone acetate 5 milliGRAM(s) Oral daily  pantoprazole    Tablet 40 milliGRAM(s) Oral before breakfast  spironolactone 25 milliGRAM(s) Oral daily  tiotropium 18 MICROgram(s) Capsule 1 Capsule(s) Inhalation daily  torsemide 50 milliGRAM(s) Oral daily  vancomycin  IVPB 750 milliGRAM(s) IV Intermittent every 24 hours    MEDICATIONS  (PRN):  acetaminophen   Tablet .. 650 milliGRAM(s) Oral every 6 hours PRN Mild Pain (1 - 3), Moderate Pain (4 - 6)  dextrose 40% Gel 15 Gram(s) Oral once PRN Blood Glucose LESS THAN 70 milliGRAM(s)/deciliter  glucagon  Injectable 1 milliGRAM(s) IntraMuscular once PRN Glucose LESS THAN 70 milligrams/deciliter      Allergies    Augmentin (Swelling)  cephalexin (Swelling)    Intolerances        PHYSICAL EXAM:  VITALS: T(C): 36.8 (04-05-19 @ 13:53)  T(F): 98.2 (04-05-19 @ 13:53), Max: 98.5 (04-05-19 @ 05:04)  HR: 84 (04-05-19 @ 17:07) (84 - 102)  BP: 135/72 (04-05-19 @ 17:07) (121/74 - 147/83)  RR:  (18 - 18)  SpO2:  (99% - 100%)  GENERAL: NAD,   EYES: No proptosis,  HEENT:  Atraumatic, Normocephalic,   RESPIRATORY: Clear to auscultation bilaterally  CARDIOVASCULAR: Regular rhythm; No murmurs; no peripheral edema  GI: Soft, nontender, non distended, normal bowel sounds  SKIN: Dry, intact, No rashes or lesions  MUSCULOSKELETAL: decreased strength  NEURO: No focal deficits.  Vaginal bleeding noted.  PSYCH: Alert and oriented x 3, normal affect, upset.      POCT Blood Glucose.: 211 mg/dL (04-05-19 @ 16:41)  POCT Blood Glucose.: 333 mg/dL (04-05-19 @ 12:22)  POCT Blood Glucose.: 196 mg/dL (04-05-19 @ 08:19)  POCT Blood Glucose.: 119 mg/dL (04-04-19 @ 21:44)  POCT Blood Glucose.: 89 mg/dL (04-04-19 @ 16:48)  POCT Blood Glucose.: 216 mg/dL (04-04-19 @ 12:08)  POCT Blood Glucose.: 152 mg/dL (04-04-19 @ 08:13)  POCT Blood Glucose.: 220 mg/dL (04-03-19 @ 23:04)  POCT Blood Glucose.: 258 mg/dL (04-03-19 @ 21:33)  POCT Blood Glucose.: 265 mg/dL (04-03-19 @ 16:41)  POCT Blood Glucose.: 304 mg/dL (04-03-19 @ 11:46)  POCT Blood Glucose.: 172 mg/dL (04-03-19 @ 07:33)  POCT Blood Glucose.: 211 mg/dL (04-02-19 @ 21:27)                            10.0   12.2  )-----------( 201      ( 05 Apr 2019 06:40 )             29.0       04-05    139  |  91<L>  |  67<H>  ----------------------------<  187<H>  3.2<L>   |  33<H>  |  2.02<H>    EGFR if : 27<L>  EGFR if non : 23<L>    Ca    10.3      04-05  Mg     1.9     04-05        Thyroid Function Tests:  04-02 @ 09:19 TSH 9.70 FreeT4 1.5 T3 -- Anti TPO -- Anti Thyroglobulin Ab -- TSI --  03-25 @ 08:52 TSH 5.27 FreeT4 -- T3 -- Anti TPO -- Anti Thyroglobulin Ab -- TSI --      Hemoglobin A1C, Whole Blood: 7.4 % <H> [4.0 - 5.6] (03-25-19 @ 08:55)

## 2019-04-05 NOTE — CHART NOTE - NSCHARTNOTEFT_GEN_A_CORE
Per gyn #[id number] (Dr. Neely for attendingDr. Mckay): recommend repeat TVUS.   - Dr. Neely informed that US requesting call from [facility].    Fara Caicedo, NP Medicine.

## 2019-04-05 NOTE — PROGRESS NOTE ADULT - SUBJECTIVE AND OBJECTIVE BOX
Patient is a 78y old  Female who presents with a chief complaint of fall, right knee pain (05 Apr 2019 16:12)      SUBJECTIVE / OVERNIGHT EVENTS: ptn seen by ortho, has possible quadricept tendon tear, acuity of which is not clear. ptns is not very active at baseline, recovery from surgery would be difficult, family and Dr. Guzman to decide on options. Meanwhile will get MRI R knee w conscious sedation as per ptn's request. spoke w ortho x30 min, spoke w daughter x 30 min    MEDICATIONS  (STANDING):  amiodarone    Tablet 200 milliGRAM(s) Oral daily  aztreonam  IVPB 1000 milliGRAM(s) IV Intermittent every 12 hours  buDESOnide 160 MICROgram(s)/formoterol 4.5 MICROgram(s) Inhaler 2 Puff(s) Inhalation two times a day  carvedilol 6.25 milliGRAM(s) Oral every 12 hours  dextrose 5%. 1000 milliLiter(s) (50 mL/Hr) IV Continuous <Continuous>  dextrose 50% Injectable 12.5 Gram(s) IV Push once  dextrose 50% Injectable 25 Gram(s) IV Push once  dextrose 50% Injectable 25 Gram(s) IV Push once  docusate sodium 100 milliGRAM(s) Oral three times a day  DULoxetine 60 milliGRAM(s) Oral daily  insulin glargine Injectable (LANTUS) 38 Unit(s) SubCutaneous at bedtime  insulin lispro (HumaLOG) corrective regimen sliding scale   SubCutaneous three times a day before meals  insulin lispro (HumaLOG) corrective regimen sliding scale   SubCutaneous at bedtime  insulin lispro Injectable (HumaLOG) 14 Unit(s) SubCutaneous three times a day before meals  isosorbide   mononitrate ER Tablet (IMDUR) 30 milliGRAM(s) Oral daily  levothyroxine 188 MICROGram(s) Oral daily  mesalamine DR (24-Hour) Tablet 2.4 Gram(s) Oral daily  metolazone 2.5 milliGRAM(s) Oral <User Schedule>  norethindrone acetate 5 milliGRAM(s) Oral daily  pantoprazole    Tablet 40 milliGRAM(s) Oral before breakfast  spironolactone 25 milliGRAM(s) Oral daily  tiotropium 18 MICROgram(s) Capsule 1 Capsule(s) Inhalation daily  torsemide 50 milliGRAM(s) Oral daily  vancomycin  IVPB 750 milliGRAM(s) IV Intermittent every 24 hours    MEDICATIONS  (PRN):  acetaminophen   Tablet .. 650 milliGRAM(s) Oral every 6 hours PRN Mild Pain (1 - 3), Moderate Pain (4 - 6)  dextrose 40% Gel 15 Gram(s) Oral once PRN Blood Glucose LESS THAN 70 milliGRAM(s)/deciliter  glucagon  Injectable 1 milliGRAM(s) IntraMuscular once PRN Glucose LESS THAN 70 milligrams/deciliter      Vital Signs Last 24 Hrs  T(F): 98.2 (04-05-19 @ 13:53), Max: 98.5 (04-05-19 @ 05:04)  HR: 84 (04-05-19 @ 17:07) (84 - 102)  BP: 135/72 (04-05-19 @ 17:07) (121/74 - 147/83)  RR: 18 (04-05-19 @ 13:53) (18 - 18)  SpO2: 100% (04-05-19 @ 13:53) (99% - 100%)  Telemetry:   CAPILLARY BLOOD GLUCOSE      POCT Blood Glucose.: 211 mg/dL (05 Apr 2019 16:41)  POCT Blood Glucose.: 333 mg/dL (05 Apr 2019 12:22)  POCT Blood Glucose.: 196 mg/dL (05 Apr 2019 08:19)  POCT Blood Glucose.: 119 mg/dL (04 Apr 2019 21:44)    I&O's Summary    04 Apr 2019 07:01  -  05 Apr 2019 07:00  --------------------------------------------------------  IN: 290 mL / OUT: 2450 mL / NET: -2160 mL        PHYSICAL EXAM:  GENERAL: NAD, well-developed  HEAD:  Atraumatic, Normocephalic  EYES: EOMI, PERRLA, conjunctiva and sclera clear  NECK: Supple, No JVD  CHEST/LUNG: Clear to auscultation bilaterally; No wheeze  HEART: Regular rate and rhythm; No murmurs, rubs, or gallops  ABDOMEN: Soft, Nontender, Nondistended; Bowel sounds present  EXTREMITIES:  2+ Peripheral Pulses, No clubbing, cyanosis, or edema  PSYCH: AAOx3  NEUROLOGY: non-focal  SKIN: No rashes or lesions    LABS:                        10.0   12.2  )-----------( 201      ( 05 Apr 2019 06:40 )             29.0     04-05    139  |  91<L>  |  67<H>  ----------------------------<  187<H>  3.2<L>   |  33<H>  |  2.02<H>    Ca    10.3      05 Apr 2019 06:41  Mg     1.9     04-05                RADIOLOGY & ADDITIONAL TESTS:    Imaging Personally Reviewed:    Consultant(s) Notes Reviewed:      Care Discussed with Consultants/Other Providers:

## 2019-04-05 NOTE — CONSULT NOTE ADULT - ATTENDING COMMENTS
Pt seen and examined.  Pt is a poor surgical candidate.  However, significant disability can occur without repair of tendon.  Need to obtain MRI to assess magnitude and type of tear to see if this may be treated w/o surgery. Will f/u after MRI to discuss surgical options.  Pt and daughter are refusing to participate in a final clinical decision regarding treatment until an MRI is obtained

## 2019-04-05 NOTE — PROGRESS NOTE ADULT - SUBJECTIVE AND OBJECTIVE BOX
patient with persistent hematuria with external device but no dysuria and ? vaginal bleed  vss  abd soft   no urine available

## 2019-04-05 NOTE — PROGRESS NOTE ADULT - ASSESSMENT
79 yo F w/ hx of moderate MS s/p bioprosthetic mitral valve, severe pulmonary HTN, DM2, anemia, diastolic CHF, hypothyroidism, paroxysmal atrial fibrillation, HTN78 y/o Type 2 DM, hypothyroid, recently admitted with CHF exacerbation, re-admitted with recurrent falls and suspected UTI.    Type 2 DM insulin resistant at home on Tresiba insulin 60 units daily, and humalog 30 units before meals. Other treatment includes Bydureon 2 mg weekly. HbA1c during recent admission was 7.4 %. During psast admission noted with significantly reduced insulin requirenents, discharged on Lantus insulin 24 units at HS and Humalog 5 units per meal.  Patient re-admitted after recurrent falls at home, and susp. UTI, started on similar dose insulin, noted with hyperglycemia.     Hypothyroidism- treated with synthroid 175 mcg daily. Last test as outpatient recently reported Good, Here with mild elevated TSH 5.27. repeat TSH this admission  9.70 uIU/mL, FT4 1.5.  C/O fatigue, weight stable till recently. Says she is compliant with medications. Synthroid increased to 188 mcg daily.    Patient on very high amounts of insulin at home. Decreased requirements last admission are most probably related to CHF, and as it resolves expect insulin requirements sanna increase.  Patient upset about vaginal bleeding.  PO intake variable.  FS post meals were low set all day yesterday with Humalog 18-->10 units pre meals, High again today on 14 units per meal.  Patient afebrile, finishing antibiotics course for cellulitis, WBC 12.2.  Will increase the basal insulin dose as am FS are still high, will only increase pre-meal insulin mildly, as variable PO intake affects control.      Suggest:  Increase Lantus insulin 42 units at HS.  Humalog insulin 15 units per meal.  Mid scale humalog.  Synthroid 188 mcg daily, repeat TFT's in 3 weeks.

## 2019-04-05 NOTE — PROGRESS NOTE ADULT - ASSESSMENT
gross hematuria  urine for culture and cytology    renal us due to increase cre 2 with ct urogram when cre improves    cysto as outpatient when stable may coordinate with gyn/ onc evalutiaon

## 2019-04-06 LAB
ANION GAP SERPL CALC-SCNC: 14 MMOL/L — SIGNIFICANT CHANGE UP (ref 5–17)
BUN SERPL-MCNC: 71 MG/DL — HIGH (ref 7–23)
CALCIUM SERPL-MCNC: 10 MG/DL — SIGNIFICANT CHANGE UP (ref 8.4–10.5)
CHLORIDE SERPL-SCNC: 92 MMOL/L — LOW (ref 96–108)
CO2 SERPL-SCNC: 31 MMOL/L — SIGNIFICANT CHANGE UP (ref 22–31)
CREAT SERPL-MCNC: 1.99 MG/DL — HIGH (ref 0.5–1.3)
GLUCOSE BLDC GLUCOMTR-MCNC: 172 MG/DL — HIGH (ref 70–99)
GLUCOSE BLDC GLUCOMTR-MCNC: 218 MG/DL — HIGH (ref 70–99)
GLUCOSE BLDC GLUCOMTR-MCNC: 266 MG/DL — HIGH (ref 70–99)
GLUCOSE BLDC GLUCOMTR-MCNC: 375 MG/DL — HIGH (ref 70–99)
GLUCOSE SERPL-MCNC: 229 MG/DL — HIGH (ref 70–99)
POTASSIUM SERPL-MCNC: 3.2 MMOL/L — LOW (ref 3.5–5.3)
POTASSIUM SERPL-SCNC: 3.2 MMOL/L — LOW (ref 3.5–5.3)
SODIUM SERPL-SCNC: 137 MMOL/L — SIGNIFICANT CHANGE UP (ref 135–145)

## 2019-04-06 RX ORDER — POTASSIUM CHLORIDE 20 MEQ
40 PACKET (EA) ORAL ONCE
Qty: 0 | Refills: 0 | Status: COMPLETED | OUTPATIENT
Start: 2019-04-06 | End: 2019-04-06

## 2019-04-06 RX ORDER — SENNA PLUS 8.6 MG/1
2 TABLET ORAL AT BEDTIME
Qty: 0 | Refills: 0 | Status: DISCONTINUED | OUTPATIENT
Start: 2019-04-06 | End: 2019-04-09

## 2019-04-06 RX ORDER — POLYETHYLENE GLYCOL 3350 17 G/17G
17 POWDER, FOR SOLUTION ORAL DAILY
Qty: 0 | Refills: 0 | Status: DISCONTINUED | OUTPATIENT
Start: 2019-04-06 | End: 2019-04-09

## 2019-04-06 RX ADMIN — Medication 50 MILLIGRAM(S): at 06:09

## 2019-04-06 RX ADMIN — Medication 15 UNIT(S): at 08:11

## 2019-04-06 RX ADMIN — ISOSORBIDE MONONITRATE 30 MILLIGRAM(S): 60 TABLET, EXTENDED RELEASE ORAL at 12:19

## 2019-04-06 RX ADMIN — TIOTROPIUM BROMIDE 1 CAPSULE(S): 18 CAPSULE ORAL; RESPIRATORY (INHALATION) at 12:20

## 2019-04-06 RX ADMIN — Medication 50 MILLIGRAM(S): at 06:13

## 2019-04-06 RX ADMIN — DULOXETINE HYDROCHLORIDE 60 MILLIGRAM(S): 30 CAPSULE, DELAYED RELEASE ORAL at 12:20

## 2019-04-06 RX ADMIN — SENNA PLUS 2 TABLET(S): 8.6 TABLET ORAL at 17:56

## 2019-04-06 RX ADMIN — BUDESONIDE AND FORMOTEROL FUMARATE DIHYDRATE 2 PUFF(S): 160; 4.5 AEROSOL RESPIRATORY (INHALATION) at 17:14

## 2019-04-06 RX ADMIN — Medication 15 UNIT(S): at 17:13

## 2019-04-06 RX ADMIN — BUDESONIDE AND FORMOTEROL FUMARATE DIHYDRATE 2 PUFF(S): 160; 4.5 AEROSOL RESPIRATORY (INHALATION) at 06:12

## 2019-04-06 RX ADMIN — NORETHINDRONE 5 MILLIGRAM(S): 0.35 TABLET ORAL at 12:19

## 2019-04-06 RX ADMIN — Medication 2.4 GRAM(S): at 12:19

## 2019-04-06 RX ADMIN — Medication 10: at 12:20

## 2019-04-06 RX ADMIN — Medication 250 MILLIGRAM(S): at 22:19

## 2019-04-06 RX ADMIN — POLYETHYLENE GLYCOL 3350 17 GRAM(S): 17 POWDER, FOR SOLUTION ORAL at 22:19

## 2019-04-06 RX ADMIN — Medication 50 MILLIGRAM(S): at 17:13

## 2019-04-06 RX ADMIN — Medication 100 MILLIGRAM(S): at 12:19

## 2019-04-06 RX ADMIN — CARVEDILOL PHOSPHATE 6.25 MILLIGRAM(S): 80 CAPSULE, EXTENDED RELEASE ORAL at 06:08

## 2019-04-06 RX ADMIN — Medication 40 MILLIEQUIVALENT(S): at 12:19

## 2019-04-06 RX ADMIN — Medication 100 MILLIGRAM(S): at 06:12

## 2019-04-06 RX ADMIN — Medication 6: at 17:13

## 2019-04-06 RX ADMIN — Medication 188 MICROGRAM(S): at 06:08

## 2019-04-06 RX ADMIN — Medication 4: at 08:11

## 2019-04-06 RX ADMIN — SPIRONOLACTONE 25 MILLIGRAM(S): 25 TABLET, FILM COATED ORAL at 06:08

## 2019-04-06 RX ADMIN — CARVEDILOL PHOSPHATE 6.25 MILLIGRAM(S): 80 CAPSULE, EXTENDED RELEASE ORAL at 17:13

## 2019-04-06 RX ADMIN — PANTOPRAZOLE SODIUM 40 MILLIGRAM(S): 20 TABLET, DELAYED RELEASE ORAL at 06:08

## 2019-04-06 RX ADMIN — Medication 100 MILLIGRAM(S): at 22:20

## 2019-04-06 RX ADMIN — Medication 15 UNIT(S): at 12:20

## 2019-04-06 RX ADMIN — INSULIN GLARGINE 42 UNIT(S): 100 INJECTION, SOLUTION SUBCUTANEOUS at 22:19

## 2019-04-06 RX ADMIN — AMIODARONE HYDROCHLORIDE 200 MILLIGRAM(S): 400 TABLET ORAL at 06:08

## 2019-04-06 NOTE — PROGRESS NOTE ADULT - ASSESSMENT
79 yo woman w/ hx of moderate MS s/p bioprosthetic mitral valve, diastolic CHF, paroxsmal afib (not on A/C in setting of vaginal bleeding), HTN, severe pulmonary HTN, DMT2, CKD III, anemia, with post menopausal vaginal bleeding, UC, VICKY on 3L NC (not on CPAP/BIPAP) presents from home in setting of multiple falls the past 2 days resulting with right knee pain, found with leukocytosis and LE wounds concerning for cellulitis.

## 2019-04-06 NOTE — PROGRESS NOTE ADULT - SUBJECTIVE AND OBJECTIVE BOX
[facility]      Seen earlier today, and examined.  - Today, patient is without complaints.           *****MEDICATIONS: Current medication reviewed and documented.    MEDICATIONS  (STANDING):  amiodarone    Tablet 200 milliGRAM(s) Oral daily  buDESOnide 160 MICROgram(s)/formoterol 4.5 MICROgram(s) Inhaler 2 Puff(s) Inhalation two times a day  carvedilol 6.25 milliGRAM(s) Oral every 12 hours  dextrose 5%. 1000 milliLiter(s) (50 mL/Hr) IV Continuous <Continuous>  dextrose 50% Injectable 12.5 Gram(s) IV Push once  dextrose 50% Injectable 25 Gram(s) IV Push once  dextrose 50% Injectable 25 Gram(s) IV Push once  docusate sodium 100 milliGRAM(s) Oral three times a day  DULoxetine 60 milliGRAM(s) Oral daily  insulin glargine Injectable (LANTUS) 42 Unit(s) SubCutaneous at bedtime  insulin lispro (HumaLOG) corrective regimen sliding scale   SubCutaneous three times a day before meals  insulin lispro (HumaLOG) corrective regimen sliding scale   SubCutaneous at bedtime  insulin lispro Injectable (HumaLOG) 15 Unit(s) SubCutaneous three times a day before meals  isosorbide   mononitrate ER Tablet (IMDUR) 30 milliGRAM(s) Oral daily  levothyroxine 188 MICROGram(s) Oral daily  mesalamine DR (24-Hour) Tablet 2.4 Gram(s) Oral daily  metolazone 2.5 milliGRAM(s) Oral <User Schedule>  norethindrone acetate 5 milliGRAM(s) Oral daily  pantoprazole    Tablet 40 milliGRAM(s) Oral before breakfast  polyethylene glycol 3350 17 Gram(s) Oral daily  senna 2 Tablet(s) Oral at bedtime  spironolactone 25 milliGRAM(s) Oral daily  tiotropium 18 MICROgram(s) Capsule 1 Capsule(s) Inhalation daily  torsemide 50 milliGRAM(s) Oral daily  vancomycin  IVPB 750 milliGRAM(s) IV Intermittent every 24 hours    MEDICATIONS  (PRN):  acetaminophen   Tablet .. 650 milliGRAM(s) Oral every 6 hours PRN Mild Pain (1 - 3), Moderate Pain (4 - 6)  dextrose 40% Gel 15 Gram(s) Oral once PRN Blood Glucose LESS THAN 70 milliGRAM(s)/deciliter  glucagon  Injectable 1 milliGRAM(s) IntraMuscular once PRN Glucose LESS THAN 70 milligrams/deciliter          ***** VITAL SIGNS:  T(F): 98 (04-06-19 @ 20:00), Max: 99 (04-06-19 @ 12:15)  HR: 101 (04-06-19 @ 20:00) (99 - 101)  BP: 137/79 (04-06-19 @ 20:00) (124/71 - 137/79)  RR: 18 (04-06-19 @ 20:00) (18 - 18)  SpO2: 99% (04-06-19 @ 20:00) (98% - 100%)  Wt(kg): --  ,   I&O's Summary    05 Apr 2019 07:01  -  06 Apr 2019 07:00  --------------------------------------------------------  IN: 360 mL / OUT: 1750 mL / NET: -1390 mL    06 Apr 2019 07:01  -  07 Apr 2019 01:33  --------------------------------------------------------  IN: 240 mL / OUT: 800 mL / NET: -560 mL             *****PHYSICAL EXAM:   alert oriented x 3 attention comprehension are fair.  Able to name, repeat.   EOmi fundi not visualized   no nystagmus VFF to confrontation  Tongue is midline  Palate elevates symmetrically   Moving all 4 ext spontaneously  except limited mvt of rle due to immobilizer   Gait not assessed.            *****LAB AND IMAGING:                        10.0   12.2  )-----------( 201      ( 05 Apr 2019 06:40 )             29.0               04-06    137  |  92<L>  |  71<H>  ----------------------------<  229<H>  3.2<L>   |  31  |  1.99<H>    Ca    10.0      06 Apr 2019 07:37  Mg     1.9     04-05                           [All pertinent recent Imaging/Reports reviewed]           *****A S S E S S M E N T   A N D   P L A N :      77 yo woman w/ hx of moderate MS s/p bioprosthetic mitral valve, diastolic CHF, paroxsmal afib (not on A/C in setting of bleeding), HTN, severe pulmonary HTN, DMT2, CKD III, anemia, with post menopausal vaginal bleeding, UC, VICKY on 3L NC (not on CPAP/BIPAP) presents from home in setting of multiple falls the past 2 days resulting with right knee pain. Patient had a fall yesterday from 1 step while walking out of the house and tripped. Per patient states that her right leg had a buckling/collapsing sensation. Patient went to [facility] on 3/29 and had an xray which did not reveal fracture and sent home with cyclobenzaprine and Lidoderm patch. Patient last use of medication on 3/29. Patient had another fall while attempting to ambulate to the bathroom. She described that her right leg buckled under her causing her to fall. It was difficult for her to get up on her own and required the assistance of her  and son-in-law to get up. Patient denies any preceding symptoms of dizziness/lightheadedness, shortness of breath, CP, palpitations preceding the events. Endorses chronic back pain with no new characteristics. Patient has been getting around home with use of wheelchair the past 4 months. Requires assistance of her  to get around because of chronic weakness. Denies development of new numbness of lower extremities. Does not utilize any other assistive devices. Has not had PT outpatient. Patient also has had chronic LE wounds that were dressed from last discharge on 3/27. Denies changing of dressing. Denies fevers, chills, sweats.    pt with hx of spinal stenosis recently has been using a wheelchair for 2 weeks, then got up and fell taken to [facility], discharged then fell again at home and was brought to [facility].       Problem/Recommendations 1: R knee  pain of unclear etiology    initially refused mri of the knee due to severe claustrophobia  ortho recommending mri knee, monday for mri under anesthesia ,        Problem/Recommendations 2: spinal stenosis with symptoms of R leg buckling likely due to spinal stenosis +/- deconditioning   no sign of radiculopathy   pt unable to tolerate the mri due to claustrophobia.   evidence of multi level degenerative changes, with moderate spinal stenosis form l1- l5   according to niece at bedside, pt has been wheelchair bound atleast 1 year, with progressive decline in clincial status.   Thank you for allowing me to participate in the care of this patient. Please do not hesitate to call me if you have any  questions.        ________________  Simran Mattson MD  [facility]  [phone]      30 minutes spent on total encounter; more than 50 % of the visit was  spent counseling about plan of care, compliance to diet/exercise and medication regimen and or  coordinating care by the attending physician.      It is advised that s stroke patients follow up with NP Vi De Jesus @ [phone] in 1- 2 weeks.   Others please follow up with Dr. Michael Nissenbaum [phone]

## 2019-04-06 NOTE — PROGRESS NOTE ADULT - ASSESSMENT
79yo female with chronic diastolic CHF, bioprosthetic MVR, PAF, VICKY, PAD, CKD, DM, HLD and LSS admitted with recurrent falls. She has severe lumbar spine disease and will need SAMANTHA.  She continue with vaginal bleeding and anemia. She has been more SOB due to anemia in setting of obesity and chronic diastolic CHF.  She has been off coumadin past 4 weeks with continued bleeding.  Low K due to diuretics.  Found to have quadriceps tendon injury.    Rec:  Reduce torsemide to 50mg once per day.   Monitor lytes, renal function; replete K  Gyn oncology recommendations pending  PT  Ortho followup, MRI R knee Monday  SAMANTHA after above.     Russell Jimenez MD  [facility]

## 2019-04-06 NOTE — PROGRESS NOTE ADULT - SUBJECTIVE AND OBJECTIVE BOX
KEESHAGALINA TANIKA    Patient is a 78y old  Female who presents with a chief complaint of fall, right knee pain (2019 10:01)    Comfortable at rest.  R knee pain controlled.  No acute cardiac symptoms.  Has not ambulated.    Allergies    Augmentin (Swelling)  cephalexin (Swelling)    Intolerances      MEDICATIONS  (STANDING):  amiodarone    Tablet 200 milliGRAM(s) Oral daily  aztreonam  IVPB 1000 milliGRAM(s) IV Intermittent every 12 hours  buDESOnide 160 MICROgram(s)/formoterol 4.5 MICROgram(s) Inhaler 2 Puff(s) Inhalation two times a day  carvedilol 6.25 milliGRAM(s) Oral every 12 hours  dextrose 5%. 1000 milliLiter(s) (50 mL/Hr) IV Continuous <Continuous>  dextrose 50% Injectable 12.5 Gram(s) IV Push once  dextrose 50% Injectable 25 Gram(s) IV Push once  dextrose 50% Injectable 25 Gram(s) IV Push once  docusate sodium 100 milliGRAM(s) Oral three times a day  DULoxetine 60 milliGRAM(s) Oral daily  insulin glargine Injectable (LANTUS) 42 Unit(s) SubCutaneous at bedtime  insulin lispro (HumaLOG) corrective regimen sliding scale   SubCutaneous three times a day before meals  insulin lispro (HumaLOG) corrective regimen sliding scale   SubCutaneous at bedtime  insulin lispro Injectable (HumaLOG) 15 Unit(s) SubCutaneous three times a day before meals  isosorbide   mononitrate ER Tablet (IMDUR) 30 milliGRAM(s) Oral daily  levothyroxine 188 MICROGram(s) Oral daily  mesalamine DR (24-Hour) Tablet 2.4 Gram(s) Oral daily  metolazone 2.5 milliGRAM(s) Oral <User Schedule>  norethindrone acetate 5 milliGRAM(s) Oral daily  pantoprazole    Tablet 40 milliGRAM(s) Oral before breakfast  spironolactone 25 milliGRAM(s) Oral daily  tiotropium 18 MICROgram(s) Capsule 1 Capsule(s) Inhalation daily  torsemide 50 milliGRAM(s) Oral daily  vancomycin  IVPB 750 milliGRAM(s) IV Intermittent every 24 hours    MEDICATIONS  (PRN):  acetaminophen   Tablet .. 650 milliGRAM(s) Oral every 6 hours PRN Mild Pain (1 - 3), Moderate Pain (4 - 6)  dextrose 40% Gel 15 Gram(s) Oral once PRN Blood Glucose LESS THAN 70 milliGRAM(s)/deciliter  glucagon  Injectable 1 milliGRAM(s) IntraMuscular once PRN Glucose LESS THAN 70 milligrams/deciliter    PHYSICAL EXAM:  Vital Signs Last 24 Hrs  T(C): 37.2 (2019 12:15), Max: 37.4 (2019 21:09)  T(F): 99 (2019 12:15), Max: 99.3 (2019 21:09)  HR: 99 (2019 12:15) (84 - 100)  BP: 134/63 (2019 12:15) (121/74 - 135/72)  BP(mean): --  RR: 18 (2019 12:15) (18 - 18)  SpO2: 98% (2019 12:15) (98% - 100%)  Daily     Daily Weight in k.4 (2019 05:31)  I&O's Summary    2019 07:01  -  2019 07:00  --------------------------------------------------------  IN: 360 mL / OUT: 1750 mL / NET: -1390 mL        General Appearance: 	 Alert, cooperative, no distress  Neck: JVP around 10 cm  Lungs:  clear to auscultation and percussion bilaterally  Cor:  pmi 5th ICS MCL, regular rate and rhythm, S1 normal intensity, S2 normal intensity  Abdomen:	 soft, non-tender; bowel sounds normal  Extremities: 1+ BL edema  R knee swollen    EKG:  Telemetry:    Labs:  CBC Full  -  ( 2019 06:40 )  WBC Count : 12.2 K/uL  RBC Count : 3.04 M/uL  Hemoglobin : 10.0 g/dL  Hematocrit : 29.0 %  Platelet Count - Automated : 201 K/uL  Mean Cell Volume : 95.3 fl  Mean Cell Hemoglobin : 32.7 pg  Mean Cell Hemoglobin Concentration : 34.4 gm/dL  Auto Neutrophil # : x  Auto Lymphocyte # : x  Auto Monocyte # : x  Auto Eosinophil # : x  Auto Basophil # : x  Auto Neutrophil % : x  Auto Lymphocyte % : x  Auto Monocyte % : x  Auto Eosinophil % : x  Auto Basophil % : x    Basic Metabolic Panel in AM (19 @ 07:37)    Sodium, Serum: 137 mmol/L    Potassium, Serum: 3.2 mmol/L    Chloride, Serum: 92 mmol/L    Carbon Dioxide, Serum: 31 mmol/L    Anion Gap, Serum: 14 mmol/L    Blood Urea Nitrogen, Serum: 71 mg/dL    Creatinine, Serum: 1.99 mg/dL    Glucose, Serum: 229 mg/dL    Calcium, Total Serum: 10.0 mg/dL    eGFR if Non : 24: Interpretative comment  The units for eGFR are mL/min/1.73M2 (normalized body surface area). The  eGFR is calculated from a serum creatinine using the CKD-EPI equation.  Other variables required for calculation are race, age and sex. Among  patients with chronic kidney disease (CKD), the eGFR is useful in  determining the stage of disease according to KDOQI CKD classification.  All eGFR results are reported numerically with the following  interpretation.          GFR                    With                 Without     (ml/min/1.73 m2)    Kidney Damage       Kidney Damage        >= 90                    Stage 1                     Normal        60-89                    Stage 2                     Decreased GFR        30-59     Stage 3                     Stage 3        15-29                    Stage 4                     Stage 4        < 15                      Stage 5                     Stage 5  Each stage of CKD assumes that the associated GFR level has been in  effect for at least 3 months. Determination of stages one and two (with  eGFR > 59 ml/min/m2) requires estimation of kidney damage for at least 3  months as defined by structural or functional abnormalities.  Limitations: All estimates of GFR will be less accurate for patients at  extremes of muscle mass (including but not limited to frail elderly,  critically ill, or cancer patients), those with unusual diets, and those  with conditions associated with reduced secretion or extrarenal  elimination of creatinine. The eGFR equation is not recommended for use  in patients with unstable creatinine levels. mL/min/1.73M2    eGFR if African American: 28 mL/min/1.73M2

## 2019-04-07 LAB
ANION GAP SERPL CALC-SCNC: 12 MMOL/L — SIGNIFICANT CHANGE UP (ref 5–17)
APTT BLD: 26.9 SEC — LOW (ref 27.5–36.3)
BLD GP AB SCN SERPL QL: NEGATIVE — SIGNIFICANT CHANGE UP
BUN SERPL-MCNC: 63 MG/DL — HIGH (ref 7–23)
CALCIUM SERPL-MCNC: 10 MG/DL — SIGNIFICANT CHANGE UP (ref 8.4–10.5)
CHLORIDE SERPL-SCNC: 93 MMOL/L — LOW (ref 96–108)
CO2 SERPL-SCNC: 31 MMOL/L — SIGNIFICANT CHANGE UP (ref 22–31)
CREAT SERPL-MCNC: 1.6 MG/DL — HIGH (ref 0.5–1.3)
GLUCOSE BLDC GLUCOMTR-MCNC: 168 MG/DL — HIGH (ref 70–99)
GLUCOSE BLDC GLUCOMTR-MCNC: 224 MG/DL — HIGH (ref 70–99)
GLUCOSE BLDC GLUCOMTR-MCNC: 239 MG/DL — HIGH (ref 70–99)
GLUCOSE BLDC GLUCOMTR-MCNC: 339 MG/DL — HIGH (ref 70–99)
GLUCOSE SERPL-MCNC: 173 MG/DL — HIGH (ref 70–99)
HCT VFR BLD CALC: 30.4 % — LOW (ref 34.5–45)
HGB BLD-MCNC: 9.4 G/DL — LOW (ref 11.5–15.5)
INR BLD: 1.05 RATIO — SIGNIFICANT CHANGE UP (ref 0.88–1.16)
MAGNESIUM SERPL-MCNC: 2.2 MG/DL — SIGNIFICANT CHANGE UP (ref 1.6–2.6)
MCHC RBC-ENTMCNC: 29.9 PG — SIGNIFICANT CHANGE UP (ref 27–34)
MCHC RBC-ENTMCNC: 30.9 GM/DL — LOW (ref 32–36)
MCV RBC AUTO: 96.8 FL — SIGNIFICANT CHANGE UP (ref 80–100)
PLATELET # BLD AUTO: 230 K/UL — SIGNIFICANT CHANGE UP (ref 150–400)
POTASSIUM SERPL-MCNC: 3.5 MMOL/L — SIGNIFICANT CHANGE UP (ref 3.5–5.3)
POTASSIUM SERPL-SCNC: 3.5 MMOL/L — SIGNIFICANT CHANGE UP (ref 3.5–5.3)
PROTHROM AB SERPL-ACNC: 12.1 SEC — SIGNIFICANT CHANGE UP (ref 10–13.1)
RBC # BLD: 3.14 M/UL — LOW (ref 3.8–5.2)
RBC # FLD: 16.7 % — HIGH (ref 10.3–14.5)
RH IG SCN BLD-IMP: POSITIVE — SIGNIFICANT CHANGE UP
SODIUM SERPL-SCNC: 136 MMOL/L — SIGNIFICANT CHANGE UP (ref 135–145)
WBC # BLD: 10.79 K/UL — HIGH (ref 3.8–10.5)
WBC # FLD AUTO: 10.79 K/UL — HIGH (ref 3.8–10.5)

## 2019-04-07 PROCEDURE — 99233 SBSQ HOSP IP/OBS HIGH 50: CPT | Mod: GC

## 2019-04-07 RX ORDER — INSULIN LISPRO 100/ML
15 VIAL (ML) SUBCUTANEOUS
Qty: 0 | Refills: 0 | Status: DISCONTINUED | OUTPATIENT
Start: 2019-04-07 | End: 2019-04-09

## 2019-04-07 RX ORDER — INSULIN GLARGINE 100 [IU]/ML
44 INJECTION, SOLUTION SUBCUTANEOUS AT BEDTIME
Qty: 0 | Refills: 0 | Status: DISCONTINUED | OUTPATIENT
Start: 2019-04-07 | End: 2019-04-09

## 2019-04-07 RX ORDER — INSULIN LISPRO 100/ML
20 VIAL (ML) SUBCUTANEOUS
Qty: 0 | Refills: 0 | Status: DISCONTINUED | OUTPATIENT
Start: 2019-04-07 | End: 2019-04-09

## 2019-04-07 RX ADMIN — Medication 188 MICROGRAM(S): at 07:00

## 2019-04-07 RX ADMIN — DULOXETINE HYDROCHLORIDE 60 MILLIGRAM(S): 30 CAPSULE, DELAYED RELEASE ORAL at 12:07

## 2019-04-07 RX ADMIN — AMIODARONE HYDROCHLORIDE 200 MILLIGRAM(S): 400 TABLET ORAL at 07:00

## 2019-04-07 RX ADMIN — SPIRONOLACTONE 25 MILLIGRAM(S): 25 TABLET, FILM COATED ORAL at 07:01

## 2019-04-07 RX ADMIN — POLYETHYLENE GLYCOL 3350 17 GRAM(S): 17 POWDER, FOR SOLUTION ORAL at 12:09

## 2019-04-07 RX ADMIN — BUDESONIDE AND FORMOTEROL FUMARATE DIHYDRATE 2 PUFF(S): 160; 4.5 AEROSOL RESPIRATORY (INHALATION) at 17:48

## 2019-04-07 RX ADMIN — Medication 100 MILLIGRAM(S): at 12:07

## 2019-04-07 RX ADMIN — Medication 15 UNIT(S): at 17:47

## 2019-04-07 RX ADMIN — INSULIN GLARGINE 44 UNIT(S): 100 INJECTION, SOLUTION SUBCUTANEOUS at 22:23

## 2019-04-07 RX ADMIN — Medication 2.4 GRAM(S): at 12:07

## 2019-04-07 RX ADMIN — Medication 2: at 08:13

## 2019-04-07 RX ADMIN — Medication 15 UNIT(S): at 12:07

## 2019-04-07 RX ADMIN — TIOTROPIUM BROMIDE 1 CAPSULE(S): 18 CAPSULE ORAL; RESPIRATORY (INHALATION) at 12:07

## 2019-04-07 RX ADMIN — Medication 100 MILLIGRAM(S): at 07:00

## 2019-04-07 RX ADMIN — Medication 8: at 12:07

## 2019-04-07 RX ADMIN — NORETHINDRONE 5 MILLIGRAM(S): 0.35 TABLET ORAL at 12:07

## 2019-04-07 RX ADMIN — Medication 100 MILLIGRAM(S): at 21:28

## 2019-04-07 RX ADMIN — SENNA PLUS 2 TABLET(S): 8.6 TABLET ORAL at 10:24

## 2019-04-07 RX ADMIN — PANTOPRAZOLE SODIUM 40 MILLIGRAM(S): 20 TABLET, DELAYED RELEASE ORAL at 07:01

## 2019-04-07 RX ADMIN — Medication 15 UNIT(S): at 08:13

## 2019-04-07 RX ADMIN — Medication 4: at 17:47

## 2019-04-07 RX ADMIN — ISOSORBIDE MONONITRATE 30 MILLIGRAM(S): 60 TABLET, EXTENDED RELEASE ORAL at 12:07

## 2019-04-07 RX ADMIN — CARVEDILOL PHOSPHATE 6.25 MILLIGRAM(S): 80 CAPSULE, EXTENDED RELEASE ORAL at 07:01

## 2019-04-07 RX ADMIN — BUDESONIDE AND FORMOTEROL FUMARATE DIHYDRATE 2 PUFF(S): 160; 4.5 AEROSOL RESPIRATORY (INHALATION) at 07:01

## 2019-04-07 RX ADMIN — Medication 50 MILLIGRAM(S): at 07:00

## 2019-04-07 RX ADMIN — CARVEDILOL PHOSPHATE 6.25 MILLIGRAM(S): 80 CAPSULE, EXTENDED RELEASE ORAL at 17:48

## 2019-04-07 NOTE — PROGRESS NOTE ADULT - SUBJECTIVE AND OBJECTIVE BOX
Patient is a 78y old  Female who presents with a chief complaint of fall, right knee pain (07 Apr 2019 19:45)      SUBJECTIVE / OVERNIGHT EVENTS: no new c/o    MEDICATIONS  (STANDING):  amiodarone    Tablet 200 milliGRAM(s) Oral daily  buDESOnide 160 MICROgram(s)/formoterol 4.5 MICROgram(s) Inhaler 2 Puff(s) Inhalation two times a day  carvedilol 6.25 milliGRAM(s) Oral every 12 hours  dextrose 5%. 1000 milliLiter(s) (50 mL/Hr) IV Continuous <Continuous>  dextrose 50% Injectable 12.5 Gram(s) IV Push once  dextrose 50% Injectable 25 Gram(s) IV Push once  dextrose 50% Injectable 25 Gram(s) IV Push once  docusate sodium 100 milliGRAM(s) Oral three times a day  DULoxetine 60 milliGRAM(s) Oral daily  insulin glargine Injectable (LANTUS) 44 Unit(s) SubCutaneous at bedtime  insulin lispro (HumaLOG) corrective regimen sliding scale   SubCutaneous three times a day before meals  insulin lispro (HumaLOG) corrective regimen sliding scale   SubCutaneous at bedtime  insulin lispro Injectable (HumaLOG) 15 Unit(s) SubCutaneous before dinner  insulin lispro Injectable (HumaLOG) 20 Unit(s) SubCutaneous before breakfast  insulin lispro Injectable (HumaLOG) 15 Unit(s) SubCutaneous before lunch  isosorbide   mononitrate ER Tablet (IMDUR) 30 milliGRAM(s) Oral daily  levothyroxine 188 MICROGram(s) Oral daily  mesalamine DR (24-Hour) Tablet 2.4 Gram(s) Oral daily  metolazone 2.5 milliGRAM(s) Oral <User Schedule>  norethindrone acetate 5 milliGRAM(s) Oral daily  pantoprazole    Tablet 40 milliGRAM(s) Oral before breakfast  polyethylene glycol 3350 17 Gram(s) Oral daily  senna 2 Tablet(s) Oral at bedtime  spironolactone 25 milliGRAM(s) Oral daily  tiotropium 18 MICROgram(s) Capsule 1 Capsule(s) Inhalation daily  torsemide 50 milliGRAM(s) Oral daily    MEDICATIONS  (PRN):  acetaminophen   Tablet .. 650 milliGRAM(s) Oral every 6 hours PRN Mild Pain (1 - 3), Moderate Pain (4 - 6)  bisacodyl Suppository 10 milliGRAM(s) Rectal once PRN Constipation  dextrose 40% Gel 15 Gram(s) Oral once PRN Blood Glucose LESS THAN 70 milliGRAM(s)/deciliter  glucagon  Injectable 1 milliGRAM(s) IntraMuscular once PRN Glucose LESS THAN 70 milligrams/deciliter      Vital Signs Last 24 Hrs  T(F): 98 (04-07-19 @ 20:47), Max: 98.2 (04-07-19 @ 12:14)  HR: 99 (04-07-19 @ 20:47) (99 - 99)  BP: 145/82 (04-07-19 @ 20:47) (124/77 - 145/82)  RR: 18 (04-07-19 @ 20:47) (18 - 18)  SpO2: 97% (04-07-19 @ 20:47) (97% - 100%)  Telemetry:   CAPILLARY BLOOD GLUCOSE      POCT Blood Glucose.: 239 mg/dL (07 Apr 2019 17:25)  POCT Blood Glucose.: 339 mg/dL (07 Apr 2019 11:55)  POCT Blood Glucose.: 168 mg/dL (07 Apr 2019 07:43)  POCT Blood Glucose.: 172 mg/dL (06 Apr 2019 21:38)    I&O's Summary    06 Apr 2019 07:01  -  07 Apr 2019 07:00  --------------------------------------------------------  IN: 240 mL / OUT: 1200 mL / NET: -960 mL    07 Apr 2019 07:01  -  07 Apr 2019 21:10  --------------------------------------------------------  IN: 240 mL / OUT: 1000 mL / NET: -760 mL        PHYSICAL EXAM:  GENERAL: NAD, well-developed  HEAD:  Atraumatic, Normocephalic  EYES: EOMI, PERRLA, conjunctiva and sclera clear  NECK: Supple, No JVD  CHEST/LUNG: Clear to auscultation bilaterally; No wheeze  HEART: Regular rate and rhythm; No murmurs, rubs, or gallops  ABDOMEN: Soft, Nontender, Nondistended; Bowel sounds present  EXTREMITIES:  2+ Peripheral Pulses, No clubbing, cyanosis, or edema  PSYCH: AAOx3  NEUROLOGY: non-focal  SKIN: No rashes or lesions    LABS:                        9.4    10.79 )-----------( 230      ( 07 Apr 2019 09:03 )             30.4     04-07    136  |  93<L>  |  63<H>  ----------------------------<  173<H>  3.5   |  31  |  1.60<H>    Ca    10.0      07 Apr 2019 07:08  Mg     2.2     04-07      PT/INR - ( 07 Apr 2019 08:30 )   PT: 12.1 sec;   INR: 1.05 ratio         PTT - ( 07 Apr 2019 08:30 )  PTT:26.9 sec          RADIOLOGY & ADDITIONAL TESTS:    Imaging Personally Reviewed:    Consultant(s) Notes Reviewed:      Care Discussed with Consultants/Other Providers:

## 2019-04-07 NOTE — PROGRESS NOTE ADULT - ASSESSMENT
79 yo woman w/ hx of moderate MS s/p bioprosthetic mitral valve, diastolic CHF, paroxsmal afib (not on A/C in setting of bleeding), HTN, severe pulmonary HTN, DMT2, CKD III, anemia, with post menopausal vaginal bleeding, UC, VICKY on 3L NC (not on CPAP/BIPAP) presents from home in setting of multiple falls the past 2 days resulting with right knee pain. Patient had a fall yesterday from 1 step while walking out of the house and tripped. Per patient states that her right leg had a buckling/collapsing sensation. Patient went to [place] ED on 3/29 and had an xray which did not reveal fracture and sent home with cyclobenzaprine and Lidoderm patch. Patient last use of medication on 3/29. Patient had another fall while attempting to ambulate to the bathroom. She described that her right leg buckled under her causing her to fall. It was difficult for her to get up on her own and required the assistance of her  and son-in-law to get up. Patient denies any preceding symptoms of dizziness/lightheadedness, shortness of breath, CP, palpitations preceding the events. Endorses chronic back pain with no new characteristics. Patient has been getting around home with use of wheelchair the past 4 months. Requires assistance of her  to get around because of chronic weakness. Denies development of new numbness of lower extremities. Does not utilize any other assistive devices. Has not had PT outpatient. Patient also has had chronic LE wounds that were dressed from last discharge on 3/27. Denies changing of dressing. Denies fevers, chills, sweats. (30 Mar 2019 21:08)    ER vss, pt afebrile.  WBC 13.2 --> 9.0.  UA large LE/ (-) nit.  Ucx >100K GNR.  No acute findings on cxr.   Pt is currently on vancomycin for cellulitis.   Pt with venous stasis and several open blisters on b/l LE.      ID consult called for further abx management.        Recommend:    UTI    - pt denies increased freq/urgency/flank pain/dysuria.  (+) UA and cultures.  Pt a/w fall and leg weakness.  Will treat with  azactam (pt with allergy to augmentin, keflex)  (through 4/6).  Urine cx results noted.      - Pt seen by Urology -  planned for outpt  cystoscopy.      - Pt has been on low dose Macrobid for last 2 years for chronic suppressive therapy.  Current Ucx growing Proteus mirabilis that is now resistant to mirabilis.  No good oral options available as pt is allergic to beta-lactams, and unable to take fluroquinolone as she is also taking amiodarone.  Don't recommend bactrim due to renal insufficiency.      - Can try urinary antiseptic - hiprex 1gm po bid and vitamin C 500mg Qdaily moving forward, to reduce frequency of future UTIs.     CELLULITIS    - b/L LE venous stasis and blisters.  Low suspicion for superimposed cellulitis.  Pt with several open blisters and denuded skin, cont local wound care.  Cont vancomycin for now for possible superimposed bacterial cellulitis.  Wounds healing well, discoloration and swelling improved.    -  Monitor vanco trough, maintain between 10-15.  Complete 7 day course through 4/6.      Rt KNEE PAIN      - Pt c/o RLE weakness and knee pain, pt seen by Neurology - recs appreciated.  CT rt knee  without joint effusion or fracture, (+) subcut edema.      - Pt pending MRI knee for further evaluation.  No fever, increased warmth or erythema to suggest septic joint.        Vaginal bleeding    - GYN consulted for further eval and treatment. Pelvic and transvaginal US performed - s/o vaginal outlet obstruction.    - Pt has restarted norethindrone for postmenopausal bleeding.   Pt for possible d&c with hysteroscopy and endometrial sampling/IUD placement      d/w pt and  at bedside.         Will follow,     Christal Reinoso  [phone]

## 2019-04-07 NOTE — PROGRESS NOTE ADULT - SUBJECTIVE AND OBJECTIVE BOX
KEESHAWENDI MOJICAE    Patient is a 78y old  Female who presents with a chief complaint of fall, right knee pain (2019 10:08)    Comfortable at rest.  Only complaint is constipation.  Breathing stable.  R knee pain controlled.    Allergies    Augmentin (Swelling)  cephalexin (Swelling)    MEDICATIONS  (STANDING):  amiodarone    Tablet 200 milliGRAM(s) Oral daily  buDESOnide 160 MICROgram(s)/formoterol 4.5 MICROgram(s) Inhaler 2 Puff(s) Inhalation two times a day  carvedilol 6.25 milliGRAM(s) Oral every 12 hours  dextrose 5%. 1000 milliLiter(s) (50 mL/Hr) IV Continuous <Continuous>  dextrose 50% Injectable 12.5 Gram(s) IV Push once  dextrose 50% Injectable 25 Gram(s) IV Push once  dextrose 50% Injectable 25 Gram(s) IV Push once  docusate sodium 100 milliGRAM(s) Oral three times a day  DULoxetine 60 milliGRAM(s) Oral daily  insulin glargine Injectable (LANTUS) 42 Unit(s) SubCutaneous at bedtime  insulin lispro (HumaLOG) corrective regimen sliding scale   SubCutaneous three times a day before meals  insulin lispro (HumaLOG) corrective regimen sliding scale   SubCutaneous at bedtime  insulin lispro Injectable (HumaLOG) 15 Unit(s) SubCutaneous three times a day before meals  isosorbide   mononitrate ER Tablet (IMDUR) 30 milliGRAM(s) Oral daily  levothyroxine 188 MICROGram(s) Oral daily  mesalamine DR (24-Hour) Tablet 2.4 Gram(s) Oral daily  metolazone 2.5 milliGRAM(s) Oral <User Schedule>  norethindrone acetate 5 milliGRAM(s) Oral daily  pantoprazole    Tablet 40 milliGRAM(s) Oral before breakfast  polyethylene glycol 3350 17 Gram(s) Oral daily  senna 2 Tablet(s) Oral at bedtime  spironolactone 25 milliGRAM(s) Oral daily  tiotropium 18 MICROgram(s) Capsule 1 Capsule(s) Inhalation daily  torsemide 50 milliGRAM(s) Oral daily    MEDICATIONS  (PRN):  acetaminophen   Tablet .. 650 milliGRAM(s) Oral every 6 hours PRN Mild Pain (1 - 3), Moderate Pain (4 - 6)  dextrose 40% Gel 15 Gram(s) Oral once PRN Blood Glucose LESS THAN 70 milliGRAM(s)/deciliter  glucagon  Injectable 1 milliGRAM(s) IntraMuscular once PRN Glucose LESS THAN 70 milligrams/deciliter    PHYSICAL EXAM:  Vital Signs Last 24 Hrs  T(C): 36.8 (2019 12:14), Max: 36.8 (2019 12:14)  T(F): 98.2 (2019 12:14), Max: 98.2 (2019 12:14)  HR: 99 (2019 12:14) (99 - 101)  BP: 124/77 (2019 12:14) (124/77 - 139/79)  BP(mean): --  RR: 18 (2019 12:14) (18 - 18)  SpO2: 100% (2019 12:14) (99% - 100%)  Daily     Daily Weight in k.3 (2019 04:12)  I&O's Summary    2019 07:01  -  2019 07:00  --------------------------------------------------------  IN: 240 mL / OUT: 1200 mL / NET: -960 mL    General Appearance: 	 Alert, cooperative, no distress  Neck: no JVD  Lungs:  clear to auscultation and percussion bilaterally  Cor:  pmi 5th ICS MCL, regular rate and rhythm, S1 normal intensity, S2 normal intensity,   Abdomen:	 soft, non-tender; bowel sounds normal  Extremities: 1+ edema      EKG:  Telemetry:    Labs:  CBC Full  -  ( 2019 09:03 )  WBC Count : 10.79 K/uL  RBC Count : 3.14 M/uL  Hemoglobin : 9.4 g/dL  Hematocrit : 30.4 %  Platelet Count - Automated : 230 K/uL  Mean Cell Volume : 96.8 fl  Mean Cell Hemoglobin : 29.9 pg  Mean Cell Hemoglobin Concentration : 30.9 gm/dL  Auto Neutrophil # : x  Auto Lymphocyte # : x  Auto Monocyte # : x  Auto Eosinophil # : x  Auto Basophil # : x  Auto Neutrophil % : x  Auto Lymphocyte % : x  Auto Monocyte % : x  Auto Eosinophil % : x  Auto Basophil % : x        PT/INR - ( 2019 08:30 )   PT: 12.1 sec;   INR: 1.05 ratio         PTT - ( 2019 08:30 )  PTT:26.9 sec    Basic Metabolic Panel in AM (19 @ 07:08)    Sodium, Serum: 136 mmol/L    Potassium, Serum: 3.5 mmol/L    Chloride, Serum: 93 mmol/L    Carbon Dioxide, Serum: 31 mmol/L    Anion Gap, Serum: 12 mmol/L    Blood Urea Nitrogen, Serum: 63 mg/dL    Creatinine, Serum: 1.60 mg/dL    Glucose, Serum: 173 mg/dL    Calcium, Total Serum: 10.0 mg/dL    eGFR if Non : 31: Interpretative comment  The units for eGFR are mL/min/1.73M2 (normalized body surface area). The  eGFR is calculated from a serum creatinine using the CKD-EPI equation.  Other variables required for calculation are race, age and sex. Among  patients with chronic kidney disease (CKD), the eGFR is useful in  determining the stage of disease according to KDOQI CKD classification.  All eGFR results are reported numerically with the following  interpretation.          GFR                    With                 Without     (ml/min/1.73 m2)    Kidney Damage       Kidney Damage        >= 90                    Stage 1                     Normal        60-89                    Stage 2                     Decreased GFR        30-59     Stage 3                     Stage 3        15-29                    Stage 4                     Stage 4        < 15                      Stage 5                     Stage 5  Each stage of CKD assumes that the associated GFR level has been in  effect for at least 3 months. Determination of stages one and two (with  eGFR > 59 ml/min/m2) requires estimation of kidney damage for at least 3  months as defined by structural or functional abnormalities.  Limitations: All estimates of GFR will be less accurate for patients at  extremes of muscle mass (including but not limited to frail elderly,  critically ill, or cancer patients), those with unusual diets, and those  with conditions associated with reduced secretion or extrarenal  elimination of creatinine. The eGFR equation is not recommended for use  in patients with unstable creatinine levels. mL/min/1.73M2    eGFR if African American: 36 mL/min/1.73M2

## 2019-04-07 NOTE — PROGRESS NOTE ADULT - ATTENDING COMMENTS
79 yo F with ILD, VICKY/OHS with chronic hypercapnic and hypoxic respiratory failure on 3L NC (not on CPAP), PAD/PVD with chronic LE wounds, h/o MS s/p bioprosthetic MVR, HFpEF, paroxsmal afib (not on A/C in setting of bleeding), HTN,  DM, CKD III, anemia, post menopausal vaginal bleeding s/p D&C in July 2018 (on pathology found to have polyps, started on hormonal therapy with resolution of bleeding), UC, , morbid obesity p/w right knee pain and edema after multiple falls.  Patient denies any preceding symptoms of dizziness/lightheadedness, shortness of breath, CP, palpitations preceding the events. Endorses chronic back pain, has a history of spinal stenosis and has been using a wheelchair the past 4 months. Requires assistance of her  for ADLS    UA +, urine cultures growing Proteus and is being treated with ANTIBIOTICS     +vaginal bleeding (has not been on Progesterone since admission)    Nonsmoker, has been following with Dr. Montgomery over the past year for symptoms of progressive dyspnea, now with minimal exertion. Prior HRCT chest showed evidence of ILD - unclear if related to underlying RA vs. amiodarone.  Has been on supplemental O2 since May 2018, 2LNC around the clock.   H/o VICKY diagnosed years ago - not on therapy  Home inhaler regimen: Albuterol nebs, anoro ellipta    A/P:  Dyspnea - multifactorial - related to underlying ILD, obesity, immobility 2/2 lumbar pain, HFpEF, anemia  Chronic respiratory failure with hypoxemia and hypercapnia  VICKY/OHS - untreated  Post menopausal Vaginal bleeding  UTI  Spinal stenosis, gait instability    Rec:  1. Dyspnea - Breathing at baseline - not dyspneic at rest, saturating well with 2LNC; CT chest and CXR unchanged  c/w current inhaler regimen- Symbicort, bronchodilators. Incentive spirometer  Out of bed to chair as tolerated  PSG AS AN OUT PT    Agree with supplemental O2 24/7 - goal sats low 90s      2. Vaginal bleeding: GYN consulted for further eval and treatment. -NEED FOLLOW UP BY GYN    3. Right knee pain after fall - CT without fracture and evidence of muscle atrophy  Pain control and out of bed as tolerated; PT eval    4. UTI- antibiotics as per ID; urine cultures and cytology as per     5. Afib - cardiac optimization- rate control, BP management, Cardiology following    6. PPX - DVT ppx  7. - Pt seen by Urology -  planned for outpt  cystoscopy 79 yo F with ILD, VICKY/OHS with chronic hypercapnic and hypoxic respiratory failure on 3L NC (not on CPAP), PAD/PVD with chronic LE wounds, h/o MS s/p bioprosthetic MVR, HFpEF, paroxsmal afib (not on A/C in setting of bleeding), HTN,  DM, CKD III, anemia, post menopausal vaginal bleeding s/p D&C in July 2018 (on pathology found to have polyps, started on hormonal therapy with resolution of bleeding), UC, , morbid obesity p/w right knee pain and edema after multiple falls.  Patient denies any preceding symptoms of dizziness/lightheadedness, shortness of breath, CP, palpitations preceding the events. Endorses chronic back pain, has a history of spinal stenosis and has been using a wheelchair the past 4 months. Requires assistance of her  for ADLS    UA +, urine cultures growing Proteus and is being treated with ANTIBIOTICS     +vaginal bleeding (has not been on Progesterone since admission)    Nonsmoker, has been following with Dr. Montgomery over the past year for symptoms of progressive dyspnea, now with minimal exertion. Prior HRCT chest showed evidence of ILD - unclear if related to underlying RA vs. amiodarone.  Has been on supplemental O2 since May 2018, 2LNC around the clock.   H/o VICKY diagnosed years ago - not on therapy  Home inhaler regimen: Albuterol nebs, anoro ellipta    A/P:  Dyspnea - multifactorial - related to underlying ILD, obesity, immobility 2/2 lumbar pain, HFpEF, anemia  Chronic respiratory failure with hypoxemia and hypercapnia  VICKY/OHS - untreated  Post menopausal Vaginal bleeding  UTI  Spinal stenosis, gait instability    Rec:  1. Dyspnea - Breathing at baseline - not dyspneic at rest, saturating well with 2LNC; CT chest and CXR unchanged  c/w current inhaler regimen- Symbicort, bronchodilators. Incentive spirometer  Out of bed to chair as tolerated  PSG AS AN OUT PT    Agree with supplemental O2 24/7 - goal sats low 90s      2. Vaginal bleeding: GYN consulted for further eval and treatment. -NEED FOLLOW UP BY GYN --- ??D&C ON TUESDAY    3. Right knee pain after fall - CT without fracture and evidence of muscle atrophy  Pain control and out of bed as tolerated; -AWAITING MRI [facility] ON MONDAY    4. UTI- antibiotics as per ID; urine cultures and cytology as per     5. Afib - cardiac optimization- rate control, BP management, Cardiology following    6. PPX - DVT ppx  7. - Pt seen by Urology -  planned for outpt  cystoscopy

## 2019-04-07 NOTE — PROGRESS NOTE ADULT - SUBJECTIVE AND OBJECTIVE BOX
Infectious Diseases progress note:    Subjective: NAD, afebrile.  Rt LE immobilizer in place.  No dysuria, no hematuria.  Urinary device in place.  No leg pain, swelling improved.   at bedside.      ROS:  CONSTITUTIONAL:  No fever, chills, rigors  CARDIOVASCULAR:  No chest pain or palpitations  RESPIRATORY:   No SOB, cough, dyspnea on exertion.  No wheezing  GASTROINTESTINAL:  No abd pain, N/V, diarrhea/constipation  EXTREMITIES:  No swelling or joint pain  GENITOURINARY:  No burning on urination, increased frequency or urgency.  No flank pain  NEUROLOGIC:  No HA, visual disturbances  SKIN: No rashes    Allergies    Augmentin (Swelling)  cephalexin (Swelling)    Intolerances        ANTIBIOTICS/RELEVANT:  antimicrobials    immunologic:    OTHER:  acetaminophen   Tablet .. 650 milliGRAM(s) Oral every 6 hours PRN  amiodarone    Tablet 200 milliGRAM(s) Oral daily  buDESOnide 160 MICROgram(s)/formoterol 4.5 MICROgram(s) Inhaler 2 Puff(s) Inhalation two times a day  carvedilol 6.25 milliGRAM(s) Oral every 12 hours  dextrose 40% Gel 15 Gram(s) Oral once PRN  dextrose 5%. 1000 milliLiter(s) IV Continuous <Continuous>  dextrose 50% Injectable 12.5 Gram(s) IV Push once  dextrose 50% Injectable 25 Gram(s) IV Push once  dextrose 50% Injectable 25 Gram(s) IV Push once  docusate sodium 100 milliGRAM(s) Oral three times a day  DULoxetine 60 milliGRAM(s) Oral daily  glucagon  Injectable 1 milliGRAM(s) IntraMuscular once PRN  insulin glargine Injectable (LANTUS) 42 Unit(s) SubCutaneous at bedtime  insulin lispro (HumaLOG) corrective regimen sliding scale   SubCutaneous three times a day before meals  insulin lispro (HumaLOG) corrective regimen sliding scale   SubCutaneous at bedtime  insulin lispro Injectable (HumaLOG) 15 Unit(s) SubCutaneous three times a day before meals  isosorbide   mononitrate ER Tablet (IMDUR) 30 milliGRAM(s) Oral daily  levothyroxine 188 MICROGram(s) Oral daily  mesalamine DR (24-Hour) Tablet 2.4 Gram(s) Oral daily  metolazone 2.5 milliGRAM(s) Oral <User Schedule>  norethindrone acetate 5 milliGRAM(s) Oral daily  pantoprazole    Tablet 40 milliGRAM(s) Oral before breakfast  polyethylene glycol 3350 17 Gram(s) Oral daily  senna 2 Tablet(s) Oral at bedtime  spironolactone 25 milliGRAM(s) Oral daily  tiotropium 18 MICROgram(s) Capsule 1 Capsule(s) Inhalation daily  torsemide 50 milliGRAM(s) Oral daily      Objective:  Vital Signs Last 24 Hrs  T(C): 36.8 (07 Apr 2019 12:14), Max: 36.8 (07 Apr 2019 12:14)  T(F): 98.2 (07 Apr 2019 12:14), Max: 98.2 (07 Apr 2019 12:14)  HR: 99 (07 Apr 2019 12:14) (99 - 101)  BP: 124/77 (07 Apr 2019 12:14) (124/77 - 139/79)  BP(mean): --  RR: 18 (07 Apr 2019 12:14) (18 - 18)  SpO2: 100% (07 Apr 2019 12:14) (99% - 100%)    PHYSICAL EXAM:  Constitutional:NAD  Eyes:HANNAH, EOMI  Ear/Nose/Throat: no thrush, mucositis.  Moist mucous membranes	  Neck:no JVD, no lymphadenopathy, supple  Respiratory: CTA saad  Cardiovascular: S1S2 RRR, no murmurs  Gastrointestinal:soft, nontender,  nondistended (+) BS  Extremities:no e/e/c.  RLE immobilizer in place  Skin:  no rashes, LE erythema resolved, superficial wounds healing.  L hallux fluid filled blister        LABS:                        9.4    10.79 )-----------( 230      ( 07 Apr 2019 09:03 )             30.4     04-07    136  |  93<L>  |  63<H>  ----------------------------<  173<H>  3.5   |  31  |  1.60<H>    Ca    10.0      07 Apr 2019 07:08  Mg     2.2     04-07      PT/INR - ( 07 Apr 2019 08:30 )   PT: 12.1 sec;   INR: 1.05 ratio         PTT - ( 07 Apr 2019 08:30 )  PTT:26.9 sec            Vancomycin Level, Trough: 19.0 ug/mL (04-04 @ 20:17)              MICROBIOLOGY:    Culture - Urine (04.04.19 @ 18:59)    Specimen Source: .Urine Clean Catch (Midstream)    Culture Results:   No growth    Culture - Blood (03.30.19 @ 22:36)    Specimen Source: .Blood Blood    Culture Results:   No growth at 5 days.    Culture - Blood (03.30.19 @ 22:36)    Specimen Source: .Blood Blood    Culture Results:   No growth at 5 days.          RADIOLOGY & ADDITIONAL STUDIES:    < from: US Pelvis Complete (04.05.19 @ 12:11) >  FINDINGS:    Evaluation is markedly limited due to difficulty with patient positioning.    Uterus: 9.6 x 3.4 x 5.5 cm.    Endometrium: Markedly distended with fluid containing scattered low-level   internal echoes. No mass is seen.    Right ovary: Not visualized.    Left ovary: Not visualized.     Fluid: None.    IMPRESSION:    Severely limited evaluation. No mass is seen.    Markedly distended endometrial cavity with fluid, suggesting   cervical/vaginal outlet obstruction.    < end of copied text >        < from: US Transvaginal (04.05.19 @ 12:11) >  IMPRESSION:    Severely limited evaluation. No mass is seen.    Markedly distended endometrial cavity with fluid, suggesting   cervical/vaginal outlet obstruction.    < end of copied text >

## 2019-04-07 NOTE — PROGRESS NOTE ADULT - ASSESSMENT
79yo female with chronic diastolic CHF, bioprosthetic MVR, PAF, VICKY, PAD, CKD, DM, HLD and LSS admitted with recurrent falls. She has severe lumbar spine disease and will need SAMANTHA.  She continue with vaginal bleeding and anemia. She has been more SOB due to anemia in setting of obesity and chronic diastolic CHF.  She has been off coumadin past 4 weeks with continued bleeding.  Low K due to diuretics.  Found to have quadriceps tendon injury.    Rec:  Continue torsemide 50mg once per day.   Monitor lytes, renal function; replete K  Gyn oncology recommendations pending  PT  Ortho followup, MRI R knee Monday  SAMANTHA after above.     Russell Jimenez MD  [facility]

## 2019-04-07 NOTE — PROGRESS NOTE ADULT - SUBJECTIVE AND OBJECTIVE BOX
Chief Complaint: 77 y/o Type 2 DM, hypothyroid, recently admitted with CHF exacerbation, re-admitted with recurrent falls and susp. cellulitis after a recent fall.    On ERT for vaginal bleeding.  PO intake variable, FS high after breakfast, in the 300th, lower after other meals.  Patient afebrile, finishing antibiotics course for susp. cellulitis, WBC 10.8.      MEDICATIONS  (STANDING):  amiodarone    Tablet 200 milliGRAM(s) Oral daily  buDESOnide 160 MICROgram(s)/formoterol 4.5 MICROgram(s) Inhaler 2 Puff(s) Inhalation two times a day  carvedilol 6.25 milliGRAM(s) Oral every 12 hours  dextrose 5%. 1000 milliLiter(s) (50 mL/Hr) IV Continuous <Continuous>  dextrose 50% Injectable 12.5 Gram(s) IV Push once  dextrose 50% Injectable 25 Gram(s) IV Push once  dextrose 50% Injectable 25 Gram(s) IV Push once  docusate sodium 100 milliGRAM(s) Oral three times a day  DULoxetine 60 milliGRAM(s) Oral daily  insulin glargine Injectable (LANTUS) 42 Unit(s) SubCutaneous at bedtime  insulin lispro (HumaLOG) corrective regimen sliding scale   SubCutaneous three times a day before meals  insulin lispro (HumaLOG) corrective regimen sliding scale   SubCutaneous at bedtime  insulin lispro Injectable (HumaLOG) 15 Unit(s) SubCutaneous three times a day before meals  isosorbide   mononitrate ER Tablet (IMDUR) 30 milliGRAM(s) Oral daily  levothyroxine 188 MICROGram(s) Oral daily  mesalamine DR (24-Hour) Tablet 2.4 Gram(s) Oral daily  metolazone 2.5 milliGRAM(s) Oral <User Schedule>  norethindrone acetate 5 milliGRAM(s) Oral daily  pantoprazole    Tablet 40 milliGRAM(s) Oral before breakfast  polyethylene glycol 3350 17 Gram(s) Oral daily  senna 2 Tablet(s) Oral at bedtime  spironolactone 25 milliGRAM(s) Oral daily  tiotropium 18 MICROgram(s) Capsule 1 Capsule(s) Inhalation daily  torsemide 50 milliGRAM(s) Oral daily    MEDICATIONS  (PRN):  acetaminophen   Tablet .. 650 milliGRAM(s) Oral every 6 hours PRN Mild Pain (1 - 3), Moderate Pain (4 - 6)  bisacodyl Suppository 10 milliGRAM(s) Rectal once PRN Constipation  dextrose 40% Gel 15 Gram(s) Oral once PRN Blood Glucose LESS THAN 70 milliGRAM(s)/deciliter  glucagon  Injectable 1 milliGRAM(s) IntraMuscular once PRN Glucose LESS THAN 70 milligrams/deciliter      Allergies    Augmentin (Swelling)  cephalexin (Swelling)    Intolerances        PHYSICAL EXAM:  VITALS: T(C): 36.8 (04-07-19 @ 12:14)  T(F): 98.2 (04-07-19 @ 12:14), Max: 98.2 (04-07-19 @ 12:14)  HR: 99 (04-07-19 @ 12:14) (99 - 101)  BP: 124/77 (04-07-19 @ 12:14) (124/77 - 139/79)  RR:  (18 - 18)  SpO2:  (99% - 100%)  GENERAL: NAD,   EYES: No proptosis,  HEENT:  Atraumatic, Normocephalic,   RESPIRATORY: Clear to auscultation bilaterally  CARDIOVASCULAR: Regular rhythm; No murmurs; bilat peripheral edema  GI: Soft, nontender, non distended, normal bowel sounds  SKIN: Dry, intact, edema and blistering both feet.  MUSCULOSKELETAL: decreased strength  NEURO: No focal deficits.  Vaginal bleeding noted.  PSYCH: Alert and oriented x 3, normal affect/mood.      POCT Blood Glucose.: 339 mg/dL (04-07-19 @ 11:55)  POCT Blood Glucose.: 168 mg/dL (04-07-19 @ 07:43)  POCT Blood Glucose.: 172 mg/dL (04-06-19 @ 21:38)  POCT Blood Glucose.: 266 mg/dL (04-06-19 @ 16:48)  POCT Blood Glucose.: 375 mg/dL (04-06-19 @ 12:06)  POCT Blood Glucose.: 218 mg/dL (04-06-19 @ 08:00)  POCT Blood Glucose.: 239 mg/dL (04-05-19 @ 21:44)  POCT Blood Glucose.: 211 mg/dL (04-05-19 @ 16:41)  POCT Blood Glucose.: 333 mg/dL (04-05-19 @ 12:22)  POCT Blood Glucose.: 196 mg/dL (04-05-19 @ 08:19)  POCT Blood Glucose.: 119 mg/dL (04-04-19 @ 21:44)  POCT Blood Glucose.: 89 mg/dL (04-04-19 @ 16:48)                            9.4    10.79 )-----------( 230      ( 07 Apr 2019 09:03 )             30.4       04-07    136  |  93<L>  |  63<H>  ----------------------------<  173<H>  3.5   |  31  |  1.60<H>    EGFR if : 36<L>  EGFR if non : 31<L>    Ca    10.0      04-07  Mg     2.2     04-07        Thyroid Function Tests:  04-02 @ 09:19 TSH 9.70 FreeT4 1.5 T3 -- Anti TPO -- Anti Thyroglobulin Ab -- TSI --  03-25 @ 08:52 TSH 5.27 FreeT4 -- T3 -- Anti TPO -- Anti Thyroglobulin Ab -- TSI --      Hemoglobin A1C, Whole Blood: 7.4 % <H> [4.0 - 5.6] (03-25-19 @ 08:55)

## 2019-04-07 NOTE — PROGRESS NOTE ADULT - SUBJECTIVE AND OBJECTIVE BOX
patient without additional hematuria and afeb  completed course of abx    vss    urine clear with external device

## 2019-04-07 NOTE — PROGRESS NOTE ADULT - ASSESSMENT
79 yo F w/ hx of moderate MS s/p bioprosthetic mitral valve, severe pulmonary HTN, DM2, anemia, diastolic CHF, hypothyroidism, paroxysmal atrial fibrillation, HTN78 y/o Type 2 DM, hypothyroid, recently admitted with CHF exacerbation, re-admitted with recurrent falls and suspected UTI.    Type 2 DM insulin resistant at home on Tresiba insulin 60 units daily, and humalog 30 units before meals. Other treatment includes Bydureon 2 mg weekly. HbA1c during recent admission was 7.4 %. During psast admission noted with significantly reduced insulin requirenents, discharged on Lantus insulin 24 units at HS and Humalog 5 units per meal.  Patient re-admitted after recurrent falls at home, and susp. UTI, started on similar dose insulin, noted with hyperglycemia.     Hypothyroidism- treated with synthroid 175 mcg daily. Last test as outpatient recently reported Good, Here with mild elevated TSH 5.27. repeat TSH this admission  9.70 uIU/mL, FT4 1.5.  C/O fatigue, weight stable till recently. Says she is compliant with medications. Synthroid increased to 188 mcg daily.    Patient on very high amounts of insulin at home. Decreased requirements last admission are most probably related to CHF, and as it resolves expect insulin requirements sanna increase.  On ERT for vaginal bleeding.  PO intake variable, FS high after breakfast, in the 300th, lower after other meals.  Patient afebrile, finishing antibiotics course for susp. cellulitis, WBC 10.8.    Will increase the basal insulin dose to 44 units as am FS are still high, will only increase pre-breakfast humalog to 20 units keep lunch and dinner at 15 units.      Suggest:  Increase Lantus insulin 44 units at HS.  Humalog insulin 20+15+15 units per meal.  Mid scale humalog.  Synthroid 188 mcg daily, repeat TFT's in 3 weeks.

## 2019-04-07 NOTE — PROGRESS NOTE ADULT - ASSESSMENT
stable with resolved hematuria and ? resolved uti    consider suppression therapy for uti  ct urogram  (cre down to 1.6)    outpatient [facility] for cysto and urine studies.

## 2019-04-07 NOTE — PROGRESS NOTE ADULT - SUBJECTIVE AND OBJECTIVE BOX
Patient is a 78y old  Female who presents with a chief complaint of fall, right knee pain--history suggestive of VICKY/OHS [UNTREATED] , ON CHRONIC O2, A FIB, -H/O ABNORMAL UTERINE BLEEDING      Interval Events: AWAITING gGYN follow up---  - Pt seen by Urology -  planned for outpt  cystoscopy      ROS:  CONSTITUTIONAL:  No fever, chills, rigors  CARDIOVASCULAR:  No chest pain or palpitations  RESPIRATORY:   No SOB, cough, dyspnea on exertion.  No wheezing  GASTROINTESTINAL:  No abd pain, N/V, diarrhea/constipation  EXTREMITIES:  No swelling or joint pain  GENITOURINARY:  No burning on urination, increased frequency or urgency.  No flank pain  NEUROLOGIC:  No HA, visual disturbances  SKIN: No rashes    Allergies    Augmentin (Swelling)  cephalexin (Swelling)    Intolerances            PHYSICAL EXAM:  General: NAD  HEENT: NC/AT  Neck: supple  Respiratory:  CTA b/l, no wheezes, crackles or rhonchi  Cardiovascular:  RRR, no m/r/g  Abdomen: soft, NT/ND, +BS  Extremities: no clubbing, cyanosis or edema, warm  Skin: no rash  Neurological: AAOx3, non focal exam  Psychiatry: not anxious appearing, normal affect and mood    OBJECTIVE:  ICU Vital Signs Last 24 Hrs  T(C): 36.8 (07 Apr 2019 12:14), Max: 36.8 (07 Apr 2019 12:14)  T(F): 98.2 (07 Apr 2019 12:14), Max: 98.2 (07 Apr 2019 12:14)  HR: 99 (07 Apr 2019 12:14) (99 - 101)  BP: 124/77 (07 Apr 2019 12:14) (124/77 - 139/79)  BP(mean): --  ABP: --  ABP(mean): --  RR: 18 (07 Apr 2019 12:14) (18 - 18)  SpO2: 100% (07 Apr 2019 12:14) (99% - 100%)        04-06 @ 07:01  -  04-07 @ 07:00  --------------------------------------------------------  IN: 240 mL / OUT: 1200 mL / NET: -960 mL    04-07 @ 07:01  -  04-07 @ 19:45  --------------------------------------------------------  IN: 240 mL / OUT: 1000 mL / NET: -760 mL      CAPILLARY BLOOD GLUCOSE      POCT Blood Glucose.: 239 mg/dL (07 Apr 2019 17:25)      PHYSICAL EXAM:  General:   HEENT:   Lymph Nodes:  Neck:   Respiratory:   Cardiovascular:   Abdomen:   Extremities:   Skin:   Neurological:  Psychiatry:    HOSPITAL MEDICATIONS:  MEDICATIONS  (STANDING):  amiodarone    Tablet 200 milliGRAM(s) Oral daily  buDESOnide 160 MICROgram(s)/formoterol 4.5 MICROgram(s) Inhaler 2 Puff(s) Inhalation two times a day  carvedilol 6.25 milliGRAM(s) Oral every 12 hours  dextrose 5%. 1000 milliLiter(s) (50 mL/Hr) IV Continuous <Continuous>  dextrose 50% Injectable 12.5 Gram(s) IV Push once  dextrose 50% Injectable 25 Gram(s) IV Push once  dextrose 50% Injectable 25 Gram(s) IV Push once  docusate sodium 100 milliGRAM(s) Oral three times a day  DULoxetine 60 milliGRAM(s) Oral daily  insulin glargine Injectable (LANTUS) 44 Unit(s) SubCutaneous at bedtime  insulin lispro (HumaLOG) corrective regimen sliding scale   SubCutaneous three times a day before meals  insulin lispro (HumaLOG) corrective regimen sliding scale   SubCutaneous at bedtime  insulin lispro Injectable (HumaLOG) 15 Unit(s) SubCutaneous before dinner  insulin lispro Injectable (HumaLOG) 20 Unit(s) SubCutaneous before breakfast  insulin lispro Injectable (HumaLOG) 15 Unit(s) SubCutaneous before lunch  isosorbide   mononitrate ER Tablet (IMDUR) 30 milliGRAM(s) Oral daily  levothyroxine 188 MICROGram(s) Oral daily  mesalamine DR (24-Hour) Tablet 2.4 Gram(s) Oral daily  metolazone 2.5 milliGRAM(s) Oral <User Schedule>  norethindrone acetate 5 milliGRAM(s) Oral daily  pantoprazole    Tablet 40 milliGRAM(s) Oral before breakfast  polyethylene glycol 3350 17 Gram(s) Oral daily  senna 2 Tablet(s) Oral at bedtime  spironolactone 25 milliGRAM(s) Oral daily  tiotropium 18 MICROgram(s) Capsule 1 Capsule(s) Inhalation daily  torsemide 50 milliGRAM(s) Oral daily    MEDICATIONS  (PRN):  acetaminophen   Tablet .. 650 milliGRAM(s) Oral every 6 hours PRN Mild Pain (1 - 3), Moderate Pain (4 - 6)  bisacodyl Suppository 10 milliGRAM(s) Rectal once PRN Constipation  dextrose 40% Gel 15 Gram(s) Oral once PRN Blood Glucose LESS THAN 70 milliGRAM(s)/deciliter  glucagon  Injectable 1 milliGRAM(s) IntraMuscular once PRN Glucose LESS THAN 70 milligrams/deciliter      LABS:                        9.4    10.79 )-----------( 230      ( 07 Apr 2019 09:03 )             30.4     04-07    136  |  93<L>  |  63<H>  ----------------------------<  173<H>  3.5   |  31  |  1.60<H>    Ca    10.0      07 Apr 2019 07:08  Mg     2.2     04-07      PT/INR - ( 07 Apr 2019 08:30 )   PT: 12.1 sec;   INR: 1.05 ratio         PTT - ( 07 Apr 2019 08:30 )  PTT:26.9 sec          MICROBIOLOGY:     RADIOLOGY:  [ ] Reviewed and interpreted by me    PULMONARY FUNCTION TESTS:    EKG: Patient is a 78y old  Female who presents with a chief complaint of fall, right knee pain--history suggestive of VICKY/OHS [UNTREATED] , ON CHRONIC O2, A FIB, -H/O ABNORMAL UTERINE BLEEDING      Interval Events: AWAITING gGYN follow up--- awaiting MRI Knee  - Pt seen by Urology -  planned for outpt  cystoscopy      ROS:  CONSTITUTIONAL:  No fever, chills, rigors  CARDIOVASCULAR:  No chest pain or palpitations  RESPIRATORY:   No SOB, cough, dyspnea on exertion.  No wheezing  GASTROINTESTINAL:  No abd pain, N/V, diarrhea/constipation  EXTREMITIES:  No swelling or joint pain  GENITOURINARY:  No burning on urination, increased frequency or urgency.  No flank pain  NEUROLOGIC:  No HA, visual disturbances  SKIN: No rashes    Allergies    Augmentin (Swelling)  cephalexin (Swelling)    Intolerances            PHYSICAL EXAM:  General: NAD  HEENT: NC/AT  Neck: supple  Respiratory:  CTA b/l, no wheezes, crackles or rhonchi  Cardiovascular:  RRR, no m/r/g  Abdomen: soft, NT/ND, +BS  Extremities: no clubbing, cyanosis or edema, warm  Skin: no rash  Neurological: AAOx3, non focal exam  Psychiatry: not anxious appearing, normal affect and mood    OBJECTIVE:  ICU Vital Signs Last 24 Hrs  T(C): 36.8 (07 Apr 2019 12:14), Max: 36.8 (07 Apr 2019 12:14)  T(F): 98.2 (07 Apr 2019 12:14), Max: 98.2 (07 Apr 2019 12:14)  HR: 99 (07 Apr 2019 12:14) (99 - 101)  BP: 124/77 (07 Apr 2019 12:14) (124/77 - 139/79)  BP(mean): --  ABP: --  ABP(mean): --  RR: 18 (07 Apr 2019 12:14) (18 - 18)  SpO2: 100% (07 Apr 2019 12:14) (99% - 100%)        04-06 @ 07:01  -  04-07 @ 07:00  --------------------------------------------------------  IN: 240 mL / OUT: 1200 mL / NET: -960 mL    04-07 @ 07:01 - 04-07 @ 19:45  --------------------------------------------------------  IN: 240 mL / OUT: 1000 mL / NET: -760 mL      CAPILLARY BLOOD GLUCOSE      POCT Blood Glucose.: 239 mg/dL (07 Apr 2019 17:25)      PHYSICAL EXAM:  General:   HEENT:   Lymph Nodes:  Neck:   Respiratory:   Cardiovascular:   Abdomen:   Extremities:   Skin:   Neurological:  Psychiatry:    HOSPITAL MEDICATIONS:  MEDICATIONS  (STANDING):  amiodarone    Tablet 200 milliGRAM(s) Oral daily  buDESOnide 160 MICROgram(s)/formoterol 4.5 MICROgram(s) Inhaler 2 Puff(s) Inhalation two times a day  carvedilol 6.25 milliGRAM(s) Oral every 12 hours  dextrose 5%. 1000 milliLiter(s) (50 mL/Hr) IV Continuous <Continuous>  dextrose 50% Injectable 12.5 Gram(s) IV Push once  dextrose 50% Injectable 25 Gram(s) IV Push once  dextrose 50% Injectable 25 Gram(s) IV Push once  docusate sodium 100 milliGRAM(s) Oral three times a day  DULoxetine 60 milliGRAM(s) Oral daily  insulin glargine Injectable (LANTUS) 44 Unit(s) SubCutaneous at bedtime  insulin lispro (HumaLOG) corrective regimen sliding scale   SubCutaneous three times a day before meals  insulin lispro (HumaLOG) corrective regimen sliding scale   SubCutaneous at bedtime  insulin lispro Injectable (HumaLOG) 15 Unit(s) SubCutaneous before dinner  insulin lispro Injectable (HumaLOG) 20 Unit(s) SubCutaneous before breakfast  insulin lispro Injectable (HumaLOG) 15 Unit(s) SubCutaneous before lunch  isosorbide   mononitrate ER Tablet (IMDUR) 30 milliGRAM(s) Oral daily  levothyroxine 188 MICROGram(s) Oral daily  mesalamine DR (24-Hour) Tablet 2.4 Gram(s) Oral daily  metolazone 2.5 milliGRAM(s) Oral <User Schedule>  norethindrone acetate 5 milliGRAM(s) Oral daily  pantoprazole    Tablet 40 milliGRAM(s) Oral before breakfast  polyethylene glycol 3350 17 Gram(s) Oral daily  senna 2 Tablet(s) Oral at bedtime  spironolactone 25 milliGRAM(s) Oral daily  tiotropium 18 MICROgram(s) Capsule 1 Capsule(s) Inhalation daily  torsemide 50 milliGRAM(s) Oral daily    MEDICATIONS  (PRN):  acetaminophen   Tablet .. 650 milliGRAM(s) Oral every 6 hours PRN Mild Pain (1 - 3), Moderate Pain (4 - 6)  bisacodyl Suppository 10 milliGRAM(s) Rectal once PRN Constipation  dextrose 40% Gel 15 Gram(s) Oral once PRN Blood Glucose LESS THAN 70 milliGRAM(s)/deciliter  glucagon  Injectable 1 milliGRAM(s) IntraMuscular once PRN Glucose LESS THAN 70 milligrams/deciliter      LABS:                        9.4    10.79 )-----------( 230      ( 07 Apr 2019 09:03 )             30.4     04-07    136  |  93<L>  |  63<H>  ----------------------------<  173<H>  3.5   |  31  |  1.60<H>    Ca    10.0      07 Apr 2019 07:08  Mg     2.2     04-07      PT/INR - ( 07 Apr 2019 08:30 )   PT: 12.1 sec;   INR: 1.05 ratio         PTT - ( 07 Apr 2019 08:30 )  PTT:26.9 sec          MICROBIOLOGY:     RADIOLOGY:  [ ] Reviewed and interpreted by me    PULMONARY FUNCTION TESTS:    EKG:

## 2019-04-08 LAB
GLUCOSE BLDC GLUCOMTR-MCNC: 168 MG/DL — HIGH (ref 70–99)
GLUCOSE BLDC GLUCOMTR-MCNC: 183 MG/DL — HIGH (ref 70–99)
GLUCOSE BLDC GLUCOMTR-MCNC: 222 MG/DL — HIGH (ref 70–99)
GLUCOSE BLDC GLUCOMTR-MCNC: 273 MG/DL — HIGH (ref 70–99)
HCT VFR BLD CALC: 28.9 % — LOW (ref 34.5–45)
HGB BLD-MCNC: 9.2 G/DL — LOW (ref 11.5–15.5)
MCHC RBC-ENTMCNC: 30.8 PG — SIGNIFICANT CHANGE UP (ref 27–34)
MCHC RBC-ENTMCNC: 31.8 GM/DL — LOW (ref 32–36)
MCV RBC AUTO: 96.7 FL — SIGNIFICANT CHANGE UP (ref 80–100)
PLATELET # BLD AUTO: 208 K/UL — SIGNIFICANT CHANGE UP (ref 150–400)
RBC # BLD: 2.99 M/UL — LOW (ref 3.8–5.2)
RBC # FLD: 16.4 % — HIGH (ref 10.3–14.5)
WBC # BLD: 11.11 K/UL — HIGH (ref 3.8–10.5)
WBC # FLD AUTO: 11.11 K/UL — HIGH (ref 3.8–10.5)

## 2019-04-08 PROCEDURE — 73721 MRI JNT OF LWR EXTRE W/O DYE: CPT | Mod: 26,RT

## 2019-04-08 PROCEDURE — 99233 SBSQ HOSP IP/OBS HIGH 50: CPT

## 2019-04-08 RX ORDER — ALPRAZOLAM 0.25 MG
0.5 TABLET ORAL ONCE
Qty: 0 | Refills: 0 | Status: DISCONTINUED | OUTPATIENT
Start: 2019-04-08 | End: 2019-04-08

## 2019-04-08 RX ADMIN — Medication 0.5 MILLIGRAM(S): at 12:50

## 2019-04-08 RX ADMIN — INSULIN GLARGINE 44 UNIT(S): 100 INJECTION, SOLUTION SUBCUTANEOUS at 22:07

## 2019-04-08 RX ADMIN — Medication 100 MILLIGRAM(S): at 22:07

## 2019-04-08 RX ADMIN — Medication 15 UNIT(S): at 17:30

## 2019-04-08 RX ADMIN — Medication 2.4 GRAM(S): at 12:17

## 2019-04-08 RX ADMIN — Medication 100 MILLIGRAM(S): at 12:17

## 2019-04-08 RX ADMIN — SENNA PLUS 2 TABLET(S): 8.6 TABLET ORAL at 22:07

## 2019-04-08 RX ADMIN — DULOXETINE HYDROCHLORIDE 60 MILLIGRAM(S): 30 CAPSULE, DELAYED RELEASE ORAL at 12:17

## 2019-04-08 RX ADMIN — TIOTROPIUM BROMIDE 1 CAPSULE(S): 18 CAPSULE ORAL; RESPIRATORY (INHALATION) at 12:17

## 2019-04-08 RX ADMIN — BUDESONIDE AND FORMOTEROL FUMARATE DIHYDRATE 2 PUFF(S): 160; 4.5 AEROSOL RESPIRATORY (INHALATION) at 07:23

## 2019-04-08 RX ADMIN — CARVEDILOL PHOSPHATE 6.25 MILLIGRAM(S): 80 CAPSULE, EXTENDED RELEASE ORAL at 17:30

## 2019-04-08 RX ADMIN — NORETHINDRONE 5 MILLIGRAM(S): 0.35 TABLET ORAL at 12:17

## 2019-04-08 RX ADMIN — Medication 15 UNIT(S): at 12:17

## 2019-04-08 RX ADMIN — Medication 2: at 08:10

## 2019-04-08 RX ADMIN — Medication 6: at 12:17

## 2019-04-08 RX ADMIN — ISOSORBIDE MONONITRATE 30 MILLIGRAM(S): 60 TABLET, EXTENDED RELEASE ORAL at 12:17

## 2019-04-08 RX ADMIN — Medication 2: at 17:30

## 2019-04-08 RX ADMIN — BUDESONIDE AND FORMOTEROL FUMARATE DIHYDRATE 2 PUFF(S): 160; 4.5 AEROSOL RESPIRATORY (INHALATION) at 17:30

## 2019-04-08 NOTE — PROGRESS NOTE ADULT - ASSESSMENT
Case was discussed with GYN ONC per my note last week and recommendation was for repeat imaging and endometrial sampling to assess for uterine malignancy. Again patient is a poor surgical candidate for hysterectomy (which GYN ONC agrees with) and if pathology on repeat sampling is benign, she should cont to be managed medically with norethindrone which does seem to be working when she takes it. Patient is scheduled for diagnostic hysteroscopy D&C with possible Mirena IUD placement tomorrow at 11:30.     I have spent over 20 minutes discussing this with the patient and her  today and they are both in agreement with the plan. I have discussed plan with anesthesia today as well and plan will be to do regional anesthesia if possible, otherwise will need to be intubated.     NPO after midnight for surgery tomorrow.

## 2019-04-08 NOTE — PROGRESS NOTE ADULT - SUBJECTIVE AND OBJECTIVE BOX
No acute events overnight. Pain well controlled with pain medications. Patient NPO for MRI.    Vital Signs Last 24 Hrs  T(C): 36.7 (07 Apr 2019 20:47), Max: 36.8 (07 Apr 2019 12:14)  T(F): 98 (07 Apr 2019 20:47), Max: 98.2 (07 Apr 2019 12:14)  HR: 99 (07 Apr 2019 20:47) (99 - 99)  BP: 145/82 (07 Apr 2019 20:47) (124/77 - 145/82)  BP(mean): --  RR: 18 (07 Apr 2019 20:47) (18 - 18)  SpO2: 97% (07 Apr 2019 20:47) (97% - 100%)    Exam:  Gen: NAD, Palpable quadriceps defect  Motor: EHL/FHL/TA/Gastrocnemius intact, unable to SLR  Sensory: SILT DP/SP/S/S/T nerve distributions  Chronic b/l LE cellulitis with dressings    A/P: 78 year old female with R Quad tendon tear  - Pain Control  - NPO for MRI  - MRI today  - NICANOR  - [facility]

## 2019-04-08 NOTE — PROGRESS NOTE ADULT - SUBJECTIVE AND OBJECTIVE BOX
GYN Attending Note    Patient known to me for the past year w recurrent postmenopausal bleeding a few times a year for the past 20 years. She had D&C last year under regional anesthesia which was benign but continued t have intermittent bleeding. She was started on norethindrone 5 mg daily last month which was stopped when she was admitted to the hospital the last 2 times for other reasons. Since stopping norethindrone bleeding resumed, however medicine team has since restarted on Norethindrone on 4/4 and bleeding has again stopped.

## 2019-04-08 NOTE — PROGRESS NOTE ADULT - SUBJECTIVE AND OBJECTIVE BOX
[facility]      Seen earlier today, and examined.  - Today, patient is without complaints.           *****MEDICATIONS: Current medication reviewed and documented.    MEDICATIONS  (STANDING):  amiodarone    Tablet 200 milliGRAM(s) Oral daily  buDESOnide 160 MICROgram(s)/formoterol 4.5 MICROgram(s) Inhaler 2 Puff(s) Inhalation two times a day  carvedilol 6.25 milliGRAM(s) Oral every 12 hours  dextrose 5%. 1000 milliLiter(s) (50 mL/Hr) IV Continuous <Continuous>  dextrose 50% Injectable 12.5 Gram(s) IV Push once  dextrose 50% Injectable 25 Gram(s) IV Push once  dextrose 50% Injectable 25 Gram(s) IV Push once  docusate sodium 100 milliGRAM(s) Oral three times a day  DULoxetine 60 milliGRAM(s) Oral daily  insulin glargine Injectable (LANTUS) 44 Unit(s) SubCutaneous at bedtime  insulin lispro (HumaLOG) corrective regimen sliding scale   SubCutaneous three times a day before meals  insulin lispro (HumaLOG) corrective regimen sliding scale   SubCutaneous at bedtime  insulin lispro Injectable (HumaLOG) 15 Unit(s) SubCutaneous before dinner  insulin lispro Injectable (HumaLOG) 20 Unit(s) SubCutaneous before breakfast  insulin lispro Injectable (HumaLOG) 15 Unit(s) SubCutaneous before lunch  isosorbide   mononitrate ER Tablet (IMDUR) 30 milliGRAM(s) Oral daily  levothyroxine 188 MICROGram(s) Oral daily  mesalamine DR (24-Hour) Tablet 2.4 Gram(s) Oral daily  metolazone 2.5 milliGRAM(s) Oral <User Schedule>  norethindrone acetate 5 milliGRAM(s) Oral daily  pantoprazole    Tablet 40 milliGRAM(s) Oral before breakfast  polyethylene glycol 3350 17 Gram(s) Oral daily  senna 2 Tablet(s) Oral at bedtime  spironolactone 25 milliGRAM(s) Oral daily  tiotropium 18 MICROgram(s) Capsule 1 Capsule(s) Inhalation daily  torsemide 50 milliGRAM(s) Oral daily    MEDICATIONS  (PRN):  acetaminophen   Tablet .. 650 milliGRAM(s) Oral every 6 hours PRN Mild Pain (1 - 3), Moderate Pain (4 - 6)  bisacodyl Suppository 10 milliGRAM(s) Rectal once PRN Constipation  dextrose 40% Gel 15 Gram(s) Oral once PRN Blood Glucose LESS THAN 70 milliGRAM(s)/deciliter  glucagon  Injectable 1 milliGRAM(s) IntraMuscular once PRN Glucose LESS THAN 70 milligrams/deciliter          ***** VITAL SIGNS:  T(F): 98.5 (19 @ 11:44), Max: 98.5 (19 @ 11:44)  HR: 95 (19 @ 11:44) (95 - 100)  BP: 141/82 (19 @ 11:44) (141/82 - 151/95)  RR: 18 (19 @ 11:44) (18 - 18)  SpO2: 100% (19 @ 11:44) (97% - 100%)  Wt(kg): --  ,   I&O's Summary    2019 07:  -  2019 07:00  --------------------------------------------------------  IN: 240 mL / OUT: 1000 mL / NET: -760 mL    2019 07:  -  2019 14:17  --------------------------------------------------------  IN: 0 mL / OUT: 250 mL / NET: -250 mL             *****PHYSICAL EXAM:  alert oriented x 3 attention comprehension are fair.  Able to name, repeat.   EOmi fundi not visualized   no nystagmus VFF to confrontation  Tongue is midline  Palate elevates symmetrically   Moving all 4 ext spontaneously  except limited mvt of rle due to immobilizer   Gait not assessed.            *****LAB AND IMAGIN.2     )-----------( 208      ( 2019 08:54 )             28.9               04-07    136  |  93<L>  |  63<H>  ----------------------------<  173<H>  3.5   |  31  |  1.60<H>    Ca    10.0      2019 07:08  Mg     2.2     04-07      PT/INR - ( 2019 08:30 )   PT: 12.1 sec;   INR: 1.05 ratio         PTT - ( 2019 08:30 )  PTT:26.9 sec                     [All pertinent recent Imaging/Reports reviewed]           *****A S S E S S M E N T   A N D   P L A N :      77 yo woman w/ hx of moderate MS s/p bioprosthetic mitral valve, diastolic CHF, paroxsmal afib (not on A/C in setting of bleeding), HTN, severe pulmonary HTN, DMT2, CKD III, anemia, with post menopausal vaginal bleeding, UC, VICKY on 3L NC (not on CPAP/BIPAP) presents from home in setting of multiple falls the past 2 days resulting with right knee pain. Patient had a fall yesterday from 1 step while walking out of the house and tripped. Per patient states that her right leg had a buckling/collapsing sensation. Patient went to [place] ED on 3/29 and had an xray which did not reveal fracture and sent home with cyclobenzaprine and Lidoderm patch. Patient last use of medication on 3/29. Patient had another fall while attempting to ambulate to the bathroom. She described that her right leg buckled under her causing her to fall. It was difficult for her to get up on her own and required the assistance of her  and son-in-law to get up. Patient denies any preceding symptoms of dizziness/lightheadedness, shortness of breath, CP, palpitations preceding the events. Endorses chronic back pain with no new characteristics. Patient has been getting around home with use of wheelchair the past 4 months. Requires assistance of her  to get around because of chronic weakness. Denies development of new numbness of lower extremities. Does not utilize any other assistive devices. Has not had PT outpatient. Patient also has had chronic LE wounds that were dressed from last discharge on 3/27. Denies changing of dressing. Denies fevers, chills, sweats.    pt with hx of spinal stenosis recently has been using a wheelchair for 2 weeks, then got up and fell taken to [facility], discharged then fell again at home and was brought to [facility].       Problem/Recommendations 1: R knee  pain of unclear etiology    initially refused mri of the knee due to severe claustrophobia  ortho recommending mri knee, monday for mri under anesthesia ,        Problem/Recommendations 2: spinal stenosis with symptoms of R leg buckling likely due to spinal stenosis +/- deconditioning   no sign of radiculopathy   pt unable to tolerate the mri due to claustrophobia.   evidence of multi level degenerative changes, with moderate spinal stenosis form l1- l5   according to niece at bedside, pt has been wheelchair bound atleast 1 year, with progressive decline in clincial status.    Thank you for allowing me to participate in the care of this patient. Please do not hesitate to call me if you have any  questions.        ________________  Simran Mattson MD  [facility] ([facility]  [phone]      30 minutes spent on total encounter; more than 50 % of the visit was  spent counseling about plan of care, compliance to diet/exercise and medication regimen and or  coordinating care by the attending physician.      It is advised that s stroke patients follow up with BAL De Jesus @ [phone] in 1- 2 weeks.   Others please follow up with Dr. Michael Nissenbaum [phone]

## 2019-04-08 NOTE — PROGRESS NOTE ADULT - SUBJECTIVE AND OBJECTIVE BOX
Patient is a 78y old  Female who presents with a chief complaint of fall, right knee pain (08 Apr 2019 13:59)      SUBJECTIVE / OVERNIGHT EVENTS:    MEDICATIONS  (STANDING):  amiodarone    Tablet 200 milliGRAM(s) Oral daily  buDESOnide 160 MICROgram(s)/formoterol 4.5 MICROgram(s) Inhaler 2 Puff(s) Inhalation two times a day  carvedilol 6.25 milliGRAM(s) Oral every 12 hours  dextrose 5%. 1000 milliLiter(s) (50 mL/Hr) IV Continuous <Continuous>  dextrose 50% Injectable 12.5 Gram(s) IV Push once  dextrose 50% Injectable 25 Gram(s) IV Push once  dextrose 50% Injectable 25 Gram(s) IV Push once  docusate sodium 100 milliGRAM(s) Oral three times a day  DULoxetine 60 milliGRAM(s) Oral daily  insulin glargine Injectable (LANTUS) 44 Unit(s) SubCutaneous at bedtime  insulin lispro (HumaLOG) corrective regimen sliding scale   SubCutaneous three times a day before meals  insulin lispro (HumaLOG) corrective regimen sliding scale   SubCutaneous at bedtime  insulin lispro Injectable (HumaLOG) 15 Unit(s) SubCutaneous before dinner  insulin lispro Injectable (HumaLOG) 20 Unit(s) SubCutaneous before breakfast  insulin lispro Injectable (HumaLOG) 15 Unit(s) SubCutaneous before lunch  isosorbide   mononitrate ER Tablet (IMDUR) 30 milliGRAM(s) Oral daily  levothyroxine 188 MICROGram(s) Oral daily  mesalamine DR (24-Hour) Tablet 2.4 Gram(s) Oral daily  metolazone 2.5 milliGRAM(s) Oral <User Schedule>  norethindrone acetate 5 milliGRAM(s) Oral daily  pantoprazole    Tablet 40 milliGRAM(s) Oral before breakfast  polyethylene glycol 3350 17 Gram(s) Oral daily  senna 2 Tablet(s) Oral at bedtime  spironolactone 25 milliGRAM(s) Oral daily  tiotropium 18 MICROgram(s) Capsule 1 Capsule(s) Inhalation daily  torsemide 50 milliGRAM(s) Oral daily    MEDICATIONS  (PRN):  acetaminophen   Tablet .. 650 milliGRAM(s) Oral every 6 hours PRN Mild Pain (1 - 3), Moderate Pain (4 - 6)  bisacodyl Suppository 10 milliGRAM(s) Rectal once PRN Constipation  dextrose 40% Gel 15 Gram(s) Oral once PRN Blood Glucose LESS THAN 70 milliGRAM(s)/deciliter  glucagon  Injectable 1 milliGRAM(s) IntraMuscular once PRN Glucose LESS THAN 70 milligrams/deciliter      Vital Signs Last 24 Hrs  T(F): 98.5 (04-08-19 @ 11:44), Max: 98.5 (04-08-19 @ 11:44)  HR: 95 (04-08-19 @ 11:44) (95 - 100)  BP: 141/82 (04-08-19 @ 11:44) (141/82 - 151/95)  RR: 18 (04-08-19 @ 11:44) (18 - 18)  SpO2: 100% (04-08-19 @ 11:44) (97% - 100%)  Telemetry:   CAPILLARY BLOOD GLUCOSE      POCT Blood Glucose.: 273 mg/dL (08 Apr 2019 12:06)  POCT Blood Glucose.: 168 mg/dL (08 Apr 2019 07:32)  POCT Blood Glucose.: 224 mg/dL (07 Apr 2019 21:43)  POCT Blood Glucose.: 239 mg/dL (07 Apr 2019 17:25)    I&O's Summary    07 Apr 2019 07:01  -  08 Apr 2019 07:00  --------------------------------------------------------  IN: 240 mL / OUT: 1000 mL / NET: -760 mL    08 Apr 2019 07:01  -  08 Apr 2019 16:28  --------------------------------------------------------  IN: 0 mL / OUT: 250 mL / NET: -250 mL        PHYSICAL EXAM:  GENERAL: NAD, well-developed  HEAD:  Atraumatic, Normocephalic  EYES: EOMI, PERRLA, conjunctiva and sclera clear  NECK: Supple, No JVD  CHEST/LUNG: Clear to auscultation bilaterally; No wheeze  HEART: Regular rate and rhythm; No murmurs, rubs, or gallops  ABDOMEN: Soft, Nontender, Nondistended; Bowel sounds present  EXTREMITIES:  2+ Peripheral Pulses, No clubbing, cyanosis, or edema  PSYCH: AAOx3  NEUROLOGY: non-focal  SKIN: No rashes or lesions    LABS:                        9.2    11.11 )-----------( 208      ( 08 Apr 2019 08:54 )             28.9     04-07    136  |  93<L>  |  63<H>  ----------------------------<  173<H>  3.5   |  31  |  1.60<H>    Ca    10.0      07 Apr 2019 07:08  Mg     2.2     04-07      PT/INR - ( 07 Apr 2019 08:30 )   PT: 12.1 sec;   INR: 1.05 ratio         PTT - ( 07 Apr 2019 08:30 )  PTT:26.9 sec          RADIOLOGY & ADDITIONAL TESTS:    Imaging Personally Reviewed:    Consultant(s) Notes Reviewed:      Care Discussed with Consultants/Other Providers:

## 2019-04-08 NOTE — PROGRESS NOTE ADULT - ASSESSMENT
79yo female with chronic diastolic CHF, bioprosthetic MVR, PAF, VICKY, PAD, CKD, DM, HLD and LSS admitted with recurrent falls. She has severe lumbar spine disease and will need SAMANTHA.  She continue with vaginal bleeding and anemia. She has been more SOB due to anemia in setting of obesity and chronic diastolic CHF.  She has been off coumadin past 4 weeks with continued bleeding.  Low K due to diuretics.  Found to have quadriceps tendon injury.    Patient is optimized and stable from cardiac standpoint and is acceptable risk to proceed with low risk D & C for persistent vaginal bleeding.    Rec:  Continue torsemide 50mg once per day.   Monitor lytes, renal function; replete K  Gyn oncology recommendations pending  PT  Ortho followup, MRI R knee tomorrow  SAMANTHA after above.     Russell Jimenez MD  [facility]

## 2019-04-08 NOTE — PRE-ANESTHESIA EVALUATION ADULT - NSANTHADDINFOFT_GEN_ALL_CORE
spinal as first option. pt reports it being a difficult spinal. will try to avoid intubation and the vent

## 2019-04-08 NOTE — PROGRESS NOTE ADULT - ASSESSMENT
77 yo F with ILD, VICKY/OHS with chronic hypercapnic and hypoxic respiratory failure on 3L NC (not on CPAP), PAD/PVD with chronic LE wounds, h/o MS s/p bioprosthetic MVR, HFpEF, paroxsmal afib (not on A/C in setting of bleeding), HTN,  DM, CKD III, anemia, post menopausal vaginal bleeding s/p D&C in July 2018 (on pathology found to have polyps, started on hormonal therapy with resolution of bleeding), UC, , morbid obesity p/w right knee pain and edema after multiple falls.    1. Chronic resp failure with hypoxia and hypercapnia/ VICKY/OHS/ ILD  - Resp status appears at baseline denies any acute worsening of chronic SOB/ dry cough   - SOB is multifactorial - related to underlying ILD, obesity, immobility 2/2 lumbar pain, HFpEF, anemia  - CXR / Ct chest unchanged   - Cont with Symbicort BID, Spiriva daily  - VICKY untreated - can consider empiric CPAP trial if she is agreeable ( start at 10-12 cm H2O). But she will require a sleep study with CPAP titration as an outpt  - Chest PT/ acapella Q6H  - Incentive spirometry  - Supplemental O2 to keep sats >90 %    2. Vaginal bleeding:  - Plan for diagnostic hysteroscopy per GYN  - Would suggest use of bronchodilators and NIV in the elmo-operative period     3. R knee pain:  - X-ray negative.   - Planned for MRI under sedation  - Suggest the use of NIV until resp status stable post sedation.   - If any change in mental status would then check stat ABG     4. Gen:  - Dr. Montgomery will follow her during hospitalization 79 yo F with ILD, VICKY/OHS with chronic hypercapnic and hypoxic respiratory failure on 3L NC (not on CPAP), PAD/PVD with chronic LE wounds, h/o MS s/p bioprosthetic MVR, HFpEF, paroxsmal afib (not on A/C in setting of bleeding), HTN,  DM, CKD III, anemia, post menopausal vaginal bleeding s/p D&C in July 2018 (on pathology found to have polyps, started on hormonal therapy with resolution of bleeding), UC, , morbid obesity p/w right knee pain and edema after multiple falls.    1. Chronic resp failure with hypoxia and hypercapnia/ VICKY/OHS/ ILD/ Pulm HTN  - Resp status appears at baseline denies any acute worsening of chronic SOB/ dry cough   - SOB is multifactorial - related to underlying ILD, obesity, immobility 2/2 lumbar pain, HFpEF, anemia  - CXR / Ct chest unchanged   - Cont with Symbicort BID, Spiriva daily  - Cont gentle diuresis to keep overall negative fluid balance   - VICKY untreated - can consider empiric CPAP trial if she is agreeable ( start at 10-12 cm H2O). But she will require a sleep study with CPAP titration as an outpt  - Chest PT/ acapella Q6H  - Incentive spirometry  - Supplemental O2 to keep sats >90 %    2. Vaginal bleeding:  - Plan for diagnostic hysteroscopy per GYN  - Would suggest use of bronchodilators and NIV in the elmo-operative period     3. R knee pain:  - X-ray negative.   - Planned for MRI under sedation  - Suggest the use of NIV until resp status stable post sedation.   - If any change in mental status would then check stat ABG     4. Chronic systolic HF/ a fib:  - On Torsemide, spironolactone, metolazone, amiodarone  - Off coumadin d/t vaginal bleeding     5. Gen:  - DVt Px: Suggest venodynes   - Dr. Montgomery will follow her during hospitalization

## 2019-04-08 NOTE — PROGRESS NOTE ADULT - SUBJECTIVE AND OBJECTIVE BOX
PULMONARY FOLLOW UP ON BEHALF OF DR. SEAN MCGEE    CHIEF COMPLAINT:    Interval Events:    REVIEW OF SYSTEMS:  Constitutional: [ ] negative [ ] fevers [ ] chills [ ] weight loss [ ] weight gain  HEENT: [ ] negative [ ] dry eyes [ ] eye irritation [ ] postnasal drip [ ] nasal congestion  CV: [ ] negative  [ ] chest pain [ ] orthopnea [ ] palpitations [ ] murmur  Resp: [ ] negative [ ] cough [ ] shortness of breath [ ] dyspnea [ ] wheezing [ ] sputum [ ] hemoptysis  GI: [ ] negative [ ] nausea [ ] vomiting [ ] diarrhea [ ] constipation [ ] abd pain [ ] dysphagia   : [ ] negative [ ] dysuria [ ] nocturia [ ] hematuria [ ] increased urinary frequency  Musculoskeletal: [ ] negative [ ] back pain [ ] myalgias [ ] arthralgias [ ] fracture  Skin: [ ] negative [ ] rash [ ] itch  Neurological: [ ] negative [ ] headache [ ] dizziness [ ] syncope [ ] weakness [ ] numbness  Psychiatric: [ ] negative [ ] anxiety [ ] depression  Endocrine: [ ] negative [ ] diabetes [ ] thyroid problem  Hematologic/Lymphatic: [ ] negative [ ] anemia [ ] bleeding problem  Allergic/Immunologic: [ ] negative [ ] itchy eyes [ ] nasal discharge [ ] hives [ ] angioedema  [ ] All other systems negative  [ ] Unable to assess ROS because ________    OBJECTIVE:  ICU Vital Signs Last 24 Hrs  T(C): 36.9 (08 Apr 2019 11:44), Max: 36.9 (08 Apr 2019 11:44)  T(F): 98.5 (08 Apr 2019 11:44), Max: 98.5 (08 Apr 2019 11:44)  HR: 95 (08 Apr 2019 11:44) (95 - 100)  BP: 141/82 (08 Apr 2019 11:44) (141/82 - 151/95)  BP(mean): --  ABP: --  ABP(mean): --  RR: 18 (08 Apr 2019 11:44) (18 - 18)  SpO2: 100% (08 Apr 2019 11:44) (97% - 100%)        04-07 @ 07:01  -  04-08 @ 07:00  --------------------------------------------------------  IN: 240 mL / OUT: 1000 mL / NET: -760 mL    04-08 @ 07:01  - 04-08 @ 14:33  --------------------------------------------------------  IN: 0 mL / OUT: 250 mL / NET: -250 mL      CAPILLARY BLOOD GLUCOSE      POCT Blood Glucose.: 273 mg/dL (08 Apr 2019 12:06)      PHYSICAL EXAM:  General:   HEENT:   Lymph Nodes:  Neck:   Respiratory:   Cardiovascular:   Abdomen:   Extremities:   Skin:   Neurological:  Psychiatry:    HOSPITAL MEDICATIONS:      amiodarone    Tablet 200 milliGRAM(s) Oral daily  carvedilol 6.25 milliGRAM(s) Oral every 12 hours  isosorbide   mononitrate ER Tablet (IMDUR) 30 milliGRAM(s) Oral daily  metolazone 2.5 milliGRAM(s) Oral <User Schedule>  spironolactone 25 milliGRAM(s) Oral daily  torsemide 50 milliGRAM(s) Oral daily    dextrose 40% Gel 15 Gram(s) Oral once PRN  dextrose 50% Injectable 12.5 Gram(s) IV Push once  dextrose 50% Injectable 25 Gram(s) IV Push once  dextrose 50% Injectable 25 Gram(s) IV Push once  glucagon  Injectable 1 milliGRAM(s) IntraMuscular once PRN  insulin glargine Injectable (LANTUS) 44 Unit(s) SubCutaneous at bedtime  insulin lispro (HumaLOG) corrective regimen sliding scale   SubCutaneous three times a day before meals  insulin lispro (HumaLOG) corrective regimen sliding scale   SubCutaneous at bedtime  insulin lispro Injectable (HumaLOG) 15 Unit(s) SubCutaneous before dinner  insulin lispro Injectable (HumaLOG) 20 Unit(s) SubCutaneous before breakfast  insulin lispro Injectable (HumaLOG) 15 Unit(s) SubCutaneous before lunch  levothyroxine 188 MICROGram(s) Oral daily  norethindrone acetate 5 milliGRAM(s) Oral daily    buDESOnide 160 MICROgram(s)/formoterol 4.5 MICROgram(s) Inhaler 2 Puff(s) Inhalation two times a day  tiotropium 18 MICROgram(s) Capsule 1 Capsule(s) Inhalation daily    acetaminophen   Tablet .. 650 milliGRAM(s) Oral every 6 hours PRN  DULoxetine 60 milliGRAM(s) Oral daily    bisacodyl Suppository 10 milliGRAM(s) Rectal once PRN  docusate sodium 100 milliGRAM(s) Oral three times a day  mesalamine DR (24-Hour) Tablet 2.4 Gram(s) Oral daily  pantoprazole    Tablet 40 milliGRAM(s) Oral before breakfast  polyethylene glycol 3350 17 Gram(s) Oral daily  senna 2 Tablet(s) Oral at bedtime        dextrose 5%. 1000 milliLiter(s) IV Continuous <Continuous>            LABS:                        9.2    11.11 )-----------( 208      ( 08 Apr 2019 08:54 )             28.9     Hgb Trend: 9.2<--, 9.4<--, 10.0<--, 8.9<--, 9.2<--  04-07    136  |  93<L>  |  63<H>  ----------------------------<  173<H>  3.5   |  31  |  1.60<H>    Ca    10.0      07 Apr 2019 07:08  Mg     2.2     04-07      Creatinine Trend: 1.60<--, 1.99<--, 2.02<--, 1.85<--, 1.94<--, 1.90<--  PT/INR - ( 07 Apr 2019 08:30 )   PT: 12.1 sec;   INR: 1.05 ratio         PTT - ( 07 Apr 2019 08:30 )  PTT:26.9 sec          MICROBIOLOGY:     RADIOLOGY:  [ ] Reviewed and interpreted by me    PULMONARY FUNCTION TESTS:    EKG: PULMONARY FOLLOW UP ON BEHALF OF DR. SEAN MCGEE    CHIEF COMPLAINT: Fall/ R knee pain     Interval Events: No acute events overnight. Reports breathing is at baseline. Denies any acute SOB, cough, CP, N/V/D, fever, chills     REVIEW OF SYSTEMS:  Constitutional: [ ] negative [ -] fevers [ -] chills [ -] weight loss [ ] weight gain  HEENT: [ ] negative [ ] dry eyes [ -] eye irritation [- ] postnasal drip [ ] nasal congestion  CV: [ ] negative  [- ] chest pain [- ] orthopnea [- ] palpitations [ ] murmur  Resp: [ ] negative [- ] cough [ ] shortness of breath [- ] dyspnea [- ] wheezing [- ] sputum [ ] hemoptysis  GI: [ ] negative [ ] nausea [- ] vomiting [- ] diarrhea [- ] constipation [ -] abd pain [ ] dysphagia   : [ ] negative [ ] dysuria [- ] nocturia [ -] hematuria [ ] increased urinary frequency  Musculoskeletal: [ ] negative [ ] back pain [- ] myalgias [ ] arthralgias [ ] fracture  Skin: [ ] negative [ ] rash [- ] itch  Neurological: [ ] negative [ ] headache [- ] dizziness [- ] syncope [ ] weakness [ ] numbness  Psychiatric: [ ] negative [ -] anxiety [ ] depression  Endocrine: [- ] negative [ ] diabetes [ ] thyroid problem  Hematologic/Lymphatic: [ ] negative [ ] anemia [ -] bleeding problem  Allergic/Immunologic: [ ] negative [ ] itchy eyes [- ] nasal discharge [ ] hives [ ] angioedema  [x ] All other systems negative  [ ] Unable to assess ROS because ________    OBJECTIVE:  ICU Vital Signs Last 24 Hrs  T(C): 36.9 (08 Apr 2019 11:44), Max: 36.9 (08 Apr 2019 11:44)  T(F): 98.5 (08 Apr 2019 11:44), Max: 98.5 (08 Apr 2019 11:44)  HR: 95 (08 Apr 2019 11:44) (95 - 100)  BP: 141/82 (08 Apr 2019 11:44) (141/82 - 151/95)  BP(mean): --  ABP: --  ABP(mean): --  RR: 18 (08 Apr 2019 11:44) (18 - 18)  SpO2: 100% (08 Apr 2019 11:44) (97% - 100%)        04-07 @ 07:01 - 04-08 @ 07:00  --------------------------------------------------------  IN: 240 mL / OUT: 1000 mL / NET: -760 mL    04-08 @ 07:01 - 04-08 @ 14:33  --------------------------------------------------------  IN: 0 mL / OUT: 250 mL / NET: -250 mL      CAPILLARY BLOOD GLUCOSE      POCT Blood Glucose.: 273 mg/dL (08 Apr 2019 12:06)      PHYSICAL EXAM:  General: NAD  HEENT: PERRLA  Lymph Nodes: No palpable cervical LNs  Neck: Thick neck, supple  Respiratory: Decreased Bs b/l bases, no wheezing or crackles   Cardiovascular: RRR, S1+S2+0  Abdomen: Obese, soft, NT, ND, BS+  Extremities: No edema, pulses + b/l LEs  Skin: Chronic LE wounds  Neurological: AAOx3, grossly non focal   Psychiatry: normal mood     HOSPITAL MEDICATIONS:      amiodarone    Tablet 200 milliGRAM(s) Oral daily  carvedilol 6.25 milliGRAM(s) Oral every 12 hours  isosorbide   mononitrate ER Tablet (IMDUR) 30 milliGRAM(s) Oral daily  metolazone 2.5 milliGRAM(s) Oral <User Schedule>  spironolactone 25 milliGRAM(s) Oral daily  torsemide 50 milliGRAM(s) Oral daily    dextrose 40% Gel 15 Gram(s) Oral once PRN  dextrose 50% Injectable 12.5 Gram(s) IV Push once  dextrose 50% Injectable 25 Gram(s) IV Push once  dextrose 50% Injectable 25 Gram(s) IV Push once  glucagon  Injectable 1 milliGRAM(s) IntraMuscular once PRN  insulin glargine Injectable (LANTUS) 44 Unit(s) SubCutaneous at bedtime  insulin lispro (HumaLOG) corrective regimen sliding scale   SubCutaneous three times a day before meals  insulin lispro (HumaLOG) corrective regimen sliding scale   SubCutaneous at bedtime  insulin lispro Injectable (HumaLOG) 15 Unit(s) SubCutaneous before dinner  insulin lispro Injectable (HumaLOG) 20 Unit(s) SubCutaneous before breakfast  insulin lispro Injectable (HumaLOG) 15 Unit(s) SubCutaneous before lunch  levothyroxine 188 MICROGram(s) Oral daily  norethindrone acetate 5 milliGRAM(s) Oral daily    buDESOnide 160 MICROgram(s)/formoterol 4.5 MICROgram(s) Inhaler 2 Puff(s) Inhalation two times a day  tiotropium 18 MICROgram(s) Capsule 1 Capsule(s) Inhalation daily    acetaminophen   Tablet .. 650 milliGRAM(s) Oral every 6 hours PRN  DULoxetine 60 milliGRAM(s) Oral daily    bisacodyl Suppository 10 milliGRAM(s) Rectal once PRN  docusate sodium 100 milliGRAM(s) Oral three times a day  mesalamine DR (24-Hour) Tablet 2.4 Gram(s) Oral daily  pantoprazole    Tablet 40 milliGRAM(s) Oral before breakfast  polyethylene glycol 3350 17 Gram(s) Oral daily  senna 2 Tablet(s) Oral at bedtime        dextrose 5%. 1000 milliLiter(s) IV Continuous <Continuous>            LABS:                        9.2    11.11 )-----------( 208      ( 08 Apr 2019 08:54 )             28.9     Hgb Trend: 9.2<--, 9.4<--, 10.0<--, 8.9<--, 9.2<--  04-07    136  |  93<L>  |  63<H>  ----------------------------<  173<H>  3.5   |  31  |  1.60<H>    Ca    10.0      07 Apr 2019 07:08  Mg     2.2     04-07      Creatinine Trend: 1.60<--, 1.99<--, 2.02<--, 1.85<--, 1.94<--, 1.90<--  PT/INR - ( 07 Apr 2019 08:30 )   PT: 12.1 sec;   INR: 1.05 ratio         PTT - ( 07 Apr 2019 08:30 )  PTT:26.9 sec          MICROBIOLOGY:   Culture - Urine (04.04.19 @ 18:59)    Specimen Source: .Urine Clean Catch (Midstream)    Culture Results:   No growth      RADIOLOGY:  < from: Xray Chest 1 View AP/PA (03.30.19 @ 17:42) >  Status post median sternotomy and CABG. Valve repair. Heart size cannot   be accurately assessed in this projection. Mild pulmonary edema. No   pleural effusions or pneumothorax. The visualized bones and soft tissues   show no acute findings.          [ ] Reviewed and interpreted by me    PULMONARY FUNCTION TESTS:    EKG:

## 2019-04-09 ENCOUNTER — RESULT REVIEW (OUTPATIENT)
Age: 79
End: 2019-04-09

## 2019-04-09 DIAGNOSIS — Z01.818 ENCOUNTER FOR OTHER PREPROCEDURAL EXAMINATION: ICD-10-CM

## 2019-04-09 LAB
ANION GAP SERPL CALC-SCNC: 14 MMOL/L — SIGNIFICANT CHANGE UP (ref 5–17)
APTT BLD: 26.2 SEC — LOW (ref 27.5–36.3)
BLD GP AB SCN SERPL QL: NEGATIVE — SIGNIFICANT CHANGE UP
BUN SERPL-MCNC: 51 MG/DL — HIGH (ref 7–23)
CALCIUM SERPL-MCNC: 9.8 MG/DL — SIGNIFICANT CHANGE UP (ref 8.4–10.5)
CHLORIDE SERPL-SCNC: 93 MMOL/L — LOW (ref 96–108)
CO2 SERPL-SCNC: 31 MMOL/L — SIGNIFICANT CHANGE UP (ref 22–31)
CREAT SERPL-MCNC: 1.39 MG/DL — HIGH (ref 0.5–1.3)
GLUCOSE BLDC GLUCOMTR-MCNC: 202 MG/DL — HIGH (ref 70–99)
GLUCOSE BLDC GLUCOMTR-MCNC: 206 MG/DL — HIGH (ref 70–99)
GLUCOSE BLDC GLUCOMTR-MCNC: 242 MG/DL — HIGH (ref 70–99)
GLUCOSE BLDC GLUCOMTR-MCNC: 275 MG/DL — HIGH (ref 70–99)
GLUCOSE SERPL-MCNC: 299 MG/DL — HIGH (ref 70–99)
HCT VFR BLD CALC: 30.5 % — LOW (ref 34.5–45)
HGB BLD-MCNC: 9.4 G/DL — LOW (ref 11.5–15.5)
INR BLD: 0.99 RATIO — SIGNIFICANT CHANGE UP (ref 0.88–1.16)
MCHC RBC-ENTMCNC: 29.9 PG — SIGNIFICANT CHANGE UP (ref 27–34)
MCHC RBC-ENTMCNC: 30.8 GM/DL — LOW (ref 32–36)
MCV RBC AUTO: 97.1 FL — SIGNIFICANT CHANGE UP (ref 80–100)
PLATELET # BLD AUTO: 208 K/UL — SIGNIFICANT CHANGE UP (ref 150–400)
POTASSIUM SERPL-MCNC: 3.3 MMOL/L — LOW (ref 3.5–5.3)
POTASSIUM SERPL-SCNC: 3.3 MMOL/L — LOW (ref 3.5–5.3)
PROTHROM AB SERPL-ACNC: 11.4 SEC — SIGNIFICANT CHANGE UP (ref 10–13.1)
RBC # BLD: 3.14 M/UL — LOW (ref 3.8–5.2)
RBC # FLD: 16.6 % — HIGH (ref 10.3–14.5)
RH IG SCN BLD-IMP: POSITIVE — SIGNIFICANT CHANGE UP
SODIUM SERPL-SCNC: 138 MMOL/L — SIGNIFICANT CHANGE UP (ref 135–145)
WBC # BLD: 9.6 K/UL — SIGNIFICANT CHANGE UP (ref 3.8–10.5)
WBC # FLD AUTO: 9.6 K/UL — SIGNIFICANT CHANGE UP (ref 3.8–10.5)

## 2019-04-09 PROCEDURE — 58300 INSERT INTRAUTERINE DEVICE: CPT | Mod: 80

## 2019-04-09 PROCEDURE — 58300 INSERT INTRAUTERINE DEVICE: CPT

## 2019-04-09 PROCEDURE — 88305 TISSUE EXAM BY PATHOLOGIST: CPT | Mod: 26

## 2019-04-09 PROCEDURE — 58558 HYSTEROSCOPY BIOPSY: CPT | Mod: 80

## 2019-04-09 PROCEDURE — 58558 HYSTEROSCOPY BIOPSY: CPT

## 2019-04-09 PROCEDURE — 99232 SBSQ HOSP IP/OBS MODERATE 35: CPT

## 2019-04-09 RX ORDER — SODIUM CHLORIDE 9 MG/ML
1000 INJECTION, SOLUTION INTRAVENOUS
Qty: 0 | Refills: 0 | Status: DISCONTINUED | OUTPATIENT
Start: 2019-04-09 | End: 2019-04-09

## 2019-04-09 RX ORDER — INSULIN GLARGINE 100 [IU]/ML
44 INJECTION, SOLUTION SUBCUTANEOUS AT BEDTIME
Qty: 0 | Refills: 0 | Status: DISCONTINUED | OUTPATIENT
Start: 2019-04-09 | End: 2019-04-09

## 2019-04-09 RX ORDER — DEXTROSE 50 % IN WATER 50 %
25 SYRINGE (ML) INTRAVENOUS ONCE
Qty: 0 | Refills: 0 | Status: DISCONTINUED | OUTPATIENT
Start: 2019-04-09 | End: 2019-04-15

## 2019-04-09 RX ORDER — GLUCAGON INJECTION, SOLUTION 0.5 MG/.1ML
1 INJECTION, SOLUTION SUBCUTANEOUS ONCE
Qty: 0 | Refills: 0 | Status: DISCONTINUED | OUTPATIENT
Start: 2019-04-09 | End: 2019-04-15

## 2019-04-09 RX ORDER — INSULIN LISPRO 100/ML
15 VIAL (ML) SUBCUTANEOUS
Qty: 0 | Refills: 0 | Status: DISCONTINUED | OUTPATIENT
Start: 2019-04-09 | End: 2019-04-11

## 2019-04-09 RX ORDER — DEXTROSE 50 % IN WATER 50 %
12.5 SYRINGE (ML) INTRAVENOUS ONCE
Qty: 0 | Refills: 0 | Status: DISCONTINUED | OUTPATIENT
Start: 2019-04-09 | End: 2019-04-15

## 2019-04-09 RX ORDER — INSULIN LISPRO 100/ML
20 VIAL (ML) SUBCUTANEOUS
Qty: 0 | Refills: 0 | Status: DISCONTINUED | OUTPATIENT
Start: 2019-04-09 | End: 2019-04-11

## 2019-04-09 RX ORDER — ONDANSETRON 8 MG/1
4 TABLET, FILM COATED ORAL ONCE
Qty: 0 | Refills: 0 | Status: DISCONTINUED | OUTPATIENT
Start: 2019-04-09 | End: 2019-04-09

## 2019-04-09 RX ORDER — ACETAMINOPHEN 500 MG
650 TABLET ORAL ONCE
Qty: 0 | Refills: 0 | Status: COMPLETED | OUTPATIENT
Start: 2019-04-09 | End: 2019-04-09

## 2019-04-09 RX ORDER — INSULIN GLARGINE 100 [IU]/ML
48 INJECTION, SOLUTION SUBCUTANEOUS AT BEDTIME
Qty: 0 | Refills: 0 | Status: DISCONTINUED | OUTPATIENT
Start: 2019-04-09 | End: 2019-04-10

## 2019-04-09 RX ORDER — DEXTROSE 50 % IN WATER 50 %
15 SYRINGE (ML) INTRAVENOUS ONCE
Qty: 0 | Refills: 0 | Status: DISCONTINUED | OUTPATIENT
Start: 2019-04-09 | End: 2019-04-15

## 2019-04-09 RX ORDER — HYDROMORPHONE HYDROCHLORIDE 2 MG/ML
0.2 INJECTION INTRAMUSCULAR; INTRAVENOUS; SUBCUTANEOUS
Qty: 0 | Refills: 0 | Status: DISCONTINUED | OUTPATIENT
Start: 2019-04-09 | End: 2019-04-09

## 2019-04-09 RX ORDER — INSULIN LISPRO 100/ML
VIAL (ML) SUBCUTANEOUS AT BEDTIME
Qty: 0 | Refills: 0 | Status: DISCONTINUED | OUTPATIENT
Start: 2019-04-09 | End: 2019-04-11

## 2019-04-09 RX ORDER — SODIUM CHLORIDE 9 MG/ML
1000 INJECTION, SOLUTION INTRAVENOUS
Qty: 0 | Refills: 0 | Status: DISCONTINUED | OUTPATIENT
Start: 2019-04-09 | End: 2019-04-15

## 2019-04-09 RX ORDER — INSULIN LISPRO 100/ML
VIAL (ML) SUBCUTANEOUS
Qty: 0 | Refills: 0 | Status: DISCONTINUED | OUTPATIENT
Start: 2019-04-09 | End: 2019-04-11

## 2019-04-09 RX ADMIN — Medication 100 MILLIGRAM(S): at 06:24

## 2019-04-09 RX ADMIN — Medication 15 UNIT(S): at 17:59

## 2019-04-09 RX ADMIN — DULOXETINE HYDROCHLORIDE 60 MILLIGRAM(S): 30 CAPSULE, DELAYED RELEASE ORAL at 12:09

## 2019-04-09 RX ADMIN — CARVEDILOL PHOSPHATE 6.25 MILLIGRAM(S): 80 CAPSULE, EXTENDED RELEASE ORAL at 06:23

## 2019-04-09 RX ADMIN — PANTOPRAZOLE SODIUM 40 MILLIGRAM(S): 20 TABLET, DELAYED RELEASE ORAL at 06:23

## 2019-04-09 RX ADMIN — Medication 2.4 GRAM(S): at 12:08

## 2019-04-09 RX ADMIN — Medication 4: at 12:09

## 2019-04-09 RX ADMIN — Medication 4: at 17:58

## 2019-04-09 RX ADMIN — ISOSORBIDE MONONITRATE 30 MILLIGRAM(S): 60 TABLET, EXTENDED RELEASE ORAL at 12:07

## 2019-04-09 RX ADMIN — Medication 650 MILLIGRAM(S): at 21:35

## 2019-04-09 RX ADMIN — INSULIN GLARGINE 48 UNIT(S): 100 INJECTION, SOLUTION SUBCUTANEOUS at 22:15

## 2019-04-09 RX ADMIN — Medication 6: at 08:28

## 2019-04-09 RX ADMIN — BUDESONIDE AND FORMOTEROL FUMARATE DIHYDRATE 2 PUFF(S): 160; 4.5 AEROSOL RESPIRATORY (INHALATION) at 06:25

## 2019-04-09 RX ADMIN — SPIRONOLACTONE 25 MILLIGRAM(S): 25 TABLET, FILM COATED ORAL at 06:23

## 2019-04-09 RX ADMIN — TIOTROPIUM BROMIDE 1 CAPSULE(S): 18 CAPSULE ORAL; RESPIRATORY (INHALATION) at 12:09

## 2019-04-09 RX ADMIN — AMIODARONE HYDROCHLORIDE 200 MILLIGRAM(S): 400 TABLET ORAL at 06:24

## 2019-04-09 RX ADMIN — Medication 50 MILLIGRAM(S): at 06:24

## 2019-04-09 RX ADMIN — Medication 188 MICROGRAM(S): at 06:24

## 2019-04-09 RX ADMIN — NORETHINDRONE 5 MILLIGRAM(S): 0.35 TABLET ORAL at 12:08

## 2019-04-09 RX ADMIN — Medication 650 MILLIGRAM(S): at 22:10

## 2019-04-09 NOTE — PROGRESS NOTE ADULT - SUBJECTIVE AND OBJECTIVE BOX
Infectious Diseases progress note:    Subjective:  NAD, no acute o/n events.  Pt for OR today with Gyn.  Afebrile.   at bedside.      ROS:  CONSTITUTIONAL:  No fever, chills, rigors  CARDIOVASCULAR:  No chest pain or palpitations  RESPIRATORY:   No SOB, cough, dyspnea on exertion.  No wheezing  GASTROINTESTINAL:  No abd pain, N/V, diarrhea/constipation  EXTREMITIES:  No swelling or joint pain  GENITOURINARY:  No burning on urination, increased frequency or urgency.  No flank pain  NEUROLOGIC:  No HA, visual disturbances  SKIN: No rashes    Allergies    Augmentin (Swelling)  cephalexin (Swelling)    Intolerances        ANTIBIOTICS/RELEVANT:  antimicrobials    immunologic:    OTHER:  acetaminophen   Tablet .. 650 milliGRAM(s) Oral every 6 hours PRN  amiodarone    Tablet 200 milliGRAM(s) Oral daily  bisacodyl Suppository 10 milliGRAM(s) Rectal once PRN  buDESOnide 160 MICROgram(s)/formoterol 4.5 MICROgram(s) Inhaler 2 Puff(s) Inhalation two times a day  carvedilol 6.25 milliGRAM(s) Oral every 12 hours  dextrose 40% Gel 15 Gram(s) Oral once PRN  dextrose 5%. 1000 milliLiter(s) IV Continuous <Continuous>  dextrose 50% Injectable 12.5 Gram(s) IV Push once  dextrose 50% Injectable 25 Gram(s) IV Push once  dextrose 50% Injectable 25 Gram(s) IV Push once  docusate sodium 100 milliGRAM(s) Oral three times a day  DULoxetine 60 milliGRAM(s) Oral daily  glucagon  Injectable 1 milliGRAM(s) IntraMuscular once PRN  insulin glargine Injectable (LANTUS) 44 Unit(s) SubCutaneous at bedtime  insulin lispro (HumaLOG) corrective regimen sliding scale   SubCutaneous three times a day before meals  insulin lispro (HumaLOG) corrective regimen sliding scale   SubCutaneous at bedtime  insulin lispro Injectable (HumaLOG) 15 Unit(s) SubCutaneous before dinner  insulin lispro Injectable (HumaLOG) 20 Unit(s) SubCutaneous before breakfast  insulin lispro Injectable (HumaLOG) 15 Unit(s) SubCutaneous before lunch  isosorbide   mononitrate ER Tablet (IMDUR) 30 milliGRAM(s) Oral daily  levothyroxine 188 MICROGram(s) Oral daily  mesalamine DR (24-Hour) Tablet 2.4 Gram(s) Oral daily  metolazone 2.5 milliGRAM(s) Oral <User Schedule>  norethindrone acetate 5 milliGRAM(s) Oral daily  pantoprazole    Tablet 40 milliGRAM(s) Oral before breakfast  polyethylene glycol 3350 17 Gram(s) Oral daily  senna 2 Tablet(s) Oral at bedtime  spironolactone 25 milliGRAM(s) Oral daily  tiotropium 18 MICROgram(s) Capsule 1 Capsule(s) Inhalation daily  torsemide 50 milliGRAM(s) Oral daily      Objective:  Vital Signs Last 24 Hrs  T(C): 37 (09 Apr 2019 07:20), Max: 37 (09 Apr 2019 07:00)  T(F): 98.6 (09 Apr 2019 07:20), Max: 98.6 (09 Apr 2019 07:00)  HR: 100 (09 Apr 2019 07:20) (95 - 100)  BP: 145/92 (09 Apr 2019 07:20) (120/72 - 145/92)  BP(mean): --  RR: 18 (09 Apr 2019 07:20) (18 - 18)  SpO2: 100% (09 Apr 2019 07:20) (98% - 100%)    PHYSICAL EXAM:  Constitutional:NAD  Eyes:HANNAH, EOMI  Ear/Nose/Throat: no thrush, mucositis.  Moist mucous membranes	  Neck:no JVD, no lymphadenopathy, supple  Respiratory: CTA saad  Cardiovascular: S1S2 RRR, no murmurs  Gastrointestinal:soft, nontender,  nondistended (+) BS  Extremities: RLE leg immobilizer  Skin:  b/l LE swelling, erythema improved, superficial wounds healing, dsg c/d/i.  L hallux blister drying up        LABS:                        9.4    9.60  )-----------( 208      ( 09 Apr 2019 09:15 )             30.5     04-09    138  |  93<L>  |  51<H>  ----------------------------<  299<H>  3.3<L>   |  31  |  1.39<H>    Ca    9.8      09 Apr 2019 07:22      PT/INR - ( 09 Apr 2019 09:14 )   PT: 11.4 sec;   INR: 0.99 ratio         PTT - ( 09 Apr 2019 09:14 )  PTT:26.2 sec            Vancomycin Level, Trough: 19.0 ug/mL (04-04 @ 20:17)              MICROBIOLOGY:    Culture - Urine (04.04.19 @ 18:59)    Specimen Source: .Urine Clean Catch (Midstream)    Culture Results:   No growth    Culture - Blood (03.30.19 @ 22:36)    Specimen Source: .Blood Blood    Culture Results:   No growth at 5 days.    Culture - Blood (03.30.19 @ 22:36)    Specimen Source: .Blood Blood    Culture Results:   No growth at 5 days.          RADIOLOGY & ADDITIONAL STUDIES:    < from: MR Knee No Cont, Right (04.08.19 @ 15:14) >    IMPRESSION:   1.  Full-thickness quadriceps tendon tear at the level of the upper pole   of the patella with 31 mm gap between the superior pole of the patella   and the torn tendonfibers.  2.  Large knee joint effusion.  3.  Tear of the medial meniscus.    < end of copied text >

## 2019-04-09 NOTE — PROGRESS NOTE ADULT - ASSESSMENT
77 yo woman w/ hx of moderate MS s/p bioprosthetic mitral valve, diastolic CHF, paroxysmal afib (not on A/C in setting of vaginal bleeding), HTN, severe pulmonary HTN, DMT2, CKD III, anemia, with post menopausal vaginal bleeding, UC, VICKY on 3L NC (not on CPAP/BIPAP) presents from home in setting of multiple falls the past 2 days resulting with right knee pain, found with leukocytosis and LE wounds concerning for cellulitis.

## 2019-04-09 NOTE — PROGRESS NOTE ADULT - SUBJECTIVE AND OBJECTIVE BOX
Pt S/E at bedside, no acute events overnight, pain controlled    Vital Signs Last 24 Hrs  T(C): 36.9 (08 Apr 2019 20:03), Max: 36.9 (08 Apr 2019 11:44)  T(F): 98.4 (08 Apr 2019 20:03), Max: 98.5 (08 Apr 2019 11:44)  HR: 99 (08 Apr 2019 20:03) (95 - 99)  BP: 120/72 (08 Apr 2019 20:03) (120/72 - 141/82)  BP(mean): --  RR: 18 (08 Apr 2019 20:03) (18 - 18)  SpO2: 98% (08 Apr 2019 20:03) (98% - 100%)    Gen: NAD,    Right Lower Extremity:  Knee immobilizer, clean dry intact  +EHL/FHL/TA/GS  SILT L3-S1  +DP/PT Pulses  Compartments soft  No calf TTP B/L

## 2019-04-09 NOTE — PROGRESS NOTE ADULT - SUBJECTIVE AND OBJECTIVE BOX
CARDIOLOGY FOLLOW UP - Dr. Jean -coverage for [facility] no cp/sob   seen in PACU  stable post-op    PHYSICAL EXAM:  T(C): 36.3 (04-09-19 @ 16:45), Max: 37 (04-09-19 @ 07:00)  HR: 94 (04-09-19 @ 17:00) (89 - 100)  BP: 125/60 (04-09-19 @ 17:00) (117/59 - 145/92)  RR: 16 (04-09-19 @ 17:00) (16 - 18)  SpO2: 100% (04-09-19 @ 17:00) (93% - 100%)  Wt(kg): --  I&O's Summary    08 Apr 2019 07:01  -  09 Apr 2019 07:00  --------------------------------------------------------  IN: 360 mL / OUT: 1500 mL / NET: -1140 mL    09 Apr 2019 07:01  -  09 Apr 2019 17:17  --------------------------------------------------------  IN: 250 mL / OUT: 0 mL / NET: 250 mL        Appearance: Normal	  Cardiovascular: Normal S1 S2,RRR, No JVD, No murmurs  Respiratory: Lungs clear to auscultation	  Gastrointestinal:  Soft, Non-tender, + BS	  Extremities: Normal range of motion, No clubbing, cyanosis or edema        MEDICATIONS  (STANDING):  lactated ringers. 1000 milliLiter(s) (125 mL/Hr) IV Continuous <Continuous>      TELEMETRY: 	    ECG:  	  RADIOLOGY:   DIAGNOSTIC TESTING:  [ ] Echocardiogram:  [ ]  Catheterization:  [ ] Stress Test:    OTHER: 	    LABS:	 	                                9.4    9.60  )-----------( 208      ( 09 Apr 2019 09:15 )             30.5     04-09    138  |  93<L>  |  51<H>  ----------------------------<  299<H>  3.3<L>   |  31  |  1.39<H>    Ca    9.8      09 Apr 2019 07:22      PT/INR - ( 09 Apr 2019 09:14 )   PT: 11.4 sec;   INR: 0.99 ratio         PTT - ( 09 Apr 2019 09:14 )  PTT:26.2 sec

## 2019-04-09 NOTE — PROGRESS NOTE ADULT - ASSESSMENT
77 yo woman w/ hx of moderate MS s/p bioprosthetic mitral valve, diastolic CHF, paroxsmal afib (not on A/C in setting of bleeding), HTN, severe pulmonary HTN, DMT2, CKD III, anemia, with post menopausal vaginal bleeding, UC, VICKY on 3L NC (not on CPAP/BIPAP) presents from home in setting of multiple falls the past 2 days resulting with right knee pain. Patient had a fall yesterday from 1 step while walking out of the house and tripped. Per patient states that her right leg had a buckling/collapsing sensation. Patient went to [place] ED on 3/29 and had an xray which did not reveal fracture and sent home with cyclobenzaprine and Lidoderm patch. Patient last use of medication on 3/29. Patient had another fall while attempting to ambulate to the bathroom. She described that her right leg buckled under her causing her to fall. It was difficult for her to get up on her own and required the assistance of her  and son-in-law to get up. Patient denies any preceding symptoms of dizziness/lightheadedness, shortness of breath, CP, palpitations preceding the events. Endorses chronic back pain with no new characteristics. Patient has been getting around home with use of wheelchair the past 4 months. Requires assistance of her  to get around because of chronic weakness. Denies development of new numbness of lower extremities. Does not utilize any other assistive devices. Has not had PT outpatient. Patient also has had chronic LE wounds that were dressed from last discharge on 3/27. Denies changing of dressing. Denies fevers, chills, sweats. (30 Mar 2019 21:08)    ER vss, pt afebrile.  WBC 13.2 --> 9.0.  UA large LE/ (-) nit.  Ucx >100K GNR.  No acute findings on cxr.   Pt is currently on vancomycin for cellulitis.   Pt with venous stasis and several open blisters on b/l LE.      ID consult called for further abx management.        Recommend:    UTI    - pt denies increased freq/urgency/flank pain/dysuria.  (+) UA and cultures.  Pt a/w fall and leg weakness.  Will treat with  azactam (pt with allergy to augmentin, keflex)  (through 4/6).  Urine cx results noted.      - Pt seen by Urology -  planned for outpt  cystoscopy.      - Pt has been on low dose Macrobid for last 2 years for chronic suppressive therapy.  Current Ucx growing Proteus mirabilis that is now resistant to mirabilis.  No good oral options available as pt is allergic to beta-lactams, and unable to take fluroquinolone as she is also taking amiodarone.  Don't recommend bactrim due to renal insufficiency.      - Can try urinary antiseptic - hiprex 1gm po bid and vitamin C 500mg Qdaily moving forward, to reduce frequency of future UTIs.     CELLULITIS    - b/L LE venous stasis and blisters.  Low suspicion for superimposed cellulitis.  Pt with several open blisters and denuded skin, cont local wound care.  Cont vancomycin for now for possible superimposed bacterial cellulitis.  Wounds healing well, discoloration and swelling improved.    -  Monitor vanco trough, maintain between 10-15.  Completed 7 day course through 4/6.      Rt KNEE PAIN      - Pt c/o RLE weakness and knee pain, pt seen by Neurology - recs appreciated.  CT rt knee  without joint effusion or fracture, (+) subcut edema.      - MRI results noted, pt with full thickness quadriceps tendon tear and joint effusion.  No fever, increased warmth or erythema to suggest septic joint.  Ortho following.       Vaginal bleeding    - GYN consulted for further eval and treatment. Pelvic and transvaginal US performed - s/o vaginal outlet obstruction.    - Pt has restarted norethindrone for postmenopausal bleeding.   Pt for possible d&c with hysteroscopy and endometrial sampling/IUD placement      d/w pt and  at bedside.  No acute ID issues at this time          Christal Reinoso  [phone]

## 2019-04-09 NOTE — PROGRESS NOTE ADULT - SUBJECTIVE AND OBJECTIVE BOX
R3 OBGYN Progress Note    Patient seen and examined at bedside.  Feeling well.  NPO overnight for procedure today.  Unsure if continues to bleed as states she has female catheter on.     Vital Signs Last 24 Hours  T(C): 36.9 (04-08-19 @ 20:03), Max: 36.9 (04-08-19 @ 11:44)  HR: 99 (04-08-19 @ 20:03) (95 - 100)  BP: 120/72 (04-08-19 @ 20:03) (120/72 - 151/95)  RR: 18 (04-08-19 @ 20:03) (18 - 18)  SpO2: 98% (04-08-19 @ 20:03) (98% - 100%)    Physical Exam:  General: NAD  Abdomen: Soft, NTND  : external female catheter draining clear urine- no bleeding noted.     Labs:                        9.2    11.11 )-----------( 208      ( 08 Apr 2019 08:54 )             28.9   baso x      eos x      imm gran x      lymph x      mono x      poly x                            9.4    10.79 )-----------( 230      ( 07 Apr 2019 09:03 )             30.4   baso x      eos x      imm gran x      lymph x      mono x      poly x

## 2019-04-09 NOTE — PROGRESS NOTE ADULT - ASSESSMENT
A/P: 78F with Right quad tendon tear, MRI proven  patient for OR today with GYn  Dr. Guzman to discuss with daughter today  NPO for OR with gyn  NICANOR RUBI Gyn ONC and post op course

## 2019-04-09 NOTE — PROGRESS NOTE ADULT - ATTENDING COMMENTS
multifactorial resp failure-cards/copd/obesity hypoventilation, osas, debility, obesity, PAH--O2 2 liters NC  copd-symbicort 160 2 bid, spiriva 1 puff q day, xopenex .63 q 6, incentive spirometry  Cards-as per Nelli et al  Obesity-nutrition consult  OSAS-outpt pt rx  PAH-WHO grp 2-cards rx  GYN-no absolute pulm contras to procedure      Vascular-no absolute pulm contras to procedures  Fito Montgomery MD-Pulmonary   [phone]

## 2019-04-09 NOTE — PROGRESS NOTE ADULT - ASSESSMENT
A/P: 78y post-menopausal female w/ vaginal bleeding for D&C today and possible Mirena IUD insertion.

## 2019-04-09 NOTE — PROGRESS NOTE ADULT - SUBJECTIVE AND OBJECTIVE BOX
Patient is a 78y old  Female who presents with a chief complaint of fall, right knee pain (09 Apr 2019 17:17)      SUBJECTIVE / OVERNIGHT EVENTS: s/p D&C today, awaiting ortho F/U    MEDICATIONS  (STANDING):  acetaminophen   Tablet .. 650 milliGRAM(s) Oral once  dextrose 5%. 1000 milliLiter(s) (50 mL/Hr) IV Continuous <Continuous>  dextrose 50% Injectable 12.5 Gram(s) IV Push once  dextrose 50% Injectable 25 Gram(s) IV Push once  dextrose 50% Injectable 25 Gram(s) IV Push once  insulin glargine Injectable (LANTUS) 48 Unit(s) SubCutaneous at bedtime  insulin lispro (HumaLOG) corrective regimen sliding scale   SubCutaneous three times a day before meals  insulin lispro (HumaLOG) corrective regimen sliding scale   SubCutaneous at bedtime  insulin lispro Injectable (HumaLOG) 20 Unit(s) SubCutaneous before breakfast  insulin lispro Injectable (HumaLOG) 15 Unit(s) SubCutaneous before lunch  insulin lispro Injectable (HumaLOG) 15 Unit(s) SubCutaneous before dinner    MEDICATIONS  (PRN):  dextrose 40% Gel 15 Gram(s) Oral once PRN Blood Glucose LESS THAN 70 milliGRAM(s)/deciliter  glucagon  Injectable 1 milliGRAM(s) IntraMuscular once PRN Glucose LESS THAN 70 milligrams/deciliter      Vital Signs Last 24 Hrs  T(F): 98.7 (04-09-19 @ 20:09), Max: 98.7 (04-09-19 @ 18:08)  HR: 98 (04-09-19 @ 20:09) (89 - 100)  BP: 136/80 (04-09-19 @ 20:09) (117/59 - 156/79)  RR: 18 (04-09-19 @ 20:09) (16 - 18)  SpO2: 100% (04-09-19 @ 20:09) (93% - 100%)  Telemetry:   CAPILLARY BLOOD GLUCOSE      POCT Blood Glucose.: 202 mg/dL (09 Apr 2019 17:42)  POCT Blood Glucose.: 206 mg/dL (09 Apr 2019 11:48)  POCT Blood Glucose.: 275 mg/dL (09 Apr 2019 07:38)  POCT Blood Glucose.: 222 mg/dL (08 Apr 2019 21:50)    I&O's Summary    08 Apr 2019 07:01 - 09 Apr 2019 07:00  --------------------------------------------------------  IN: 360 mL / OUT: 1500 mL / NET: -1140 mL    09 Apr 2019 07:01  -  09 Apr 2019 21:32  --------------------------------------------------------  IN: 490 mL / OUT: 900 mL / NET: -410 mL        PHYSICAL EXAM:  GENERAL: NAD, well-developed  HEAD:  Atraumatic, Normocephalic  EYES: EOMI, PERRLA, conjunctiva and sclera clear  NECK: Supple, No JVD  CHEST/LUNG: Clear to auscultation bilaterally; No wheeze  HEART: Regular rate and rhythm; No murmurs, rubs, or gallops  ABDOMEN: Soft, Nontender, Nondistended; Bowel sounds present  EXTREMITIES:  2+ Peripheral Pulses, No clubbing, cyanosis, or edema  PSYCH: AAOx3  NEUROLOGY: non-focal  SKIN: No rashes or lesions    LABS:                        9.4    9.60  )-----------( 208      ( 09 Apr 2019 09:15 )             30.5     04-09    138  |  93<L>  |  51<H>  ----------------------------<  299<H>  3.3<L>   |  31  |  1.39<H>    Ca    9.8      09 Apr 2019 07:22      PT/INR - ( 09 Apr 2019 09:14 )   PT: 11.4 sec;   INR: 0.99 ratio         PTT - ( 09 Apr 2019 09:14 )  PTT:26.2 sec          RADIOLOGY & ADDITIONAL TESTS:    Imaging Personally Reviewed:    Consultant(s) Notes Reviewed:      Care Discussed with Consultants/Other Providers:

## 2019-04-09 NOTE — PROGRESS NOTE ADULT - ASSESSMENT
79yo female with chronic diastolic CHF, bioprosthetic MVR, PAF, VICKY, PAD, CKD, DM, HLD and LSS admitted with recurrent falls. She has severe lumbar spine disease and will need SAMANTHA.  She continue with vaginal bleeding and anemia. She has been more SOB due to anemia in setting of obesity and chronic diastolic CHF.  She has been off coumadin past 4 weeks with continued bleeding.  Low K due to diuretics.  Found to have quadriceps tendon injury.      Rec:  resume torsemide 50mg once per day.   Monitor lytes, renal function, replete k+   s/p D&C , pt. remains cv stable post-op   PT F/u   Ortho followup, MRI R knee tomorrow  SAMANTHA after above.

## 2019-04-09 NOTE — PROGRESS NOTE ADULT - ASSESSMENT
79 yo F with ILD, VICKY/OHS with chronic hypercapnic and hypoxic respiratory failure on 3L NC (not on CPAP), PAD/PVD with chronic LE wounds, h/o MS s/p bioprosthetic MVR, HFpEF, paroxsmal afib (not on A/C in setting of bleeding), HTN,  DM, CKD III, anemia, post menopausal vaginal bleeding s/p D&C in July 2018 (on pathology found to have polyps, started on hormonal therapy with resolution of bleeding), UC, , morbid obesity p/w right knee pain and edema after multiple falls.  Patient denies any preceding symptoms of dizziness/lightheadedness, shortness of breath, CP, palpitations preceding the events. Endorses chronic back pain, has a history of spinal stenosis and has been using a wheelchair the past 4 months. Requires assistance of her  for ADLS    UA +, urine cultures growing Proteus and is being treated with Aztreonam and Vanco.  +vaginal bleeding over the past 24 hrs (has not been on Progesterone since admission)    Nonsmoker, has been following with Dr. Montgomery over the past year for symptoms of progressive dyspnea, now with minimal exertion. Prior HRCT chest showed evidence of ILD - unclear if related to underlying RA vs. amiodarone.  Has been on supplemental O2 since May 2018, 2LNC around the clock.   H/o VICKY diagnosed years ago - not on therapy  Home inhaler regimen: Albuterol nebs, anoro ellipta    A/P:  Dyspnea - multifactorial - related to underlying ILD, obesity, immobility 2/2 lumbar pain, HFpEF, anemia  Chronic respiratory failure with hypoxemia and hypercapnia  VICKY/OHS - untreated  Post menopausal Vaginal bleeding  UTI  Spinal stenosis, gait instability    Rec:  1. Dyspnea - Breathing at baseline - not dyspneic at rest, saturating well with 2LNC; CT chest and CXR unchanged  c/w current inhaler regimen- Symbicort, bronchodilators. Incentive spirometer  Given the patient's underlying VICKY/OHS with evidence of chronic hypercapnia, a trial of CPAP therapy should be considered and I have explained this to the patient and her family prior- she will require titration study as an outpatient. Agree with supplemental O2 24/7 - goal sats low 90s  2. Vaginal bleeding: GYN consulted for further eval and treatment. s/p Pelvic sono--for D and C today  3. UTI- antibiotics as per ID  4. Afib - cardiac optimization- rate control, BP management, Cardiology following  5. PPX - DVT ppx  ******  4/9 for GYN procedure

## 2019-04-09 NOTE — PROGRESS NOTE ADULT - SUBJECTIVE AND OBJECTIVE BOX
CHIEF COMPLAINT: f/up resp failure, preop clearance, copd, restrictive dysfunction, PAH--no signif sob or pain except RLE    Interval Events: for D and C    REVIEW OF SYSTEMS:  Constitutional: No fevers or chills. No weight loss. + fatigue or generalized malaise.  Eyes: No itching or discharge from the eyes  ENT: No ear pain. No ear discharge. No nasal congestion. No post nasal drip. No epistaxis. No throat pain. No sore throat. No difficulty swallowing.   CV: No chest pain. No palpitations. No lightheadedness or dizziness.   Resp: No dyspnea at rest. + dyspnea on exertion. No orthopnea. No wheezing. No cough. No stridor. No sputum production. No chest pain with respiration.  GI: No nausea. No vomiting. No diarrhea.  MSK: No joint pain or pain in any extremities  Integumentary: No skin lesions. No pedal edema.  Neurological: No gross motor weakness. No sensory changes.  [+ ] All other systems negative  [ ] Unable to assess ROS because ________    OBJECTIVE:  ICU Vital Signs Last 24 Hrs  T(C): 36.9 (08 Apr 2019 20:03), Max: 36.9 (08 Apr 2019 11:44)  T(F): 98.4 (08 Apr 2019 20:03), Max: 98.5 (08 Apr 2019 11:44)  HR: 99 (08 Apr 2019 20:03) (95 - 100)  BP: 120/72 (08 Apr 2019 20:03) (120/72 - 151/95)  BP(mean): --  ABP: --  ABP(mean): --  RR: 18 (08 Apr 2019 20:03) (18 - 18)  SpO2: 98% (08 Apr 2019 20:03) (98% - 100%)        04-07 @ 07:01  -  04-08 @ 07:00  --------------------------------------------------------  IN: 240 mL / OUT: 1000 mL / NET: -760 mL    04-08 @ 07:01  -  04-09 @ 05:58  --------------------------------------------------------  IN: 360 mL / OUT: 850 mL / NET: -490 mL      CAPILLARY BLOOD GLUCOSE      POCT Blood Glucose.: 222 mg/dL (08 Apr 2019 21:50)      PHYSICAL EXAM: NAD in bed on NC O2  General: Awake, alert, oriented X 3.   HEENT: Atraumatic, normocephalic.                 Mallampatti Grade 3                No nasal congestion.                No tonsillar or pharyngeal exudates.  Lymph Nodes: No palpable lymphadenopathy  Neck: No JVD. No carotid bruit.   Respiratory: Normal chest expansion                         Normal percussion                         Normal and equal air entry                         No wheeze, rhonchi or rales.  Cardiovascular: S1 S2 normal. + murmurs, rubs or gallops.   Abdomen: Soft, non-tender, non-distended. No organomegaly. Normoactive bowel sounds.  Extremities: Warm to touch. Peripheral pulse palpable. No pedal edema.   Skin: No rashes or skin lesions  Neurological: Motor and sensory examination equal and normal in all four extremities.  Psychiatry: Appropriate mood and affect.    HOSPITAL MEDICATIONS:  MEDICATIONS  (STANDING):  amiodarone    Tablet 200 milliGRAM(s) Oral daily  buDESOnide 160 MICROgram(s)/formoterol 4.5 MICROgram(s) Inhaler 2 Puff(s) Inhalation two times a day  carvedilol 6.25 milliGRAM(s) Oral every 12 hours  dextrose 5%. 1000 milliLiter(s) (50 mL/Hr) IV Continuous <Continuous>  dextrose 50% Injectable 12.5 Gram(s) IV Push once  dextrose 50% Injectable 25 Gram(s) IV Push once  dextrose 50% Injectable 25 Gram(s) IV Push once  docusate sodium 100 milliGRAM(s) Oral three times a day  DULoxetine 60 milliGRAM(s) Oral daily  insulin glargine Injectable (LANTUS) 44 Unit(s) SubCutaneous at bedtime  insulin lispro (HumaLOG) corrective regimen sliding scale   SubCutaneous three times a day before meals  insulin lispro (HumaLOG) corrective regimen sliding scale   SubCutaneous at bedtime  insulin lispro Injectable (HumaLOG) 15 Unit(s) SubCutaneous before dinner  insulin lispro Injectable (HumaLOG) 20 Unit(s) SubCutaneous before breakfast  insulin lispro Injectable (HumaLOG) 15 Unit(s) SubCutaneous before lunch  isosorbide   mononitrate ER Tablet (IMDUR) 30 milliGRAM(s) Oral daily  levothyroxine 188 MICROGram(s) Oral daily  mesalamine DR (24-Hour) Tablet 2.4 Gram(s) Oral daily  metolazone 2.5 milliGRAM(s) Oral <User Schedule>  norethindrone acetate 5 milliGRAM(s) Oral daily  pantoprazole    Tablet 40 milliGRAM(s) Oral before breakfast  polyethylene glycol 3350 17 Gram(s) Oral daily  senna 2 Tablet(s) Oral at bedtime  spironolactone 25 milliGRAM(s) Oral daily  tiotropium 18 MICROgram(s) Capsule 1 Capsule(s) Inhalation daily  torsemide 50 milliGRAM(s) Oral daily    MEDICATIONS  (PRN):  acetaminophen   Tablet .. 650 milliGRAM(s) Oral every 6 hours PRN Mild Pain (1 - 3), Moderate Pain (4 - 6)  bisacodyl Suppository 10 milliGRAM(s) Rectal once PRN Constipation  dextrose 40% Gel 15 Gram(s) Oral once PRN Blood Glucose LESS THAN 70 milliGRAM(s)/deciliter  glucagon  Injectable 1 milliGRAM(s) IntraMuscular once PRN Glucose LESS THAN 70 milligrams/deciliter      LABS:                        9.2    11.11 )-----------( 208      ( 08 Apr 2019 08:54 )             28.9     04-07    136  |  93<L>  |  63<H>  ----------------------------<  173<H>  3.5   |  31  |  1.60<H>    Ca    10.0      07 Apr 2019 07:08  Mg     2.2     04-07      PT/INR - ( 07 Apr 2019 08:30 )   PT: 12.1 sec;   INR: 1.05 ratio         PTT - ( 07 Apr 2019 08:30 )  PTT:26.9 sec          MICROBIOLOGY:     RADIOLOGY:  [ ] Reviewed and interpreted by me    Point of Care Ultrasound Findings:    PFT:    EKG:

## 2019-04-09 NOTE — PROGRESS NOTE ADULT - SUBJECTIVE AND OBJECTIVE BOX
[facility]      Seen earlier today, and examined.  - Today, patient is without complaints.           *****MEDICATIONS: Current medication reviewed and documented.    MEDICATIONS  (STANDING):  amiodarone    Tablet 200 milliGRAM(s) Oral daily  buDESOnide 160 MICROgram(s)/formoterol 4.5 MICROgram(s) Inhaler 2 Puff(s) Inhalation two times a day  carvedilol 6.25 milliGRAM(s) Oral every 12 hours  dextrose 5%. 1000 milliLiter(s) (50 mL/Hr) IV Continuous <Continuous>  dextrose 50% Injectable 12.5 Gram(s) IV Push once  dextrose 50% Injectable 25 Gram(s) IV Push once  dextrose 50% Injectable 25 Gram(s) IV Push once  docusate sodium 100 milliGRAM(s) Oral three times a day  DULoxetine 60 milliGRAM(s) Oral daily  insulin glargine Injectable (LANTUS) 44 Unit(s) SubCutaneous at bedtime  insulin lispro (HumaLOG) corrective regimen sliding scale   SubCutaneous at bedtime  insulin lispro (HumaLOG) corrective regimen sliding scale   SubCutaneous three times a day before meals  insulin lispro Injectable (HumaLOG) 15 Unit(s) SubCutaneous before dinner  insulin lispro Injectable (HumaLOG) 20 Unit(s) SubCutaneous before breakfast  insulin lispro Injectable (HumaLOG) 15 Unit(s) SubCutaneous before lunch  isosorbide   mononitrate ER Tablet (IMDUR) 30 milliGRAM(s) Oral daily  levothyroxine 188 MICROGram(s) Oral daily  mesalamine DR (24-Hour) Tablet 2.4 Gram(s) Oral daily  metolazone 2.5 milliGRAM(s) Oral <User Schedule>  norethindrone acetate 5 milliGRAM(s) Oral daily  pantoprazole    Tablet 40 milliGRAM(s) Oral before breakfast  polyethylene glycol 3350 17 Gram(s) Oral daily  senna 2 Tablet(s) Oral at bedtime  spironolactone 25 milliGRAM(s) Oral daily  tiotropium 18 MICROgram(s) Capsule 1 Capsule(s) Inhalation daily  torsemide 50 milliGRAM(s) Oral daily    MEDICATIONS  (PRN):  acetaminophen   Tablet .. 650 milliGRAM(s) Oral every 6 hours PRN Mild Pain (1 - 3), Moderate Pain (4 - 6)  bisacodyl Suppository 10 milliGRAM(s) Rectal once PRN Constipation  dextrose 40% Gel 15 Gram(s) Oral once PRN Blood Glucose LESS THAN 70 milliGRAM(s)/deciliter  glucagon  Injectable 1 milliGRAM(s) IntraMuscular once PRN Glucose LESS THAN 70 milligrams/deciliter          ***** VITAL SIGNS:  T(F): 98.3 (19 @ 12:53), Max: 98.6 (19 @ 07:00)  HR: 92 (19 @ 12:53) (92 - 100)  BP: 128/67 (19 @ 12:53) (120/72 - 145/92)  RR: 18 (19 @ 12:53) (18 - 18)  SpO2: 100% (19 @ 12:53) (98% - 100%)  Wt(kg): --  ,   I&O's Summary    2019 07:01  -  2019 07:00  --------------------------------------------------------  IN: 360 mL / OUT: 1500 mL / NET: -1140 mL             *****PHYSICAL EXAM:  alert oriented x 3 attention comprehension are fair.  Able to name, repeat.   EOmi fundi not visualized   no nystagmus VFF to confrontation  Tongue is midline  Palate elevates symmetrically   Moving all 4 ext spontaneously  except limited mvt of rle due to immobilizer   Gait not assessed.          *****LAB AND IMAGIN.4    9.60  )-----------( 208      ( 2019 09:15 )             30.5               -    138  |  93<L>  |  51<H>  ----------------------------<  299<H>  3.3<L>   |  31  |  1.39<H>    Ca    9.8      2019 07:22      PT/INR - ( 2019 09:14 )   PT: 11.4 sec;   INR: 0.99 ratio         PTT - ( 2019 09:14 )  PTT:26.2 sec                 < from: MR Knee No Cont, Right (19 @ 15:14) >  1.  Full-thickness quadriceps tendon tear at the level of the upper pole   of the patella with 31 mm gap between the superior pole of the patella   and the torn tendonfibers.  2.  Large knee joint effusion.  3.  Tear of the medial meniscus.    < end of copied text >      [All pertinent recent Imaging/Reports reviewed]           *****A S S E S S M E N T   A N D   P L A N :    77 yo woman w/ hx of moderate MS s/p bioprosthetic mitral valve, diastolic CHF, paroxsmal afib (not on A/C in setting of bleeding), HTN, severe pulmonary HTN, DMT2, CKD III, anemia, with post menopausal vaginal bleeding, UC, VICKY on 3L NC (not on CPAP/BIPAP) presents from home in setting of multiple falls the past 2 days resulting with right knee pain. Patient had a fall yesterday from 1 step while walking out of the house and tripped. Per patient states that her right leg had a buckling/collapsing sensation. Patient went to [place] ED on 3/29 and had an xray which did not reveal fracture and sent home with cyclobenzaprine and Lidoderm patch. Patient last use of medication on 3/29. Patient had another fall while attempting to ambulate to the bathroom. She described that her right leg buckled under her causing her to fall. It was difficult for her to get up on her own and required the assistance of her  and son-in-law to get up. Patient denies any preceding symptoms of dizziness/lightheadedness, shortness of breath, CP, palpitations preceding the events. Endorses chronic back pain with no new characteristics. Patient has been getting around home with use of wheelchair the past 4 months. Requires assistance of her  to get around because of chronic weakness. Denies development of new numbness of lower extremities. Does not utilize any other assistive devices. Has not had PT outpatient. Patient also has had chronic LE wounds that were dressed from last discharge on 3/27. Denies changing of dressing. Denies fevers, chills, sweats.    pt with hx of spinal stenosis recently has been using a wheelchair for 2 weeks, then got up and fell taken to [facility], discharged then fell again at home and was brought to [facility].       Problem/Recommendations 1: post traumatic R knee  pain of unclear etiology    initially refused mri of the knee,   mri confirms quad tendon tear        Problem/Recommendations 2: spinal stenosis with symptoms of R leg buckling likely due to spinal stenosis +/- deconditioning   no sign of radiculopathy   pt unable to tolerate the mri due to claustrophobia.   evidence of multi level degenerative changes, with moderate spinal stenosis form l1- l5   according to sheng at bedside, pt has been wheelchair bound atleast 1 year, with progressive decline in clincial status.      Thank you for allowing me to participate in the care of this patient. Please do not hesitate to call me if you have any  questions.        ________________  Simran Mattson MD  [facility] ([facility]  [phone]      30 minutes spent on total encounter; more than 50 % of the visit was  spent counseling about plan of care, compliance to diet/exercise and medication regimen and or  coordinating care by the attending physician.      It is advised that s stroke patients follow up with BAL De Jesus @ [phone] in 1- 2 weeks.   Others please follow up with Dr. Michael Nissenbaum [phone]

## 2019-04-10 ENCOUNTER — TRANSCRIPTION ENCOUNTER (OUTPATIENT)
Age: 79
End: 2019-04-10

## 2019-04-10 LAB
ANION GAP SERPL CALC-SCNC: 19 MMOL/L — HIGH (ref 5–17)
BUN SERPL-MCNC: 51 MG/DL — HIGH (ref 7–23)
CALCIUM SERPL-MCNC: 10 MG/DL — SIGNIFICANT CHANGE UP (ref 8.4–10.5)
CHLORIDE SERPL-SCNC: 95 MMOL/L — LOW (ref 96–108)
CO2 SERPL-SCNC: 25 MMOL/L — SIGNIFICANT CHANGE UP (ref 22–31)
CREAT SERPL-MCNC: 1.53 MG/DL — HIGH (ref 0.5–1.3)
GLUCOSE BLDC GLUCOMTR-MCNC: 254 MG/DL — HIGH (ref 70–99)
GLUCOSE BLDC GLUCOMTR-MCNC: 258 MG/DL — HIGH (ref 70–99)
GLUCOSE BLDC GLUCOMTR-MCNC: 264 MG/DL — HIGH (ref 70–99)
GLUCOSE BLDC GLUCOMTR-MCNC: 286 MG/DL — HIGH (ref 70–99)
GLUCOSE SERPL-MCNC: 210 MG/DL — HIGH (ref 70–99)
HCT VFR BLD CALC: 31.6 % — LOW (ref 34.5–45)
HGB BLD-MCNC: 10.6 G/DL — LOW (ref 11.5–15.5)
MCHC RBC-ENTMCNC: 32.3 PG — SIGNIFICANT CHANGE UP (ref 27–34)
MCHC RBC-ENTMCNC: 33.4 GM/DL — SIGNIFICANT CHANGE UP (ref 32–36)
MCV RBC AUTO: 96.6 FL — SIGNIFICANT CHANGE UP (ref 80–100)
NON-GYNECOLOGICAL CYTOLOGY STUDY: SIGNIFICANT CHANGE UP
PLATELET # BLD AUTO: 215 K/UL — SIGNIFICANT CHANGE UP (ref 150–400)
POTASSIUM SERPL-MCNC: 3.7 MMOL/L — SIGNIFICANT CHANGE UP (ref 3.5–5.3)
POTASSIUM SERPL-SCNC: 3.7 MMOL/L — SIGNIFICANT CHANGE UP (ref 3.5–5.3)
RBC # BLD: 3.27 M/UL — LOW (ref 3.8–5.2)
RBC # FLD: 15.9 % — HIGH (ref 10.3–14.5)
SODIUM SERPL-SCNC: 139 MMOL/L — SIGNIFICANT CHANGE UP (ref 135–145)
WBC # BLD: 12.8 K/UL — HIGH (ref 3.8–10.5)
WBC # FLD AUTO: 12.8 K/UL — HIGH (ref 3.8–10.5)

## 2019-04-10 PROCEDURE — 99232 SBSQ HOSP IP/OBS MODERATE 35: CPT

## 2019-04-10 RX ORDER — AMIODARONE HYDROCHLORIDE 400 MG/1
200 TABLET ORAL DAILY
Qty: 0 | Refills: 0 | Status: DISCONTINUED | OUTPATIENT
Start: 2019-04-10 | End: 2019-04-11

## 2019-04-10 RX ORDER — SENNA PLUS 8.6 MG/1
2 TABLET ORAL AT BEDTIME
Qty: 0 | Refills: 0 | Status: DISCONTINUED | OUTPATIENT
Start: 2019-04-10 | End: 2019-04-15

## 2019-04-10 RX ORDER — LEVOTHYROXINE SODIUM 125 MCG
188 TABLET ORAL DAILY
Qty: 0 | Refills: 0 | Status: DISCONTINUED | OUTPATIENT
Start: 2019-04-10 | End: 2019-04-11

## 2019-04-10 RX ORDER — ACETAMINOPHEN 500 MG
650 TABLET ORAL EVERY 6 HOURS
Qty: 0 | Refills: 0 | Status: DISCONTINUED | OUTPATIENT
Start: 2019-04-10 | End: 2019-04-15

## 2019-04-10 RX ORDER — DOCUSATE SODIUM 100 MG
100 CAPSULE ORAL THREE TIMES A DAY
Qty: 0 | Refills: 0 | Status: DISCONTINUED | OUTPATIENT
Start: 2019-04-10 | End: 2019-04-11

## 2019-04-10 RX ORDER — SODIUM CHLORIDE 9 MG/ML
1000 INJECTION, SOLUTION INTRAVENOUS
Qty: 0 | Refills: 0 | Status: DISCONTINUED | OUTPATIENT
Start: 2019-04-10 | End: 2019-04-14

## 2019-04-10 RX ORDER — BUDESONIDE AND FORMOTEROL FUMARATE DIHYDRATE 160; 4.5 UG/1; UG/1
2 AEROSOL RESPIRATORY (INHALATION)
Qty: 0 | Refills: 0 | Status: DISCONTINUED | OUTPATIENT
Start: 2019-04-10 | End: 2019-04-11

## 2019-04-10 RX ORDER — MESALAMINE 400 MG
2.4 TABLET, DELAYED RELEASE (ENTERIC COATED) ORAL DAILY
Qty: 0 | Refills: 0 | Status: DISCONTINUED | OUTPATIENT
Start: 2019-04-10 | End: 2019-04-15

## 2019-04-10 RX ORDER — POLYETHYLENE GLYCOL 3350 17 G/17G
17 POWDER, FOR SOLUTION ORAL DAILY
Qty: 0 | Refills: 0 | Status: DISCONTINUED | OUTPATIENT
Start: 2019-04-10 | End: 2019-04-11

## 2019-04-10 RX ORDER — INSULIN GLARGINE 100 [IU]/ML
24 INJECTION, SOLUTION SUBCUTANEOUS AT BEDTIME
Qty: 0 | Refills: 0 | Status: DISCONTINUED | OUTPATIENT
Start: 2019-04-10 | End: 2019-04-11

## 2019-04-10 RX ORDER — NORETHINDRONE 0.35 MG/1
5 TABLET ORAL DAILY
Qty: 0 | Refills: 0 | Status: DISCONTINUED | OUTPATIENT
Start: 2019-04-10 | End: 2019-04-15

## 2019-04-10 RX ORDER — PANTOPRAZOLE SODIUM 20 MG/1
40 TABLET, DELAYED RELEASE ORAL
Qty: 0 | Refills: 0 | Status: DISCONTINUED | OUTPATIENT
Start: 2019-04-10 | End: 2019-04-11

## 2019-04-10 RX ORDER — DULOXETINE HYDROCHLORIDE 30 MG/1
60 CAPSULE, DELAYED RELEASE ORAL DAILY
Qty: 0 | Refills: 0 | Status: DISCONTINUED | OUTPATIENT
Start: 2019-04-10 | End: 2019-04-11

## 2019-04-10 RX ORDER — SPIRONOLACTONE 25 MG/1
25 TABLET, FILM COATED ORAL DAILY
Qty: 0 | Refills: 0 | Status: DISCONTINUED | OUTPATIENT
Start: 2019-04-10 | End: 2019-04-11

## 2019-04-10 RX ORDER — ONDANSETRON 8 MG/1
4 TABLET, FILM COATED ORAL ONCE
Qty: 0 | Refills: 0 | Status: DISCONTINUED | OUTPATIENT
Start: 2019-04-10 | End: 2019-04-15

## 2019-04-10 RX ORDER — CARVEDILOL PHOSPHATE 80 MG/1
6.25 CAPSULE, EXTENDED RELEASE ORAL EVERY 12 HOURS
Qty: 0 | Refills: 0 | Status: DISCONTINUED | OUTPATIENT
Start: 2019-04-10 | End: 2019-04-11

## 2019-04-10 RX ORDER — HYDROMORPHONE HYDROCHLORIDE 2 MG/ML
0.2 INJECTION INTRAMUSCULAR; INTRAVENOUS; SUBCUTANEOUS
Qty: 0 | Refills: 0 | Status: DISCONTINUED | OUTPATIENT
Start: 2019-04-10 | End: 2019-04-15

## 2019-04-10 RX ORDER — NORETHINDRONE 0.35 MG/1
5 TABLET ORAL DAILY
Qty: 0 | Refills: 0 | Status: DISCONTINUED | OUTPATIENT
Start: 2019-04-10 | End: 2019-04-10

## 2019-04-10 RX ORDER — TIOTROPIUM BROMIDE 18 UG/1
1 CAPSULE ORAL; RESPIRATORY (INHALATION) DAILY
Qty: 0 | Refills: 0 | Status: DISCONTINUED | OUTPATIENT
Start: 2019-04-10 | End: 2019-04-11

## 2019-04-10 RX ORDER — ISOSORBIDE MONONITRATE 60 MG/1
30 TABLET, EXTENDED RELEASE ORAL DAILY
Qty: 0 | Refills: 0 | Status: DISCONTINUED | OUTPATIENT
Start: 2019-04-10 | End: 2019-04-11

## 2019-04-10 RX ADMIN — Medication 2.4 GRAM(S): at 12:30

## 2019-04-10 RX ADMIN — CARVEDILOL PHOSPHATE 6.25 MILLIGRAM(S): 80 CAPSULE, EXTENDED RELEASE ORAL at 18:11

## 2019-04-10 RX ADMIN — Medication 100 MILLIGRAM(S): at 05:14

## 2019-04-10 RX ADMIN — SPIRONOLACTONE 25 MILLIGRAM(S): 25 TABLET, FILM COATED ORAL at 05:15

## 2019-04-10 RX ADMIN — SENNA PLUS 2 TABLET(S): 8.6 TABLET ORAL at 22:26

## 2019-04-10 RX ADMIN — Medication 15 UNIT(S): at 12:28

## 2019-04-10 RX ADMIN — CARVEDILOL PHOSPHATE 6.25 MILLIGRAM(S): 80 CAPSULE, EXTENDED RELEASE ORAL at 05:15

## 2019-04-10 RX ADMIN — Medication 15 UNIT(S): at 17:00

## 2019-04-10 RX ADMIN — Medication 50 MILLIGRAM(S): at 05:14

## 2019-04-10 RX ADMIN — Medication 20 UNIT(S): at 08:04

## 2019-04-10 RX ADMIN — Medication 6: at 08:04

## 2019-04-10 RX ADMIN — Medication 6: at 17:00

## 2019-04-10 RX ADMIN — Medication 100 MILLIGRAM(S): at 22:26

## 2019-04-10 RX ADMIN — Medication 1: at 22:18

## 2019-04-10 RX ADMIN — AMIODARONE HYDROCHLORIDE 200 MILLIGRAM(S): 400 TABLET ORAL at 05:14

## 2019-04-10 RX ADMIN — INSULIN GLARGINE 24 UNIT(S): 100 INJECTION, SOLUTION SUBCUTANEOUS at 22:18

## 2019-04-10 RX ADMIN — Medication 188 MICROGRAM(S): at 05:14

## 2019-04-10 RX ADMIN — Medication 6: at 12:28

## 2019-04-10 RX ADMIN — BUDESONIDE AND FORMOTEROL FUMARATE DIHYDRATE 2 PUFF(S): 160; 4.5 AEROSOL RESPIRATORY (INHALATION) at 18:11

## 2019-04-10 RX ADMIN — BUDESONIDE AND FORMOTEROL FUMARATE DIHYDRATE 2 PUFF(S): 160; 4.5 AEROSOL RESPIRATORY (INHALATION) at 05:15

## 2019-04-10 RX ADMIN — Medication 100 MILLIGRAM(S): at 13:00

## 2019-04-10 RX ADMIN — NORETHINDRONE 5 MILLIGRAM(S): 0.35 TABLET ORAL at 13:35

## 2019-04-10 RX ADMIN — DULOXETINE HYDROCHLORIDE 60 MILLIGRAM(S): 30 CAPSULE, DELAYED RELEASE ORAL at 12:30

## 2019-04-10 RX ADMIN — POLYETHYLENE GLYCOL 3350 17 GRAM(S): 17 POWDER, FOR SOLUTION ORAL at 12:59

## 2019-04-10 RX ADMIN — ISOSORBIDE MONONITRATE 30 MILLIGRAM(S): 60 TABLET, EXTENDED RELEASE ORAL at 12:30

## 2019-04-10 RX ADMIN — PANTOPRAZOLE SODIUM 40 MILLIGRAM(S): 20 TABLET, DELAYED RELEASE ORAL at 05:16

## 2019-04-10 NOTE — PROGRESS NOTE ADULT - SUBJECTIVE AND OBJECTIVE BOX
Patient is a 78y old  Female who presents with a chief complaint of fall, right knee pain (10 Apr 2019 20:27)      SUBJECTIVE / OVERNIGHT EVENTS: no new c/o    MEDICATIONS  (STANDING):  amiodarone    Tablet 200 milliGRAM(s) Oral daily  buDESOnide 160 MICROgram(s)/formoterol 4.5 MICROgram(s) Inhaler 2 Puff(s) Inhalation two times a day  carvedilol 6.25 milliGRAM(s) Oral every 12 hours  dextrose 5%. 1000 milliLiter(s) (50 mL/Hr) IV Continuous <Continuous>  dextrose 50% Injectable 12.5 Gram(s) IV Push once  dextrose 50% Injectable 25 Gram(s) IV Push once  dextrose 50% Injectable 25 Gram(s) IV Push once  docusate sodium 100 milliGRAM(s) Oral three times a day  DULoxetine 60 milliGRAM(s) Oral daily  insulin glargine Injectable (LANTUS) 24 Unit(s) SubCutaneous at bedtime  insulin lispro (HumaLOG) corrective regimen sliding scale   SubCutaneous three times a day before meals  insulin lispro (HumaLOG) corrective regimen sliding scale   SubCutaneous at bedtime  insulin lispro Injectable (HumaLOG) 20 Unit(s) SubCutaneous before breakfast  insulin lispro Injectable (HumaLOG) 15 Unit(s) SubCutaneous before lunch  insulin lispro Injectable (HumaLOG) 15 Unit(s) SubCutaneous before dinner  isosorbide   mononitrate ER Tablet (IMDUR) 30 milliGRAM(s) Oral daily  lactated ringers. 1000 milliLiter(s) (50 mL/Hr) IV Continuous <Continuous>  levothyroxine 188 MICROGram(s) Oral daily  mesalamine DR (24-Hour) Tablet 2.4 Gram(s) Oral daily  metolazone 2.5 milliGRAM(s) Oral <User Schedule>  norethindrone acetate 5 milliGRAM(s) Oral daily  pantoprazole    Tablet 40 milliGRAM(s) Oral before breakfast  polyethylene glycol 3350 17 Gram(s) Oral daily  senna 2 Tablet(s) Oral at bedtime  spironolactone 25 milliGRAM(s) Oral daily  tiotropium 18 MICROgram(s) Capsule 1 Capsule(s) Inhalation daily  torsemide 50 milliGRAM(s) Oral daily    MEDICATIONS  (PRN):  acetaminophen   Tablet .. 650 milliGRAM(s) Oral every 6 hours PRN Mild Pain (1 - 3), Moderate Pain (4 - 6)  bisacodyl Suppository 10 milliGRAM(s) Rectal once PRN Constipation  dextrose 40% Gel 15 Gram(s) Oral once PRN Blood Glucose LESS THAN 70 milliGRAM(s)/deciliter  glucagon  Injectable 1 milliGRAM(s) IntraMuscular once PRN Glucose LESS THAN 70 milligrams/deciliter  HYDROmorphone  Injectable 0.2 milliGRAM(s) IV Push every 10 minutes PRN Moderate Pain (4 - 6)  ondansetron Injectable 4 milliGRAM(s) IV Push once PRN Nausea and/or Vomiting      Vital Signs Last 24 Hrs  T(F): 98.9 (04-10-19 @ 20:00), Max: 98.9 (04-10-19 @ 20:00)  HR: 97 (04-10-19 @ 20:00) (92 - 99)  BP: 119/69 (04-10-19 @ 20:00) (119/69 - 157/74)  RR: 18 (04-10-19 @ 20:00) (18 - 18)  SpO2: 98% (04-10-19 @ 20:00) (98% - 100%)  Telemetry:   CAPILLARY BLOOD GLUCOSE      POCT Blood Glucose.: 264 mg/dL (10 Apr 2019 21:43)  POCT Blood Glucose.: 254 mg/dL (10 Apr 2019 16:53)  POCT Blood Glucose.: 286 mg/dL (10 Apr 2019 12:06)  POCT Blood Glucose.: 258 mg/dL (10 Apr 2019 07:39)    I&O's Summary    09 Apr 2019 07:01  -  10 Apr 2019 07:00  --------------------------------------------------------  IN: 590 mL / OUT: 1400 mL / NET: -810 mL    10 Apr 2019 07:01  -  10 Apr 2019 23:17  --------------------------------------------------------  IN: 240 mL / OUT: 900 mL / NET: -660 mL        PHYSICAL EXAM:  GENERAL: NAD, well-developed  HEAD:  Atraumatic, Normocephalic  EYES: EOMI, PERRLA, conjunctiva and sclera clear  NECK: Supple, No JVD  CHEST/LUNG: Clear to auscultation bilaterally; No wheeze  HEART: Regular rate and rhythm; No murmurs, rubs, or gallops  ABDOMEN: Soft, Nontender, Nondistended; Bowel sounds present  EXTREMITIES:  2+ Peripheral Pulses, No clubbing, cyanosis, or edema  PSYCH: AAOx3  NEUROLOGY: non-focal  SKIN: No rashes or lesions    LABS:                        10.6   12.8  )-----------( 215      ( 10 Apr 2019 09:29 )             31.6     04-10    139  |  95<L>  |  51<H>  ----------------------------<  210<H>  3.7   |  25  |  1.53<H>    Ca    10.0      10 Apr 2019 09:29      PT/INR - ( 09 Apr 2019 09:14 )   PT: 11.4 sec;   INR: 0.99 ratio         PTT - ( 09 Apr 2019 09:14 )  PTT:26.2 sec          RADIOLOGY & ADDITIONAL TESTS:    Imaging Personally Reviewed:    Consultant(s) Notes Reviewed:      Care Discussed with Consultants/Other Providers:

## 2019-04-10 NOTE — PROGRESS NOTE ADULT - ASSESSMENT
79yo female with chronic diastolic CHF, bioprosthetic MVR, PAF, VICKY, PAD, CKD, DM, HLD and LSS admitted with recurrent falls. She has severe lumbar spine disease and will need SAMANTHA.  She has been more SOB due to anemia in setting of obesity and chronic diastolic CHF.  She has been off coumadin past 4 weeks with continued bleeding.  Low K due to diuretics.  Found to have quadriceps tendon tear.    Patient is optimized and stable from cardiac standpoint and is acceptable risk to proceed with low risk orthopedic surgery to prevent further immobility.  Given her multiple comorbidities, it would be best to use the most local anesthesia possible whether it be epidural or local block.    Rec:  Continue beta blocker in the perioperative period.  Continue torsemide 50mg once per day.   Monitor lytes, renal function; replete K  Ortho followup  SAMANTHA after above.     Russell Jimenez MD  [facility]

## 2019-04-10 NOTE — PROGRESS NOTE ADULT - ATTENDING COMMENTS
multifactorial resp failure-cards/copd/obesity hypoventilation, osas, debility, obesity, PAH--O2 2 liters NC-stable  copd-symbicort 160 2 bid, spiriva 1 puff q day, xopenex .63 q 6, incentive spirometry  Cards-as per Nelli et al  Obesity-nutrition consult  OSAS-outpt pt rx  PAH-WHO grp 2-cards rx  GYN-s/p procedure      ****Vascular-no absolute pulm contras to procedures  Fito Montgomery MD-Pulmonary   [phone]

## 2019-04-10 NOTE — PROGRESS NOTE ADULT - ASSESSMENT
77 yo F w/ hx of moderate MS s/p bioprosthetic mitral valve, severe pulmonary HTN, DM2, anemia, diastolic CHF, hypothyroidism, paroxysmal atrial fibrillation, HTN78 y/o Type 2 DM, hypothyroid, recently admitted with CHF exacerbation, re-admitted with recurrent falls and suspected UTI.    Type 2 DM insulin resistant at home on Tresiba insulin 60 units daily, and humalog 30 units before meals. Other treatment includes Bydureon 2 mg weekly. HbA1c during recent admission was 7.4 %. During psast admission noted with significantly reduced insulin requirenents, discharged on Lantus insulin 24 units at HS and Humalog 5 units per meal.  Patient re-admitted after recurrent falls at home, and susp. UTI, started on similar dose insulin, noted with hyperglycemia.     Hypothyroidism- treated with synthroid 175 mcg daily. Last test as outpatient recently reported Good, Here with mild elevated TSH 5.27. repeat TSH this admission  9.70 uIU/mL, FT4 1.5.  C/O fatigue, weight stable till recently. Says she is compliant with medications. Synthroid increased to 188 mcg daily.    Patient on very high amounts of insulin at home. Decreased requirements last admission are most probably related to CHF, and as it resolves expect insulin requirements sanna increase.  Plan for surgery for Quadriceps tendon tear tomorrow at am, NPO till then.  On ERT for vaginal bleeding.  PO intake variable, On lantus insulin 48 units at HS, humalog pre-meals 20+15+15.  FS higher last 48 hours, mostly in the 200th, may be related to hormonal treatment.  PO intake good.     Will Decrease basal insulin dose to 24 units as patient NPO.  F/U PO inytake post procedure o restart pre-meal humalog.  F/U TFT's on increased synthroid dose.      Suggest:  Lantus insulin 24 units tonight adjusted to NPO.  Hold pre-meal humalog while NPO.  Mid scale humalog.  Synthroid 188 mcg daily, repeat TFT's post procedure.

## 2019-04-10 NOTE — PROGRESS NOTE ADULT - SUBJECTIVE AND OBJECTIVE BOX
Pt S/E at bedside, no acute events overnight, pain controlled    Vital Signs Last 24 Hrs  T(C): 36.7 (10 Apr 2019 04:03), Max: 37.1 (09 Apr 2019 18:08)  T(F): 98 (10 Apr 2019 04:03), Max: 98.7 (09 Apr 2019 18:08)  HR: 96 (10 Apr 2019 04:03) (89 - 100)  BP: 157/74 (10 Apr 2019 04:03) (117/59 - 157/74)  BP(mean): 87 (09 Apr 2019 17:00) (82 - 93)  RR: 18 (10 Apr 2019 04:03) (16 - 18)  SpO2: 100% (10 Apr 2019 04:03) (93% - 100%)    Gen: NAD,    Right Lower Extremity:  Knee immobilizer, clean dry intact  +EHL/FHL/TA/GS  SILT L3-S1  +DP/PT Pulses  Compartments soft  No calf TTP B/L      Assessment and Plan:   · Assessment		  A/P: 78F with Right quad tendon tear, MRI proven  Dr. Guzman to discuss with daughter today regarding OR for quad tendon  [facility] Gyn ONC and post op course  will d/w attending

## 2019-04-10 NOTE — PROGRESS NOTE ADULT - ASSESSMENT
77 yo F with ILD, VICKY/OHS with chronic hypercapnic and hypoxic respiratory failure on 3L NC (not on CPAP), PAD/PVD with chronic LE wounds, h/o MS s/p bioprosthetic MVR, HFpEF, paroxsmal afib (not on A/C in setting of bleeding), HTN,  DM, CKD III, anemia, post menopausal vaginal bleeding s/p D&C in July 2018 (on pathology found to have polyps, started on hormonal therapy with resolution of bleeding), UC, , morbid obesity p/w right knee pain and edema after multiple falls.  Patient denies any preceding symptoms of dizziness/lightheadedness, shortness of breath, CP, palpitations preceding the events. Endorses chronic back pain, has a history of spinal stenosis and has been using a wheelchair the past 4 months. Requires assistance of her  for ADLS    UA +, urine cultures growing Proteus and is being treated with Aztreonam and Vanco.  +vaginal bleeding over the past 24 hrs (has not been on Progesterone since admission)    Nonsmoker, has been following with Dr. Montgomery over the past year for symptoms of progressive dyspnea, now with minimal exertion. Prior HRCT chest showed evidence of ILD - unclear if related to underlying RA vs. amiodarone.  Has been on supplemental O2 since May 2018, 2LNC around the clock.   H/o VICKY diagnosed years ago - not on therapy  Home inhaler regimen: Albuterol nebs, anoro ellipta    A/P:  Dyspnea - multifactorial - related to underlying ILD, obesity, immobility 2/2 lumbar pain, HFpEF, anemia  Chronic respiratory failure with hypoxemia and hypercapnia  VICKY/OHS - untreated  Post menopausal Vaginal bleeding  UTI  Spinal stenosis, gait instability    Rec:  1. Dyspnea - Breathing at baseline - not dyspneic at rest, saturating well with 2LNC; CT chest and CXR unchanged  c/w current inhaler regimen- Symbicort, bronchodilators. Incentive spirometer  Given the patient's underlying VICKY/OHS with evidence of chronic hypercapnia, a trial of CPAP therapy should be considered and I have explained this to the patient and her family prior- she will require titration study as an outpatient. Agree with supplemental O2 24/7 - goal sats low 90s  2. Vaginal bleeding: GYN consulted for further eval and treatment. s/p Pelvic sono--for D and C today  3. UTI- antibiotics as per ID  4. Afib - cardiac optimization- rate control, BP management, Cardiology following  5. PPX - DVT ppx  ******  4/9 for GYN procedure; 4/10-s/p gyn procedure-await vascular procedure

## 2019-04-10 NOTE — PROGRESS NOTE ADULT - SUBJECTIVE AND OBJECTIVE BOX
KEESHAGALINA TANIKA    Patient is a 78y old  Female who presents with a chief complaint of fall, right knee pain (10 Apr 2019 08:18)    Pt is clinically stable.  R knee pain controlled at rest.  Stable s/p D and C; no further vaginal bleeding.  No dyspnea, chest pain, or palpitations.    Allergies    Augmentin (Swelling)  cephalexin (Swelling)    Intolerances      MEDICATIONS  (STANDING):  amiodarone    Tablet 200 milliGRAM(s) Oral daily  buDESOnide 160 MICROgram(s)/formoterol 4.5 MICROgram(s) Inhaler 2 Puff(s) Inhalation two times a day  carvedilol 6.25 milliGRAM(s) Oral every 12 hours  dextrose 5%. 1000 milliLiter(s) (50 mL/Hr) IV Continuous <Continuous>  dextrose 50% Injectable 12.5 Gram(s) IV Push once  dextrose 50% Injectable 25 Gram(s) IV Push once  dextrose 50% Injectable 25 Gram(s) IV Push once  docusate sodium 100 milliGRAM(s) Oral three times a day  DULoxetine 60 milliGRAM(s) Oral daily  insulin glargine Injectable (LANTUS) 48 Unit(s) SubCutaneous at bedtime  insulin lispro (HumaLOG) corrective regimen sliding scale   SubCutaneous three times a day before meals  insulin lispro (HumaLOG) corrective regimen sliding scale   SubCutaneous at bedtime  insulin lispro Injectable (HumaLOG) 20 Unit(s) SubCutaneous before breakfast  insulin lispro Injectable (HumaLOG) 15 Unit(s) SubCutaneous before lunch  insulin lispro Injectable (HumaLOG) 15 Unit(s) SubCutaneous before dinner  isosorbide   mononitrate ER Tablet (IMDUR) 30 milliGRAM(s) Oral daily  levothyroxine 188 MICROGram(s) Oral daily  mesalamine DR (24-Hour) Tablet 2.4 Gram(s) Oral daily  metolazone 2.5 milliGRAM(s) Oral <User Schedule>  norethindrone acetate 5 milliGRAM(s) Oral daily  pantoprazole    Tablet 40 milliGRAM(s) Oral before breakfast  polyethylene glycol 3350 17 Gram(s) Oral daily  senna 2 Tablet(s) Oral at bedtime  spironolactone 25 milliGRAM(s) Oral daily  tiotropium 18 MICROgram(s) Capsule 1 Capsule(s) Inhalation daily  torsemide 50 milliGRAM(s) Oral daily    MEDICATIONS  (PRN):  acetaminophen   Tablet .. 650 milliGRAM(s) Oral every 6 hours PRN Mild Pain (1 - 3), Moderate Pain (4 - 6)  bisacodyl Suppository 10 milliGRAM(s) Rectal once PRN Constipation  dextrose 40% Gel 15 Gram(s) Oral once PRN Blood Glucose LESS THAN 70 milliGRAM(s)/deciliter  glucagon  Injectable 1 milliGRAM(s) IntraMuscular once PRN Glucose LESS THAN 70 milligrams/deciliter  HYDROmorphone  Injectable 0.2 milliGRAM(s) IV Push every 10 minutes PRN Moderate Pain (4 - 6)  ondansetron Injectable 4 milliGRAM(s) IV Push once PRN Nausea and/or Vomiting    PHYSICAL EXAM:  Vital Signs Last 24 Hrs  T(C): 36.9 (10 Apr 2019 14:06), Max: 37.1 (2019 18:08)  T(F): 98.5 (10 Apr 2019 14:06), Max: 98.7 (2019 18:08)  HR: 99 (10 Apr 2019 14:06) (92 - 99)  BP: 132/79 (10 Apr 2019 14:06) (124/62 - 157/74)  BP(mean): 87 (2019 17:00) (85 - 93)  RR: 18 (10 Apr 2019 14:06) (16 - 18)  SpO2: 99% (10 Apr 2019 14:06) (99% - 100%)  Daily     Daily Weight in k.4 (10 Apr 2019 06:30)  I&O's Summary    2019 07:01  -  10 Apr 2019 07:00  --------------------------------------------------------  IN: 590 mL / OUT: 1400 mL / NET: -810 mL    General Appearance: 	 Alert, cooperative, no distress  Neck: no JVD  Lungs:  clear to auscultation and percussion bilaterally  Cor:  pmi 5th ICS MCL, regular rate and rhythm, S1 normal intensity, S2 normal intensity  Abdomen:	 soft, non-tender; bowel sounds normal  Extremities: without cyanosis, clubbing or edema      EKG:  Telemetry:    Labs:  CBC Full  -  ( 10 Apr 2019 09:29 )  WBC Count : 12.8 K/uL  RBC Count : 3.27 M/uL  Hemoglobin : 10.6 g/dL  Hematocrit : 31.6 %  Platelet Count - Automated : 215 K/uL  Mean Cell Volume : 96.6 fl  Mean Cell Hemoglobin : 32.3 pg  Mean Cell Hemoglobin Concentration : 33.4 gm/dL  Auto Neutrophil # : x  Auto Lymphocyte # : x  Auto Monocyte # : x  Auto Eosinophil # : x  Auto Basophil # : x  Auto Neutrophil % : x  Auto Lymphocyte % : x  Auto Monocyte % : x  Auto Eosinophil % : x  Auto Basophil % : x        PT/INR - ( 2019 09:14 )   PT: 11.4 sec;   INR: 0.99 ratio         PTT - ( 2019 09:14 )  PTT:26.2 sec      MRI with quadriceps tear, joint effusion and medial meniscus tear

## 2019-04-10 NOTE — PROGRESS NOTE ADULT - SUBJECTIVE AND OBJECTIVE BOX
CHIEF COMPLAINT:  f/up resp failure, preop clearance, copd, restrictive dysfunction, PAH--no signif sob cough, back pain persists     Interval Events: s/p GYN procedure    REVIEW OF SYSTEMS:  Constitutional: No fevers or chills. No weight loss. + fatigue or generalized malaise.  Eyes: No itching or discharge from the eyes  ENT: No ear pain. No ear discharge. No nasal congestion. No post nasal drip. No epistaxis. No throat pain. No sore throat. No difficulty swallowing.   CV: No chest pain. No palpitations. No lightheadedness or dizziness.   Resp: No dyspnea at rest. +dyspnea on exertion. No orthopnea. No wheezing. No cough. No stridor. No sputum production. No chest pain with respiration.  GI: No nausea. No vomiting. No diarrhea.  MSK: No joint pain or pain in any extremities  Integumentary: No skin lesions. +pedal edema.  Neurological: No gross motor weakness. No sensory changes.  [+ ] All other systems negative  [ ] Unable to assess ROS because ________    OBJECTIVE:  ICU Vital Signs Last 24 Hrs  T(C): 36.7 (10 Apr 2019 04:03), Max: 37.1 (09 Apr 2019 18:08)  T(F): 98 (10 Apr 2019 04:03), Max: 98.7 (09 Apr 2019 18:08)  HR: 96 (10 Apr 2019 04:03) (89 - 100)  BP: 157/74 (10 Apr 2019 04:03) (117/59 - 157/74)  BP(mean): 87 (09 Apr 2019 17:00) (82 - 93)  ABP: --  ABP(mean): --  RR: 18 (10 Apr 2019 04:03) (16 - 18)  SpO2: 100% (10 Apr 2019 04:03) (93% - 100%)        04-08 @ 07:01  -  04-09 @ 07:00  --------------------------------------------------------  IN: 360 mL / OUT: 1500 mL / NET: -1140 mL    04-09 @ 07:01  -  04-10 @ 05:39  --------------------------------------------------------  IN: 490 mL / OUT: 1100 mL / NET: -610 mL      CAPILLARY BLOOD GLUCOSE      POCT Blood Glucose.: 242 mg/dL (09 Apr 2019 21:43)      PHYSICAL EXAM: NAD in bed-NC O2  General: Awake, alert, oriented X 3.   HEENT: Atraumatic, normocephalic.                 Mallampatti Grade 3                No nasal congestion.                No tonsillar or pharyngeal exudates.  Lymph Nodes: No palpable lymphadenopathy  Neck: No JVD. No carotid bruit.   Respiratory: Normal chest expansion                         Normal percussion                         Normal and equal air entry                         No wheeze, rhonchi or rales.  Cardiovascular: S1 S2 normal. + murmurs, rubs or gallops.   Abdomen: Soft, non-tender, non-distended. No organomegaly. Normoactive bowel sounds.  Extremities: Warm to touch. Peripheral pulse palpable. + pedal edema.   Skin: No rashes or skin lesions  Neurological: Motor and sensory examination equal and normal in all four extremities.  Psychiatry: Appropriate mood and affect.    HOSPITAL MEDICATIONS:  MEDICATIONS  (STANDING):  amiodarone    Tablet 200 milliGRAM(s) Oral daily  buDESOnide 160 MICROgram(s)/formoterol 4.5 MICROgram(s) Inhaler 2 Puff(s) Inhalation two times a day  carvedilol 6.25 milliGRAM(s) Oral every 12 hours  dextrose 5%. 1000 milliLiter(s) (50 mL/Hr) IV Continuous <Continuous>  dextrose 50% Injectable 12.5 Gram(s) IV Push once  dextrose 50% Injectable 25 Gram(s) IV Push once  dextrose 50% Injectable 25 Gram(s) IV Push once  docusate sodium 100 milliGRAM(s) Oral three times a day  DULoxetine 60 milliGRAM(s) Oral daily  insulin glargine Injectable (LANTUS) 48 Unit(s) SubCutaneous at bedtime  insulin lispro (HumaLOG) corrective regimen sliding scale   SubCutaneous three times a day before meals  insulin lispro (HumaLOG) corrective regimen sliding scale   SubCutaneous at bedtime  insulin lispro Injectable (HumaLOG) 20 Unit(s) SubCutaneous before breakfast  insulin lispro Injectable (HumaLOG) 15 Unit(s) SubCutaneous before lunch  insulin lispro Injectable (HumaLOG) 15 Unit(s) SubCutaneous before dinner  isosorbide   mononitrate ER Tablet (IMDUR) 30 milliGRAM(s) Oral daily  levothyroxine 188 MICROGram(s) Oral daily  mesalamine DR (24-Hour) Tablet 2.4 Gram(s) Oral daily  metolazone 2.5 milliGRAM(s) Oral <User Schedule>  norethindrone acetate 5 milliGRAM(s) Oral daily  pantoprazole    Tablet 40 milliGRAM(s) Oral before breakfast  polyethylene glycol 3350 17 Gram(s) Oral daily  senna 2 Tablet(s) Oral at bedtime  spironolactone 25 milliGRAM(s) Oral daily  tiotropium 18 MICROgram(s) Capsule 1 Capsule(s) Inhalation daily  torsemide 50 milliGRAM(s) Oral daily    MEDICATIONS  (PRN):  acetaminophen   Tablet .. 650 milliGRAM(s) Oral every 6 hours PRN Mild Pain (1 - 3), Moderate Pain (4 - 6)  bisacodyl Suppository 10 milliGRAM(s) Rectal once PRN Constipation  dextrose 40% Gel 15 Gram(s) Oral once PRN Blood Glucose LESS THAN 70 milliGRAM(s)/deciliter  glucagon  Injectable 1 milliGRAM(s) IntraMuscular once PRN Glucose LESS THAN 70 milligrams/deciliter  HYDROmorphone  Injectable 0.2 milliGRAM(s) IV Push every 10 minutes PRN Moderate Pain (4 - 6)  ondansetron Injectable 4 milliGRAM(s) IV Push once PRN Nausea and/or Vomiting      LABS:                        9.4    9.60  )-----------( 208      ( 09 Apr 2019 09:15 )             30.5     04-09    138  |  93<L>  |  51<H>  ----------------------------<  299<H>  3.3<L>   |  31  |  1.39<H>    Ca    9.8      09 Apr 2019 07:22      PT/INR - ( 09 Apr 2019 09:14 )   PT: 11.4 sec;   INR: 0.99 ratio         PTT - ( 09 Apr 2019 09:14 )  PTT:26.2 sec          MICROBIOLOGY:     RADIOLOGY:  [ ] Reviewed and interpreted by me    Point of Care Ultrasound Findings:    PFT:    EKG:

## 2019-04-10 NOTE — PROGRESS NOTE ADULT - SUBJECTIVE AND OBJECTIVE BOX
Chief Complaint: 79 y/o Type 2 DM, hypothyroid, recently admitted with CHF exacerbation, re-admitted with recurrent falls and susp. cellulitis after a recent fall.    Plan for surgery for Quadriceps tendon tear tomorrow at am, NPO till then.  On ERT for vaginal bleeding.  PO intake variable, On lantus insulin 48 units at HS, humalog pre-meals 20+15+15.  FS higher last 48 hours, mostly in the 200th.   PO intake good.       MEDICATIONS  (STANDING):  amiodarone    Tablet 200 milliGRAM(s) Oral daily  buDESOnide 160 MICROgram(s)/formoterol 4.5 MICROgram(s) Inhaler 2 Puff(s) Inhalation two times a day  carvedilol 6.25 milliGRAM(s) Oral every 12 hours  dextrose 5%. 1000 milliLiter(s) (50 mL/Hr) IV Continuous <Continuous>  dextrose 50% Injectable 12.5 Gram(s) IV Push once  dextrose 50% Injectable 25 Gram(s) IV Push once  dextrose 50% Injectable 25 Gram(s) IV Push once  docusate sodium 100 milliGRAM(s) Oral three times a day  DULoxetine 60 milliGRAM(s) Oral daily  insulin glargine Injectable (LANTUS) 24 Unit(s) SubCutaneous at bedtime  insulin lispro (HumaLOG) corrective regimen sliding scale   SubCutaneous three times a day before meals  insulin lispro (HumaLOG) corrective regimen sliding scale   SubCutaneous at bedtime  insulin lispro Injectable (HumaLOG) 20 Unit(s) SubCutaneous before breakfast  insulin lispro Injectable (HumaLOG) 15 Unit(s) SubCutaneous before lunch  insulin lispro Injectable (HumaLOG) 15 Unit(s) SubCutaneous before dinner  isosorbide   mononitrate ER Tablet (IMDUR) 30 milliGRAM(s) Oral daily  lactated ringers. 1000 milliLiter(s) (50 mL/Hr) IV Continuous <Continuous>  levothyroxine 188 MICROGram(s) Oral daily  mesalamine DR (24-Hour) Tablet 2.4 Gram(s) Oral daily  metolazone 2.5 milliGRAM(s) Oral <User Schedule>  norethindrone acetate 5 milliGRAM(s) Oral daily  pantoprazole    Tablet 40 milliGRAM(s) Oral before breakfast  polyethylene glycol 3350 17 Gram(s) Oral daily  senna 2 Tablet(s) Oral at bedtime  spironolactone 25 milliGRAM(s) Oral daily  tiotropium 18 MICROgram(s) Capsule 1 Capsule(s) Inhalation daily  torsemide 50 milliGRAM(s) Oral daily    MEDICATIONS  (PRN):  acetaminophen   Tablet .. 650 milliGRAM(s) Oral every 6 hours PRN Mild Pain (1 - 3), Moderate Pain (4 - 6)  bisacodyl Suppository 10 milliGRAM(s) Rectal once PRN Constipation  dextrose 40% Gel 15 Gram(s) Oral once PRN Blood Glucose LESS THAN 70 milliGRAM(s)/deciliter  glucagon  Injectable 1 milliGRAM(s) IntraMuscular once PRN Glucose LESS THAN 70 milligrams/deciliter  HYDROmorphone  Injectable 0.2 milliGRAM(s) IV Push every 10 minutes PRN Moderate Pain (4 - 6)  ondansetron Injectable 4 milliGRAM(s) IV Push once PRN Nausea and/or Vomiting      Allergies    Augmentin (Swelling)  cephalexin (Swelling)    Intolerances        PHYSICAL EXAM:  VITALS: T(C): 37.2 (04-10-19 @ 20:00)  T(F): 98.9 (04-10-19 @ 20:00), Max: 98.9 (04-10-19 @ 20:00)  HR: 97 (04-10-19 @ 20:00) (92 - 99)  BP: 119/69 (04-10-19 @ 20:00) (119/69 - 157/74)  RR:  (18 - 18)  SpO2:  (98% - 100%)  GENERAL: NAD,   EYES: No proptosis,  HEENT:  Atraumatic, Normocephalic,   RESPIRATORY: Clear to auscultation bilaterally  CARDIOVASCULAR: Regular rhythm; No murmurs; bilat peripheral edema  GI: Soft, nontender, non distended, normal bowel sounds  SKIN: Dry, intact, edema and blistering both feet.  MUSCULOSKELETAL: decreased strength  NEURO: No focal deficits.  Vaginal bleeding noted.  PSYCH: Alert and oriented x 3, normal affect/mood.      POCT Blood Glucose.: 254 mg/dL (04-10-19 @ 16:53)  POCT Blood Glucose.: 286 mg/dL (04-10-19 @ 12:06)  POCT Blood Glucose.: 258 mg/dL (04-10-19 @ 07:39)  POCT Blood Glucose.: 242 mg/dL (04-09-19 @ 21:43)  POCT Blood Glucose.: 202 mg/dL (04-09-19 @ 17:42)  POCT Blood Glucose.: 206 mg/dL (04-09-19 @ 11:48)  POCT Blood Glucose.: 275 mg/dL (04-09-19 @ 07:38)  POCT Blood Glucose.: 222 mg/dL (04-08-19 @ 21:50)  POCT Blood Glucose.: 183 mg/dL (04-08-19 @ 16:36)  POCT Blood Glucose.: 273 mg/dL (04-08-19 @ 12:06)  POCT Blood Glucose.: 168 mg/dL (04-08-19 @ 07:32)  POCT Blood Glucose.: 224 mg/dL (04-07-19 @ 21:43)                            10.6   12.8  )-----------( 215      ( 10 Apr 2019 09:29 )             31.6       04-10    139  |  95<L>  |  51<H>  ----------------------------<  210<H>  3.7   |  25  |  1.53<H>    EGFR if : 38<L>  EGFR if non : 33<L>    Ca    10.0      04-10        Thyroid Function Tests:  04-02 @ 09:19 TSH 9.70 FreeT4 1.5 T3 -- Anti TPO -- Anti Thyroglobulin Ab -- TSI --  03-25 @ 08:52 TSH 5.27 FreeT4 -- T3 -- Anti TPO -- Anti Thyroglobulin Ab -- TSI --      Hemoglobin A1C, Whole Blood: 7.4 % <H> [4.0 - 5.6] (03-25-19 @ 08:55)

## 2019-04-10 NOTE — PROGRESS NOTE ADULT - ASSESSMENT
hematuria vs vaginal bleed with external urethral device    gu house cw uti with no bladder pathology   cystitis not present at cysto    gu fu as outpatient

## 2019-04-10 NOTE — PROGRESS NOTE ADULT - SUBJECTIVE AND OBJECTIVE BOX
Patient seen and examined at bedside. Overnight with mild bleeding. No pain, nausea, emesis, CP/SOB, fevers, chills. Not OOB. Passing flatus.     MEDICATIONS  (STANDING):  amiodarone    Tablet 200 milliGRAM(s) Oral daily  buDESOnide 160 MICROgram(s)/formoterol 4.5 MICROgram(s) Inhaler 2 Puff(s) Inhalation two times a day  carvedilol 6.25 milliGRAM(s) Oral every 12 hours  dextrose 5%. 1000 milliLiter(s) (50 mL/Hr) IV Continuous <Continuous>  dextrose 50% Injectable 12.5 Gram(s) IV Push once  dextrose 50% Injectable 25 Gram(s) IV Push once  dextrose 50% Injectable 25 Gram(s) IV Push once  docusate sodium 100 milliGRAM(s) Oral three times a day  DULoxetine 60 milliGRAM(s) Oral daily  insulin glargine Injectable (LANTUS) 48 Unit(s) SubCutaneous at bedtime  insulin lispro (HumaLOG) corrective regimen sliding scale   SubCutaneous three times a day before meals  insulin lispro (HumaLOG) corrective regimen sliding scale   SubCutaneous at bedtime  insulin lispro Injectable (HumaLOG) 20 Unit(s) SubCutaneous before breakfast  insulin lispro Injectable (HumaLOG) 15 Unit(s) SubCutaneous before lunch  insulin lispro Injectable (HumaLOG) 15 Unit(s) SubCutaneous before dinner  isosorbide   mononitrate ER Tablet (IMDUR) 30 milliGRAM(s) Oral daily  levothyroxine 188 MICROGram(s) Oral daily  mesalamine DR (24-Hour) Tablet 2.4 Gram(s) Oral daily  metolazone 2.5 milliGRAM(s) Oral <User Schedule>  norethindrone acetate 5 milliGRAM(s) Oral daily  pantoprazole    Tablet 40 milliGRAM(s) Oral before breakfast  polyethylene glycol 3350 17 Gram(s) Oral daily  senna 2 Tablet(s) Oral at bedtime  spironolactone 25 milliGRAM(s) Oral daily  tiotropium 18 MICROgram(s) Capsule 1 Capsule(s) Inhalation daily  torsemide 50 milliGRAM(s) Oral daily    MEDICATIONS  (PRN):  acetaminophen   Tablet .. 650 milliGRAM(s) Oral every 6 hours PRN Mild Pain (1 - 3), Moderate Pain (4 - 6)  bisacodyl Suppository 10 milliGRAM(s) Rectal once PRN Constipation  dextrose 40% Gel 15 Gram(s) Oral once PRN Blood Glucose LESS THAN 70 milliGRAM(s)/deciliter  glucagon  Injectable 1 milliGRAM(s) IntraMuscular once PRN Glucose LESS THAN 70 milligrams/deciliter  HYDROmorphone  Injectable 0.2 milliGRAM(s) IV Push every 10 minutes PRN Moderate Pain (4 - 6)  ondansetron Injectable 4 milliGRAM(s) IV Push once PRN Nausea and/or Vomiting      Allergies    Augmentin (Swelling)  cephalexin (Swelling)    Intolerances        12 point ROS negative except as outlined above    Vital Signs Last 24 Hrs  T(C): 36.7 (10 Apr 2019 04:03), Max: 37.1 (09 Apr 2019 18:08)  T(F): 98 (10 Apr 2019 04:03), Max: 98.7 (09 Apr 2019 18:08)  HR: 96 (10 Apr 2019 04:03) (89 - 100)  BP: 157/74 (10 Apr 2019 04:03) (117/59 - 157/74)  BP(mean): 87 (09 Apr 2019 17:00) (82 - 93)  RR: 18 (10 Apr 2019 04:03) (16 - 18)  SpO2: 100% (10 Apr 2019 04:03) (93% - 100%)    PHYSICAL EXAM:    GA: NAD, A+0 x 3  Abd: soft, nontender, nondistended, no rebound or guarding,   : minimal blood on pad          LABS:             9.4    9.60  )-----------( 208      ( 04-09 @ 09:15 )             30.5                9.2    11.11 )-----------( 208      ( 04-08 @ 08:54 )             28.9                9.4    10.79 )-----------( 230      ( 04-07 @ 09:03 )             30.4       04-09 @ 07:22    138  |  93  |  51  ----------------------------<  299  3.3   |  31  |  1.39    04-07 @ 07:08    136  |  93  |  63  ----------------------------<  173  3.5   |  31  |  1.60      Mg     2.2     04-07 @ 07:08      PT/INR - ( 09 Apr 2019 09:14 )   PT: 11.4 sec;   INR: 0.99 ratio         PTT - ( 09 Apr 2019 09:14 )  PTT:26.2 sec      RADIOLOGY & ADDITIONAL TESTS:

## 2019-04-11 ENCOUNTER — APPOINTMENT (OUTPATIENT)
Dept: ORTHOPEDIC SURGERY | Facility: HOSPITAL | Age: 79
End: 2019-04-11

## 2019-04-11 LAB
ANION GAP SERPL CALC-SCNC: 15 MMOL/L — SIGNIFICANT CHANGE UP (ref 5–17)
APTT BLD: 25.4 SEC — LOW (ref 27.5–36.3)
BLD GP AB SCN SERPL QL: NEGATIVE — SIGNIFICANT CHANGE UP
BUN SERPL-MCNC: 56 MG/DL — HIGH (ref 7–23)
CALCIUM SERPL-MCNC: 9.8 MG/DL — SIGNIFICANT CHANGE UP (ref 8.4–10.5)
CHLORIDE SERPL-SCNC: 94 MMOL/L — LOW (ref 96–108)
CO2 SERPL-SCNC: 30 MMOL/L — SIGNIFICANT CHANGE UP (ref 22–31)
CREAT SERPL-MCNC: 1.7 MG/DL — HIGH (ref 0.5–1.3)
GLUCOSE BLDC GLUCOMTR-MCNC: 209 MG/DL — HIGH (ref 70–99)
GLUCOSE BLDC GLUCOMTR-MCNC: 217 MG/DL — HIGH (ref 70–99)
GLUCOSE BLDC GLUCOMTR-MCNC: 262 MG/DL — HIGH (ref 70–99)
GLUCOSE BLDC GLUCOMTR-MCNC: 308 MG/DL — HIGH (ref 70–99)
GLUCOSE SERPL-MCNC: 235 MG/DL — HIGH (ref 70–99)
HCT VFR BLD CALC: 30.1 % — LOW (ref 34.5–45)
HGB BLD-MCNC: 10.1 G/DL — LOW (ref 11.5–15.5)
INR BLD: 1.12 RATIO — SIGNIFICANT CHANGE UP (ref 0.88–1.16)
MCHC RBC-ENTMCNC: 32.3 PG — SIGNIFICANT CHANGE UP (ref 27–34)
MCHC RBC-ENTMCNC: 33.5 GM/DL — SIGNIFICANT CHANGE UP (ref 32–36)
MCV RBC AUTO: 96.4 FL — SIGNIFICANT CHANGE UP (ref 80–100)
PLATELET # BLD AUTO: 191 K/UL — SIGNIFICANT CHANGE UP (ref 150–400)
POTASSIUM SERPL-MCNC: 3.2 MMOL/L — LOW (ref 3.5–5.3)
POTASSIUM SERPL-SCNC: 3.2 MMOL/L — LOW (ref 3.5–5.3)
PROTHROM AB SERPL-ACNC: 12.9 SEC — SIGNIFICANT CHANGE UP (ref 10–12.9)
RBC # BLD: 3.12 M/UL — LOW (ref 3.8–5.2)
RBC # FLD: 15.9 % — HIGH (ref 10.3–14.5)
RH IG SCN BLD-IMP: POSITIVE — SIGNIFICANT CHANGE UP
SODIUM SERPL-SCNC: 139 MMOL/L — SIGNIFICANT CHANGE UP (ref 135–145)
WBC # BLD: 11 K/UL — HIGH (ref 3.8–10.5)
WBC # FLD AUTO: 11 K/UL — HIGH (ref 3.8–10.5)

## 2019-04-11 PROCEDURE — 27386 REPAIR/GRAFT OF THIGH MUSCLE: CPT | Mod: RT

## 2019-04-11 PROCEDURE — 99232 SBSQ HOSP IP/OBS MODERATE 35: CPT

## 2019-04-11 RX ORDER — SPIRONOLACTONE 25 MG/1
25 TABLET, FILM COATED ORAL DAILY
Qty: 0 | Refills: 0 | Status: DISCONTINUED | OUTPATIENT
Start: 2019-04-11 | End: 2019-04-14

## 2019-04-11 RX ORDER — POLYETHYLENE GLYCOL 3350 17 G/17G
17 POWDER, FOR SOLUTION ORAL DAILY
Qty: 0 | Refills: 0 | Status: DISCONTINUED | OUTPATIENT
Start: 2019-04-11 | End: 2019-04-15

## 2019-04-11 RX ORDER — OXYCODONE HYDROCHLORIDE 5 MG/1
2.5 TABLET ORAL EVERY 4 HOURS
Qty: 0 | Refills: 0 | Status: DISCONTINUED | OUTPATIENT
Start: 2019-04-11 | End: 2019-04-15

## 2019-04-11 RX ORDER — ACETAMINOPHEN 500 MG
975 TABLET ORAL EVERY 8 HOURS
Qty: 0 | Refills: 0 | Status: DISCONTINUED | OUTPATIENT
Start: 2019-04-11 | End: 2019-04-15

## 2019-04-11 RX ORDER — INSULIN LISPRO 100/ML
15 VIAL (ML) SUBCUTANEOUS
Qty: 0 | Refills: 0 | Status: DISCONTINUED | OUTPATIENT
Start: 2019-04-11 | End: 2019-04-12

## 2019-04-11 RX ORDER — HYDROMORPHONE HYDROCHLORIDE 2 MG/ML
0.3 INJECTION INTRAMUSCULAR; INTRAVENOUS; SUBCUTANEOUS
Qty: 0 | Refills: 0 | Status: DISCONTINUED | OUTPATIENT
Start: 2019-04-11 | End: 2019-04-11

## 2019-04-11 RX ORDER — ISOSORBIDE MONONITRATE 60 MG/1
30 TABLET, EXTENDED RELEASE ORAL DAILY
Qty: 0 | Refills: 0 | Status: DISCONTINUED | OUTPATIENT
Start: 2019-04-11 | End: 2019-04-15

## 2019-04-11 RX ORDER — METOPROLOL TARTRATE 50 MG
1 TABLET ORAL
Qty: 0 | Refills: 0 | COMMUNITY

## 2019-04-11 RX ORDER — CARVEDILOL PHOSPHATE 80 MG/1
6.25 CAPSULE, EXTENDED RELEASE ORAL EVERY 12 HOURS
Qty: 0 | Refills: 0 | Status: DISCONTINUED | OUTPATIENT
Start: 2019-04-11 | End: 2019-04-15

## 2019-04-11 RX ORDER — DOCUSATE SODIUM 100 MG
100 CAPSULE ORAL THREE TIMES A DAY
Qty: 0 | Refills: 0 | Status: DISCONTINUED | OUTPATIENT
Start: 2019-04-11 | End: 2019-04-15

## 2019-04-11 RX ORDER — GLUCAGON INJECTION, SOLUTION 0.5 MG/.1ML
1 INJECTION, SOLUTION SUBCUTANEOUS ONCE
Qty: 0 | Refills: 0 | Status: DISCONTINUED | OUTPATIENT
Start: 2019-04-11 | End: 2019-04-15

## 2019-04-11 RX ORDER — DULOXETINE HYDROCHLORIDE 30 MG/1
60 CAPSULE, DELAYED RELEASE ORAL DAILY
Qty: 0 | Refills: 0 | Status: DISCONTINUED | OUTPATIENT
Start: 2019-04-11 | End: 2019-04-15

## 2019-04-11 RX ORDER — ENOXAPARIN SODIUM 100 MG/ML
40 INJECTION SUBCUTANEOUS EVERY 24 HOURS
Qty: 0 | Refills: 0 | Status: DISCONTINUED | OUTPATIENT
Start: 2019-04-11 | End: 2019-04-15

## 2019-04-11 RX ORDER — DEXTROSE 50 % IN WATER 50 %
25 SYRINGE (ML) INTRAVENOUS ONCE
Qty: 0 | Refills: 0 | Status: DISCONTINUED | OUTPATIENT
Start: 2019-04-11 | End: 2019-04-15

## 2019-04-11 RX ORDER — INSULIN LISPRO 100/ML
VIAL (ML) SUBCUTANEOUS AT BEDTIME
Qty: 0 | Refills: 0 | Status: DISCONTINUED | OUTPATIENT
Start: 2019-04-11 | End: 2019-04-15

## 2019-04-11 RX ORDER — INSULIN LISPRO 100/ML
20 VIAL (ML) SUBCUTANEOUS
Qty: 0 | Refills: 0 | Status: DISCONTINUED | OUTPATIENT
Start: 2019-04-11 | End: 2019-04-12

## 2019-04-11 RX ORDER — TRAMADOL HYDROCHLORIDE 50 MG/1
25 TABLET ORAL EVERY 8 HOURS
Qty: 0 | Refills: 0 | Status: DISCONTINUED | OUTPATIENT
Start: 2019-04-11 | End: 2019-04-15

## 2019-04-11 RX ORDER — SODIUM CHLORIDE 9 MG/ML
1000 INJECTION INTRAMUSCULAR; INTRAVENOUS; SUBCUTANEOUS
Qty: 0 | Refills: 0 | Status: DISCONTINUED | OUTPATIENT
Start: 2019-04-11 | End: 2019-04-14

## 2019-04-11 RX ORDER — OXYCODONE HYDROCHLORIDE 5 MG/1
5 TABLET ORAL EVERY 4 HOURS
Qty: 0 | Refills: 0 | Status: DISCONTINUED | OUTPATIENT
Start: 2019-04-11 | End: 2019-04-15

## 2019-04-11 RX ORDER — DEXTROSE 50 % IN WATER 50 %
15 SYRINGE (ML) INTRAVENOUS ONCE
Qty: 0 | Refills: 0 | Status: DISCONTINUED | OUTPATIENT
Start: 2019-04-11 | End: 2019-04-15

## 2019-04-11 RX ORDER — TIOTROPIUM BROMIDE 18 UG/1
1 CAPSULE ORAL; RESPIRATORY (INHALATION) DAILY
Qty: 0 | Refills: 0 | Status: DISCONTINUED | OUTPATIENT
Start: 2019-04-11 | End: 2019-04-15

## 2019-04-11 RX ORDER — LANOLIN ALCOHOL/MO/W.PET/CERES
3 CREAM (GRAM) TOPICAL AT BEDTIME
Qty: 0 | Refills: 0 | Status: DISCONTINUED | OUTPATIENT
Start: 2019-04-11 | End: 2019-04-15

## 2019-04-11 RX ORDER — INSULIN LISPRO 100/ML
VIAL (ML) SUBCUTANEOUS
Qty: 0 | Refills: 0 | Status: DISCONTINUED | OUTPATIENT
Start: 2019-04-11 | End: 2019-04-15

## 2019-04-11 RX ORDER — INSULIN LISPRO 100/ML
15 VIAL (ML) SUBCUTANEOUS
Qty: 0 | Refills: 0 | Status: DISCONTINUED | OUTPATIENT
Start: 2019-04-11 | End: 2019-04-11

## 2019-04-11 RX ORDER — DEXTROSE 50 % IN WATER 50 %
12.5 SYRINGE (ML) INTRAVENOUS ONCE
Qty: 0 | Refills: 0 | Status: DISCONTINUED | OUTPATIENT
Start: 2019-04-11 | End: 2019-04-15

## 2019-04-11 RX ORDER — ONDANSETRON 8 MG/1
4 TABLET, FILM COATED ORAL ONCE
Qty: 0 | Refills: 0 | Status: DISCONTINUED | OUTPATIENT
Start: 2019-04-11 | End: 2019-04-11

## 2019-04-11 RX ORDER — BUDESONIDE AND FORMOTEROL FUMARATE DIHYDRATE 160; 4.5 UG/1; UG/1
2 AEROSOL RESPIRATORY (INHALATION)
Qty: 0 | Refills: 0 | Status: DISCONTINUED | OUTPATIENT
Start: 2019-04-11 | End: 2019-04-15

## 2019-04-11 RX ORDER — ONDANSETRON 8 MG/1
4 TABLET, FILM COATED ORAL EVERY 6 HOURS
Qty: 0 | Refills: 0 | Status: DISCONTINUED | OUTPATIENT
Start: 2019-04-11 | End: 2019-04-15

## 2019-04-11 RX ORDER — PANTOPRAZOLE SODIUM 20 MG/1
40 TABLET, DELAYED RELEASE ORAL
Qty: 0 | Refills: 0 | Status: DISCONTINUED | OUTPATIENT
Start: 2019-04-11 | End: 2019-04-15

## 2019-04-11 RX ORDER — INSULIN LISPRO 100/ML
15 VIAL (ML) SUBCUTANEOUS
Qty: 0 | Refills: 0 | Status: DISCONTINUED | OUTPATIENT
Start: 2019-04-12 | End: 2019-04-12

## 2019-04-11 RX ORDER — LEVOTHYROXINE SODIUM 125 MCG
188 TABLET ORAL DAILY
Qty: 0 | Refills: 0 | Status: DISCONTINUED | OUTPATIENT
Start: 2019-04-11 | End: 2019-04-15

## 2019-04-11 RX ORDER — POTASSIUM CHLORIDE 20 MEQ
40 PACKET (EA) ORAL ONCE
Qty: 0 | Refills: 0 | Status: COMPLETED | OUTPATIENT
Start: 2019-04-11 | End: 2019-04-11

## 2019-04-11 RX ORDER — AMIODARONE HYDROCHLORIDE 400 MG/1
200 TABLET ORAL DAILY
Qty: 0 | Refills: 0 | Status: DISCONTINUED | OUTPATIENT
Start: 2019-04-11 | End: 2019-04-15

## 2019-04-11 RX ORDER — INSULIN GLARGINE 100 [IU]/ML
44 INJECTION, SOLUTION SUBCUTANEOUS AT BEDTIME
Qty: 0 | Refills: 0 | Status: DISCONTINUED | OUTPATIENT
Start: 2019-04-11 | End: 2019-04-12

## 2019-04-11 RX ORDER — INSULIN LISPRO 100/ML
10 VIAL (ML) SUBCUTANEOUS
Qty: 0 | Refills: 0 | Status: COMPLETED | OUTPATIENT
Start: 2019-04-11 | End: 2019-04-12

## 2019-04-11 RX ORDER — INSULIN GLARGINE 100 [IU]/ML
24 INJECTION, SOLUTION SUBCUTANEOUS AT BEDTIME
Qty: 0 | Refills: 0 | Status: DISCONTINUED | OUTPATIENT
Start: 2019-04-11 | End: 2019-04-11

## 2019-04-11 RX ADMIN — Medication 975 MILLIGRAM(S): at 16:00

## 2019-04-11 RX ADMIN — Medication 4: at 16:11

## 2019-04-11 RX ADMIN — Medication 2: at 21:57

## 2019-04-11 RX ADMIN — Medication 50 MILLIGRAM(S): at 06:23

## 2019-04-11 RX ADMIN — SPIRONOLACTONE 25 MILLIGRAM(S): 25 TABLET, FILM COATED ORAL at 06:23

## 2019-04-11 RX ADMIN — Medication 2.4 GRAM(S): at 17:40

## 2019-04-11 RX ADMIN — Medication 100 MILLIGRAM(S): at 06:26

## 2019-04-11 RX ADMIN — Medication 100 MILLIGRAM(S): at 21:55

## 2019-04-11 RX ADMIN — Medication 975 MILLIGRAM(S): at 23:13

## 2019-04-11 RX ADMIN — Medication 188 MICROGRAM(S): at 06:26

## 2019-04-11 RX ADMIN — INSULIN GLARGINE 44 UNIT(S): 100 INJECTION, SOLUTION SUBCUTANEOUS at 21:54

## 2019-04-11 RX ADMIN — NORETHINDRONE 5 MILLIGRAM(S): 0.35 TABLET ORAL at 17:40

## 2019-04-11 RX ADMIN — Medication 40 MILLIEQUIVALENT(S): at 07:55

## 2019-04-11 RX ADMIN — Medication 4: at 08:27

## 2019-04-11 RX ADMIN — Medication 15 UNIT(S): at 17:41

## 2019-04-11 RX ADMIN — Medication 100 MILLIGRAM(S): at 18:38

## 2019-04-11 RX ADMIN — ENOXAPARIN SODIUM 40 MILLIGRAM(S): 100 INJECTION SUBCUTANEOUS at 15:08

## 2019-04-11 RX ADMIN — AMIODARONE HYDROCHLORIDE 200 MILLIGRAM(S): 400 TABLET ORAL at 06:23

## 2019-04-11 RX ADMIN — CARVEDILOL PHOSPHATE 6.25 MILLIGRAM(S): 80 CAPSULE, EXTENDED RELEASE ORAL at 17:40

## 2019-04-11 RX ADMIN — CARVEDILOL PHOSPHATE 6.25 MILLIGRAM(S): 80 CAPSULE, EXTENDED RELEASE ORAL at 06:26

## 2019-04-11 RX ADMIN — BUDESONIDE AND FORMOTEROL FUMARATE DIHYDRATE 2 PUFF(S): 160; 4.5 AEROSOL RESPIRATORY (INHALATION) at 17:47

## 2019-04-11 RX ADMIN — SENNA PLUS 2 TABLET(S): 8.6 TABLET ORAL at 21:55

## 2019-04-11 RX ADMIN — Medication 975 MILLIGRAM(S): at 21:54

## 2019-04-11 RX ADMIN — Medication 100 MILLIGRAM(S): at 15:07

## 2019-04-11 RX ADMIN — SODIUM CHLORIDE 50 MILLILITER(S): 9 INJECTION, SOLUTION INTRAVENOUS at 06:26

## 2019-04-11 RX ADMIN — DULOXETINE HYDROCHLORIDE 60 MILLIGRAM(S): 30 CAPSULE, DELAYED RELEASE ORAL at 17:40

## 2019-04-11 RX ADMIN — BUDESONIDE AND FORMOTEROL FUMARATE DIHYDRATE 2 PUFF(S): 160; 4.5 AEROSOL RESPIRATORY (INHALATION) at 06:24

## 2019-04-11 RX ADMIN — Medication 975 MILLIGRAM(S): at 15:07

## 2019-04-11 NOTE — PROGRESS NOTE ADULT - SUBJECTIVE AND OBJECTIVE BOX
Chief Complaint: 79 y/o Type 2 DM, hypothyroid, recently admitted with CHF exacerbation, re-admitted with recurrent falls and susp. cellulitis after a recent fall.    Patient post right Quadriceps tendon repair under general, back of floor, restarted on diet.  On ERT for vaginal bleeding.  PO intake variable prior, not hungry post procedure.  FS in the low 200th on decreased dose lantus    MEDICATIONS  (STANDING):  acetaminophen   Tablet .. 975 milliGRAM(s) Oral every 8 hours  amiodarone    Tablet 200 milliGRAM(s) Oral daily  buDESOnide 160 MICROgram(s)/formoterol 4.5 MICROgram(s) Inhaler 2 Puff(s) Inhalation two times a day  carvedilol 6.25 milliGRAM(s) Oral every 12 hours  clindamycin IVPB 900 milliGRAM(s) IV Intermittent every 8 hours  dextrose 5%. 1000 milliLiter(s) (50 mL/Hr) IV Continuous <Continuous>  dextrose 50% Injectable 12.5 Gram(s) IV Push once  dextrose 50% Injectable 25 Gram(s) IV Push once  dextrose 50% Injectable 25 Gram(s) IV Push once  dextrose 50% Injectable 12.5 Gram(s) IV Push once  dextrose 50% Injectable 25 Gram(s) IV Push once  dextrose 50% Injectable 25 Gram(s) IV Push once  docusate sodium 100 milliGRAM(s) Oral three times a day  DULoxetine 60 milliGRAM(s) Oral daily  enoxaparin Injectable 40 milliGRAM(s) SubCutaneous every 24 hours  insulin glargine Injectable (LANTUS) 44 Unit(s) SubCutaneous at bedtime  insulin lispro (HumaLOG) corrective regimen sliding scale   SubCutaneous three times a day before meals  insulin lispro (HumaLOG) corrective regimen sliding scale   SubCutaneous at bedtime  insulin lispro Injectable (HumaLOG) 20 Unit(s) SubCutaneous before breakfast  insulin lispro Injectable (HumaLOG) 15 Unit(s) SubCutaneous before lunch  insulin lispro Injectable (HumaLOG) 10 Unit(s) SubCutaneous before dinner  isosorbide   mononitrate ER Tablet (IMDUR) 30 milliGRAM(s) Oral daily  lactated ringers. 1000 milliLiter(s) (50 mL/Hr) IV Continuous <Continuous>  levothyroxine 188 MICROGram(s) Oral daily  mesalamine DR (24-Hour) Tablet 2.4 Gram(s) Oral daily  metolazone 2.5 milliGRAM(s) Oral <User Schedule>  norethindrone acetate 5 milliGRAM(s) Oral daily  pantoprazole    Tablet 40 milliGRAM(s) Oral before breakfast  polyethylene glycol 3350 17 Gram(s) Oral daily  senna 2 Tablet(s) Oral at bedtime  sodium chloride 0.9%. 1000 milliLiter(s) (75 mL/Hr) IV Continuous <Continuous>  spironolactone 25 milliGRAM(s) Oral daily  tiotropium 18 MICROgram(s) Capsule 1 Capsule(s) Inhalation daily  torsemide 50 milliGRAM(s) Oral daily    MEDICATIONS  (PRN):  acetaminophen   Tablet .. 650 milliGRAM(s) Oral every 6 hours PRN Mild Pain (1 - 3), Moderate Pain (4 - 6)  bisacodyl Suppository 10 milliGRAM(s) Rectal once PRN Constipation  dextrose 40% Gel 15 Gram(s) Oral once PRN Blood Glucose LESS THAN 70 milliGRAM(s)/deciliter  dextrose 40% Gel 15 Gram(s) Oral once PRN Blood Glucose LESS THAN 70 milliGRAM(s)/deciliter  glucagon  Injectable 1 milliGRAM(s) IntraMuscular once PRN Glucose LESS THAN 70 milligrams/deciliter  glucagon  Injectable 1 milliGRAM(s) IntraMuscular once PRN Glucose LESS THAN 70 milligrams/deciliter  HYDROmorphone  Injectable 0.2 milliGRAM(s) IV Push every 10 minutes PRN Moderate Pain (4 - 6)  melatonin 3 milliGRAM(s) Oral at bedtime PRN Insomnia  ondansetron Injectable 4 milliGRAM(s) IV Push once PRN Nausea and/or Vomiting  ondansetron Injectable 4 milliGRAM(s) IV Push every 6 hours PRN Nausea and/or Vomiting  oxyCODONE    IR 2.5 milliGRAM(s) Oral every 4 hours PRN Moderate Pain (4 - 6)  oxyCODONE    IR 5 milliGRAM(s) Oral every 4 hours PRN Severe Pain (7 - 10)  traMADol 25 milliGRAM(s) Oral every 8 hours PRN Mild Pain (1 - 3)      Allergies    Augmentin (Swelling)  cephalexin (Swelling)  latex (Rash)    Intolerances        PHYSICAL EXAM:  VITALS: T(C): 36.3 (04-11-19 @ 16:00)  T(F): 97.3 (04-11-19 @ 16:00), Max: 98.9 (04-10-19 @ 20:00)  HR: 99 (04-11-19 @ 17:43) (90 - 100)  BP: 141/83 (04-11-19 @ 17:43) (119/69 - 159/76)  RR:  (16 - 20)  SpO2:  (97% - 100%)  GENERAL: NAD, alert  EYES: No proptosis,  HEENT:  Atraumatic, Normocephalic,   RESPIRATORY: Clear to auscultation bilaterally  CARDIOVASCULAR: Regular rhythm; No murmurs; bilat peripheral edema Rt. >left.  GI: Soft, nontender, non distended, normal bowel sounds  SKIN: Dry, intact, edema right knee dressed, blistering both feet.  MUSCULOSKELETAL: decreased strength  NEURO: No focal deficits.  PSYCH: Alert and oriented x 3, normal affect/mood.      POCT Blood Glucose.: 262 mg/dL (04-11-19 @ 17:23)  POCT Blood Glucose.: 217 mg/dL (04-11-19 @ 16:04)  POCT Blood Glucose.: 209 mg/dL (04-11-19 @ 08:15)  POCT Blood Glucose.: 264 mg/dL (04-10-19 @ 21:43)  POCT Blood Glucose.: 254 mg/dL (04-10-19 @ 16:53)  POCT Blood Glucose.: 286 mg/dL (04-10-19 @ 12:06)  POCT Blood Glucose.: 258 mg/dL (04-10-19 @ 07:39)  POCT Blood Glucose.: 242 mg/dL (04-09-19 @ 21:43)  POCT Blood Glucose.: 202 mg/dL (04-09-19 @ 17:42)  POCT Blood Glucose.: 206 mg/dL (04-09-19 @ 11:48)  POCT Blood Glucose.: 275 mg/dL (04-09-19 @ 07:38)  POCT Blood Glucose.: 222 mg/dL (04-08-19 @ 21:50)                            10.1   11.0  )-----------( 191      ( 11 Apr 2019 05:13 )             30.1       04-11    139  |  94<L>  |  56<H>  ----------------------------<  235<H>  3.2<L>   |  30  |  1.70<H>    EGFR if : 33<L>  EGFR if non : 29<L>    Ca    9.8      04-11        Thyroid Function Tests:  04-02 @ 09:19 TSH 9.70 FreeT4 1.5 T3 -- Anti TPO -- Anti Thyroglobulin Ab -- TSI --  03-25 @ 08:52 TSH 5.27 FreeT4 -- T3 -- Anti TPO -- Anti Thyroglobulin Ab -- TSI --      Hemoglobin A1C, Whole Blood: 7.4 % <H> [4.0 - 5.6] (03-25-19 @ 08:55)

## 2019-04-11 NOTE — BRIEF OPERATIVE NOTE - OPERATION/FINDINGS
See dictated report.  Findings - R quadriceps tendon rupture  Procedure - Quad tendon repair
Polypoid endometrium.  1-2 mm fibroid on posterior aspect of uterus.  Ostia wnl bilaterally.

## 2019-04-11 NOTE — PRE-ANESTHESIA EVALUATION ADULT - NSANTHAIRWAYFT_ENT_ALL_CORE
mall 4 very limited mouth opening.  from neck.  upper middle teeth discolored and chipped in multiple spots.

## 2019-04-11 NOTE — PHYSICAL THERAPY INITIAL EVALUATION ADULT - DID THE PATIENT HAVE SURGERY?
s/p  D+C Hysteroscopy, Insertion of Mirena IUD (4/9), s/p Quad tendon repair (4/11)/yes
s/p fall x2, leukocytosis and LE wounds concerning for cellulitis./n/a

## 2019-04-11 NOTE — PRE-ANESTHESIA EVALUATION ADULT - NSANTHPMHFT_GEN_ALL_CORE
PAF, VICKY, chronic hypoxic hypercarbic resp failure (As per pulmonologist.  Several notes mention sever pulm htn but cards and pulm do not mention and the TTE does not doc evidence of this.  On home O2 3L but not CPAP for the last 6 months.  wheelchair bound for 4 months.  , k 3.2, CRI.  morbid obesity.  cxray mild pulm edema.  ekg JR @ 102.  TTE showed BP MVR with nml LV sys fnx and no pulm htn.  had D and C 4 days ago with GA.  airway was documented as easy but grade 4 view with blind HOA.  .  patient has BL hand numbness.  patient currently complaining of burping of acid
77 yo woman w/ hx of moderate MS s/p bioprosthetic mitral valve, diastolic CHF, paroxysmal afib (not on A/C in setting of vaginal bleeding), HTN, severe pulmonary HTN, DMT2, CKD III, anemia, with post menopausal vaginal bleeding,  VICKY on 3L NC (not on CPAP/BIPAP) presents from home in setting of multiple falls the past 2 days resulting with right knee pain, found with leukocytosis and LE wounds concerning for cellulitis. ; quadriceps tendon injury; interstitial lung disease; progressive hypoxia; asthma; obesity hypoventilation syndrome; spinal sptenosis using wheel chair with leg buckling x 2 weeks; TTE 3/25/19:: No evidence of pulmonary hypertension.  ------------------------------------------------------------------------  Conclusions:  Endocardial visualization enhanced with intravenous  injection of Ultrasonic Enhancing Agent (Definity).  Normal left ventricular systolic function. No segmental  wall motion abnormalities.  Bioprosthetic mitral valve replacement with normal  function.

## 2019-04-11 NOTE — CHART NOTE - NSCHARTNOTESELECT_GEN_ALL_CORE
Encounter Date: 2019       History     Chief Complaint   Patient presents with    Cough     Pt states was in ED 3-4 days ago and tested negative for flu, states started with cough and is now having bilateral rib pain. Denies any OTC medications for symptoms.      30-year-old female presents emergency department for evaluation of 4 day history of cough, nasal congestion and fever.  She reports that she was evaluated 2 days ago and tested negative for influenza that time.  She reports that over the last several days she has continued with fever, cough and nasal congestion.  She reports that last night she began to have pain in her ribs from coughing.  She denies any chest pain, palpitations, shortness of breath, headache, dizziness, neck pain, abdominal pain, nausea, vomiting, or dysuria.  No treatment was attempted prior to arrival.          Review of patient's allergies indicates:  No Known Allergies  Past Medical History:   Diagnosis Date    Anemia     Encounter for blood transfusion      Past Surgical History:   Procedure Laterality Date     SECTION       SECTION Left 2012    Performed by [facility]rios [facility] at [facility] L[facility] OR    DELIVERY-CEASAREAN SECTION N/A 2015    Performed by Shiva Villagran MD at [facility] L&D OR    mass left breast removed  2006    non-cancerous per pt.     TUBAL LIGATION       Family History   Problem Relation Age of Onset    Diabetes Maternal Grandmother     Hypertension Maternal Grandmother     Diabetes Paternal Grandmother     Hypertension Paternal Grandmother      Social History     Tobacco Use    Smoking status: Never Smoker    Smokeless tobacco: Never Used   Substance Use Topics    Alcohol use: Yes     Comment: Occasionally    Drug use: Yes     Types: Marijuana     Comment: daily     Review of Systems   Constitutional: Positive for fever. Negative for appetite change.   HENT: Positive for congestion and rhinorrhea. Negative for ear  Event Note/Ortho POC PA discharge, ear pain, sinus pressure, sinus pain, sore throat, trouble swallowing and voice change.    Eyes: Negative for photophobia and visual disturbance.   Respiratory: Positive for cough. Negative for chest tightness, shortness of breath and wheezing.    Cardiovascular: Negative for chest pain.   Gastrointestinal: Negative for abdominal pain, diarrhea, nausea and vomiting.   Genitourinary: Negative for dysuria.   Musculoskeletal: Negative for back pain and neck pain.   Neurological: Negative for dizziness, syncope, weakness, light-headedness, numbness and headaches.       Physical Exam     Initial Vitals [02/14/19 1017]   BP Pulse Resp Temp SpO2   113/75 (!) 112 20 98.9 °F (37.2 °C) 98 %      MAP       --         Physical Exam    Nursing note and vitals reviewed.  Constitutional: She appears well-developed and well-nourished. She is not diaphoretic. No distress.   HENT:   Head: Normocephalic and atraumatic.   Right Ear: External ear normal.   Left Ear: External ear normal.   Nose: Nose normal.   Mouth/Throat: Oropharynx is clear and moist.   Eyes: Conjunctivae and EOM are normal. Pupils are equal, round, and reactive to light.   Neck: Normal range of motion. Neck supple.   Cardiovascular: Normal rate, regular rhythm and normal heart sounds.   Pulmonary/Chest: Breath sounds normal. No respiratory distress. She has no wheezes. She has no rhonchi. She has no rales. She exhibits no tenderness.   Abdominal: Soft. Bowel sounds are normal. She exhibits no distension. There is no tenderness. There is no rebound.   Lymphadenopathy:     She has no cervical adenopathy.   Neurological: She is alert and oriented to person, place, and time. No cranial nerve deficit.   Skin: Skin is warm and dry.   Psychiatric: She has a normal mood and affect.         ED Course   Procedures  Labs Reviewed   INFLUENZA A & B BY MOLECULAR - Abnormal; Notable for the following components:       Result Value    Influenza A, Molecular Positive (*)      All other components within normal limits   URINALYSIS, REFLEX TO URINE CULTURE - Abnormal; Notable for the following components:    Urobilinogen, UA >=8.0 (*)     Leukocytes, UA 1+ (*)     All other components within normal limits    Narrative:     Preferred Collection Type->Urine, Clean Catch   PREGNANCY TEST, URINE RAPID   URINALYSIS MICROSCOPIC    Narrative:     Preferred Collection Type->Urine, Clean Catch          Imaging Results          X-Ray Chest PA And Lateral (Final result)  Result time 02/14/19 11:29:52    Final result by BRYCE Thompson Sr., MD (02/14/19 11:29:52)                 Impression:      Normal study.      Electronically signed by: Drew Thompson MD  Date:    02/14/2019  Time:    11:29             Narrative:    EXAMINATION:  XR CHEST PA AND LATERAL    CLINICAL HISTORY:  Cough    COMPARISON:  06/22/2018    FINDINGS:  The size and contour of the heart are normal. The lungs are clear. There is no pneumothorax or pleural effusion.                                 Medical Decision Making:   Initial Assessment:   30 year old female presents for evaluation of cough, nasal congestion and fever.  Physical exam reveals a nontoxic-appearing female in no acute distress.  Patient is afebrile, mildly tachycardic but other vital signs within normal limits.  Neurological exam reveals an alert and oriented patient.  Patient appears well hydrated as her mucous membranes are moist.  TMs reveal no erythema.  Posterior pharynx reveals erythema, edema or tonsillar exudate.  Neck is supple, no meningeal signs noted.  Lungs clear to auscultation bilaterally. No respiratory distress or accessory muscle use noted.  Abdominal exam reveals soft abdomen, nontender palpation.  Differential Diagnosis:   Influenza  Viral URI  Chest x-ray ordered to assess for possible pneumonia or consolidation.  ED Management:  UPT negative. Urinalysis reveals no evidence of UTI.  Influenza A positive. Chest x-ray reveals no acute  findings.  Discussed these findings at length with the patient verbalizes understanding and agreement course of treatment.  Instructed the patient to follow up with her primary care provider for re-evaluation and to return to the emergency department immediately for any new or worsening symptoms                       Clinical Impression:   The primary encounter diagnosis was Influenza A. A diagnosis of Cough was also pertinent to this visit.                             Piper Sewell PA-C  02/14/19 1144

## 2019-04-11 NOTE — PHYSICAL THERAPY INITIAL EVALUATION ADULT - ACTIVE RANGE OF MOTION EXAMINATION, REHAB EVAL
deficits as listed below/no Active ROM deficits were identified/R Knee NT
BLE limited by strength deficits/bilateral upper extremity Active ROM was WFL (within functional limits)

## 2019-04-11 NOTE — PRE-ANESTHESIA EVALUATION ADULT - LAST STRESS TEST
unable to complete walk test on 5/1/18 due to fatigue and dyspnea
unable to complete walk test on 5/1/18 due to fatigue and dyspnea

## 2019-04-11 NOTE — PHYSICAL THERAPY INITIAL EVALUATION ADULT - PERTINENT HX OF CURRENT PROBLEM, REHAB EVAL
77 yo F w/ hx of mod MS s/p bioprosthetic mitral valve, diastolic CHF, paroxsimal afib, HTN, severe pulmonary HTN, DMT2, CKD III, anemia, with post menopausal vaginal bleeding, UC, VICKY on 3L NC (not on CPAP/BIPAP) presents from home in setting of multiple falls the past 2 days (pt reports RLE buckling) resulting with right knee pain. Pt also has had chronic LE wounds that were dressed from last discharge on 3/27. A/w leukocytosis and LE wounds concerning for cellulitis.

## 2019-04-11 NOTE — PROGRESS NOTE ADULT - SUBJECTIVE AND OBJECTIVE BOX
Orthopedic Surgery Progress Note  S: Pain is well controlled.  NPO for OR today. Patient nervous but ready.    O:  Vital Signs Last 24 Hrs  T(C): 36.7 (11 Apr 2019 06:28), Max: 37.2 (10 Apr 2019 20:00)  T(F): 98 (11 Apr 2019 06:28), Max: 98.9 (10 Apr 2019 20:00)  HR: 91 (11 Apr 2019 06:28) (91 - 99)  BP: 148/82 (11 Apr 2019 06:28) (119/69 - 148/82)  RR: 18 (11 Apr 2019 06:28) (18 - 18)  SpO2: 99% (11 Apr 2019 06:28) (98% - 99%)    Gen: NAD  RLE  KI on  skin intact underneath, palpable defect superior to patella  distally there are wounds that are dressed, patient says due to chronic vein issues  EHL/FHL/TA/GS intact  SILT DP/SP/Humphrey/Sa  WWP distally                        10.1   11.0  )-----------( 191      ( 11 Apr 2019 05:13 )             30.1                         10.6   12.8  )-----------( 215      ( 10 Apr 2019 09:29 )             31.6     04-11    139  |  94<L>  |  56<H>  ----------------------------<  235<H>  3.2<L>   |  30  |  1.70<H>    PT/INR - ( 11 Apr 2019 05:13 )   PT: 12.9 sec;   INR: 1.12 ratio      PTT - ( 11 Apr 2019 05:13 )  PTT:25.4 sec    A/P 78y year old Female with R quad tendon rupture    Pain Control  NPO/IVF  OR today for R quad tendon repair  Medical/Pulm/Cards clearance documented  Consent in chart    Emanuel Babcock MD

## 2019-04-11 NOTE — PHYSICAL THERAPY INITIAL EVALUATION ADULT - GENERAL OBSERVATIONS, REHAB EVAL
Received in bed in NAD, family at bedside.
Pt received semisupine in bed, PIV, RLE in al locked in extension

## 2019-04-11 NOTE — BRIEF OPERATIVE NOTE - NSICDXBRIEFPROCEDURE_GEN_ALL_CORE_FT
PROCEDURES:  Insertion of Mirena IUD 09-Apr-2019 15:24:49  Shraddha Escamilla  Hysteroscopy with biopsy 09-Apr-2019 15:24:43  Shraddha Escamilla  Dilation and curettage, uterus 09-Apr-2019 15:24:34  Shraddha Escamilla
PROCEDURES:  Primary repair of quadriceps tendon 11-Apr-2019 12:18:13  Mendy Catalan

## 2019-04-11 NOTE — PROGRESS NOTE ADULT - SUBJECTIVE AND OBJECTIVE BOX
CHIEF COMPLAINT:  f/up resp failure, preop clearance, copd, restrictive dysfunction, PAH-generally well but cant walk-no sob or cough    Interval Events: for quad tendon repair    REVIEW OF SYSTEMS:  Constitutional: No fevers or chills. No weight loss. No fatigue or generalized malaise.  Eyes: No itching or discharge from the eyes  ENT: No ear pain. No ear discharge. No nasal congestion. No post nasal drip. No epistaxis. No throat pain. No sore throat. No difficulty swallowing.   CV: No chest pain. No palpitations. No lightheadedness or dizziness.   Resp: No dyspnea at rest. No dyspnea on exertion. No orthopnea. No wheezing. No cough. No stridor. No sputum production. No chest pain with respiration.  GI: No nausea. No vomiting. No diarrhea.  MSK: No joint pain or pain in any extremities-except RLE  Integumentary: No skin lesions. + pedal edema.  Neurological: No gross motor weakness. No sensory changes.  [+ ] All other systems negative  [ ] Unable to assess ROS because ________    OBJECTIVE:  ICU Vital Signs Last 24 Hrs  T(C): 36.7 (11 Apr 2019 04:02), Max: 37.2 (10 Apr 2019 20:00)  T(F): 98 (11 Apr 2019 04:02), Max: 98.9 (10 Apr 2019 20:00)  HR: 91 (11 Apr 2019 04:02) (91 - 99)  BP: 148/82 (11 Apr 2019 04:02) (119/69 - 148/82)  BP(mean): --  ABP: --  ABP(mean): --  RR: 18 (11 Apr 2019 04:02) (18 - 18)  SpO2: 99% (11 Apr 2019 04:02) (98% - 99%)        04-09 @ 07:01  -  04-10 @ 07:00  --------------------------------------------------------  IN: 590 mL / OUT: 1400 mL / NET: -810 mL    04-10 @ 07:01  -  04-11 @ 04:58  --------------------------------------------------------  IN: 240 mL / OUT: 900 mL / NET: -660 mL      CAPILLARY BLOOD GLUCOSE      POCT Blood Glucose.: 264 mg/dL (10 Apr 2019 21:43)      PHYSICAL EXAM: NAD in bed  General: Awake, alert, oriented X 3.   HEENT: Atraumatic, normocephalic.                 Mallampatti Grade 3                No nasal congestion.                No tonsillar or pharyngeal exudates.  Lymph Nodes: No palpable lymphadenopathy  Neck: No JVD. No carotid bruit.   Respiratory: Normal chest expansion                         Normal percussion                         Normal and equal air entry                         No wheeze, rhonchi or rales.  Cardiovascular: S1 S2 normal. No murmurs, rubs or gallops.   Abdomen: Soft, non-tender, non-distended. No organomegaly. Normoactive bowel sounds.  Extremities: Warm to touch. Peripheral pulse palpable. + pedal edema. right leg immobilized  Skin: No rashes or skin lesions  Neurological: Motor and sensory examination equal and normal in all four extremities.  Psychiatry: Appropriate mood and affect.    HOSPITAL MEDICATIONS:  MEDICATIONS  (STANDING):  amiodarone    Tablet 200 milliGRAM(s) Oral daily  buDESOnide 160 MICROgram(s)/formoterol 4.5 MICROgram(s) Inhaler 2 Puff(s) Inhalation two times a day  carvedilol 6.25 milliGRAM(s) Oral every 12 hours  dextrose 5%. 1000 milliLiter(s) (50 mL/Hr) IV Continuous <Continuous>  dextrose 50% Injectable 12.5 Gram(s) IV Push once  dextrose 50% Injectable 25 Gram(s) IV Push once  dextrose 50% Injectable 25 Gram(s) IV Push once  docusate sodium 100 milliGRAM(s) Oral three times a day  DULoxetine 60 milliGRAM(s) Oral daily  insulin glargine Injectable (LANTUS) 24 Unit(s) SubCutaneous at bedtime  insulin lispro (HumaLOG) corrective regimen sliding scale   SubCutaneous three times a day before meals  insulin lispro (HumaLOG) corrective regimen sliding scale   SubCutaneous at bedtime  insulin lispro Injectable (HumaLOG) 20 Unit(s) SubCutaneous before breakfast  insulin lispro Injectable (HumaLOG) 15 Unit(s) SubCutaneous before lunch  insulin lispro Injectable (HumaLOG) 15 Unit(s) SubCutaneous before dinner  isosorbide   mononitrate ER Tablet (IMDUR) 30 milliGRAM(s) Oral daily  lactated ringers. 1000 milliLiter(s) (50 mL/Hr) IV Continuous <Continuous>  levothyroxine 188 MICROGram(s) Oral daily  mesalamine DR (24-Hour) Tablet 2.4 Gram(s) Oral daily  metolazone 2.5 milliGRAM(s) Oral <User Schedule>  norethindrone acetate 5 milliGRAM(s) Oral daily  pantoprazole    Tablet 40 milliGRAM(s) Oral before breakfast  polyethylene glycol 3350 17 Gram(s) Oral daily  senna 2 Tablet(s) Oral at bedtime  spironolactone 25 milliGRAM(s) Oral daily  tiotropium 18 MICROgram(s) Capsule 1 Capsule(s) Inhalation daily  torsemide 50 milliGRAM(s) Oral daily    MEDICATIONS  (PRN):  acetaminophen   Tablet .. 650 milliGRAM(s) Oral every 6 hours PRN Mild Pain (1 - 3), Moderate Pain (4 - 6)  bisacodyl Suppository 10 milliGRAM(s) Rectal once PRN Constipation  dextrose 40% Gel 15 Gram(s) Oral once PRN Blood Glucose LESS THAN 70 milliGRAM(s)/deciliter  glucagon  Injectable 1 milliGRAM(s) IntraMuscular once PRN Glucose LESS THAN 70 milligrams/deciliter  HYDROmorphone  Injectable 0.2 milliGRAM(s) IV Push every 10 minutes PRN Moderate Pain (4 - 6)  ondansetron Injectable 4 milliGRAM(s) IV Push once PRN Nausea and/or Vomiting      LABS:                        10.6   12.8  )-----------( 215      ( 10 Apr 2019 09:29 )             31.6     04-10    139  |  95<L>  |  51<H>  ----------------------------<  210<H>  3.7   |  25  |  1.53<H>    Ca    10.0      10 Apr 2019 09:29      PT/INR - ( 09 Apr 2019 09:14 )   PT: 11.4 sec;   INR: 0.99 ratio         PTT - ( 09 Apr 2019 09:14 )  PTT:26.2 sec          MICROBIOLOGY:     RADIOLOGY:  [ ] Reviewed and interpreted by me    Point of Care Ultrasound Findings:    PFT:    EKG:

## 2019-04-11 NOTE — BRIEF OPERATIVE NOTE - NSICDXBRIEFPOSTOP_GEN_ALL_CORE_FT
POST-OP DIAGNOSIS:  Postmenopausal bleeding 09-Apr-2019 15:25:16  Shraddha Escamilla
POST-OP DIAGNOSIS:  Quadriceps tendon rupture, right, initial encounter 11-Apr-2019 12:18:56  Mendy Catalan

## 2019-04-11 NOTE — PROGRESS NOTE ADULT - SUBJECTIVE AND OBJECTIVE BOX
Patient is a 78y old  Female who presents with a chief complaint of fall, right knee pain (11 Apr 2019 17:44)      SUBJECTIVE / OVERNIGHT EVENTS: s/p quad tendon repair    MEDICATIONS  (STANDING):  acetaminophen   Tablet .. 975 milliGRAM(s) Oral every 8 hours  amiodarone    Tablet 200 milliGRAM(s) Oral daily  buDESOnide 160 MICROgram(s)/formoterol 4.5 MICROgram(s) Inhaler 2 Puff(s) Inhalation two times a day  carvedilol 6.25 milliGRAM(s) Oral every 12 hours  clindamycin IVPB 900 milliGRAM(s) IV Intermittent every 8 hours  dextrose 5%. 1000 milliLiter(s) (50 mL/Hr) IV Continuous <Continuous>  dextrose 50% Injectable 12.5 Gram(s) IV Push once  dextrose 50% Injectable 25 Gram(s) IV Push once  dextrose 50% Injectable 25 Gram(s) IV Push once  dextrose 50% Injectable 12.5 Gram(s) IV Push once  dextrose 50% Injectable 25 Gram(s) IV Push once  dextrose 50% Injectable 25 Gram(s) IV Push once  docusate sodium 100 milliGRAM(s) Oral three times a day  DULoxetine 60 milliGRAM(s) Oral daily  enoxaparin Injectable 40 milliGRAM(s) SubCutaneous every 24 hours  insulin glargine Injectable (LANTUS) 44 Unit(s) SubCutaneous at bedtime  insulin lispro (HumaLOG) corrective regimen sliding scale   SubCutaneous three times a day before meals  insulin lispro (HumaLOG) corrective regimen sliding scale   SubCutaneous at bedtime  insulin lispro Injectable (HumaLOG) 20 Unit(s) SubCutaneous before breakfast  insulin lispro Injectable (HumaLOG) 15 Unit(s) SubCutaneous before lunch  insulin lispro Injectable (HumaLOG) 10 Unit(s) SubCutaneous before dinner  isosorbide   mononitrate ER Tablet (IMDUR) 30 milliGRAM(s) Oral daily  lactated ringers. 1000 milliLiter(s) (50 mL/Hr) IV Continuous <Continuous>  levothyroxine 188 MICROGram(s) Oral daily  mesalamine DR (24-Hour) Tablet 2.4 Gram(s) Oral daily  metolazone 2.5 milliGRAM(s) Oral <User Schedule>  norethindrone acetate 5 milliGRAM(s) Oral daily  pantoprazole    Tablet 40 milliGRAM(s) Oral before breakfast  polyethylene glycol 3350 17 Gram(s) Oral daily  senna 2 Tablet(s) Oral at bedtime  sodium chloride 0.9%. 1000 milliLiter(s) (75 mL/Hr) IV Continuous <Continuous>  spironolactone 25 milliGRAM(s) Oral daily  tiotropium 18 MICROgram(s) Capsule 1 Capsule(s) Inhalation daily  torsemide 50 milliGRAM(s) Oral daily    MEDICATIONS  (PRN):  acetaminophen   Tablet .. 650 milliGRAM(s) Oral every 6 hours PRN Mild Pain (1 - 3), Moderate Pain (4 - 6)  bisacodyl Suppository 10 milliGRAM(s) Rectal once PRN Constipation  dextrose 40% Gel 15 Gram(s) Oral once PRN Blood Glucose LESS THAN 70 milliGRAM(s)/deciliter  dextrose 40% Gel 15 Gram(s) Oral once PRN Blood Glucose LESS THAN 70 milliGRAM(s)/deciliter  glucagon  Injectable 1 milliGRAM(s) IntraMuscular once PRN Glucose LESS THAN 70 milligrams/deciliter  glucagon  Injectable 1 milliGRAM(s) IntraMuscular once PRN Glucose LESS THAN 70 milligrams/deciliter  HYDROmorphone  Injectable 0.2 milliGRAM(s) IV Push every 10 minutes PRN Moderate Pain (4 - 6)  melatonin 3 milliGRAM(s) Oral at bedtime PRN Insomnia  ondansetron Injectable 4 milliGRAM(s) IV Push once PRN Nausea and/or Vomiting  ondansetron Injectable 4 milliGRAM(s) IV Push every 6 hours PRN Nausea and/or Vomiting  oxyCODONE    IR 2.5 milliGRAM(s) Oral every 4 hours PRN Moderate Pain (4 - 6)  oxyCODONE    IR 5 milliGRAM(s) Oral every 4 hours PRN Severe Pain (7 - 10)  traMADol 25 milliGRAM(s) Oral every 8 hours PRN Mild Pain (1 - 3)      Vital Signs Last 24 Hrs  T(F): 98.8 (04-11-19 @ 19:54), Max: 98.8 (04-11-19 @ 19:54)  HR: 90 (04-11-19 @ 19:54) (90 - 100)  BP: 123/70 (04-11-19 @ 19:54) (123/60 - 159/76)  RR: 18 (04-11-19 @ 19:54) (16 - 20)  SpO2: 100% (04-11-19 @ 19:54) (97% - 100%)  Telemetry:   CAPILLARY BLOOD GLUCOSE      POCT Blood Glucose.: 308 mg/dL (11 Apr 2019 21:37)  POCT Blood Glucose.: 262 mg/dL (11 Apr 2019 17:23)  POCT Blood Glucose.: 217 mg/dL (11 Apr 2019 16:04)  POCT Blood Glucose.: 209 mg/dL (11 Apr 2019 08:15)    I&O's Summary    10 Apr 2019 07:01  -  11 Apr 2019 07:00  --------------------------------------------------------  IN: 240 mL / OUT: 1500 mL / NET: -1260 mL    11 Apr 2019 07:01  -  11 Apr 2019 22:45  --------------------------------------------------------  IN: 200 mL / OUT: 1000 mL / NET: -800 mL        PHYSICAL EXAM:  GENERAL: NAD, well-developed  HEAD:  Atraumatic, Normocephalic  EYES: EOMI, PERRLA, conjunctiva and sclera clear  NECK: Supple, No JVD  CHEST/LUNG: Clear to auscultation bilaterally; No wheeze  HEART: Regular rate and rhythm; No murmurs, rubs, or gallops  ABDOMEN: Soft, Nontender, Nondistended; Bowel sounds present  EXTREMITIES:  2+ Peripheral Pulses, No clubbing, cyanosis, or edema  PSYCH: AAOx3  NEUROLOGY: non-focal  SKIN: No rashes or lesions    LABS:                        10.1   11.0  )-----------( 191      ( 11 Apr 2019 05:13 )             30.1     04-11    139  |  94<L>  |  56<H>  ----------------------------<  235<H>  3.2<L>   |  30  |  1.70<H>    Ca    9.8      11 Apr 2019 05:13      PT/INR - ( 11 Apr 2019 05:13 )   PT: 12.9 sec;   INR: 1.12 ratio         PTT - ( 11 Apr 2019 05:13 )  PTT:25.4 sec          RADIOLOGY & ADDITIONAL TESTS:    Imaging Personally Reviewed:    Consultant(s) Notes Reviewed:      Care Discussed with Consultants/Other Providers:

## 2019-04-11 NOTE — PHYSICAL THERAPY INITIAL EVALUATION ADULT - BED MOBILITY TRAINING, PT EVAL
GOAL: Pt will perform bed mobility independently, in 4 weeks.
GOAL: Pt will perform bed mobility with mod  Ax1  in 2 weeks.

## 2019-04-11 NOTE — PROGRESS NOTE ADULT - ASSESSMENT
79 yo F with ILD, VICKY/OHS with chronic hypercapnic and hypoxic respiratory failure on 3L NC (not on CPAP), PAD/PVD with chronic LE wounds, h/o MS s/p bioprosthetic MVR, HFpEF, paroxsmal afib (not on A/C in setting of bleeding), HTN,  DM, CKD III, anemia, post menopausal vaginal bleeding s/p D&C in July 2018 (on pathology found to have polyps, started on hormonal therapy with resolution of bleeding), UC, , morbid obesity p/w right knee pain and edema after multiple falls.  Patient denies any preceding symptoms of dizziness/lightheadedness, shortness of breath, CP, palpitations preceding the events. Endorses chronic back pain, has a history of spinal stenosis and has been using a wheelchair the past 4 months. Requires assistance of her  for ADLS    UA +, urine cultures growing Proteus and is being treated with Aztreonam and Vanco.  +vaginal bleeding over the past 24 hrs (has not been on Progesterone since admission)    Nonsmoker, has been following with Dr. Montgomery over the past year for symptoms of progressive dyspnea, now with minimal exertion. Prior HRCT chest showed evidence of ILD - unclear if related to underlying RA vs. amiodarone.  Has been on supplemental O2 since May 2018, 2LNC around the clock.   H/o VICKY diagnosed years ago - not on therapy  Home inhaler regimen: Albuterol nebs, anoro ellipta    A/P:  Dyspnea - multifactorial - related to underlying ILD, obesity, immobility 2/2 lumbar pain, HFpEF, anemia  Chronic respiratory failure with hypoxemia and hypercapnia  VICKY/OHS - untreated  Post menopausal Vaginal bleeding  UTI  Spinal stenosis, gait instability    Rec:  1. Dyspnea - Breathing at baseline - not dyspneic at rest, saturating well with 2LNC; CT chest and CXR unchanged  c/w current inhaler regimen- Symbicort, bronchodilators. Incentive spirometer  Given the patient's underlying VICKY/OHS with evidence of chronic hypercapnia, a trial of CPAP therapy should be considered and I have explained this to the patient and her family prior- she will require titration study as an outpatient. Agree with supplemental O2 24/7 - goal sats low 90s  2. Vaginal bleeding: GYN consulted for further eval and treatment. s/p Pelvic sono--s/p D and C   3. UTI- antibiotics as per ID  4. Afib - cardiac optimization- rate control, BP management, Cardiology following  5. PPX - DVT ppx  ******  4/9 for GYN procedure; 4/10-s/p gyn procedure-await ortho procedure  4/11-for ortho surgery

## 2019-04-11 NOTE — BRIEF OPERATIVE NOTE - NSICDXBRIEFPREOP_GEN_ALL_CORE_FT
PRE-OP DIAGNOSIS:  Postmenopausal bleeding 09-Apr-2019 15:25:04  Shraddha Escamilla
PRE-OP DIAGNOSIS:  Rupture of right quadriceps tendon 11-Apr-2019 12:18:40  Mendy Catalan

## 2019-04-11 NOTE — PHYSICAL THERAPY INITIAL EVALUATION ADULT - PLANNED THERAPY INTERVENTIONS, PT EVAL
balance training/gait training/transfer training/bed mobility training
transfer training/bed mobility training/gait training

## 2019-04-11 NOTE — PHYSICAL THERAPY INITIAL EVALUATION ADULT - CRITERIA FOR SKILLED THERAPEUTIC INTERVENTIONS
impairments found
functional limitations in following categories/risk reduction/prevention/rehab potential/impairments found

## 2019-04-11 NOTE — PHYSICAL THERAPY INITIAL EVALUATION ADULT - GAIT TRAINING, PT EVAL
GOAL: Pt will ambulate 50 feet w/ CG assist, w/use of appropriate assistive device in 4 weeks.
GOAL: Pt will amb 15ft with mod A x1  with use of appropriate AD in 2 weeks.

## 2019-04-11 NOTE — PHYSICAL THERAPY INITIAL EVALUATION ADULT - BED MOBILITY LIMITATIONS, REHAB EVAL
decreased ability to use legs for bridging/pushing
decreased ability to use legs for bridging/pushing/decreased ability to use arms for pushing/pulling

## 2019-04-11 NOTE — PROGRESS NOTE ADULT - SUBJECTIVE AND OBJECTIVE BOX
ORTHO ATTENDING POST OP    s/p R quad tendon repair  Saavedra Dressing  Knee immobilizer   WBAT  R  LE in immobilizer  hinged brace in extension will be placed tomorrow  elevation  ice  Lovenox  f/u 2 weeks  keep brace and dressing, on, clean and dry  until office visit

## 2019-04-11 NOTE — PHYSICAL THERAPY INITIAL EVALUATION ADULT - PRECAUTIONS/LIMITATIONS, REHAB EVAL
fall precautions/Xray L Tib/Fib: No acute fracture or dislocation. Joint spaces are maintained. No soft tissue swelling. Superior and inferior patellar and calcaneal enthesophytes. Extensive vascular calcifications. Xray pelvis: No acute displaced fracture or dislocation. Joint spaces are maintained. Vascular calcifications. Xray R knee: No acute fracture or dislocation. Joint spaces are maintained. No soft tissue swelling. Superior and inferior patellar enthesophytes. Vascular calcifications. Xray R hip: No acute displaced fracture or dislocation. Joint spaces are maintained. Vascular calcifications. CXR: Status post median sternotomy and CABG. Valve repair. Heart size cannot be accurately assessed in this projection. Mild pulmonary edema. No pleural effusions or pneumothorax. The visualized bones and soft tissues show no acute findings. MRI R Knee: Full-thickness quadriceps tendon tear at the level of the upper pole of the patella with 31 mm gap between the superior pole of the patella and the torn tendon fibers. Tear of the medial meniscus.
fall precautions/Xray L Tib/Fib: No acute fracture or dislocation. Joint spaces are maintained. No soft tissue swelling. Superior and inferior patellar and calcaneal enthesophytes. Extensive vascular calcifications. Xray pelvis: No acute displaced fracture or dislocation. Joint spaces are maintained. Vascular calcifications. Xray R knee: No acute fracture or dislocation. Joint spaces are maintained. No soft tissue swelling. Superior and inferior patellar enthesophytes. Vascular calcifications. Xray R hip: No acute displaced fracture or dislocation. Joint spaces are maintained. Vascular calcifications. CXR: Status post median sternotomy and CABG. Valve repair. Heart size cannot be accurately assessed in this projection. Mild pulmonary edema. No pleural effusions or pneumothorax. The visualized bones and soft tissues show no acute findings.

## 2019-04-11 NOTE — PROGRESS NOTE ADULT - ATTENDING COMMENTS
multifactorial resp failure-cards/copd/obesity hypoventilation, osas, debility, obesity, PAH--O2 2 liters NC-stable  copd-symbicort 160 2 bid, spiriva 1 puff q day, xopenex .63 q 6, incentive spirometry  Cards-as per Nelli et al  Obesity-nutrition consult  OSAS-outpt pt rx  PAH-WHO grp 2-cards rx  GYN-s/p procedure      ****Ortho-no absolute pulm contras to procedures  Fito Montgomery MD-Pulmonary   [phone]

## 2019-04-11 NOTE — PHYSICAL THERAPY INITIAL EVALUATION ADULT - MANUAL MUSCLE TESTING RESULTS, REHAB EVAL
grossly assessed due to/RLE 3/5 except R Knee NT, LLE 3/5, BUE 3+/5
at least 3/5 BUE, 2+/5 BLE/grossly assessed due to

## 2019-04-11 NOTE — PROGRESS NOTE ADULT - ASSESSMENT
79 yo F w/ hx of moderate MS s/p bioprosthetic mitral valve, severe pulmonary HTN, DM2, anemia, diastolic CHF, hypothyroidism, paroxysmal atrial fibrillation, HTN78 y/o Type 2 DM, hypothyroid, recently admitted with CHF exacerbation, re-admitted with recurrent falls and suspected UTI.    Type 2 DM insulin resistant at home on Tresiba insulin 60 units daily, and humalog 30 units before meals. Other treatment includes Bydureon 2 mg weekly. HbA1c during recent admission was 7.4 %. During psast admission noted with significantly reduced insulin requirenents, discharged on Lantus insulin 24 units at HS and Humalog 5 units per meal.  Patient re-admitted after recurrent falls at home, and susp. UTI, started on similar dose insulin, noted with hyperglycemia.     Hypothyroidism- treated with synthroid 175 mcg daily. Last test as outpatient recently reported Good, Here with mild elevated TSH 5.27. repeat TSH this admission  9.70 uIU/mL, FT4 1.5.  C/O fatigue, weight stable till recently. Says she is compliant with medications. Synthroid increased to 188 mcg daily.    Patient on very high amounts of insulin at home. Decreased requirements last admission are most probably related to CHF, and as it resolves expect insulin requirements sanna increase.  Patient post right Quadriceps tendon repair under general, back of floor, restarted on diet.  On ERT for vaginal bleeding.  PO intake variable prior, not hungry post procedure.  FS in the low 200th on decreased dose lantus    Suggest:  Will increase lantus basal insulin dose to 44 units again.  Humalog 10 units before dinner tonightX1.  Go back to pre-meal humalog 20+15+15 from tomorrow.  Synthroid 188 mcg daily, repeat TFT's tomorrow.

## 2019-04-11 NOTE — PHYSICAL THERAPY INITIAL EVALUATION ADULT - IMPAIRMENTS CONTRIBUTING IMPAIRED BED MOBILITY, REHAB EVAL
impaired postural control/decreased strength/impaired balance/narrow base of support
impaired balance/impaired postural control/decreased strength

## 2019-04-11 NOTE — CHART NOTE - NSCHARTNOTEFT_GEN_A_CORE
Patient seen in RR with family at bedside. Resting without complaints. States pain is well controlled.  Denies Chest Pain, SOB, N/V.    T(C): 36 (04-11-19 @ 15:30), Max: 37.2 (04-10-19 @ 20:00)  HR: 93 (04-11-19 @ 15:30) (90 - 100)  BP: 123/60 (04-11-19 @ 15:30) (119/69 - 159/76)  RR: 20 (04-11-19 @ 15:30) (16 - 20)  SpO2: 100% (04-11-19 @ 15:30) (97% - 100%)  Wt(kg): --    Exam:  Alert and Lewis, No Acute Distress  Card: +S1/S2, RRR  Pulm: CTAB  Laterality: R leg ace wrap/knee immobilizer in place  Calves soft, non-tender bilaterally  +PF/DF/EHL/FHL  SILT  + DP pulse, faint                               10.1   11.0  )-----------( 191      ( 11 Apr 2019 05:13 )             30.1    04-11    139  |  94<L>  |  56<H>  ----------------------------<  235<H>  3.2<L>   |  30  |  1.70<H>    Ca    9.8      11 Apr 2019 05:13        A/P: Patient is a 78y Female S/p R Quad tendon repair. VSS. NAD  -PT/OT-WBAT With Knee Immobilizer, [place] brace to be applied tomorrow AM  -IS encouraged  -DVT PPx- Lovenox 40 mg SQ daily  -Pain Control PRN  -Ice/elevate  -Appreciate medical care    Shira White PA-C  Team Pager #[phone]

## 2019-04-11 NOTE — PHYSICAL THERAPY INITIAL EVALUATION ADULT - TRANSFER TRAINING, PT EVAL
GOAL: Pt will perform ALL transfers independently, w/use of appropriate assistive device as needed, in 4 weeks.
GOAL: Pt will perform all transfers with mod A x1  with use of appropriate AD in 2 weeks.

## 2019-04-11 NOTE — PHYSICAL THERAPY INITIAL EVALUATION ADULT - NS ASR WT BEARING DETAIL RLE
[FreeTextEntry1] : Mrs. Oconnor presents to the office today 4 days post-op from a three level anterior cervical decompression and fusion. She is complaining of severe right sided neck pain and pain into her right arm. She has mild swelling at the surgical site. She has mild dysphagia but able to tolerated po fluids and foods. She has been taking Percocet 20-40mg at one time with no relief. The wound itself is clean/dry and intact with steri-strips in place. weight-bearing as tolerated/locked in extension

## 2019-04-11 NOTE — PHYSICAL THERAPY INITIAL EVALUATION ADULT - ADDITIONAL COMMENTS
Lives with spouse, house  3 steps to enter. Patient's spouse assist with adls and  has a cane, walker and wheelchair to assist with ambulation. Requires assistance of her  to get around because of chronic weakness.
Lives with spouse, house  3 steps to enter. Patient's spouse assist with adls and  has a cane, walker and wheelchair to assist with ambulation. Requires assistance of her  to get around because of chronic weakness.

## 2019-04-12 LAB
ANION GAP SERPL CALC-SCNC: 14 MMOL/L — SIGNIFICANT CHANGE UP (ref 5–17)
BASOPHILS # BLD AUTO: 0.04 K/UL — SIGNIFICANT CHANGE UP (ref 0–0.2)
BASOPHILS NFR BLD AUTO: 0.3 % — SIGNIFICANT CHANGE UP (ref 0–2)
BUN SERPL-MCNC: 60 MG/DL — HIGH (ref 7–23)
CALCIUM SERPL-MCNC: 9.2 MG/DL — SIGNIFICANT CHANGE UP (ref 8.4–10.5)
CHLORIDE SERPL-SCNC: 94 MMOL/L — LOW (ref 96–108)
CO2 SERPL-SCNC: 27 MMOL/L — SIGNIFICANT CHANGE UP (ref 22–31)
CREAT SERPL-MCNC: 1.96 MG/DL — HIGH (ref 0.5–1.3)
EOSINOPHIL # BLD AUTO: 0 K/UL — SIGNIFICANT CHANGE UP (ref 0–0.5)
EOSINOPHIL NFR BLD AUTO: 0 % — SIGNIFICANT CHANGE UP (ref 0–6)
GLUCOSE BLDC GLUCOMTR-MCNC: 232 MG/DL — HIGH (ref 70–99)
GLUCOSE BLDC GLUCOMTR-MCNC: 264 MG/DL — HIGH (ref 70–99)
GLUCOSE BLDC GLUCOMTR-MCNC: 284 MG/DL — HIGH (ref 70–99)
GLUCOSE BLDC GLUCOMTR-MCNC: 311 MG/DL — HIGH (ref 70–99)
GLUCOSE SERPL-MCNC: 296 MG/DL — HIGH (ref 70–99)
HCT VFR BLD CALC: 27.8 % — LOW (ref 34.5–45)
HGB BLD-MCNC: 8.6 G/DL — LOW (ref 11.5–15.5)
IMM GRANULOCYTES NFR BLD AUTO: 0.3 % — SIGNIFICANT CHANGE UP (ref 0–1.5)
LYMPHOCYTES # BLD AUTO: 1.15 K/UL — SIGNIFICANT CHANGE UP (ref 1–3.3)
LYMPHOCYTES # BLD AUTO: 7.9 % — LOW (ref 13–44)
MCHC RBC-ENTMCNC: 30.2 PG — SIGNIFICANT CHANGE UP (ref 27–34)
MCHC RBC-ENTMCNC: 30.9 GM/DL — LOW (ref 32–36)
MCV RBC AUTO: 97.5 FL — SIGNIFICANT CHANGE UP (ref 80–100)
MONOCYTES # BLD AUTO: 1.26 K/UL — HIGH (ref 0–0.9)
MONOCYTES NFR BLD AUTO: 8.7 % — SIGNIFICANT CHANGE UP (ref 2–14)
NEUTROPHILS # BLD AUTO: 11.97 K/UL — HIGH (ref 1.8–7.4)
NEUTROPHILS NFR BLD AUTO: 82.8 % — HIGH (ref 43–77)
PLATELET # BLD AUTO: 215 K/UL — SIGNIFICANT CHANGE UP (ref 150–400)
POTASSIUM SERPL-MCNC: 4 MMOL/L — SIGNIFICANT CHANGE UP (ref 3.5–5.3)
POTASSIUM SERPL-SCNC: 4 MMOL/L — SIGNIFICANT CHANGE UP (ref 3.5–5.3)
RBC # BLD: 2.85 M/UL — LOW (ref 3.8–5.2)
RBC # FLD: 16.8 % — HIGH (ref 10.3–14.5)
SODIUM SERPL-SCNC: 135 MMOL/L — SIGNIFICANT CHANGE UP (ref 135–145)
SURGICAL PATHOLOGY STUDY: SIGNIFICANT CHANGE UP
T4 FREE SERPL-MCNC: 2.2 NG/DL — HIGH (ref 0.9–1.8)
TSH SERPL-MCNC: 2.5 UIU/ML — SIGNIFICANT CHANGE UP (ref 0.27–4.2)
WBC # BLD: 14.47 K/UL — HIGH (ref 3.8–10.5)
WBC # FLD AUTO: 14.47 K/UL — HIGH (ref 3.8–10.5)

## 2019-04-12 PROCEDURE — 99232 SBSQ HOSP IP/OBS MODERATE 35: CPT

## 2019-04-12 RX ORDER — INSULIN GLARGINE 100 [IU]/ML
50 INJECTION, SOLUTION SUBCUTANEOUS AT BEDTIME
Qty: 0 | Refills: 0 | Status: DISCONTINUED | OUTPATIENT
Start: 2019-04-12 | End: 2019-04-14

## 2019-04-12 RX ORDER — INSULIN LISPRO 100/ML
20 VIAL (ML) SUBCUTANEOUS
Qty: 0 | Refills: 0 | Status: DISCONTINUED | OUTPATIENT
Start: 2019-04-12 | End: 2019-04-14

## 2019-04-12 RX ADMIN — Medication 100 MILLIGRAM(S): at 02:50

## 2019-04-12 RX ADMIN — ISOSORBIDE MONONITRATE 30 MILLIGRAM(S): 60 TABLET, EXTENDED RELEASE ORAL at 12:20

## 2019-04-12 RX ADMIN — Medication 100 MILLIGRAM(S): at 12:21

## 2019-04-12 RX ADMIN — BUDESONIDE AND FORMOTEROL FUMARATE DIHYDRATE 2 PUFF(S): 160; 4.5 AEROSOL RESPIRATORY (INHALATION) at 17:35

## 2019-04-12 RX ADMIN — Medication 100 MILLIGRAM(S): at 06:14

## 2019-04-12 RX ADMIN — BUDESONIDE AND FORMOTEROL FUMARATE DIHYDRATE 2 PUFF(S): 160; 4.5 AEROSOL RESPIRATORY (INHALATION) at 06:15

## 2019-04-12 RX ADMIN — Medication 8: at 08:27

## 2019-04-12 RX ADMIN — Medication 50 MILLIGRAM(S): at 06:14

## 2019-04-12 RX ADMIN — SPIRONOLACTONE 25 MILLIGRAM(S): 25 TABLET, FILM COATED ORAL at 06:14

## 2019-04-12 RX ADMIN — CARVEDILOL PHOSPHATE 6.25 MILLIGRAM(S): 80 CAPSULE, EXTENDED RELEASE ORAL at 06:15

## 2019-04-12 RX ADMIN — AMIODARONE HYDROCHLORIDE 200 MILLIGRAM(S): 400 TABLET ORAL at 06:14

## 2019-04-12 RX ADMIN — Medication 4: at 12:17

## 2019-04-12 RX ADMIN — DULOXETINE HYDROCHLORIDE 60 MILLIGRAM(S): 30 CAPSULE, DELAYED RELEASE ORAL at 12:19

## 2019-04-12 RX ADMIN — NORETHINDRONE 5 MILLIGRAM(S): 0.35 TABLET ORAL at 12:20

## 2019-04-12 RX ADMIN — PANTOPRAZOLE SODIUM 40 MILLIGRAM(S): 20 TABLET, DELAYED RELEASE ORAL at 08:31

## 2019-04-12 RX ADMIN — Medication 188 MICROGRAM(S): at 06:15

## 2019-04-12 RX ADMIN — Medication 975 MILLIGRAM(S): at 12:51

## 2019-04-12 RX ADMIN — POLYETHYLENE GLYCOL 3350 17 GRAM(S): 17 POWDER, FOR SOLUTION ORAL at 12:20

## 2019-04-12 RX ADMIN — Medication 15 UNIT(S): at 17:34

## 2019-04-12 RX ADMIN — Medication 2.4 GRAM(S): at 12:20

## 2019-04-12 RX ADMIN — Medication 6: at 17:33

## 2019-04-12 RX ADMIN — TIOTROPIUM BROMIDE 1 CAPSULE(S): 18 CAPSULE ORAL; RESPIRATORY (INHALATION) at 12:19

## 2019-04-12 RX ADMIN — Medication 975 MILLIGRAM(S): at 06:14

## 2019-04-12 RX ADMIN — ENOXAPARIN SODIUM 40 MILLIGRAM(S): 100 INJECTION SUBCUTANEOUS at 17:34

## 2019-04-12 RX ADMIN — INSULIN GLARGINE 50 UNIT(S): 100 INJECTION, SOLUTION SUBCUTANEOUS at 22:11

## 2019-04-12 RX ADMIN — Medication 975 MILLIGRAM(S): at 22:12

## 2019-04-12 RX ADMIN — Medication 1: at 22:12

## 2019-04-12 RX ADMIN — Medication 100 MILLIGRAM(S): at 22:12

## 2019-04-12 RX ADMIN — Medication 975 MILLIGRAM(S): at 06:54

## 2019-04-12 RX ADMIN — Medication 20 UNIT(S): at 08:28

## 2019-04-12 RX ADMIN — Medication 975 MILLIGRAM(S): at 12:21

## 2019-04-12 RX ADMIN — Medication 975 MILLIGRAM(S): at 23:20

## 2019-04-12 RX ADMIN — SENNA PLUS 2 TABLET(S): 8.6 TABLET ORAL at 22:12

## 2019-04-12 RX ADMIN — CARVEDILOL PHOSPHATE 6.25 MILLIGRAM(S): 80 CAPSULE, EXTENDED RELEASE ORAL at 17:42

## 2019-04-12 RX ADMIN — Medication 15 UNIT(S): at 12:18

## 2019-04-12 NOTE — PROGRESS NOTE ADULT - ASSESSMENT
77 yo F w/ hx of moderate MS s/p bioprosthetic mitral valve, severe pulmonary HTN, DM2, anemia, diastolic CHF, hypothyroidism, paroxysmal atrial fibrillation, HTN78 y/o Type 2 DM, hypothyroid, recently admitted with CHF exacerbation, re-admitted with recurrent falls and suspected UTI.    Type 2 DM insulin resistant at home on Tresiba insulin 60 units daily, and humalog 30 units before meals. Other treatment includes Bydureon 2 mg weekly. HbA1c during recent admission was 7.4 %. During psast admission noted with significantly reduced insulin requirenents, discharged on Lantus insulin 24 units at HS and Humalog 5 units per meal.  Patient re-admitted after recurrent falls at home, and susp. UTI, started on similar dose insulin, noted with hyperglycemia.     Hypothyroidism- treated with synthroid 175 mcg daily. Last test as outpatient recently reported Good, Here with mild elevated TSH 5.27. repeat TSH this admission  9.70 uIU/mL, FT4 1.5.  C/O fatigue, weight stable till recently. Says she is compliant with medications. Synthroid increased to 188 mcg daily.    Patient on very high amounts of insulin at home. Decreased requirements last admission are most probably related to CHF, and as it resolves expect insulin requirements sanna increase.  Patient post right Quadriceps tendon repair, restarted on diet.  On ERT for vaginal bleeding.  PO intake good today.  FS back up again on diet in the 200th range.    Suggest:  Will increase lantus basal insulin dose to 50 units again.  Pre-meal humalog 20 units per meal.  On increased Synthroid 188 mcg daily, repeat TFT's good, 2.50 keep current dose.

## 2019-04-12 NOTE — PROGRESS NOTE ADULT - SUBJECTIVE AND OBJECTIVE BOX
Patient is a 78y old  Female who presents with a chief complaint of fall, right knee pain (12 Apr 2019 06:21)      INTERVAL HPI/OVERNIGHT EVENTS:    REVIEW OF SYSTEMS:  CONSTITUTIONAL: No fever, weight loss, or fatigue  EYES: No eye pain, visual disturbances, or discharge  ENMT:  No difficulty hearing, tinnitus, vertigo; No sinus or throat pain  NECK: No pain or stiffness  BREASTS: No pain, masses, or nipple discharge  RESPIRATORY: No cough, wheezing, chills or hemoptysis; No shortness of breath  CARDIOVASCULAR: No chest pain, palpitations, dizziness, or leg swelling  GASTROINTESTINAL: No abdominal or epigastric pain. No nausea, vomiting, or hematemesis; No diarrhea or constipation. No melena or hematochezia.  GENITOURINARY: No dysuria, frequency, hematuria, or incontinence  NEUROLOGICAL: No headaches, memory loss, loss of strength, numbness, or tremors  SKIN: No itching, burning, rashes, or lesions   LYMPH NODES: No enlarged glands  ENDOCRINE: No heat or cold intolerance; No hair loss  MUSCULOSKELETAL: No joint pain or swelling; No muscle, back, or extremity pain  PSYCHIATRIC: No depression, anxiety, mood swings, or difficulty sleeping  HEME/LYMPH: No easy bruising, or bleeding gums  ALLERGY AND IMMUNOLOGIC: No hives or eczema      T(C): 36.7 (04-12-19 @ 12:01), Max: 37.1 (04-11-19 @ 19:54)  HR: 93 (04-12-19 @ 12:01) (86 - 99)  BP: 117/76 (04-12-19 @ 12:01) (117/76 - 159/76)  RR: 18 (04-12-19 @ 12:01) (16 - 20)  SpO2: 100% (04-12-19 @ 12:01) (97% - 100%)    PHYSICAL EXAM:  GENERAL: NAD, well-groomed, well-developed  HEAD:  Atraumatic, Normocephalic  EYES: EOMI, PERRLA, conjunctiva and sclera clear  ENMT: No tonsillar erythema, exudates, or enlargement; Moist mucous membranes, Good dentition, No lesions  NECK: Supple, No JVD, Normal thyroid  NERVOUS SYSTEM:  Alert & Oriented X3, Good concentration; Motor Strength 5/5 B/L upper and lower extremities; DTRs 2+ intact and symmetric  CHEST/LUNG: Clear to percussion bilaterally; No rales, rhonchi, wheezing, or rubs  HEART: Regular rate and rhythm; No murmurs, rubs, or gallops  ABDOMEN: Soft, Nontender, Nondistended; Bowel sounds present  EXTREMITIES:  2+ Peripheral Pulses, No clubbing, cyanosis, or edema  LYMPH: No lymphadenopathy noted  SKIN: No rashes or lesions      MEDICATIONS  (STANDING):  acetaminophen   Tablet .. 975 milliGRAM(s) Oral every 8 hours  amiodarone    Tablet 200 milliGRAM(s) Oral daily  buDESOnide 160 MICROgram(s)/formoterol 4.5 MICROgram(s) Inhaler 2 Puff(s) Inhalation two times a day  carvedilol 6.25 milliGRAM(s) Oral every 12 hours  dextrose 5%. 1000 milliLiter(s) (50 mL/Hr) IV Continuous <Continuous>  dextrose 50% Injectable 12.5 Gram(s) IV Push once  dextrose 50% Injectable 25 Gram(s) IV Push once  dextrose 50% Injectable 25 Gram(s) IV Push once  dextrose 50% Injectable 12.5 Gram(s) IV Push once  dextrose 50% Injectable 25 Gram(s) IV Push once  dextrose 50% Injectable 25 Gram(s) IV Push once  docusate sodium 100 milliGRAM(s) Oral three times a day  DULoxetine 60 milliGRAM(s) Oral daily  enoxaparin Injectable 40 milliGRAM(s) SubCutaneous every 24 hours  insulin glargine Injectable (LANTUS) 44 Unit(s) SubCutaneous at bedtime  insulin lispro (HumaLOG) corrective regimen sliding scale   SubCutaneous three times a day before meals  insulin lispro (HumaLOG) corrective regimen sliding scale   SubCutaneous at bedtime  insulin lispro Injectable (HumaLOG) 20 Unit(s) SubCutaneous before breakfast  insulin lispro Injectable (HumaLOG) 15 Unit(s) SubCutaneous before lunch  insulin lispro Injectable (HumaLOG) 15 Unit(s) SubCutaneous before dinner  insulin lispro Injectable (HumaLOG) 10 Unit(s) SubCutaneous before dinner  isosorbide   mononitrate ER Tablet (IMDUR) 30 milliGRAM(s) Oral daily  lactated ringers. 1000 milliLiter(s) (50 mL/Hr) IV Continuous <Continuous>  levothyroxine 188 MICROGram(s) Oral daily  mesalamine DR (24-Hour) Tablet 2.4 Gram(s) Oral daily  metolazone 2.5 milliGRAM(s) Oral <User Schedule>  norethindrone acetate 5 milliGRAM(s) Oral daily  pantoprazole    Tablet 40 milliGRAM(s) Oral before breakfast  polyethylene glycol 3350 17 Gram(s) Oral daily  senna 2 Tablet(s) Oral at bedtime  sodium chloride 0.9%. 1000 milliLiter(s) (75 mL/Hr) IV Continuous <Continuous>  spironolactone 25 milliGRAM(s) Oral daily  tiotropium 18 MICROgram(s) Capsule 1 Capsule(s) Inhalation daily  torsemide 50 milliGRAM(s) Oral daily    MEDICATIONS  (PRN):  acetaminophen   Tablet .. 650 milliGRAM(s) Oral every 6 hours PRN Mild Pain (1 - 3), Moderate Pain (4 - 6)  bisacodyl Suppository 10 milliGRAM(s) Rectal once PRN Constipation  dextrose 40% Gel 15 Gram(s) Oral once PRN Blood Glucose LESS THAN 70 milliGRAM(s)/deciliter  dextrose 40% Gel 15 Gram(s) Oral once PRN Blood Glucose LESS THAN 70 milliGRAM(s)/deciliter  glucagon  Injectable 1 milliGRAM(s) IntraMuscular once PRN Glucose LESS THAN 70 milligrams/deciliter  glucagon  Injectable 1 milliGRAM(s) IntraMuscular once PRN Glucose LESS THAN 70 milligrams/deciliter  HYDROmorphone  Injectable 0.2 milliGRAM(s) IV Push every 10 minutes PRN Moderate Pain (4 - 6)  melatonin 3 milliGRAM(s) Oral at bedtime PRN Insomnia  ondansetron Injectable 4 milliGRAM(s) IV Push once PRN Nausea and/or Vomiting  ondansetron Injectable 4 milliGRAM(s) IV Push every 6 hours PRN Nausea and/or Vomiting  oxyCODONE    IR 2.5 milliGRAM(s) Oral every 4 hours PRN Moderate Pain (4 - 6)  oxyCODONE    IR 5 milliGRAM(s) Oral every 4 hours PRN Severe Pain (7 - 10)  traMADol 25 milliGRAM(s) Oral every 8 hours PRN Mild Pain (1 - 3)        Allergies    Augmentin (Swelling)  cephalexin (Swelling)  latex (Rash)    Intolerances            LABS:                        8.6    14.47 )-----------( 215      ( 12 Apr 2019 09:02 )             27.8     04-12    135  |  94<L>  |  60<H>  ----------------------------<  296<H>  4.0   |  27  |  1.96<H>    Ca    9.2      12 Apr 2019 06:50      PT/INR - ( 11 Apr 2019 05:13 )   PT: 12.9 sec;   INR: 1.12 ratio         PTT - ( 11 Apr 2019 05:13 )  PTT:25.4 sec    CAPILLARY BLOOD GLUCOSE      POCT Blood Glucose.: 232 mg/dL (12 Apr 2019 11:32)  POCT Blood Glucose.: 311 mg/dL (12 Apr 2019 08:09)  POCT Blood Glucose.: 308 mg/dL (11 Apr 2019 21:37)  POCT Blood Glucose.: 262 mg/dL (11 Apr 2019 17:23)  POCT Blood Glucose.: 217 mg/dL (11 Apr 2019 16:04)      RADIOLOGY & ADDITIONAL TESTS:    Imaging Personally Reviewed:  [ ] YES  [ ] NO    Consultant(s) Notes Reviewed:  [ ] YES  [ ] NO Patient is seen and examined at the bed side, is afebrile. She is s/p s/p Right quadriceps tendon repair on 4/11/19, and tolerated the procedure well.         REVIEW OF SYSTEMS: All other review systems are negative        T(C): 36.7 (04-12-19 @ 12:01), Max: 37.1 (04-11-19 @ 19:54)  HR: 93 (04-12-19 @ 12:01) (86 - 99)  BP: 117/76 (04-12-19 @ 12:01) (117/76 - 159/76)  RR: 18 (04-12-19 @ 12:01) (16 - 20)  SpO2: 100% (04-12-19 @ 12:01) (97% - 100%)        PHYSICAL EXAM:  GENERAL: Not in distress  CHEST/LUNG: Air entry bilaterally  HEART: s1 and s2 present  ABDOMEN:  Nontender and  Nondistended  EXTREMITIES:  RLE bandage and immobilizer in placed  CNS: Awake, Alert and oriented        MEDICATIONS  (STANDING):  acetaminophen   Tablet .. 975 milliGRAM(s) Oral every 8 hours  amiodarone    Tablet 200 milliGRAM(s) Oral daily  buDESOnide 160 MICROgram(s)/formoterol 4.5 MICROgram(s) Inhaler 2 Puff(s) Inhalation two times a day  carvedilol 6.25 milliGRAM(s) Oral every 12 hours  docusate sodium 100 milliGRAM(s) Oral three times a day  DULoxetine 60 milliGRAM(s) Oral daily  enoxaparin Injectable 40 milliGRAM(s) SubCutaneous every 24 hours  insulin glargine Injectable (LANTUS) 44 Unit(s) SubCutaneous at bedtime  insulin lispro (HumaLOG) corrective regimen sliding scale   SubCutaneous three times a day before meals  insulin lispro (HumaLOG) corrective regimen sliding scale   SubCutaneous at bedtime  insulin lispro Injectable (HumaLOG) 20 Unit(s) SubCutaneous before breakfast  insulin lispro Injectable (HumaLOG) 15 Unit(s) SubCutaneous before lunch  insulin lispro Injectable (HumaLOG) 15 Unit(s) SubCutaneous before dinner  insulin lispro Injectable (HumaLOG) 10 Unit(s) SubCutaneous before dinner  isosorbide   mononitrate ER Tablet (IMDUR) 30 milliGRAM(s) Oral daily  lactated ringers. 1000 milliLiter(s) (50 mL/Hr) IV Continuous <Continuous>  levothyroxine 188 MICROGram(s) Oral daily  mesalamine DR (24-Hour) Tablet 2.4 Gram(s) Oral daily  metolazone 2.5 milliGRAM(s) Oral <User Schedule>  norethindrone acetate 5 milliGRAM(s) Oral daily  pantoprazole    Tablet 40 milliGRAM(s) Oral before breakfast  polyethylene glycol 3350 17 Gram(s) Oral daily  senna 2 Tablet(s) Oral at bedtime  sodium chloride 0.9%. 1000 milliLiter(s) (75 mL/Hr) IV Continuous <Continuous>  spironolactone 25 milliGRAM(s) Oral daily  tiotropium 18 MICROgram(s) Capsule 1 Capsule(s) Inhalation daily  torsemide 50 milliGRAM(s) Oral daily    MEDICATIONS  (PRN):  acetaminophen   Tablet .. 650 milliGRAM(s) Oral every 6 hours PRN Mild Pain (1 - 3), Moderate Pain (4 - 6)  bisacodyl Suppository 10 milliGRAM(s) Rectal once PRN Constipation  dextrose 40% Gel 15 Gram(s) Oral once PRN Blood Glucose LESS THAN 70 milliGRAM(s)/deciliter  dextrose 40% Gel 15 Gram(s) Oral once PRN Blood Glucose LESS THAN 70 milliGRAM(s)/deciliter  glucagon  Injectable 1 milliGRAM(s) IntraMuscular once PRN Glucose LESS THAN 70 milligrams/deciliter  glucagon  Injectable 1 milliGRAM(s) IntraMuscular once PRN Glucose LESS THAN 70 milligrams/deciliter  HYDROmorphone  Injectable 0.2 milliGRAM(s) IV Push every 10 minutes PRN Moderate Pain (4 - 6)  melatonin 3 milliGRAM(s) Oral at bedtime PRN Insomnia  ondansetron Injectable 4 milliGRAM(s) IV Push once PRN Nausea and/or Vomiting  ondansetron Injectable 4 milliGRAM(s) IV Push every 6 hours PRN Nausea and/or Vomiting  oxyCODONE    IR 2.5 milliGRAM(s) Oral every 4 hours PRN Moderate Pain (4 - 6)  oxyCODONE    IR 5 milliGRAM(s) Oral every 4 hours PRN Severe Pain (7 - 10)  traMADol 25 milliGRAM(s) Oral every 8 hours PRN Mild Pain (1 - 3)        Allergies : Augmentin (Swelling), cephalexin (Swelling), latex (Rash)        LABS:                        8.6    14.47 )-----------( 215      ( 12 Apr 2019 09:02 )             27.8         04-12    135  |  94<L>  |  60<H>  ----------------------------<  296<H>  4.0   |  27  |  1.96<H>    Ca    9.2      12 Apr 2019 06:50      PT/INR - ( 11 Apr 2019 05:13 )   PT: 12.9 sec;   INR: 1.12 ratio    PTT - ( 11 Apr 2019 05:13 )  PTT:25.4 sec      CAPILLARY BLOOD GLUCOSE  POCT Blood Glucose.: 232 mg/dL (12 Apr 2019 11:32)  POCT Blood Glucose.: 311 mg/dL (12 Apr 2019 08:09)  POCT Blood Glucose.: 308 mg/dL (11 Apr 2019 21:37)  POCT Blood Glucose.: 262 mg/dL (11 Apr 2019 17:23)  POCT Blood Glucose.: 217 mg/dL (11 Apr 2019 16:04)        RADIOLOGY & ADDITIONAL TESTS:    4/8/19 : MR Knee No Cont, Right (04.08.19 @ 15:14) 1.  Full-thickness quadriceps tendon tear at the level of the upper pole  of the patella with 31 mm gap between the superior pole of the patella   and the torn tendon fibers.  2.  Large knee joint effusion.  3.  Tear of the medial meniscus.      4/5/19 : US Pelvis Complete (04.05.19 @ 12:11) Severely limited evaluation. No mass is seen.  Markedly distended endometrial cavity with fluid, suggesting  cervical/vaginal outlet obstruction.          MICROBIOLOGY DATA:      Culture - Urine (04.04.19 @ 18:59)    Specimen Source: .Urine Clean Catch (Midstream)    Culture Results:   No growth      Culture - Blood (03.30.19 @ 22:36)    Specimen Source: .Blood Blood    Culture Results:   No growth at 5 days.      Culture - Blood (03.30.19 @ 22:36)    Specimen Source: .Blood Blood    Culture Results:   No growth at 5 days.      Culture - Urine (03.30.19 @ 21:18)    -  Tobramycin: S <=4    -  Trimethoprim/Sulfamethoxazole: S <=2/38    -  Amikacin: S <=16    -  Ampicillin: S <=8 These ampicillin results predict results for amoxicillin    -  Ampicillin/Sulbactam: S <=8/4    -  Aztreonam: S <=4    -  Cefazolin: S <=8 For uncomplicated UTI with K. pneumoniae, E. coli, or P. mirablis: LÓPEZ <=16 is sensitive and LÓPEZ >=32 is resistant. This also predicts results for oral agents cefaclor, cefdinir, cefpodoxime, cefprozil, cefuroxime axetil, cephalexin and locarbef for uncomplicated UTI. Note that some isolates may be susceptible to these agents while testing resistant to cefazolin.    -  Cefepime: S <=4    -  Cefoxitin: S <=8    -  Ceftriaxone: S <=1 Enterobacter, Citrobacter, and Serratia may develop resistance during prolonged therapy    -  Ciprofloxacin: S <=1    -  Ertapenem: S <=1    -  Gentamicin: S <=4    -  Levofloxacin: S <=2    -  Meropenem: S <=1    -  Nitrofurantoin: R >64 Should not be used to treat pyelonephritis    -  Piperacillin/Tazobactam: S <=16    Specimen Source: .Urine Clean Catch (Midstream)    Culture Results:   >100,000 CFU/ml Proteus mirabilis    Organism Identification: Proteus mirabilis    Organism: Proteus mirabilis    Method Type: LÓPEZ

## 2019-04-12 NOTE — PROGRESS NOTE ADULT - ASSESSMENT
77 yo F with ILD, VICKY/OHS with chronic hypercapnic and hypoxic respiratory failure on 3L NC (not on CPAP), PAD/PVD with chronic LE wounds, h/o MS s/p bioprosthetic MVR, HFpEF, paroxsmal afib (not on A/C in setting of bleeding), HTN,  DM, CKD III, anemia, post menopausal vaginal bleeding s/p D&C in July 2018 (on pathology found to have polyps, started on hormonal therapy with resolution of bleeding), UC, , morbid obesity p/w right knee pain and edema after multiple falls.  Patient denies any preceding symptoms of dizziness/lightheadedness, shortness of breath, CP, palpitations preceding the events. Endorses chronic back pain, has a history of spinal stenosis and has been using a wheelchair the past 4 months. Requires assistance of her  for ADLS    UA +, urine cultures growing Proteus and is being treated with Aztreonam and Vanco.  +vaginal bleeding over the past 24 hrs (has not been on Progesterone since admission)    Nonsmoker, has been following with me over the past year for symptoms of progressive dyspnea, now with minimal exertion. Prior HRCT chest showed evidence of ILD - unclear if related to underlying RA vs. amiodarone.  Has been on supplemental O2 since May 2018, 2LNC around the clock.   H/o VICKY diagnosed years ago - not on therapy  Home inhaler regimen: Albuterol nebs, anoro ellipta    A/P:  Dyspnea - multifactorial - related to underlying ILD, obesity, immobility 2/2 lumbar pain, HFpEF, anemia  Chronic respiratory failure with hypoxemia and hypercapnia  VICKY/OHS - untreated  Post menopausal Vaginal bleeding  UTI  Spinal stenosis, gait instability    Rec:  1. Dyspnea - Breathing at baseline - not dyspneic at rest, saturating well with 2LNC; CT chest and CXR unchanged  c/w current inhaler regimen- Symbicort, bronchodilators. Incentive spirometer  Given the patient's underlying VICKY/OHS with evidence of chronic hypercapnia, a trial of CPAP therapy should be considered and I have explained this to the patient and her family prior- she will require titration study as an outpatient. Agree with supplemental O2 24/7 - goal sats low 90s  2. Vaginal bleeding: GYN consulted for further eval and treatment. s/p Pelvic sono--s/p D and C   3. UTI- antibiotics as per ID  4. Afib - cardiac optimization- rate control, BP management, Cardiology following  5. PPX - DVT ppx  ******  4/9 for GYN procedure; 4/10-s/p gyn procedure-await ortho procedure  4/11-for ortho surgery  4/12-s/p R-knee surgery--stable

## 2019-04-12 NOTE — PROGRESS NOTE ADULT - SUBJECTIVE AND OBJECTIVE BOX
Pt S/E at bedside, no acute events overnight, pain controlled, denies CP, does not feel short of breath at this time, denies N/V.     Vital Signs Last 24 Hrs  T(C): 36.6 (12 Apr 2019 05:00), Max: 37.1 (11 Apr 2019 19:54)  T(F): 97.8 (12 Apr 2019 05:00), Max: 98.8 (11 Apr 2019 19:54)  HR: 86 (12 Apr 2019 05:00) (86 - 100)  BP: 127/74 (12 Apr 2019 05:00) (123/60 - 159/76)  BP(mean): 91 (11 Apr 2019 16:00) (89 - 109)  RR: 18 (12 Apr 2019 05:00) (16 - 20)  SpO2: 99% (12 Apr 2019 05:00) (97% - 100%)    Gen: NAD,    Right Lower Extremity:  Dressing clean dry intact, bulky morillo with knee immobilzer in place, aquacell without saturation  +EHL/FHL/TA/GS  SILT L3-S1  +DP/PT Pulses  Compartments soft  No calf TTP B/L

## 2019-04-12 NOTE — PROGRESS NOTE ADULT - ATTENDING COMMENTS
Pt seen and examined.  Exam and plan as above.  Linn to be placed today.  WBAT in brace, locked in extension

## 2019-04-12 NOTE — PROGRESS NOTE ADULT - ASSESSMENT
A/P: 78F s/p R Quad tendon repair POD 1  Pain Control  DVT ppx, lovenox 40QD  WBAT in knee immobilizer at all times, al brace to be delivered today locked in extension  Ice/Elevation  PT/OT  Incentive Spirometry  Medical management appreciated  DC planning

## 2019-04-12 NOTE — CHART NOTE - NSCHARTNOTEFT_GEN_A_CORE
79yo female s/p right quadriceps tendon repair was fit with [place] knee ROM knee orthosis locked in full extension  Pt and spouse instructed to madan and yovanny  Written instructions left at bedside  Follow up if needed        Holden CRUZ  allpro [phone]

## 2019-04-12 NOTE — PROGRESS NOTE ADULT - ASSESSMENT
77yo female with chronic diastolic CHF, bioprosthetic MVR, PAF, VICKY, PAD, CKD, DM, HLD and LSS admitted with recurrent falls. She has severe lumbar spine disease and will need SAMANTHA. Found to have quadriceps tendon tear.  Stable from cardiac standpoint.    Rec:  Continue beta blocker.  Continue torsemide 50mg once per day.   Monitor lytes, renal function; replete K as needed.  Ortho followup  SAMANTHA after above.   Pain control/Bowel regimen/DVT prophylaxis    Russell Jimenez MD  [facility]

## 2019-04-12 NOTE — PROGRESS NOTE ADULT - SUBJECTIVE AND OBJECTIVE BOX
KEESHAGALINA TANIKA    Patient is a 78y old  Female who presents with a chief complaint of fall, right knee pain (2019 12:11)    Tolerated orthopedic surgery yesterday without incident.  Otherwise stable.    Allergies    Augmentin (Swelling)  cephalexin (Swelling)  latex (Rash)    MEDICATIONS  (STANDING):  acetaminophen   Tablet .. 975 milliGRAM(s) Oral every 8 hours  amiodarone    Tablet 200 milliGRAM(s) Oral daily  buDESOnide 160 MICROgram(s)/formoterol 4.5 MICROgram(s) Inhaler 2 Puff(s) Inhalation two times a day  carvedilol 6.25 milliGRAM(s) Oral every 12 hours  dextrose 5%. 1000 milliLiter(s) (50 mL/Hr) IV Continuous <Continuous>  dextrose 50% Injectable 12.5 Gram(s) IV Push once  dextrose 50% Injectable 25 Gram(s) IV Push once  dextrose 50% Injectable 25 Gram(s) IV Push once  dextrose 50% Injectable 12.5 Gram(s) IV Push once  dextrose 50% Injectable 25 Gram(s) IV Push once  dextrose 50% Injectable 25 Gram(s) IV Push once  docusate sodium 100 milliGRAM(s) Oral three times a day  DULoxetine 60 milliGRAM(s) Oral daily  enoxaparin Injectable 40 milliGRAM(s) SubCutaneous every 24 hours  insulin glargine Injectable (LANTUS) 44 Unit(s) SubCutaneous at bedtime  insulin lispro (HumaLOG) corrective regimen sliding scale   SubCutaneous three times a day before meals  insulin lispro (HumaLOG) corrective regimen sliding scale   SubCutaneous at bedtime  insulin lispro Injectable (HumaLOG) 20 Unit(s) SubCutaneous before breakfast  insulin lispro Injectable (HumaLOG) 15 Unit(s) SubCutaneous before lunch  insulin lispro Injectable (HumaLOG) 15 Unit(s) SubCutaneous before dinner  insulin lispro Injectable (HumaLOG) 10 Unit(s) SubCutaneous before dinner  isosorbide   mononitrate ER Tablet (IMDUR) 30 milliGRAM(s) Oral daily  lactated ringers. 1000 milliLiter(s) (50 mL/Hr) IV Continuous <Continuous>  levothyroxine 188 MICROGram(s) Oral daily  mesalamine DR (24-Hour) Tablet 2.4 Gram(s) Oral daily  metolazone 2.5 milliGRAM(s) Oral <User Schedule>  norethindrone acetate 5 milliGRAM(s) Oral daily  pantoprazole    Tablet 40 milliGRAM(s) Oral before breakfast  polyethylene glycol 3350 17 Gram(s) Oral daily  senna 2 Tablet(s) Oral at bedtime  sodium chloride 0.9%. 1000 milliLiter(s) (75 mL/Hr) IV Continuous <Continuous>  spironolactone 25 milliGRAM(s) Oral daily  tiotropium 18 MICROgram(s) Capsule 1 Capsule(s) Inhalation daily  torsemide 50 milliGRAM(s) Oral daily    MEDICATIONS  (PRN):  acetaminophen   Tablet .. 650 milliGRAM(s) Oral every 6 hours PRN Mild Pain (1 - 3), Moderate Pain (4 - 6)  bisacodyl Suppository 10 milliGRAM(s) Rectal once PRN Constipation  dextrose 40% Gel 15 Gram(s) Oral once PRN Blood Glucose LESS THAN 70 milliGRAM(s)/deciliter  dextrose 40% Gel 15 Gram(s) Oral once PRN Blood Glucose LESS THAN 70 milliGRAM(s)/deciliter  glucagon  Injectable 1 milliGRAM(s) IntraMuscular once PRN Glucose LESS THAN 70 milligrams/deciliter  glucagon  Injectable 1 milliGRAM(s) IntraMuscular once PRN Glucose LESS THAN 70 milligrams/deciliter  HYDROmorphone  Injectable 0.2 milliGRAM(s) IV Push every 10 minutes PRN Moderate Pain (4 - 6)  melatonin 3 milliGRAM(s) Oral at bedtime PRN Insomnia  ondansetron Injectable 4 milliGRAM(s) IV Push once PRN Nausea and/or Vomiting  ondansetron Injectable 4 milliGRAM(s) IV Push every 6 hours PRN Nausea and/or Vomiting  oxyCODONE    IR 2.5 milliGRAM(s) Oral every 4 hours PRN Moderate Pain (4 - 6)  oxyCODONE    IR 5 milliGRAM(s) Oral every 4 hours PRN Severe Pain (7 - 10)  traMADol 25 milliGRAM(s) Oral every 8 hours PRN Mild Pain (1 - 3)    PHYSICAL EXAM:  Vital Signs Last 24 Hrs  T(C): 36.7 (2019 12:01), Max: 37.1 (2019 19:54)  T(F): 98.1 (2019 12:01), Max: 98.8 (2019 19:54)  HR: 92 (2019 13:15) (86 - 99)  BP: 134/79 (2019 13:15) (117/76 - 141/83)  BP(mean): 91 (2019 16:00) (91 - 91)  RR: 18 (2019 12:01) (18 - 20)  SpO2: 100% (2019 13:15) (99% - 100%)  Daily     Daily Weight in k (2019 05:55)  I&O's Summary    2019 07:01  -  2019 07:00  --------------------------------------------------------  IN: 200 mL / OUT: 1000 mL / NET: -800 mL    2019 07:01  -  2019 15:17  --------------------------------------------------------  IN: 240 mL / OUT: 1000 mL / NET: -760 mL        General Appearance: 	 Alert, cooperative, no distress  Neck: no JVD  Lungs:  clear to auscultation and percussion bilaterally  Cor:  pmi 5th ICS MCL, regular rate and rhythm, S1 normal intensity, S2 normal intensity, ARTIE  Abdomen:	 soft, non-tender; bowel sounds normal  Extremities: without cyanosis, clubbing or edema      EKG:  Telemetry:    Labs:  CBC Full  -  ( 2019 09:02 )  WBC Count : 14.47 K/uL  RBC Count : 2.85 M/uL  Hemoglobin : 8.6 g/dL  Hematocrit : 27.8 %  Platelet Count - Automated : 215 K/uL  Mean Cell Volume : 97.5 fl  Mean Cell Hemoglobin : 30.2 pg  Mean Cell Hemoglobin Concentration : 30.9 gm/dL  Auto Neutrophil # : 11.97 K/uL  Auto Lymphocyte # : 1.15 K/uL  Auto Monocyte # : 1.26 K/uL  Auto Eosinophil # : 0.00 K/uL  Auto Basophil # : 0.04 K/uL  Auto Neutrophil % : 82.8 %  Auto Lymphocyte % : 7.9 %  Auto Monocyte % : 8.7 %  Auto Eosinophil % : 0.0 %  Auto Basophil % : 0.3 %        PT/INR - ( 2019 05:13 )   PT: 12.9 sec;   INR: 1.12 ratio         PTT - ( 2019 05:13 )  PTT:25.4 sec

## 2019-04-12 NOTE — PROGRESS NOTE ADULT - SUBJECTIVE AND OBJECTIVE BOX
Chief Complaint: 79 y/o Type 2 DM, hypothyroid, recently admitted with CHF exacerbation, re-admitted with recurrent falls and susp. cellulitis after a recent fall.    Patient post right Quadriceps tendon repair, restarted on diet.  On ERT for vaginal bleeding.  PO intake good today.  FS back up again on diet in the 200th range.    MEDICATIONS  (STANDING):  acetaminophen   Tablet .. 975 milliGRAM(s) Oral every 8 hours  amiodarone    Tablet 200 milliGRAM(s) Oral daily  bisacodyl Suppository 10 milliGRAM(s) Rectal once  buDESOnide 160 MICROgram(s)/formoterol 4.5 MICROgram(s) Inhaler 2 Puff(s) Inhalation two times a day  carvedilol 6.25 milliGRAM(s) Oral every 12 hours  dextrose 5%. 1000 milliLiter(s) (50 mL/Hr) IV Continuous <Continuous>  dextrose 50% Injectable 12.5 Gram(s) IV Push once  dextrose 50% Injectable 25 Gram(s) IV Push once  dextrose 50% Injectable 25 Gram(s) IV Push once  dextrose 50% Injectable 12.5 Gram(s) IV Push once  dextrose 50% Injectable 25 Gram(s) IV Push once  dextrose 50% Injectable 25 Gram(s) IV Push once  docusate sodium 100 milliGRAM(s) Oral three times a day  DULoxetine 60 milliGRAM(s) Oral daily  enoxaparin Injectable 40 milliGRAM(s) SubCutaneous every 24 hours  insulin glargine Injectable (LANTUS) 44 Unit(s) SubCutaneous at bedtime  insulin lispro (HumaLOG) corrective regimen sliding scale   SubCutaneous three times a day before meals  insulin lispro (HumaLOG) corrective regimen sliding scale   SubCutaneous at bedtime  insulin lispro Injectable (HumaLOG) 20 Unit(s) SubCutaneous before breakfast  insulin lispro Injectable (HumaLOG) 15 Unit(s) SubCutaneous before lunch  insulin lispro Injectable (HumaLOG) 15 Unit(s) SubCutaneous before dinner  isosorbide   mononitrate ER Tablet (IMDUR) 30 milliGRAM(s) Oral daily  lactated ringers. 1000 milliLiter(s) (50 mL/Hr) IV Continuous <Continuous>  levothyroxine 188 MICROGram(s) Oral daily  mesalamine DR (24-Hour) Tablet 2.4 Gram(s) Oral daily  metolazone 2.5 milliGRAM(s) Oral <User Schedule>  norethindrone acetate 5 milliGRAM(s) Oral daily  pantoprazole    Tablet 40 milliGRAM(s) Oral before breakfast  polyethylene glycol 3350 17 Gram(s) Oral daily  senna 2 Tablet(s) Oral at bedtime  sodium chloride 0.9%. 1000 milliLiter(s) (75 mL/Hr) IV Continuous <Continuous>  spironolactone 25 milliGRAM(s) Oral daily  tiotropium 18 MICROgram(s) Capsule 1 Capsule(s) Inhalation daily  torsemide 50 milliGRAM(s) Oral daily    MEDICATIONS  (PRN):  acetaminophen   Tablet .. 650 milliGRAM(s) Oral every 6 hours PRN Mild Pain (1 - 3), Moderate Pain (4 - 6)  bisacodyl Suppository 10 milliGRAM(s) Rectal once PRN Constipation  dextrose 40% Gel 15 Gram(s) Oral once PRN Blood Glucose LESS THAN 70 milliGRAM(s)/deciliter  dextrose 40% Gel 15 Gram(s) Oral once PRN Blood Glucose LESS THAN 70 milliGRAM(s)/deciliter  glucagon  Injectable 1 milliGRAM(s) IntraMuscular once PRN Glucose LESS THAN 70 milligrams/deciliter  glucagon  Injectable 1 milliGRAM(s) IntraMuscular once PRN Glucose LESS THAN 70 milligrams/deciliter  HYDROmorphone  Injectable 0.2 milliGRAM(s) IV Push every 10 minutes PRN Moderate Pain (4 - 6)  melatonin 3 milliGRAM(s) Oral at bedtime PRN Insomnia  ondansetron Injectable 4 milliGRAM(s) IV Push once PRN Nausea and/or Vomiting  ondansetron Injectable 4 milliGRAM(s) IV Push every 6 hours PRN Nausea and/or Vomiting  oxyCODONE    IR 2.5 milliGRAM(s) Oral every 4 hours PRN Moderate Pain (4 - 6)  oxyCODONE    IR 5 milliGRAM(s) Oral every 4 hours PRN Severe Pain (7 - 10)  traMADol 25 milliGRAM(s) Oral every 8 hours PRN Mild Pain (1 - 3)      Allergies    Augmentin (Swelling)  cephalexin (Swelling)  latex (Rash)    Intolerances        PHYSICAL EXAM:  VITALS: T(C): 37.3 (04-12-19 @ 17:36)  T(F): 99.1 (04-12-19 @ 17:36), Max: 99.1 (04-12-19 @ 17:36)  HR: 92 (04-12-19 @ 13:15) (86 - 93)  BP: 143/74 (04-12-19 @ 17:36) (117/76 - 143/74)  RR:  (18 - 18)  SpO2:  (99% - 100%)  GENERAL: NAD, alert  EYES: No proptosis,  HEENT:  Atraumatic, Normocephalic,   RESPIRATORY: Clear to auscultation bilaterally  CARDIOVASCULAR: Regular rhythm; No murmurs; bilat peripheral edema Rt. >left.  GI: Soft, nontender, non distended, normal bowel sounds  SKIN: Dry, intact, edema right knee dressed, blistering both feet.  MUSCULOSKELETAL: decreased strength  NEURO: No focal deficits.  PSYCH: Alert and oriented x 3, normal affect/mood.      POCT Blood Glucose.: 264 mg/dL (04-12-19 @ 17:01)  POCT Blood Glucose.: 232 mg/dL (04-12-19 @ 11:32)  POCT Blood Glucose.: 311 mg/dL (04-12-19 @ 08:09)  POCT Blood Glucose.: 308 mg/dL (04-11-19 @ 21:37)  POCT Blood Glucose.: 262 mg/dL (04-11-19 @ 17:23)  POCT Blood Glucose.: 217 mg/dL (04-11-19 @ 16:04)  POCT Blood Glucose.: 209 mg/dL (04-11-19 @ 08:15)  POCT Blood Glucose.: 264 mg/dL (04-10-19 @ 21:43)  POCT Blood Glucose.: 254 mg/dL (04-10-19 @ 16:53)  POCT Blood Glucose.: 286 mg/dL (04-10-19 @ 12:06)  POCT Blood Glucose.: 258 mg/dL (04-10-19 @ 07:39)  POCT Blood Glucose.: 242 mg/dL (04-09-19 @ 21:43)                            8.6    14.47 )-----------( 215      ( 12 Apr 2019 09:02 )             27.8       04-12    135  |  94<L>  |  60<H>  ----------------------------<  296<H>  4.0   |  27  |  1.96<H>    EGFR if : 28<L>  EGFR if non : 24<L>    Ca    9.2      04-12        Thyroid Function Tests:  04-12 @ 09:32 TSH 2.50 FreeT4 2.2 T3 -- Anti TPO -- Anti Thyroglobulin Ab -- TSI --  04-02 @ 09:19 TSH 9.70 FreeT4 1.5 T3 -- Anti TPO -- Anti Thyroglobulin Ab -- TSI --      Hemoglobin A1C, Whole Blood: 7.4 % <H> [4.0 - 5.6] (03-25-19 @ 08:55)

## 2019-04-12 NOTE — PROGRESS NOTE ADULT - SUBJECTIVE AND OBJECTIVE BOX
Patient is a 78y old  Female who presents with a chief complaint of fall, right knee pain (12 Apr 2019 15:17)      SUBJECTIVE / OVERNIGHT EVENTS: no new c/o exc constipation x 5 days    MEDICATIONS  (STANDING):  acetaminophen   Tablet .. 975 milliGRAM(s) Oral every 8 hours  amiodarone    Tablet 200 milliGRAM(s) Oral daily  bisacodyl Suppository 10 milliGRAM(s) Rectal once  buDESOnide 160 MICROgram(s)/formoterol 4.5 MICROgram(s) Inhaler 2 Puff(s) Inhalation two times a day  carvedilol 6.25 milliGRAM(s) Oral every 12 hours  dextrose 5%. 1000 milliLiter(s) (50 mL/Hr) IV Continuous <Continuous>  dextrose 50% Injectable 12.5 Gram(s) IV Push once  dextrose 50% Injectable 25 Gram(s) IV Push once  dextrose 50% Injectable 25 Gram(s) IV Push once  dextrose 50% Injectable 12.5 Gram(s) IV Push once  dextrose 50% Injectable 25 Gram(s) IV Push once  dextrose 50% Injectable 25 Gram(s) IV Push once  docusate sodium 100 milliGRAM(s) Oral three times a day  DULoxetine 60 milliGRAM(s) Oral daily  enoxaparin Injectable 40 milliGRAM(s) SubCutaneous every 24 hours  insulin glargine Injectable (LANTUS) 44 Unit(s) SubCutaneous at bedtime  insulin lispro (HumaLOG) corrective regimen sliding scale   SubCutaneous three times a day before meals  insulin lispro (HumaLOG) corrective regimen sliding scale   SubCutaneous at bedtime  insulin lispro Injectable (HumaLOG) 20 Unit(s) SubCutaneous before breakfast  insulin lispro Injectable (HumaLOG) 15 Unit(s) SubCutaneous before lunch  insulin lispro Injectable (HumaLOG) 15 Unit(s) SubCutaneous before dinner  isosorbide   mononitrate ER Tablet (IMDUR) 30 milliGRAM(s) Oral daily  lactated ringers. 1000 milliLiter(s) (50 mL/Hr) IV Continuous <Continuous>  levothyroxine 188 MICROGram(s) Oral daily  mesalamine DR (24-Hour) Tablet 2.4 Gram(s) Oral daily  metolazone 2.5 milliGRAM(s) Oral <User Schedule>  norethindrone acetate 5 milliGRAM(s) Oral daily  pantoprazole    Tablet 40 milliGRAM(s) Oral before breakfast  polyethylene glycol 3350 17 Gram(s) Oral daily  senna 2 Tablet(s) Oral at bedtime  sodium chloride 0.9%. 1000 milliLiter(s) (75 mL/Hr) IV Continuous <Continuous>  spironolactone 25 milliGRAM(s) Oral daily  tiotropium 18 MICROgram(s) Capsule 1 Capsule(s) Inhalation daily  torsemide 50 milliGRAM(s) Oral daily    MEDICATIONS  (PRN):  acetaminophen   Tablet .. 650 milliGRAM(s) Oral every 6 hours PRN Mild Pain (1 - 3), Moderate Pain (4 - 6)  bisacodyl Suppository 10 milliGRAM(s) Rectal once PRN Constipation  dextrose 40% Gel 15 Gram(s) Oral once PRN Blood Glucose LESS THAN 70 milliGRAM(s)/deciliter  dextrose 40% Gel 15 Gram(s) Oral once PRN Blood Glucose LESS THAN 70 milliGRAM(s)/deciliter  glucagon  Injectable 1 milliGRAM(s) IntraMuscular once PRN Glucose LESS THAN 70 milligrams/deciliter  glucagon  Injectable 1 milliGRAM(s) IntraMuscular once PRN Glucose LESS THAN 70 milligrams/deciliter  HYDROmorphone  Injectable 0.2 milliGRAM(s) IV Push every 10 minutes PRN Moderate Pain (4 - 6)  melatonin 3 milliGRAM(s) Oral at bedtime PRN Insomnia  ondansetron Injectable 4 milliGRAM(s) IV Push once PRN Nausea and/or Vomiting  ondansetron Injectable 4 milliGRAM(s) IV Push every 6 hours PRN Nausea and/or Vomiting  oxyCODONE    IR 2.5 milliGRAM(s) Oral every 4 hours PRN Moderate Pain (4 - 6)  oxyCODONE    IR 5 milliGRAM(s) Oral every 4 hours PRN Severe Pain (7 - 10)  traMADol 25 milliGRAM(s) Oral every 8 hours PRN Mild Pain (1 - 3)      Vital Signs Last 24 Hrs  T(F): 99.1 (04-12-19 @ 17:36), Max: 99.1 (04-12-19 @ 17:36)  HR: 92 (04-12-19 @ 13:15) (86 - 93)  BP: 143/74 (04-12-19 @ 17:36) (117/76 - 143/74)  RR: 18 (04-12-19 @ 12:01) (18 - 18)  SpO2: 100% (04-12-19 @ 13:15) (99% - 100%)  Telemetry:   CAPILLARY BLOOD GLUCOSE      POCT Blood Glucose.: 264 mg/dL (12 Apr 2019 17:01)  POCT Blood Glucose.: 232 mg/dL (12 Apr 2019 11:32)  POCT Blood Glucose.: 311 mg/dL (12 Apr 2019 08:09)  POCT Blood Glucose.: 308 mg/dL (11 Apr 2019 21:37)    I&O's Summary    11 Apr 2019 07:01  -  12 Apr 2019 07:00  --------------------------------------------------------  IN: 200 mL / OUT: 1000 mL / NET: -800 mL    12 Apr 2019 07:01  -  12 Apr 2019 18:20  --------------------------------------------------------  IN: 600 mL / OUT: 1000 mL / NET: -400 mL        PHYSICAL EXAM:  GENERAL: NAD, well-developed  HEAD:  Atraumatic, Normocephalic  EYES: EOMI, PERRLA, conjunctiva and sclera clear  NECK: Supple, No JVD  CHEST/LUNG: Clear to auscultation bilaterally; No wheeze  HEART: Regular rate and rhythm; No murmurs, rubs, or gallops  ABDOMEN: Soft, Nontender, Nondistended; Bowel sounds present  EXTREMITIES:  2+ Peripheral Pulses, No clubbing, cyanosis, or edema  PSYCH: AAOx3  NEUROLOGY: non-focal  SKIN: No rashes or lesions    LABS:                        8.6    14.47 )-----------( 215      ( 12 Apr 2019 09:02 )             27.8     04-12    135  |  94<L>  |  60<H>  ----------------------------<  296<H>  4.0   |  27  |  1.96<H>    Ca    9.2      12 Apr 2019 06:50      PT/INR - ( 11 Apr 2019 05:13 )   PT: 12.9 sec;   INR: 1.12 ratio         PTT - ( 11 Apr 2019 05:13 )  PTT:25.4 sec          RADIOLOGY & ADDITIONAL TESTS:    Imaging Personally Reviewed:    Consultant(s) Notes Reviewed:      Care Discussed with Consultants/Other Providers:

## 2019-04-12 NOTE — PROGRESS NOTE ADULT - SUBJECTIVE AND OBJECTIVE BOX
CHIEF COMPLAINT:  f/up resp failure, preop clearance, copd, restrictive dysfunction, PAH-happy that knee repaired--no sob or cough, some pain    Interval Events: knee repaired    REVIEW OF SYSTEMS:  Constitutional: No fevers or chills. No weight loss. + fatigue or generalized malaise.  Eyes: No itching or discharge from the eyes  ENT: No ear pain. No ear discharge. No nasal congestion. No post nasal drip. No epistaxis. No throat pain. No sore throat. No difficulty swallowing.   CV: No chest pain. No palpitations. No lightheadedness or dizziness.   Resp: No dyspnea at rest. No dyspnea on exertion. No orthopnea. No wheezing. No cough. No stridor. No sputum production. No chest pain with respiration.  GI: No nausea. No vomiting. No diarrhea.  MSK: No joint pain or pain in any extremities-except R-knee  Integumentary: No skin lesions. No pedal edema.  Neurological: No gross motor weakness. No sensory changes.  [+ ] All other systems negative  [ ] Unable to assess ROS because ________    OBJECTIVE:  ICU Vital Signs Last 24 Hrs  T(C): 36.6 (12 Apr 2019 05:00), Max: 37.1 (11 Apr 2019 19:54)  T(F): 97.8 (12 Apr 2019 05:00), Max: 98.8 (11 Apr 2019 19:54)  HR: 86 (12 Apr 2019 05:00) (86 - 100)  BP: 127/74 (12 Apr 2019 05:00) (123/60 - 159/76)  BP(mean): 91 (11 Apr 2019 16:00) (89 - 109)  ABP: --  ABP(mean): --  RR: 18 (12 Apr 2019 05:00) (16 - 20)  SpO2: 99% (12 Apr 2019 05:00) (97% - 100%)        04-10 @ 07:01  -  04-11 @ 07:00  --------------------------------------------------------  IN: 240 mL / OUT: 1500 mL / NET: -1260 mL    04-11 @ 07:01  -  04-12 @ 05:37  --------------------------------------------------------  IN: 200 mL / OUT: 1000 mL / NET: -800 mL      CAPILLARY BLOOD GLUCOSE      POCT Blood Glucose.: 308 mg/dL (11 Apr 2019 21:37)      PHYSICAL EXAM: NAD in bed  General: Awake, alert, oriented X 3.   HEENT: Atraumatic, normocephalic.                 Mallampatti Grade 3                No nasal congestion.                No tonsillar or pharyngeal exudates.  Lymph Nodes: No palpable lymphadenopathy  Neck: No JVD. No carotid bruit.   Respiratory: Normal chest expansion                         Normal percussion                         Normal and equal air entry                         No wheeze, rhonchi or rales.  Cardiovascular: S1 S2 normal. No murmurs, rubs or gallops.   Abdomen: Soft, non-tender, non-distended. No organomegaly. Normoactive bowel sounds.  Extremities: Warm to touch. Peripheral pulse palpable. No pedal edema.   Skin: No rashes or skin lesions  Neurological: Motor and sensory examination unequal and abnormal RLE extremities.  Psychiatry: Appropriate mood and affect.    HOSPITAL MEDICATIONS:  MEDICATIONS  (STANDING):  acetaminophen   Tablet .. 975 milliGRAM(s) Oral every 8 hours  amiodarone    Tablet 200 milliGRAM(s) Oral daily  buDESOnide 160 MICROgram(s)/formoterol 4.5 MICROgram(s) Inhaler 2 Puff(s) Inhalation two times a day  carvedilol 6.25 milliGRAM(s) Oral every 12 hours  dextrose 5%. 1000 milliLiter(s) (50 mL/Hr) IV Continuous <Continuous>  dextrose 50% Injectable 12.5 Gram(s) IV Push once  dextrose 50% Injectable 25 Gram(s) IV Push once  dextrose 50% Injectable 25 Gram(s) IV Push once  dextrose 50% Injectable 12.5 Gram(s) IV Push once  dextrose 50% Injectable 25 Gram(s) IV Push once  dextrose 50% Injectable 25 Gram(s) IV Push once  docusate sodium 100 milliGRAM(s) Oral three times a day  DULoxetine 60 milliGRAM(s) Oral daily  enoxaparin Injectable 40 milliGRAM(s) SubCutaneous every 24 hours  insulin glargine Injectable (LANTUS) 44 Unit(s) SubCutaneous at bedtime  insulin lispro (HumaLOG) corrective regimen sliding scale   SubCutaneous three times a day before meals  insulin lispro (HumaLOG) corrective regimen sliding scale   SubCutaneous at bedtime  insulin lispro Injectable (HumaLOG) 20 Unit(s) SubCutaneous before breakfast  insulin lispro Injectable (HumaLOG) 15 Unit(s) SubCutaneous before lunch  insulin lispro Injectable (HumaLOG) 15 Unit(s) SubCutaneous before dinner  insulin lispro Injectable (HumaLOG) 10 Unit(s) SubCutaneous before dinner  isosorbide   mononitrate ER Tablet (IMDUR) 30 milliGRAM(s) Oral daily  lactated ringers. 1000 milliLiter(s) (50 mL/Hr) IV Continuous <Continuous>  levothyroxine 188 MICROGram(s) Oral daily  mesalamine DR (24-Hour) Tablet 2.4 Gram(s) Oral daily  metolazone 2.5 milliGRAM(s) Oral <User Schedule>  norethindrone acetate 5 milliGRAM(s) Oral daily  pantoprazole    Tablet 40 milliGRAM(s) Oral before breakfast  polyethylene glycol 3350 17 Gram(s) Oral daily  senna 2 Tablet(s) Oral at bedtime  sodium chloride 0.9%. 1000 milliLiter(s) (75 mL/Hr) IV Continuous <Continuous>  spironolactone 25 milliGRAM(s) Oral daily  tiotropium 18 MICROgram(s) Capsule 1 Capsule(s) Inhalation daily  torsemide 50 milliGRAM(s) Oral daily    MEDICATIONS  (PRN):  acetaminophen   Tablet .. 650 milliGRAM(s) Oral every 6 hours PRN Mild Pain (1 - 3), Moderate Pain (4 - 6)  bisacodyl Suppository 10 milliGRAM(s) Rectal once PRN Constipation  dextrose 40% Gel 15 Gram(s) Oral once PRN Blood Glucose LESS THAN 70 milliGRAM(s)/deciliter  dextrose 40% Gel 15 Gram(s) Oral once PRN Blood Glucose LESS THAN 70 milliGRAM(s)/deciliter  glucagon  Injectable 1 milliGRAM(s) IntraMuscular once PRN Glucose LESS THAN 70 milligrams/deciliter  glucagon  Injectable 1 milliGRAM(s) IntraMuscular once PRN Glucose LESS THAN 70 milligrams/deciliter  HYDROmorphone  Injectable 0.2 milliGRAM(s) IV Push every 10 minutes PRN Moderate Pain (4 - 6)  melatonin 3 milliGRAM(s) Oral at bedtime PRN Insomnia  ondansetron Injectable 4 milliGRAM(s) IV Push once PRN Nausea and/or Vomiting  ondansetron Injectable 4 milliGRAM(s) IV Push every 6 hours PRN Nausea and/or Vomiting  oxyCODONE    IR 2.5 milliGRAM(s) Oral every 4 hours PRN Moderate Pain (4 - 6)  oxyCODONE    IR 5 milliGRAM(s) Oral every 4 hours PRN Severe Pain (7 - 10)  traMADol 25 milliGRAM(s) Oral every 8 hours PRN Mild Pain (1 - 3)      LABS:                        10.1   11.0  )-----------( 191      ( 11 Apr 2019 05:13 )             30.1     04-11    139  |  94<L>  |  56<H>  ----------------------------<  235<H>  3.2<L>   |  30  |  1.70<H>    Ca    9.8      11 Apr 2019 05:13      PT/INR - ( 11 Apr 2019 05:13 )   PT: 12.9 sec;   INR: 1.12 ratio         PTT - ( 11 Apr 2019 05:13 )  PTT:25.4 sec          MICROBIOLOGY:     RADIOLOGY:  [ ] Reviewed and interpreted by me    Point of Care Ultrasound Findings:    PFT:    EKG:

## 2019-04-12 NOTE — PROGRESS NOTE ADULT - ATTENDING COMMENTS
multifactorial resp failure-cards/copd/obesity hypoventilation, osas, debility, obesity, PAH--O2 2 liters NC-stable  copd-symbicort 160 2 bid, spiriva 1 puff q day, xopenex .63 q 6, incentive spirometry  Cards-as per Nelli et al  Obesity-nutrition consult  OSAS-outpt pt rx  PAH-WHO grp 2-cards rx  GYN-s/p procedure      ****Ortho-s/p surgery                                 Rehab transfer next few days.  Fito Montgomery MD-Pulmonary   [phone]

## 2019-04-12 NOTE — PROGRESS NOTE ADULT - ASSESSMENT
77 yo woman w/ hx of moderate MS s/p bioprosthetic mitral valve, diastolic CHF, paroxsmal afib (not on A/C in setting of bleeding), HTN, severe pulmonary HTN, DMT2, CKD III, anemia, with post menopausal vaginal bleeding, UC, VICKY on 3L NC (not on CPAP/BIPAP) presents from home in setting of multiple falls the past 2 days resulting with right knee pain. Patient had a fall yesterday from 1 step while walking out of the house and tripped. Per patient states that her right leg had a buckling/collapsing sensation. Patient went to [place] ED on 3/29 and had an xray which did not reveal fracture and sent home with cyclobenzaprine and Lidoderm patch. Patient last use of medication on 3/29. Patient had another fall while attempting to ambulate to the bathroom. She described that her right leg buckled under her causing her to fall. It was difficult for her to get up on her own and required the assistance of her  and son-in-law to get up. Patient denies any preceding symptoms of dizziness/lightheadedness, shortness of breath, CP, palpitations preceding the events. Endorses chronic back pain with no new characteristics. Patient has been getting around home with use of wheelchair the past 4 months. Requires assistance of her  to get around because of chronic weakness. Denies development of new numbness of lower extremities. Does not utilize any other assistive devices. Has not had PT outpatient. Patient also has had chronic LE wounds that were dressed from last discharge on 3/27. Denies changing of dressing. Denies fevers, chills, sweats. (30 Mar 2019 21:08)    ER vss, pt afebrile.  WBC 13.2 --> 9.0.  UA large LE/ (-) nit.  Ucx >100K GNR.  No acute findings on cxr.   Pt is currently on vancomycin for cellulitis.   Pt with venous stasis and several open blisters on b/l LE.      ID consult called for further abx management.        Recommend:    UTI    - pt denies increased freq/urgency/flank pain/dysuria.  (+) UA and cultures.  Pt a/w fall and leg weakness.  Will treat with  azactam (pt with allergy to augmentin, keflex)  (through 4/6).  Urine cx results noted.      - Pt seen by Urology -  planned for outpt  cystoscopy.      - Pt has been on low dose Macrobid for last 2 years for chronic suppressive therapy.  Current Ucx growing Proteus mirabilis that is now resistant to mirabilis.  No good oral options available as pt is allergic to beta-lactams, and unable to take fluroquinolone as she is also taking amiodarone.  Don't recommend bactrim due to renal insufficiency.      - Can try urinary antiseptic - hiprex 1gm po bid and vitamin C 500mg Qdaily moving forward, to reduce frequency of future UTIs.     CELLULITIS    - b/L LE venous stasis and blisters.  Low suspicion for superimposed cellulitis.  Pt with several open blisters and denuded skin, cont local wound care.  Cont vancomycin for now for possible superimposed bacterial cellulitis.  Wounds healing well, discoloration and swelling improved.    -  Monitor vanco trough, maintain between 10-15.  Completed 7 day course through 4/6.      Rt KNEE PAIN      - Pt c/o RLE weakness and knee pain, pt seen by Neurology - recs appreciated.  CT rt knee  without joint effusion or fracture, (+) subcut edema.      - MRI results noted, pt with full thickness quadriceps tendon tear and joint effusion.  No fever, increased warmth or erythema to suggest septic joint.  Ortho following.       Vaginal bleeding    - GYN consulted for further eval and treatment. Pelvic and transvaginal US performed - s/o vaginal outlet obstruction.    - Pt has restarted norethindrone for postmenopausal bleeding.   Pt for possible d&c with hysteroscopy and endometrial sampling/IUD placement      Covering Dr. Reinoso 77 yo woman w/ hx of moderate MS s/p bioprosthetic mitral valve, diastolic CHF, paroxsmal afib (not on A/C in setting of bleeding), HTN, severe pulmonary HTN, DMT2, CKD III, anemia, with post menopausal vaginal bleeding, UC, VICKY on 3L NC (not on CPAP/BIPAP) presents from home in setting of multiple falls the past 2 days resulting with right knee pain. Patient had a fall yesterday from 1 step while walking out of the house and tripped. Per patient states that her right leg had a buckling/collapsing sensation. Patient went to [place] ED on 3/29 and had an xray which did not reveal fracture and sent home with cyclobenzaprine and Lidoderm patch. Patient last use of medication on 3/29. Patient had another fall while attempting to ambulate to the bathroom. She described that her right leg buckled under her causing her to fall. It was difficult for her to get up on her own and required the assistance of her  and son-in-law to get up. Patient denies any preceding symptoms of dizziness/lightheadedness, shortness of breath, CP, palpitations preceding the events. Endorses chronic back pain with no new characteristics. Patient has been getting around home with use of wheelchair the past 4 months. Requires assistance of her  to get around because of chronic weakness. Denies development of new numbness of lower extremities. Does not utilize any other assistive devices. Has not had PT outpatient. Patient also has had chronic LE wounds that were dressed from last discharge on 3/27. Denies changing of dressing. Denies fevers, chills, sweats. (30 Mar 2019 21:08)  ER vss, pt afebrile.  WBC 13.2 --> 9.0.  UA large LE/ (-) nit.  Ucx >100K GNR.  No acute findings on cxr.   Pt is currently on vancomycin for cellulitis.   Pt with venous stasis and several open blisters on b/l LE.  ID consult called for further abx management.        # UTI-  UCx Proteus mirabilis - s/p treated -  Pt seen by Urology -  planned for outpt  cystoscopy. Pt has been on low dose Macrobid for last 2 years for chronic suppressive therapy.  Current Ucx growing Proteus mirabilis that is now resistant to mirabilis.  No good oral options available as pt is allergic to beta-lactams, and unable to take fluroquinolone as she is also taking amiodarone.  Don't recommend bactrim due to renal insufficiency.      # RLE Cellulitis - s/p completed the  ABx course  # Right quadriceps tendon tear  - s/p repair on 4/11/19  # Vaginal bleeding- - Pelvic and transvaginal US performed - s/o vaginal outlet obstruction. Pt has restarted norethindrone for postmenopausal bleeding.   Pt for possible d&c with hysteroscopy and endometrial sampling/IUD placement  # Leukocytosis      Would recommend:    1. Monitor OFF antibiotic  2. Monitor WBC count  3. Post-surgical care as per Ortho  4. Pain management as needed      - Covering Dr. Reinoso

## 2019-04-13 LAB
ANION GAP SERPL CALC-SCNC: 17 MMOL/L — SIGNIFICANT CHANGE UP (ref 5–17)
BUN SERPL-MCNC: 64 MG/DL — HIGH (ref 7–23)
CALCIUM SERPL-MCNC: 9.4 MG/DL — SIGNIFICANT CHANGE UP (ref 8.4–10.5)
CHLORIDE SERPL-SCNC: 94 MMOL/L — LOW (ref 96–108)
CO2 SERPL-SCNC: 25 MMOL/L — SIGNIFICANT CHANGE UP (ref 22–31)
CREAT SERPL-MCNC: 2.08 MG/DL — HIGH (ref 0.5–1.3)
GLUCOSE BLDC GLUCOMTR-MCNC: 159 MG/DL — HIGH (ref 70–99)
GLUCOSE BLDC GLUCOMTR-MCNC: 196 MG/DL — HIGH (ref 70–99)
GLUCOSE BLDC GLUCOMTR-MCNC: 255 MG/DL — HIGH (ref 70–99)
GLUCOSE BLDC GLUCOMTR-MCNC: 263 MG/DL — HIGH (ref 70–99)
GLUCOSE BLDC GLUCOMTR-MCNC: 281 MG/DL — HIGH (ref 70–99)
GLUCOSE SERPL-MCNC: 238 MG/DL — HIGH (ref 70–99)
HCT VFR BLD CALC: 27.1 % — LOW (ref 34.5–45)
HGB BLD-MCNC: 8.4 G/DL — LOW (ref 11.5–15.5)
MCHC RBC-ENTMCNC: 30.2 PG — SIGNIFICANT CHANGE UP (ref 27–34)
MCHC RBC-ENTMCNC: 31 GM/DL — LOW (ref 32–36)
MCV RBC AUTO: 97.5 FL — SIGNIFICANT CHANGE UP (ref 80–100)
PLATELET # BLD AUTO: 193 K/UL — SIGNIFICANT CHANGE UP (ref 150–400)
POTASSIUM SERPL-MCNC: 3.8 MMOL/L — SIGNIFICANT CHANGE UP (ref 3.5–5.3)
POTASSIUM SERPL-SCNC: 3.8 MMOL/L — SIGNIFICANT CHANGE UP (ref 3.5–5.3)
RBC # BLD: 2.78 M/UL — LOW (ref 3.8–5.2)
RBC # FLD: 16.9 % — HIGH (ref 10.3–14.5)
SODIUM SERPL-SCNC: 136 MMOL/L — SIGNIFICANT CHANGE UP (ref 135–145)
WBC # BLD: 11.29 K/UL — HIGH (ref 3.8–10.5)
WBC # FLD AUTO: 11.29 K/UL — HIGH (ref 3.8–10.5)

## 2019-04-13 PROCEDURE — 99233 SBSQ HOSP IP/OBS HIGH 50: CPT | Mod: GC

## 2019-04-13 RX ADMIN — CARVEDILOL PHOSPHATE 6.25 MILLIGRAM(S): 80 CAPSULE, EXTENDED RELEASE ORAL at 17:31

## 2019-04-13 RX ADMIN — ENOXAPARIN SODIUM 40 MILLIGRAM(S): 100 INJECTION SUBCUTANEOUS at 17:31

## 2019-04-13 RX ADMIN — DULOXETINE HYDROCHLORIDE 60 MILLIGRAM(S): 30 CAPSULE, DELAYED RELEASE ORAL at 13:20

## 2019-04-13 RX ADMIN — INSULIN GLARGINE 50 UNIT(S): 100 INJECTION, SOLUTION SUBCUTANEOUS at 22:35

## 2019-04-13 RX ADMIN — Medication 975 MILLIGRAM(S): at 22:34

## 2019-04-13 RX ADMIN — BUDESONIDE AND FORMOTEROL FUMARATE DIHYDRATE 2 PUFF(S): 160; 4.5 AEROSOL RESPIRATORY (INHALATION) at 17:50

## 2019-04-13 RX ADMIN — NORETHINDRONE 5 MILLIGRAM(S): 0.35 TABLET ORAL at 13:19

## 2019-04-13 RX ADMIN — Medication 50 MILLIGRAM(S): at 06:04

## 2019-04-13 RX ADMIN — Medication 975 MILLIGRAM(S): at 06:05

## 2019-04-13 RX ADMIN — Medication 6: at 13:17

## 2019-04-13 RX ADMIN — Medication 20 UNIT(S): at 08:38

## 2019-04-13 RX ADMIN — Medication 975 MILLIGRAM(S): at 13:19

## 2019-04-13 RX ADMIN — Medication 2.4 GRAM(S): at 13:18

## 2019-04-13 RX ADMIN — Medication 975 MILLIGRAM(S): at 13:49

## 2019-04-13 RX ADMIN — Medication 20 UNIT(S): at 17:30

## 2019-04-13 RX ADMIN — Medication 100 MILLIGRAM(S): at 13:18

## 2019-04-13 RX ADMIN — Medication 100 MILLIGRAM(S): at 22:33

## 2019-04-13 RX ADMIN — Medication 188 MICROGRAM(S): at 06:05

## 2019-04-13 RX ADMIN — AMIODARONE HYDROCHLORIDE 200 MILLIGRAM(S): 400 TABLET ORAL at 06:05

## 2019-04-13 RX ADMIN — ISOSORBIDE MONONITRATE 30 MILLIGRAM(S): 60 TABLET, EXTENDED RELEASE ORAL at 13:18

## 2019-04-13 RX ADMIN — CARVEDILOL PHOSPHATE 6.25 MILLIGRAM(S): 80 CAPSULE, EXTENDED RELEASE ORAL at 06:04

## 2019-04-13 RX ADMIN — Medication 6: at 08:38

## 2019-04-13 RX ADMIN — Medication 20 UNIT(S): at 13:16

## 2019-04-13 RX ADMIN — SPIRONOLACTONE 25 MILLIGRAM(S): 25 TABLET, FILM COATED ORAL at 06:04

## 2019-04-13 RX ADMIN — PANTOPRAZOLE SODIUM 40 MILLIGRAM(S): 20 TABLET, DELAYED RELEASE ORAL at 06:07

## 2019-04-13 RX ADMIN — Medication 100 MILLIGRAM(S): at 06:05

## 2019-04-13 RX ADMIN — SENNA PLUS 2 TABLET(S): 8.6 TABLET ORAL at 22:37

## 2019-04-13 RX ADMIN — TIOTROPIUM BROMIDE 1 CAPSULE(S): 18 CAPSULE ORAL; RESPIRATORY (INHALATION) at 13:19

## 2019-04-13 RX ADMIN — Medication 2: at 17:29

## 2019-04-13 RX ADMIN — BUDESONIDE AND FORMOTEROL FUMARATE DIHYDRATE 2 PUFF(S): 160; 4.5 AEROSOL RESPIRATORY (INHALATION) at 06:07

## 2019-04-13 RX ADMIN — POLYETHYLENE GLYCOL 3350 17 GRAM(S): 17 POWDER, FOR SOLUTION ORAL at 13:20

## 2019-04-13 NOTE — PROGRESS NOTE ADULT - ASSESSMENT
79 yo woman w/ hx of moderate MS s/p bioprosthetic mitral valve, diastolic CHF, paroxysmal afib (not on A/C in setting of vaginal bleeding), HTN, severe pulmonary HTN, DMT2, CKD III, anemia, with post menopausal vaginal bleeding, UC, VICKY on 3L NC (not on CPAP/BIPAP) presents from home in setting of multiple falls the past 2 days resulting with right knee pain, found with leukocytosis and LE wounds concerning for cellulitis.

## 2019-04-13 NOTE — PROGRESS NOTE ADULT - SUBJECTIVE AND OBJECTIVE BOX
TANIKA OBRIEN    Patient is a 78y old  Female who presents with a chief complaint of fall, right knee pain,MS S/P MVR,PAF,diastolic CHF,HTN,DM,VICKY,pulmonary HTN,CRI and anemia.No CP or SOB.      Allergies    Augmentin (Swelling)  cephalexin (Swelling)  latex (Rash)    Intolerances      MEDICATIONS  (STANDING):  acetaminophen   Tablet .. 975 milliGRAM(s) Oral every 8 hours  amiodarone    Tablet 200 milliGRAM(s) Oral daily  bisacodyl Suppository 10 milliGRAM(s) Rectal once  buDESOnide 160 MICROgram(s)/formoterol 4.5 MICROgram(s) Inhaler 2 Puff(s) Inhalation two times a day  carvedilol 6.25 milliGRAM(s) Oral every 12 hours  dextrose 5%. 1000 milliLiter(s) (50 mL/Hr) IV Continuous <Continuous>  dextrose 50% Injectable 12.5 Gram(s) IV Push once  dextrose 50% Injectable 25 Gram(s) IV Push once  dextrose 50% Injectable 25 Gram(s) IV Push once  dextrose 50% Injectable 12.5 Gram(s) IV Push once  dextrose 50% Injectable 25 Gram(s) IV Push once  dextrose 50% Injectable 25 Gram(s) IV Push once  docusate sodium 100 milliGRAM(s) Oral three times a day  DULoxetine 60 milliGRAM(s) Oral daily  enoxaparin Injectable 40 milliGRAM(s) SubCutaneous every 24 hours  insulin glargine Injectable (LANTUS) 50 Unit(s) SubCutaneous at bedtime  insulin lispro (HumaLOG) corrective regimen sliding scale   SubCutaneous three times a day before meals  insulin lispro (HumaLOG) corrective regimen sliding scale   SubCutaneous at bedtime  insulin lispro Injectable (HumaLOG) 20 Unit(s) SubCutaneous three times a day before meals  isosorbide   mononitrate ER Tablet (IMDUR) 30 milliGRAM(s) Oral daily  lactated ringers. 1000 milliLiter(s) (50 mL/Hr) IV Continuous <Continuous>  levothyroxine 188 MICROGram(s) Oral daily  mesalamine DR (24-Hour) Tablet 2.4 Gram(s) Oral daily  metolazone 2.5 milliGRAM(s) Oral <User Schedule>  norethindrone acetate 5 milliGRAM(s) Oral daily  pantoprazole    Tablet 40 milliGRAM(s) Oral before breakfast  polyethylene glycol 3350 17 Gram(s) Oral daily  senna 2 Tablet(s) Oral at bedtime  sodium chloride 0.9%. 1000 milliLiter(s) (75 mL/Hr) IV Continuous <Continuous>  spironolactone 25 milliGRAM(s) Oral daily  tiotropium 18 MICROgram(s) Capsule 1 Capsule(s) Inhalation daily  torsemide 50 milliGRAM(s) Oral daily    MEDICATIONS  (PRN):  acetaminophen   Tablet .. 650 milliGRAM(s) Oral every 6 hours PRN Mild Pain (1 - 3), Moderate Pain (4 - 6)  bisacodyl Suppository 10 milliGRAM(s) Rectal once PRN Constipation  dextrose 40% Gel 15 Gram(s) Oral once PRN Blood Glucose LESS THAN 70 milliGRAM(s)/deciliter  dextrose 40% Gel 15 Gram(s) Oral once PRN Blood Glucose LESS THAN 70 milliGRAM(s)/deciliter  glucagon  Injectable 1 milliGRAM(s) IntraMuscular once PRN Glucose LESS THAN 70 milligrams/deciliter  glucagon  Injectable 1 milliGRAM(s) IntraMuscular once PRN Glucose LESS THAN 70 milligrams/deciliter  HYDROmorphone  Injectable 0.2 milliGRAM(s) IV Push every 10 minutes PRN Moderate Pain (4 - 6)  melatonin 3 milliGRAM(s) Oral at bedtime PRN Insomnia  ondansetron Injectable 4 milliGRAM(s) IV Push once PRN Nausea and/or Vomiting  ondansetron Injectable 4 milliGRAM(s) IV Push every 6 hours PRN Nausea and/or Vomiting  oxyCODONE    IR 2.5 milliGRAM(s) Oral every 4 hours PRN Moderate Pain (4 - 6)  oxyCODONE    IR 5 milliGRAM(s) Oral every 4 hours PRN Severe Pain (7 - 10)  traMADol 25 milliGRAM(s) Oral every 8 hours PRN Mild Pain (1 - 3)      ROS:  Positive:    General: Denies weight loss, fevers, rash, decreased hearing  Cardiac: Denies chest pain, SOB, QUESADA, orthopnea, PND, claudication, edema, snoring, daytime somnolence, palpitations, syncope  Resp: Denies SOB, QUESADA, cough, sputum, wheezing, hemoptysis  GI: Denies change in bowel habits, diarrhea, weight loss, melena, tarry stools,   nausea, vomiting, jaundice, abdominal pain, dysphagia  : Denies dysuria, nocturia, hematuria  Neuro: Denies tinnitus, headache, visual changes, weakness, dizziness or vertigo  Musculoskeletal: Denies neck pain back pain joint pain.  Skin: Denies rash, itching, dryness.  Endocrine: Denies polydipsia, polyuria  Psychiatric: Denies depression, anxiety      PHYSICAL EXAM:  Vital Signs Last 24 Hrs  T(C): 36.8 (2019 03:57), Max: 37.3 (2019 17:36)  T(F): 98.3 (2019 03:57), Max: 99.1 (2019 17:36)  HR: 80 (2019 03:57) (73 - 93)  BP: 122/70 (2019 03:57) (117/76 - 143/74)  BP(mean): --  RR: 18 (2019 03:57) (18 - 18)  SpO2: 100% (2019 03:57) (100% - 100%)  Daily     Daily Weight in k.7 (2019 03:57)  I&O's Summary    2019 07:  -  2019 07:00  --------------------------------------------------------  IN: 200 mL / OUT: 1000 mL / NET: -800 mL    2019 07:01  -  2019 06:21  --------------------------------------------------------  IN: 600 mL / OUT: 1800 mL / NET: -1200 mL        General Appearance: 	 Alert, cooperative, no distress  HEENT: normocephalic, atraumatic, PERRLA, EOMI, conjunctiva normal, sclera anicteric,   Neck: no JVD,  carotid 2+  bilaterally without bruits, thyroid normal to inspection and palpation, no adenopathy, trachea midline  Lungs:  clear to auscultation and percussion bilaterally  Cor:  pmi 5th ICS MCL, regular rate and rhythm, S1 normal intensity, S2 normal intensity, no gallops, murmurs or rubs  Abdomen:	 soft, non-tender; bowel sounds normal; no masses,  no organomegaly  Extremities: without cyanosis, clubbing or edema  Vasc: 2-+ PT and DP pulses; no varicosities  Neurologic: A&O x 3 (time, place, person). Symmetric strength; limited exam  Musculoskeletal: no kyphosis, scoliosis; normal gait, normal tone  Skin: no rashes; limited exam      Labs:  CBC Full  -  ( 2019 09:02 )  WBC Count : 14.47 K/uL  RBC Count : 2.85 M/uL  Hemoglobin : 8.6 g/dL  Hematocrit : 27.8 %  Platelet Count - Automated : 215 K/uL  Mean Cell Volume : 97.5 fl  Mean Cell Hemoglobin : 30.2 pg  Mean Cell Hemoglobin Concentration : 30.9 gm/dL  Auto Neutrophil # : 11.97 K/uL  Auto Lymphocyte # : 1.15 K/uL  Auto Monocyte # : 1.26 K/uL  Auto Eosinophil # : 0.00 K/uL  Auto Basophil # : 0.04 K/uL  Auto Neutrophil % : 82.8 %  Auto Lymphocyte % : 7.9 %  Auto Monocyte % : 8.7 %  Auto Eosinophil % : 0.0 %  Auto Basophil % : 0.3 %        Impression/Plan:Patient with MS S/P MVR,PAF,DM,chronic diastolic CHF,HTN,VICKY,severe pulmonary HTN,VICKY,admitted with falls,vaginal bleed.Cardiopulmonary stable.Continue amiodarone,Coreg,isordil,insulin, and synthroid.Incresaed BUN./CR hold diuretics X 24 HRs.OOB/PT.Lovenox for DVT PPX.        Wayne Mcdermott MD, [facility]  [facility]

## 2019-04-13 NOTE — PROGRESS NOTE ADULT - ASSESSMENT
A/P: 78F s/p R Quad tendon repair POD 2  Pain Control  DVT ppx, lovenox 40QD  [place] to RLE locked in extension at all times, WBAT  Ice/Elevation  PT/OT  Incentive Spirometry  Medical management appreciated  DC planning

## 2019-04-13 NOTE — PROGRESS NOTE ADULT - SUBJECTIVE AND OBJECTIVE BOX
Pt S/E at bedside, no acute events overnight, pain controlled, denies shortness of breath or chest pain.     Vital Signs Last 24 Hrs  T(C): 36.8 (13 Apr 2019 03:57), Max: 37.3 (12 Apr 2019 17:36)  T(F): 98.3 (13 Apr 2019 03:57), Max: 99.1 (12 Apr 2019 17:36)  HR: 80 (13 Apr 2019 03:57) (73 - 93)  BP: 122/70 (13 Apr 2019 03:57) (117/76 - 143/74)  BP(mean): --  RR: 18 (13 Apr 2019 03:57) (18 - 18)  SpO2: 100% (13 Apr 2019 03:57) (100% - 100%)    Gen: NAD,    Right Lower Extremity:  Dressing clean dry intact  BLedose in place locked in extension  +EHL/FHL/TA/GS  SILT L3-S1  +DP/PT Pulses  Compartments soft  No calf TTP B/L

## 2019-04-13 NOTE — PROGRESS NOTE ADULT - SUBJECTIVE AND OBJECTIVE BOX
Patient is a 78y old  Female who presents with a chief complaint of fall, right knee pain,MS S/P MVR,PAF,diastolic CHF,HTN,DM,VICKY,pulmonary HTN,CRI and anemia.No CP or SOB.      Allergies    Augmentin (Swelling)  cephalexin (Swelling)  latex (Rash)    Intolerances      MEDICATIONS  (STANDING):  acetaminophen   Tablet .. 975 milliGRAM(s) Oral every 8 hours  amiodarone    Tablet 200 milliGRAM(s) Oral daily  bisacodyl Suppository 10 milliGRAM(s) Rectal once  buDESOnide 160 MICROgram(s)/formoterol 4.5 MICROgram(s) Inhaler 2 Puff(s) Inhalation two times a day  carvedilol 6.25 milliGRAM(s) Oral every 12 hours  dextrose 5%. 1000 milliLiter(s) (50 mL/Hr) IV Continuous <Continuous>  dextrose 50% Injectable 12.5 Gram(s) IV Push once  dextrose 50% Injectable 25 Gram(s) IV Push once  dextrose 50% Injectable 25 Gram(s) IV Push once  dextrose 50% Injectable 12.5 Gram(s) IV Push once  dextrose 50% Injectable 25 Gram(s) IV Push once  dextrose 50% Injectable 25 Gram(s) IV Push once  docusate sodium 100 milliGRAM(s) Oral three times a day  DULoxetine 60 milliGRAM(s) Oral daily  enoxaparin Injectable 40 milliGRAM(s) SubCutaneous every 24 hours  insulin glargine Injectable (LANTUS) 50 Unit(s) SubCutaneous at bedtime  insulin lispro (HumaLOG) corrective regimen sliding scale   SubCutaneous three times a day before meals  insulin lispro (HumaLOG) corrective regimen sliding scale   SubCutaneous at bedtime  insulin lispro Injectable (HumaLOG) 20 Unit(s) SubCutaneous three times a day before meals  isosorbide   mononitrate ER Tablet (IMDUR) 30 milliGRAM(s) Oral daily  lactated ringers. 1000 milliLiter(s) (50 mL/Hr) IV Continuous <Continuous>  levothyroxine 188 MICROGram(s) Oral daily  mesalamine DR (24-Hour) Tablet 2.4 Gram(s) Oral daily  metolazone 2.5 milliGRAM(s) Oral <User Schedule>  norethindrone acetate 5 milliGRAM(s) Oral daily  pantoprazole    Tablet 40 milliGRAM(s) Oral before breakfast  polyethylene glycol 3350 17 Gram(s) Oral daily  senna 2 Tablet(s) Oral at bedtime  sodium chloride 0.9%. 1000 milliLiter(s) (75 mL/Hr) IV Continuous <Continuous>  spironolactone 25 milliGRAM(s) Oral daily  tiotropium 18 MICROgram(s) Capsule 1 Capsule(s) Inhalation daily  torsemide 50 milliGRAM(s) Oral daily    MEDICATIONS  (PRN):  acetaminophen   Tablet .. 650 milliGRAM(s) Oral every 6 hours PRN Mild Pain (1 - 3), Moderate Pain (4 - 6)  bisacodyl Suppository 10 milliGRAM(s) Rectal once PRN Constipation  dextrose 40% Gel 15 Gram(s) Oral once PRN Blood Glucose LESS THAN 70 milliGRAM(s)/deciliter  dextrose 40% Gel 15 Gram(s) Oral once PRN Blood Glucose LESS THAN 70 milliGRAM(s)/deciliter  glucagon  Injectable 1 milliGRAM(s) IntraMuscular once PRN Glucose LESS THAN 70 milligrams/deciliter  glucagon  Injectable 1 milliGRAM(s) IntraMuscular once PRN Glucose LESS THAN 70 milligrams/deciliter  HYDROmorphone  Injectable 0.2 milliGRAM(s) IV Push every 10 minutes PRN Moderate Pain (4 - 6)  melatonin 3 milliGRAM(s) Oral at bedtime PRN Insomnia  ondansetron Injectable 4 milliGRAM(s) IV Push once PRN Nausea and/or Vomiting  ondansetron Injectable 4 milliGRAM(s) IV Push every 6 hours PRN Nausea and/or Vomiting  oxyCODONE    IR 2.5 milliGRAM(s) Oral every 4 hours PRN Moderate Pain (4 - 6)  oxyCODONE    IR 5 milliGRAM(s) Oral every 4 hours PRN Severe Pain (7 - 10)  traMADol 25 milliGRAM(s) Oral every 8 hours PRN Mild Pain (1 - 3)      ROS:  Positive:    General: Denies weight loss, fevers, rash, decreased hearing  Cardiac: Denies chest pain, SOB, QUESADA, orthopnea, PND, claudication, edema, snoring, daytime somnolence, palpitations, syncope  Resp: Denies SOB, QUESADA, cough, sputum, wheezing, hemoptysis  GI: Denies change in bowel habits, diarrhea, weight loss, melena, tarry stools,   nausea, vomiting, jaundice, abdominal pain, dysphagia  : Denies dysuria, nocturia, hematuria  Neuro: Denies tinnitus, headache, visual changes, weakness, dizziness or vertigo  Musculoskeletal: Denies neck pain back pain joint pain.  Skin: Denies rash, itching, dryness.  Endocrine: Denies polydipsia, polyuria  Psychiatric: Denies depression, anxiety      PHYSICAL EXAM:  Vital Signs Last 24 Hrs  T(C): 36.8 (2019 03:57), Max: 37.3 (2019 17:36)  T(F): 98.3 (2019 03:57), Max: 99.1 (2019 17:36)  HR: 80 (2019 03:57) (73 - 93)  BP: 122/70 (2019 03:57) (117/76 - 143/74)  BP(mean): --  RR: 18 (2019 03:57) (18 - 18)  SpO2: 100% (2019 03:57) (100% - 100%)  Daily     Daily Weight in k.7 (2019 03:57)  I&O's Summary    2019 07:01  -  2019 07:00  --------------------------------------------------------  IN: 200 mL / OUT: 1000 mL / NET: -800 mL    2019 07:01  -  2019 06:21  --------------------------------------------------------  IN: 600 mL / OUT: 1800 mL / NET: -1200 mL        General Appearance: 	 Alert, cooperative, no distress  HEENT: normocephalic, atraumatic, PERRLA, EOMI, conjunctiva normal, sclera anicteric,   Neck: no JVD,  carotid 2+  bilaterally without bruits, thyroid normal to inspection and palpation, no adenopathy, trachea midline  Lungs:  clear to auscultation and percussion bilaterally  Cor:  pmi 5th ICS MCL, regular rate and rhythm, S1 normal intensity, S2 normal intensity, no gallops, murmurs or rubs  Abdomen:	 soft, non-tender; bowel sounds normal; no masses,  no organomegaly  Extremities: without cyanosis, clubbing or edema  Vasc: 2-+ PT and DP pulses; no varicosities  Neurologic: A&O x 3 (time, place, person). Symmetric strength; limited exam  Musculoskeletal: no kyphosis, scoliosis; normal gait, normal tone  Skin: no rashes; limited exam      Labs:  CBC Full  -  ( 2019 09:02 )  WBC Count : 14.47 K/uL  RBC Count : 2.85 M/uL  Hemoglobin : 8.6 g/dL  Hematocrit : 27.8 %  Platelet Count - Automated : 215 K/uL  Mean Cell Volume : 97.5 fl  Mean Cell Hemoglobin : 30.2 pg  Mean Cell Hemoglobin Concentration : 30.9 gm/dL  Auto Neutrophil # : 11.97 K/uL  Auto Lymphocyte # : 1.15 K/uL  Auto Monocyte # : 1.26 K/uL  Auto Eosinophil # : 0.00 K/uL  Auto Basophil # : 0.04 K/uL  Auto Neutrophil % : 82.8 %  Auto Lymphocyte % : 7.9 %  Auto Monocyte % : 8.7 %  Auto Eosinophil % : 0.0 %  Auto Basophil % : 0.3 %        Impression/Plan:Patient with MS S/P MVR,PAF,DM,chronic diastolic CHF,HTN,VICKY,severe pulmonary HTN,VICKY,admitted with falls,vaginal bleed.Cardiopulmonary stable.Patient with VICKY but refuses workup or treatment.

## 2019-04-13 NOTE — PROGRESS NOTE ADULT - SUBJECTIVE AND OBJECTIVE BOX
Patient is seen and examined at the bed side, is afebrile. She is feeling better. The Leukocytosis is trending down to normal.        REVIEW OF SYSTEMS: All other review systems are negative        Vital Signs Last 24 Hrs  T(C): 36.4 (13 Apr 2019 12:50), Max: 36.8 (13 Apr 2019 03:57)  T(F): 97.5 (13 Apr 2019 12:50), Max: 98.3 (13 Apr 2019 03:57)  HR: 76 (13 Apr 2019 12:50) (73 - 80)  BP: 121/71 (13 Apr 2019 12:50) (121/71 - 133/71)  BP(mean): --  RR: 17 (13 Apr 2019 12:50) (17 - 18)  SpO2: 100% (13 Apr 2019 12:50) (100% - 100%)        PHYSICAL EXAM:  GENERAL: Not in distress  CHEST/LUNG: Air entry bilaterally  HEART: s1 and s2 present  ABDOMEN:  Nontender and  Nondistended  EXTREMITIES:  RLE bandage and immobilizer in placed  CNS: Awake, Alert and oriented        MEDICATIONS  (STANDING):  acetaminophen   Tablet .. 975 milliGRAM(s) Oral every 8 hours  amiodarone    Tablet 200 milliGRAM(s) Oral daily  bisacodyl Suppository 10 milliGRAM(s) Rectal once  buDESOnide 160 MICROgram(s)/formoterol 4.5 MICROgram(s) Inhaler 2 Puff(s) Inhalation two times a day  carvedilol 6.25 milliGRAM(s) Oral every 12 hours  docusate sodium 100 milliGRAM(s) Oral three times a day  DULoxetine 60 milliGRAM(s) Oral daily  enoxaparin Injectable 40 milliGRAM(s) SubCutaneous every 24 hours  insulin glargine Injectable (LANTUS) 50 Unit(s) SubCutaneous at bedtime  insulin lispro (HumaLOG) corrective regimen sliding scale   SubCutaneous three times a day before meals  insulin lispro (HumaLOG) corrective regimen sliding scale   SubCutaneous at bedtime  insulin lispro Injectable (HumaLOG) 20 Unit(s) SubCutaneous three times a day before meals  isosorbide   mononitrate ER Tablet (IMDUR) 30 milliGRAM(s) Oral daily  lactated ringers. 1000 milliLiter(s) (50 mL/Hr) IV Continuous <Continuous>  levothyroxine 188 MICROGram(s) Oral daily  mesalamine DR (24-Hour) Tablet 2.4 Gram(s) Oral daily  metolazone 2.5 milliGRAM(s) Oral <User Schedule>  norethindrone acetate 5 milliGRAM(s) Oral daily  pantoprazole    Tablet 40 milliGRAM(s) Oral before breakfast  polyethylene glycol 3350 17 Gram(s) Oral daily  senna 2 Tablet(s) Oral at bedtime  sodium chloride 0.9%. 1000 milliLiter(s) (75 mL/Hr) IV Continuous <Continuous>  spironolactone 25 milliGRAM(s) Oral daily  tiotropium 18 MICROgram(s) Capsule 1 Capsule(s) Inhalation daily  torsemide 50 milliGRAM(s) Oral daily    MEDICATIONS  (PRN):  acetaminophen   Tablet .. 650 milliGRAM(s) Oral every 6 hours PRN Mild Pain (1 - 3), Moderate Pain (4 - 6)  bisacodyl Suppository 10 milliGRAM(s) Rectal once PRN Constipation  dextrose 40% Gel 15 Gram(s) Oral once PRN Blood Glucose LESS THAN 70 milliGRAM(s)/deciliter  dextrose 40% Gel 15 Gram(s) Oral once PRN Blood Glucose LESS THAN 70 milliGRAM(s)/deciliter  glucagon  Injectable 1 milliGRAM(s) IntraMuscular once PRN Glucose LESS THAN 70 milligrams/deciliter  glucagon  Injectable 1 milliGRAM(s) IntraMuscular once PRN Glucose LESS THAN 70 milligrams/deciliter  HYDROmorphone  Injectable 0.2 milliGRAM(s) IV Push every 10 minutes PRN Moderate Pain (4 - 6)  melatonin 3 milliGRAM(s) Oral at bedtime PRN Insomnia  ondansetron Injectable 4 milliGRAM(s) IV Push once PRN Nausea and/or Vomiting  ondansetron Injectable 4 milliGRAM(s) IV Push every 6 hours PRN Nausea and/or Vomiting  oxyCODONE    IR 2.5 milliGRAM(s) Oral every 4 hours PRN Moderate Pain (4 - 6)  oxyCODONE    IR 5 milliGRAM(s) Oral every 4 hours PRN Severe Pain (7 - 10)  traMADol 25 milliGRAM(s) Oral every 8 hours PRN Mild Pain (1 - 3)          Allergies : Augmentin (Swelling), cephalexin (Swelling), latex (Rash)        LABS:                        8.4    11.29 )-----------( 193      ( 13 Apr 2019 09:26 )             27.1                           8.6    14.47 )-----------( 215      ( 12 Apr 2019 09:02 )             27.8       04-13    136  |  94<L>  |  64<H>  ----------------------------<  238<H>  3.8   |  25  |  2.08<H>    Ca    9.4      13 Apr 2019 07:02      04-12    135  |  94<L>  |  60<H>  ----------------------------<  296<H>  4.0   |  27  |  1.96<H>    Ca    9.2      12 Apr 2019 06:50      PT/INR - ( 11 Apr 2019 05:13 )   PT: 12.9 sec;   INR: 1.12 ratio    PTT - ( 11 Apr 2019 05:13 )  PTT:25.4 sec      CAPILLARY BLOOD GLUCOSE  POCT Blood Glucose.: 232 mg/dL (12 Apr 2019 11:32)  POCT Blood Glucose.: 311 mg/dL (12 Apr 2019 08:09)  POCT Blood Glucose.: 308 mg/dL (11 Apr 2019 21:37)  POCT Blood Glucose.: 262 mg/dL (11 Apr 2019 17:23)  POCT Blood Glucose.: 217 mg/dL (11 Apr 2019 16:04)        RADIOLOGY & ADDITIONAL TESTS:    4/8/19 : MR Knee No Cont, Right (04.08.19 @ 15:14) 1.  Full-thickness quadriceps tendon tear at the level of the upper pole  of the patella with 31 mm gap between the superior pole of the patella   and the torn tendon fibers.  2.  Large knee joint effusion.  3.  Tear of the medial meniscus.      4/5/19 : US Pelvis Complete (04.05.19 @ 12:11) Severely limited evaluation. No mass is seen.  Markedly distended endometrial cavity with fluid, suggesting  cervical/vaginal outlet obstruction.          MICROBIOLOGY DATA:      Culture - Urine (04.04.19 @ 18:59)    Specimen Source: .Urine Clean Catch (Midstream)    Culture Results:   No growth      Culture - Blood (03.30.19 @ 22:36)    Specimen Source: .Blood Blood    Culture Results:   No growth at 5 days.      Culture - Blood (03.30.19 @ 22:36)    Specimen Source: .Blood Blood    Culture Results:   No growth at 5 days.      Culture - Urine (03.30.19 @ 21:18)    -  Tobramycin: S <=4    -  Trimethoprim/Sulfamethoxazole: S <=2/38    -  Amikacin: S <=16    -  Ampicillin: S <=8 These ampicillin results predict results for amoxicillin    -  Ampicillin/Sulbactam: S <=8/4    -  Aztreonam: S <=4    -  Cefazolin: S <=8 For uncomplicated UTI with K. pneumoniae, E. coli, or P. mirablis: LÓPEZ <=16 is sensitive and LÓPEZ >=32 is resistant. This also predicts results for oral agents cefaclor, cefdinir, cefpodoxime, cefprozil, cefuroxime axetil, cephalexin and locarbef for uncomplicated UTI. Note that some isolates may be susceptible to these agents while testing resistant to cefazolin.    -  Cefepime: S <=4    -  Cefoxitin: S <=8    -  Ceftriaxone: S <=1 Enterobacter, Citrobacter, and Serratia may develop resistance during prolonged therapy    -  Ciprofloxacin: S <=1    -  Ertapenem: S <=1    -  Gentamicin: S <=4    -  Levofloxacin: S <=2    -  Meropenem: S <=1    -  Nitrofurantoin: R >64 Should not be used to treat pyelonephritis    -  Piperacillin/Tazobactam: S <=16    Specimen Source: .Urine Clean Catch (Midstream)    Culture Results:   >100,000 CFU/ml Proteus mirabilis    Organism Identification: Proteus mirabilis    Organism: Proteus mirabilis    Method Type: LÓPEZ Patient is seen and examined at the bed side, is afebrile. The pain is better controlled now.  The Leukocytosis is trending down to normal.        REVIEW OF SYSTEMS: All other review systems are negative        Vital Signs Last 24 Hrs  T(C): 36.4 (13 Apr 2019 12:50), Max: 36.8 (13 Apr 2019 03:57)  T(F): 97.5 (13 Apr 2019 12:50), Max: 98.3 (13 Apr 2019 03:57)  HR: 76 (13 Apr 2019 12:50) (73 - 80)  BP: 121/71 (13 Apr 2019 12:50) (121/71 - 133/71)  BP(mean): --  RR: 17 (13 Apr 2019 12:50) (17 - 18)  SpO2: 100% (13 Apr 2019 12:50) (100% - 100%)        PHYSICAL EXAM:  GENERAL: Not in distress  CHEST/LUNG: Air entry bilaterally  HEART: s1 and s2 present  ABDOMEN:  Nontender and  Nondistended  EXTREMITIES:  RLE bandage and immobilizer in placed  CNS: Awake, Alert and oriented        MEDICATIONS  (STANDING):  acetaminophen   Tablet .. 975 milliGRAM(s) Oral every 8 hours  amiodarone    Tablet 200 milliGRAM(s) Oral daily  bisacodyl Suppository 10 milliGRAM(s) Rectal once  buDESOnide 160 MICROgram(s)/formoterol 4.5 MICROgram(s) Inhaler 2 Puff(s) Inhalation two times a day  carvedilol 6.25 milliGRAM(s) Oral every 12 hours  docusate sodium 100 milliGRAM(s) Oral three times a day  DULoxetine 60 milliGRAM(s) Oral daily  enoxaparin Injectable 40 milliGRAM(s) SubCutaneous every 24 hours  insulin glargine Injectable (LANTUS) 50 Unit(s) SubCutaneous at bedtime  insulin lispro (HumaLOG) corrective regimen sliding scale   SubCutaneous three times a day before meals  insulin lispro (HumaLOG) corrective regimen sliding scale   SubCutaneous at bedtime  insulin lispro Injectable (HumaLOG) 20 Unit(s) SubCutaneous three times a day before meals  isosorbide   mononitrate ER Tablet (IMDUR) 30 milliGRAM(s) Oral daily  lactated ringers. 1000 milliLiter(s) (50 mL/Hr) IV Continuous <Continuous>  levothyroxine 188 MICROGram(s) Oral daily  mesalamine DR (24-Hour) Tablet 2.4 Gram(s) Oral daily  metolazone 2.5 milliGRAM(s) Oral <User Schedule>  norethindrone acetate 5 milliGRAM(s) Oral daily  pantoprazole    Tablet 40 milliGRAM(s) Oral before breakfast  polyethylene glycol 3350 17 Gram(s) Oral daily  senna 2 Tablet(s) Oral at bedtime  sodium chloride 0.9%. 1000 milliLiter(s) (75 mL/Hr) IV Continuous <Continuous>  spironolactone 25 milliGRAM(s) Oral daily  tiotropium 18 MICROgram(s) Capsule 1 Capsule(s) Inhalation daily  torsemide 50 milliGRAM(s) Oral daily    MEDICATIONS  (PRN):  acetaminophen   Tablet .. 650 milliGRAM(s) Oral every 6 hours PRN Mild Pain (1 - 3), Moderate Pain (4 - 6)  bisacodyl Suppository 10 milliGRAM(s) Rectal once PRN Constipation  dextrose 40% Gel 15 Gram(s) Oral once PRN Blood Glucose LESS THAN 70 milliGRAM(s)/deciliter  dextrose 40% Gel 15 Gram(s) Oral once PRN Blood Glucose LESS THAN 70 milliGRAM(s)/deciliter  glucagon  Injectable 1 milliGRAM(s) IntraMuscular once PRN Glucose LESS THAN 70 milligrams/deciliter  glucagon  Injectable 1 milliGRAM(s) IntraMuscular once PRN Glucose LESS THAN 70 milligrams/deciliter  HYDROmorphone  Injectable 0.2 milliGRAM(s) IV Push every 10 minutes PRN Moderate Pain (4 - 6)  melatonin 3 milliGRAM(s) Oral at bedtime PRN Insomnia  ondansetron Injectable 4 milliGRAM(s) IV Push once PRN Nausea and/or Vomiting  ondansetron Injectable 4 milliGRAM(s) IV Push every 6 hours PRN Nausea and/or Vomiting  oxyCODONE    IR 2.5 milliGRAM(s) Oral every 4 hours PRN Moderate Pain (4 - 6)  oxyCODONE    IR 5 milliGRAM(s) Oral every 4 hours PRN Severe Pain (7 - 10)  traMADol 25 milliGRAM(s) Oral every 8 hours PRN Mild Pain (1 - 3)          Allergies : Augmentin (Swelling), cephalexin (Swelling), latex (Rash)        LABS:                        8.4    11.29 )-----------( 193      ( 13 Apr 2019 09:26 )             27.1                           8.6    14.47 )-----------( 215      ( 12 Apr 2019 09:02 )             27.8         04-13    136  |  94<L>  |  64<H>  ----------------------------<  238<H>  3.8   |  25  |  2.08<H>    Ca    9.4      13 Apr 2019 07:02      04-12    135  |  94<L>  |  60<H>  ----------------------------<  296<H>  4.0   |  27  |  1.96<H>    Ca    9.2      12 Apr 2019 06:50      PT/INR - ( 11 Apr 2019 05:13 )   PT: 12.9 sec;   INR: 1.12 ratio    PTT - ( 11 Apr 2019 05:13 )  PTT:25.4 sec      CAPILLARY BLOOD GLUCOSE  POCT Blood Glucose.: 232 mg/dL (12 Apr 2019 11:32)  POCT Blood Glucose.: 311 mg/dL (12 Apr 2019 08:09)  POCT Blood Glucose.: 308 mg/dL (11 Apr 2019 21:37)  POCT Blood Glucose.: 262 mg/dL (11 Apr 2019 17:23)  POCT Blood Glucose.: 217 mg/dL (11 Apr 2019 16:04)        RADIOLOGY & ADDITIONAL TESTS:    4/8/19 : MR Knee No Cont, Right (04.08.19 @ 15:14) 1.  Full-thickness quadriceps tendon tear at the level of the upper pole  of the patella with 31 mm gap between the superior pole of the patella   and the torn tendon fibers.  2.  Large knee joint effusion.  3.  Tear of the medial meniscus.      4/5/19 : US Pelvis Complete (04.05.19 @ 12:11) Severely limited evaluation. No mass is seen.  Markedly distended endometrial cavity with fluid, suggesting  cervical/vaginal outlet obstruction.          MICROBIOLOGY DATA:      Culture - Urine (04.04.19 @ 18:59)    Specimen Source: .Urine Clean Catch (Midstream)    Culture Results:   No growth      Culture - Blood (03.30.19 @ 22:36)    Specimen Source: .Blood Blood    Culture Results:   No growth at 5 days.      Culture - Blood (03.30.19 @ 22:36)    Specimen Source: .Blood Blood    Culture Results:   No growth at 5 days.      Culture - Urine (03.30.19 @ 21:18)    -  Tobramycin: S <=4    -  Trimethoprim/Sulfamethoxazole: S <=2/38    -  Amikacin: S <=16    -  Ampicillin: S <=8 These ampicillin results predict results for amoxicillin    -  Ampicillin/Sulbactam: S <=8/4    -  Aztreonam: S <=4    -  Cefazolin: S <=8 For uncomplicated UTI with K. pneumoniae, E. coli, or P. mirablis: LÓPEZ <=16 is sensitive and LÓPEZ >=32 is resistant. This also predicts results for oral agents cefaclor, cefdinir, cefpodoxime, cefprozil, cefuroxime axetil, cephalexin and locarbef for uncomplicated UTI. Note that some isolates may be susceptible to these agents while testing resistant to cefazolin.    -  Cefepime: S <=4    -  Cefoxitin: S <=8    -  Ceftriaxone: S <=1 Enterobacter, Citrobacter, and Serratia may develop resistance during prolonged therapy    -  Ciprofloxacin: S <=1    -  Ertapenem: S <=1    -  Gentamicin: S <=4    -  Levofloxacin: S <=2    -  Meropenem: S <=1    -  Nitrofurantoin: R >64 Should not be used to treat pyelonephritis    -  Piperacillin/Tazobactam: S <=16    Specimen Source: .Urine Clean Catch (Midstream)    Culture Results:   >100,000 CFU/ml Proteus mirabilis    Organism Identification: Proteus mirabilis    Organism: Proteus mirabilis    Method Type: LÓPEZ

## 2019-04-13 NOTE — PROGRESS NOTE ADULT - ASSESSMENT
77 yo woman w/ hx of moderate MS s/p bioprosthetic mitral valve, diastolic CHF, paroxsmal afib (not on A/C in setting of bleeding), HTN, severe pulmonary HTN, DMT2, CKD III, anemia, with post menopausal vaginal bleeding, UC, VICKY on 3L NC (not on CPAP/BIPAP) presents from home in setting of multiple falls the past 2 days resulting with right knee pain. Patient had a fall yesterday from 1 step while walking out of the house and tripped. Per patient states that her right leg had a buckling/collapsing sensation. Patient went to [facility] on 3/29 and had an xray which did not reveal fracture and sent home with cyclobenzaprine and Lidoderm patch. Patient last use of medication on 3/29. Patient had another fall while attempting to ambulate to the bathroom. She described that her right leg buckled under her causing her to fall. It was difficult for her to get up on her own and required the assistance of her  and son-in-law to get up. Patient denies any preceding symptoms of dizziness/lightheadedness, shortness of breath, CP, palpitations preceding the events. Endorses chronic back pain with no new characteristics. Patient has been getting around home with use of wheelchair the past 4 months. Requires assistance of her  to get around because of chronic weakness. Denies development of new numbness of lower extremities. Does not utilize any other assistive devices. Has not had PT outpatient. Patient also has had chronic LE wounds that were dressed from last discharge on 3/27. Denies changing of dressing. Denies fevers, chills, sweats. (30 Mar 2019 21:08)  ER vss, pt afebrile.  WBC 13.2 --> 9.0.  UA large LE/ (-) nit.  Ucx >100K GNR.  No acute findings on cxr.   Pt is currently on vancomycin for cellulitis.   Pt with venous stasis and several open blisters on b/l LE.  ID consult called for further abx management.        # UTI-  UCx Proteus mirabilis - s/p treated -  Pt seen by Urology -  planned for outpt  cystoscopy. Pt has been on low dose Macrobid for last 2 years for chronic suppressive therapy.  Current Ucx growing Proteus mirabilis that is now resistant to mirabilis.  No good oral options available as pt is allergic to beta-lactams, and unable to take fluroquinolone as she is also taking amiodarone.  Don't recommend bactrim due to renal insufficiency.      # RLE Cellulitis - s/p completed the  ABx course  # Right quadriceps tendon tear  - s/p repair on 4/11/19  # Vaginal bleeding- - Pelvic and transvaginal US performed - s/o vaginal outlet obstruction. Pt has restarted norethindrone for postmenopausal bleeding.   Pt for possible d&c with hysteroscopy and endometrial sampling/IUD placement  # Leukocytosis      Would recommend:    1. Monitor OFF antibiotic  2. Monitor WBC count  3. Post-surgical care as per Ortho  4. Pain management as needed      - Covering Dr. Reinoso 79 yo woman w/ hx of moderate MS s/p bioprosthetic mitral valve, diastolic CHF, paroxsmal afib (not on A/C in setting of bleeding), HTN, severe pulmonary HTN, DMT2, CKD III, anemia, with post menopausal vaginal bleeding, UC, VICKY on 3L NC (not on CPAP/BIPAP) presents from home in setting of multiple falls the past 2 days resulting with right knee pain. Patient had a fall yesterday from 1 step while walking out of the house and tripped. Per patient states that her right leg had a buckling/collapsing sensation. Patient went to [place] ED on 3/29 and had an xray which did not reveal fracture and sent home with cyclobenzaprine and Lidoderm patch. Patient last use of medication on 3/29. Patient had another fall while attempting to ambulate to the bathroom. She described that her right leg buckled under her causing her to fall. It was difficult for her to get up on her own and required the assistance of her  and son-in-law to get up. Patient denies any preceding symptoms of dizziness/lightheadedness, shortness of breath, CP, palpitations preceding the events. Endorses chronic back pain with no new characteristics. Patient has been getting around home with use of wheelchair the past 4 months. Requires assistance of her  to get around because of chronic weakness. Denies development of new numbness of lower extremities. Does not utilize any other assistive devices. Has not had PT outpatient. Patient also has had chronic LE wounds that were dressed from last discharge on 3/27. Denies changing of dressing. Denies fevers, chills, sweats. (30 Mar 2019 21:08)  ER vss, pt afebrile.  WBC 13.2 --> 9.0.  UA large LE/ (-) nit.  Ucx >100K GNR.  No acute findings on cxr.   Pt is currently on vancomycin for cellulitis.   Pt with venous stasis and several open blisters on b/l LE.  ID consult called for further abx management.        # UTI-  UCx Proteus mirabilis - s/p treated -  Pt seen by Urology -  planned for outpt  cystoscopy. Pt has been on low dose Macrobid for last 2 years for chronic suppressive therapy.  Current Ucx growing Proteus mirabilis that is now resistant to mirabilis.  No good oral options available as pt is allergic to beta-lactams, and unable to take fluroquinolone as she is also taking amiodarone.  Don't recommend bactrim due to renal insufficiency.      # RLE Cellulitis - s/p completed the  ABx course  # Right quadriceps tendon tear  - s/p repair on 4/11/19  # Vaginal bleeding- - Pelvic and transvaginal US performed - s/o vaginal outlet obstruction. Pt has restarted norethindrone for postmenopausal bleeding.   Pt for possible d&c with hysteroscopy and endometrial sampling/IUD placement  # Leukocytosis      Would recommend:    1. Monitor WBC count, is trending  down  2. Monitor OFF antibiotic  3. Post-surgical care as per Ortho  4. Pain management as needed    - Covering Dr. Kemar Merida  [phone]

## 2019-04-13 NOTE — PROGRESS NOTE ADULT - SUBJECTIVE AND OBJECTIVE BOX
Patient is a 78y old  Female who presents with a chief complaint of fall, right knee pain (13 Apr 2019 19:23)      SUBJECTIVE / OVERNIGHT EVENTS: no new c/o, awaiting SAMANTHA    MEDICATIONS  (STANDING):  acetaminophen   Tablet .. 975 milliGRAM(s) Oral every 8 hours  amiodarone    Tablet 200 milliGRAM(s) Oral daily  bisacodyl Suppository 10 milliGRAM(s) Rectal once  buDESOnide 160 MICROgram(s)/formoterol 4.5 MICROgram(s) Inhaler 2 Puff(s) Inhalation two times a day  carvedilol 6.25 milliGRAM(s) Oral every 12 hours  dextrose 5%. 1000 milliLiter(s) (50 mL/Hr) IV Continuous <Continuous>  dextrose 50% Injectable 12.5 Gram(s) IV Push once  dextrose 50% Injectable 25 Gram(s) IV Push once  dextrose 50% Injectable 25 Gram(s) IV Push once  dextrose 50% Injectable 12.5 Gram(s) IV Push once  dextrose 50% Injectable 25 Gram(s) IV Push once  dextrose 50% Injectable 25 Gram(s) IV Push once  docusate sodium 100 milliGRAM(s) Oral three times a day  DULoxetine 60 milliGRAM(s) Oral daily  enoxaparin Injectable 40 milliGRAM(s) SubCutaneous every 24 hours  insulin glargine Injectable (LANTUS) 50 Unit(s) SubCutaneous at bedtime  insulin lispro (HumaLOG) corrective regimen sliding scale   SubCutaneous three times a day before meals  insulin lispro (HumaLOG) corrective regimen sliding scale   SubCutaneous at bedtime  insulin lispro Injectable (HumaLOG) 20 Unit(s) SubCutaneous three times a day before meals  isosorbide   mononitrate ER Tablet (IMDUR) 30 milliGRAM(s) Oral daily  lactated ringers. 1000 milliLiter(s) (50 mL/Hr) IV Continuous <Continuous>  levothyroxine 188 MICROGram(s) Oral daily  mesalamine DR (24-Hour) Tablet 2.4 Gram(s) Oral daily  metolazone 2.5 milliGRAM(s) Oral <User Schedule>  norethindrone acetate 5 milliGRAM(s) Oral daily  pantoprazole    Tablet 40 milliGRAM(s) Oral before breakfast  polyethylene glycol 3350 17 Gram(s) Oral daily  senna 2 Tablet(s) Oral at bedtime  sodium chloride 0.9%. 1000 milliLiter(s) (75 mL/Hr) IV Continuous <Continuous>  spironolactone 25 milliGRAM(s) Oral daily  tiotropium 18 MICROgram(s) Capsule 1 Capsule(s) Inhalation daily  torsemide 50 milliGRAM(s) Oral daily    MEDICATIONS  (PRN):  acetaminophen   Tablet .. 650 milliGRAM(s) Oral every 6 hours PRN Mild Pain (1 - 3), Moderate Pain (4 - 6)  bisacodyl Suppository 10 milliGRAM(s) Rectal once PRN Constipation  dextrose 40% Gel 15 Gram(s) Oral once PRN Blood Glucose LESS THAN 70 milliGRAM(s)/deciliter  dextrose 40% Gel 15 Gram(s) Oral once PRN Blood Glucose LESS THAN 70 milliGRAM(s)/deciliter  glucagon  Injectable 1 milliGRAM(s) IntraMuscular once PRN Glucose LESS THAN 70 milligrams/deciliter  glucagon  Injectable 1 milliGRAM(s) IntraMuscular once PRN Glucose LESS THAN 70 milligrams/deciliter  HYDROmorphone  Injectable 0.2 milliGRAM(s) IV Push every 10 minutes PRN Moderate Pain (4 - 6)  melatonin 3 milliGRAM(s) Oral at bedtime PRN Insomnia  ondansetron Injectable 4 milliGRAM(s) IV Push once PRN Nausea and/or Vomiting  ondansetron Injectable 4 milliGRAM(s) IV Push every 6 hours PRN Nausea and/or Vomiting  oxyCODONE    IR 2.5 milliGRAM(s) Oral every 4 hours PRN Moderate Pain (4 - 6)  oxyCODONE    IR 5 milliGRAM(s) Oral every 4 hours PRN Severe Pain (7 - 10)  traMADol 25 milliGRAM(s) Oral every 8 hours PRN Mild Pain (1 - 3)      Vital Signs Last 24 Hrs  T(F): 98.3 (04-13-19 @ 20:05), Max: 98.3 (04-13-19 @ 03:57)  HR: 97 (04-13-19 @ 20:05) (73 - 97)  BP: 119/73 (04-13-19 @ 20:05) (119/73 - 127/81)  RR: 17 (04-13-19 @ 20:05) (17 - 18)  SpO2: 99% (04-13-19 @ 20:05) (99% - 100%)  Telemetry:   CAPILLARY BLOOD GLUCOSE      POCT Blood Glucose.: 196 mg/dL (13 Apr 2019 17:27)  POCT Blood Glucose.: 263 mg/dL (13 Apr 2019 13:14)  POCT Blood Glucose.: 281 mg/dL (13 Apr 2019 11:40)  POCT Blood Glucose.: 255 mg/dL (13 Apr 2019 08:03)  POCT Blood Glucose.: 284 mg/dL (12 Apr 2019 21:40)    I&O's Summary    12 Apr 2019 07:01  -  13 Apr 2019 07:00  --------------------------------------------------------  IN: 720 mL / OUT: 2300 mL / NET: -1580 mL    13 Apr 2019 07:01  -  13 Apr 2019 21:39  --------------------------------------------------------  IN: 540 mL / OUT: 950 mL / NET: -410 mL        PHYSICAL EXAM:  GENERAL: NAD, well-developed  HEAD:  Atraumatic, Normocephalic  EYES: EOMI, PERRLA, conjunctiva and sclera clear  NECK: Supple, No JVD  CHEST/LUNG: Clear to auscultation bilaterally; No wheeze  HEART: Regular rate and rhythm; No murmurs, rubs, or gallops  ABDOMEN: Soft, Nontender, Nondistended; Bowel sounds present  EXTREMITIES:  2+ Peripheral Pulses, No clubbing, cyanosis, or edema  PSYCH: AAOx3  NEUROLOGY: non-focal  SKIN: No rashes or lesions    LABS:                        8.4    11.29 )-----------( 193      ( 13 Apr 2019 09:26 )             27.1     04-13    136  |  94<L>  |  64<H>  ----------------------------<  238<H>  3.8   |  25  |  2.08<H>    Ca    9.4      13 Apr 2019 07:02                RADIOLOGY & ADDITIONAL TESTS:    Imaging Personally Reviewed:    Consultant(s) Notes Reviewed:      Care Discussed with Consultants/Other Providers:

## 2019-04-14 LAB
ANION GAP SERPL CALC-SCNC: 13 MMOL/L — SIGNIFICANT CHANGE UP (ref 5–17)
BUN SERPL-MCNC: 66 MG/DL — HIGH (ref 7–23)
CALCIUM SERPL-MCNC: 9.4 MG/DL — SIGNIFICANT CHANGE UP (ref 8.4–10.5)
CHLORIDE SERPL-SCNC: 97 MMOL/L — SIGNIFICANT CHANGE UP (ref 96–108)
CO2 SERPL-SCNC: 29 MMOL/L — SIGNIFICANT CHANGE UP (ref 22–31)
CREAT SERPL-MCNC: 1.91 MG/DL — HIGH (ref 0.5–1.3)
GLUCOSE BLDC GLUCOMTR-MCNC: 167 MG/DL — HIGH (ref 70–99)
GLUCOSE BLDC GLUCOMTR-MCNC: 181 MG/DL — HIGH (ref 70–99)
GLUCOSE BLDC GLUCOMTR-MCNC: 231 MG/DL — HIGH (ref 70–99)
GLUCOSE BLDC GLUCOMTR-MCNC: 237 MG/DL — HIGH (ref 70–99)
GLUCOSE BLDC GLUCOMTR-MCNC: 303 MG/DL — HIGH (ref 70–99)
GLUCOSE SERPL-MCNC: 157 MG/DL — HIGH (ref 70–99)
HCT VFR BLD CALC: 27.4 % — LOW (ref 34.5–45)
HGB BLD-MCNC: 8.4 G/DL — LOW (ref 11.5–15.5)
MCHC RBC-ENTMCNC: 30 PG — SIGNIFICANT CHANGE UP (ref 27–34)
MCHC RBC-ENTMCNC: 30.7 GM/DL — LOW (ref 32–36)
MCV RBC AUTO: 97.9 FL — SIGNIFICANT CHANGE UP (ref 80–100)
PLATELET # BLD AUTO: 191 K/UL — SIGNIFICANT CHANGE UP (ref 150–400)
POTASSIUM SERPL-MCNC: 3.7 MMOL/L — SIGNIFICANT CHANGE UP (ref 3.5–5.3)
POTASSIUM SERPL-SCNC: 3.7 MMOL/L — SIGNIFICANT CHANGE UP (ref 3.5–5.3)
RBC # BLD: 2.8 M/UL — LOW (ref 3.8–5.2)
RBC # FLD: 17 % — HIGH (ref 10.3–14.5)
SODIUM SERPL-SCNC: 139 MMOL/L — SIGNIFICANT CHANGE UP (ref 135–145)
WBC # BLD: 10.88 K/UL — HIGH (ref 3.8–10.5)
WBC # FLD AUTO: 10.88 K/UL — HIGH (ref 3.8–10.5)

## 2019-04-14 RX ORDER — INSULIN LISPRO 100/ML
24 VIAL (ML) SUBCUTANEOUS
Qty: 0 | Refills: 0 | Status: DISCONTINUED | OUTPATIENT
Start: 2019-04-14 | End: 2019-04-15

## 2019-04-14 RX ORDER — INSULIN GLARGINE 100 [IU]/ML
54 INJECTION, SOLUTION SUBCUTANEOUS AT BEDTIME
Qty: 0 | Refills: 0 | Status: DISCONTINUED | OUTPATIENT
Start: 2019-04-14 | End: 2019-04-15

## 2019-04-14 RX ORDER — INSULIN LISPRO 100/ML
20 VIAL (ML) SUBCUTANEOUS
Qty: 0 | Refills: 0 | Status: DISCONTINUED | OUTPATIENT
Start: 2019-04-14 | End: 2019-04-15

## 2019-04-14 RX ADMIN — TIOTROPIUM BROMIDE 1 CAPSULE(S): 18 CAPSULE ORAL; RESPIRATORY (INHALATION) at 12:51

## 2019-04-14 RX ADMIN — DULOXETINE HYDROCHLORIDE 60 MILLIGRAM(S): 30 CAPSULE, DELAYED RELEASE ORAL at 12:52

## 2019-04-14 RX ADMIN — Medication 20 UNIT(S): at 17:47

## 2019-04-14 RX ADMIN — Medication 975 MILLIGRAM(S): at 15:31

## 2019-04-14 RX ADMIN — Medication 975 MILLIGRAM(S): at 22:18

## 2019-04-14 RX ADMIN — Medication 20 UNIT(S): at 12:50

## 2019-04-14 RX ADMIN — PANTOPRAZOLE SODIUM 40 MILLIGRAM(S): 20 TABLET, DELAYED RELEASE ORAL at 06:58

## 2019-04-14 RX ADMIN — SENNA PLUS 2 TABLET(S): 8.6 TABLET ORAL at 22:19

## 2019-04-14 RX ADMIN — Medication 4: at 17:47

## 2019-04-14 RX ADMIN — Medication 100 MILLIGRAM(S): at 05:46

## 2019-04-14 RX ADMIN — Medication 50 MILLIGRAM(S): at 05:46

## 2019-04-14 RX ADMIN — POLYETHYLENE GLYCOL 3350 17 GRAM(S): 17 POWDER, FOR SOLUTION ORAL at 12:51

## 2019-04-14 RX ADMIN — Medication 975 MILLIGRAM(S): at 23:00

## 2019-04-14 RX ADMIN — ENOXAPARIN SODIUM 40 MILLIGRAM(S): 100 INJECTION SUBCUTANEOUS at 15:01

## 2019-04-14 RX ADMIN — BUDESONIDE AND FORMOTEROL FUMARATE DIHYDRATE 2 PUFF(S): 160; 4.5 AEROSOL RESPIRATORY (INHALATION) at 05:48

## 2019-04-14 RX ADMIN — Medication 2: at 08:19

## 2019-04-14 RX ADMIN — INSULIN GLARGINE 54 UNIT(S): 100 INJECTION, SOLUTION SUBCUTANEOUS at 22:19

## 2019-04-14 RX ADMIN — Medication 100 MILLIGRAM(S): at 15:01

## 2019-04-14 RX ADMIN — Medication 100 MILLIGRAM(S): at 22:19

## 2019-04-14 RX ADMIN — Medication 975 MILLIGRAM(S): at 05:47

## 2019-04-14 RX ADMIN — Medication 8: at 12:50

## 2019-04-14 RX ADMIN — Medication 975 MILLIGRAM(S): at 15:01

## 2019-04-14 RX ADMIN — NORETHINDRONE 5 MILLIGRAM(S): 0.35 TABLET ORAL at 12:51

## 2019-04-14 RX ADMIN — Medication 188 MICROGRAM(S): at 05:47

## 2019-04-14 RX ADMIN — AMIODARONE HYDROCHLORIDE 200 MILLIGRAM(S): 400 TABLET ORAL at 05:47

## 2019-04-14 RX ADMIN — Medication 20 UNIT(S): at 08:19

## 2019-04-14 RX ADMIN — CARVEDILOL PHOSPHATE 6.25 MILLIGRAM(S): 80 CAPSULE, EXTENDED RELEASE ORAL at 17:52

## 2019-04-14 RX ADMIN — BUDESONIDE AND FORMOTEROL FUMARATE DIHYDRATE 2 PUFF(S): 160; 4.5 AEROSOL RESPIRATORY (INHALATION) at 17:52

## 2019-04-14 RX ADMIN — SPIRONOLACTONE 25 MILLIGRAM(S): 25 TABLET, FILM COATED ORAL at 05:48

## 2019-04-14 RX ADMIN — ISOSORBIDE MONONITRATE 30 MILLIGRAM(S): 60 TABLET, EXTENDED RELEASE ORAL at 12:51

## 2019-04-14 RX ADMIN — CARVEDILOL PHOSPHATE 6.25 MILLIGRAM(S): 80 CAPSULE, EXTENDED RELEASE ORAL at 05:47

## 2019-04-14 RX ADMIN — Medication 3 MILLIGRAM(S): at 22:19

## 2019-04-14 RX ADMIN — Medication 2.4 GRAM(S): at 12:51

## 2019-04-14 NOTE — PROGRESS NOTE ADULT - SUBJECTIVE AND OBJECTIVE BOX
TANIKA OBRIEN    Patient is a 78y old  Female who presents with a chief complaint of fall, right knee pain,S/P fall,MVR,PAF,diastolic CHF,HTN,pulmonary HTN,CRI and anemia.No CP or SOB.      Allergies    Augmentin (Swelling)  cephalexin (Swelling)  latex (Rash)    Intolerances      MEDICATIONS  (STANDING):  acetaminophen   Tablet .. 975 milliGRAM(s) Oral every 8 hours  amiodarone    Tablet 200 milliGRAM(s) Oral daily  bisacodyl Suppository 10 milliGRAM(s) Rectal once  buDESOnide 160 MICROgram(s)/formoterol 4.5 MICROgram(s) Inhaler 2 Puff(s) Inhalation two times a day  carvedilol 6.25 milliGRAM(s) Oral every 12 hours  dextrose 5%. 1000 milliLiter(s) (50 mL/Hr) IV Continuous <Continuous>  dextrose 50% Injectable 12.5 Gram(s) IV Push once  dextrose 50% Injectable 25 Gram(s) IV Push once  dextrose 50% Injectable 25 Gram(s) IV Push once  dextrose 50% Injectable 12.5 Gram(s) IV Push once  dextrose 50% Injectable 25 Gram(s) IV Push once  dextrose 50% Injectable 25 Gram(s) IV Push once  docusate sodium 100 milliGRAM(s) Oral three times a day  DULoxetine 60 milliGRAM(s) Oral daily  enoxaparin Injectable 40 milliGRAM(s) SubCutaneous every 24 hours  insulin glargine Injectable (LANTUS) 50 Unit(s) SubCutaneous at bedtime  insulin lispro (HumaLOG) corrective regimen sliding scale   SubCutaneous three times a day before meals  insulin lispro (HumaLOG) corrective regimen sliding scale   SubCutaneous at bedtime  insulin lispro Injectable (HumaLOG) 20 Unit(s) SubCutaneous three times a day before meals  isosorbide   mononitrate ER Tablet (IMDUR) 30 milliGRAM(s) Oral daily  lactated ringers. 1000 milliLiter(s) (50 mL/Hr) IV Continuous <Continuous>  levothyroxine 188 MICROGram(s) Oral daily  mesalamine DR (24-Hour) Tablet 2.4 Gram(s) Oral daily  metolazone 2.5 milliGRAM(s) Oral <User Schedule>  norethindrone acetate 5 milliGRAM(s) Oral daily  pantoprazole    Tablet 40 milliGRAM(s) Oral before breakfast  polyethylene glycol 3350 17 Gram(s) Oral daily  senna 2 Tablet(s) Oral at bedtime  sodium chloride 0.9%. 1000 milliLiter(s) (75 mL/Hr) IV Continuous <Continuous>  spironolactone 25 milliGRAM(s) Oral daily  tiotropium 18 MICROgram(s) Capsule 1 Capsule(s) Inhalation daily  torsemide 50 milliGRAM(s) Oral daily    MEDICATIONS  (PRN):  acetaminophen   Tablet .. 650 milliGRAM(s) Oral every 6 hours PRN Mild Pain (1 - 3), Moderate Pain (4 - 6)  bisacodyl Suppository 10 milliGRAM(s) Rectal once PRN Constipation  dextrose 40% Gel 15 Gram(s) Oral once PRN Blood Glucose LESS THAN 70 milliGRAM(s)/deciliter  dextrose 40% Gel 15 Gram(s) Oral once PRN Blood Glucose LESS THAN 70 milliGRAM(s)/deciliter  glucagon  Injectable 1 milliGRAM(s) IntraMuscular once PRN Glucose LESS THAN 70 milligrams/deciliter  glucagon  Injectable 1 milliGRAM(s) IntraMuscular once PRN Glucose LESS THAN 70 milligrams/deciliter  HYDROmorphone  Injectable 0.2 milliGRAM(s) IV Push every 10 minutes PRN Moderate Pain (4 - 6)  melatonin 3 milliGRAM(s) Oral at bedtime PRN Insomnia  ondansetron Injectable 4 milliGRAM(s) IV Push once PRN Nausea and/or Vomiting  ondansetron Injectable 4 milliGRAM(s) IV Push every 6 hours PRN Nausea and/or Vomiting  oxyCODONE    IR 2.5 milliGRAM(s) Oral every 4 hours PRN Moderate Pain (4 - 6)  oxyCODONE    IR 5 milliGRAM(s) Oral every 4 hours PRN Severe Pain (7 - 10)  traMADol 25 milliGRAM(s) Oral every 8 hours PRN Mild Pain (1 - 3)      ROS:  Positive:    General: Denies weight loss, fevers, rash, decreased hearing  Cardiac: Denies chest pain, SOB, QUESADA, orthopnea, PND, claudication, edema, snoring, daytime somnolence, palpitations, syncope  Resp: Denies SOB, QUESADA, cough, sputum, wheezing, hemoptysis  GI: Denies change in bowel habits, diarrhea, weight loss, melena, tarry stools,   nausea, vomiting, jaundice, abdominal pain, dysphagia  : Denies dysuria, nocturia, hematuria  Neuro: Denies tinnitus, headache, visual changes, weakness, dizziness or vertigo  Musculoskeletal: Denies neck pain back pain joint pain.  Skin: Denies rash, itching, dryness.  Endocrine: Denies polydipsia, polyuria  Psychiatric: Denies depression, anxiety      PHYSICAL EXAM:  Vital Signs Last 24 Hrs  T(C): 36.7 (2019 04:10), Max: 36.8 (2019 20:05)  T(F): 98 (2019 04:10), Max: 98.3 (2019 20:05)  HR: 91 (2019 05:41) (76 - 97)  BP: 127/77 (2019 05:41) (119/73 - 128/79)  BP(mean): --  RR: 17 (2019 04:10) (17 - 17)  SpO2: 98% (2019 05:41) (98% - 100%)  Daily     Daily Weight in k.4 (2019 04:10)  I&O's Summary    2019 07:01  -  2019 07:00  --------------------------------------------------------  IN: 720 mL / OUT: 2300 mL / NET: -1580 mL    2019 07:01  -  2019 06:39  --------------------------------------------------------  IN: 540 mL / OUT: 950 mL / NET: -410 mL        General Appearance: 	 Alert, cooperative, no distress  HEENT: normocephalic, atraumatic, PERRLA, EOMI, conjunctiva normal, sclera anicteric,   Neck: no JVD,  carotid 2+  bilaterally without bruits, thyroid normal to inspection and palpation, no adenopathy, trachea midline  Lungs:  clear to auscultation and percussion bilaterally  Cor:  pmi 5th ICS MCL, regular rate and rhythm, S1 normal intensity, S2 normal intensity, no gallops, murmurs or rubs  Abdomen:	 soft, non-tender; bowel sounds normal; no masses,  no organomegaly  Extremities: without cyanosis, clubbing or edema Right knee in brace  Vasc: 2-+ PT and DP pulses; no varicosities  Neurologic: A&O x 3 (time, place, person). Symmetric strength; limited exam  Musculoskeletal: no kyphosis, scoliosis; normal gait, normal tone  Skin: no rashes; limited exam    EKG:  Telemetry:    Labs:  CBC Full  -  ( 2019 09:26 )  WBC Count : 11.29 K/uL  RBC Count : 2.78 M/uL  Hemoglobin : 8.4 g/dL  Hematocrit : 27.1 %  Platelet Count - Automated : 193 K/uL  Mean Cell Volume : 97.5 fl  Mean Cell Hemoglobin : 30.2 pg  Mean Cell Hemoglobin Concentration : 31.0 gm/dL  Auto Neutrophil # : x  Auto Lymphocyte # : x  Auto Monocyte # : x  Auto Eosinophil # : x  Auto Basophil # : x  Auto Neutrophil % : x  Auto Lymphocyte % : x  Auto Monocyte % : x  Auto Eosinophil % : x  Auto Basophil % : x            Impression/Plan:Patient with MVR,PAF,diastolic CHF,HTN,pulmonary HTN,anemia and CRI.No acute CHF or ischemia.Cardiopulmonary stable.Increasing BUN/CR would hold diuretics X 24 -48 HRs.Continue Coreg,amiodarone,Insulin and synthroid.OOB/PT.Lovenox for DVT PPX.        Wayne Mcdermott MD, [facility]  [facility]

## 2019-04-14 NOTE — PROGRESS NOTE ADULT - ASSESSMENT
77 yo F w/ hx of moderate MS s/p bioprosthetic mitral valve, severe pulmonary HTN, DM2, anemia, diastolic CHF, hypothyroidism, paroxysmal atrial fibrillation, HTN78 y/o Type 2 DM, hypothyroid, recently admitted with CHF exacerbation, re-admitted with recurrent falls and suspected UTI.    Type 2 DM insulin resistant at home on Tresiba insulin 60 units daily, and humalog 30 units before meals. Other treatment includes Bydureon 2 mg weekly. HbA1c during recent admission was 7.4 %. During psast admission noted with significantly reduced insulin requirenents, discharged on Lantus insulin 24 units at HS and Humalog 5 units per meal.  Patient re-admitted after recurrent falls at home, and susp. UTI, started on similar dose insulin, noted with hyperglycemia.     Hypothyroidism- treated with synthroid 175 mcg daily. Last test as outpatient recently reported Good, Here with mild elevated TSH 5.27. repeat TSH this admission  9.70 uIU/mL, FT4 1.5.  C/O fatigue, weight stable till recently. Says she is compliant with medications. Synthroid increased to 188 mcg daily.    Patient on very high amounts of insulin at home. Decreased requirements last admission are most probably related to CHF, and as it resolves expect insulin requirements sanna increase.  Patient post right Quadriceps tendon repair.  On ERT for vaginal bleeding.  PO intake good.  FS better on increased basal lantus 50 units /bolus humalog insulin 20 units per meal, still above 150 and >200 mg/dL pre-lunch.      Suggest:  Will increase lantus basal insulin dose to 54 units again.  Pre-meal humalog 24+20+20 units per meal.  On increased Synthroid 188 mcg daily, repeat TFT's good, 2.50 keep current dose.

## 2019-04-14 NOTE — PROGRESS NOTE ADULT - SUBJECTIVE AND OBJECTIVE BOX
[facility]      Seen earlier today, and examined.  - Today, patient is without complaints.           *****MEDICATIONS: Current medication reviewed and documented.    MEDICATIONS  (STANDING):  acetaminophen   Tablet .. 975 milliGRAM(s) Oral every 8 hours  amiodarone    Tablet 200 milliGRAM(s) Oral daily  bisacodyl Suppository 10 milliGRAM(s) Rectal once  buDESOnide 160 MICROgram(s)/formoterol 4.5 MICROgram(s) Inhaler 2 Puff(s) Inhalation two times a day  carvedilol 6.25 milliGRAM(s) Oral every 12 hours  dextrose 5%. 1000 milliLiter(s) (50 mL/Hr) IV Continuous <Continuous>  dextrose 50% Injectable 12.5 Gram(s) IV Push once  dextrose 50% Injectable 25 Gram(s) IV Push once  dextrose 50% Injectable 25 Gram(s) IV Push once  dextrose 50% Injectable 12.5 Gram(s) IV Push once  dextrose 50% Injectable 25 Gram(s) IV Push once  dextrose 50% Injectable 25 Gram(s) IV Push once  docusate sodium 100 milliGRAM(s) Oral three times a day  DULoxetine 60 milliGRAM(s) Oral daily  enoxaparin Injectable 40 milliGRAM(s) SubCutaneous every 24 hours  insulin glargine Injectable (LANTUS) 50 Unit(s) SubCutaneous at bedtime  insulin lispro (HumaLOG) corrective regimen sliding scale   SubCutaneous three times a day before meals  insulin lispro (HumaLOG) corrective regimen sliding scale   SubCutaneous at bedtime  insulin lispro Injectable (HumaLOG) 20 Unit(s) SubCutaneous three times a day before meals  isosorbide   mononitrate ER Tablet (IMDUR) 30 milliGRAM(s) Oral daily  lactated ringers. 1000 milliLiter(s) (50 mL/Hr) IV Continuous <Continuous>  levothyroxine 188 MICROGram(s) Oral daily  mesalamine DR (24-Hour) Tablet 2.4 Gram(s) Oral daily  metolazone 2.5 milliGRAM(s) Oral <User Schedule>  norethindrone acetate 5 milliGRAM(s) Oral daily  pantoprazole    Tablet 40 milliGRAM(s) Oral before breakfast  polyethylene glycol 3350 17 Gram(s) Oral daily  senna 2 Tablet(s) Oral at bedtime  sodium chloride 0.9%. 1000 milliLiter(s) (75 mL/Hr) IV Continuous <Continuous>  spironolactone 25 milliGRAM(s) Oral daily  tiotropium 18 MICROgram(s) Capsule 1 Capsule(s) Inhalation daily  torsemide 50 milliGRAM(s) Oral daily    MEDICATIONS  (PRN):  acetaminophen   Tablet .. 650 milliGRAM(s) Oral every 6 hours PRN Mild Pain (1 - 3), Moderate Pain (4 - 6)  bisacodyl Suppository 10 milliGRAM(s) Rectal once PRN Constipation  dextrose 40% Gel 15 Gram(s) Oral once PRN Blood Glucose LESS THAN 70 milliGRAM(s)/deciliter  dextrose 40% Gel 15 Gram(s) Oral once PRN Blood Glucose LESS THAN 70 milliGRAM(s)/deciliter  glucagon  Injectable 1 milliGRAM(s) IntraMuscular once PRN Glucose LESS THAN 70 milligrams/deciliter  glucagon  Injectable 1 milliGRAM(s) IntraMuscular once PRN Glucose LESS THAN 70 milligrams/deciliter  HYDROmorphone  Injectable 0.2 milliGRAM(s) IV Push every 10 minutes PRN Moderate Pain (4 - 6)  melatonin 3 milliGRAM(s) Oral at bedtime PRN Insomnia  ondansetron Injectable 4 milliGRAM(s) IV Push once PRN Nausea and/or Vomiting  ondansetron Injectable 4 milliGRAM(s) IV Push every 6 hours PRN Nausea and/or Vomiting  oxyCODONE    IR 2.5 milliGRAM(s) Oral every 4 hours PRN Moderate Pain (4 - 6)  oxyCODONE    IR 5 milliGRAM(s) Oral every 4 hours PRN Severe Pain (7 - 10)  traMADol 25 milliGRAM(s) Oral every 8 hours PRN Mild Pain (1 - 3)          ***** VITAL SIGNS:  T(F): 98 (19 @ 04:10), Max: 98.3 (19 @ 20:05)  HR: 91 (19 @ 05:41) (76 - 97)  BP: 127/77 (19 @ 05:41) (119/73 - 128/79)  RR: 17 (19 @ 04:10) (17 - 17)  SpO2: 98% (19 @ 05:41) (98% - 100%)  Wt(kg): --  ,   I&O's Summary    2019 07:01  -  2019 07:00  --------------------------------------------------------  IN: 740 mL / OUT: 1550 mL / NET: -810 mL             *****PHYSICAL EXAM:   alert oriented x 3 attention comprehension are fair.  Able to name, repeat.   EOmi fundi not visualized   no nystagmus VFF to confrontation  Tongue is midline  Palate elevates symmetrically   Movingboth upper ext spont  moves toes in both le  RLE limited exam due to immobilizer     Gait not assessed.            *****LAB AND IMAGIN.4    11. )-----------( 193      ( 2019 09:26 )             27.1               04-14    139  |  97  |  66<H>  ----------------------------<  157<H>  3.7   |  29  |  1.91<H>    Ca    9.4      2019 06:39                           [All pertinent recent Imaging/Reports reviewed]           *****A S S E S S M E N T   A N D   P L A N :          79 yo woman w/ hx of moderate MS s/p bioprosthetic mitral valve, diastolic CHF, paroxsmal afib (not on A/C in setting of bleeding), HTN, severe pulmonary HTN, DMT2, CKD III, anemia, with post menopausal vaginal bleeding, UC, VICKY on 3L NC (not on CPAP/BIPAP) presents from home in setting of multiple falls the past 2 days resulting with right knee pain. Patient had a fall yesterday from 1 step while walking out of the house and tripped. Per patient states that her right leg had a buckling/collapsing sensation. Patient went to [place] ED on 3/29 and had an xray which did not reveal fracture and sent home with cyclobenzaprine and Lidoderm patch. Patient last use of medication on 3/29. Patient had another fall while attempting to ambulate to the bathroom. She described that her right leg buckled under her causing her to fall. It was difficult for her to get up on her own and required the assistance of her  and son-in-law to get up. Patient denies any preceding symptoms of dizziness/lightheadedness, shortness of breath, CP, palpitations preceding the events. Endorses chronic back pain with no new characteristics. Patient has been getting around home with use of wheelchair the past 4 months. Requires assistance of her  to get around because of chronic weakness. Denies development of new numbness of lower extremities. Does not utilize any other assistive devices. Has not had PT outpatient. Patient also has had chronic LE wounds that were dressed from last discharge on 3/27. Denies changing of dressing. Denies fevers, chills, sweats.    pt with hx of spinal stenosis recently has been using a wheelchair for 2 weeks, then got up and fell taken to [facility], discharged then fell again at home and was brought to [facility].       Problem/Recommendations 1: post traumatic R knee  pain of unclear etiology    initially refused mri of the knee,   mri confirms quad tendon tear   pt /ot   oob to chair today please.          Problem/Recommendations 2: spinal stenosis with symptoms of R leg buckling likely due to spinal stenosis +/- deconditioning   no sign of radiculopathy   pt unable to tolerate the mri due to claustrophobia.   evidence of multi level degenerative changes, with moderate spinal stenosis form l1- l5   according to sheng at bedside, pt has been wheelchair bound atleast 1 year, with progressive   Thank you for allowing me to participate in the care of this patient. Please do not hesitate to call me if you have any  questions.        ________________  Simran Mattson MD  [facility]  [phone]      30 minutes spent on total encounter; more than 50 % of the visit was  spent counseling about plan of care, compliance to diet/exercise and medication regimen and or  coordinating care by the attending physician.      It is advised that s stroke patients follow up with BAL De Jesus @ [phone] in 1- 2 weeks.   Others please follow up with Dr. Michael Nissenbaum [phone]

## 2019-04-14 NOTE — PROGRESS NOTE ADULT - SUBJECTIVE AND OBJECTIVE BOX
Patient is a 78y old  Female who presents with a chief complaint of fall, right knee pain (14 Apr 2019 11:52)      SUBJECTIVE / OVERNIGHT EVENTS: no new c/o, " I feel ok"    MEDICATIONS  (STANDING):  acetaminophen   Tablet .. 975 milliGRAM(s) Oral every 8 hours  amiodarone    Tablet 200 milliGRAM(s) Oral daily  bisacodyl Suppository 10 milliGRAM(s) Rectal once  buDESOnide 160 MICROgram(s)/formoterol 4.5 MICROgram(s) Inhaler 2 Puff(s) Inhalation two times a day  carvedilol 6.25 milliGRAM(s) Oral every 12 hours  dextrose 5%. 1000 milliLiter(s) (50 mL/Hr) IV Continuous <Continuous>  dextrose 50% Injectable 12.5 Gram(s) IV Push once  dextrose 50% Injectable 25 Gram(s) IV Push once  dextrose 50% Injectable 25 Gram(s) IV Push once  dextrose 50% Injectable 12.5 Gram(s) IV Push once  dextrose 50% Injectable 25 Gram(s) IV Push once  dextrose 50% Injectable 25 Gram(s) IV Push once  docusate sodium 100 milliGRAM(s) Oral three times a day  DULoxetine 60 milliGRAM(s) Oral daily  enoxaparin Injectable 40 milliGRAM(s) SubCutaneous every 24 hours  insulin glargine Injectable (LANTUS) 54 Unit(s) SubCutaneous at bedtime  insulin lispro (HumaLOG) corrective regimen sliding scale   SubCutaneous three times a day before meals  insulin lispro (HumaLOG) corrective regimen sliding scale   SubCutaneous at bedtime  insulin lispro Injectable (HumaLOG) 20 Unit(s) SubCutaneous before lunch  insulin lispro Injectable (HumaLOG) 20 Unit(s) SubCutaneous before dinner  insulin lispro Injectable (HumaLOG) 24 Unit(s) SubCutaneous before breakfast  isosorbide   mononitrate ER Tablet (IMDUR) 30 milliGRAM(s) Oral daily  levothyroxine 188 MICROGram(s) Oral daily  mesalamine DR (24-Hour) Tablet 2.4 Gram(s) Oral daily  norethindrone acetate 5 milliGRAM(s) Oral daily  pantoprazole    Tablet 40 milliGRAM(s) Oral before breakfast  polyethylene glycol 3350 17 Gram(s) Oral daily  senna 2 Tablet(s) Oral at bedtime  tiotropium 18 MICROgram(s) Capsule 1 Capsule(s) Inhalation daily    MEDICATIONS  (PRN):  acetaminophen   Tablet .. 650 milliGRAM(s) Oral every 6 hours PRN Mild Pain (1 - 3), Moderate Pain (4 - 6)  bisacodyl Suppository 10 milliGRAM(s) Rectal once PRN Constipation  dextrose 40% Gel 15 Gram(s) Oral once PRN Blood Glucose LESS THAN 70 milliGRAM(s)/deciliter  dextrose 40% Gel 15 Gram(s) Oral once PRN Blood Glucose LESS THAN 70 milliGRAM(s)/deciliter  glucagon  Injectable 1 milliGRAM(s) IntraMuscular once PRN Glucose LESS THAN 70 milligrams/deciliter  glucagon  Injectable 1 milliGRAM(s) IntraMuscular once PRN Glucose LESS THAN 70 milligrams/deciliter  HYDROmorphone  Injectable 0.2 milliGRAM(s) IV Push every 10 minutes PRN Moderate Pain (4 - 6)  melatonin 3 milliGRAM(s) Oral at bedtime PRN Insomnia  ondansetron Injectable 4 milliGRAM(s) IV Push once PRN Nausea and/or Vomiting  ondansetron Injectable 4 milliGRAM(s) IV Push every 6 hours PRN Nausea and/or Vomiting  oxyCODONE    IR 2.5 milliGRAM(s) Oral every 4 hours PRN Moderate Pain (4 - 6)  oxyCODONE    IR 5 milliGRAM(s) Oral every 4 hours PRN Severe Pain (7 - 10)  traMADol 25 milliGRAM(s) Oral every 8 hours PRN Mild Pain (1 - 3)      Vital Signs Last 24 Hrs  T(F): 98.4 (04-14-19 @ 14:36), Max: 98.4 (04-14-19 @ 14:36)  HR: 98 (04-14-19 @ 17:51) (88 - 98)  BP: 113/73 (04-14-19 @ 17:51) (113/73 - 128/79)  RR: 17 (04-14-19 @ 14:36) (17 - 17)  SpO2: 99% (04-14-19 @ 14:36) (98% - 100%)  Telemetry:   CAPILLARY BLOOD GLUCOSE      POCT Blood Glucose.: 231 mg/dL (14 Apr 2019 16:49)  POCT Blood Glucose.: 303 mg/dL (14 Apr 2019 12:48)  POCT Blood Glucose.: 237 mg/dL (14 Apr 2019 11:42)  POCT Blood Glucose.: 167 mg/dL (14 Apr 2019 07:51)  POCT Blood Glucose.: 159 mg/dL (13 Apr 2019 21:42)    I&O's Summary    13 Apr 2019 07:01  -  14 Apr 2019 07:00  --------------------------------------------------------  IN: 740 mL / OUT: 1550 mL / NET: -810 mL    14 Apr 2019 07:01  -  14 Apr 2019 19:57  --------------------------------------------------------  IN: 780 mL / OUT: 1600 mL / NET: -820 mL        PHYSICAL EXAM:  GENERAL: NAD, well-developed  HEAD:  Atraumatic, Normocephalic  EYES: EOMI, PERRLA, conjunctiva and sclera clear  NECK: Supple, No JVD  CHEST/LUNG: Clear to auscultation bilaterally; No wheeze  HEART: Regular rate and rhythm; No murmurs, rubs, or gallops  ABDOMEN: Soft, Nontender, Nondistended; Bowel sounds present  EXTREMITIES:  2+ Peripheral Pulses, No clubbing, cyanosis, or edema  PSYCH: AAOx3  NEUROLOGY: non-focal  SKIN: No rashes or lesions    LABS:                        8.4    10.88 )-----------( 191      ( 14 Apr 2019 10:19 )             27.4     04-14    139  |  97  |  66<H>  ----------------------------<  157<H>  3.7   |  29  |  1.91<H>    Ca    9.4      14 Apr 2019 06:39                RADIOLOGY & ADDITIONAL TESTS:    Imaging Personally Reviewed:    Consultant(s) Notes Reviewed:      Care Discussed with Consultants/Other Providers:

## 2019-04-14 NOTE — PROGRESS NOTE ADULT - SUBJECTIVE AND OBJECTIVE BOX
No acute events overnight. Pain well controlled with pain medications.    Vital Signs Last 24 Hrs  T(C): 36.7 (14 Apr 2019 04:10), Max: 36.8 (13 Apr 2019 20:05)  T(F): 98 (14 Apr 2019 04:10), Max: 98.3 (13 Apr 2019 20:05)  HR: 91 (14 Apr 2019 05:41) (76 - 97)  BP: 127/77 (14 Apr 2019 05:41) (119/73 - 128/79)  BP(mean): --  RR: 17 (14 Apr 2019 04:10) (17 - 17)  SpO2: 98% (14 Apr 2019 05:41) (98% - 100%)    Exam:  Gen: NAD  Motor: EHL/FHL/TA/Gastrocnemius intact  Sensory: SILT DP/SP/S/S/T nerve distributions  Dressing: Clean, Dry, Intact    A/P: 78 year old female s/p R Quad Tendon Repair  - Pain Control  - WBAT RLE in [place] locked in extension  - PT/OT, OOB  - Care per medicine

## 2019-04-14 NOTE — PROGRESS NOTE ADULT - SUBJECTIVE AND OBJECTIVE BOX
Chief Complaint: 79 y/o Type 2 DM, hypothyroid, recently admitted with CHF exacerbation, re-admitted with recurrent falls and susp. cellulitis after a recent fall.    Patient post right Quadriceps tendon repair.  On ERT for vaginal bleeding.  PO intake good.  FS better on increased basal/bolus insulin, still above 150 and >200 mg/dL pre-lunch.      MEDICATIONS  (STANDING):  acetaminophen   Tablet .. 975 milliGRAM(s) Oral every 8 hours  amiodarone    Tablet 200 milliGRAM(s) Oral daily  bisacodyl Suppository 10 milliGRAM(s) Rectal once  buDESOnide 160 MICROgram(s)/formoterol 4.5 MICROgram(s) Inhaler 2 Puff(s) Inhalation two times a day  carvedilol 6.25 milliGRAM(s) Oral every 12 hours  dextrose 5%. 1000 milliLiter(s) (50 mL/Hr) IV Continuous <Continuous>  dextrose 50% Injectable 12.5 Gram(s) IV Push once  dextrose 50% Injectable 25 Gram(s) IV Push once  dextrose 50% Injectable 25 Gram(s) IV Push once  dextrose 50% Injectable 12.5 Gram(s) IV Push once  dextrose 50% Injectable 25 Gram(s) IV Push once  dextrose 50% Injectable 25 Gram(s) IV Push once  docusate sodium 100 milliGRAM(s) Oral three times a day  DULoxetine 60 milliGRAM(s) Oral daily  enoxaparin Injectable 40 milliGRAM(s) SubCutaneous every 24 hours  insulin glargine Injectable (LANTUS) 50 Unit(s) SubCutaneous at bedtime  insulin lispro (HumaLOG) corrective regimen sliding scale   SubCutaneous three times a day before meals  insulin lispro (HumaLOG) corrective regimen sliding scale   SubCutaneous at bedtime  insulin lispro Injectable (HumaLOG) 20 Unit(s) SubCutaneous three times a day before meals  isosorbide   mononitrate ER Tablet (IMDUR) 30 milliGRAM(s) Oral daily  lactated ringers. 1000 milliLiter(s) (50 mL/Hr) IV Continuous <Continuous>  levothyroxine 188 MICROGram(s) Oral daily  mesalamine DR (24-Hour) Tablet 2.4 Gram(s) Oral daily  metolazone 2.5 milliGRAM(s) Oral <User Schedule>  norethindrone acetate 5 milliGRAM(s) Oral daily  pantoprazole    Tablet 40 milliGRAM(s) Oral before breakfast  polyethylene glycol 3350 17 Gram(s) Oral daily  senna 2 Tablet(s) Oral at bedtime  sodium chloride 0.9%. 1000 milliLiter(s) (75 mL/Hr) IV Continuous <Continuous>  spironolactone 25 milliGRAM(s) Oral daily  tiotropium 18 MICROgram(s) Capsule 1 Capsule(s) Inhalation daily  torsemide 50 milliGRAM(s) Oral daily    MEDICATIONS  (PRN):  acetaminophen   Tablet .. 650 milliGRAM(s) Oral every 6 hours PRN Mild Pain (1 - 3), Moderate Pain (4 - 6)  bisacodyl Suppository 10 milliGRAM(s) Rectal once PRN Constipation  dextrose 40% Gel 15 Gram(s) Oral once PRN Blood Glucose LESS THAN 70 milliGRAM(s)/deciliter  dextrose 40% Gel 15 Gram(s) Oral once PRN Blood Glucose LESS THAN 70 milliGRAM(s)/deciliter  glucagon  Injectable 1 milliGRAM(s) IntraMuscular once PRN Glucose LESS THAN 70 milligrams/deciliter  glucagon  Injectable 1 milliGRAM(s) IntraMuscular once PRN Glucose LESS THAN 70 milligrams/deciliter  HYDROmorphone  Injectable 0.2 milliGRAM(s) IV Push every 10 minutes PRN Moderate Pain (4 - 6)  melatonin 3 milliGRAM(s) Oral at bedtime PRN Insomnia  ondansetron Injectable 4 milliGRAM(s) IV Push once PRN Nausea and/or Vomiting  ondansetron Injectable 4 milliGRAM(s) IV Push every 6 hours PRN Nausea and/or Vomiting  oxyCODONE    IR 2.5 milliGRAM(s) Oral every 4 hours PRN Moderate Pain (4 - 6)  oxyCODONE    IR 5 milliGRAM(s) Oral every 4 hours PRN Severe Pain (7 - 10)  traMADol 25 milliGRAM(s) Oral every 8 hours PRN Mild Pain (1 - 3)      Allergies    Augmentin (Swelling)  cephalexin (Swelling)  latex (Rash)    Intolerances        PHYSICAL EXAM:  VITALS: T(C): 36.7 (04-14-19 @ 04:10)  T(F): 98 (04-14-19 @ 04:10), Max: 98.3 (04-13-19 @ 20:05)  HR: 91 (04-14-19 @ 05:41) (76 - 97)  BP: 127/77 (04-14-19 @ 05:41) (119/73 - 128/79)  RR:  (17 - 17)  SpO2:  (98% - 100%)  GENERAL: NAD, sitting in recliner.  EYES: No proptosis,  HEENT:  Atraumatic, Normocephalic,   RESPIRATORY: Clear to auscultation bilaterally  CARDIOVASCULAR: Regular rhythm; No murmurs; bilat peripheral edema Rt. >left.  GI: Soft, nontender, non distended, normal bowel sounds  SKIN: Dry, intact, edema right knee dressed, blistering both feet.  MUSCULOSKELETAL: decreased strength  NEURO: No focal deficits.  PSYCH: Alert and oriented x 3, normal affect/mood.    POCT Blood Glucose.: 237 mg/dL (04-14-19 @ 11:42)  POCT Blood Glucose.: 167 mg/dL (04-14-19 @ 07:51)  POCT Blood Glucose.: 159 mg/dL (04-13-19 @ 21:42)  POCT Blood Glucose.: 196 mg/dL (04-13-19 @ 17:27)  POCT Blood Glucose.: 263 mg/dL (04-13-19 @ 13:14)  POCT Blood Glucose.: 281 mg/dL (04-13-19 @ 11:40)  POCT Blood Glucose.: 255 mg/dL (04-13-19 @ 08:03)  POCT Blood Glucose.: 284 mg/dL (04-12-19 @ 21:40)  POCT Blood Glucose.: 264 mg/dL (04-12-19 @ 17:01)  POCT Blood Glucose.: 232 mg/dL (04-12-19 @ 11:32)  POCT Blood Glucose.: 311 mg/dL (04-12-19 @ 08:09)  POCT Blood Glucose.: 308 mg/dL (04-11-19 @ 21:37)  POCT Blood Glucose.: 262 mg/dL (04-11-19 @ 17:23)  POCT Blood Glucose.: 217 mg/dL (04-11-19 @ 16:04)                            8.4    10.88 )-----------( 191      ( 14 Apr 2019 10:19 )             27.4       04-14    139  |  97  |  66<H>  ----------------------------<  157<H>  3.7   |  29  |  1.91<H>    EGFR if : 29<L>  EGFR if non : 25<L>    Ca    9.4      04-14        Thyroid Function Tests:  04-12 @ 09:32 TSH 2.50 FreeT4 2.2 T3 -- Anti TPO -- Anti Thyroglobulin Ab -- TSI --  04-02 @ 09:19 TSH 9.70 FreeT4 1.5 T3 -- Anti TPO -- Anti Thyroglobulin Ab -- TSI --      Hemoglobin A1C, Whole Blood: 7.4 % <H> [4.0 - 5.6] (03-25-19 @ 08:55)

## 2019-04-15 ENCOUNTER — TRANSCRIPTION ENCOUNTER (OUTPATIENT)
Age: 79
End: 2019-04-15

## 2019-04-15 VITALS
TEMPERATURE: 98 F | RESPIRATION RATE: 18 BRPM | DIASTOLIC BLOOD PRESSURE: 79 MMHG | HEART RATE: 95 BPM | SYSTOLIC BLOOD PRESSURE: 137 MMHG | OXYGEN SATURATION: 98 %

## 2019-04-15 LAB
ANION GAP SERPL CALC-SCNC: 15 MMOL/L — SIGNIFICANT CHANGE UP (ref 5–17)
BUN SERPL-MCNC: 61 MG/DL — HIGH (ref 7–23)
CALCIUM SERPL-MCNC: 9.7 MG/DL — SIGNIFICANT CHANGE UP (ref 8.4–10.5)
CHLORIDE SERPL-SCNC: 94 MMOL/L — LOW (ref 96–108)
CO2 SERPL-SCNC: 30 MMOL/L — SIGNIFICANT CHANGE UP (ref 22–31)
CREAT SERPL-MCNC: 1.73 MG/DL — HIGH (ref 0.5–1.3)
GLUCOSE BLDC GLUCOMTR-MCNC: 153 MG/DL — HIGH (ref 70–99)
GLUCOSE BLDC GLUCOMTR-MCNC: 253 MG/DL — HIGH (ref 70–99)
GLUCOSE SERPL-MCNC: 124 MG/DL — HIGH (ref 70–99)
HCT VFR BLD CALC: 30.1 % — LOW (ref 34.5–45)
HGB BLD-MCNC: 9.3 G/DL — LOW (ref 11.5–15.5)
MCHC RBC-ENTMCNC: 30.7 PG — SIGNIFICANT CHANGE UP (ref 27–34)
MCHC RBC-ENTMCNC: 30.9 GM/DL — LOW (ref 32–36)
MCV RBC AUTO: 99.3 FL — SIGNIFICANT CHANGE UP (ref 80–100)
PLATELET # BLD AUTO: 238 K/UL — SIGNIFICANT CHANGE UP (ref 150–400)
POTASSIUM SERPL-MCNC: 3.5 MMOL/L — SIGNIFICANT CHANGE UP (ref 3.5–5.3)
POTASSIUM SERPL-SCNC: 3.5 MMOL/L — SIGNIFICANT CHANGE UP (ref 3.5–5.3)
RBC # BLD: 3.03 M/UL — LOW (ref 3.8–5.2)
RBC # FLD: 16.9 % — HIGH (ref 10.3–14.5)
SODIUM SERPL-SCNC: 139 MMOL/L — SIGNIFICANT CHANGE UP (ref 135–145)
WBC # BLD: 10.01 K/UL — SIGNIFICANT CHANGE UP (ref 3.8–10.5)
WBC # FLD AUTO: 10.01 K/UL — SIGNIFICANT CHANGE UP (ref 3.8–10.5)

## 2019-04-15 PROCEDURE — 99285 EMERGENCY DEPT VISIT HI MDM: CPT | Mod: 25

## 2019-04-15 PROCEDURE — 82962 GLUCOSE BLOOD TEST: CPT

## 2019-04-15 PROCEDURE — 71045 X-RAY EXAM CHEST 1 VIEW: CPT

## 2019-04-15 PROCEDURE — 82947 ASSAY GLUCOSE BLOOD QUANT: CPT

## 2019-04-15 PROCEDURE — 51701 INSERT BLADDER CATHETER: CPT

## 2019-04-15 PROCEDURE — 99232 SBSQ HOSP IP/OBS MODERATE 35: CPT

## 2019-04-15 PROCEDURE — 72148 MRI LUMBAR SPINE W/O DYE: CPT

## 2019-04-15 PROCEDURE — 86850 RBC ANTIBODY SCREEN: CPT

## 2019-04-15 PROCEDURE — 94640 AIRWAY INHALATION TREATMENT: CPT

## 2019-04-15 PROCEDURE — 85730 THROMBOPLASTIN TIME PARTIAL: CPT

## 2019-04-15 PROCEDURE — 97164 PT RE-EVAL EST PLAN CARE: CPT

## 2019-04-15 PROCEDURE — 86901 BLOOD TYPING SEROLOGIC RH(D): CPT

## 2019-04-15 PROCEDURE — 73700 CT LOWER EXTREMITY W/O DYE: CPT

## 2019-04-15 PROCEDURE — 84439 ASSAY OF FREE THYROXINE: CPT

## 2019-04-15 PROCEDURE — 85014 HEMATOCRIT: CPT

## 2019-04-15 PROCEDURE — 87186 SC STD MICRODIL/AGAR DIL: CPT

## 2019-04-15 PROCEDURE — 85027 COMPLETE CBC AUTOMATED: CPT

## 2019-04-15 PROCEDURE — 83735 ASSAY OF MAGNESIUM: CPT

## 2019-04-15 PROCEDURE — 97110 THERAPEUTIC EXERCISES: CPT

## 2019-04-15 PROCEDURE — 73721 MRI JNT OF LWR EXTRE W/O DYE: CPT

## 2019-04-15 PROCEDURE — 84295 ASSAY OF SERUM SODIUM: CPT

## 2019-04-15 PROCEDURE — 86900 BLOOD TYPING SEROLOGIC ABO: CPT

## 2019-04-15 PROCEDURE — 82550 ASSAY OF CK (CPK): CPT

## 2019-04-15 PROCEDURE — 97162 PT EVAL MOD COMPLEX 30 MIN: CPT

## 2019-04-15 PROCEDURE — 73502 X-RAY EXAM HIP UNI 2-3 VIEWS: CPT

## 2019-04-15 PROCEDURE — 83605 ASSAY OF LACTIC ACID: CPT

## 2019-04-15 PROCEDURE — 87086 URINE CULTURE/COLONY COUNT: CPT

## 2019-04-15 PROCEDURE — 82803 BLOOD GASES ANY COMBINATION: CPT

## 2019-04-15 PROCEDURE — 88305 TISSUE EXAM BY PATHOLOGIST: CPT

## 2019-04-15 PROCEDURE — 81001 URINALYSIS AUTO W/SCOPE: CPT

## 2019-04-15 PROCEDURE — P9016: CPT

## 2019-04-15 PROCEDURE — 97602 WOUND(S) CARE NON-SELECTIVE: CPT

## 2019-04-15 PROCEDURE — 72170 X-RAY EXAM OF PELVIS: CPT

## 2019-04-15 PROCEDURE — 73590 X-RAY EXAM OF LOWER LEG: CPT

## 2019-04-15 PROCEDURE — 76377 3D RENDER W/INTRP POSTPROCES: CPT

## 2019-04-15 PROCEDURE — 76830 TRANSVAGINAL US NON-OB: CPT

## 2019-04-15 PROCEDURE — 82330 ASSAY OF CALCIUM: CPT

## 2019-04-15 PROCEDURE — 93005 ELECTROCARDIOGRAM TRACING: CPT | Mod: XU

## 2019-04-15 PROCEDURE — 82435 ASSAY OF BLOOD CHLORIDE: CPT

## 2019-04-15 PROCEDURE — 73564 X-RAY EXAM KNEE 4 OR MORE: CPT

## 2019-04-15 PROCEDURE — 80053 COMPREHEN METABOLIC PANEL: CPT

## 2019-04-15 PROCEDURE — 85610 PROTHROMBIN TIME: CPT

## 2019-04-15 PROCEDURE — 84100 ASSAY OF PHOSPHORUS: CPT

## 2019-04-15 PROCEDURE — 80202 ASSAY OF VANCOMYCIN: CPT

## 2019-04-15 PROCEDURE — 86923 COMPATIBILITY TEST ELECTRIC: CPT

## 2019-04-15 PROCEDURE — 84132 ASSAY OF SERUM POTASSIUM: CPT

## 2019-04-15 PROCEDURE — 84443 ASSAY THYROID STIM HORMONE: CPT

## 2019-04-15 PROCEDURE — 87040 BLOOD CULTURE FOR BACTERIA: CPT

## 2019-04-15 PROCEDURE — 76856 US EXAM PELVIC COMPLETE: CPT

## 2019-04-15 PROCEDURE — 88112 CYTOPATH CELL ENHANCE TECH: CPT

## 2019-04-15 PROCEDURE — 97116 GAIT TRAINING THERAPY: CPT

## 2019-04-15 PROCEDURE — 80048 BASIC METABOLIC PNL TOTAL CA: CPT

## 2019-04-15 RX ORDER — OXYCODONE HYDROCHLORIDE 5 MG/1
2.5 TABLET ORAL
Qty: 0 | Refills: 0 | COMMUNITY
Start: 2019-04-15

## 2019-04-15 RX ORDER — INSULIN LISPRO 100/ML
0 VIAL (ML) SUBCUTANEOUS
Qty: 0 | Refills: 0 | COMMUNITY
Start: 2019-04-15

## 2019-04-15 RX ORDER — CYCLOBENZAPRINE HYDROCHLORIDE 10 MG/1
1 TABLET, FILM COATED ORAL
Qty: 0 | Refills: 0 | COMMUNITY

## 2019-04-15 RX ORDER — DOCUSATE SODIUM 100 MG
2 CAPSULE ORAL
Qty: 0 | Refills: 0 | DISCHARGE
Start: 2019-04-15

## 2019-04-15 RX ORDER — ENOXAPARIN SODIUM 100 MG/ML
40 INJECTION SUBCUTANEOUS
Qty: 0 | Refills: 0 | COMMUNITY
Start: 2019-04-15

## 2019-04-15 RX ORDER — INSULIN LISPRO 100/ML
20 VIAL (ML) SUBCUTANEOUS
Qty: 0 | Refills: 0 | COMMUNITY
Start: 2019-04-15

## 2019-04-15 RX ORDER — OXYCODONE HYDROCHLORIDE 5 MG/1
1 TABLET ORAL
Qty: 0 | Refills: 0 | COMMUNITY
Start: 2019-04-15

## 2019-04-15 RX ORDER — INSULIN ASPART 100 [IU]/ML
18 INJECTION, SOLUTION SUBCUTANEOUS
Qty: 0 | Refills: 0 | COMMUNITY

## 2019-04-15 RX ORDER — LANOLIN ALCOHOL/MO/W.PET/CERES
0 CREAM (GRAM) TOPICAL
Qty: 0 | Refills: 0 | COMMUNITY

## 2019-04-15 RX ORDER — LEVOTHYROXINE SODIUM 125 MCG
188 TABLET ORAL
Qty: 0 | Refills: 0 | COMMUNITY
Start: 2019-04-15

## 2019-04-15 RX ORDER — OLOPATADINE HYDROCHLORIDE 665 UG/1
0 SPRAY, METERED NASAL
Qty: 0 | Refills: 0 | COMMUNITY

## 2019-04-15 RX ORDER — INSULIN DEGLUDEC 100 U/ML
0 INJECTION, SOLUTION SUBCUTANEOUS
Qty: 0 | Refills: 0 | COMMUNITY

## 2019-04-15 RX ORDER — INSULIN GLARGINE 100 [IU]/ML
54 INJECTION, SOLUTION SUBCUTANEOUS
Qty: 0 | Refills: 0 | COMMUNITY
Start: 2019-04-15

## 2019-04-15 RX ORDER — DOCUSATE SODIUM 100 MG
4 CAPSULE ORAL
Qty: 0 | Refills: 0 | COMMUNITY

## 2019-04-15 RX ORDER — POLYETHYLENE GLYCOL 3350 17 G/17G
17 POWDER, FOR SOLUTION ORAL
Qty: 0 | Refills: 0 | COMMUNITY
Start: 2019-04-15

## 2019-04-15 RX ORDER — TRAMADOL HYDROCHLORIDE 50 MG/1
0.5 TABLET ORAL
Qty: 0 | Refills: 0 | COMMUNITY
Start: 2019-04-15

## 2019-04-15 RX ORDER — DOCUSATE SODIUM 100 MG
1 CAPSULE ORAL
Qty: 0 | Refills: 0 | COMMUNITY
Start: 2019-04-15

## 2019-04-15 RX ORDER — SENNA PLUS 8.6 MG/1
2 TABLET ORAL
Qty: 0 | Refills: 0 | COMMUNITY
Start: 2019-04-15

## 2019-04-15 RX ORDER — INSULIN GLARGINE 100 [IU]/ML
50 INJECTION, SOLUTION SUBCUTANEOUS
Qty: 0 | Refills: 0 | DISCHARGE
Start: 2019-04-15

## 2019-04-15 RX ORDER — EXENATIDE 250 UG/ML
0 INJECTION SUBCUTANEOUS
Qty: 0 | Refills: 0 | COMMUNITY

## 2019-04-15 RX ORDER — INSULIN LISPRO 100/ML
24 VIAL (ML) SUBCUTANEOUS
Qty: 0 | Refills: 0 | COMMUNITY
Start: 2019-04-15

## 2019-04-15 RX ORDER — UBIDECARENONE 100 MG
0 CAPSULE ORAL
Qty: 0 | Refills: 0 | COMMUNITY

## 2019-04-15 RX ORDER — LEVOTHYROXINE SODIUM 125 MCG
1 TABLET ORAL
Qty: 0 | Refills: 0 | COMMUNITY

## 2019-04-15 RX ORDER — INSULIN GLARGINE 100 [IU]/ML
54 INJECTION, SOLUTION SUBCUTANEOUS
Qty: 0 | Refills: 0 | DISCHARGE
Start: 2019-04-15

## 2019-04-15 RX ORDER — TIOTROPIUM BROMIDE 18 UG/1
1 CAPSULE ORAL; RESPIRATORY (INHALATION)
Qty: 0 | Refills: 0 | COMMUNITY
Start: 2019-04-15

## 2019-04-15 RX ORDER — INSULIN LISPRO 100/ML
20 VIAL (ML) SUBCUTANEOUS
Qty: 0 | Refills: 0 | DISCHARGE
Start: 2019-04-15

## 2019-04-15 RX ORDER — LEVOTHYROXINE SODIUM 125 MCG
1 TABLET ORAL
Qty: 0 | Refills: 0 | COMMUNITY
Start: 2019-04-15

## 2019-04-15 RX ADMIN — ISOSORBIDE MONONITRATE 30 MILLIGRAM(S): 60 TABLET, EXTENDED RELEASE ORAL at 12:30

## 2019-04-15 RX ADMIN — Medication 100 MILLIGRAM(S): at 13:09

## 2019-04-15 RX ADMIN — ENOXAPARIN SODIUM 40 MILLIGRAM(S): 100 INJECTION SUBCUTANEOUS at 15:25

## 2019-04-15 RX ADMIN — CARVEDILOL PHOSPHATE 6.25 MILLIGRAM(S): 80 CAPSULE, EXTENDED RELEASE ORAL at 05:49

## 2019-04-15 RX ADMIN — Medication 2: at 08:17

## 2019-04-15 RX ADMIN — PANTOPRAZOLE SODIUM 40 MILLIGRAM(S): 20 TABLET, DELAYED RELEASE ORAL at 05:49

## 2019-04-15 RX ADMIN — Medication 6: at 12:31

## 2019-04-15 RX ADMIN — NORETHINDRONE 5 MILLIGRAM(S): 0.35 TABLET ORAL at 11:34

## 2019-04-15 RX ADMIN — AMIODARONE HYDROCHLORIDE 200 MILLIGRAM(S): 400 TABLET ORAL at 05:49

## 2019-04-15 RX ADMIN — Medication 100 MILLIGRAM(S): at 05:49

## 2019-04-15 RX ADMIN — DULOXETINE HYDROCHLORIDE 60 MILLIGRAM(S): 30 CAPSULE, DELAYED RELEASE ORAL at 12:29

## 2019-04-15 RX ADMIN — TIOTROPIUM BROMIDE 1 CAPSULE(S): 18 CAPSULE ORAL; RESPIRATORY (INHALATION) at 11:34

## 2019-04-15 RX ADMIN — Medication 24 UNIT(S): at 08:17

## 2019-04-15 RX ADMIN — Medication 975 MILLIGRAM(S): at 13:40

## 2019-04-15 RX ADMIN — Medication 975 MILLIGRAM(S): at 05:50

## 2019-04-15 RX ADMIN — Medication 20 UNIT(S): at 12:31

## 2019-04-15 RX ADMIN — Medication 2.4 GRAM(S): at 11:34

## 2019-04-15 RX ADMIN — Medication 975 MILLIGRAM(S): at 13:08

## 2019-04-15 RX ADMIN — POLYETHYLENE GLYCOL 3350 17 GRAM(S): 17 POWDER, FOR SOLUTION ORAL at 11:33

## 2019-04-15 RX ADMIN — BUDESONIDE AND FORMOTEROL FUMARATE DIHYDRATE 2 PUFF(S): 160; 4.5 AEROSOL RESPIRATORY (INHALATION) at 05:50

## 2019-04-15 RX ADMIN — Medication 188 MICROGRAM(S): at 05:49

## 2019-04-15 NOTE — PROGRESS NOTE ADULT - PROBLEM SELECTOR PROBLEM 1
R/O Cellulitis
Uncontrolled type 2 diabetes mellitus with hyperglycemia
Gross hematuria
Gross hematuria
Preop examination
R/O Cellulitis
R/O Urinary tract infection without hematuria, site unspecified
R/O Urinary tract infection without hematuria, site unspecified
Uncontrolled type 2 diabetes mellitus with hyperglycemia
Vaginal bleeding
Vaginal bleeding
Uncontrolled type 2 diabetes mellitus with hyperglycemia
R/O Cellulitis
R/O Cellulitis

## 2019-04-15 NOTE — PROGRESS NOTE ADULT - SUBJECTIVE AND OBJECTIVE BOX
KEESHATANIKA MOJICA    Patient is a 78y old  Female who presents with a chief complaint of fall, right knee pain (15 Apr 2019 12:02)    Clinically stable.  No cardiac symptoms.  Still has not ambulated much.    Allergies    Augmentin (Swelling)  cephalexin (Swelling)  latex (Rash)    MEDICATIONS  (STANDING):  acetaminophen   Tablet .. 975 milliGRAM(s) Oral every 8 hours  amiodarone    Tablet 200 milliGRAM(s) Oral daily  bisacodyl Suppository 10 milliGRAM(s) Rectal once  buDESOnide 160 MICROgram(s)/formoterol 4.5 MICROgram(s) Inhaler 2 Puff(s) Inhalation two times a day  carvedilol 6.25 milliGRAM(s) Oral every 12 hours  dextrose 5%. 1000 milliLiter(s) (50 mL/Hr) IV Continuous <Continuous>  dextrose 50% Injectable 12.5 Gram(s) IV Push once  dextrose 50% Injectable 25 Gram(s) IV Push once  dextrose 50% Injectable 25 Gram(s) IV Push once  dextrose 50% Injectable 12.5 Gram(s) IV Push once  dextrose 50% Injectable 25 Gram(s) IV Push once  dextrose 50% Injectable 25 Gram(s) IV Push once  docusate sodium 100 milliGRAM(s) Oral three times a day  DULoxetine 60 milliGRAM(s) Oral daily  enoxaparin Injectable 40 milliGRAM(s) SubCutaneous every 24 hours  insulin glargine Injectable (LANTUS) 54 Unit(s) SubCutaneous at bedtime  insulin lispro (HumaLOG) corrective regimen sliding scale   SubCutaneous three times a day before meals  insulin lispro (HumaLOG) corrective regimen sliding scale   SubCutaneous at bedtime  insulin lispro Injectable (HumaLOG) 20 Unit(s) SubCutaneous before lunch  insulin lispro Injectable (HumaLOG) 20 Unit(s) SubCutaneous before dinner  insulin lispro Injectable (HumaLOG) 24 Unit(s) SubCutaneous before breakfast  isosorbide   mononitrate ER Tablet (IMDUR) 30 milliGRAM(s) Oral daily  levothyroxine 188 MICROGram(s) Oral daily  mesalamine DR (24-Hour) Tablet 2.4 Gram(s) Oral daily  norethindrone acetate 5 milliGRAM(s) Oral daily  pantoprazole    Tablet 40 milliGRAM(s) Oral before breakfast  polyethylene glycol 3350 17 Gram(s) Oral daily  senna 2 Tablet(s) Oral at bedtime  tiotropium 18 MICROgram(s) Capsule 1 Capsule(s) Inhalation daily    MEDICATIONS  (PRN):  acetaminophen   Tablet .. 650 milliGRAM(s) Oral every 6 hours PRN Mild Pain (1 - 3), Moderate Pain (4 - 6)  bisacodyl Suppository 10 milliGRAM(s) Rectal once PRN Constipation  dextrose 40% Gel 15 Gram(s) Oral once PRN Blood Glucose LESS THAN 70 milliGRAM(s)/deciliter  dextrose 40% Gel 15 Gram(s) Oral once PRN Blood Glucose LESS THAN 70 milliGRAM(s)/deciliter  glucagon  Injectable 1 milliGRAM(s) IntraMuscular once PRN Glucose LESS THAN 70 milligrams/deciliter  glucagon  Injectable 1 milliGRAM(s) IntraMuscular once PRN Glucose LESS THAN 70 milligrams/deciliter  HYDROmorphone  Injectable 0.2 milliGRAM(s) IV Push every 10 minutes PRN Moderate Pain (4 - 6)  melatonin 3 milliGRAM(s) Oral at bedtime PRN Insomnia  ondansetron Injectable 4 milliGRAM(s) IV Push once PRN Nausea and/or Vomiting  ondansetron Injectable 4 milliGRAM(s) IV Push every 6 hours PRN Nausea and/or Vomiting  oxyCODONE    IR 2.5 milliGRAM(s) Oral every 4 hours PRN Moderate Pain (4 - 6)  oxyCODONE    IR 5 milliGRAM(s) Oral every 4 hours PRN Severe Pain (7 - 10)  traMADol 25 milliGRAM(s) Oral every 8 hours PRN Mild Pain (1 - 3)    PHYSICAL EXAM:  Vital Signs Last 24 Hrs  T(C): 36.7 (15 Apr 2019 12:31), Max: 36.9 (2019 14:36)  T(F): 98 (15 Apr 2019 12:31), Max: 98.4 (2019 14:36)  HR: 95 (15 Apr 2019 12:31) (88 - 98)  BP: 137/79 (15 Apr 2019 12:31) (113/73 - 137/79)  BP(mean): --  RR: 18 (15 Apr 2019 12:31) (17 - 18)  SpO2: 98% (15 Apr 2019 12:31) (98% - 99%)  Daily     Daily Weight in k.4 (15 Apr 2019 04:35)  I&O's Summary    2019 07:01  -  15 Apr 2019 07:00  --------------------------------------------------------  IN: 780 mL / OUT: 2400 mL / NET: -1620 mL        General Appearance: 	 Alert, cooperative, no distress  Neck: no JVD  Lungs:  clear to auscultation and percussion bilaterally  Cor:  pmi 5th ICS MCL, regular rate and rhythm, S1 normal intensity, S2 normal intensity, ARTIE  Abdomen:	 soft, non-tender; bowel sounds normal  Extremities: RLE surgical  Trace edema on LLE      EKG:  Telemetry:    Labs:  CBC Full  -  ( 15 Apr 2019 09:12 )  WBC Count : 10.01 K/uL  RBC Count : 3.03 M/uL  Hemoglobin : 9.3 g/dL  Hematocrit : 30.1 %  Platelet Count - Automated : 238 K/uL  Mean Cell Volume : 99.3 fl  Mean Cell Hemoglobin : 30.7 pg  Mean Cell Hemoglobin Concentration : 30.9 gm/dL  Auto Neutrophil # : x  Auto Lymphocyte # : x  Auto Monocyte # : x  Auto Eosinophil # : x  Auto Basophil # : x  Auto Neutrophil % : x  Auto Lymphocyte % : x  Auto Monocyte % : x  Auto Eosinophil % : x  Auto Basophil % : x    Basic Metabolic Panel in AM (04.15.19 @ 06:47)    Sodium, Serum: 139 mmol/L    Potassium, Serum: 3.5 mmol/L    Chloride, Serum: 94 mmol/L    Carbon Dioxide, Serum: 30 mmol/L    Anion Gap, Serum: 15 mmol/L    Blood Urea Nitrogen, Serum: 61 mg/dL    Creatinine, Serum: 1.73 mg/dL    Glucose, Serum: 124 mg/dL    Calcium, Total Serum: 9.7 mg/dL    eGFR if Non : 28: Interpretative comment  The units for eGFR are mL/min/1.73M2 (normalized body surface area). The  eGFR is calculated from a serum creatinine using the CKD-EPI equation.  Other variables required for calculation are race, age and sex. Among  patients with chronic kidney disease (CKD), the eGFR is useful in  determining the stage of disease according to KDOQI CKD classification.  All eGFR results are reported numerically with the following  interpretation.          GFR                    With                 Without     (ml/min/1.73 m2)    Kidney Damage       Kidney Damage        >= 90                    Stage 1                     Normal        60-89                    Stage 2                     Decreased GFR        30-59     Stage 3                     Stage 3        15-29                    Stage 4                     Stage 4        < 15                      Stage 5                     Stage 5  Each stage of CKD assumes that the associated GFR level has been in  effect for at least 3 months. Determination of stages one and two (with  eGFR > 59 ml/min/m2) requires estimation of kidney damage for at least 3  months as defined by structural or functional abnormalities.  Limitations: All estimates of GFR will be less accurate for patients at  extremes of muscle mass (including but not limited to frail elderly,  critically ill, or cancer patients), those with unusual diets, and those  with conditions associated with reduced secretion or extrarenal  elimination of creatinine. The eGFR equation is not recommended for use  in patients with unstable creatinine levels. mL/min/1.73M2    eGFR if African American: 33 mL/min/1.73M2

## 2019-04-15 NOTE — PROGRESS NOTE ADULT - PROBLEM SELECTOR PROBLEM 2
Acquired hypothyroidism
Gross hematuria
R/O Urinary tract infection without hematuria, site unspecified
R/O Urinary tract infection without hematuria, site unspecified
Vaginal bleeding
R/O Urinary tract infection without hematuria, site unspecified
Acute pain of right knee
R/O Urinary tract infection without hematuria, site unspecified

## 2019-04-15 NOTE — PROGRESS NOTE ADULT - PROVIDER SPECIALTY LIST ADULT
Cardiology
Endocrinology
GYN
Infectious Disease
Internal Medicine
Neurology
Orthopedics
Pulmonology
Urology
Endocrinology
Neurology
Orthopedics
Pulmonology
Infectious Disease
Cardiology
GYN
Endocrinology
Pulmonology
Pulmonology
Internal Medicine
Internal Medicine

## 2019-04-15 NOTE — PROGRESS NOTE ADULT - ATTENDING COMMENTS
multifactorial resp failure-cards/copd/obesity hypoventilation, osas, debility, obesity, PAH--O2 2 liters NC-stable  copd-symbicort 160 2 bid, spiriva 1 puff q day, xopenex .63 q 6, incentive spirometry  Cards-as per Nelli et al  Obesity-nutrition consult  OSAS-outpt pt rx  PAH-WHO grp 2-cards rx  GYN-s/p procedure      ****Ortho-s/p surgery                                 Rehab transfer next 24-48 hrs.  Fito Montgomery MD-Pulmonary   [phone]

## 2019-04-15 NOTE — PROGRESS NOTE ADULT - REASON FOR ADMISSION
fall, right knee pain

## 2019-04-15 NOTE — DISCHARGE NOTE PROVIDER - NSDCFUADDINST_GEN_ALL_CORE_FT
Follow up with Orthopedic in 2 weeks  Follow up with GYN in 2 weeks  Follow up with pulmonary and cardiology after rehab  daily pt/INR to dose coumadin; Watch for  hematuria or severe vaginal bleeding Follow up with Orthopedic in 2 weeks  Follow up with GYN in 2 weeks  Follow up with pulmonary and cardiology after rehab  daily pt/INR to dose coumadin; Watch for  hematuria or severe vaginal bleeding  Follow up with Endocrinology after Rehab Dr Nunn Follow up with Orthopedic in 2 weeks  Follow up with GYN in 2 weeks  Follow up with  cardiology  on 4/22/19   daily pt/INR to dose coumadin; Watch for  hematuria or severe vaginal bleeding  Follow up with Endocrinology after Rehab Dr Nunn  Follow up with pulmonary

## 2019-04-15 NOTE — CHART NOTE - NSCHARTNOTEFT_GEN_A_CORE
GYN has cleared the pt to go back on AC for afib; since benign path on uterine biopsy; Spoke to Dr Russell Avila from cardiology who has cleared the pt to restart on coumadin; Discussed with attending and agrees.

## 2019-04-15 NOTE — DISCHARGE NOTE PROVIDER - NSDCCPCAREPLAN_GEN_ALL_CORE_FT
PRINCIPAL DISCHARGE DIAGNOSIS  Diagnosis: Right leg weakness  Assessment and Plan of Treatment: You had R quadriceps tendon repair  Weight bearing as tolerated on knee bledsow locked in extension  Folow up with Orthopedic in 2 weeks      SECONDARY DISCHARGE DIAGNOSES  Diagnosis: Chronic hypoxemic respiratory failure  Assessment and Plan of Treatment: You have respiratory failure from  asthma  and severe pul HTN, VICKY  Continue o2 at all times  Follow up with Pulmonary      Diagnosis: Gross hematuria  Assessment and Plan of Treatment: resolved; Follow up with Urology for cystoscopy in 2 weeks    Diagnosis: Type 2 diabetes mellitus with complication, unspecified whether long term insulin use  Assessment and Plan of Treatment: HgA1C this admission.  Make sure you get your HgA1c checked every three months.  If you take oral diabetes medications, check your blood glucose two times a day.  If you take insulin, check your blood glucose before meals and at bedtime.  It's important not to skip any meals.  Keep a log of your blood glucose results and always take it with you to your doctor appointments.  Keep a list of your current medications including injectables and over the counter medications and bring this medication list with you to all your doctor appointments.  If you have not seen your ophthalmologist this year call for appointment.  Check your feet daily for redness, sores, or openings. Do not self treat. If no improvement in two days call your primary care physician for an appointment.  Low blood sugar (hypoglycemia) is a blood sugar below 70mg/dl. Check your blood sugar if you feel signs/symptoms of hypoglycemia. If your blood sugar is below 70 take 15 grams of carbohydrates (ex 4 oz of apple juice, 3-4 glucose tablets, or 4-6 oz of regular soda) wait 15 minutes and repeat blood sugar to make sure it comes up above 70.  If your blood sugar is above 70 and you are due for a meal, have a meal.  If you are not due for a meal have a snack.  This snack helps keeps your blood sugar at a safe range.      Diagnosis: Paroxysmal atrial fibrillation  Assessment and Plan of Treatment: You are beimg restarted on coumadin  Follow up daily INR  Follow up with cardiologist    Diagnosis: Chronic diastolic CHF (congestive heart failure)  Assessment and Plan of Treatment: Weigh yourself daily.  If you gain 3lbs in 3 days, or 5lbs in a week call your Health Care Provider.  Do not eat or drink foods containing more than 2000mg of salt (sodium) in your diet every day.  Call your Health Care Provider if you have any swelling or increased swelling in your feet, ankles, and/or stomach.  Take all of your medication as directed.  If you become dizzy call your Health Care Provider.      Diagnosis: Post-menopause bleeding  Assessment and Plan of Treatment: Continue Norethidrone  Follow up with GYN in 2 weeks    Diagnosis: Cellulitis  Assessment and Plan of Treatment: completed treatment

## 2019-04-15 NOTE — PROGRESS NOTE ADULT - PROBLEM SELECTOR PLAN 4
Patient does not appear to be in acute decompensation  States she has been compliant with medication  C/W Torsemide, Metolazone, Spironolactone  Monitor I/O and Daily weights  Cardiology consult reviewed
Patient does not appear to be in acute decompensation  States she has been compliant with medication  C/W Torsemide, Metolazone, Spironolactone  Monitor I/O and Daily weights  Cardiology consult reviewed
Patient does not appear to be in acute decompensation  States she has been compliant with medication  C/W Torsemide, Metolazone, Spironolactone  Monitor I/O and Daily weights  Cardiology consult called
Patient does not appear to be in acute decompensation  States she has been compliant with medication  C/W Torsemide, Metolazone, Spironolactone  Monitor I/O and Daily weights  Cardiology consult reviewed
Patient does not appear to be in acute decompensation  as per cardiology: HOLD Torsemide, Metolazone, Spironolactone 2/2 SAPPHIRE  Monitor I/O and Daily weights
Patient does not appear to be in acute decompensation  as per cardiology: HOLD Torsemide, Metolazone, Spironolactone 2/2 SAPPHIRE  Monitor I/O and Daily weights
Continue amiodarone and coreg  Per prior discharge patient is off coumadin in setting of postmenopausal bleeding, she is nonsurgical, seen by GYN,  Continue to hold coumadin per primary team's assessment

## 2019-04-15 NOTE — PROGRESS NOTE ADULT - ASSESSMENT
79 yo F with ILD, VICKY/OHS with chronic hypercapnic and hypoxic respiratory failure on 3L NC (not on CPAP), PAD/PVD with chronic LE wounds, h/o MS s/p bioprosthetic MVR, HFpEF, paroxsmal afib (not on A/C in setting of bleeding), HTN,  DM, CKD III, anemia, post menopausal vaginal bleeding s/p D&C in July 2018 (on pathology found to have polyps, started on hormonal therapy with resolution of bleeding), UC, , morbid obesity p/w right knee pain and edema after multiple falls.  Patient denies any preceding symptoms of dizziness/lightheadedness, shortness of breath, CP, palpitations preceding the events. Endorses chronic back pain, has a history of spinal stenosis and has been using a wheelchair the past 4 months. Requires assistance of her  for ADLS    UA +, urine cultures growing Proteus and is being treated with Aztreonam and Vanco.  +vaginal bleeding over the past 24 hrs (has not been on Progesterone since admission)    Nonsmoker, has been following with me over the past year for symptoms of progressive dyspnea, now with minimal exertion. Prior HRCT chest showed evidence of ILD - unclear if related to underlying RA vs. amiodarone.  Has been on supplemental O2 since May 2018, 2LNC around the clock.   H/o VICKY diagnosed years ago - not on therapy  Home inhaler regimen: Albuterol nebs, anoro ellipta    A/P:  Dyspnea - multifactorial - related to underlying ILD, obesity, immobility 2/2 lumbar pain, HFpEF, anemia  Chronic respiratory failure with hypoxemia and hypercapnia  VICKY/OHS - untreated  Post menopausal Vaginal bleeding  UTI  Spinal stenosis, gait instability    Rec:  1. Dyspnea - Breathing at baseline - not dyspneic at rest, saturating well with 2LNC; CT chest and CXR unchanged  c/w current inhaler regimen- Symbicort, bronchodilators. Incentive spirometer  Given the patient's underlying VICKY/OHS with evidence of chronic hypercapnia, a trial of CPAP therapy should be considered and I have explained this to the patient and her family prior- she will require titration study as an outpatient. Agree with supplemental O2 24/7 - goal sats low 90s  2. Vaginal bleeding: GYN consulted for further eval and treatment. s/p Pelvic sono--s/p D and C   3. UTI-s/p  antibiotics as per ID  4. Afib - cardiac optimization- rate control, BP management, Cardiology following  5. PPX - DVT ppx  ******  4/9 for GYN procedure; 4/10-s/p gyn procedure-await ortho procedure  4/11-for ortho surgery  4/12-s/p R-knee surgery--stable  4/15-await rehab

## 2019-04-15 NOTE — PROGRESS NOTE ADULT - PROBLEM SELECTOR PLAN 5
Continue amiodarone and coreg   patient is off coumadin in setting of postmenopausal bleeding, she is nonsurgical, seen by GYN,  Continue to hold coumadin
Continue amiodarone and coreg  Per prior discharge patient is off coumadin in setting of postmenopausal bleeding, she is nonsurgical, seen by GYN,  Continue to hold coumadin per primary team's assessment
Continue amiodarone and coreg  Ptn cleared by GYN to resume AC:  coumadinrestarted, Cardiology in agreement, Pt/INR to be checked in 2 days  Continue to hold coumadin
Abnormal uterine bleeding known, she is not surgical, off AC  Patient has not undergone outpatient US  Patient is on norethindrone 5mg qD

## 2019-04-15 NOTE — PROGRESS NOTE ADULT - SUBJECTIVE AND OBJECTIVE BOX
Pt S/E at bedside, no acute events overnight, pain controlled    Vital Signs Last 24 Hrs  T(C): 36.4 (15 Apr 2019 04:35), Max: 36.9 (14 Apr 2019 14:36)  T(F): 97.6 (15 Apr 2019 04:35), Max: 98.4 (14 Apr 2019 14:36)  HR: 97 (15 Apr 2019 04:35) (88 - 98)  BP: 124/77 (15 Apr 2019 04:35) (113/73 - 128/83)  BP(mean): --  RR: 18 (15 Apr 2019 04:35) (17 - 18)  SpO2: 99% (15 Apr 2019 04:35) (98% - 99%)    Gen: NAD,    Right Lower Extremity:  Dressing clean dry intact, al brace in place  +EHL/FHL/TA/GS  SILT L3-S1  +DP/PT Pulses  Compartments soft  No calf TTP B/L

## 2019-04-15 NOTE — DISCHARGE NOTE PROVIDER - CARE PROVIDERS DIRECT ADDRESSES
[email or web address]

## 2019-04-15 NOTE — CONSULT NOTE ADULT - SUBJECTIVE AND OBJECTIVE BOX
Discussed loop recorder implant benefits and risks with patient and  an literature given. patient and  refuse implant at this time. Discussed with medicine NP . Dr Gallegos aware Patient for discharge to [facility] in 1.5 hrs. Advised to follow up with Dr Feliz Aiken as planned on April 22 for further cardiology follow up.   [id number]

## 2019-04-15 NOTE — PROGRESS NOTE ADULT - SUBJECTIVE AND OBJECTIVE BOX
Patient is a 78y old  Female who presents with a chief complaint of fall, right knee pain (15 Apr 2019 14:44)      SUBJECTIVE / OVERNIGHT EVENTS: no new c/o, cleared by GYN for resuming AC for PAF. Dr. Jimenez , cardiology agrees.    MEDICATIONS  (STANDING):  acetaminophen   Tablet .. 975 milliGRAM(s) Oral every 8 hours  amiodarone    Tablet 200 milliGRAM(s) Oral daily  bisacodyl Suppository 10 milliGRAM(s) Rectal once  buDESOnide 160 MICROgram(s)/formoterol 4.5 MICROgram(s) Inhaler 2 Puff(s) Inhalation two times a day  carvedilol 6.25 milliGRAM(s) Oral every 12 hours  dextrose 5%. 1000 milliLiter(s) (50 mL/Hr) IV Continuous <Continuous>  dextrose 50% Injectable 12.5 Gram(s) IV Push once  dextrose 50% Injectable 25 Gram(s) IV Push once  dextrose 50% Injectable 25 Gram(s) IV Push once  dextrose 50% Injectable 12.5 Gram(s) IV Push once  dextrose 50% Injectable 25 Gram(s) IV Push once  dextrose 50% Injectable 25 Gram(s) IV Push once  docusate sodium 100 milliGRAM(s) Oral three times a day  DULoxetine 60 milliGRAM(s) Oral daily  enoxaparin Injectable 40 milliGRAM(s) SubCutaneous every 24 hours  insulin glargine Injectable (LANTUS) 54 Unit(s) SubCutaneous at bedtime  insulin lispro (HumaLOG) corrective regimen sliding scale   SubCutaneous three times a day before meals  insulin lispro (HumaLOG) corrective regimen sliding scale   SubCutaneous at bedtime  insulin lispro Injectable (HumaLOG) 20 Unit(s) SubCutaneous before lunch  insulin lispro Injectable (HumaLOG) 20 Unit(s) SubCutaneous before dinner  insulin lispro Injectable (HumaLOG) 24 Unit(s) SubCutaneous before breakfast  isosorbide   mononitrate ER Tablet (IMDUR) 30 milliGRAM(s) Oral daily  levothyroxine 188 MICROGram(s) Oral daily  mesalamine DR (24-Hour) Tablet 2.4 Gram(s) Oral daily  norethindrone acetate 5 milliGRAM(s) Oral daily  pantoprazole    Tablet 40 milliGRAM(s) Oral before breakfast  polyethylene glycol 3350 17 Gram(s) Oral daily  senna 2 Tablet(s) Oral at bedtime  tiotropium 18 MICROgram(s) Capsule 1 Capsule(s) Inhalation daily    MEDICATIONS  (PRN):  acetaminophen   Tablet .. 650 milliGRAM(s) Oral every 6 hours PRN Mild Pain (1 - 3), Moderate Pain (4 - 6)  bisacodyl Suppository 10 milliGRAM(s) Rectal once PRN Constipation  dextrose 40% Gel 15 Gram(s) Oral once PRN Blood Glucose LESS THAN 70 milliGRAM(s)/deciliter  dextrose 40% Gel 15 Gram(s) Oral once PRN Blood Glucose LESS THAN 70 milliGRAM(s)/deciliter  glucagon  Injectable 1 milliGRAM(s) IntraMuscular once PRN Glucose LESS THAN 70 milligrams/deciliter  glucagon  Injectable 1 milliGRAM(s) IntraMuscular once PRN Glucose LESS THAN 70 milligrams/deciliter  HYDROmorphone  Injectable 0.2 milliGRAM(s) IV Push every 10 minutes PRN Moderate Pain (4 - 6)  melatonin 3 milliGRAM(s) Oral at bedtime PRN Insomnia  ondansetron Injectable 4 milliGRAM(s) IV Push once PRN Nausea and/or Vomiting  ondansetron Injectable 4 milliGRAM(s) IV Push every 6 hours PRN Nausea and/or Vomiting  oxyCODONE    IR 2.5 milliGRAM(s) Oral every 4 hours PRN Moderate Pain (4 - 6)  oxyCODONE    IR 5 milliGRAM(s) Oral every 4 hours PRN Severe Pain (7 - 10)  traMADol 25 milliGRAM(s) Oral every 8 hours PRN Mild Pain (1 - 3)      Vital Signs Last 24 Hrs  T(F): 98 (04-15-19 @ 12:31), Max: 98.3 (04-14-19 @ 19:55)  HR: 95 (04-15-19 @ 12:31) (95 - 97)  BP: 137/79 (04-15-19 @ 12:31) (124/77 - 137/79)  RR: 18 (04-15-19 @ 12:31) (18 - 18)  SpO2: 98% (04-15-19 @ 12:31) (98% - 99%)  Telemetry:   CAPILLARY BLOOD GLUCOSE      POCT Blood Glucose.: 253 mg/dL (15 Apr 2019 12:02)  POCT Blood Glucose.: 153 mg/dL (15 Apr 2019 07:27)  POCT Blood Glucose.: 181 mg/dL (14 Apr 2019 22:11)    I&O's Summary    14 Apr 2019 07:01  -  15 Apr 2019 07:00  --------------------------------------------------------  IN: 780 mL / OUT: 2400 mL / NET: -1620 mL    15 Apr 2019 07:01  -  15 Apr 2019 18:59  --------------------------------------------------------  IN: 360 mL / OUT: 800 mL / NET: -440 mL        PHYSICAL EXAM:  GENERAL: NAD, well-developed  HEAD:  Atraumatic, Normocephalic  EYES: EOMI, PERRLA, conjunctiva and sclera clear  NECK: Supple, No JVD  CHEST/LUNG: Clear to auscultation bilaterally; No wheeze  HEART: Regular rate and rhythm; No murmurs, rubs, or gallops  ABDOMEN: Soft, Nontender, Nondistended; Bowel sounds present  EXTREMITIES:  2+ Peripheral Pulses, No clubbing, cyanosis, or edema  PSYCH: AAOx3  NEUROLOGY: non-focal  SKIN: No rashes or lesions    LABS:                        9.3    10.01 )-----------( 238      ( 15 Apr 2019 09:12 )             30.1     04-15    139  |  94<L>  |  61<H>  ----------------------------<  124<H>  3.5   |  30  |  1.73<H>    Ca    9.7      15 Apr 2019 06:47                RADIOLOGY & ADDITIONAL TESTS:    Imaging Personally Reviewed:    Consultant(s) Notes Reviewed:      Care Discussed with Consultants/Other Providers:

## 2019-04-15 NOTE — PROGRESS NOTE ADULT - PROBLEM SELECTOR PLAN 2
on azactam, ID on board. AB coverage planned through 4/6
Abx completed, recommend preventive treatment w Vit C and Hiprex 1 gm bid 2/2 multiple frequent UTIs
on azactam, ID on board
on azactam, ID on board. AB coverage planned through 4/6
xrays neg for Fx  Pain control, prn  Ortho consult   Fall precautions
Abx completed, recommend preventive treatment w Vit C and Hiprex 1 gm bid 2/2 multiple frequent UTIs

## 2019-04-15 NOTE — PROGRESS NOTE ADULT - SUBJECTIVE AND OBJECTIVE BOX
R3 OBGYN Progress Note        Patient seen and examined at bedside.  Feeling well.  Reports minimal vaginal spotting.       Vital Signs Last 24 Hours  T(C): 36.4 (04-15-19 @ 04:35), Max: 36.9 (04-14-19 @ 14:36)  HR: 97 (04-15-19 @ 04:35) (88 - 98)  BP: 124/77 (04-15-19 @ 04:35) (113/73 - 128/83)  RR: 18 (04-15-19 @ 04:35) (17 - 18)  SpO2: 99% (04-15-19 @ 04:35) (99% - 99%)               9.3    10.01 )-----------( 238      ( 04-15 @ 09:12 )             30.1                8.4    10.88 )-----------( 191      ( 04-14 @ 10:19 )             27.4                8.4    11.29 )-----------( 193      ( 04-13 @ 09:26 )             27.1           Physical Exam:  General: NAD  : external catheter draining dark urine - no blood seen in tubing.  no blood noted externally.         ACCESSION No:  [id number]    TANIKA OBREIN                    2        Surgical Final Report          Final Diagnosis    1. Uterus, endomterium, polyp, polypectomy  - Minute polypoid fragment of benign endometrium admixed  with smooth muscle    2. Uterus, endomterium, mass, biopsy  - Attenuated benign endometrium overlying bland smooth  muscle suggestive of leiomyoma    3. Uterus, endometrium, curettings/ biopsy  - Benign squamous and endocervical mucosa  - Rare fragments of inactive endometrium    Verified by: Lucio Levy MD  (Electronic Signature)  Reported on: 04/12/19 11:02 EDT, [address] [facility], [place]  [address]  _________________________________________________________________    Clinical History  postmenopausal bleeding    Specimen(s) Submitted  1     Endometrial polyp  2     Endometrial mass  3     Endometrial curettings/biopsy    Gross Description  1. The specimen is received in formalin and the specimen  container is labeled: Endometrial polyp.  It consists of a 0.3 x  0.2 x 0.1 cm fragment of soft, tan- white tissue.  Entirely  submitted.  One cassette.    2. The specimen is received in formalin and the specimen  container is labeled: Endometrial mass.  It consists of a 1.0 x  0.4 x 0.1 cm fragment of soft, tan- white tissue.  Entirely  submitted.  One cassette.    3. The specimen is received in formalin and the specimen  container is labeled: Endometrial curettings/biopsy.  It consists  of a 4.0 x 3.0 x 1.0 cm aggregate fragments of soft, bloody  tissue.  Entirely submitted.  Two cassettes.    In addition to other data that may appear on the specimen  containers, the labels have been inspected to confirm the  presence of the patient's name and date of birth.              TANIKA OBRIEN                    2        Surgical Final Report          Kolby Weiss 04/10/2019 15:03

## 2019-04-15 NOTE — CONSULT NOTE ADULT - CONSULT REASON
Chronic respiratory failure, ILD    Pulmonary consultation on behalf of Dr. Fito Montgomery
Knee pain, joint pains
LOOP recorder
UTI
fall
hematuria
vaginal bleeding
R knee pain s/p fall

## 2019-04-15 NOTE — PROGRESS NOTE ADULT - PROBLEM SELECTOR PROBLEM 5
Paroxysmal atrial fibrillation
History of postmenopausal bleeding

## 2019-04-15 NOTE — PROGRESS NOTE ADULT - PROBLEM SELECTOR PLAN 6
Abnormal uterine bleeding known, she is not surgical, off AC  TV Pelvic Sono pending as per ecs from GYN/ONC  Patient is on norethindrone 5mg qD  ongoing on and off vag bleed. ptn states she has had this on and off x 21 yrs,   s/p Hysteroscopy, D&C and Mirena IUD placement on 4/9. has some spotting post op
Abnormal uterine bleeding known, she is not surgical, off AC  TV Pelvic Sono pending as per ecs from GYN/ONC  Patient is on norethindrone 5mg qD  ongoing on and off vag bleed. ptn states she has had this on and off x 21 yrs,  very upset about it and wants GYN to resolve the problem, outside of hysterectomy there are few choices, not a good surgical candidate, this was discussed w GYN x 40 min and PCP x 20 min and ptn x 25 min. awaiting Gyn ONC consult.
Abnormal uterine bleeding known, she is not surgical, off AC  Patient has not undergone outpatient US  Patient is on norethindrone 5mg qD
Abnormal uterine bleeding known, she is not surgical, off AC  Pelvic Sono done  Patient is on norethindrone 5mg qD
Abnormal uterine bleeding known, she is not surgical, off AC  Pelvic Sono done  Patient is on norethindrone 5mg qD  ongoing on and off vag bleed. ptn states she has had this on and off x 21 yrs,  very upset about it and wants GYN to resolve the problem, outside of hysterectomy there are few choices, not a good surgical candidate, this was discussed w GYN x 40 min and PCP x 20 min and ptn x 25 min. awaiting GYn ONC consult.
Abnormal uterine bleeding known, she is not surgical, off AC  TV Pelvic Sono pending as per ecs from GYN/ONC  Patient is on norethindrone 5mg qD  ongoing on and off vag bleed. ptn states she has had this on and off x 21 yrs,   s/p Hysteroscopy, D&C and Mirena IUD placement on 4/9. has some spotting post op
Abnormal uterine bleeding known, she is not surgical, off AC  TV Pelvic Sono pending as per ecs from GYN/ONC  Patient is on norethindrone 5mg qD  ongoing on and off vag bleed. ptn states she has had this on and off x 21 yrs,  very upset about it and wants GYN to resolve the problem, outside of hysterectomy there are few choices, not a good surgical candidate, this was discussed w GYN x 40 min and PCP x 20 min and ptn x 25 min.  awaiting Hysteroscopy, D&C and possible Mirena IUD placement tomorrow. Ptn is medically cleared for the procedure. cardiology clearance reviewed.
Abnormal uterine bleeding known, she is not surgical, off AC  TV Pelvic Sono pending as per ecs from GYN/ONC  Patient is on norethindrone 5mg qD  ongoing on and off vag bleed. ptn states she has had this on and off x 21 yrs,  very upset about it and wants GYN to resolve the problem, outside of hysterectomy there are few choices, not a good surgical candidate, this was discussed w GYN x 40 min and PCP x 20 min and ptn x 25 min.  s/p Hysteroscopy, D&C and possible Mirena IUD placement on 4/9
Abnormal uterine bleeding known, she is not surgical, off AC  TV Pelvic Sono pending as per ecs from GYN/ONC  Patient is on norethindrone 5mg qD  ongoing on and off vag bleed. ptn states she has had this on and off x 21 yrs,  very upset about it and wants GYN to resolve the problem, outside of hysterectomy there are few choices, not a good surgical candidate, this was discussed w GYN x 40 min and PCP x 20 min and ptn x 25 min. awaiting Gyn ONC consult.
Reviewed outpatient regimen with patient's   Per review of last inpatient orders on 3/27 Patient was on Humalog 5U TID, Corrective SSI and Lantus 28U   Will resume with Humalog 5U TID, Corrective SSI and Lantus 20U tonight  Monitor FS  Hypoglycemia protocol  Endo consult per primary day team

## 2019-04-15 NOTE — PROGRESS NOTE ADULT - MINUTES
25
20
25
34
100

## 2019-04-15 NOTE — PROGRESS NOTE ADULT - PROBLEM SELECTOR PROBLEM 3
Acute pain of right knee
R/O Urinary tract infection without hematuria, site unspecified
Acute pain of right knee
Chronic diastolic CHF (congestive heart failure)

## 2019-04-15 NOTE — PROGRESS NOTE ADULT - PROBLEM SELECTOR PLAN 7
Patient with baseline SOB on chronic O2, presume to be 2/2 to obesity hypoventilation syndrome, pulmonary HTN, asthma, deconditioning, possible ILD per prior pulm assessment  Continue supplemental O2 at 2-3 L via NC  Outpatient inhaler non formulary: Continue with Symbicort and Spiriva
improved hyperglycemia. follow endo recommendation for insulin pre-op
on Humalog 5U TID, Corrective SSI and Lantus 20U   Monitor FS  Hypoglycemia protocol
Reviewed outpatient regimen with patient's   Per review of last inpatient orders on 3/27 Patient was on Humalog 5U TID, Corrective SSI and Lantus 28U   Will resume with Humalog 5U TID, Corrective SSI and Lantus 20U tonight  Monitor FS  Hypoglycemia protocol  Endo consult per primary day team
improved hyperglycemia. follow endo recommendation for insulin pre-op
on Humalog 5U TID, Corrective SSI and Lantus 20U     Monitor FS  Hypoglycemia protocol  Endo consult per primary day team
on Humalog 5U TID, Corrective SSI and Lantus 20U   Monitor FS  Hypoglycemia protocol

## 2019-04-15 NOTE — CONSULT NOTE ADULT - REASON FOR ADMISSION
fall, right knee pain

## 2019-04-15 NOTE — PROGRESS NOTE ADULT - PROBLEM SELECTOR PLAN 3
s/p quad tendon repair. in a brace, post op care as per ortho. POD1, awaiting PT, transfer to [facility]
xrays neg for Fx, MRI ordered, ptn refuses , agrees to have CT  Pain control, prn  Fall precautions
s/p quad tendon repair. in a brace, post op care as per ortho
s/p quad tendon repair. in a brace, post op care as per ortho. POD1, awaiting PT, transfer to [facility]
xrays neg for Fx, MRI ordered  Pain control, prn  Fall precautions
xrays neg for Fx, MRI ordered initially  ptn refused , agreed to have CT done, seen by ortho, Dx possibly acute Quadricept tendon rupture, Dr Guzman to speak to family re options . meanwhile wants MRI, ptn agrees to have it done only w conscious sedation.  Pain control, prn  Fall precautions
xrays neg for Fx, MRI ordered initially  ptn refused , agreed to have CT done, seen by ortho, Dx possibly acute Quadricept tendon rupture, Dr Guzman to speak to family re options .right knee MRI confirms the diagnosis, Ptn awaiting repair in am. medically optimized and medically  cleared for surgery
xrays neg for Fx, MRI ordered initially  ptn refused , agreed to have CT done, seen by ortho, Dx possibly acute Quadricept tendon rupture, Dr Guzman to speak to family re options .right knee MRI confirms the diagnosis, follow w ortho pending
xrays neg for Fx, MRI ordered initially  ptn refused , agreed to have CT done, seen by ortho, Dx possibly acute Quadricept tendon rupture, Dr Guzman to speak to family re options .right knee MRI w conscious sedation scheduled for today
xrays neg for Fx, MRI ordered, ptn refuses , agrees to have CT  Pain control, prn  Fall precautions
xrays neg for Fx, MRI ordered, ptn refuses and wants to F/U on outptn basis  Pain control, prn  Fall precautions
Patient does not appear to be in acute decompensation  States she has been compliant with medication  C/W Torsemide, Metolazone, Spirinolactone  Monitor I/O and Daily weights  Cardiology consult called

## 2019-04-15 NOTE — DISCHARGE NOTE PROVIDER - CARE PROVIDER_API CALL
Fito Montgomery)  Internal Medicine; Pulmonary Disease  [address]  [place] [address]  Phone: [phone]  Fax: [phone]  Follow Up Time:     Patrick Guzman)  Orthopaedic Surgery  [address], [address]  [place] [address]  Phone: [phone]  Fax: [phone]  Follow Up Time:     Feliz Aiken)  Cardiovascular Disease; Internal Medicine; Nuclear Cardiology  [address], [address]  [place], [place] [id number]  Phone: [phone]  Fax: [phone]  Follow Up Time:     Goldberg, Gary L (MD)  Gynecologic Oncology; Obstetrics and Gynecology  [address] [place] [address]  Phone: [phone]  Fax: [phone]  Follow Up Time:     Romana Chavira)  Obstetrics and Gynecology  [address], [address]  [place] [address]  Phone: [phone]  Fax: [phone]  Follow Up Time:

## 2019-04-15 NOTE — PROGRESS NOTE ADULT - PROBLEM SELECTOR PLAN 1
Right lower extremity with redness and warmth with open wound  S/P vancomycin in ED  Continue with vancomycin- Renally dosed  Monitor trough  ID input appreciated  complete through 4/5
-NPO for procedure  -pre-op labs  -Reviewed chart.  Note clearance from pulmonology and internal medicine.  Anestehsia pre-evaluation   -Discussed plan with patient.  All questions addressed.   -For OR @ 1130 AM     Meagan Escamilla PGY-3
-Pathology benign.   Mirena IUD to remain in situ (effective x5-7 years)   -Continue norethindrone 5 mg QD for now  -Patient instructed to follow up with Dr. Chavira in 2 weeks and will discuss continuation vs. discontinuation of norethindrone at that time   -Post-op appt in 2 weeks   -No GYN contraindication to resuming anticoagulation as deemed necessary by primary team.     -All recommendations discussed with patient,  and primary team [facility] [place]   -Please re-consult as need arises.   Thank you.     Meagan Escamilla PGY-3     [phone]  d/w Dr. Chavira
-Patient doing well from GYN perspective. Discussed postoperative expectations. May have some spotting post procedure. Final pathology will take 10-14 days.     Please call [phone] with any questions or concerns    Bonnie PGY4
ABx completed, ID following
Right lower extremity with redness and warmth with open wound  S/P vancomycin in ED  Continue with vancomycin- Renally dosed  Monitor trough  ID input appreciated
Right lower extremity with redness and warmth with open wound  S/P vancomycin in ED  Continue with vancomycin- Renally dosed  Monitor trough  ID input appreciated
Right lower extremity with redness and warmth with open wound  S/P vancomycin in ED  Continue with vancomycin- Renally dosed  Monitor trough  ID input appreciated  complete through 4/5
complete ABx today, ID following
Right lower extremity with redness and warmth with open wound  S/P vancomycin in ED  Continue with vancomycin- Renally dosed  Monitor trough  ID consult called  Check LE dopplers  As for work up for LE: F/U Blood cultures and Urine cultures
ABx completed, ID following

## 2019-04-15 NOTE — PROGRESS NOTE ADULT - PROBLEM SELECTOR PROBLEM 7
Type 2 diabetes mellitus with complication, unspecified whether long term insulin use
Chronic respiratory failure

## 2019-04-15 NOTE — DISCHARGE NOTE PROVIDER - HOSPITAL COURSE
qqq 77 yo woman w/ hx of moderate MS, s/p bioprosthetic mitral valve, diastolic CHF, paroxsmal afib (not on A/C in setting of bleeding), HTN, severe pulmonary HTN, DMT2, CKD III, anemia, with post menopausal vaginal bleeding, UC, VICKY on 3L NC (not on CPAP/BIPAP) presents from home in setting of multiple falls the past 2 days resulting with right knee pain, found with leukocytosis and LE wounds concerning for cellulitis. ER vss, pt afebrile.  WBC 13.2 --> 9.0.  UA large LE/ (-) nit.  Ucx >100K GNR.  No acute findings on cxr.   Pt is currently on vancomycin for cellulitis.   Pt with venous stasis and several open blisters on b/l LE.  ID consulted  for further abx management. pt denies increased freq/urgency/flank pain/dysuria.  (+) UA and cultures. Treated  with  azactam (pt with allergy to augmentin, keflex)  (through 4/6).  + hematuria; resolved after treatemnt for UTI;       - b/L LE venous stasis and blisters.  Low suspicion for superimposed cellulitis.  Pt with several open blisters and denuded skin, cont local wound care.  Cont vancomycin for now for possible superimposed bacterial cellulitis. Complete 7 day course.     Pt c/o RLE weakness, pt seen by Neurology and Rheumatology and Ortho. CT showed R  Kitty tendon tear ; MRI confirmed acute process; s/p R Quad Tendon Repair on 4/11. - Pain Control; - WBAT RLE in [place] locked in extension    Seen by cardiology ; Chronic diastolic CHF (congestive heart failure).  Plan: Patient does not appear to be in acute decompensation; States she has been compliant with medication; C/W Torsemide, Metolazone, Spironolactone.     Hosp course complicated with post menopausal bleeding; pt with h/o vaginal bleeding ; Seen by GYN; Restarted Norethidrone; now s/p D&C and Mirena IUD insertion for post-menopausal bleeding.  Pathology benign- c/w fibroid.   Bleeding improved w/ Mirena and norethindrone 5 mg QD.      Pt had been off AC for afib; GYN cleared to restart.  Cardiology and pulmonary followed throughout hospitalization for pHTN and CHF. Discharged to [facility] on coumadin; 79 yo woman w/ hx of moderate MS, s/p bioprosthetic mitral valve, diastolic CHF, paroxsmal afib (not on A/C in setting of bleeding), HTN, severe pulmonary HTN, DMT2, CKD III, anemia, with post menopausal vaginal bleeding, UC, VICKY on 3L NC (not on CPAP/BIPAP) presents from home in setting of multiple falls the past 2 days resulting with right knee pain, found with leukocytosis and LE wounds concerning for cellulitis. ER vss, pt afebrile.  WBC 13.2 --> 9.0.  UA large LE/ (-) nit.  Ucx >100K GNR.  No acute findings on cxr.   Pt is currently on vancomycin for cellulitis.   Pt with venous stasis and several open blisters on b/l LE.  ID consulted  for further abx management. pt denies increased freq/urgency/flank pain/dysuria.  (+) UA and cultures. Treated  with  azactam (pt with allergy to augmentin, keflex)  (through 4/6).  + hematuria; resolved after treatemnt for UTI;       - b/L LE venous stasis and blisters.  Low suspicion for superimposed cellulitis.  Pt with several open blisters and denuded skin, cont local wound care.  Cont vancomycin for now for possible superimposed bacterial cellulitis. Complete 7 day course.     Pt c/o RLE weakness, pt seen by Neurology and Rheumatology and Ortho. CT showed R  Kitty tendon tear ; MRI confirmed acute process; s/p R Quad Tendon Repair on 4/11. - Pain Control; - WBAT RLE in [place] locked in extension    Seen by cardiology ; Chronic diastolic CHF (congestive heart failure).  Plan: Patient does not appear to be in acute decompensation; States she has been compliant with medication; C/W Torsemide, Metolazone, Spironolactone.     Hosp course complicated with post menopausal bleeding; pt with h/o vaginal bleeding ; Seen by GYN; Restarted Norethidrone; now s/p D&C and Mirena IUD insertion for post-menopausal bleeding.  Pathology benign- c/w fibroid.   Bleeding improved w/ Mirena and norethindrone 5 mg QD.      Pt had been off AC for afib; GYN cleared to restart.  Cardiology and pulmonary followed throughout hospitalization for pHTN and CHF. Cardiology advised loop recorder, but she refused. will follow up with cardiology outpatient. Discharged to [facility] on coumadin;

## 2019-04-15 NOTE — PROGRESS NOTE ADULT - ASSESSMENT
77yo female with chronic diastolic CHF, bioprosthetic MVR, PAF, VICKY, PAD, CKD, DM, HLD and LSS admitted with recurrent falls. She has severe lumbar spine disease and will need SAMANTHA. Found to have quadriceps tendon tear.  Stable from cardiac standpoint.    Rec:  Continue beta blocker.  Restart torsemide 50mg once per day.   Monitor lytes, renal function; replete K as needed.  Ortho followup  Pain control/Bowel regimen/DVT prophylaxis  Awaiting SAMANTHA transfer.    Russell Jimenez MD  [facility] [place] [facility] 77yo female with chronic diastolic CHF, bioprosthetic MVR, PAF, VICKY, PAD, CKD, DM, HLD and LSS admitted with recurrent falls. She has severe lumbar spine disease and will need SAMANTHA. Found to have quadriceps tendon tear.  Stable from cardiac standpoint.    Rec:  Continue beta blocker.  Restart torsemide 50mg once per day.   Monitor lytes, renal function; replete K as needed.  Ortho followup  Pain control/Bowel regimen/DVT prophylaxis  Awaiting SAMANTHA transfer.  Pt was previously on coumadin for PAF and now that her vaginal bleeding has stopped and hemoglobin is stable; OK to resume coumadin upon discharge.  Discussed with Dr. Lopez and NS on floor.    Russell Jimenez MD  [facility]

## 2019-04-15 NOTE — DISCHARGE NOTE PROVIDER - PROVIDER TOKENS
PROVIDER:[TOKEN:[[id number]]],PROVIDER:[TOKEN:[[id number]]],PROVIDER:[TOKEN:[[id number]]],PROVIDER:[TOKEN:[[id number]]],PROVIDER:[TOKEN:[[id number]]]

## 2019-04-15 NOTE — PROGRESS NOTE ADULT - SUBJECTIVE AND OBJECTIVE BOX
Patient is seen and examined at the bed side, is afebrile. The pain is better controlled now.  The Leukocytosis is trending down to normal.        REVIEW OF SYSTEMS: All other review systems are negative        Vital Signs Last 24 Hrs  T(C): 36.7 (15 Apr 2019 12:31), Max: 36.8 (14 Apr 2019 19:55)  T(F): 98 (15 Apr 2019 12:31), Max: 98.3 (14 Apr 2019 19:55)  HR: 95 (15 Apr 2019 12:31) (95 - 98)  BP: 137/79 (15 Apr 2019 12:31) (113/73 - 137/79)  BP(mean): --  RR: 18 (15 Apr 2019 12:31) (18 - 18)  SpO2: 98% (15 Apr 2019 12:31) (98% - 99%)      PHYSICAL EXAM:  GENERAL: Not in distress  CHEST/LUNG: Air entry bilaterally  HEART: s1 and s2 present  ABDOMEN:  Nontender and  Nondistended  EXTREMITIES:  RLE bandage and immobilizer in placed  CNS: Awake, Alert and oriented      MEDICATIONS  (STANDING):  acetaminophen   Tablet .. 975 milliGRAM(s) Oral every 8 hours  amiodarone    Tablet 200 milliGRAM(s) Oral daily  bisacodyl Suppository 10 milliGRAM(s) Rectal once  buDESOnide 160 MICROgram(s)/formoterol 4.5 MICROgram(s) Inhaler 2 Puff(s) Inhalation two times a day  carvedilol 6.25 milliGRAM(s) Oral every 12 hours  dextrose 5%. 1000 milliLiter(s) (50 mL/Hr) IV Continuous <Continuous>  dextrose 50% Injectable 12.5 Gram(s) IV Push once  dextrose 50% Injectable 25 Gram(s) IV Push once  dextrose 50% Injectable 25 Gram(s) IV Push once  dextrose 50% Injectable 12.5 Gram(s) IV Push once  dextrose 50% Injectable 25 Gram(s) IV Push once  dextrose 50% Injectable 25 Gram(s) IV Push once  docusate sodium 100 milliGRAM(s) Oral three times a day  DULoxetine 60 milliGRAM(s) Oral daily  enoxaparin Injectable 40 milliGRAM(s) SubCutaneous every 24 hours  insulin glargine Injectable (LANTUS) 54 Unit(s) SubCutaneous at bedtime  insulin lispro (HumaLOG) corrective regimen sliding scale   SubCutaneous three times a day before meals  insulin lispro (HumaLOG) corrective regimen sliding scale   SubCutaneous at bedtime  insulin lispro Injectable (HumaLOG) 20 Unit(s) SubCutaneous before lunch  insulin lispro Injectable (HumaLOG) 20 Unit(s) SubCutaneous before dinner  insulin lispro Injectable (HumaLOG) 24 Unit(s) SubCutaneous before breakfast  isosorbide   mononitrate ER Tablet (IMDUR) 30 milliGRAM(s) Oral daily  levothyroxine 188 MICROGram(s) Oral daily  mesalamine DR (24-Hour) Tablet 2.4 Gram(s) Oral daily  norethindrone acetate 5 milliGRAM(s) Oral daily  pantoprazole    Tablet 40 milliGRAM(s) Oral before breakfast  polyethylene glycol 3350 17 Gram(s) Oral daily  senna 2 Tablet(s) Oral at bedtime  tiotropium 18 MICROgram(s) Capsule 1 Capsule(s) Inhalation daily    MEDICATIONS  (PRN):  acetaminophen   Tablet .. 650 milliGRAM(s) Oral every 6 hours PRN Mild Pain (1 - 3), Moderate Pain (4 - 6)  bisacodyl Suppository 10 milliGRAM(s) Rectal once PRN Constipation  dextrose 40% Gel 15 Gram(s) Oral once PRN Blood Glucose LESS THAN 70 milliGRAM(s)/deciliter  dextrose 40% Gel 15 Gram(s) Oral once PRN Blood Glucose LESS THAN 70 milliGRAM(s)/deciliter  glucagon  Injectable 1 milliGRAM(s) IntraMuscular once PRN Glucose LESS THAN 70 milligrams/deciliter  glucagon  Injectable 1 milliGRAM(s) IntraMuscular once PRN Glucose LESS THAN 70 milligrams/deciliter  HYDROmorphone  Injectable 0.2 milliGRAM(s) IV Push every 10 minutes PRN Moderate Pain (4 - 6)  melatonin 3 milliGRAM(s) Oral at bedtime PRN Insomnia  ondansetron Injectable 4 milliGRAM(s) IV Push once PRN Nausea and/or Vomiting  ondansetron Injectable 4 milliGRAM(s) IV Push every 6 hours PRN Nausea and/or Vomiting  oxyCODONE    IR 2.5 milliGRAM(s) Oral every 4 hours PRN Moderate Pain (4 - 6)  oxyCODONE    IR 5 milliGRAM(s) Oral every 4 hours PRN Severe Pain (7 - 10)  traMADol 25 milliGRAM(s) Oral every 8 hours PRN Mild Pain (1 - 3)            Allergies : Augmentin (Swelling), cephalexin (Swelling), latex (Rash)        LABS:                                   9.3    10.01 )-----------( 238      ( 15 Apr 2019 09:12 )             30.1                8.4    11.29 )-----------( 193      ( 13 Apr 2019 09:26 )             27.1                           8.6    14.47 )-----------( 215      ( 12 Apr 2019 09:02 )             27.8           04-15    139  |  94<L>  |  61<H>  ----------------------------<  124<H>  3.5   |  30  |  1.73<H>    Ca    9.7      15 Apr 2019 06:47      04-13    136  |  94<L>  |  64<H>  ----------------------------<  238<H>  3.8   |  25  |  2.08<H>    Ca    9.4      13 Apr 2019 07:02    PT/INR - ( 11 Apr 2019 05:13 )   PT: 12.9 sec;   INR: 1.12 ratio    PTT - ( 11 Apr 2019 05:13 )  PTT:25.4 sec      CAPILLARY BLOOD GLUCOSE  POCT Blood Glucose.: 232 mg/dL (12 Apr 2019 11:32)  POCT Blood Glucose.: 311 mg/dL (12 Apr 2019 08:09)  POCT Blood Glucose.: 308 mg/dL (11 Apr 2019 21:37)  POCT Blood Glucose.: 262 mg/dL (11 Apr 2019 17:23)  POCT Blood Glucose.: 217 mg/dL (11 Apr 2019 16:04)        RADIOLOGY & ADDITIONAL TESTS:    4/8/19 : MR Knee No Cont, Right (04.08.19 @ 15:14) 1.  Full-thickness quadriceps tendon tear at the level of the upper pole  of the patella with 31 mm gap between the superior pole of the patella   and the torn tendon fibers.  2.  Large knee joint effusion.  3.  Tear of the medial meniscus.      4/5/19 : US Pelvis Complete (04.05.19 @ 12:11) Severely limited evaluation. No mass is seen.  Markedly distended endometrial cavity with fluid, suggesting  cervical/vaginal outlet obstruction.          MICROBIOLOGY DATA:      Culture - Urine (04.04.19 @ 18:59)    Specimen Source: .Urine Clean Catch (Midstream)    Culture Results:   No growth      Culture - Blood (03.30.19 @ 22:36)    Specimen Source: .Blood Blood    Culture Results:   No growth at 5 days.      Culture - Blood (03.30.19 @ 22:36)    Specimen Source: .Blood Blood    Culture Results:   No growth at 5 days.      Culture - Urine (03.30.19 @ 21:18)    -  Tobramycin: S <=4    -  Trimethoprim/Sulfamethoxazole: S <=2/38    -  Amikacin: S <=16    -  Ampicillin: S <=8 These ampicillin results predict results for amoxicillin    -  Ampicillin/Sulbactam: S <=8/4    -  Aztreonam: S <=4    -  Cefazolin: S <=8 For uncomplicated UTI with K. pneumoniae, E. coli, or P. mirablis: LÓPEZ <=16 is sensitive and LÓPEZ >=32 is resistant. This also predicts results for oral agents cefaclor, cefdinir, cefpodoxime, cefprozil, cefuroxime axetil, cephalexin and locarbef for uncomplicated UTI. Note that some isolates may be susceptible to these agents while testing resistant to cefazolin.    -  Cefepime: S <=4    -  Cefoxitin: S <=8    -  Ceftriaxone: S <=1 Enterobacter, Citrobacter, and Serratia may develop resistance during prolonged therapy    -  Ciprofloxacin: S <=1    -  Ertapenem: S <=1    -  Gentamicin: S <=4    -  Levofloxacin: S <=2    -  Meropenem: S <=1    -  Nitrofurantoin: R >64 Should not be used to treat pyelonephritis    -  Piperacillin/Tazobactam: S <=16    Specimen Source: .Urine Clean Catch (Midstream)    Culture Results:   >100,000 CFU/ml Proteus mirabilis    Organism Identification: Proteus mirabilis    Organism: Proteus mirabilis    Method Type: LÓPEZ Patient is seen and examined at the bed side, is afebrile. She is doing better. The Leukocytosis trended down to normal.        REVIEW OF SYSTEMS: All other review systems are negative        Vital Signs Last 24 Hrs  T(C): 36.7 (15 Apr 2019 12:31), Max: 36.8 (14 Apr 2019 19:55)  T(F): 98 (15 Apr 2019 12:31), Max: 98.3 (14 Apr 2019 19:55)  HR: 95 (15 Apr 2019 12:31) (95 - 98)  BP: 137/79 (15 Apr 2019 12:31) (113/73 - 137/79)  BP(mean): --  RR: 18 (15 Apr 2019 12:31) (18 - 18)  SpO2: 98% (15 Apr 2019 12:31) (98% - 99%)      PHYSICAL EXAM:  GENERAL: Not in distress  CHEST/LUNG: Air entry bilaterally  HEART: s1 and s2 present  ABDOMEN:  Nontender and  Nondistended  EXTREMITIES:  RLE bandage and immobilizer in placed  CNS: Awake, Alert and oriented      MEDICATIONS  (STANDING):  acetaminophen   Tablet .. 975 milliGRAM(s) Oral every 8 hours  amiodarone    Tablet 200 milliGRAM(s) Oral daily  bisacodyl Suppository 10 milliGRAM(s) Rectal once  buDESOnide 160 MICROgram(s)/formoterol 4.5 MICROgram(s) Inhaler 2 Puff(s) Inhalation two times a day  carvedilol 6.25 milliGRAM(s) Oral every 12 hours  dextrose 5%. 1000 milliLiter(s) (50 mL/Hr) IV Continuous <Continuous>  dextrose 50% Injectable 12.5 Gram(s) IV Push once  dextrose 50% Injectable 25 Gram(s) IV Push once  dextrose 50% Injectable 25 Gram(s) IV Push once  dextrose 50% Injectable 12.5 Gram(s) IV Push once  dextrose 50% Injectable 25 Gram(s) IV Push once  dextrose 50% Injectable 25 Gram(s) IV Push once  docusate sodium 100 milliGRAM(s) Oral three times a day  DULoxetine 60 milliGRAM(s) Oral daily  enoxaparin Injectable 40 milliGRAM(s) SubCutaneous every 24 hours  insulin glargine Injectable (LANTUS) 54 Unit(s) SubCutaneous at bedtime  insulin lispro (HumaLOG) corrective regimen sliding scale   SubCutaneous three times a day before meals  insulin lispro (HumaLOG) corrective regimen sliding scale   SubCutaneous at bedtime  insulin lispro Injectable (HumaLOG) 20 Unit(s) SubCutaneous before lunch  insulin lispro Injectable (HumaLOG) 20 Unit(s) SubCutaneous before dinner  insulin lispro Injectable (HumaLOG) 24 Unit(s) SubCutaneous before breakfast  isosorbide   mononitrate ER Tablet (IMDUR) 30 milliGRAM(s) Oral daily  levothyroxine 188 MICROGram(s) Oral daily  mesalamine DR (24-Hour) Tablet 2.4 Gram(s) Oral daily  norethindrone acetate 5 milliGRAM(s) Oral daily  pantoprazole    Tablet 40 milliGRAM(s) Oral before breakfast  polyethylene glycol 3350 17 Gram(s) Oral daily  senna 2 Tablet(s) Oral at bedtime  tiotropium 18 MICROgram(s) Capsule 1 Capsule(s) Inhalation daily    MEDICATIONS  (PRN):  acetaminophen   Tablet .. 650 milliGRAM(s) Oral every 6 hours PRN Mild Pain (1 - 3), Moderate Pain (4 - 6)  bisacodyl Suppository 10 milliGRAM(s) Rectal once PRN Constipation  dextrose 40% Gel 15 Gram(s) Oral once PRN Blood Glucose LESS THAN 70 milliGRAM(s)/deciliter  dextrose 40% Gel 15 Gram(s) Oral once PRN Blood Glucose LESS THAN 70 milliGRAM(s)/deciliter  glucagon  Injectable 1 milliGRAM(s) IntraMuscular once PRN Glucose LESS THAN 70 milligrams/deciliter  glucagon  Injectable 1 milliGRAM(s) IntraMuscular once PRN Glucose LESS THAN 70 milligrams/deciliter  HYDROmorphone  Injectable 0.2 milliGRAM(s) IV Push every 10 minutes PRN Moderate Pain (4 - 6)  melatonin 3 milliGRAM(s) Oral at bedtime PRN Insomnia  ondansetron Injectable 4 milliGRAM(s) IV Push once PRN Nausea and/or Vomiting  ondansetron Injectable 4 milliGRAM(s) IV Push every 6 hours PRN Nausea and/or Vomiting  oxyCODONE    IR 2.5 milliGRAM(s) Oral every 4 hours PRN Moderate Pain (4 - 6)  oxyCODONE    IR 5 milliGRAM(s) Oral every 4 hours PRN Severe Pain (7 - 10)  traMADol 25 milliGRAM(s) Oral every 8 hours PRN Mild Pain (1 - 3)            Allergies : Augmentin (Swelling), cephalexin (Swelling), latex (Rash)        LABS:                                   9.3    10.01 )-----------( 238      ( 15 Apr 2019 09:12 )             30.1                8.4    11.29 )-----------( 193      ( 13 Apr 2019 09:26 )             27.1                           8.6    14.47 )-----------( 215      ( 12 Apr 2019 09:02 )             27.8           04-15    139  |  94<L>  |  61<H>  ----------------------------<  124<H>  3.5   |  30  |  1.73<H>    Ca    9.7      15 Apr 2019 06:47      04-13    136  |  94<L>  |  64<H>  ----------------------------<  238<H>  3.8   |  25  |  2.08<H>    Ca    9.4      13 Apr 2019 07:02    PT/INR - ( 11 Apr 2019 05:13 )   PT: 12.9 sec;   INR: 1.12 ratio    PTT - ( 11 Apr 2019 05:13 )  PTT:25.4 sec      CAPILLARY BLOOD GLUCOSE  POCT Blood Glucose.: 232 mg/dL (12 Apr 2019 11:32)  POCT Blood Glucose.: 311 mg/dL (12 Apr 2019 08:09)  POCT Blood Glucose.: 308 mg/dL (11 Apr 2019 21:37)  POCT Blood Glucose.: 262 mg/dL (11 Apr 2019 17:23)  POCT Blood Glucose.: 217 mg/dL (11 Apr 2019 16:04)        RADIOLOGY & ADDITIONAL TESTS:    4/8/19 : MR Knee No Cont, Right (04.08.19 @ 15:14) 1.  Full-thickness quadriceps tendon tear at the level of the upper pole  of the patella with 31 mm gap between the superior pole of the patella   and the torn tendon fibers.  2.  Large knee joint effusion.  3.  Tear of the medial meniscus.      4/5/19 : US Pelvis Complete (04.05.19 @ 12:11) Severely limited evaluation. No mass is seen.  Markedly distended endometrial cavity with fluid, suggesting  cervical/vaginal outlet obstruction.          MICROBIOLOGY DATA:      Culture - Urine (04.04.19 @ 18:59)    Specimen Source: .Urine Clean Catch (Midstream)    Culture Results:   No growth      Culture - Blood (03.30.19 @ 22:36)    Specimen Source: .Blood Blood    Culture Results:   No growth at 5 days.      Culture - Blood (03.30.19 @ 22:36)    Specimen Source: .Blood Blood    Culture Results:   No growth at 5 days.      Culture - Urine (03.30.19 @ 21:18)    -  Tobramycin: S <=4    -  Trimethoprim/Sulfamethoxazole: S <=2/38    -  Amikacin: S <=16    -  Ampicillin: S <=8 These ampicillin results predict results for amoxicillin    -  Ampicillin/Sulbactam: S <=8/4    -  Aztreonam: S <=4    -  Cefazolin: S <=8 For uncomplicated UTI with K. pneumoniae, E. coli, or P. mirablis: LÓPEZ <=16 is sensitive and LÓPEZ >=32 is resistant. This also predicts results for oral agents cefaclor, cefdinir, cefpodoxime, cefprozil, cefuroxime axetil, cephalexin and locarbef for uncomplicated UTI. Note that some isolates may be susceptible to these agents while testing resistant to cefazolin.    -  Cefepime: S <=4    -  Cefoxitin: S <=8    -  Ceftriaxone: S <=1 Enterobacter, Citrobacter, and Serratia may develop resistance during prolonged therapy    -  Ciprofloxacin: S <=1    -  Ertapenem: S <=1    -  Gentamicin: S <=4    -  Levofloxacin: S <=2    -  Meropenem: S <=1    -  Nitrofurantoin: R >64 Should not be used to treat pyelonephritis    -  Piperacillin/Tazobactam: S <=16    Specimen Source: .Urine Clean Catch (Midstream)    Culture Results:   >100,000 CFU/ml Proteus mirabilis    Organism Identification: Proteus mirabilis    Organism: Proteus mirabilis    Method Type: LÓPEZ

## 2019-04-15 NOTE — PROGRESS NOTE ADULT - PROBLEM SELECTOR PLAN 8
SCDS in setting of vaginal bleeding
Patient with baseline SOB on chronic O2, presume to be 2/2 to obesity hypoventilation syndrome, pulmonary HTN, asthma, deconditioning, possible ILD per prior pulm assessment  Continue supplemental O2 at 2-3 L via NC  Outpatient inhaler non formulary: Continue with Symbicort and Spiriva

## 2019-04-15 NOTE — PROGRESS NOTE ADULT - PROBLEM SELECTOR PROBLEM 4
Chronic diastolic CHF (congestive heart failure)
Paroxysmal atrial fibrillation

## 2019-04-15 NOTE — PROGRESS NOTE ADULT - ASSESSMENT
79 yo woman w/ hx of moderate MS s/p bioprosthetic mitral valve, diastolic CHF, paroxsmal afib (not on A/C in setting of bleeding), HTN, severe pulmonary HTN, DMT2, CKD III, anemia, with post menopausal vaginal bleeding, UC, VICKY on 3L NC (not on CPAP/BIPAP) presents from home in setting of multiple falls the past 2 days resulting with right knee pain. Patient had a fall yesterday from 1 step while walking out of the house and tripped. Per patient states that her right leg had a buckling/collapsing sensation. Patient went to [facility] on 3/29 and had an xray which did not reveal fracture and sent home with cyclobenzaprine and Lidoderm patch. Patient last use of medication on 3/29. Patient had another fall while attempting to ambulate to the bathroom. She described that her right leg buckled under her causing her to fall. It was difficult for her to get up on her own and required the assistance of her  and son-in-law to get up. Patient denies any preceding symptoms of dizziness/lightheadedness, shortness of breath, CP, palpitations preceding the events. Endorses chronic back pain with no new characteristics. Patient has been getting around home with use of wheelchair the past 4 months. Requires assistance of her  to get around because of chronic weakness. Denies development of new numbness of lower extremities. Does not utilize any other assistive devices. Has not had PT outpatient. Patient also has had chronic LE wounds that were dressed from last discharge on 3/27. Denies changing of dressing. Denies fevers, chills, sweats. (30 Mar 2019 21:08)  ER vss, pt afebrile.  WBC 13.2 --> 9.0.  UA large LE/ (-) nit.  Ucx >100K GNR.  No acute findings on cxr.   Pt is currently on vancomycin for cellulitis.   Pt with venous stasis and several open blisters on b/l LE.  ID consult called for further abx management.        # UTI-  UCx Proteus mirabilis - s/p treated -  Pt seen by Urology -  planned for outpt  cystoscopy. Pt has been on low dose Macrobid for last 2 years for chronic suppressive therapy.  Current Ucx growing Proteus mirabilis that is now resistant to mirabilis.  No good oral options available as pt is allergic to beta-lactams, and unable to take fluroquinolone as she is also taking amiodarone.  Don't recommend bactrim due to renal insufficiency.      # RLE Cellulitis - s/p completed the  ABx course  # Right quadriceps tendon tear  - s/p repair on 4/11/19  # Vaginal bleeding- - Pelvic and transvaginal US performed - s/o vaginal outlet obstruction. Pt has restarted norethindrone for postmenopausal bleeding.   Pt for possible d&c with hysteroscopy and endometrial sampling/IUD placement  # Leukocytosis      Would recommend:    1. Monitor OFF antibiotic  2. Post-surgical care as per Ortho  3. OOb to chair    - Covering Dr. Kemar Merida  [phone] 79 yo woman w/ hx of moderate MS s/p bioprosthetic mitral valve, diastolic CHF, paroxsmal afib (not on A/C in setting of bleeding), HTN, severe pulmonary HTN, DMT2, CKD III, anemia, with post menopausal vaginal bleeding, UC, VICKY on 3L NC (not on CPAP/BIPAP) presents from home in setting of multiple falls the past 2 days resulting with right knee pain. Patient had a fall yesterday from 1 step while walking out of the house and tripped. Per patient states that her right leg had a buckling/collapsing sensation. Patient went to [facility] ED on 3/29 and had an xray which did not reveal fracture and sent home with cyclobenzaprine and Lidoderm patch. Patient last use of medication on 3/29. Patient had another fall while attempting to ambulate to the bathroom. She described that her right leg buckled under her causing her to fall. It was difficult for her to get up on her own and required the assistance of her  and son-in-law to get up. Patient denies any preceding symptoms of dizziness/lightheadedness, shortness of breath, CP, palpitations preceding the events. Endorses chronic back pain with no new characteristics. Patient has been getting around home with use of wheelchair the past 4 months. Requires assistance of her  to get around because of chronic weakness. Denies development of new numbness of lower extremities. Does not utilize any other assistive devices. Has not had PT outpatient. Patient also has had chronic LE wounds that were dressed from last discharge on 3/27. Denies changing of dressing. Denies fevers, chills, sweats. (30 Mar 2019 21:08)  ER vss, pt afebrile.  WBC 13.2 --> 9.0.  UA large LE/ (-) nit.  Ucx >100K GNR.  No acute findings on cxr.   Pt is currently on vancomycin for cellulitis.   Pt with venous stasis and several open blisters on b/l LE.  ID consult called for further abx management.        # UTI-  UCx Proteus mirabilis - s/p treated -  Pt seen by Urology -  planned for outpt  cystoscopy. Pt has been on low dose Macrobid for last 2 years for chronic suppressive therapy.  Current Ucx growing Proteus mirabilis that is now resistant to mirabilis.  No good oral options available as pt is allergic to beta-lactams, and unable to take fluroquinolone as she is also taking amiodarone.  Don't recommend bactrim due to renal insufficiency.      # RLE Cellulitis - s/p completed the  ABx course  # Right quadriceps tendon tear  - s/p repair on 4/11/19  # Vaginal bleeding- - Pelvic and transvaginal US performed - s/o vaginal outlet obstruction. Pt has restarted norethindrone for postmenopausal bleeding.   Pt for possible d&c with hysteroscopy and endometrial sampling/IUD placement  # Leukocytosis      Would recommend:    1.  OOb to chair/ PT  2. Monitor OFF antibiotic  3. Post-surgical care as per Ortho    - Covering Dr. Kemar Merida  [phone]

## 2019-04-15 NOTE — CONSULT NOTE ADULT - CONSULT REQUESTED DATE/TIME
01-Apr-2019 17:48
03-Apr-2019
03-Apr-2019 15:55
03-Apr-2019 17:42
03-Apr-2019 20:33
15-Apr-2019 12:00
31-Mar-2019 15:00
05-Apr-2019 16:13

## 2019-04-15 NOTE — PROGRESS NOTE ADULT - ASSESSMENT
A/P: 78 year old female s/p R Quad Tendon Repair  - Pain Control  - WBAT RLE in [place] locked in extension  - PT/OT, OOB  - Care per medicine

## 2019-04-15 NOTE — PROGRESS NOTE ADULT - PROBLEM SELECTOR PROBLEM 6
History of postmenopausal bleeding
Type 2 diabetes mellitus with complication, unspecified whether long term insulin use

## 2019-04-15 NOTE — PROGRESS NOTE ADULT - PROBLEM SELECTOR PROBLEM 8
Prophylactic measure
Chronic respiratory failure

## 2019-04-15 NOTE — PROGRESS NOTE ADULT - NSHPATTENDINGPLANDISCUSS_GEN_ALL_CORE
patient.
patient
patient.
ptn, card, 
team
ptn, card, , NP, daughter
ptn, card, , NP
ptn, card, 
ptn, card, , NP
ptn, card, , NP, daughter

## 2019-04-15 NOTE — PROGRESS NOTE ADULT - SUBJECTIVE AND OBJECTIVE BOX
CHIEF COMPLAINT:  f/up resp failure, preop clearance, copd, restrictive dysfunction, PAH--overall frustrated-not much rehab--no signif sob at present    Interval Events: none    REVIEW OF SYSTEMS:  Constitutional: No fevers or chills. No weight loss. + fatigue or generalized malaise.  Eyes: No itching or discharge from the eyes  ENT: No ear pain. No ear discharge. No nasal congestion. No post nasal drip. No epistaxis. No throat pain. No sore throat. No difficulty swallowing.   CV: No chest pain. No palpitations. No lightheadedness or dizziness.   Resp: No dyspnea at rest. No dyspnea on exertion. No orthopnea. No wheezing. No cough. No stridor. No sputum production. No chest pain with respiration.  GI: No nausea. No vomiting. No diarrhea.  MSK: No joint pain or pain in any extremities  Integumentary: No skin lesions. No pedal edema.  Neurological: + gross motor weakness. No sensory changes.  [+ ] All other systems negative  [ ] Unable to assess ROS because ________    OBJECTIVE:  ICU Vital Signs Last 24 Hrs  T(C): 36.4 (15 Apr 2019 04:35), Max: 36.9 (14 Apr 2019 14:36)  T(F): 97.6 (15 Apr 2019 04:35), Max: 98.4 (14 Apr 2019 14:36)  HR: 97 (15 Apr 2019 04:35) (88 - 98)  BP: 124/77 (15 Apr 2019 04:35) (113/73 - 128/83)  BP(mean): --  ABP: --  ABP(mean): --  RR: 18 (15 Apr 2019 04:35) (17 - 18)  SpO2: 99% (15 Apr 2019 04:35) (98% - 99%)        04-13 @ 07:01  -  04-14 @ 07:00  --------------------------------------------------------  IN: 740 mL / OUT: 1550 mL / NET: -810 mL    04-14 @ 07:01  -  04-15 @ 05:30  --------------------------------------------------------  IN: 780 mL / OUT: 2400 mL / NET: -1620 mL      CAPILLARY BLOOD GLUCOSE      POCT Blood Glucose.: 181 mg/dL (14 Apr 2019 22:11)      PHYSICAL EXAM:  General: Awake, alert, oriented X 3.   HEENT: Atraumatic, normocephalic.                 Mallampatti Grade 3                No nasal congestion.                No tonsillar or pharyngeal exudates.  Lymph Nodes: No palpable lymphadenopathy  Neck: No JVD. No carotid bruit.   Respiratory: Normal chest expansion                         Normal percussion                         Normal and equal air entry                         No wheeze, rhonchi or rales.  Cardiovascular: S1 S2 normal. + murmurs, rubs or gallops.   Abdomen: Soft, non-tender, non-distended. No organomegaly. Normoactive bowel sounds.  Extremities: Warm to touch. Peripheral pulse palpable. No pedal edema.   Skin: No rashes or skin lesions  Neurological: Motor and sensory examination unequal and abnormal in right Lower extremities.  Psychiatry: Appropriate mood and affect.    HOSPITAL MEDICATIONS:  MEDICATIONS  (STANDING):  acetaminophen   Tablet .. 975 milliGRAM(s) Oral every 8 hours  amiodarone    Tablet 200 milliGRAM(s) Oral daily  bisacodyl Suppository 10 milliGRAM(s) Rectal once  buDESOnide 160 MICROgram(s)/formoterol 4.5 MICROgram(s) Inhaler 2 Puff(s) Inhalation two times a day  carvedilol 6.25 milliGRAM(s) Oral every 12 hours  dextrose 5%. 1000 milliLiter(s) (50 mL/Hr) IV Continuous <Continuous>  dextrose 50% Injectable 12.5 Gram(s) IV Push once  dextrose 50% Injectable 25 Gram(s) IV Push once  dextrose 50% Injectable 25 Gram(s) IV Push once  dextrose 50% Injectable 12.5 Gram(s) IV Push once  dextrose 50% Injectable 25 Gram(s) IV Push once  dextrose 50% Injectable 25 Gram(s) IV Push once  docusate sodium 100 milliGRAM(s) Oral three times a day  DULoxetine 60 milliGRAM(s) Oral daily  enoxaparin Injectable 40 milliGRAM(s) SubCutaneous every 24 hours  insulin glargine Injectable (LANTUS) 54 Unit(s) SubCutaneous at bedtime  insulin lispro (HumaLOG) corrective regimen sliding scale   SubCutaneous three times a day before meals  insulin lispro (HumaLOG) corrective regimen sliding scale   SubCutaneous at bedtime  insulin lispro Injectable (HumaLOG) 20 Unit(s) SubCutaneous before lunch  insulin lispro Injectable (HumaLOG) 20 Unit(s) SubCutaneous before dinner  insulin lispro Injectable (HumaLOG) 24 Unit(s) SubCutaneous before breakfast  isosorbide   mononitrate ER Tablet (IMDUR) 30 milliGRAM(s) Oral daily  levothyroxine 188 MICROGram(s) Oral daily  mesalamine DR (24-Hour) Tablet 2.4 Gram(s) Oral daily  norethindrone acetate 5 milliGRAM(s) Oral daily  pantoprazole    Tablet 40 milliGRAM(s) Oral before breakfast  polyethylene glycol 3350 17 Gram(s) Oral daily  senna 2 Tablet(s) Oral at bedtime  tiotropium 18 MICROgram(s) Capsule 1 Capsule(s) Inhalation daily    MEDICATIONS  (PRN):  acetaminophen   Tablet .. 650 milliGRAM(s) Oral every 6 hours PRN Mild Pain (1 - 3), Moderate Pain (4 - 6)  bisacodyl Suppository 10 milliGRAM(s) Rectal once PRN Constipation  dextrose 40% Gel 15 Gram(s) Oral once PRN Blood Glucose LESS THAN 70 milliGRAM(s)/deciliter  dextrose 40% Gel 15 Gram(s) Oral once PRN Blood Glucose LESS THAN 70 milliGRAM(s)/deciliter  glucagon  Injectable 1 milliGRAM(s) IntraMuscular once PRN Glucose LESS THAN 70 milligrams/deciliter  glucagon  Injectable 1 milliGRAM(s) IntraMuscular once PRN Glucose LESS THAN 70 milligrams/deciliter  HYDROmorphone  Injectable 0.2 milliGRAM(s) IV Push every 10 minutes PRN Moderate Pain (4 - 6)  melatonin 3 milliGRAM(s) Oral at bedtime PRN Insomnia  ondansetron Injectable 4 milliGRAM(s) IV Push once PRN Nausea and/or Vomiting  ondansetron Injectable 4 milliGRAM(s) IV Push every 6 hours PRN Nausea and/or Vomiting  oxyCODONE    IR 2.5 milliGRAM(s) Oral every 4 hours PRN Moderate Pain (4 - 6)  oxyCODONE    IR 5 milliGRAM(s) Oral every 4 hours PRN Severe Pain (7 - 10)  traMADol 25 milliGRAM(s) Oral every 8 hours PRN Mild Pain (1 - 3)      LABS:                        8.4    10.88 )-----------( 191      ( 14 Apr 2019 10:19 )             27.4     04-14    139  |  97  |  66<H>  ----------------------------<  157<H>  3.7   |  29  |  1.91<H>    Ca    9.4      14 Apr 2019 06:39                MICROBIOLOGY:     RADIOLOGY:  [ ] Reviewed and interpreted by me    Point of Care Ultrasound Findings:    PFT:    EKG:

## 2019-04-15 NOTE — PROGRESS NOTE ADULT - PROBLEM SELECTOR PLAN 9
SCDS in setting of vaginal bleeding

## 2019-04-19 ENCOUNTER — INBOUND DOCUMENT (OUTPATIENT)
Age: 79
End: 2019-04-19

## 2019-04-23 PROBLEM — S76.111D: Status: ACTIVE | Noted: 2019-04-23

## 2019-04-24 ENCOUNTER — APPOINTMENT (OUTPATIENT)
Dept: ORTHOPEDIC SURGERY | Facility: CLINIC | Age: 79
End: 2019-04-24
Payer: MEDICARE

## 2019-04-24 VITALS
BODY MASS INDEX: 44.16 KG/M2 | DIASTOLIC BLOOD PRESSURE: 80 MMHG | HEART RATE: 99 BPM | HEIGHT: 62 IN | SYSTOLIC BLOOD PRESSURE: 127 MMHG | WEIGHT: 240 LBS

## 2019-04-24 DIAGNOSIS — S76.111D STRAIN OF RIGHT QUADRICEPS MUSCLE, FASCIA AND TENDON, SUBSEQUENT ENCOUNTER: ICD-10-CM

## 2019-04-24 PROCEDURE — 99024 POSTOP FOLLOW-UP VISIT: CPT

## 2019-04-25 ENCOUNTER — APPOINTMENT (OUTPATIENT)
Dept: PULMONOLOGY | Facility: CLINIC | Age: 79
End: 2019-04-25

## 2019-04-28 NOTE — HISTORY OF PRESENT ILLNESS
[de-identified] : S/P Right quad tendon repair 4/11/19  [de-identified] : 77 yo F COPD on home O2 S/P Right quad tendon repair 4/11/19 presents for first post op followup. She is currently WBAT locked in extension.  [de-identified] : Right LE \par Aquacel removed \par Staples in place no erythema no drainage \par SILT TN SPN DPN SN \par not yet able to SLR against gravity \par knee ROM not tested \par 2+ distal pulses NVID  [de-identified] : none  [de-identified] : S/P Right quad tendon repair 4/11/19 doing well  [de-identified] : S/P Right quad tendon repair 4/11/19 \par doing well \par P: \par - Staples removed Steristrips placed\par - Continue WBAT locked in extension at all times \par - F/U in 3 weeks for post op followup and advancement in knee ROM

## 2019-05-06 RX ORDER — FUROSEMIDE 40 MG
80 TABLET ORAL
Qty: 0 | Refills: 0 | DISCHARGE
Start: 2019-05-06 | End: 2019-05-10

## 2019-05-07 RX ORDER — PIPERACILLIN AND TAZOBACTAM 4; .5 G/20ML; G/20ML
2.25 INJECTION, POWDER, LYOPHILIZED, FOR SOLUTION INTRAVENOUS
Qty: 0 | Refills: 0 | DISCHARGE
Start: 2019-05-07 | End: 2019-05-14

## 2019-05-07 RX ORDER — IPRATROPIUM/ALBUTEROL SULFATE 18-103MCG
3 AEROSOL WITH ADAPTER (GRAM) INHALATION
Qty: 0 | Refills: 0 | DISCHARGE
Start: 2019-05-07 | End: 2019-05-12

## 2019-05-08 ENCOUNTER — INPATIENT (INPATIENT)
Facility: HOSPITAL | Age: 79
LOS: 7 days | Discharge: HOME | DRG: 291 | End: 2019-05-16
Attending: INTERNAL MEDICINE | Admitting: INTERNAL MEDICINE
Payer: COMMERCIAL

## 2019-05-08 VITALS
DIASTOLIC BLOOD PRESSURE: 84 MMHG | SYSTOLIC BLOOD PRESSURE: 154 MMHG | WEIGHT: 250 LBS | HEART RATE: 96 BPM | HEIGHT: 65 IN | OXYGEN SATURATION: 98 % | TEMPERATURE: 98 F | RESPIRATION RATE: 20 BRPM

## 2019-05-08 DIAGNOSIS — Z98.891 HISTORY OF UTERINE SCAR FROM PREVIOUS SURGERY: Chronic | ICD-10-CM

## 2019-05-08 DIAGNOSIS — Z95.3 PRESENCE OF XENOGENIC HEART VALVE: Chronic | ICD-10-CM

## 2019-05-08 DIAGNOSIS — I50.9 HEART FAILURE, UNSPECIFIED: ICD-10-CM

## 2019-05-08 LAB
ALBUMIN SERPL ELPH-MCNC: 3.8 G/DL — SIGNIFICANT CHANGE UP (ref 3.3–5)
ALP SERPL-CCNC: 69 U/L — SIGNIFICANT CHANGE UP (ref 40–120)
ALT FLD-CCNC: 57 U/L — HIGH (ref 10–45)
ANION GAP SERPL CALC-SCNC: 14 MMOL/L — SIGNIFICANT CHANGE UP (ref 5–17)
APTT BLD: 34.5 SEC — SIGNIFICANT CHANGE UP (ref 27.5–36.3)
AST SERPL-CCNC: 51 U/L — HIGH (ref 10–40)
BILIRUB SERPL-MCNC: 0.8 MG/DL — SIGNIFICANT CHANGE UP (ref 0.2–1.2)
BUN SERPL-MCNC: 44 MG/DL — HIGH (ref 7–23)
CALCIUM SERPL-MCNC: 9.5 MG/DL — SIGNIFICANT CHANGE UP (ref 8.4–10.5)
CHLORIDE SERPL-SCNC: 95 MMOL/L — LOW (ref 96–108)
CO2 SERPL-SCNC: 29 MMOL/L — SIGNIFICANT CHANGE UP (ref 22–31)
CREAT SERPL-MCNC: 1.87 MG/DL — HIGH (ref 0.5–1.3)
GLUCOSE BLDC GLUCOMTR-MCNC: 122 MG/DL — HIGH (ref 70–99)
GLUCOSE SERPL-MCNC: 110 MG/DL — HIGH (ref 70–99)
HCT VFR BLD CALC: 29.3 % — LOW (ref 34.5–45)
HGB BLD-MCNC: 9.7 G/DL — LOW (ref 11.5–15.5)
HMPV RNA SPEC QL NAA+PROBE: DETECTED
INR BLD: 2.59 RATIO — HIGH (ref 0.88–1.16)
MAGNESIUM SERPL-MCNC: 2.1 MG/DL — SIGNIFICANT CHANGE UP (ref 1.6–2.6)
MCHC RBC-ENTMCNC: 32.5 PG — SIGNIFICANT CHANGE UP (ref 27–34)
MCHC RBC-ENTMCNC: 33.2 GM/DL — SIGNIFICANT CHANGE UP (ref 32–36)
MCV RBC AUTO: 98.1 FL — SIGNIFICANT CHANGE UP (ref 80–100)
NT-PROBNP SERPL-SCNC: 2427 PG/ML — HIGH (ref 0–300)
PLATELET # BLD AUTO: 149 K/UL — LOW (ref 150–400)
POTASSIUM SERPL-MCNC: 4.4 MMOL/L — SIGNIFICANT CHANGE UP (ref 3.5–5.3)
POTASSIUM SERPL-SCNC: 4.4 MMOL/L — SIGNIFICANT CHANGE UP (ref 3.5–5.3)
PROT SERPL-MCNC: 7.3 G/DL — SIGNIFICANT CHANGE UP (ref 6–8.3)
PROTHROM AB SERPL-ACNC: 30.7 SEC — HIGH (ref 10–12.9)
RAPID RVP RESULT: DETECTED
RBC # BLD: 2.99 M/UL — LOW (ref 3.8–5.2)
RBC # FLD: 14.5 % — SIGNIFICANT CHANGE UP (ref 10.3–14.5)
SODIUM SERPL-SCNC: 138 MMOL/L — SIGNIFICANT CHANGE UP (ref 135–145)
TROPONIN T, HIGH SENSITIVITY RESULT: 60 NG/L — HIGH (ref 0–51)
WBC # BLD: 5 K/UL — SIGNIFICANT CHANGE UP (ref 3.8–10.5)
WBC # FLD AUTO: 5 K/UL — SIGNIFICANT CHANGE UP (ref 3.8–10.5)

## 2019-05-08 PROCEDURE — 93010 ELECTROCARDIOGRAM REPORT: CPT

## 2019-05-08 PROCEDURE — 71045 X-RAY EXAM CHEST 1 VIEW: CPT | Mod: 26

## 2019-05-08 PROCEDURE — 99285 EMERGENCY DEPT VISIT HI MDM: CPT | Mod: 25

## 2019-05-08 RX ORDER — MESALAMINE 400 MG
2.4 TABLET, DELAYED RELEASE (ENTERIC COATED) ORAL DAILY
Qty: 0 | Refills: 0 | Status: DISCONTINUED | OUTPATIENT
Start: 2019-05-08 | End: 2019-05-16

## 2019-05-08 RX ORDER — ISOSORBIDE MONONITRATE 60 MG/1
30 TABLET, EXTENDED RELEASE ORAL DAILY
Qty: 0 | Refills: 0 | Status: DISCONTINUED | OUTPATIENT
Start: 2019-05-08 | End: 2019-05-16

## 2019-05-08 RX ORDER — NITROFURANTOIN MACROCRYSTAL 50 MG
0 CAPSULE ORAL
Qty: 0 | Refills: 0 | COMMUNITY

## 2019-05-08 RX ORDER — DULOXETINE HYDROCHLORIDE 30 MG/1
1 CAPSULE, DELAYED RELEASE ORAL
Qty: 0 | Refills: 0 | COMMUNITY

## 2019-05-08 RX ORDER — AMIODARONE HYDROCHLORIDE 400 MG/1
1 TABLET ORAL
Qty: 0 | Refills: 0 | COMMUNITY

## 2019-05-08 RX ORDER — IPRATROPIUM/ALBUTEROL SULFATE 18-103MCG
3 AEROSOL WITH ADAPTER (GRAM) INHALATION EVERY 6 HOURS
Qty: 0 | Refills: 0 | Status: DISCONTINUED | OUTPATIENT
Start: 2019-05-08 | End: 2019-05-09

## 2019-05-08 RX ORDER — INSULIN GLARGINE 100 [IU]/ML
54 INJECTION, SOLUTION SUBCUTANEOUS AT BEDTIME
Qty: 0 | Refills: 0 | Status: DISCONTINUED | OUTPATIENT
Start: 2019-05-08 | End: 2019-05-10

## 2019-05-08 RX ORDER — LANOLIN ALCOHOL/MO/W.PET/CERES
10 CREAM (GRAM) TOPICAL AT BEDTIME
Qty: 0 | Refills: 0 | Status: DISCONTINUED | OUTPATIENT
Start: 2019-05-08 | End: 2019-05-16

## 2019-05-08 RX ORDER — POLYETHYLENE GLYCOL 3350 17 G/17G
17 POWDER, FOR SOLUTION ORAL
Qty: 0 | Refills: 0 | COMMUNITY

## 2019-05-08 RX ORDER — ISOSORBIDE MONONITRATE 60 MG/1
1 TABLET, EXTENDED RELEASE ORAL
Qty: 0 | Refills: 0 | COMMUNITY

## 2019-05-08 RX ORDER — FENOFIBRATE,MICRONIZED 130 MG
145 CAPSULE ORAL DAILY
Qty: 0 | Refills: 0 | Status: DISCONTINUED | OUTPATIENT
Start: 2019-05-08 | End: 2019-05-16

## 2019-05-08 RX ORDER — FENOFIBRATE,MICRONIZED 130 MG
1 CAPSULE ORAL
Qty: 0 | Refills: 0 | COMMUNITY

## 2019-05-08 RX ORDER — FUROSEMIDE 40 MG
40 TABLET ORAL ONCE
Qty: 0 | Refills: 0 | Status: COMPLETED | OUTPATIENT
Start: 2019-05-08 | End: 2019-05-08

## 2019-05-08 RX ORDER — UMECLIDINIUM BROMIDE AND VILANTEROL TRIFENATATE 62.5; 25 UG/1; UG/1
1 POWDER RESPIRATORY (INHALATION)
Qty: 0 | Refills: 0 | COMMUNITY

## 2019-05-08 RX ORDER — SPIRONOLACTONE 25 MG/1
1 TABLET, FILM COATED ORAL
Qty: 0 | Refills: 0 | COMMUNITY

## 2019-05-08 RX ORDER — ALPRAZOLAM 0.25 MG
0.25 TABLET ORAL
Qty: 0 | Refills: 0 | Status: DISCONTINUED | OUTPATIENT
Start: 2019-05-08 | End: 2019-05-15

## 2019-05-08 RX ORDER — WARFARIN SODIUM 2.5 MG/1
3 TABLET ORAL ONCE
Qty: 0 | Refills: 0 | Status: DISCONTINUED | OUTPATIENT
Start: 2019-05-08 | End: 2019-05-09

## 2019-05-08 RX ORDER — LEVOTHYROXINE SODIUM 125 MCG
88 TABLET ORAL DAILY
Qty: 0 | Refills: 0 | Status: DISCONTINUED | OUTPATIENT
Start: 2019-05-08 | End: 2019-05-16

## 2019-05-08 RX ORDER — DOCUSATE SODIUM 100 MG
100 CAPSULE ORAL
Qty: 0 | Refills: 0 | Status: DISCONTINUED | OUTPATIENT
Start: 2019-05-08 | End: 2019-05-16

## 2019-05-08 RX ORDER — CARVEDILOL PHOSPHATE 80 MG/1
6.25 CAPSULE, EXTENDED RELEASE ORAL EVERY 12 HOURS
Qty: 0 | Refills: 0 | Status: DISCONTINUED | OUTPATIENT
Start: 2019-05-08 | End: 2019-05-16

## 2019-05-08 RX ORDER — NITROFURANTOIN MACROCRYSTAL 50 MG
1 CAPSULE ORAL
Qty: 0 | Refills: 0 | COMMUNITY

## 2019-05-08 RX ORDER — LACTOBACILLUS ACIDOPHILUS 100MM CELL
1 CAPSULE ORAL
Qty: 0 | Refills: 0 | COMMUNITY

## 2019-05-08 RX ORDER — PANTOPRAZOLE SODIUM 20 MG/1
40 TABLET, DELAYED RELEASE ORAL
Qty: 0 | Refills: 0 | Status: DISCONTINUED | OUTPATIENT
Start: 2019-05-08 | End: 2019-05-16

## 2019-05-08 RX ORDER — UBIDECARENONE 100 MG
1 CAPSULE ORAL
Qty: 0 | Refills: 0 | COMMUNITY

## 2019-05-08 RX ORDER — NORETHINDRONE 0.35 MG/1
1 TABLET ORAL
Qty: 0 | Refills: 0 | COMMUNITY

## 2019-05-08 RX ORDER — FUROSEMIDE 40 MG
40 TABLET ORAL EVERY 12 HOURS
Qty: 0 | Refills: 0 | Status: DISCONTINUED | OUTPATIENT
Start: 2019-05-08 | End: 2019-05-10

## 2019-05-08 RX ORDER — ALPRAZOLAM 0.25 MG
1 TABLET ORAL
Qty: 0 | Refills: 0 | COMMUNITY

## 2019-05-08 RX ORDER — LEVOTHYROXINE SODIUM 125 MCG
1 TABLET ORAL
Qty: 0 | Refills: 0 | COMMUNITY

## 2019-05-08 RX ORDER — WARFARIN SODIUM 2.5 MG/1
1 TABLET ORAL
Qty: 0 | Refills: 0 | COMMUNITY

## 2019-05-08 RX ORDER — ACETAMINOPHEN 500 MG
650 TABLET ORAL EVERY 6 HOURS
Qty: 0 | Refills: 0 | Status: DISCONTINUED | OUTPATIENT
Start: 2019-05-08 | End: 2019-05-16

## 2019-05-08 RX ORDER — MESALAMINE 400 MG
2 TABLET, DELAYED RELEASE (ENTERIC COATED) ORAL
Qty: 0 | Refills: 0 | COMMUNITY

## 2019-05-08 RX ORDER — MULTIVIT-MIN/FERROUS GLUCONATE 9 MG/15 ML
1 LIQUID (ML) ORAL
Qty: 0 | Refills: 0 | COMMUNITY

## 2019-05-08 RX ORDER — DULOXETINE HYDROCHLORIDE 30 MG/1
60 CAPSULE, DELAYED RELEASE ORAL DAILY
Qty: 0 | Refills: 0 | Status: DISCONTINUED | OUTPATIENT
Start: 2019-05-08 | End: 2019-05-16

## 2019-05-08 RX ORDER — LEVOTHYROXINE SODIUM 125 MCG
100 TABLET ORAL DAILY
Qty: 0 | Refills: 0 | Status: DISCONTINUED | OUTPATIENT
Start: 2019-05-08 | End: 2019-05-16

## 2019-05-08 RX ORDER — SENNA PLUS 8.6 MG/1
2 TABLET ORAL AT BEDTIME
Qty: 0 | Refills: 0 | Status: DISCONTINUED | OUTPATIENT
Start: 2019-05-08 | End: 2019-05-16

## 2019-05-08 RX ORDER — AMIODARONE HYDROCHLORIDE 400 MG/1
200 TABLET ORAL DAILY
Qty: 0 | Refills: 0 | Status: DISCONTINUED | OUTPATIENT
Start: 2019-05-08 | End: 2019-05-16

## 2019-05-08 RX ORDER — CARVEDILOL PHOSPHATE 80 MG/1
1 CAPSULE, EXTENDED RELEASE ORAL
Qty: 0 | Refills: 0 | COMMUNITY

## 2019-05-08 RX ORDER — SPIRONOLACTONE 25 MG/1
25 TABLET, FILM COATED ORAL DAILY
Qty: 0 | Refills: 0 | Status: DISCONTINUED | OUTPATIENT
Start: 2019-05-08 | End: 2019-05-16

## 2019-05-08 RX ORDER — CHOLECALCIFEROL (VITAMIN D3) 125 MCG
1000 CAPSULE ORAL DAILY
Qty: 0 | Refills: 0 | Status: DISCONTINUED | OUTPATIENT
Start: 2019-05-08 | End: 2019-05-16

## 2019-05-08 RX ORDER — CHOLECALCIFEROL (VITAMIN D3) 125 MCG
1 CAPSULE ORAL
Qty: 0 | Refills: 0 | COMMUNITY

## 2019-05-08 RX ORDER — POLYETHYLENE GLYCOL 3350 17 G/17G
17 POWDER, FOR SOLUTION ORAL DAILY
Qty: 0 | Refills: 0 | Status: DISCONTINUED | OUTPATIENT
Start: 2019-05-08 | End: 2019-05-16

## 2019-05-08 RX ADMIN — Medication 40 MILLIGRAM(S): at 20:09

## 2019-05-08 NOTE — H&P ADULT - ASSESSMENT
77 yo woman w multiple medical problems outline in [facility] being admitted for HMPV tracheobronchitis and acute on chronic diastolic CHF. will diurese w lasix 40 mg IV q 12H, duonebs, po Prednisone,  will observe off ABx, close watch to her finger sticks 2/2 being on steroids. will consult card, endo, pulm, wound, id and ortho.ptn had a recent Echo no need to repeat it. cont coumadin and daily Pt/INR check., cont rest of outptn meds.

## 2019-05-08 NOTE — ED ADULT NURSE NOTE - NSIMPLEMENTINTERV_GEN_ALL_ED
Implemented All Fall with Harm Risk Interventions:  [place] to call system. Call bell, personal items and telephone within reach. Instruct patient to call for assistance. Room bathroom lighting operational. Non-slip footwear when patient is off stretcher. Physically safe environment: no spills, clutter or unnecessary equipment. Stretcher in lowest position, wheels locked, appropriate side rails in place. Provide visual cue, wrist band, yellow gown, etc. Monitor gait and stability. Monitor for mental status changes and reorient to person, place, and time. Review medications for side effects contributing to fall risk. Reinforce activity limits and safety measures with patient and family. Provide visual clues: red socks.

## 2019-05-08 NOTE — ED PROVIDER NOTE - CARE PLAN
Principal Discharge DX:	Acute on chronic congestive heart failure, unspecified heart failure type  Secondary Diagnosis:	Chronic kidney disease (CKD)  Secondary Diagnosis:	Anemia of chronic disease

## 2019-05-08 NOTE — ED PROVIDER NOTE - PROGRESS NOTE DETAILS
Spoke with Dr. Jimenez covering for Dr. Aiken. Patient to be admitted to Dr. Lopez. Spoke with Dr. Lopez who has accepted the patient to her service.

## 2019-05-08 NOTE — ED ADULT NURSE REASSESSMENT NOTE - NS ED NURSE REASSESS COMMENT FT1
Patient A&Ox3, resting comfortably in bed no complaints at this time. Patient requesting finger stick to check sugar at this time. Finger stick performed.  at this time. Patient satisfied. patient awaiting bed assignment. will continue to monitor.

## 2019-05-08 NOTE — H&P ADULT - NSHPPHYSICALEXAM_GEN_ALL_CORE
wd obese woman in NAD on O2NC, speaking in partially broken sentences 2/2 dyspnea,     T(F): 97.8 (05-08-19 @ 20:17), Max: 98.3 (05-08-19 @ 17:20)  HR: 90 (05-08-19 @ 20:17) (90 - 96)  BP: 136/86 (05-08-19 @ 20:17) (123/64 - 154/84)  RR: 20 (05-08-19 @ 20:17) (20 - 22)  SpO2: 100% (05-08-19 @ 20:17) (98% - 100%)    PHYSICAL EXAM:  GENERAL: NAD, well-developed  HEAD:  Atraumatic, Normocephalic  EYES: EOMI, PERRLA, conjunctiva and sclera clear  NECK: Supple, No JVD  CHEST/LUNG: b/l exp wheezing  HEART: Regular rate and rhythm; No murmurs, rubs, or gallops  ABDOMEN: Soft, Nontender, Nondistended; Bowel sounds present  EXTREMITIES:  3+ edema bl LE, both LE w ACE wraps, has B/L chronic leg wounds  PSYCH: AAOx3  NEUROLOGY: non-focal  SKIN: No rashes or lesions

## 2019-05-08 NOTE — ED PROVIDER NOTE - PMH
Anemia of chronic disease    Arthritis of spine    Asthma  PFT (4/2018)  Chronic diastolic CHF (congestive heart failure)  EF 56% (4/2017)  Chronic kidney disease (CKD)    Chronic low back pain    Depression (emotion)    Diabetes mellitus  T2DM last A1C 6.7 mg/dl in January   fs range 59- 200 mg/dl  Dyslipidemia associated with type 2 diabetes mellitus    Edema    GERD (gastroesophageal reflux disease)    H/O: hypothyroidism    Herniated disc  lumbar  History of postmenopausal bleeding    Hypertension    Macular degeneration of left eye  receiving injection  Morbid obesity with BMI of 45.0-49.9, adult    Neuropathy    On home oxygen therapy  2  liters nasal cannula  VICKY (obstructive sleep apnea)    Paroxysmal atrial fibrillation  h/o rapid ventricular rate 2 years ago, admitted at [facility], controlled with medication as per patient.  last INR 2.0  Peripheral arterial disease  s/p remote stent. not on antiplatelet meds for a while.  Ulcerative colitis

## 2019-05-08 NOTE — ED ADULT NURSE REASSESSMENT NOTE - NS ED NURSE REASSESS COMMENT FT1
Patient A&Ox3, resting comfortably in bed. Patient on 4L NC Spo2 100%. RR 20. Patient admitted, awaiting bed assignment. No complaints at this time. Family member at the bedside. VSS. Will continue to monitor.

## 2019-05-08 NOTE — ED PROVIDER NOTE - CLINICAL SUMMARY MEDICAL DECISION MAKING FREE TEXT BOX
Multiple co-morbidities. Recent knee surgery. CC is SOB. Benign exam. Appropriate screening studies obtained. Based on prior creatinine, will likely not be able to go for CTA of her chest. Will follow her ordered studies, reassess, attempt to reach her PCP and treat/dispo accordingly.

## 2019-05-08 NOTE — ED PROVIDER NOTE - CONSTITUTIONAL, MLM
normal... Well nourished, awake, alert, and in no apparent distress on her usual 3L of supplemental O2 via NC.

## 2019-05-08 NOTE — ED PROVIDER NOTE - OBJECTIVE STATEMENT
77 y/o female c/ a h/o HTN, atrial fibrillation, CHF, s/p bioprosthetic MVR, DM, CKD and VICKY, on 3L supplemental O2 via NC at all times and who presents as a transfer from [facility] due to SOB of a few days duration. She is at [facility] s/p repair of a torn R quadriceps tendon and describes her SOB as a sense of inability to take a full satisfying breath. It has been intermittent, exacerbated by some activity, without clear relieving factor and a/w cough productive of yellowish sputum. She mentions her roommate in the facility who reportedly  due to PNA.

## 2019-05-08 NOTE — H&P ADULT - HISTORY OF PRESENT ILLNESS
77 yo woman w/ hx of moderate MS s/p bioprosthetic mitral valve, diastolic CHF, paroxsmal afib (not on A/C in setting of bleeding), HTN, severe pulmonary HTN, DMT2, CKD III, anemia, with post menopausal vaginal bleeding, UC, VICKY on 3L NC (not on CPAP/BIPAP) presents from [facility] w dyspnea, cough 79 yo woman w multiple medical problems  presents from [facility] w dyspnea and productive cough for 3 days, no fevers, no chills, no CP, no palpitations . RVP positive for HMPV   Ptn was sent to [facility] post admission 3/30-4/15/19 with UTI, further uterine bleeding, and Right Quadricept tendon repair 2/2 traumatic rupture post mechanical fall.   PMH: morbid obesity, restrictive lung disease, ILD, VICKY/OHS with chronic hypercapnic and hypoxic respiratory failure on 3L NC (not on CPAP), PAD/PVD with chronic LE wounds, h/o MS s/p bioprosthetic MVR, dCHF, HTN, DM 2, CKD III, anemia, post menopausal vaginal bleeding s/p D&C in July 2018 and in April 2019, findings benign, placed on Norethindrone and cleared to restart ELiquis for PAF, UC, Right knee Quadriceps tendon rupture , s/p repair in April ( last month), chronic back 2/2 spinal stenosis with gait instability,  wheelchair dependent for the past 5-6 months. Requires assistance of her  for ADLS. 77 yo woman w multiple medical problems  presents from [facility] w dyspnea and productive cough for 3 days, no fevers, no chills, no CP, no palpitations . RVP positive for HMPV. was started on IV Zosyn duonebs and po prednisone at [facility] on 5/7   Ptn was sent to [facility] post admission 3/30-4/15/19 with UTI, further uterine bleeding, and Right Quadricept tendon repair 2/2 traumatic rupture post mechanical fall.   PMH: morbid obesity with functional paraplegia, restrictive lung disease, ILD, VICKY/OHS with chronic hypercapnic and hypoxic respiratory failure on 3L NC (not on CPAP), PAD/PVD with chronic LE wounds, h/o MS s/p bioprosthetic MVR, dCHF, HTN, DM 2, CKD III, anemia, post menopausal vaginal bleeding s/p D&C in July 2018 and in April 2019 had D&C amd Mirena IUD placement, pathology findings benign, no further bleeding since. placed initially on Norethindrone and none at present. Ptn was  cleared to restart ELiquis for PAFI stable on amiodarone) prior to DC but now she is on coumadin, not sure why the medication was changed. also h/o , UC, Right knee Quadriceps tendon rupture , s/p repair in April ( last month), chronic hematuria, s/p cystoscopy w no findings of cystitis or polyp in april. chronic back 2/2 spinal stenosis with gait instability,  wheelchair dependent for the past 5-6 months. Requires assistance of her  for ADLS.

## 2019-05-09 DIAGNOSIS — I50.9 HEART FAILURE, UNSPECIFIED: ICD-10-CM

## 2019-05-09 DIAGNOSIS — J96.11 CHRONIC RESPIRATORY FAILURE WITH HYPOXIA: ICD-10-CM

## 2019-05-09 DIAGNOSIS — E66.01 MORBID (SEVERE) OBESITY DUE TO EXCESS CALORIES: ICD-10-CM

## 2019-05-09 DIAGNOSIS — Z29.9 ENCOUNTER FOR PROPHYLACTIC MEASURES, UNSPECIFIED: ICD-10-CM

## 2019-05-09 DIAGNOSIS — G47.33 OBSTRUCTIVE SLEEP APNEA (ADULT) (PEDIATRIC): ICD-10-CM

## 2019-05-09 DIAGNOSIS — B97.81 HUMAN METAPNEUMOVIRUS AS THE CAUSE OF DISEASES CLASSIFIED ELSEWHERE: ICD-10-CM

## 2019-05-09 DIAGNOSIS — R06.02 SHORTNESS OF BREATH: ICD-10-CM

## 2019-05-09 LAB
BASE EXCESS BLDA CALC-SCNC: 3.2 MMOL/L — HIGH (ref -2–2)
CO2 BLDA-SCNC: 31 MMOL/L — HIGH (ref 22–30)
GLUCOSE BLDC GLUCOMTR-MCNC: 118 MG/DL — HIGH (ref 70–99)
GLUCOSE BLDC GLUCOMTR-MCNC: 127 MG/DL — HIGH (ref 70–99)
GLUCOSE BLDC GLUCOMTR-MCNC: 146 MG/DL — HIGH (ref 70–99)
GLUCOSE BLDC GLUCOMTR-MCNC: 211 MG/DL — HIGH (ref 70–99)
GLUCOSE BLDC GLUCOMTR-MCNC: 245 MG/DL — HIGH (ref 70–99)
HCO3 BLDA-SCNC: 29 MMOL/L — SIGNIFICANT CHANGE UP (ref 21–29)
HCT VFR BLD CALC: 31.2 % — LOW (ref 34.5–45)
HGB BLD-MCNC: 9.1 G/DL — LOW (ref 11.5–15.5)
HOROWITZ INDEX BLDA+IHG-RTO: 36 — SIGNIFICANT CHANGE UP
INR BLD: 2.34 RATIO — HIGH (ref 0.88–1.16)
MCHC RBC-ENTMCNC: 29.2 GM/DL — LOW (ref 32–36)
MCHC RBC-ENTMCNC: 29.7 PG — SIGNIFICANT CHANGE UP (ref 27–34)
MCV RBC AUTO: 102 FL — HIGH (ref 80–100)
PCO2 BLDA: 54 MMHG — HIGH (ref 32–46)
PH BLDA: 7.35 — SIGNIFICANT CHANGE UP (ref 7.35–7.45)
PLATELET # BLD AUTO: 159 K/UL — SIGNIFICANT CHANGE UP (ref 150–400)
PO2 BLDA: 89 MMHG — SIGNIFICANT CHANGE UP (ref 74–108)
PROCALCITONIN SERPL-MCNC: 0.05 NG/ML — SIGNIFICANT CHANGE UP (ref 0.02–0.1)
PROTHROM AB SERPL-ACNC: 27.2 SEC — HIGH (ref 10–13.1)
RBC # BLD: 3.06 M/UL — LOW (ref 3.8–5.2)
RBC # FLD: 15.5 % — HIGH (ref 10.3–14.5)
SAO2 % BLDA: 96 % — SIGNIFICANT CHANGE UP (ref 92–96)
WBC # BLD: 7.05 K/UL — SIGNIFICANT CHANGE UP (ref 3.8–10.5)
WBC # FLD AUTO: 7.05 K/UL — SIGNIFICANT CHANGE UP (ref 3.8–10.5)

## 2019-05-09 PROCEDURE — 99223 1ST HOSP IP/OBS HIGH 75: CPT

## 2019-05-09 PROCEDURE — 71045 X-RAY EXAM CHEST 1 VIEW: CPT | Mod: 26

## 2019-05-09 PROCEDURE — 99232 SBSQ HOSP IP/OBS MODERATE 35: CPT

## 2019-05-09 PROCEDURE — 93010 ELECTROCARDIOGRAM REPORT: CPT

## 2019-05-09 RX ORDER — SODIUM CHLORIDE 9 MG/ML
1000 INJECTION, SOLUTION INTRAVENOUS
Refills: 0 | Status: DISCONTINUED | OUTPATIENT
Start: 2019-05-09 | End: 2019-05-16

## 2019-05-09 RX ORDER — ALPRAZOLAM 0.25 MG
0.25 TABLET ORAL ONCE
Refills: 0 | Status: DISCONTINUED | OUTPATIENT
Start: 2019-05-09 | End: 2019-05-09

## 2019-05-09 RX ORDER — DEXTROSE 50 % IN WATER 50 %
12.5 SYRINGE (ML) INTRAVENOUS ONCE
Refills: 0 | Status: DISCONTINUED | OUTPATIENT
Start: 2019-05-09 | End: 2019-05-16

## 2019-05-09 RX ORDER — INSULIN LISPRO 100/ML
VIAL (ML) SUBCUTANEOUS
Refills: 0 | Status: DISCONTINUED | OUTPATIENT
Start: 2019-05-09 | End: 2019-05-16

## 2019-05-09 RX ORDER — INSULIN LISPRO 100/ML
10 VIAL (ML) SUBCUTANEOUS
Refills: 0 | Status: DISCONTINUED | OUTPATIENT
Start: 2019-05-10 | End: 2019-05-10

## 2019-05-09 RX ORDER — WARFARIN SODIUM 2.5 MG/1
3.5 TABLET ORAL ONCE
Refills: 0 | Status: COMPLETED | OUTPATIENT
Start: 2019-05-09 | End: 2019-05-09

## 2019-05-09 RX ORDER — INSULIN LISPRO 100/ML
VIAL (ML) SUBCUTANEOUS AT BEDTIME
Refills: 0 | Status: DISCONTINUED | OUTPATIENT
Start: 2019-05-09 | End: 2019-05-09

## 2019-05-09 RX ORDER — IPRATROPIUM/ALBUTEROL SULFATE 18-103MCG
3 AEROSOL WITH ADAPTER (GRAM) INHALATION EVERY 4 HOURS
Refills: 0 | Status: DISCONTINUED | OUTPATIENT
Start: 2019-05-09 | End: 2019-05-16

## 2019-05-09 RX ORDER — GLUCAGON INJECTION, SOLUTION 0.5 MG/.1ML
1 INJECTION, SOLUTION SUBCUTANEOUS ONCE
Refills: 0 | Status: DISCONTINUED | OUTPATIENT
Start: 2019-05-09 | End: 2019-05-16

## 2019-05-09 RX ORDER — DEXTROSE 50 % IN WATER 50 %
25 SYRINGE (ML) INTRAVENOUS ONCE
Refills: 0 | Status: DISCONTINUED | OUTPATIENT
Start: 2019-05-09 | End: 2019-05-16

## 2019-05-09 RX ORDER — DEXTROSE 50 % IN WATER 50 %
15 SYRINGE (ML) INTRAVENOUS ONCE
Refills: 0 | Status: DISCONTINUED | OUTPATIENT
Start: 2019-05-09 | End: 2019-05-16

## 2019-05-09 RX ORDER — IPRATROPIUM/ALBUTEROL SULFATE 18-103MCG
3 AEROSOL WITH ADAPTER (GRAM) INHALATION EVERY 6 HOURS
Refills: 0 | Status: DISCONTINUED | OUTPATIENT
Start: 2019-05-09 | End: 2019-05-16

## 2019-05-09 RX ORDER — INSULIN LISPRO 100/ML
VIAL (ML) SUBCUTANEOUS AT BEDTIME
Refills: 0 | Status: DISCONTINUED | OUTPATIENT
Start: 2019-05-09 | End: 2019-05-16

## 2019-05-09 RX ORDER — WARFARIN SODIUM 2.5 MG/1
3 TABLET ORAL ONCE
Refills: 0 | Status: COMPLETED | OUTPATIENT
Start: 2019-05-09 | End: 2019-05-09

## 2019-05-09 RX ORDER — INSULIN LISPRO 100/ML
VIAL (ML) SUBCUTANEOUS
Refills: 0 | Status: DISCONTINUED | OUTPATIENT
Start: 2019-05-09 | End: 2019-05-09

## 2019-05-09 RX ORDER — IPRATROPIUM/ALBUTEROL SULFATE 18-103MCG
3 AEROSOL WITH ADAPTER (GRAM) INHALATION ONCE
Refills: 0 | Status: COMPLETED | OUTPATIENT
Start: 2019-05-09 | End: 2019-05-09

## 2019-05-09 RX ORDER — CARVEDILOL PHOSPHATE 80 MG/1
6.25 CAPSULE, EXTENDED RELEASE ORAL ONCE
Refills: 0 | Status: COMPLETED | OUTPATIENT
Start: 2019-05-09 | End: 2019-05-09

## 2019-05-09 RX ADMIN — Medication 3 MILLILITER(S): at 05:57

## 2019-05-09 RX ADMIN — PANTOPRAZOLE SODIUM 40 MILLIGRAM(S): 20 TABLET, DELAYED RELEASE ORAL at 05:52

## 2019-05-09 RX ADMIN — Medication 600 MILLIGRAM(S): at 18:47

## 2019-05-09 RX ADMIN — Medication 3 MILLILITER(S): at 00:32

## 2019-05-09 RX ADMIN — Medication 40 MILLIGRAM(S): at 16:51

## 2019-05-09 RX ADMIN — Medication 0.25 MILLIGRAM(S): at 16:51

## 2019-05-09 RX ADMIN — CARVEDILOL PHOSPHATE 6.25 MILLIGRAM(S): 80 CAPSULE, EXTENDED RELEASE ORAL at 05:52

## 2019-05-09 RX ADMIN — Medication 40 MILLIGRAM(S): at 05:54

## 2019-05-09 RX ADMIN — Medication 3 MILLILITER(S): at 23:00

## 2019-05-09 RX ADMIN — Medication 3 MILLILITER(S): at 07:56

## 2019-05-09 RX ADMIN — Medication 100 MICROGRAM(S): at 05:52

## 2019-05-09 RX ADMIN — Medication 3 MILLILITER(S): at 11:26

## 2019-05-09 RX ADMIN — Medication 0.25 MILLIGRAM(S): at 05:52

## 2019-05-09 RX ADMIN — DULOXETINE HYDROCHLORIDE 60 MILLIGRAM(S): 30 CAPSULE, DELAYED RELEASE ORAL at 12:05

## 2019-05-09 RX ADMIN — CARVEDILOL PHOSPHATE 6.25 MILLIGRAM(S): 80 CAPSULE, EXTENDED RELEASE ORAL at 00:32

## 2019-05-09 RX ADMIN — SPIRONOLACTONE 25 MILLIGRAM(S): 25 TABLET, FILM COATED ORAL at 05:52

## 2019-05-09 RX ADMIN — WARFARIN SODIUM 3.5 MILLIGRAM(S): 2.5 TABLET ORAL at 22:51

## 2019-05-09 RX ADMIN — SENNA PLUS 2 TABLET(S): 8.6 TABLET ORAL at 22:50

## 2019-05-09 RX ADMIN — Medication 145 MILLIGRAM(S): at 12:05

## 2019-05-09 RX ADMIN — Medication 2.4 GRAM(S): at 12:05

## 2019-05-09 RX ADMIN — Medication 40 MILLIGRAM(S): at 05:52

## 2019-05-09 RX ADMIN — CARVEDILOL PHOSPHATE 6.25 MILLIGRAM(S): 80 CAPSULE, EXTENDED RELEASE ORAL at 16:51

## 2019-05-09 RX ADMIN — INSULIN GLARGINE 54 UNIT(S): 100 INJECTION, SOLUTION SUBCUTANEOUS at 22:50

## 2019-05-09 RX ADMIN — Medication 2: at 17:06

## 2019-05-09 RX ADMIN — Medication 88 MICROGRAM(S): at 05:52

## 2019-05-09 RX ADMIN — Medication 1 TABLET(S): at 11:26

## 2019-05-09 RX ADMIN — ISOSORBIDE MONONITRATE 30 MILLIGRAM(S): 60 TABLET, EXTENDED RELEASE ORAL at 11:27

## 2019-05-09 RX ADMIN — Medication 600 MILLIGRAM(S): at 05:54

## 2019-05-09 RX ADMIN — WARFARIN SODIUM 3 MILLIGRAM(S): 2.5 TABLET ORAL at 00:32

## 2019-05-09 RX ADMIN — Medication 40 MILLIGRAM(S): at 22:50

## 2019-05-09 RX ADMIN — AMIODARONE HYDROCHLORIDE 200 MILLIGRAM(S): 400 TABLET ORAL at 05:52

## 2019-05-09 RX ADMIN — Medication 1000 UNIT(S): at 16:50

## 2019-05-09 RX ADMIN — Medication 0.25 MILLIGRAM(S): at 21:16

## 2019-05-09 RX ADMIN — Medication 3 MILLILITER(S): at 16:51

## 2019-05-09 RX ADMIN — Medication 100 MILLIGRAM(S): at 16:51

## 2019-05-09 RX ADMIN — Medication 10 MILLIGRAM(S): at 22:51

## 2019-05-09 NOTE — CONSULT NOTE ADULT - SUBJECTIVE AND OBJECTIVE BOX
Patient is a 78y old  Female who presents with a chief complaint of DYSPNEA, COUGH (09 May 2019 09:26)      HPI:    77 yo woman w multiple medical problems  presents from [facility] w dyspnea and productive cough for 3 days, no fevers, no chills, no CP, no palpitations . RVP positive for HMPV. was started on IV Zosyn duonebs and po prednisone at [facility] on    Ptn was sent to [facility] post admission 3/30-4/15/19 with UTI, further uterine bleeding, and Right Quadricept tendon repair 2/2 traumatic rupture post mechanical fall.   PMH: morbid obesity with functional paraplegia, restrictive lung disease, ILD, VICKY/OHS with chronic hypercapnic and hypoxic respiratory failure on 3L NC (not on CPAP), PAD/PVD with chronic LE wounds, h/o MS s/p bioprosthetic MVR, dCHF, HTN, DM 2, CKD III, anemia, post menopausal vaginal bleeding s/p D&C in 2018 and in 2019 had D&C amd Mirena IUD placement, pathology findings benign, no further bleeding since. placed initially on Norethindrone and none at present. Ptn was  cleared to restart ELiquis for PAFI stable on amiodarone) prior to DC but now she is on coumadin, not sure why the medication was changed. also h/o , UC, Right knee Quadriceps tendon rupture , s/p repair in April ( last month), chronic hematuria, s/p cystoscopy w no findings of cystitis or polyp in april. chronic back 2/2 spinal stenosis with gait instability,  wheelchair dependent for the past 5-6 months. Requires assistance of her  for ADLS. (08 May 2019 20:59)    Pt afebrile, no leukocytosis.  PCT 0.05.  AST/ALT mildly elevated.  RVP (+) hMPV.  Cxr with mild pulm edema on prelim read. Pt off abx.      REVIEW OF SYSTEMS:    CONSTITUTIONAL: No fever, weight loss, or fatigue  EYES: No eye pain, visual disturbances, or discharge  ENMT:  No sore throat  NECK: No pain or stiffness  RESPIRATORY: No cough, wheezing, chills or hemoptysis; No shortness of breath  CARDIOVASCULAR: No chest pain, palpitations, dizziness, or leg swelling  GASTROINTESTINAL: No abdominal or epigastric pain. No nausea, vomiting, or hematemesis; No diarrhea or constipation. No melena or hematochezia.  GENITOURINARY: No dysuria, frequency, hematuria, or incontinence  NEUROLOGICAL: No headaches, memory loss, loss of strength, numbness, or tremors  SKIN: No itching, burning, rashes, or lesions   LYMPH NODES: No enlarged glands  MUSCULOSKELETAL: No joint pain or swelling; No muscle, back, or extremity pain      PAST MEDICAL & SURGICAL HISTORY:  Chronic kidney disease (CKD)  Anemia of chronic disease  On home oxygen therapy: 2  liters nasal cannula  Asthma: PFT (2018)  History of postmenopausal bleeding  Dyslipidemia associated with type 2 diabetes mellitus  Paroxysmal atrial fibrillation: h/o rapid ventricular rate 2 years ago, admitted at [facility], controlled with medication as per patient.  last INR 2.0  Morbid obesity with BMI of 45.0-49.9, adult  H/O: hypothyroidism  Chronic diastolic CHF (congestive heart failure): EF 56% (2017)  Depression (emotion)  Arthritis of spine  Macular degeneration of left eye: receiving injection  Neuropathy  Peripheral arterial disease: s/p remote stent. not on antiplatelet meds for a while.  Hypertension  Edema  GERD (gastroesophageal reflux disease)  Chronic low back pain  Herniated disc: lumbar  VICKY (obstructive sleep apnea)  Ulcerative colitis  Diabetes mellitus: T2DM last A1C 6.7 mg/dl in January   fs range 59- 200 mg/dl  History of mitral valve replacement with bioprosthetic valve: x 4 years ago  H/O  section: x 2  Amputated toe      Allergies    Augmentin (Swelling)  cephalexin (Swelling)  latex (Rash)    Intolerances        FAMILY HISTORY:  FH: heart disease: mother      SOCIAL HISTORY:    , lives with spouse.  No smoking    MEDICATIONS  (STANDING):  ALBUTerol/ipratropium for Nebulization. 3 milliLiter(s) Nebulizer every 6 hours  ALPRAZolam 0.25 milliGRAM(s) Oral two times a day  amiodarone    Tablet 200 milliGRAM(s) Oral daily  carvedilol 6.25 milliGRAM(s) Oral every 12 hours  cholecalciferol 1000 Unit(s) Oral daily  dextrose 5%. 1000 milliLiter(s) (50 mL/Hr) IV Continuous <Continuous>  dextrose 50% Injectable 12.5 Gram(s) IV Push once  dextrose 50% Injectable 25 Gram(s) IV Push once  dextrose 50% Injectable 25 Gram(s) IV Push once  docusate sodium 100 milliGRAM(s) Oral two times a day  DULoxetine 60 milliGRAM(s) Oral daily  fenofibrate Tablet 145 milliGRAM(s) Oral daily  furosemide   Injectable 40 milliGRAM(s) IV Push every 12 hours  guaiFENesin  milliGRAM(s) Oral every 12 hours  insulin glargine Injectable (LANTUS) 54 Unit(s) SubCutaneous at bedtime  insulin lispro (HumaLOG) corrective regimen sliding scale   SubCutaneous three times a day before meals  insulin lispro (HumaLOG) corrective regimen sliding scale   SubCutaneous at bedtime  isosorbide   mononitrate ER Tablet (IMDUR) 30 milliGRAM(s) Oral daily  levothyroxine 88 MICROGram(s) Oral daily  levothyroxine 100 MICROGram(s) Oral daily  melatonin 10 milliGRAM(s) Oral at bedtime  mesalamine DR (24-Hour) Tablet 2.4 Gram(s) Oral daily  multivitamin 1 Tablet(s) Oral daily  pantoprazole    Tablet 40 milliGRAM(s) Oral before breakfast  predniSONE   Tablet 40 milliGRAM(s) Oral daily  senna 2 Tablet(s) Oral at bedtime  spironolactone 25 milliGRAM(s) Oral daily    MEDICATIONS  (PRN):  acetaminophen   Tablet .. 650 milliGRAM(s) Oral every 6 hours PRN Temp greater or equal to 38C (100.4F), Mild Pain (1 - 3)  bisacodyl Suppository 10 milliGRAM(s) Rectal daily PRN Constipation  dextrose 40% Gel 15 Gram(s) Oral once PRN Blood Glucose LESS THAN 70 milliGRAM(s)/deciliter  glucagon  Injectable 1 milliGRAM(s) IntraMuscular once PRN Glucose LESS THAN 70 milligrams/deciliter  polyethylene glycol 3350 17 Gram(s) Oral daily PRN Constipation  saline laxative (FLEET) Rectal Enema 1 Enema Rectal daily PRN for constipation      Vital Signs Last 24 Hrs  T(C): 36.7 (09 May 2019 05:09), Max: 36.8 (08 May 2019 16:14)  T(F): 98.1 (09 May 2019 05:09), Max: 98.3 (08 May 2019 17:20)  HR: 95 (09 May 2019 05:09) (83 - 101)  BP: 136/75 (09 May 2019 05:09) (123/64 - 179/65)  BP(mean): --  RR: 20 (09 May 2019 05:09) (18 - 22)  SpO2: 98% (09 May 2019 05:09) (98% - 100%)    PHYSICAL EXAM:    GENERAL: NAD, well-groomed  HEAD:  Atraumatic, Normocephalic  EYES: EOMI, PERRLA, conjunctiva and sclera clear  ENMT: No tonsillar erythema, exudates, or enlargement; Moist mucous membranes  NECK: Supple, No JVD  CHEST/LUNG: Clear to percussion bilaterally; No rales, rhonchi, wheezing, or rubs  HEART: Regular rate and rhythm; No murmurs, rubs, or gallops  ABDOMEN: Soft, Nontender, Nondistended; Bowel sounds present  EXTREMITIES:  2+ Peripheral Pulses, No clubbing, cyanosis, or edema  LYMPH: No lymphadenopathy noted  SKIN: No rashes or lesions    LABS:  CBC Full  -  ( 09 May 2019 07:43 )  WBC Count : 7.05 K/uL  RBC Count : 3.06 M/uL  Hemoglobin : 9.1 g/dL  Hematocrit : 31.2 %  Platelet Count - Automated : 159 K/uL  Mean Cell Volume : 102.0 fl  Mean Cell Hemoglobin : 29.7 pg  Mean Cell Hemoglobin Concentration : 29.2 gm/dL  Auto Neutrophil # : x  Auto Lymphocyte # : x  Auto Monocyte # : x  Auto Eosinophil # : x  Auto Basophil # : x  Auto Neutrophil % : x  Auto Lymphocyte % : x  Auto Monocyte % : x  Auto Eosinophil % : x  Auto Basophil % : x          138  |  95<L>  |  44<H>  ----------------------------<  110<H>  4.4   |  29  |  1.87<H>    Ca    9.5      08 May 2019 16:58  Mg     2.1         TPro  7.3  /  Alb  3.8  /  TBili  0.8  /  DBili  x   /  AST  51<H>  /  ALT  57<H>  /  AlkPhos  69        LIVER FUNCTIONS - ( 08 May 2019 16:58 )  Alb: 3.8 g/dL / Pro: 7.3 g/dL / ALK PHOS: 69 U/L / ALT: 57 U/L / AST: 51 U/L / GGT: x                   MICROBIOLOGY:    Rapid Respiratory Viral Panel (19 @ 17:35)    Rapid RVP Result: Detected: This Respiratory Panel uses polymerase chain reaction (PCR) to detect for  adenovirus; coronavirus (HKU1, NL63, 229E, OC43); human metapneumovirus  (hMPV); human enterovirus/rhinovirus (Entero/RV); influenza A; influenza  A/H1; influenza A/H3; influenza A/H1-2009; influenza B; parainfluenza  viruses 1, 2, 3, 4; respiratory syncytial virus; Mycoplasma pneumoniae;  and Chlamydophila pneumoniae.    hMPV (RapRVP): Detected                    RADIOLOGY:    < from: Xray Chest 1 View- PORTABLE-Urgent (19 @ 17:03) >  PROCEDURE DATE:  2019      ******PRELIMINARY REPORT******    ******PRELIMINARY REPORT******              INTERPRETATION:  minimal pulmonary edema    < end of copied text > Patient is a 78y old  Female who presents with a chief complaint of DYSPNEA, COUGH (09 May 2019 09:26)      HPI:    79 yo woman w multiple medical problems  presents from [facility] w dyspnea and productive cough for 3 days, no fevers, no chills, no CP, no palpitations . RVP positive for HMPV. was started on IV Zosyn duonebs and po prednisone at [facility] on    Ptn was sent to [facility] post admission 3/30-4/15/19 with UTI, further uterine bleeding, and Right Quadricept tendon repair 2/2 traumatic rupture post mechanical fall.   PMH: morbid obesity with functional paraplegia, restrictive lung disease, ILD, VICKY/OHS with chronic hypercapnic and hypoxic respiratory failure on 3L NC (not on CPAP), PAD/PVD with chronic LE wounds, h/o MS s/p bioprosthetic MVR, dCHF, HTN, DM 2, CKD III, anemia, post menopausal vaginal bleeding s/p D&C in 2018 and in 2019 had D&C amd Mirena IUD placement, pathology findings benign, no further bleeding since. placed initially on Norethindrone and none at present. Ptn was  cleared to restart ELiquis for PAFI stable on amiodarone) prior to DC but now she is on coumadin, not sure why the medication was changed. also h/o , UC, Right knee Quadriceps tendon rupture , s/p repair in April ( last month), chronic hematuria, s/p cystoscopy w no findings of cystitis or polyp in april. chronic back 2/2 spinal stenosis with gait instability,  wheelchair dependent for the past 5-6 months. Requires assistance of her  for ADLS. (08 May 2019 20:59)    Pt afebrile, no leukocytosis.  PCT 0.05.  AST/ALT mildly elevated.  RVP (+) hMPV.  Cxr with mild pulm edema on prelim read. Pt off abx.  She c/o dry cough and wheezing.  No abd pain/diarrhea/dysuria/cp/HA.  c/o congestion and "blockage" or "stuffiness" in rt ear.      REVIEW OF SYSTEMS:    CONSTITUTIONAL: No fever, weight loss, or fatigue  EYES: No eye pain, visual disturbances, or discharge  ENMT:  No sore throat  NECK: No pain or stiffness  RESPIRATORY: No cough, wheezing, chills or hemoptysis; No shortness of breath  CARDIOVASCULAR: No chest pain, palpitations, dizziness, or leg swelling  GASTROINTESTINAL: No abdominal or epigastric pain. No nausea, vomiting, or hematemesis; No diarrhea or constipation. No melena or hematochezia.  GENITOURINARY: No dysuria, frequency, hematuria, or incontinence  NEUROLOGICAL: No headaches, memory loss, loss of strength, numbness, or tremors  SKIN: No itching, burning, rashes, or lesions   LYMPH NODES: No enlarged glands  MUSCULOSKELETAL: No joint pain or swelling; No muscle, back, or extremity pain      PAST MEDICAL & SURGICAL HISTORY:  Chronic kidney disease (CKD)  Anemia of chronic disease  On home oxygen therapy: 2  liters nasal cannula  Asthma: PFT (2018)  History of postmenopausal bleeding  Dyslipidemia associated with type 2 diabetes mellitus  Paroxysmal atrial fibrillation: h/o rapid ventricular rate 2 years ago, admitted at [facility], controlled with medication as per patient.  last INR 2.0  Morbid obesity with BMI of 45.0-49.9, adult  H/O: hypothyroidism  Chronic diastolic CHF (congestive heart failure): EF 56% (2017)  Depression (emotion)  Arthritis of spine  Macular degeneration of left eye: receiving injection  Neuropathy  Peripheral arterial disease: s/p remote stent. not on antiplatelet meds for a while.  Hypertension  Edema  GERD (gastroesophageal reflux disease)  Chronic low back pain  Herniated disc: lumbar  VICKY (obstructive sleep apnea)  Ulcerative colitis  Diabetes mellitus: T2DM last A1C 6.7 mg/dl in January   fs range 59- 200 mg/dl  History of mitral valve replacement with bioprosthetic valve: x 4 years ago  H/O  section: x 2  Amputated toe      Allergies    Augmentin (Swelling)  cephalexin (Swelling)  latex (Rash)    Intolerances        FAMILY HISTORY:  FH: heart disease: mother      SOCIAL HISTORY:    , lives with spouse.  No smoking    MEDICATIONS  (STANDING):  ALBUTerol/ipratropium for Nebulization. 3 milliLiter(s) Nebulizer every 6 hours  ALPRAZolam 0.25 milliGRAM(s) Oral two times a day  amiodarone    Tablet 200 milliGRAM(s) Oral daily  carvedilol 6.25 milliGRAM(s) Oral every 12 hours  cholecalciferol 1000 Unit(s) Oral daily  dextrose 5%. 1000 milliLiter(s) (50 mL/Hr) IV Continuous <Continuous>  dextrose 50% Injectable 12.5 Gram(s) IV Push once  dextrose 50% Injectable 25 Gram(s) IV Push once  dextrose 50% Injectable 25 Gram(s) IV Push once  docusate sodium 100 milliGRAM(s) Oral two times a day  DULoxetine 60 milliGRAM(s) Oral daily  fenofibrate Tablet 145 milliGRAM(s) Oral daily  furosemide   Injectable 40 milliGRAM(s) IV Push every 12 hours  guaiFENesin  milliGRAM(s) Oral every 12 hours  insulin glargine Injectable (LANTUS) 54 Unit(s) SubCutaneous at bedtime  insulin lispro (HumaLOG) corrective regimen sliding scale   SubCutaneous three times a day before meals  insulin lispro (HumaLOG) corrective regimen sliding scale   SubCutaneous at bedtime  isosorbide   mononitrate ER Tablet (IMDUR) 30 milliGRAM(s) Oral daily  levothyroxine 88 MICROGram(s) Oral daily  levothyroxine 100 MICROGram(s) Oral daily  melatonin 10 milliGRAM(s) Oral at bedtime  mesalamine DR (24-Hour) Tablet 2.4 Gram(s) Oral daily  multivitamin 1 Tablet(s) Oral daily  pantoprazole    Tablet 40 milliGRAM(s) Oral before breakfast  predniSONE   Tablet 40 milliGRAM(s) Oral daily  senna 2 Tablet(s) Oral at bedtime  spironolactone 25 milliGRAM(s) Oral daily    MEDICATIONS  (PRN):  acetaminophen   Tablet .. 650 milliGRAM(s) Oral every 6 hours PRN Temp greater or equal to 38C (100.4F), Mild Pain (1 - 3)  bisacodyl Suppository 10 milliGRAM(s) Rectal daily PRN Constipation  dextrose 40% Gel 15 Gram(s) Oral once PRN Blood Glucose LESS THAN 70 milliGRAM(s)/deciliter  glucagon  Injectable 1 milliGRAM(s) IntraMuscular once PRN Glucose LESS THAN 70 milligrams/deciliter  polyethylene glycol 3350 17 Gram(s) Oral daily PRN Constipation  saline laxative (FLEET) Rectal Enema 1 Enema Rectal daily PRN for constipation      Vital Signs Last 24 Hrs  T(C): 36.7 (09 May 2019 05:09), Max: 36.8 (08 May 2019 16:14)  T(F): 98.1 (09 May 2019 05:09), Max: 98.3 (08 May 2019 17:20)  HR: 95 (09 May 2019 05:09) (83 - 101)  BP: 136/75 (09 May 2019 05:09) (123/64 - 179/65)  BP(mean): --  RR: 20 (09 May 2019 05:09) (18 - 22)  SpO2: 98% (09 May 2019 05:09) (98% - 100%)    PHYSICAL EXAM:    GENERAL: NAD, well-groomed  HEAD:  Atraumatic, Normocephalic  EYES: EOMI, PERRLA, conjunctiva and sclera clear  ENMT: No tonsillar erythema, exudates, or enlargement; Moist mucous membranes  NECK: Supple, No JVD  CHEST/LUNG: Clear to percussion bilaterally; No rales, rhonchi, wheezing, or rubs  HEART: Regular rate and rhythm; No murmurs, rubs, or gallops  ABDOMEN: Soft, Nontender, Nondistended; Bowel sounds present  EXTREMITIES:  2+ Peripheral Pulses, No clubbing, cyanosis, or edema, RLE leg brace.  Surg incision site c/d/i  LYMPH: No lymphadenopathy noted  SKIN: No rashes or lesions    LABS:  CBC Full  -  ( 09 May 2019 07:43 )  WBC Count : 7.05 K/uL  RBC Count : 3.06 M/uL  Hemoglobin : 9.1 g/dL  Hematocrit : 31.2 %  Platelet Count - Automated : 159 K/uL  Mean Cell Volume : 102.0 fl  Mean Cell Hemoglobin : 29.7 pg  Mean Cell Hemoglobin Concentration : 29.2 gm/dL  Auto Neutrophil # : x  Auto Lymphocyte # : x  Auto Monocyte # : x  Auto Eosinophil # : x  Auto Basophil # : x  Auto Neutrophil % : x  Auto Lymphocyte % : x  Auto Monocyte % : x  Auto Eosinophil % : x  Auto Basophil % : x          138  |  95<L>  |  44<H>  ----------------------------<  110<H>  4.4   |  29  |  1.87<H>    Ca    9.5      08 May 2019 16:58  Mg     2.1         TPro  7.3  /  Alb  3.8  /  TBili  0.8  /  DBili  x   /  AST  51<H>  /  ALT  57<H>  /  AlkPhos  69        LIVER FUNCTIONS - ( 08 May 2019 16:58 )  Alb: 3.8 g/dL / Pro: 7.3 g/dL / ALK PHOS: 69 U/L / ALT: 57 U/L / AST: 51 U/L / GGT: x                   MICROBIOLOGY:    Rapid Respiratory Viral Panel (19 @ 17:35)    Rapid RVP Result: Detected: This Respiratory Panel uses polymerase chain reaction (PCR) to detect for  adenovirus; coronavirus (HKU1, NL63, 229E, OC43); human metapneumovirus  (hMPV); human enterovirus/rhinovirus (Entero/RV); influenza A; influenza  A/H1; influenza A/H3; influenza A/H1-2009; influenza B; parainfluenza  viruses 1, 2, 3, 4; respiratory syncytial virus; Mycoplasma pneumoniae;  and Chlamydophila pneumoniae.    hMPV (RapRVP): Detected                    RADIOLOGY:    < from: Xray Chest 1 View- PORTABLE-Urgent (19 @ 17:03) >  PROCEDURE DATE:  2019      ******PRELIMINARY REPORT******    ******PRELIMINARY REPORT******              INTERPRETATION:  minimal pulmonary edema    < end of copied text >

## 2019-05-09 NOTE — PROGRESS NOTE ADULT - ASSESSMENT
77 yo woman w multiple medical problems outline in [place] being admitted for HMPV tracheobronchitis and acute on chronic diastolic CHF.   Respiratory status w more bronchospasm today, will change steroids to medrol 40 mg q8H, cont  lasix 40 mg IV q 12H, duonebs q4h prn, cont observing off ABx, close watch to her finger sticks 2/2 being on steroids.  CKD3-4, creatinine is a baseline  anemia is multifactoral, 2/2 iron def from chronic blood loss and 2/2 renal dz. epogen up to renal  card, endo, pulm, wound, id and renal consults appreciated  ptn had a recent Echo no need to repeat it.   cont coumadin and daily Pt/INR check.,   cont rest of outptn meds.

## 2019-05-09 NOTE — CONSULT NOTE ADULT - PROBLEM SELECTOR RECOMMENDATION 9
As above
Shortness of breath is likely multifactorial due primarily to her hMPV infection but complicated by her underlying heart disease, lung disease, obesity and OHS, pulmonary HTN, deconditioning, and poor breathing mechanics  - Maintain o2 sat > 90%  - Incentive spirometer  - OOB and PT as able

## 2019-05-09 NOTE — CONSULT NOTE ADULT - SUBJECTIVE AND OBJECTIVE BOX
[facility] DIVISION OF PULMONARY, CRITICAL CARE AND SLEEP MEDICINE  PULMONARY CONSULTATION NOTE  OFFICE NUMBER: [phone]    NAME: TANIKA OBRIEN  MEDICAL RECORD NUMBER: [id number]    CHIEF COMPLAINT: Patient is a 78y old  Female who presents with a chief complaint of DYSPNEA, COUGH (09 May 2019 06:40)      HISTORY OF PRESENT ILLNESS:       ====================BACKGROUND INFORMATION============================    FAMILY HISTORY: FAMILY HISTORY:  FH: heart disease: mother      PAST MEDICAL AND SURGICAL HISTORY: PAST MEDICAL & SURGICAL HISTORY:  Chronic kidney disease (CKD)  Anemia of chronic disease  On home oxygen therapy: 2  liters nasal cannula  Asthma: PFT (2018)  History of postmenopausal bleeding  Dyslipidemia associated with type 2 diabetes mellitus  Paroxysmal atrial fibrillation: h/o rapid ventricular rate 2 years ago, admitted at [facility], controlled with medication as per patient.  last INR 2.0  Morbid obesity with BMI of 45.0-49.9, adult  H/O: hypothyroidism  Chronic diastolic CHF (congestive heart failure): EF 56% (2017)  Depression (emotion)  Arthritis of spine  Macular degeneration of left eye: receiving injection  Neuropathy  Peripheral arterial disease: s/p remote stent. not on antiplatelet meds for a while.  Hypertension  Edema  GERD (gastroesophageal reflux disease)  Chronic low back pain  Herniated disc: lumbar  VICKY (obstructive sleep apnea)  Ulcerative colitis  Diabetes mellitus: T2DM last A1C 6.7 mg/dl in January   fs range 59- 200 mg/dl  History of mitral valve replacement with bioprosthetic valve: x 4 years ago  H/O  section: x 2  Amputated toe      ALLERGIES:Allergies    Augmentin (Swelling)  cephalexin (Swelling)  latex (Rash)    Intolerances        HOME MEDICATIONS:     OUTPATIENT PULMONARY DOCTOR:     SOCIAL HISTORY:  TOBACCO USE:  ALCOHOL:  DRUGS:  MARITAL STATUS:  EMPLOYMENT:  EXPOSURES:  RECENT TRAVELS:  PETS:  CODE STATUS:    ====================REVIEW OF SYSTEMS=====================================  CONSTITUTIONAL:  CARDIOVASCULAR:  PULMONARY:  GASTROINTESTINAL:  [] ALL OTHER REVIEW OF SYSTEMS ARE NEGATIVE   [] UNABLE TO OBTAIN REVIEW OF SYSTEMS DUE TO ______________      ====================PHYSICAL EXAM=========================================    VITALS: ICU Vital Signs Last 24 Hrs  T(C): 36.7 (09 May 2019 05:09), Max: 36.8 (08 May 2019 16:14)  T(F): 98.1 (09 May 2019 05:09), Max: 98.3 (08 May 2019 17:20)  HR: 95 (09 May 2019 05:09) (83 - 101)  BP: 136/75 (09 May 2019 05:09) (123/64 - 179/65)  RR: 20 (09 May 2019 05:09) (18 - 22)  SpO2: 98% (09 May 2019 05:09) (98% - 100%)      INTAKE and OUTPUT: I&O's Summary      WEIGHT: Daily Height in cm: 165.1 (08 May 2019 16:14)    Daily     GLUCOSE: CAPILLARY BLOOD GLUCOSE: POCT Blood Glucose.: 118 mg/dL (09 May 2019 02:09)      VENTILATOR SETTINGS:     GENERAL:  HEENT:  NECK:   LYMPH NODES:  CARDIOVASCULAR:  PULMONARY:  ABDOMEN:  SKIN:  EXTREMITIES:  NEUROLOGIC:  PSYCHIATRIC:    ====================MEDICATIONS===========================================  MEDICATIONS  (STANDING):  ALPRAZolam 0.25 milliGRAM(s) Oral two times a day  amiodarone    Tablet 200 milliGRAM(s) Oral daily  carvedilol 6.25 milliGRAM(s) Oral every 12 hours  cholecalciferol 1000 Unit(s) Oral daily  dextrose 5%. 1000 milliLiter(s) (50 mL/Hr) IV Continuous <Continuous>  dextrose 50% Injectable 12.5 Gram(s) IV Push once  dextrose 50% Injectable 25 Gram(s) IV Push once  dextrose 50% Injectable 25 Gram(s) IV Push once  docusate sodium 100 milliGRAM(s) Oral two times a day  DULoxetine 60 milliGRAM(s) Oral daily  fenofibrate Tablet 145 milliGRAM(s) Oral daily  furosemide   Injectable 40 milliGRAM(s) IV Push every 12 hours  guaiFENesin  milliGRAM(s) Oral every 12 hours  insulin glargine Injectable (LANTUS) 54 Unit(s) SubCutaneous at bedtime  insulin lispro (HumaLOG) corrective regimen sliding scale   SubCutaneous three times a day before meals  insulin lispro (HumaLOG) corrective regimen sliding scale   SubCutaneous at bedtime  isosorbide   mononitrate ER Tablet (IMDUR) 30 milliGRAM(s) Oral daily  levothyroxine 88 MICROGram(s) Oral daily  levothyroxine 100 MICROGram(s) Oral daily  melatonin 10 milliGRAM(s) Oral at bedtime  mesalamine DR (24-Hour) Tablet 2.4 Gram(s) Oral daily  multivitamin 1 Tablet(s) Oral daily  pantoprazole    Tablet 40 milliGRAM(s) Oral before breakfast  predniSONE   Tablet 40 milliGRAM(s) Oral daily  senna 2 Tablet(s) Oral at bedtime  spironolactone 25 milliGRAM(s) Oral daily      MEDICATIONS  (PRN):  acetaminophen   Tablet .. 650 milliGRAM(s) Oral every 6 hours PRN Temp greater or equal to 38C (100.4F), Mild Pain (1 - 3)  ALBUTerol/ipratropium for Nebulization. 3 milliLiter(s) Nebulizer every 6 hours PRN Wheezing  bisacodyl Suppository 10 milliGRAM(s) Rectal daily PRN Constipation  dextrose 40% Gel 15 Gram(s) Oral once PRN Blood Glucose LESS THAN 70 milliGRAM(s)/deciliter  glucagon  Injectable 1 milliGRAM(s) IntraMuscular once PRN Glucose LESS THAN 70 milligrams/deciliter  polyethylene glycol 3350 17 Gram(s) Oral daily PRN Constipation  saline laxative (FLEET) Rectal Enema 1 Enema Rectal daily PRN for constipation      ====================LABORATORY RESULTS====================================  CBC Full  -  ( 08 May 2019 16:58 )  WBC Count : 5.0 K/uL  RBC Count : 2.99 M/uL  Hemoglobin : 9.7 g/dL  Hematocrit : 29.3 %  Platelet Count - Automated : 149 K/uL  Mean Cell Volume : 98.1 fl  Mean Cell Hemoglobin : 32.5 pg  Mean Cell Hemoglobin Concentration : 33.2 gm/dL                                          138  |  95<L>  |  44<H>  ----------------------------<  110<H>  4.4   |  29  |  1.87<H>    Ca    9.5      08 May 2019 16:58  Mg     2.1         TPro  7.3  /  Alb  3.8  /  TBili  0.8  /  DBili  x   /  AST  51<H>  /  ALT  57<H>  /  AlkPhos  69          PT/INR - ( 08 May 2019 16:58 )   PT: 30.7 sec;   INR: 2.59 ratio         PTT - ( 08 May 2019 16:58 )  PTT:34.5 sec    Creatinine Trend: 1.87<--, 1.73<--, 1.91<--, 2.08<--, 1.96<--, 1.70<--      ====================RADIOLOGY and ECHOCARDIOGRAPHY=======================    CXR: < from: Xray Chest 1 View- PORTABLE-Urgent (19 @ 17:03) >  ******PRELIMINARY REPORT******              INTERPRETATION:  minimal pulmonary edema    < end of copied text >      CT CHEST: < from: CT Chest No Cont (19 @ 13:08) >  CHEST:     LUNGS AND LARGE AIRWAYS: Patent central airways.  No pulmonary nodules.   Bibasilar linear atelectasis.  PLEURA: Trace right pleural effusion.  VESSELS: Atherosclerotic calcifications of the thoracic aorta and   coronary arteries.  HEART: Heart size is normal. No pericardial effusion. Aortic valvular   calcifications. Mitral replacement.  MEDIASTINUM AND JULIET: No lymphadenopathy.  CHEST WALL AND LOWER NECK: Sternotomy.  VISUALIZED UPPER ABDOMEN: Increased density of the liver which may be   seen in the setting of amiodarone use.  BONES: Degenerative changes.    IMPRESSION:   Trace right pleural effusion.    Bibasilar linear atelectasis.    < end of copied text >      ECHOCARDIOGRAPHY: < from: TTE with Doppler (w/Cont) (19 @ 13:12) >  Observations:  Mitral Valve: Bioprosthetic mitral valve replacement with  normal function. Mean gradient is 7.5 mmHg. Heart rate  during assessment was 100/min.  Aortic Valve/Aorta: Calcified aortic valve with normal  opening.  Normal aortic root size. (Ao: 3.3 cm at the sinuses of  Valsalva).  Left Atrium: Normal left atrium.  LeftVentricle: Endocardial visualization enhanced with  intravenous injection of Ultrasonic Enhancing Agent  (Definity). Normal left ventricular systolic function.  Normal left ventricular systolic function. No segmental  wall motion abnormalities. Normal left ventricular internal  dimensions and wall thicknesses.  Right Heart: Normal right atrium. Normal right ventricular  size and function. Normal tricuspid valve.  Normal pulmonic  valve.  Pericardium/Pleura: Normal pericardium with no pericardial  effusion.  Hemodynamic: No evidence of pulmonary hypertension.  ------------------------------------------------------------------------  Conclusions:  Endocardial visualization enhanced with intravenous  injection of Ultrasonic Enhancing Agent (Definity).  Normal left ventricular systolic function. No segmental  wall motion abnormalities.  Bioprosthetic mitral valve replacement with normal  function.    < end of copied text > [facility] DIVISION OF PULMONARY, CRITICAL CARE AND SLEEP MEDICINE  PULMONARY CONSULTATION NOTE  OFFICE NUMBER: [phone]    NAME: TANIKA OBRIEN  MEDICAL RECORD NUMBER: [id number]    CHIEF COMPLAINT: Patient is a 78y old  Female who presents with a chief complaint of DYSPNEA, COUGH (09 May 2019 06:40)      HISTORY OF PRESENT ILLNESS:   78F moderate MS s/p bioprosthetic MV, severe pulmonary HTN, DM2, diastolic CHF, PAF, HTN, obesity who presents for progressive worsening of SOB, cough, and sputum production from SAMANTHA . She has a found to have human metapneumovirus. She denies having fevers or chills or chest pains.     She has had chronic dyspnea and is mostly limited to a wheelchair. She has been on supplemental O2 for a few months no and uses 2L )2 ATC. She is supposed to be on a diuretic regimen but is not adherent to therapy and is also not adherent to a low salt diet. She has noted orthopnea  and has had increasing LE edema.    With regards to her pulmonary history she is suspected of having ILD. She was seen by Dr. Rizvi of [facility]  and was being pepared for a VATs/lung biopsy but this never occurred. She had a serologic workup for ILD which was mostly negative except for an elevated RF and ESR. At that time he was advised by Dr. Montgomery to stop the Amiodarone as this was thought to be a potential cause but she has remained on it due to her cardiac disease. She also is noted to have restriction on her PFTS.     ====================BACKGROUND INFORMATION============================    FAMILY HISTORY: FAMILY HISTORY:  FH: heart disease: mother      PAST MEDICAL AND SURGICAL HISTORY: PAST MEDICAL & SURGICAL HISTORY:  Chronic kidney disease (CKD)  Anemia of chronic disease  On home oxygen therapy: 2  liters nasal cannula  Asthma: PFT (2018)  History of postmenopausal bleeding  Dyslipidemia associated with type 2 diabetes mellitus  Paroxysmal atrial fibrillation: h/o rapid ventricular rate 2 years ago, admitted at [facility], controlled with medication as per patient.  last INR 2.0  Morbid obesity with BMI of 45.0-49.9, adult  H/O: hypothyroidism  Chronic diastolic CHF (congestive heart failure): EF 56% (2017)  Depression (emotion)  Arthritis of spine  Macular degeneration of left eye: receiving injection  Neuropathy  Peripheral arterial disease: s/p remote stent. not on antiplatelet meds for a while.  Hypertension  Edema  GERD (gastroesophageal reflux disease)  Chronic low back pain  Herniated disc: lumbar  VICKY (obstructive sleep apnea)  Ulcerative colitis  Diabetes mellitus: T2DM last A1C 6.7 mg/dl in January   fs range 59- 200 mg/dl  History of mitral valve replacement with bioprosthetic valve: x 4 years ago  H/O  section: x 2  Amputated toe      ALLERGIES:Allergies    Augmentin (Swelling)  cephalexin (Swelling)  latex (Rash)    Intolerances        HOME MEDICATIONS: Reviewed     OUTPATIENT PULMONARY DOCTOR: Fito Montgomery MD     SOCIAL HISTORY:  TOBACCO USE: Denied   ALCOHOL: Denied   DRUGS: Denied   MARITAL STATUS:    EMPLOYMENT: Retired   EXPOSURES: Denied   RECENT TRAVELS: Denied   PETS: Denied  CODE STATUS: Full Code     ====================REVIEW OF SYSTEMS=====================================  CONSTITUTIONAL: Feels weak  CARDIOVASCULAR: Denies chest pain or palpitations   PULMONARY: Cough with yellow sputum and shortness of breath, denies hemoptysis   GASTROINTESTINAL: Denies nausea, vomiting, or abdominal pain  [x] ALL OTHER REVIEW OF SYSTEMS ARE NEGATIVE   [] UNABLE TO OBTAIN REVIEW OF SYSTEMS DUE TO ______________      ====================PHYSICAL EXAM=========================================    VITALS: ICU Vital Signs Last 24 Hrs  T(C): 36.7 (09 May 2019 05:09), Max: 36.8 (08 May 2019 16:14)  T(F): 98.1 (09 May 2019 05:09), Max: 98.3 (08 May 2019 17:20)  HR: 95 (09 May 2019 05:09) (83 - 101)  BP: 136/75 (09 May 2019 05:09) (123/64 - 179/65)  RR: 20 (09 May 2019 05:09) (18 - 22)  SpO2: 98% (09 May 2019 05:09) (98% - 100%)      INTAKE and OUTPUT: I&O's Summary      WEIGHT: Daily Height in cm: 165.1 (08 May 2019 16:14)    Daily     GLUCOSE: CAPILLARY BLOOD GLUCOSE: POCT Blood Glucose.: 118 mg/dL (09 May 2019 02:09)      VENTILATOR SETTINGS: N/A     GENERAL: AAOx3, mild tachypnea, sitting in bed  HEENT: NCAT, EOMI, anicteric, MMM, nares clear, trachea midline   NECK: supple, no JVD  LYMPH NODES: no palpable supraclavicular LAD   CARDIOVASCULAR: RRR, S1S2   PULMONARY: diffuse expiratory wheeze, prolonged expiratory phase, no rhonchi, no accessory muscle use   ABDOMEN: soft, obese, NT, ND, +BS  SKIN: warm and dry  EXTREMITIES: bilateral LE wrapped and right LE in brace  NEUROLOGIC: nonfocal exam  PSYCHIATRIC: calm    ====================MEDICATIONS===========================================  MEDICATIONS  (STANDING):  ALPRAZolam 0.25 milliGRAM(s) Oral two times a day  amiodarone    Tablet 200 milliGRAM(s) Oral daily  carvedilol 6.25 milliGRAM(s) Oral every 12 hours  cholecalciferol 1000 Unit(s) Oral daily  dextrose 5%. 1000 milliLiter(s) (50 mL/Hr) IV Continuous <Continuous>  dextrose 50% Injectable 12.5 Gram(s) IV Push once  dextrose 50% Injectable 25 Gram(s) IV Push once  dextrose 50% Injectable 25 Gram(s) IV Push once  docusate sodium 100 milliGRAM(s) Oral two times a day  DULoxetine 60 milliGRAM(s) Oral daily  fenofibrate Tablet 145 milliGRAM(s) Oral daily  furosemide   Injectable 40 milliGRAM(s) IV Push every 12 hours  guaiFENesin  milliGRAM(s) Oral every 12 hours  insulin glargine Injectable (LANTUS) 54 Unit(s) SubCutaneous at bedtime  insulin lispro (HumaLOG) corrective regimen sliding scale   SubCutaneous three times a day before meals  insulin lispro (HumaLOG) corrective regimen sliding scale   SubCutaneous at bedtime  isosorbide   mononitrate ER Tablet (IMDUR) 30 milliGRAM(s) Oral daily  levothyroxine 88 MICROGram(s) Oral daily  levothyroxine 100 MICROGram(s) Oral daily  melatonin 10 milliGRAM(s) Oral at bedtime  mesalamine DR (24-Hour) Tablet 2.4 Gram(s) Oral daily  multivitamin 1 Tablet(s) Oral daily  pantoprazole    Tablet 40 milliGRAM(s) Oral before breakfast  predniSONE   Tablet 40 milliGRAM(s) Oral daily  senna 2 Tablet(s) Oral at bedtime  spironolactone 25 milliGRAM(s) Oral daily      MEDICATIONS  (PRN):  acetaminophen   Tablet .. 650 milliGRAM(s) Oral every 6 hours PRN Temp greater or equal to 38C (100.4F), Mild Pain (1 - 3)  ALBUTerol/ipratropium for Nebulization. 3 milliLiter(s) Nebulizer every 6 hours PRN Wheezing  bisacodyl Suppository 10 milliGRAM(s) Rectal daily PRN Constipation  dextrose 40% Gel 15 Gram(s) Oral once PRN Blood Glucose LESS THAN 70 milliGRAM(s)/deciliter  glucagon  Injectable 1 milliGRAM(s) IntraMuscular once PRN Glucose LESS THAN 70 milligrams/deciliter  polyethylene glycol 3350 17 Gram(s) Oral daily PRN Constipation  saline laxative (FLEET) Rectal Enema 1 Enema Rectal daily PRN for constipation      ====================LABORATORY RESULTS====================================  CBC Full  -  ( 08 May 2019 16:58 )  WBC Count : 5.0 K/uL  RBC Count : 2.99 M/uL  Hemoglobin : 9.7 g/dL  Hematocrit : 29.3 %  Platelet Count - Automated : 149 K/uL  Mean Cell Volume : 98.1 fl  Mean Cell Hemoglobin : 32.5 pg  Mean Cell Hemoglobin Concentration : 33.2 gm/dL                                          138  |  95<L>  |  44<H>  ----------------------------<  110<H>  4.4   |  29  |  1.87<H>    Ca    9.5      08 May 2019 16:58  Mg     2.1         TPro  7.3  /  Alb  3.8  /  TBili  0.8  /  DBili  x   /  AST  51<H>  /  ALT  57<H>  /  AlkPhos  69          PT/INR - ( 08 May 2019 16:58 )   PT: 30.7 sec;   INR: 2.59 ratio         PTT - ( 08 May 2019 16:58 )  PTT:34.5 sec    Creatinine Trend: 1.87<--, 1.73<--, 1.91<--, 2.08<--, 1.96<--, 1.70<--      ====================RADIOLOGY and ECHOCARDIOGRAPHY=======================    CXR: < from: Xray Chest 1 View- PORTABLE-Urgent (19 @ 17:03) >  ******PRELIMINARY REPORT******              INTERPRETATION:  minimal pulmonary edema    < end of copied text >      CT CHEST: < from: CT Chest No Cont (19 @ 13:08) >  CHEST:     LUNGS AND LARGE AIRWAYS: Patent central airways.  No pulmonary nodules.   Bibasilar linear atelectasis.  PLEURA: Trace right pleural effusion.  VESSELS: Atherosclerotic calcifications of the thoracic aorta and   coronary arteries.  HEART: Heart size is normal. No pericardial effusion. Aortic valvular   calcifications. Mitral replacement.  MEDIASTINUM AND JULIET: No lymphadenopathy.  CHEST WALL AND LOWER NECK: Sternotomy.  VISUALIZED UPPER ABDOMEN: Increased density of the liver which may be   seen in the setting of amiodarone use.  BONES: Degenerative changes.    IMPRESSION:   Trace right pleural effusion.    Bibasilar linear atelectasis.    < end of copied text >      ECHOCARDIOGRAPHY: < from: TTE with Doppler (w/Cont) (19 @ 13:12) >  Observations:  Mitral Valve: Bioprosthetic mitral valve replacement with  normal function. Mean gradient is 7.5 mmHg. Heart rate  during assessment was 100/min.  Aortic Valve/Aorta: Calcified aortic valve with normal  opening.  Normal aortic root size. (Ao: 3.3 cm at the sinuses of  Valsalva).  Left Atrium: Normal left atrium.  LeftVentricle: Endocardial visualization enhanced with  intravenous injection of Ultrasonic Enhancing Agent  (Definity). Normal left ventricular systolic function.  Normal left ventricular systolic function. No segmental  wall motion abnormalities. Normal left ventricular internal  dimensions and wall thicknesses.  Right Heart: Normal right atrium. Normal right ventricular  size and function. Normal tricuspid valve.  Normal pulmonic  valve.  Pericardium/Pleura: Normal pericardium with no pericardial  effusion.  Hemodynamic: No evidence of pulmonary hypertension.  ------------------------------------------------------------------------  Conclusions:  Endocardial visualization enhanced with intravenous  injection of Ultrasonic Enhancing Agent (Definity).  Normal left ventricular systolic function. No segmental  wall motion abnormalities.  Bioprosthetic mitral valve replacement with normal  function.    < end of copied text > [facility] DIVISION OF PULMONARY, CRITICAL CARE AND SLEEP MEDICINE  PULMONARY CONSULTATION NOTE  OFFICE NUMBER: [phone]    NAME: TANIKA OBRIEN  MEDICAL RECORD NUMBER: [id number]    CHIEF COMPLAINT: Patient is a 78y old  Female who presents with a chief complaint of DYSPNEA, COUGH (09 May 2019 06:40)      HISTORY OF PRESENT ILLNESS:   78F moderate MS s/p bioprosthetic MV, severe pulmonary HTN, DM2, diastolic CHF, PAF, HTN, obesity who presents for progressive worsening of SOB, cough, and sputum production from SAMANTHA . She has a found to have human metapneumovirus. She denies having fevers or chills or chest pains.     She has had chronic dyspnea and is mostly limited to a wheelchair. She has been on supplemental O2 for a few months no and uses 2L )2 ATC. She is supposed to be on a diuretic regimen but is not adherent to therapy and is also not adherent to a low salt diet. She has noted orthopnea  and has had increasing LE edema.    With regards to her pulmonary history she is suspected of having ILD. She was seen by Dr. Rizvi of [facility] in 2018 and was being prepared for a VATs/lung biopsy but this never occurred. She had a serologic workup for ILD which was mostly negative except for an elevated RF and ESR. At that time he was advised by Dr. Montgomery to stop the Amiodarone as this was thought to be a potential cause but she has remained on it due to her cardiac disease. She also is noted to have restriction on her PFTS. Her most recent CT did not show significant interstitial lung disease but her CXR from the ED shows some interstitial markings.    She was recently hospitalized with falls and LE wounds. She was treated with antibiotics and then had right maki tendon tear and seen by Orthopedics, Rheumatology, and Neurology services. She is currently WBAT with a brace on her right LE.    ====================BACKGROUND INFORMATION============================    FAMILY HISTORY: FAMILY HISTORY:  FH: heart disease: mother      PAST MEDICAL AND SURGICAL HISTORY: PAST MEDICAL & SURGICAL HISTORY:  Chronic kidney disease (CKD)  Anemia of chronic disease  On home oxygen therapy: 2  liters nasal cannula  Asthma: PFT (2018)  History of postmenopausal bleeding  Dyslipidemia associated with type 2 diabetes mellitus  Paroxysmal atrial fibrillation: h/o rapid ventricular rate 2 years ago, admitted at [facility], controlled with medication as per patient.  last INR 2.0  Morbid obesity with BMI of 45.0-49.9, adult  H/O: hypothyroidism  Chronic diastolic CHF (congestive heart failure): EF 56% (2017)  Depression (emotion)  Arthritis of spine  Macular degeneration of left eye: receiving injection  Neuropathy  Peripheral arterial disease: s/p remote stent. not on antiplatelet meds for a while.  Hypertension  Edema  GERD (gastroesophageal reflux disease)  Chronic low back pain  Herniated disc: lumbar  VICKY (obstructive sleep apnea)  Ulcerative colitis  Diabetes mellitus: T2DM last A1C 6.7 mg/dl in January   fs range 59- 200 mg/dl  History of mitral valve replacement with bioprosthetic valve: x 4 years ago  H/O  section: x 2  Amputated toe      ALLERGIES:Allergies    Augmentin (Swelling)  cephalexin (Swelling)  latex (Rash)    Intolerances        HOME MEDICATIONS: Reviewed     OUTPATIENT PULMONARY DOCTOR: Fito Montgomery MD     SOCIAL HISTORY:  TOBACCO USE: Denied   ALCOHOL: Denied   DRUGS: Denied   MARITAL STATUS:    EMPLOYMENT: Retired   EXPOSURES: Denied   RECENT TRAVELS: Denied   PETS: Denied  CODE STATUS: Full Code     ====================REVIEW OF SYSTEMS=====================================  CONSTITUTIONAL: Feels weak  CARDIOVASCULAR: Denies chest pain or palpitations   PULMONARY: Cough with yellow sputum and shortness of breath, denies hemoptysis   GASTROINTESTINAL: Denies nausea, vomiting, or abdominal pain  [x] ALL OTHER REVIEW OF SYSTEMS ARE NEGATIVE   [] UNABLE TO OBTAIN REVIEW OF SYSTEMS DUE TO ______________      ====================PHYSICAL EXAM=========================================    VITALS: ICU Vital Signs Last 24 Hrs  T(C): 36.7 (09 May 2019 05:09), Max: 36.8 (08 May 2019 16:14)  T(F): 98.1 (09 May 2019 05:09), Max: 98.3 (08 May 2019 17:20)  HR: 95 (09 May 2019 05:09) (83 - 101)  BP: 136/75 (09 May 2019 05:09) (123/64 - 179/65)  RR: 20 (09 May 2019 05:09) (18 - 22)  SpO2: 98% (09 May 2019 05:09) (98% - 100%)      INTAKE and OUTPUT: I&O's Summary      WEIGHT: Daily Height in cm: 165.1 (08 May 2019 16:14)    Daily     GLUCOSE: CAPILLARY BLOOD GLUCOSE: POCT Blood Glucose.: 118 mg/dL (09 May 2019 02:09)      VENTILATOR SETTINGS: N/A     GENERAL: AAOx3, mild tachypnea, sitting in bed  HEENT: NCAT, EOMI, anicteric, MMM, nares clear, trachea midline   NECK: supple, no JVD  LYMPH NODES: no palpable supraclavicular LAD   CARDIOVASCULAR: RRR, S1S2   PULMONARY: diffuse expiratory wheeze, prolonged expiratory phase, no rhonchi, no accessory muscle use   ABDOMEN: soft, obese, NT, ND, +BS  SKIN: warm and dry  EXTREMITIES: bilateral LE wrapped and right LE in brace  NEUROLOGIC: nonfocal exam  PSYCHIATRIC: calm    ====================MEDICATIONS===========================================  MEDICATIONS  (STANDING):  ALPRAZolam 0.25 milliGRAM(s) Oral two times a day  amiodarone    Tablet 200 milliGRAM(s) Oral daily  carvedilol 6.25 milliGRAM(s) Oral every 12 hours  cholecalciferol 1000 Unit(s) Oral daily  dextrose 5%. 1000 milliLiter(s) (50 mL/Hr) IV Continuous <Continuous>  dextrose 50% Injectable 12.5 Gram(s) IV Push once  dextrose 50% Injectable 25 Gram(s) IV Push once  dextrose 50% Injectable 25 Gram(s) IV Push once  docusate sodium 100 milliGRAM(s) Oral two times a day  DULoxetine 60 milliGRAM(s) Oral daily  fenofibrate Tablet 145 milliGRAM(s) Oral daily  furosemide   Injectable 40 milliGRAM(s) IV Push every 12 hours  guaiFENesin  milliGRAM(s) Oral every 12 hours  insulin glargine Injectable (LANTUS) 54 Unit(s) SubCutaneous at bedtime  insulin lispro (HumaLOG) corrective regimen sliding scale   SubCutaneous three times a day before meals  insulin lispro (HumaLOG) corrective regimen sliding scale   SubCutaneous at bedtime  isosorbide   mononitrate ER Tablet (IMDUR) 30 milliGRAM(s) Oral daily  levothyroxine 88 MICROGram(s) Oral daily  levothyroxine 100 MICROGram(s) Oral daily  melatonin 10 milliGRAM(s) Oral at bedtime  mesalamine DR (24-Hour) Tablet 2.4 Gram(s) Oral daily  multivitamin 1 Tablet(s) Oral daily  pantoprazole    Tablet 40 milliGRAM(s) Oral before breakfast  predniSONE   Tablet 40 milliGRAM(s) Oral daily  senna 2 Tablet(s) Oral at bedtime  spironolactone 25 milliGRAM(s) Oral daily      MEDICATIONS  (PRN):  acetaminophen   Tablet .. 650 milliGRAM(s) Oral every 6 hours PRN Temp greater or equal to 38C (100.4F), Mild Pain (1 - 3)  ALBUTerol/ipratropium for Nebulization. 3 milliLiter(s) Nebulizer every 6 hours PRN Wheezing  bisacodyl Suppository 10 milliGRAM(s) Rectal daily PRN Constipation  dextrose 40% Gel 15 Gram(s) Oral once PRN Blood Glucose LESS THAN 70 milliGRAM(s)/deciliter  glucagon  Injectable 1 milliGRAM(s) IntraMuscular once PRN Glucose LESS THAN 70 milligrams/deciliter  polyethylene glycol 3350 17 Gram(s) Oral daily PRN Constipation  saline laxative (FLEET) Rectal Enema 1 Enema Rectal daily PRN for constipation      ====================LABORATORY RESULTS====================================  CBC Full  -  ( 08 May 2019 16:58 )  WBC Count : 5.0 K/uL  RBC Count : 2.99 M/uL  Hemoglobin : 9.7 g/dL  Hematocrit : 29.3 %  Platelet Count - Automated : 149 K/uL  Mean Cell Volume : 98.1 fl  Mean Cell Hemoglobin : 32.5 pg  Mean Cell Hemoglobin Concentration : 33.2 gm/dL                                          138  |  95<L>  |  44<H>  ----------------------------<  110<H>  4.4   |  29  |  1.87<H>    Ca    9.5      08 May 2019 16:58  Mg     2.1         TPro  7.3  /  Alb  3.8  /  TBili  0.8  /  DBili  x   /  AST  51<H>  /  ALT  57<H>  /  AlkPhos  69          PT/INR - ( 08 May 2019 16:58 )   PT: 30.7 sec;   INR: 2.59 ratio         PTT - ( 08 May 2019 16:58 )  PTT:34.5 sec    Creatinine Trend: 1.87<--, 1.73<--, 1.91<--, 2.08<--, 1.96<--, 1.70<--      ====================RADIOLOGY and ECHOCARDIOGRAPHY=======================    CXR: < from: Xray Chest 1 View- PORTABLE-Urgent (19 @ 17:03) >  ******PRELIMINARY REPORT******              INTERPRETATION:  minimal pulmonary edema    < end of copied text >      CT CHEST: < from: CT Chest No Cont (19 @ 13:08) >  CHEST:     LUNGS AND LARGE AIRWAYS: Patent central airways.  No pulmonary nodules.   Bibasilar linear atelectasis.  PLEURA: Trace right pleural effusion.  VESSELS: Atherosclerotic calcifications of the thoracic aorta and   coronary arteries.  HEART: Heart size is normal. No pericardial effusion. Aortic valvular   calcifications. Mitral replacement.  MEDIASTINUM AND JULIET: No lymphadenopathy.  CHEST WALL AND LOWER NECK: Sternotomy.  VISUALIZED UPPER ABDOMEN: Increased density of the liver which may be   seen in the setting of amiodarone use.  BONES: Degenerative changes.    IMPRESSION:   Trace right pleural effusion.    Bibasilar linear atelectasis.    < end of copied text >      ECHOCARDIOGRAPHY: < from: TTE with Doppler (w/Cont) (19 @ 13:12) >  Observations:  Mitral Valve: Bioprosthetic mitral valve replacement with  normal function. Mean gradient is 7.5 mmHg. Heart rate  during assessment was 100/min.  Aortic Valve/Aorta: Calcified aortic valve with normal  opening.  Normal aortic root size. (Ao: 3.3 cm at the sinuses of  Valsalva).  Left Atrium: Normal left atrium.  LeftVentricle: Endocardial visualization enhanced with  intravenous injection of Ultrasonic Enhancing Agent  (Definity). Normal left ventricular systolic function.  Normal left ventricular systolic function. No segmental  wall motion abnormalities. Normal left ventricular internal  dimensions and wall thicknesses.  Right Heart: Normal right atrium. Normal right ventricular  size and function. Normal tricuspid valve.  Normal pulmonic  valve.  Pericardium/Pleura: Normal pericardium with no pericardial  effusion.  Hemodynamic: No evidence of pulmonary hypertension.  ------------------------------------------------------------------------  Conclusions:  Endocardial visualization enhanced with intravenous  injection of Ultrasonic Enhancing Agent (Definity).  Normal left ventricular systolic function. No segmental  wall motion abnormalities.  Bioprosthetic mitral valve replacement with normal  function.    < end of copied text >

## 2019-05-09 NOTE — CONSULT NOTE ADULT - SUBJECTIVE AND OBJECTIVE BOX
Wound SURGERY CONSULT NOTE    HPI:  79 yo woman w multiple medical problems  presents from [facility] w dyspnea and productive cough for 3 days, no fevers, no chills, no CP, no palpitations . RVP positive for HMPV. was started on IV Zosyn duonebs and po prednisone at [facility] on    Ptn was sent to [facility] post admission 3/30-4/15/19 with UTI, further uterine bleeding, and Right Quadricept tendon repair 2/2 traumatic rupture post mechanical fall.   PMH: morbid obesity with functional paraplegia, restrictive lung disease, ILD, VICKY/OHS with chronic hypercapnic and hypoxic respiratory failure on 3L NC (not on CPAP), PAD/PVD with chronic LE wounds, h/o MS s/p bioprosthetic MVR, dCHF, HTN, DM 2, CKD III, anemia, post menopausal vaginal bleeding s/p D&C in 2018 and in 2019 had D&C amd Mirena IUD placement, pathology findings benign, no further bleeding since. placed initially on Norethindrone and none at present. Ptn was  cleared to restart ELiquis for PAFI stable on amiodarone) prior to DC but now she is on coumadin, not sure why the medication was changed. also h/o , UC, Right knee Quadriceps tendon rupture , s/p repair in April ( last month), chronic hematuria, s/p cystoscopy w no findings of cystitis or polyp in april. chronic back 2/2 spinal stenosis with gait instability,  wheelchair dependent for the past 5-6 months. Requires assistance of her  for ADLS.   Wound consult requested to assist w/ management of leg wound.   DSD placed awaiting consult. No drainage, odor, redness, warmth, pain, f/c/s noted.  Pt c/o leg swelling w/ blistering.  Pt states that it is cyclical.  Pt doesn't wear compression.       PAST MEDICAL & SURGICAL HISTORY:  Chronic kidney disease (CKD)  Anemia of chronic disease  On home oxygen therapy: 2  liters nasal cannula  Asthma: PFT (2018)  Dyslipidemia a  Paroxysmal atrial fibrillation: h/o rapid ventricular rate 2 years ago,   Morbid obesity with BMI of 45.0-49.9, adult  hypothyroidism  Chronic diastolic CHF (congestive heart failure): EF 56% (2017)  Depression (emotion)  Arthritis of spine  Macular degeneration of left eye: receiving injection  Neuropathy  Peripheral arterial disease: s/p remote stent. not on antiplatelet meds for a while.  Hypertension  GERD (gastroesophageal reflux disease)  Chronic low back pain  Herniated disc: lumbar  VICKY (obstructive sleep apnea)  Ulcerative colitis  Diabetes mellitus: T2DM last A1C 6.7 mg/dl in January   s/p mitral valve replacement with bioprosthetic valve: x 4 years ago  s/p  section: x 2  s/p Lt 3rd toe& 4th partial Amputated       REVIEW OF SYSTEMS  Skin/ MSK: see HPI  All other systems negative    MEDICATIONS  (STANDING):  ALBUTerol/ipratropium for Nebulization. 3 milliLiter(s) Nebulizer every 6 hours  ALPRAZolam 0.25 milliGRAM(s) Oral two times a day  amiodarone    Tablet 200 milliGRAM(s) Oral daily  carvedilol 6.25 milliGRAM(s) Oral every 12 hours  cholecalciferol 1000 Unit(s) Oral daily  dextrose 5%. 1000 milliLiter(s) (50 mL/Hr) IV Continuous <Continuous>  dextrose 50% Injectable 12.5 Gram(s) IV Push once  dextrose 50% Injectable 25 Gram(s) IV Push once  dextrose 50% Injectable 25 Gram(s) IV Push once  docusate sodium 100 milliGRAM(s) Oral two times a day  DULoxetine 60 milliGRAM(s) Oral daily  fenofibrate Tablet 145 milliGRAM(s) Oral daily  furosemide   Injectable 40 milliGRAM(s) IV Push every 12 hours  guaiFENesin  milliGRAM(s) Oral every 12 hours  insulin glargine Injectable (LANTUS) 54 Unit(s) SubCutaneous at bedtime  insulin lispro (HumaLOG) corrective regimen sliding scale   SubCutaneous three times a day before meals  insulin lispro (HumaLOG) corrective regimen sliding scale   SubCutaneous at bedtime  isosorbide   mononitrate ER Tablet (IMDUR) 30 milliGRAM(s) Oral daily  levothyroxine 88 MICROGram(s) Oral daily  levothyroxine 100 MICROGram(s) Oral daily  melatonin 10 milliGRAM(s) Oral at bedtime  mesalamine DR (24-Hour) Tablet 2.4 Gram(s) Oral daily  multivitamin 1 Tablet(s) Oral daily  pantoprazole    Tablet 40 milliGRAM(s) Oral before breakfast  predniSONE   Tablet 40 milliGRAM(s) Oral daily  senna 2 Tablet(s) Oral at bedtime  spironolactone 25 milliGRAM(s) Oral daily    MEDICATIONS  (PRN):  acetaminophen   Tablet .. 650 milliGRAM(s) Oral every 6 hours PRN Temp greater or equal to 38C (100.4F), Mild Pain (1 - 3)  bisacodyl Suppository 10 milliGRAM(s) Rectal daily PRN Constipation  dextrose 40% Gel 15 Gram(s) Oral once PRN Blood Glucose LESS THAN 70 milliGRAM(s)/deciliter  glucagon  Injectable 1 milliGRAM(s) IntraMuscular once PRN Glucose LESS THAN 70 milligrams/deciliter  polyethylene glycol 3350 17 Gram(s) Oral daily PRN Constipation  saline laxative (FLEET) Rectal Enema 1 Enema Rectal daily PRN for constipation      Allergies    Augmentin (Swelling)  cephalexin (Swelling)  latex (Rash)    SOCIAL HISTORY:  , Denies smoking, ETOH, drugs    FAMILY HISTORY:  FH: heart disease: mother      Vital Signs Last 24 Hrs  T(C): 36.4 (09 May 2019 13:41), Max: 36.8 (08 May 2019 16:14)  T(F): 97.6 (09 May 2019 13:41), Max: 98.3 (08 May 2019 17:20)  HR: 90 (09 May 2019 13:41) (83 - 101)  BP: 155/77 (09 May 2019 13:41) (123/64 - 179/65)  BP(mean): --  RR: 20 (09 May 2019 13:41) (18 - 22)  SpO2: 99% (09 May 2019 13:41) (98% - 100%)    NAD / A&Ox3/ MO  WD/ WN/ Disheveled  Versa Care P500 bed    Cardiovascular: RRR (+)m    Respiratory: CTA    Gastrointestinal soft NT/ND (+)BS     Neurology  weakened strength & sensation grossly intact    Musculoskeletal/Vascular:  FROM x4;  RLE limited by brace  BLE edema equal  no DP/PT pulses palpable  BLE equally warm  no acute ischemia noted  hemosiderin staining  LLE w/ small partial thickness ulcers moist  RLE shin w/ newly healed wounds  (+)serosanguinous drainage  No odor, erythema, increased warmth, tenderness, induration, fluctuance    Skin: Moist w/ good turgor      LABS:      138  |  95<L>  |  44<H>  ----------------------------<  110<H>  4.4   |  29  |  1.87<H>    Ca    9.5      08 May 2019 16:58  Mg     2.1         TPro  7.3  /  Alb  3.8  /  TBili  0.8  /  DBili  x   /  AST  51<H>  /  ALT  57<H>  /  AlkPhos  69                            9.1    7.05  )-----------( 159      ( 09 May 2019 07:43 )             31.2     PT/INR - ( 09 May 2019 08:53 )   PT: 27.2 sec;   INR: 2.34 ratio    PTT - ( 08 May 2019 16:58 )  PTT:34.5 sec      RADIOLOGY & ADDITIONAL STUDIES:      < from: Xray Chest 1 View- PORTABLE-Urgent (19 @ 17:03) >  FINDINGS:     Patient is status post median sternotomy. Status post CABG. Status post   mitral valve repair.    Bibasilar linear subsegmental atelectasis.  Minimal pulmonary edema.  No pleural effusion or pneumothorax.   Cardiac size cannot be accurately assessed in this projection. Visualized   osseous structures are unremarkable.    IMPRESSION:   Bibasilar linear subsegmental atelectasis.

## 2019-05-09 NOTE — PROGRESS NOTE ADULT - SUBJECTIVE AND OBJECTIVE BOX
Patient is a 78y old  Female who presents with a chief complaint of DYSPNEA, COUGH (09 May 2019 18:01)      SUBJECTIVE / OVERNIGHT EVENTS: states feels worse today, wheezing    MEDICATIONS  (STANDING):  ALBUTerol/ipratropium for Nebulization. 3 milliLiter(s) Nebulizer every 6 hours  ALPRAZolam 0.25 milliGRAM(s) Oral two times a day  amiodarone    Tablet 200 milliGRAM(s) Oral daily  carvedilol 6.25 milliGRAM(s) Oral every 12 hours  cholecalciferol 1000 Unit(s) Oral daily  dextrose 5%. 1000 milliLiter(s) (50 mL/Hr) IV Continuous <Continuous>  dextrose 50% Injectable 12.5 Gram(s) IV Push once  dextrose 50% Injectable 25 Gram(s) IV Push once  dextrose 50% Injectable 25 Gram(s) IV Push once  docusate sodium 100 milliGRAM(s) Oral two times a day  DULoxetine 60 milliGRAM(s) Oral daily  fenofibrate Tablet 145 milliGRAM(s) Oral daily  furosemide   Injectable 40 milliGRAM(s) IV Push every 12 hours  guaiFENesin  milliGRAM(s) Oral every 12 hours  insulin glargine Injectable (LANTUS) 54 Unit(s) SubCutaneous at bedtime  insulin lispro (HumaLOG) corrective regimen sliding scale   SubCutaneous three times a day before meals  insulin lispro (HumaLOG) corrective regimen sliding scale   SubCutaneous at bedtime  isosorbide   mononitrate ER Tablet (IMDUR) 30 milliGRAM(s) Oral daily  levothyroxine 88 MICROGram(s) Oral daily  levothyroxine 100 MICROGram(s) Oral daily  melatonin 10 milliGRAM(s) Oral at bedtime  mesalamine DR (24-Hour) Tablet 2.4 Gram(s) Oral daily  methylPREDNISolone sodium succinate Injectable 40 milliGRAM(s) IV Push every 8 hours  multivitamin 1 Tablet(s) Oral daily  pantoprazole    Tablet 40 milliGRAM(s) Oral before breakfast  senna 2 Tablet(s) Oral at bedtime  spironolactone 25 milliGRAM(s) Oral daily  warfarin 3.5 milliGRAM(s) Oral once    MEDICATIONS  (PRN):  acetaminophen   Tablet .. 650 milliGRAM(s) Oral every 6 hours PRN Temp greater or equal to 38C (100.4F), Mild Pain (1 - 3)  ALBUTerol/ipratropium for Nebulization 3 milliLiter(s) Nebulizer every 4 hours PRN Shortness of Breath and/or Wheezing  bisacodyl Suppository 10 milliGRAM(s) Rectal daily PRN Constipation  dextrose 40% Gel 15 Gram(s) Oral once PRN Blood Glucose LESS THAN 70 milliGRAM(s)/deciliter  glucagon  Injectable 1 milliGRAM(s) IntraMuscular once PRN Glucose LESS THAN 70 milligrams/deciliter  polyethylene glycol 3350 17 Gram(s) Oral daily PRN Constipation  saline laxative (FLEET) Rectal Enema 1 Enema Rectal daily PRN for constipation      Vital Signs Last 24 Hrs  T(F): 97.6 (05-09-19 @ 13:41), Max: 98.1 (05-08-19 @ 23:30)  HR: 90 (05-09-19 @ 13:41) (83 - 101)  BP: 155/77 (05-09-19 @ 13:41) (127/67 - 179/65)  RR: 20 (05-09-19 @ 13:41) (18 - 22)  SpO2: 99% (05-09-19 @ 13:41) (98% - 100%)  Telemetry:   CAPILLARY BLOOD GLUCOSE      POCT Blood Glucose.: 211 mg/dL (09 May 2019 17:00)  POCT Blood Glucose.: 146 mg/dL (09 May 2019 11:42)  POCT Blood Glucose.: 127 mg/dL (09 May 2019 07:28)  POCT Blood Glucose.: 118 mg/dL (09 May 2019 02:09)  POCT Blood Glucose.: 122 mg/dL (08 May 2019 23:34)    I&O's Summary    09 May 2019 07:01  -  09 May 2019 19:46  --------------------------------------------------------  IN: 1020 mL / OUT: 350 mL / NET: 670 mL        PHYSICAL EXAM:  GENERAL: NAD, well-developed  HEAD:  Atraumatic, Normocephalic  EYES: EOMI, PERRLA, conjunctiva and sclera clear  NECK: Supple, No JVD  CHEST/LUNG: Clear to auscultation bilaterally; No wheeze  HEART: Regular rate and rhythm; No murmurs, rubs, or gallops  ABDOMEN: Soft, Nontender, Nondistended; Bowel sounds present  EXTREMITIES:  2+ Peripheral Pulses, No clubbing, cyanosis, or edema  PSYCH: AAOx3  NEUROLOGY: non-focal  SKIN: No rashes or lesions    LABS:                        9.1    7.05  )-----------( 159      ( 09 May 2019 07:43 )             31.2     05-08    138  |  95<L>  |  44<H>  ----------------------------<  110<H>  4.4   |  29  |  1.87<H>    Ca    9.5      08 May 2019 16:58  Mg     2.1     05-08    TPro  7.3  /  Alb  3.8  /  TBili  0.8  /  DBili  x   /  AST  51<H>  /  ALT  57<H>  /  AlkPhos  69  05-08    PT/INR - ( 09 May 2019 08:53 )   PT: 27.2 sec;   INR: 2.34 ratio         PTT - ( 08 May 2019 16:58 )  PTT:34.5 sec          RADIOLOGY & ADDITIONAL TESTS:    Imaging Personally Reviewed:    Consultant(s) Notes Reviewed:      Care Discussed with Consultants/Other Providers:

## 2019-05-09 NOTE — CONSULT NOTE ADULT - ASSESSMENT
78y Female with history of bioprosthetic MVR, chronic diastolic CHF, PAF, VICKY/Obesity, CKD, DM and PAD sent to ER due to SOB. Micro c/w MHNP viral infection. Mild bronchospasm and mild fluid overload, Labs are stable.    Rec:  Change to IV diuretic for 1-2 days  Continue other CV meds  Keep INR 2-3  Treatment per pulmonary and ID  Monitor BT
79 y/o Type 2 DM, hypothyroid, re-admitted after prolonged hospital stay for morbid obesity, ILD, VICKY/OHS PAD/PVD with chronic LE wounds, h/o MS s/p bioprosthetic MVR, dCHF, HTN, DM 2, CKD III, anemia, vaginal bleeding, repair of traumatic right leg quadriceps tendon, with increasing SOB due to HMPV infection started on high dose steroids.    Type 2 DM, morbid obesity, insulin resistant discharged on lantus 54 units daily, and humalog 20-24 units before meals. Pt's HbA1c on prior admission was 7.4 %.   She had decreased PO intake over the past two days, due to SOB, and FS have been low set on basal lantus dose and scale humalog alone. She was on PO steroids at rehab prior to transfer, however was started on solumedrol 40 mg IV Q8h from pm today. PO intake was moderate with dinned.    Hypothyroidism- treated with synthroid 175 mcg daily. During past admission dose increased to 188 mg daily, with normal TFT's by D/C.  C/O fatigue and weakness in rehab.       Diabetes:  Insulin requirements were low due to decreased PO intake, but expect significant hyperglycemia back on high dose steroids.  Will restart per-meal Humalog 10 units per meal from am.  Keep lantus 54 units daily for now.  Increase scale coverage humalog.    Thyroid:  On synthroid 188 mcg daily now.  Will check TFT's.
A/P: 79 yo woman w multiple medical problems outline in [place] being admitted for HMPV tracheobronchitis and acute on chronic diastolic CHF.     BLE w/ PVD- partial thickness wounds- cavilon  Compression BLE   Consider RENAN/PVR, XRay,  BLE elevation  Abx per Medicine/ ID  Moisturize intact skin w/ SWEEN cream BID  con't Nutrition (as tolerated), Nutrition Consult  con't Offloading   con't Pericare  Care as per medicine will follow w/ you  Upon discharge [facility] [address] [place] [address] [phone]  Seen w/ attng and D/w team  Thank you for this consult  Thalia Archer PA-C [id number] [id number]
77 yo woman w multiple medical problems  presents from [facility] w dyspnea and productive cough for 3 days, no fevers, no chills, no CP, no palpitations . RVP positive for HMPV. was started on IV Zosyn duonebs and po prednisone at [facility] on 5/7   Ptn was sent to [facility] post admission 3/30-4/15/19 with UTI, further uterine bleeding, and Right Quadricept tendon repair 2/2 traumatic rupture post mechanical fall.   PMH: morbid obesity with functional paraplegia, restrictive lung disease, ILD, VICKY/OHS with chronic hypercapnic and hypoxic respiratory failure on 3L NC (not on CPAP), PAD/PVD with chronic LE wounds, h/o MS s/p bioprosthetic MVR, dCHF, HTN, DM 2, CKD III, anemia, post menopausal vaginal bleeding s/p D&C in July 2018 and in April 2019 had D&C amd Mirena IUD placement, pathology findings benign, no further bleeding since. placed initially on Norethindrone and none at present. Ptn was  cleared to restart ELiquis for PAFI stable on amiodarone) prior to DC but now she is on coumadin, not sure why the medication was changed. also h/o , UC, Right knee Quadriceps tendon rupture , s/p repair in April ( last month), chronic hematuria, s/p cystoscopy w no findings of cystitis or polyp in april. chronic back 2/2 spinal stenosis with gait instability,  wheelchair dependent for the past 5-6 months. Requires assistance of her  for ADLS. (08 May 2019 20:59)    Pt afebrile, no leukocytosis.  PCT 0.05.  AST/ALT mildly elevated.  RVP (+) hMPV.  Cxr with mild pulm edema on prelim read. Pt off abx.  She c/o dry cough and wheezing.  No abd pain/diarrhea/dysuria/cp/HA.  c/o congestion and "blockage" or "stuffiness" in rt ear.        Recommend:    Viral URI:    - RVP (+) human metapneumovirus.  No role for antivirals.  Pt without fever or leukocytosis.  Cxr shows mild pulmonary edema (prelim).  No evidence for superimposed bacterial pna.  No need for antibiotics at this time.    - Cont supportive care, nebulizers, supp O2 as needed.  Pt with wheezing, on solumedrol.  Cont steroid taper.      Will follow,    Christal Reinoso  [phone]
77 yo F w/ hx of moderate MS s/p bioprosthetic mitral valve, severe pulmonary HTN, chronic hypoxemic respiratory failure and suspected ILD, DM2, anemia, diastolic CHF, hypothyroidism, paroxysmal atrial fibrillation, HTN presenting with progressive SOB 2/2 human metapneumovirus

## 2019-05-09 NOTE — CONSULT NOTE ADULT - SUBJECTIVE AND OBJECTIVE BOX
HPI:  Ms. Alvarado is a 78 year-old woman with history of multiple medical issues including morbid obesity, hypertension, type 2 diabetes mellitus, diastolic congestive heart failure, chronic kidney disease, ulcerative colitis, and mitral valve replacement. She presented yesterday to the [facility] from a subacute rehabilitation facility with progressively worsening shortness of breath and a productive cough for 3 days. Her rapid sputum testing was notable for HMPV.     Ms. Alvarado is s/p admission 3/30-4/15/19 for UTI, uterine bleeding, and right quadriceps tendon rupture s/p fall; the tendon was surgically repaired during the admission. She has spinal stenosis as well, and has been wheelchair-dependent for the last several months.        PAST MEDICAL & SURGICAL HISTORY:  Chronic kidney disease (CKD)  Anemia of chronic disease  Asthma: PFT (2018)  History of postmenopausal bleeding  Dyslipidemia associated with type 2 diabetes mellitus  Paroxysmal atrial fibrillation: h/o rapid ventricular rate 2 years ago, admitted at [facility], controlled with medication as per patient.  H/O: hypothyroidism  Chronic diastolic CHF (congestive heart failure): EF 56% (2017)  Peripheral arterial disease: s/p remote stent. not on antiplatelet meds for a while.  Hypertension  VICKY (obstructive sleep apnea)  Ulcerative colitis  Diabetes mellitus: T2DM last A1C 6.7 mg/dl in January   History of mitral valve replacement with bioprosthetic valve: x 4 years ago  H/O  section: x 2  Amputated toe    Allergies  Augmentin (Swelling)  cephalexin (Swelling)  latex (Rash)    SOCIAL HISTORY:  Denies ETOh,Smoking,     FAMILY HISTORY:  FH: heart disease: mother    REVIEW OF SYSTEMS:  CONSTITUTIONAL: No weakness, fevers or chills  EYES/ENT: No visual changes;  No vertigo or throat pain   NECK: No pain or stiffness  RESPIRATORY: (+)SOB, (+)cough, (+)phlegm  CARDIOVASCULAR: No chest pain or palpitations  GASTROINTESTINAL: No abdominal or epigastric pain. No nausea, vomiting, or hematemesis; No diarrhea or constipation. No melena or hematochezia.  GENITOURINARY: No dysuria, frequency or hematuria  NEUROLOGICAL: No numbness or weakness  SKIN: No itching, burning, rashes, or lesions   All other review of systems is negative unless indicated above.    VITAL:  T(C): , Max: 36.8 (19 @ 16:14)  T(F): , Max: 98.3 (19 @ 17:20)  HR: 95 (19 @ 05:09)  BP: 136/75 (19 @ 05:09)  RR: 20 (19 @ 05:09)  SpO2: 98% (19 @ 05:09)    PHYSICAL EXAM:  Constitutional: NAD, Alert  HEENT: NCAT, MMM  Neck: Supple, No JVD  Respiratory: CTA-b/l  Cardiovascular: RRR s1s2, no m/r/g  Gastrointestinal: BS+, soft, NT/ND  Extremities: No peripheral edema b/l  Neurological: no focal deficits; strength grossly intact  Psychiatric: Normal mood, normal affect  Back: no CVAT b/l  Skin: No rashes, no nevi    LABS:                        9.1    7.05  )-----------( 159      ( 09 May 2019 07:43 )             31.2     Na(138)/K(4.4)/Cl(95)/HCO3(29)/BUN(44)/Cr(1.87)Glu(110)/Ca(9.5)/Mg(2.1)/PO4(--)     @ 16:58  (4/15/19) - BUN/creatinine: 61/1.73  (3/30/19) - UA - 100prot; 15-25RBC; >50WBC      IMAGING:  < from: Xray Chest 1 View- PORTABLE-Urgent (19 @ 17:03) >  minimal pulmonary edema    ASSESSMENT:  (1)Renal - CKD 3-4, with non-nephrotic range proteinuria - likely due to DM/HTN. At/near baseline GFR.   (2)Lytes - K+/HCO3- acceptable for now  (3)Pulm - HMPV/reactive airway disease flare - Pulmonary on board/on steroids  (4)Anemia - CKD-associated?    RECOMMEND:  (1)Dose new meds for GFR 25-35ml/min  (2)No objection to continuing Spironolactone as ordered  (3)Steroids per primary team/Pulmonary  (4)BMP daily  (5)Check iron studies  (6)Check serum PO4    Thank you for involving [facility] in this patient's care.    With warm regards,    Juan Alberto Hills MD   [employer]  [phone]            . HPI:  Ms. Alvarado is a 78 year-old woman with history of multiple medical issues including morbid obesity, hypertension, type 2 diabetes mellitus, diastolic congestive heart failure, chronic kidney disease, ulcerative colitis, and mitral valve replacement. She presented yesterday to the [facility] ER from a subacute rehabilitation facility with progressively worsening shortness of breath and a productive cough for 3 days. Her rapid sputum testing was notable for HMPV.     Ms. Alvarado is s/p admission 3/30-4/15/19 for UTI, uterine bleeding, and right quadriceps tendon rupture s/p fall; the tendon was surgically repaired during the admission. She has spinal stenosis as well, and has been wheelchair-dependent for the last several months.    Ms. Alvarado states that she has had known CKD for a fairly long time. She has a nephrologist but she cannot recall his name. She denies recent urinary changes. She denies NSAID use.    PAST MEDICAL & SURGICAL HISTORY:  Chronic kidney disease (CKD)  Anemia of chronic disease  Asthma: PFT (2018)  History of postmenopausal bleeding  Dyslipidemia associated with type 2 diabetes mellitus  Paroxysmal atrial fibrillation: h/o rapid ventricular rate 2 years ago, admitted at [facility], controlled with medication as per patient.  H/O: hypothyroidism  Chronic diastolic CHF (congestive heart failure): EF 56% (2017)  Peripheral arterial disease: s/p remote stent. not on antiplatelet meds for a while.  Hypertension  VICKY (obstructive sleep apnea)  Ulcerative colitis  Diabetes mellitus: T2DM last A1C 6.7 mg/dl in January   History of mitral valve replacement with bioprosthetic valve: x 4 years ago  H/O  section: x 2  Amputated toe    Allergies  Augmentin (Swelling)  cephalexin (Swelling)  latex (Rash)    SOCIAL HISTORY:  Denies ETOh,Smoking,     FAMILY HISTORY:  FH: heart disease: mother    REVIEW OF SYSTEMS:  CONSTITUTIONAL: No weakness, fevers or chills  EYES/ENT: No visual changes;  No vertigo or throat pain   NECK: No pain or stiffness  RESPIRATORY: (+)SOB, (+)cough, (+)phlegm  CARDIOVASCULAR: No chest pain or palpitations  GASTROINTESTINAL: No abdominal or epigastric pain. No nausea, vomiting, or hematemesis; No diarrhea or constipation. No melena or hematochezia.  GENITOURINARY: No dysuria, frequency or hematuria  NEUROLOGICAL: No numbness or weakness  SKIN: No itching, burning, rashes, or lesions   All other review of systems is negative unless indicated above.    VITAL:  T(C): , Max: 36.8 (19 @ 16:14)  T(F): , Max: 98.3 (19 @ 17:20)  HR: 95 (19 @ 05:09)  BP: 136/75 (19 @ 05:09)  RR: 20 (19 @ 05:09)  SpO2: 98% (19 @ 05:09)    PHYSICAL EXAM:  Constitutional: NAD, Alert; obese  HEENT: NCAT, DMM  Neck: Supple, No JVD  Respiratory: coarse BS b/l  Cardiovascular: irreg s1s2  Gastrointestinal: BS+, soft, NT/ND  Extremities: (+)B/L LE edema  Neurological: RLE in brace; reduced generalized strength  Back: no CVAT b/l  Skin: No rashes, no nevi    LABS:                        9.1    7.05  )-----------( 159      ( 09 May 2019 07:43 )             31.2     Na(138)/K(4.4)/Cl(95)/HCO3(29)/BUN(44)/Cr(1.87)Glu(110)/Ca(9.5)/Mg(2.1)/PO4(--)     @ 16:58  (4/15/19) - BUN/creatinine: 61/1.73  (3/30/19) - UA - 100prot; 15-25RBC; >50WBC      IMAGING:  < from: Xray Chest 1 View- PORTABLE-Urgent (19 @ 17:03) >  minimal pulmonary edema    ASSESSMENT:  (1)Renal - CKD 3-4, with non-nephrotic range proteinuria - likely due to DM/HTN. At/near baseline GFR.   (2)Lytes - K+/HCO3- acceptable for now  (3)Pulm - HMPV/reactive airway disease flare - Pulmonary on board/on steroids  (4)Anemia - CKD-associated?    RECOMMEND:  (1)Dose new meds for GFR 25-35ml/min  (2)No objection to continuing Spironolactone as ordered  (3)Steroids per primary team/Pulmonary  (4)BMP daily  (5)Check iron studies  (6)Check serum PO4    Thank you for involving [facility] in this patient's care.    With warm regards,    Juan Alberto Hills MD   [employer]  [phone]            .

## 2019-05-09 NOTE — CONSULT NOTE ADULT - ATTENDING COMMENTS
79 yo female a/w respiratory compromise  h/o CHF, on diuretics  c/o weeping bilateral leg wounds   present  Had recent emergency right knee surgery  Denies DVT  Currently is at bedrest using nasal O2  Is overweight  Brace present right leg with steri strips  present- clean right knee wound  Both legs are edematous as are thighs  No current lymphedema  Suspect a component of hypervolemia  Consider arterial , venous duplex studies  Op f/u info provided and all questions answered

## 2019-05-09 NOTE — CONSULT NOTE ADULT - SUBJECTIVE AND OBJECTIVE BOX
HPI:  79 yo woman w multiple medical problems  presents from [facility] w dyspnea and productive cough for 3 days, no fevers, no chills, no CP, no palpitations . RVP positive for HMPV. was started on IV Zosyn duonebs and po prednisone at [facility] on    Ptn was sent to [facility] post admission 3/30-4/15/19 with UTI, further uterine bleeding, and Right Quadricept tendon repair 2/2 traumatic rupture post mechanical fall.   PMH: morbid obesity with functional paraplegia, restrictive lung disease, ILD, VICKY/OHS with chronic hypercapnic and hypoxic respiratory failure on 3L NC (not on CPAP), PAD/PVD with chronic LE wounds, h/o MS s/p bioprosthetic MVR, dCHF, HTN, DM 2, CKD III, anemia, post menopausal vaginal bleeding s/p D&C in 2018 and in 2019 had D&C amd Mirena IUD placement, pathology findings benign, no further bleeding since. placed initially on Norethindrone and none at present. Ptn was  cleared to restart ELiquis for PAFI stable on amiodarone) prior to DC but now she is on coumadin, not sure why the medication was changed. also h/o , UC, Right knee Quadriceps tendon rupture , s/p repair in April ( last month), chronic hematuria, s/p cystoscopy w no findings of cystitis or polyp in april. chronic back 2/2 spinal stenosis with gait instability,  wheelchair dependent for the past 5-6 months. Requires assistance of her  for ADLS. (08 May 2019 20:59)      Admit Diagnosis  Heart failure      ENDOCRINE HPI: 79 y/o Type 2 DM, hypothyroid, re-admitted after prolonged hospital stay for morbid obesity, ILD, VICKY/OHS PAD/PVD with chronic LE wounds, h/o MS s/p bioprosthetic MVR, dCHF, HTN, DM 2, CKD III, anemia, vaginal bleeding, repair of traumatic right leg quadriceps tendon, with increasing SOB due to HMPV infection started on high dose steroids.    Type 2 DM, morbid obesity, insulin resistant discharged on lantus 54 units daily, and humalog 20-24 units before meals. Pt's HbA1c on prior admission was 7.4 %.   She had decreased PO intake over the past two days, due to SOB, and FS have been low set on basal lantus dose and scale humalog alone. She was on PO steroids at rehab prior to transfer, however was started on solumedrol 40 mg IV Q8h from pm today. PO intake was moderate with dinned.    Hypothyroidism- treated with synthroid 175 mcg daily. During past admission dose increased to 188 mg daily, with normal TFT's by D/C.  C/O fatigue and weakness in rehab.         PAST MEDICAL & SURGICAL HISTORY:  Chronic kidney disease (CKD)  Anemia of chronic disease  On home oxygen therapy: 2  liters nasal cannula  Asthma: PFT (2018)  History of postmenopausal bleeding  Dyslipidemia associated with type 2 diabetes mellitus  Paroxysmal atrial fibrillation: h/o rapid ventricular rate 2 years ago, admitted at [facility], controlled with medication as per patient.  last INR 2.0  Morbid obesity with BMI of 45.0-49.9, adult  H/O: hypothyroidism  Chronic diastolic CHF (congestive heart failure): EF 56% (2017)  Depression (emotion)  Arthritis of spine  Macular degeneration of left eye: receiving injection  Neuropathy  Peripheral arterial disease: s/p remote stent. not on antiplatelet meds for a while.  Hypertension  Edema  GERD (gastroesophageal reflux disease)  Chronic low back pain  Herniated disc: lumbar  VICKY (obstructive sleep apnea)  Ulcerative colitis  Diabetes mellitus: T2DM last A1C 6.7 mg/dl in January   fs range 59- 200 mg/dl  History of mitral valve replacement with bioprosthetic valve: x 4 years ago  H/O  section: x 2  Amputated toe      FAMILY HISTORY:  FH: heart disease: mother      Social History:    Outpatient Medications:    MEDICATIONS  (STANDING):  ALBUTerol/ipratropium for Nebulization. 3 milliLiter(s) Nebulizer every 6 hours  ALPRAZolam 0.25 milliGRAM(s) Oral two times a day  amiodarone    Tablet 200 milliGRAM(s) Oral daily  carvedilol 6.25 milliGRAM(s) Oral every 12 hours  cholecalciferol 1000 Unit(s) Oral daily  dextrose 5%. 1000 milliLiter(s) (50 mL/Hr) IV Continuous <Continuous>  dextrose 50% Injectable 12.5 Gram(s) IV Push once  dextrose 50% Injectable 25 Gram(s) IV Push once  dextrose 50% Injectable 25 Gram(s) IV Push once  docusate sodium 100 milliGRAM(s) Oral two times a day  DULoxetine 60 milliGRAM(s) Oral daily  fenofibrate Tablet 145 milliGRAM(s) Oral daily  furosemide   Injectable 40 milliGRAM(s) IV Push every 12 hours  guaiFENesin  milliGRAM(s) Oral every 12 hours  insulin glargine Injectable (LANTUS) 54 Unit(s) SubCutaneous at bedtime  insulin lispro (HumaLOG) corrective regimen sliding scale   SubCutaneous three times a day before meals  insulin lispro (HumaLOG) corrective regimen sliding scale   SubCutaneous at bedtime  isosorbide   mononitrate ER Tablet (IMDUR) 30 milliGRAM(s) Oral daily  levothyroxine 88 MICROGram(s) Oral daily  levothyroxine 100 MICROGram(s) Oral daily  melatonin 10 milliGRAM(s) Oral at bedtime  mesalamine DR (24-Hour) Tablet 2.4 Gram(s) Oral daily  methylPREDNISolone sodium succinate Injectable 40 milliGRAM(s) IV Push every 8 hours  multivitamin 1 Tablet(s) Oral daily  pantoprazole    Tablet 40 milliGRAM(s) Oral before breakfast  senna 2 Tablet(s) Oral at bedtime  spironolactone 25 milliGRAM(s) Oral daily  warfarin 3.5 milliGRAM(s) Oral once    MEDICATIONS  (PRN):  acetaminophen   Tablet .. 650 milliGRAM(s) Oral every 6 hours PRN Temp greater or equal to 38C (100.4F), Mild Pain (1 - 3)  ALBUTerol/ipratropium for Nebulization 3 milliLiter(s) Nebulizer every 4 hours PRN Shortness of Breath and/or Wheezing  bisacodyl Suppository 10 milliGRAM(s) Rectal daily PRN Constipation  dextrose 40% Gel 15 Gram(s) Oral once PRN Blood Glucose LESS THAN 70 milliGRAM(s)/deciliter  glucagon  Injectable 1 milliGRAM(s) IntraMuscular once PRN Glucose LESS THAN 70 milligrams/deciliter  polyethylene glycol 3350 17 Gram(s) Oral daily PRN Constipation  saline laxative (FLEET) Rectal Enema 1 Enema Rectal daily PRN for constipation      Allergies    Augmentin (Swelling)  cephalexin (Swelling)  latex (Rash)    Intolerances        Review of Systems:  Constitutional: No fever, chills, increased SOB.  Eyes: blurry vision  Neuro: No tremors  HEENT: No pain  Cardiovascular: No chest pain, palpitations  Respiratory: SOB, non procductive cough  GI: No nausea, vomiting, abdominal pain  : No dysuria, no recurrence of vaginal bleeding.  Skin: chronic trophic changes both feet with ischemic changes and toe amputations.  Psych: no depression  Endocrine: polyuria, polydipsia  Hem/lymph: no swelling  Osteoporosis: no fractures    ALL OTHER SYSTEMS REVIEWED AND NEGATIVE    PHYSICAL EXAM:  VITALS: T(C): 36.4 (19 @ 13:41)  T(F): 97.6 (19 @ 13:41), Max: 98.1 (19 @ 23:30)  HR: 90 (19 @ 13:41) (83 - 101)  BP: 155/77 (19 @ 13:41) (127/67 - 179/65)  RR:  (18 - 22)  SpO2:  (98% - 100%)  Wt(lb): --251  GENERAL: morbid obesity, C/O SOB, cough  EYES: No proptosis, no lid lag, anicteric  HEENT:  Atraumatic, Normocephalic, moist mucous membranes  THYROID: Normal size, no palpable nodules  RESPIRATORY: decreased breath sounds, bilateral, rhonchi and wheezing  CARDIOVASCULAR: Regular rate and rhythm; No murmurs; + peripheral edema  GI: Soft, nontender, non distended, normal bowel sounds  SKIN: Dry, intact, bilateral foot chronic changes and edema, toe amputation left foot.  MUSCULOSKELETAL: right leg in brace, reduced strength.  NEURO: no focal deficits.  PSYCH: Alert and oriented x 3, normal affect, normal mood    POCT Blood Glucose.: 211 mg/dL (19 @ 17:00)  POCT Blood Glucose.: 146 mg/dL (19 @ 11:42)  POCT Blood Glucose.: 127 mg/dL (19 @ 07:28)  POCT Blood Glucose.: 118 mg/dL (19 @ 02:09)  POCT Blood Glucose.: 122 mg/dL (19 @ 23:34)                            9.1    7.05  )-----------( 159      ( 09 May 2019 07:43 )             31.2           138  |  95<L>  |  44<H>  ----------------------------<  110<H>  4.4   |  29  |  1.87<H>    Ca    9.5      08 May 2019 16:58  Mg     2.1     -08    TPro  7.3  /  Alb  3.8  /  TBili  0.8  /  DBili  x   /  AST  51<H>  /  ALT  57<H>  /  AlkPhos  69  05-      Thyroid Function Tests:   @ 09:32 TSH 2.50 FreeT4 2.2 T3 -- Anti TPO -- Anti Thyroglobulin Ab -- TSI --      Hemoglobin A1C, Whole Blood: 7.4 % <H> [4.0 - 5.6] (19 @ 08:55)          Radiology:

## 2019-05-09 NOTE — CONSULT NOTE ADULT - PROBLEM/RECOMMENDATION-2
1. Caller Name: Jairo Rivas                                           Call Back Number: [phone] (home)       Patient approves a detailed voicemail message: N\A    2. DMV paperwork received from 3/26/18 requiring provider signature.     3. All appropriate fields completed by Medical Assistant: YES    4. Paperwork handed to provider to sign then faxed to [phone]    
DISPLAY PLAN FREE TEXT
DISPLAY PLAN FREE TEXT

## 2019-05-09 NOTE — CONSULT NOTE ADULT - PROBLEM SELECTOR RECOMMENDATION 4
- Continue diuretics as per Dr. Aiken  - Monitor I/O and daily weights  - Dietary adherence discussed with patient  - 2D echo done 3/2019 noted

## 2019-05-09 NOTE — CONSULT NOTE ADULT - SUBJECTIVE AND OBJECTIVE BOX
Patient is a 78y old  Female who presents with a chief complaint of DYSPNEA, COUGH (08 May 2019 20:59)      HPI:  Patient is a 78y Female with history of bioprosthetic MVR, chronic diastolic CHF, PAF, VICKY/Obesity, CKD, DM and PAD sent to ER due to SOB. She is sleepy and difficult to arouse currently.    PAST MEDICAL & SURGICAL HISTORY:  Chronic kidney disease (CKD)  Anemia of chronic disease  On home oxygen therapy: 2  liters nasal cannula  Asthma: PFT (2018)  History of postmenopausal bleeding  Dyslipidemia associated with type 2 diabetes mellitus  Paroxysmal atrial fibrillation: h/o rapid ventricular rate 2 years ago, admitted at [facility], controlled with medication as per patient.  last INR 2.0  Morbid obesity with BMI of 45.0-49.9, adult  H/O: hypothyroidism  Chronic diastolic CHF (congestive heart failure): EF 56% (2017)  Depression (emotion)  Arthritis of spine  Macular degeneration of left eye: receiving injection  Neuropathy  Peripheral arterial disease: s/p remote stent. not on antiplatelet meds for a while.  Hypertension  Edema  GERD (gastroesophageal reflux disease)  Chronic low back pain  Herniated disc: lumbar  VICKY (obstructive sleep apnea)  Ulcerative colitis  Diabetes mellitus: T2DM last A1C 6.7 mg/dl in January   fs range 59- 200 mg/dl  History of mitral valve replacement with bioprosthetic valve: x 4 years ago  H/O  section: x 2  Amputated toe    Allergies    Augmentin (Swelling)  cephalexin (Swelling)  latex (Rash)    Intolerances      MEDICATIONS  (STANDING):  ALPRAZolam 0.25 milliGRAM(s) Oral two times a day  amiodarone    Tablet 200 milliGRAM(s) Oral daily  carvedilol 6.25 milliGRAM(s) Oral every 12 hours  cholecalciferol 1000 Unit(s) Oral daily  dextrose 5%. 1000 milliLiter(s) (50 mL/Hr) IV Continuous <Continuous>  dextrose 50% Injectable 12.5 Gram(s) IV Push once  dextrose 50% Injectable 25 Gram(s) IV Push once  dextrose 50% Injectable 25 Gram(s) IV Push once  docusate sodium 100 milliGRAM(s) Oral two times a day  DULoxetine 60 milliGRAM(s) Oral daily  fenofibrate Tablet 145 milliGRAM(s) Oral daily  furosemide   Injectable 40 milliGRAM(s) IV Push every 12 hours  guaiFENesin  milliGRAM(s) Oral every 12 hours  insulin glargine Injectable (LANTUS) 54 Unit(s) SubCutaneous at bedtime  insulin lispro (HumaLOG) corrective regimen sliding scale   SubCutaneous three times a day before meals  insulin lispro (HumaLOG) corrective regimen sliding scale   SubCutaneous at bedtime  isosorbide   mononitrate ER Tablet (IMDUR) 30 milliGRAM(s) Oral daily  levothyroxine 88 MICROGram(s) Oral daily  levothyroxine 100 MICROGram(s) Oral daily  melatonin 10 milliGRAM(s) Oral at bedtime  mesalamine DR (24-Hour) Tablet 2.4 Gram(s) Oral daily  multivitamin 1 Tablet(s) Oral daily  pantoprazole    Tablet 40 milliGRAM(s) Oral before breakfast  predniSONE   Tablet 40 milliGRAM(s) Oral daily  senna 2 Tablet(s) Oral at bedtime  spironolactone 25 milliGRAM(s) Oral daily    MEDICATIONS  (PRN):  acetaminophen   Tablet .. 650 milliGRAM(s) Oral every 6 hours PRN Temp greater or equal to 38C (100.4F), Mild Pain (1 - 3)  ALBUTerol/ipratropium for Nebulization. 3 milliLiter(s) Nebulizer every 6 hours PRN Wheezing  bisacodyl Suppository 10 milliGRAM(s) Rectal daily PRN Constipation  dextrose 40% Gel 15 Gram(s) Oral once PRN Blood Glucose LESS THAN 70 milliGRAM(s)/deciliter  glucagon  Injectable 1 milliGRAM(s) IntraMuscular once PRN Glucose LESS THAN 70 milligrams/deciliter  polyethylene glycol 3350 17 Gram(s) Oral daily PRN Constipation  saline laxative (FLEET) Rectal Enema 1 Enema Rectal daily PRN for constipation    FAMILY HISTORY:  FH: heart disease: mother    ROS not obtainable    PHYSICAL EXAM:  Vital Signs Last 24 Hrs  T(C): 36.7 (09 May 2019 05:09), Max: 36.8 (08 May 2019 16:14)  T(F): 98.1 (09 May 2019 05:09), Max: 98.3 (08 May 2019 17:20)  HR: 95 (09 May 2019 05:09) (83 - 101)  BP: 136/75 (09 May 2019 05:09) (123/64 - 179/65)  BP(mean): --  RR: 20 (09 May 2019 05:09) (18 - 22)  SpO2: 98% (09 May 2019 05:09) (98% - 100%)  I&O's Summary      General Appearance: somnolent, tachypneic  HEENT: normocephalic, atraumatic,   Neck: 4-6cm JVD,  carotid 2+  bilaterally without bruits  Lungs:  wheeze bilaterally  Cor:  pmi 5th ICS MCL, regular rate and rhythm, S1 normal intensity, S2 normal intensity, no gallops, murmurs or rubs  Abdomen: soft, non-tender; bowel sounds normal; no masses,  no organomegaly  Extremities: without cyanosis, clubbing, trace LE edema; right knee in brace  Vasc: 12-+ PT and DP pulses;         LABS:                        9.7    5.0   )-----------( 149      ( 08 May 2019 16:58 )             29.3         138  |  95<L>  |  44<H>  ----------------------------<  110<H>  4.4   |  29  |  1.87<H>    Ca    9.5      08 May 2019 16:58  Mg     2.1         TPro  7.3  /  Alb  3.8  /  TBili  0.8  /  DBili  x   /  AST  51<H>  /  ALT  57<H>  /  AlkPhos  69          CAPILLARY BLOOD GLUCOSE      POCT Blood Glucose.: 118 mg/dL (09 May 2019 02:09)  POCT Blood Glucose.: 122 mg/dL (08 May 2019 23:34)    PT/INR - ( 08 May 2019 16:58 )   PT: 30.7 sec;   INR: 2.59 ratio         PTT - ( 08 May 2019 16:58 )  PTT:34.5 sec    Radiology/Imaging:      Feliz Aiken< from: Xray Chest 1 View- PORTABLE-Urgent (19 @ 17:03) >  ******PRELIMINARY REPORT******    ******PRELIMINARY REPORT******          EXAM:  XR CHEST PORTABLE URGENT 1V                            PROCEDURE DATE:  2019      ******PRELIMINARY REPORT******    ******PRELIMINARY REPORT******              INTERPRETATION:  minimal pulmonary edema              ******PRELIMINARY REPORT******    ******PRELIMINARY REPORT******          ALYSON TREJO M.D., RADIOLOGY RESIDENT      < end of copied text >   MD [facility]  [phone]

## 2019-05-09 NOTE — CONSULT NOTE ADULT - PROBLEM SELECTOR RECOMMENDATION 2
As above
- patient with hMPV as likely cause of worsening of her reactive airway disease  - would continue corticosteroids and given persistent wheezing may need to consider increase to Solumedrol 20-40mg Q6-8hrs if she does not improve on Prednisone. Patient is hesitant about steroids given issues with hyperglycemia and prior visual disturbances when on steroids  - Change Duonebs to Q6 ATC  - Patient was supposed to be on Anoro as outpatient for asthma but was not using it - would consider Symbicort BID or Pulmicort BID nebulized for added bronchodilator therapy.  - Acapella  - Maintain O2 sat > 90%  - OOB

## 2019-05-09 NOTE — CONSULT NOTE ADULT - PROBLEM SELECTOR RECOMMENDATION 3
- patient is reported as having some evidence of ILD but I do not see any CT scans in PACS  - she does not have any noticeable inspiratory crackles on my exam  - would suggest a noncontrast CT chest for further evaluation - if in fact there is evidence of ILD may need to consider discontinuing Amiodarone  - Patient was previously scheduled for a lung biopsy 10/2018 but this never happened  - Prior serologic workup only notable for a slightly elevated RF and ESR  - Continue supplemental O2   - Maintain O2 sat > 90%  - Incentive spirometer and Acapella  - OOB and PT

## 2019-05-09 NOTE — CHART NOTE - NSCHARTNOTEFT_GEN_A_CORE
Medicine PA Dora     TANIKA OBRIEN  MRN-[id number]      PMH: morbid obesity with functional paraplegia, restrictive lung disease, ILD, VICKY/OHS with chronic hypercapnic and hypoxic respiratory failure on 3L NC (not on CPAP), PAD/PVD with chronic LE wounds, h/o MS s/p bioprosthetic MVR, dCHF, HTN, DM 2, CKD III, anemia, post menopausal vaginal bleeding s/p D&C in 2018 and in 2019 had D&C amd Mirena IUD placement, pathology findings benign     Notified by RN for     Vital Signs Last 24 Hrs  T(C): 36.4 (19 @ 20:19), Max: 36.7 (19 @ 23:30)  T(F): 97.6 (19 @ 20:19), Max: 98.1 (19 @ 23:30)  HR: 104 (19 @ 20:19) (83 - 104)  BP: 169/92 (19 @ 20:19) (127/67 - 179/65)  BP(mean): --  RR: 18 (19 @ 20:19) (18 - 22)  SpO2: 96% (19 @ 20:19) (96% - 100%)  Daily     Daily Weight in k (09 May 2019 11:01)  I&O's Summary    09 May 2019 07:01  -  09 May 2019 22:57  --------------------------------------------------------  IN: 1020 mL / OUT: 1050 mL / NET: -30 mL      CAPILLARY BLOOD GLUCOSE                          9.1    7.05  )-----------( 159      ( 09 May 2019 07:43 )             31.2         138  |  95<L>  |  44<H>  ----------------------------<  110<H>  4.4   |  29  |  1.87<H>    Ca    9.5      08 May 2019 16:58  Mg     2.1         TPro  7.3  /  Alb  3.8  /  TBili  0.8  /  DBili  x   /  AST  51<H>  /  ALT  57<H>  /  AlkPhos  69      PT/INR - ( 09 May 2019 08:53 )   PT: 27.2 sec;   INR: 2.34 ratio         PTT - ( 08 May 2019 16:58 )  PTT:34.5 sec          Radiology:    PHYSICAL EXAM:  GENERAL: NAD, well-developed  HEAD:  Atraumatic, Normocephalic  EYES: EOMI, PERRLA, conjunctiva and sclera clear  NECK: Supple, No JVD  CHEST/LUNG: Clear to auscultation bilaterally; No wheeze  HEART: Regular rate and rhythm; No murmurs, rubs, or gallops  ABDOMEN: Soft, Nontender, Nondistended; Bowel sounds present  EXTREMITIES:  2+ Peripheral Pulses, No clubbing, cyanosis, or edema  PSYCH: AAOx3  NEUROLOGY: non-focal  SKIN: No rashes or lesions    Assessment/Plan: HPI:  77 yo woman w multiple medical problems  presents from [facility] w dyspnea and productive cough for 3 days, no fevers, no chills, no CP, no palpitations . RVP positive for HMPV. was started on IV Zosyn duonebs and po prednisone at [facility] on    Ptn was sent to [facility] post admission 3/30-4/15/19 with UTI, further uterine bleeding, and Right Quadricept tendon repair 2/2 traumatic rupture post mechanical fall.   PMH: morbid obesity with functional paraplegia, restrictive lung disease, ILD, VICKY/OHS with chronic hypercapnic and hypoxic respiratory failure on 3L NC (not on CPAP), PAD/PVD with chronic LE wounds, h/o MS s/p bioprosthetic MVR, dCHF, HTN, DM 2, CKD III, anemia, post menopausal vaginal bleeding s/p D&C in 2018 and in 2019 had D&C amd Mirena IUD placement, pathology findings benign, no further bleeding since. placed initially on Norethindrone and none at present. Ptn was  cleared to restart ELiquis for PAFI stable on amiodarone) prior to DC but now she is on coumadin, not sure why the medication was changed. also h/o , UC, Right knee Quadriceps tendon rupture , s/p repair in April ( last month), chronic hematuria, s/p cystoscopy w no findings of cystitis or polyp in april. chronic back 2/2 spinal stenosis with gait instability,  wheelchair dependent for the past 5-6 months. Requires assistance of her  for ADLS. (08 May 2019 20:59)        Ryan Kimble PA-C,   Department of Medicine Medicine PA Note     TANIKA OBRIEN  MRN-[id number]      PMH: morbid obesity with functional paraplegia, restrictive lung disease, ILD, VICKY/OHS with chronic hypercapnic and hypoxic respiratory failure on 3L NC (not on CPAP), PAD/PVD with chronic LE wounds, h/o MS s/p bioprosthetic MVR, dCHF, HTN, DM 2, CKD III, anemia, post menopausal vaginal bleeding s/p D&C in 2018 and in 2019 had D&C amd Mirena IUD placement, pathology findings benign     Notified by RN for patient c/o SOB. Patient seen and evaluated at bedside, able to converse verbally, RR > 30, appears uncomfortable, o2 sat stable on 3L NC. Patient c/o difficultly breathing, which has been present since admission. Patient denies any CP/N/V/D/headache    Vital Signs Last 24 Hrs  T(C): 36.4 (19 @ 20:19), Max: 36.7 (19 @ 23:30)  T(F): 97.6 (19 @ 20:19), Max: 98.1 (19 @ 23:30)  HR: 104 (19 @ 20:19) (83 - 104)  BP: 169/92 (19 @ 20:19) (127/67 - 179/65)  BP(mean): --  RR: 18 (19 @ 20:19) (18 - 22)  SpO2: 96% (19 @ 20:19) (96% - 100%)  Daily     Daily Weight in k (09 May 2019 11:01)  I&O's Summary    09 May 2019 07:01  -  09 May 2019 22:57  --------------------------------------------------------  IN: 1020 mL / OUT: 1050 mL / NET: -30 mL      CAPILLARY BLOOD GLUCOSE                          9.1    7.05  )-----------( 159      ( 09 May 2019 07:43 )             31.2     05-08    138  |  95<L>  |  44<H>  ----------------------------<  110<H>  4.4   |  29  |  1.87<H>    Ca    9.5      08 May 2019 16:58  Mg     2.1         TPro  7.3  /  Alb  3.8  /  TBili  0.8  /  DBili  x   /  AST  51<H>  /  ALT  57<H>  /  AlkPhos  69      PT/INR - ( 09 May 2019 08:53 )   PT: 27.2 sec;   INR: 2.34 ratio         PTT - ( 08 May 2019 16:58 )  PTT:34.5 sec          PHYSICAL EXAM:  GENERAL: dyspneic   CHEST/LUNG: exp wheeze  HEART: Regular rate and rhythm;   ABDOMEN: Soft, Nontender, Nondistended; Bowel sounds present  PSYCH: AAOx3    Assessment/Plan: SOB/ 2/2 Hmpv, obesity   - o2 sat stable RR> 30 on my examination   - duoneb q 4 hr   - solumedrol 40mg q 8 hr   - VS q 4 hr   - CXR to eval will f/u   - ABG to eval will f/u   - will attempt Bipap for WOB, if tachypneic on bipap will consider MICU consult         Ryan Kimble PA-C,   Department of Medicine Medicine PA Note     TANIKA OBRIEN  MRN-[id number]      PMH: morbid obesity with functional paraplegia, restrictive lung disease, ILD, VICKY/OHS with chronic hypercapnic and hypoxic respiratory failure on 3L NC (not on CPAP), PAD/PVD with chronic LE wounds, h/o MS s/p bioprosthetic MVR, dCHF, HTN, DM 2, CKD III, anemia, post menopausal vaginal bleeding s/p D&C in 2018 and in 2019 had D&C amd Mirena IUD placement, pathology findings benign     Notified by RN for patient c/o SOB. Patient seen and evaluated at bedside, able to converse verbally, RR > 30, appears uncomfortable, o2 sat stable on 3L NC. Patient c/o difficultly breathing, which has been present since admission. Patient denies any CP/N/V/D/headache, abdominal pain or headache.     Vital Signs Last 24 Hrs  T(C): 36.4 (19 @ 20:19), Max: 36.7 (19 @ 23:30)  T(F): 97.6 (19 @ 20:19), Max: 98.1 (19 @ 23:30)  HR: 104 (19 @ 20:19) (83 - 104)  BP: 169/92 (19 @ 20:19) (127/67 - 179/65)  BP(mean): --  RR: 18 (19 @ 20:19) (18 - 22)  SpO2: 96% (19 @ 20:19) (96% - 100%)  Daily     Daily Weight in k (09 May 2019 11:01)  I&O's Summary    09 May 2019 07:01  -  09 May 2019 22:57  --------------------------------------------------------  IN: 1020 mL / OUT: 1050 mL / NET: -30 mL      CAPILLARY BLOOD GLUCOSE                          9.1    7.05  )-----------( 159      ( 09 May 2019 07:43 )             31.2     05-08    138  |  95<L>  |  44<H>  ----------------------------<  110<H>  4.4   |  29  |  1.87<H>    Ca    9.5      08 May 2019 16:58  Mg     2.1         TPro  7.3  /  Alb  3.8  /  TBili  0.8  /  DBili  x   /  AST  51<H>  /  ALT  57<H>  /  AlkPhos  69      PT/INR - ( 09 May 2019 08:53 )   PT: 27.2 sec;   INR: 2.34 ratio         PTT - ( 08 May 2019 16:58 )  PTT:34.5 sec          PHYSICAL EXAM:  GENERAL: dyspneic   CHEST/LUNG: exp wheeze  HEART: Regular rate and rhythm;   ABDOMEN: Soft, Nontender, Nondistended; Bowel sounds present  PSYCH: AAOx3    Assessment/Plan: SOB/ 2/2 Hmpv, obesity   - o2 sat stable RR> 30 on my examination , will give dose duoneb + solumedrol now   - duoneb q 4 hr   - solumedrol 40mg q 8 hr   - VS q 4 hr   - CXR to eval will f/u   - ABG to eval will f/u   - will attempt Bipap for WOB/pending results of ABG, if tachypneic on bipap will consider MICU consult   - will monitor clinical status closely   - will endorse to AM team of overnight events        Ryan Kimble PA-C,   Department of Medicine Medicine PA Note     TANIKA OBRIEN  MRN-[id number]      PMH: morbid obesity with functional paraplegia, restrictive lung disease, ILD, VICKY/OHS with chronic hypercapnic and hypoxic respiratory failure on 3L NC (not on CPAP), PAD/PVD with chronic LE wounds, h/o MS s/p bioprosthetic MVR, dCHF, HTN, DM 2, CKD III, anemia, post menopausal vaginal bleeding s/p D&C in 2018 and in 2019 had D&C amd Mirena IUD placement, pathology findings benign     Notified by RN for patient c/o SOB. Patient seen and evaluated at bedside, able to converse verbally, RR > 30, appears uncomfortable, o2 sat stable on 4-5L NC. Patient c/o difficultly breathing, which has been present since admission. Patient denies any CP/N/V/D/headache, abdominal pain or headache.     Vital Signs Last 24 Hrs  T(C): 36.4 (19 @ 20:19), Max: 36.7 (19 @ 23:30)  T(F): 97.6 (19 @ 20:19), Max: 98.1 (19 @ 23:30)  HR: 104 (19 @ 20:19) (83 - 104)  BP: 169/92 (19 @ 20:19) (127/67 - 179/65)  BP(mean): --  RR: 18 (19 @ 20:19) (18 - 22)  SpO2: 96% (19 @ 20:19) (96% - 100%)  Daily     Daily Weight in k (09 May 2019 11:01)  I&O's Summary    09 May 2019 07:01  -  09 May 2019 22:57  --------------------------------------------------------  IN: 1020 mL / OUT: 1050 mL / NET: -30 mL      CAPILLARY BLOOD GLUCOSE                          9.1    7.05  )-----------( 159      ( 09 May 2019 07:43 )             31.2         138  |  95<L>  |  44<H>  ----------------------------<  110<H>  4.4   |  29  |  1.87<H>    Ca    9.5      08 May 2019 16:58  Mg     2.1         TPro  7.3  /  Alb  3.8  /  TBili  0.8  /  DBili  x   /  AST  51<H>  /  ALT  57<H>  /  AlkPhos  69      PT/INR - ( 09 May 2019 08:53 )   PT: 27.2 sec;   INR: 2.34 ratio         PTT - ( 08 May 2019 16:58 )  PTT:34.5 sec          PHYSICAL EXAM:  GENERAL: dyspneic   CHEST/LUNG: exp wheeze  HEART: Regular rate and rhythm;   ABDOMEN: Soft, Nontender, Nondistended; Bowel sounds present  PSYCH: AAOx3    Assessment/Plan: SOB/ 2/2 Hmpv, obesity   - o2 sat stable RR> 30 on my examination , will give dose duoneb + solumedrol now   - duoneb q 4 hr   - solumedrol 40mg q 8 hr   - VS q 4 hr   - CXR to eval will f/u   - ABG to eval will f/u   - will attempt Bipap for WOB/pending results of ABG, if tachypneic on bipap will consider MICU consult   - will monitor clinical status closely   - will endorse to AM team of overnight events        Ryan Kimble PA-C,   Department of Medicine Medicine PA Note     TANIKA OBRIEN  MRN-[id number]      PMH: morbid obesity with functional paraplegia, restrictive lung disease, ILD, VICKY/OHS with chronic hypercapnic and hypoxic respiratory failure on 3L NC (not on CPAP), PAD/PVD with chronic LE wounds, h/o MS s/p bioprosthetic MVR, dCHF, HTN, DM 2, CKD III, anemia, post menopausal vaginal bleeding s/p D&C in 2018 and in 2019 had D&C amd Mirena IUD placement, pathology findings benign     Notified by RN for patient c/o SOB. Patient seen and evaluated at bedside, able to converse verbally, RR > 30, appears uncomfortable, o2 sat stable on 4-5L NC. Patient c/o difficultly breathing, which has been present since admission. Patient denies any CP/N/V/D/headache, abdominal pain or headache.     Vital Signs Last 24 Hrs  T(C): 36.4 (19 @ 20:19), Max: 36.7 (19 @ 23:30)  T(F): 97.6 (19 @ 20:19), Max: 98.1 (19 @ 23:30)  HR: 104 (19 @ 20:19) (83 - 104)  BP: 169/92 (19 @ 20:19) (127/67 - 179/65)  BP(mean): --  RR: 18 (19 @ 20:19) (18 - 22)  SpO2: 96% (19 @ 20:19) (96% - 100%)  Daily     Daily Weight in k (09 May 2019 11:01)  I&O's Summary    09 May 2019 07:01  -  09 May 2019 22:57  --------------------------------------------------------  IN: 1020 mL / OUT: 1050 mL / NET: -30 mL      CAPILLARY BLOOD GLUCOSE                          9.1    7.05  )-----------( 159      ( 09 May 2019 07:43 )             31.2         138  |  95<L>  |  44<H>  ----------------------------<  110<H>  4.4   |  29  |  1.87<H>    Ca    9.5      08 May 2019 16:58  Mg     2.1         TPro  7.3  /  Alb  3.8  /  TBili  0.8  /  DBili  x   /  AST  51<H>  /  ALT  57<H>  /  AlkPhos  69      PT/INR - ( 09 May 2019 08:53 )   PT: 27.2 sec;   INR: 2.34 ratio         PTT - ( 08 May 2019 16:58 )  PTT:34.5 sec          PHYSICAL EXAM:  GENERAL: dyspneic   CHEST/LUNG: exp wheeze  HEART: Regular rate and rhythm;   ABDOMEN: Soft, Nontender, Nondistended; Bowel sounds present  PSYCH: AAOx3    Assessment/Plan: SOB/ 2/2 Hmpv, obesity   - o2 sat stable RR> 30 on my examination , will give dose duoneb + solumedrol now   - duoneb q 4 hr   - solumedrol 40mg q 8 hr   - VS q 4 hr   - CXR to eval will f/u -- > d/w radiology resident #[id number] @ 11:24pm, no emergent or urgent findings, ? small right pleural effusion.   - ABG to eval will f/u   - will attempt Bipap for WOB/pending results of ABG, if tachypneic on bipap will consider MICU consult   - will monitor clinical status closely   - will endorse to AM team of overnight events        Ryan Kimble PA-C,   Department of Medicine Medicine PA Note     TANIKA OBRIEN  MRN-[id number]      PMH: morbid obesity with functional paraplegia, restrictive lung disease, ILD, VICKY/OHS with chronic hypercapnic and hypoxic respiratory failure on 3L NC (not on CPAP), PAD/PVD with chronic LE wounds, h/o MS s/p bioprosthetic MVR, dCHF, HTN, DM 2, CKD III, anemia, post menopausal vaginal bleeding s/p D&C in 2018 and in 2019 had D&C amd Mirena IUD placement, pathology findings benign     Notified by RN for patient c/o SOB. Patient seen and evaluated at bedside, able to converse verbally, RR > 30, appears uncomfortable, o2 sat stable on 4-5L NC. Patient c/o difficultly breathing, which has been present since admission. Patient denies any CP/N/V/D/headache, abdominal pain or headache.     Vital Signs Last 24 Hrs  T(C): 36.4 (19 @ 20:19), Max: 36.7 (19 @ 23:30)  T(F): 97.6 (19 @ 20:19), Max: 98.1 (19 @ 23:30)  HR: 104 (19 @ 20:19) (83 - 104)  BP: 169/92 (19 @ 20:19) (127/67 - 179/65)  BP(mean): --  RR: 18 (19 @ 20:19) (18 - 22)  SpO2: 96% (19 @ 20:19) (96% - 100%)  Daily     Daily Weight in k (09 May 2019 11:01)  I&O's Summary    09 May 2019 07:01  -  09 May 2019 22:57  --------------------------------------------------------  IN: 1020 mL / OUT: 1050 mL / NET: -30 mL      CAPILLARY BLOOD GLUCOSE                          9.1    7.05  )-----------( 159      ( 09 May 2019 07:43 )             31.2         138  |  95<L>  |  44<H>  ----------------------------<  110<H>  4.4   |  29  |  1.87<H>    Ca    9.5      08 May 2019 16:58  Mg     2.1         TPro  7.3  /  Alb  3.8  /  TBili  0.8  /  DBili  x   /  AST  51<H>  /  ALT  57<H>  /  AlkPhos  69      PT/INR - ( 09 May 2019 08:53 )   PT: 27.2 sec;   INR: 2.34 ratio         PTT - ( 08 May 2019 16:58 )  PTT:34.5 sec          PHYSICAL EXAM:  GENERAL: dyspneic   CHEST/LUNG: exp wheeze  HEART: Regular rate and rhythm;   ABDOMEN: Soft, Nontender, Nondistended; Bowel sounds present  PSYCH: AAOx3    Assessment/Plan: SOB/ 2/2 Hmpv, obesity   - o2 sat stable RR> 30 on my examination , will give dose duoneb + solumedrol now   - duoneb q 4 hr   - solumedrol 40mg q 8 hr   - VS q 4 hr   - CXR to eval will f/u -- > d/w radiology resident #[id number] @ 11:24pm, no emergent or urgent findings, ? small right pleural effusion.   - ABG to eval will f/u   - will attempt Bipap for WOB/pending results of ABG, if tachypneic on bipap will consider MICU consult   - will monitor clinical status closely   - will endorse to AM team of overnight events    addednum: on reevaluation s/p duoneb patient breathing with improvement RR low 20s, appears anxious has xanax prn for anxiety    Ryan Kimble PA-C,   Department of Medicine Medicine PA Note     TANIKA OBRIEN  MRN-[id number]      PMH: morbid obesity with functional paraplegia, restrictive lung disease, ILD, VICKY/OHS with chronic hypercapnic and hypoxic respiratory failure on 3L NC (not on CPAP), PAD/PVD with chronic LE wounds, h/o MS s/p bioprosthetic MVR, dCHF, HTN, DM 2, CKD III, anemia, post menopausal vaginal bleeding s/p D&C in 2018 and in 2019 had D&C amd Mirena IUD placement, pathology findings benign     Notified by RN for patient c/o SOB. Patient seen and evaluated at bedside, able to converse verbally, RR > 30, appears uncomfortable, o2 sat stable on 4-5L NC. Patient c/o difficultly breathing, which has been present since admission. Patient denies any CP/N/V/D/headache, abdominal pain or headache.     Vital Signs Last 24 Hrs  T(C): 36.4 (19 @ 20:19), Max: 36.7 (19 @ 23:30)  T(F): 97.6 (19 @ 20:19), Max: 98.1 (19 @ 23:30)  HR: 104 (19 @ 20:19) (83 - 104)  BP: 169/92 (19 @ 20:19) (127/67 - 179/65)  BP(mean): --  RR: 18 (19 @ 20:19) (18 - 22)  SpO2: 96% (19 @ 20:19) (96% - 100%)  Daily     Daily Weight in k (09 May 2019 11:01)  I&O's Summary    09 May 2019 07:01  -  09 May 2019 22:57  --------------------------------------------------------  IN: 1020 mL / OUT: 1050 mL / NET: -30 mL      CAPILLARY BLOOD GLUCOSE                          9.1    7.05  )-----------( 159      ( 09 May 2019 07:43 )             31.2         138  |  95<L>  |  44<H>  ----------------------------<  110<H>  4.4   |  29  |  1.87<H>    Ca    9.5      08 May 2019 16:58  Mg     2.1         TPro  7.3  /  Alb  3.8  /  TBili  0.8  /  DBili  x   /  AST  51<H>  /  ALT  57<H>  /  AlkPhos  69      PT/INR - ( 09 May 2019 08:53 )   PT: 27.2 sec;   INR: 2.34 ratio         PTT - ( 08 May 2019 16:58 )  PTT:34.5 sec          PHYSICAL EXAM:  GENERAL: dyspneic   CHEST/LUNG: exp wheeze  HEART: Regular rate and rhythm;   ABDOMEN: Soft, Nontender, Nondistended; Bowel sounds present  PSYCH: AAOx3    Assessment/Plan: SOB/ 2/2 Hmpv, obesity   - o2 sat stable RR> 30 on my examination , will give dose duoneb + solumedrol now   - duoneb q 4 hr   - solumedrol 40mg q 8 hr   - VS q 4 hr   - CXR to [facility] f/u -- > d/w radiology resident #[id number] @ 11:24pm, no emergent or urgent findings, ? small right pleural effusion.   - ABG to [facility] f/u   ABG - ( 09 May 2019 23:38 )  pH, Arterial: 7.35  pH, Blood: x     /  pCO2: 54    /  pO2: 89    / HCO3: 29    / Base Excess: 3.2   /  SaO2: 96      showing likely chronic co2 retention, showing compensation, normal PH, slight elevation in pco2, will monitor clinical status closely   - will attempt Bipap for WOB/pending results of ABG, if tachypneic on bipap will consider MICU consult   - will monitor clinical status closely   - will endorse to AM team of overnight events    addednum: on reevaluation s/p duoneb patient breathing with improvement RR low 20s, appears anxious has xanax prn for anxiety    Ryan Kimble PA-C,   Department of Medicine Medicine PA Note     TANIKA OBRIEN  MRN-[id number]      PMH: morbid obesity with functional paraplegia, restrictive lung disease, ILD, VICKY/OHS with chronic hypercapnic and hypoxic respiratory failure on 3L NC (not on CPAP), PAD/PVD with chronic LE wounds, h/o MS s/p bioprosthetic MVR, dCHF, HTN, DM 2, CKD III, anemia, post menopausal vaginal bleeding s/p D&C in 2018 and in 2019 had D&C amd Mirena IUD placement, pathology findings benign     Notified by RN for patient c/o SOB. Patient seen and evaluated at bedside, able to converse verbally, RR > 30, appears uncomfortable, o2 sat stable on 4-5L NC. Patient c/o difficultly breathing, which has been present since admission. Patient denies any CP/N/V/D/headache, abdominal pain or headache.     Vital Signs Last 24 Hrs  T(C): 36.4 (19 @ 20:19), Max: 36.7 (19 @ 23:30)  T(F): 97.6 (19 @ 20:19), Max: 98.1 (19 @ 23:30)  HR: 104 (19 @ 20:19) (83 - 104)  BP: 169/92 (19 @ 20:19) (127/67 - 179/65)  BP(mean): --  RR: 18 (19 @ 20:19) (18 - 22)  SpO2: 96% (19 @ 20:19) (96% - 100%)  Daily     Daily Weight in k (09 May 2019 11:01)  I&O's Summary    09 May 2019 07:01  -  09 May 2019 22:57  --------------------------------------------------------  IN: 1020 mL / OUT: 1050 mL / NET: -30 mL      CAPILLARY BLOOD GLUCOSE                          9.1    7.05  )-----------( 159      ( 09 May 2019 07:43 )             31.2         138  |  95<L>  |  44<H>  ----------------------------<  110<H>  4.4   |  29  |  1.87<H>    Ca    9.5      08 May 2019 16:58  Mg     2.1         TPro  7.3  /  Alb  3.8  /  TBili  0.8  /  DBili  x   /  AST  51<H>  /  ALT  57<H>  /  AlkPhos  69      PT/INR - ( 09 May 2019 08:53 )   PT: 27.2 sec;   INR: 2.34 ratio         PTT - ( 08 May 2019 16:58 )  PTT:34.5 sec          PHYSICAL EXAM:  GENERAL: dyspneic   CHEST/LUNG: exp wheeze  HEART: Regular rate and rhythm;   ABDOMEN: Soft, Nontender, Nondistended; Bowel sounds present  PSYCH: AAOx3    Assessment/Plan: SOB/ 2/2 Hmpv, obesity   - o2 sat stable RR> 30 on my examination , will give dose duoneb + solumedrol now   - duoneb q 4 hr   - solumedrol 40mg q 8 hr   - VS q 4 hr   - CXR to \[facility]\"" f/u -- > d/w radiology resident #[id number] @ 11:24pm, no emergent or urgent findings, ? small right pleural effusion.   - ABG to \[facility]\"" f/u   ABG - ( 09 May 2019 23:38 )  pH, Arterial: 7.35  pH, Blood: x     /  pCO2: 54    /  pO2: 89    / HCO3: 29    / Base Excess: 3.2   /  SaO2: 96      showing likely chronic co2 retention, showing compensation, normal PH, slight elevation in pco2, will monitor clinical status closely   - will attempt Bipap for WOB/pending results of ABG, if tachypneic on bipap will consider MICU consult   - will monitor clinical status closely   - will endorse to AM team of overnight events    addednum: on reevaluation s/p duoneb patient breathing with improvement RR low 20s, appears anxious has xanax prn for anxiety patient refusing bipap, patient educated on risks, including intubation if refusing. Patient understands, continues to refuse.     Ryan Kimble PA-C,   Department of Medicine Medicine PA Note     TANIKA OBRIEN  MRN-[id number]      PMH: morbid obesity with functional paraplegia, restrictive lung disease, ILD, VICKY/OHS with chronic hypercapnic and hypoxic respiratory failure on 3L NC (not on CPAP), PAD/PVD with chronic LE wounds, h/o MS s/p bioprosthetic MVR, dCHF, HTN, DM 2, CKD III, anemia, post menopausal vaginal bleeding s/p D&C in 2018 and in 2019 had D&C amd Mirena IUD placement, pathology findings benign     Notified by RN for patient c/o SOB. Patient seen and evaluated at bedside, able to converse verbally, RR > 30, appears uncomfortable, o2 sat stable on 4-5L NC. Patient c/o difficultly breathing, which has been present since admission. Patient denies any CP/N/V/D/headache, abdominal pain or headache.     Vital Signs Last 24 Hrs  T(C): 36.4 (19 @ 20:19), Max: 36.7 (19 @ 23:30)  T(F): 97.6 (19 @ 20:19), Max: 98.1 (19 @ 23:30)  HR: 104 (19 @ 20:19) (83 - 104)  BP: 169/92 (19 @ 20:19) (127/67 - 179/65)  BP(mean): --  RR: 18 (19 @ 20:19) (18 - 22)  SpO2: 96% (19 @ 20:19) (96% - 100%)  Daily     Daily Weight in k (09 May 2019 11:01)  I&O's Summary    09 May 2019 07:01  -  09 May 2019 22:57  --------------------------------------------------------  IN: 1020 mL / OUT: 1050 mL / NET: -30 mL      CAPILLARY BLOOD GLUCOSE                          9.1    7.05  )-----------( 159      ( 09 May 2019 07:43 )             31.2         138  |  95<L>  |  44<H>  ----------------------------<  110<H>  4.4   |  29  |  1.87<H>    Ca    9.5      08 May 2019 16:58  Mg     2.1         TPro  7.3  /  Alb  3.8  /  TBili  0.8  /  DBili  x   /  AST  51<H>  /  ALT  57<H>  /  AlkPhos  69      PT/INR - ( 09 May 2019 08:53 )   PT: 27.2 sec;   INR: 2.34 ratio         PTT - ( 08 May 2019 16:58 )  PTT:34.5 sec          PHYSICAL EXAM:  GENERAL: dyspneic   CHEST/LUNG: exp wheeze  HEART: Regular rate and rhythm;   ABDOMEN: Soft, Nontender, Nondistended; Bowel sounds present  PSYCH: AAOx3    Assessment/Plan: SOB/ 2/2 Hmpv, obesity   - o2 sat stable RR> 30 on my examination , will give dose duoneb + solumedrol now   - duoneb q 4 hr   - solumedrol 40mg q 8 hr   - VS q 4 hr   - CXR to [facility] f/u -- > d/w radiology resident #[id number] @ 11:24pm, no emergent or urgent findings, ? small right pleural effusion.   - ABG to [facility] f/u   ABG - ( 09 May 2019 23:38 )  pH, Arterial: 7.35  pH, Blood: x     /  pCO2: 54    /  pO2: 89    / HCO3: 29    / Base Excess: 3.2   /  SaO2: 96      showing likely chronic co2 retention, showing compensation, normal PH, slight elevation in pco2, will monitor clinical status closely   - will attempt Bipap for WOB/pending results of ABG, if tachypneic on bipap will consider MICU consult   - will monitor clinical status closely   - will endorse to AM team of overnight events    addendum: on reevaluation s/p duoneb patient breathing with improvement RR low 20s, appears anxious has xanax prn for anxiety patient refusing bipap, patient educated on risks, including intubation and or death as a result. Patient understands, continues to refuse. Will continue to educate    addendum: re-evaluation, patient sleeping, still refusing bipap, RR low 20s. A&Ox3    Ryan Kimble PA-C,   Department of Medicine

## 2019-05-10 DIAGNOSIS — J44.1 CHRONIC OBSTRUCTIVE PULMONARY DISEASE WITH (ACUTE) EXACERBATION: ICD-10-CM

## 2019-05-10 LAB
ANION GAP SERPL CALC-SCNC: 15 MMOL/L — SIGNIFICANT CHANGE UP (ref 5–17)
BUN SERPL-MCNC: 54 MG/DL — HIGH (ref 7–23)
CALCIUM SERPL-MCNC: 9.5 MG/DL — SIGNIFICANT CHANGE UP (ref 8.4–10.5)
CHLORIDE SERPL-SCNC: 92 MMOL/L — LOW (ref 96–108)
CO2 SERPL-SCNC: 26 MMOL/L — SIGNIFICANT CHANGE UP (ref 22–31)
CREAT SERPL-MCNC: 1.82 MG/DL — HIGH (ref 0.5–1.3)
FERRITIN SERPL-MCNC: 902 NG/ML — HIGH (ref 15–150)
GLUCOSE BLDC GLUCOMTR-MCNC: 153 MG/DL — HIGH (ref 70–99)
GLUCOSE BLDC GLUCOMTR-MCNC: 172 MG/DL — HIGH (ref 70–99)
GLUCOSE BLDC GLUCOMTR-MCNC: 240 MG/DL — HIGH (ref 70–99)
GLUCOSE BLDC GLUCOMTR-MCNC: 261 MG/DL — HIGH (ref 70–99)
GLUCOSE SERPL-MCNC: 256 MG/DL — HIGH (ref 70–99)
HCT VFR BLD CALC: 34.2 % — LOW (ref 34.5–45)
HGB BLD-MCNC: 10.4 G/DL — LOW (ref 11.5–15.5)
INR BLD: 2.83 RATIO — HIGH (ref 0.88–1.16)
IRON SATN MFR SERPL: 19 % — SIGNIFICANT CHANGE UP (ref 14–50)
IRON SATN MFR SERPL: 65 UG/DL — SIGNIFICANT CHANGE UP (ref 30–160)
MAGNESIUM SERPL-MCNC: 2.3 MG/DL — SIGNIFICANT CHANGE UP (ref 1.6–2.6)
MCHC RBC-ENTMCNC: 30.4 GM/DL — LOW (ref 32–36)
MCHC RBC-ENTMCNC: 30.5 PG — SIGNIFICANT CHANGE UP (ref 27–34)
MCV RBC AUTO: 100.3 FL — HIGH (ref 80–100)
PHOSPHATE SERPL-MCNC: 4.4 MG/DL — SIGNIFICANT CHANGE UP (ref 2.5–4.5)
PLATELET # BLD AUTO: 196 K/UL — SIGNIFICANT CHANGE UP (ref 150–400)
POTASSIUM SERPL-MCNC: 4.8 MMOL/L — SIGNIFICANT CHANGE UP (ref 3.5–5.3)
POTASSIUM SERPL-SCNC: 4.8 MMOL/L — SIGNIFICANT CHANGE UP (ref 3.5–5.3)
PROTHROM AB SERPL-ACNC: 33 SEC — HIGH (ref 10–13.1)
RBC # BLD: 3.41 M/UL — LOW (ref 3.8–5.2)
RBC # FLD: 15.6 % — HIGH (ref 10.3–14.5)
SODIUM SERPL-SCNC: 133 MMOL/L — LOW (ref 135–145)
T4 FREE SERPL-MCNC: 2.2 NG/DL — HIGH (ref 0.9–1.8)
TIBC SERPL-MCNC: 340 UG/DL — SIGNIFICANT CHANGE UP (ref 220–430)
TSH SERPL-MCNC: 1.24 UIU/ML — SIGNIFICANT CHANGE UP (ref 0.27–4.2)
UIBC SERPL-MCNC: 275 UG/DL — SIGNIFICANT CHANGE UP (ref 110–370)
WBC # BLD: 8.96 K/UL — SIGNIFICANT CHANGE UP (ref 3.8–10.5)
WBC # FLD AUTO: 8.96 K/UL — SIGNIFICANT CHANGE UP (ref 3.8–10.5)

## 2019-05-10 PROCEDURE — 99232 SBSQ HOSP IP/OBS MODERATE 35: CPT

## 2019-05-10 RX ORDER — SODIUM CHLORIDE 0.65 %
1 AEROSOL, SPRAY (ML) NASAL THREE TIMES A DAY
Refills: 0 | Status: DISCONTINUED | OUTPATIENT
Start: 2019-05-10 | End: 2019-05-16

## 2019-05-10 RX ORDER — FUROSEMIDE 40 MG
40 TABLET ORAL
Refills: 0 | Status: DISCONTINUED | OUTPATIENT
Start: 2019-05-10 | End: 2019-05-16

## 2019-05-10 RX ORDER — WARFARIN SODIUM 2.5 MG/1
2 TABLET ORAL ONCE
Refills: 0 | Status: COMPLETED | OUTPATIENT
Start: 2019-05-10 | End: 2019-05-10

## 2019-05-10 RX ORDER — INSULIN LISPRO 100/ML
14 VIAL (ML) SUBCUTANEOUS
Refills: 0 | Status: DISCONTINUED | OUTPATIENT
Start: 2019-05-10 | End: 2019-05-11

## 2019-05-10 RX ORDER — INSULIN GLARGINE 100 [IU]/ML
60 INJECTION, SOLUTION SUBCUTANEOUS AT BEDTIME
Refills: 0 | Status: DISCONTINUED | OUTPATIENT
Start: 2019-05-10 | End: 2019-05-11

## 2019-05-10 RX ADMIN — Medication 3 MILLILITER(S): at 23:07

## 2019-05-10 RX ADMIN — Medication 0.25 MILLIGRAM(S): at 17:38

## 2019-05-10 RX ADMIN — WARFARIN SODIUM 2 MILLIGRAM(S): 2.5 TABLET ORAL at 23:07

## 2019-05-10 RX ADMIN — ISOSORBIDE MONONITRATE 30 MILLIGRAM(S): 60 TABLET, EXTENDED RELEASE ORAL at 12:00

## 2019-05-10 RX ADMIN — AMIODARONE HYDROCHLORIDE 200 MILLIGRAM(S): 400 TABLET ORAL at 05:10

## 2019-05-10 RX ADMIN — SENNA PLUS 2 TABLET(S): 8.6 TABLET ORAL at 23:07

## 2019-05-10 RX ADMIN — Medication 40 MILLIGRAM(S): at 05:09

## 2019-05-10 RX ADMIN — Medication 88 MICROGRAM(S): at 05:10

## 2019-05-10 RX ADMIN — Medication 40 MILLIGRAM(S): at 13:13

## 2019-05-10 RX ADMIN — DULOXETINE HYDROCHLORIDE 60 MILLIGRAM(S): 30 CAPSULE, DELAYED RELEASE ORAL at 12:00

## 2019-05-10 RX ADMIN — Medication 6: at 08:30

## 2019-05-10 RX ADMIN — Medication 145 MILLIGRAM(S): at 12:00

## 2019-05-10 RX ADMIN — Medication 4: at 12:00

## 2019-05-10 RX ADMIN — Medication 3 MILLILITER(S): at 17:38

## 2019-05-10 RX ADMIN — Medication 10 MILLIGRAM(S): at 23:06

## 2019-05-10 RX ADMIN — Medication 3 MILLILITER(S): at 12:00

## 2019-05-10 RX ADMIN — Medication 40 MILLIGRAM(S): at 23:07

## 2019-05-10 RX ADMIN — SPIRONOLACTONE 25 MILLIGRAM(S): 25 TABLET, FILM COATED ORAL at 05:09

## 2019-05-10 RX ADMIN — Medication 100 MICROGRAM(S): at 05:17

## 2019-05-10 RX ADMIN — Medication 10 UNIT(S): at 08:30

## 2019-05-10 RX ADMIN — Medication 0.25 MILLIGRAM(S): at 05:10

## 2019-05-10 RX ADMIN — Medication 3 MILLILITER(S): at 08:30

## 2019-05-10 RX ADMIN — Medication 3 MILLILITER(S): at 05:09

## 2019-05-10 RX ADMIN — Medication 600 MILLIGRAM(S): at 17:38

## 2019-05-10 RX ADMIN — Medication 2.4 GRAM(S): at 12:00

## 2019-05-10 RX ADMIN — Medication 10 UNIT(S): at 12:00

## 2019-05-10 RX ADMIN — PANTOPRAZOLE SODIUM 40 MILLIGRAM(S): 20 TABLET, DELAYED RELEASE ORAL at 05:11

## 2019-05-10 RX ADMIN — Medication 100 MILLIGRAM(S): at 17:38

## 2019-05-10 RX ADMIN — Medication 600 MILLIGRAM(S): at 05:10

## 2019-05-10 RX ADMIN — CARVEDILOL PHOSPHATE 6.25 MILLIGRAM(S): 80 CAPSULE, EXTENDED RELEASE ORAL at 17:38

## 2019-05-10 RX ADMIN — Medication 1000 UNIT(S): at 12:00

## 2019-05-10 RX ADMIN — Medication 14 UNIT(S): at 17:39

## 2019-05-10 RX ADMIN — Medication 40 MILLIGRAM(S): at 17:43

## 2019-05-10 RX ADMIN — POLYETHYLENE GLYCOL 3350 17 GRAM(S): 17 POWDER, FOR SOLUTION ORAL at 11:59

## 2019-05-10 RX ADMIN — Medication 1 TABLET(S): at 12:00

## 2019-05-10 RX ADMIN — Medication 2: at 17:39

## 2019-05-10 RX ADMIN — CARVEDILOL PHOSPHATE 6.25 MILLIGRAM(S): 80 CAPSULE, EXTENDED RELEASE ORAL at 05:10

## 2019-05-10 RX ADMIN — INSULIN GLARGINE 60 UNIT(S): 100 INJECTION, SOLUTION SUBCUTANEOUS at 21:47

## 2019-05-10 NOTE — PHYSICAL THERAPY INITIAL EVALUATION ADULT - ACTIVE RANGE OF MOTION EXAMINATION, REHAB EVAL
bilateral  lower extremity Active ROM was WFL (within functional limits)/R knee flex NA due to al brace locked in extension/bilateral upper extremity Active ROM was WFL (within functional limits)

## 2019-05-10 NOTE — PHYSICAL THERAPY INITIAL EVALUATION ADULT - MANUAL MUSCLE TESTING RESULTS, REHAB EVAL
grossly assessed due to/B UE at least 3/5; L LE at least 3/5 except L knee ext 4/5; R hip flex 3-/5, R knee locked in ext due to al brace; B ankle DF at least 3/5

## 2019-05-10 NOTE — PROGRESS NOTE ADULT - SUBJECTIVE AND OBJECTIVE BOX
No pain, no shortness of breath      VITAL:  T(C): , Max: 36.6 (05-10-19 @ 05:10)  T(F): , Max: 97.8 (05-10-19 @ 05:10)  HR: 60 (05-10-19 @ 06:28)  BP: 149/80 (05-10-19 @ 06:28)  RR: 18 (05-10-19 @ 05:10)  SpO2: 95% (05-10-19 @ 05:10)  urine output 1200cc/12h      PHYSICAL EXAM:  Constitutional: NAD, Alert; obese  HEENT: NCAT, DMM  Neck: Supple, No JVD  Respiratory: coarse BS b/l  Cardiovascular: irreg s1s2  Gastrointestinal: BS+, soft, NT/ND  Extremities: (+)B/L LE edema  Neurological: RLE in brace; reduced generalized strength  Back: no CVAT b/l  Skin: No rashes, no nevi      LABS:                        10.4   8.96  )-----------( 196      ( 10 May 2019 08:04 )             34.2     Na(133)/K(4.8)/Cl(92)/HCO3(26)/BUN(54)/Cr(1.82)Glu(256)/Ca(9.5)/Mg(2.3)/PO4(4.4)    05-10 @ 06:23  Na(138)/K(4.4)/Cl(95)/HCO3(29)/BUN(44)/Cr(1.87)Glu(110)/Ca(9.5)/Mg(2.1)/PO4(--)    05-08 @ 16:58      IMPRESSION: 78F w/ HTN, DM2, HFpEF, morbid obesity, CKD, ulcerative colitis, and valvular heart disease s/p MVR, 5/8/19 a/w HMPV bronchopneumonia    (1)Renal - CKD 3-4, with non-nephrotic range proteinuria - due to DM/HTN. At/near baseline GFR.   (2)Pulm - HMPV/reactive airway disease flare - Pulmonary on board/on steroids  (3)Anemia - Improved as of today    RECOMMEND:  (1)Dose new meds for GFR 25-35ml/min  (2)No objection to continuing Spironolactone as ordered  (3)Steroids per primary team/Pulmonary  (4)BMP daily          Juan Alberto Hills MD  [employer]  [phone] Complains of cough and shortness of breath      VITAL:  T(C): , Max: 36.6 (05-10-19 @ 05:10)  T(F): , Max: 97.8 (05-10-19 @ 05:10)  HR: 60 (05-10-19 @ 06:28)  BP: 149/80 (05-10-19 @ 06:28)  RR: 18 (05-10-19 @ 05:10)  SpO2: 95% (05-10-19 @ 05:10)  urine output 1200cc/12h      PHYSICAL EXAM:  Constitutional: NAD, Alert; obese  HEENT: NCAT, DMM  Neck: Supple, No JVD  Respiratory: (+)b/l wheeze  Cardiovascular: irreg s1s2  Gastrointestinal: BS+, soft, NT/ND  Extremities: (+)1-2+ B/L LE edema  Neurological: RLE in brace; reduced generalized strength  Back: no CVAT b/l  Skin: No rashes, no nevi      LABS:                        10.4   8.96  )-----------( 196      ( 10 May 2019 08:04 )             34.2     Na(133)/K(4.8)/Cl(92)/HCO3(26)/BUN(54)/Cr(1.82)Glu(256)/Ca(9.5)/Mg(2.3)/PO4(4.4)    05-10 @ 06:23  Na(138)/K(4.4)/Cl(95)/HCO3(29)/BUN(44)/Cr(1.87)Glu(110)/Ca(9.5)/Mg(2.1)/PO4(--)    05-08 @ 16:58      IMPRESSION: 78F w/ HTN, DM2, HFpEF, morbid obesity, CKD, ulcerative colitis, and valvular heart disease s/p MVR, 5/8/19 a/w HMPV bronchopneumonia    (1)Renal - CKD 3-4, with non-nephrotic range proteinuria - due to DM/HTN. At/near baseline GFR.   (2)Pulm - HMPV/reactive airway disease flare - Pulmonary on board/on steroids  (3)Anemia - Improved as of today    RECOMMEND:  (1)Dose new meds for GFR 25-35ml/min  (2)No objection to continuing Spironolactone as ordered  (3)Steroids per primary team/Pulmonary  (4)Could switch over to PO Lasix (40bid) at this point  (5)BMP daily          Juan Alberto Hills MD  [facility]  [phone]

## 2019-05-10 NOTE — PROGRESS NOTE ADULT - SUBJECTIVE AND OBJECTIVE BOX
CHIEF COMPLAINT: f/up sob, HMPV infection, PAH, obesity hypoventilation, copd exacerbation, ILDZ--    Interval Events:    REVIEW OF SYSTEMS:  Constitutional: No fevers or chills. No weight loss. No fatigue or generalized malaise.  Eyes: No itching or discharge from the eyes  ENT: No ear pain. No ear discharge. No nasal congestion. No post nasal drip. No epistaxis. No throat pain. No sore throat. No difficulty swallowing.   CV: No chest pain. No palpitations. No lightheadedness or dizziness.   Resp: No dyspnea at rest. No dyspnea on exertion. No orthopnea. No wheezing. No cough. No stridor. No sputum production. No chest pain with respiration.  GI: No nausea. No vomiting. No diarrhea.  MSK: No joint pain or pain in any extremities  Integumentary: No skin lesions. No pedal edema.  Neurological: No gross motor weakness. No sensory changes.  [ ] All other systems negative  [ ] Unable to assess ROS because ________    OBJECTIVE:  ICU Vital Signs Last 24 Hrs  T(C): 36.6 (10 May 2019 05:10), Max: 36.6 (10 May 2019 05:10)  T(F): 97.8 (10 May 2019 05:10), Max: 97.8 (10 May 2019 05:10)  HR: 54 (10 May 2019 05:10) (54 - 104)  BP: 161/84 (10 May 2019 05:10) (155/77 - 169/92)  BP(mean): --  ABP: --  ABP(mean): --  RR: 18 (10 May 2019 05:10) (18 - 20)  SpO2: 95% (10 May 2019 05:10) (95% - 100%)        05-09 @ 07:01  -  05-10 @ 05:46  --------------------------------------------------------  IN: 1020 mL / OUT: 1050 mL / NET: -30 mL      CAPILLARY BLOOD GLUCOSE      POCT Blood Glucose.: 245 mg/dL (09 May 2019 22:04)      PHYSICAL EXAM:  General: Awake, alert, oriented X 3.   HEENT: Atraumatic, normocephalic.                 Mallampatti Grade                 No nasal congestion.                No tonsillar or pharyngeal exudates.  Lymph Nodes: No palpable lymphadenopathy  Neck: No JVD. No carotid bruit.   Respiratory: Normal chest expansion                         Normal percussion                         Normal and equal air entry                         No wheeze, rhonchi or rales.  Cardiovascular: S1 S2 normal. No murmurs, rubs or gallops.   Abdomen: Soft, non-tender, non-distended. No organomegaly. Normoactive bowel sounds.  Extremities: Warm to touch. Peripheral pulse palpable. No pedal edema.   Skin: No rashes or skin lesions  Neurological: Motor and sensory examination equal and normal in all four extremities.  Psychiatry: Appropriate mood and affect.    HOSPITAL MEDICATIONS:  MEDICATIONS  (STANDING):  ALBUTerol/ipratropium for Nebulization. 3 milliLiter(s) Nebulizer every 6 hours  ALPRAZolam 0.25 milliGRAM(s) Oral two times a day  amiodarone    Tablet 200 milliGRAM(s) Oral daily  carvedilol 6.25 milliGRAM(s) Oral every 12 hours  cholecalciferol 1000 Unit(s) Oral daily  dextrose 5%. 1000 milliLiter(s) (50 mL/Hr) IV Continuous <Continuous>  dextrose 50% Injectable 12.5 Gram(s) IV Push once  dextrose 50% Injectable 25 Gram(s) IV Push once  dextrose 50% Injectable 25 Gram(s) IV Push once  docusate sodium 100 milliGRAM(s) Oral two times a day  DULoxetine 60 milliGRAM(s) Oral daily  fenofibrate Tablet 145 milliGRAM(s) Oral daily  furosemide   Injectable 40 milliGRAM(s) IV Push every 12 hours  guaiFENesin  milliGRAM(s) Oral every 12 hours  insulin glargine Injectable (LANTUS) 54 Unit(s) SubCutaneous at bedtime  insulin lispro (HumaLOG) corrective regimen sliding scale   SubCutaneous three times a day before meals  insulin lispro (HumaLOG) corrective regimen sliding scale   SubCutaneous at bedtime  insulin lispro Injectable (HumaLOG) 10 Unit(s) SubCutaneous three times a day before meals  isosorbide   mononitrate ER Tablet (IMDUR) 30 milliGRAM(s) Oral daily  levothyroxine 88 MICROGram(s) Oral daily  levothyroxine 100 MICROGram(s) Oral daily  melatonin 10 milliGRAM(s) Oral at bedtime  mesalamine DR (24-Hour) Tablet 2.4 Gram(s) Oral daily  methylPREDNISolone sodium succinate Injectable 40 milliGRAM(s) IV Push every 8 hours  multivitamin 1 Tablet(s) Oral daily  pantoprazole    Tablet 40 milliGRAM(s) Oral before breakfast  senna 2 Tablet(s) Oral at bedtime  spironolactone 25 milliGRAM(s) Oral daily    MEDICATIONS  (PRN):  acetaminophen   Tablet .. 650 milliGRAM(s) Oral every 6 hours PRN Temp greater or equal to 38C (100.4F), Mild Pain (1 - 3)  ALBUTerol/ipratropium for Nebulization 3 milliLiter(s) Nebulizer every 4 hours PRN Shortness of Breath and/or Wheezing  bisacodyl Suppository 10 milliGRAM(s) Rectal daily PRN Constipation  dextrose 40% Gel 15 Gram(s) Oral once PRN Blood Glucose LESS THAN 70 milliGRAM(s)/deciliter  glucagon  Injectable 1 milliGRAM(s) IntraMuscular once PRN Glucose LESS THAN 70 milligrams/deciliter  polyethylene glycol 3350 17 Gram(s) Oral daily PRN Constipation  saline laxative (FLEET) Rectal Enema 1 Enema Rectal daily PRN for constipation      LABS:                        9.1    7.05  )-----------( 159      ( 09 May 2019 07:43 )             31.2     05-08    138  |  95<L>  |  44<H>  ----------------------------<  110<H>  4.4   |  29  |  1.87<H>    Ca    9.5      08 May 2019 16:58  Mg     2.1     05-08    TPro  7.3  /  Alb  3.8  /  TBili  0.8  /  DBili  x   /  AST  51<H>  /  ALT  57<H>  /  AlkPhos  69  05-08    PT/INR - ( 09 May 2019 08:53 )   PT: 27.2 sec;   INR: 2.34 ratio         PTT - ( 08 May 2019 16:58 )  PTT:34.5 sec    Arterial Blood Gas:  05-09 @ 23:38  7.35/54/89/29/96/3.2  ABG lactate: --        MICROBIOLOGY:     RADIOLOGY:  [ ] Reviewed and interpreted by me    Point of Care Ultrasound Findings:    PFT:    EKG: CHIEF COMPLAINT: f/up sob, HMPV infection, PAH, obesity hypoventilation, copd exacerbation, ILDZ--slightly better today-less cough and sob    Interval Events: monitored isolation bed    REVIEW OF SYSTEMS:  Constitutional: No fevers or chills. No weight loss. + fatigue or generalized malaise.  Eyes: No itching or discharge from the eyes  ENT: No ear pain. No ear discharge. No nasal congestion. No post nasal drip. No epistaxis. No throat pain. No sore throat. No difficulty swallowing.   CV: No chest pain. No palpitations. No lightheadedness or dizziness.   Resp: No dyspnea at rest. + dyspnea on exertion. No orthopnea. + wheezing. + cough. No stridor. No sputum production. No chest pain with respiration.  GI: No nausea. No vomiting. No diarrhea.  MSK: No joint pain or pain in any extremities  Integumentary: No skin lesions. No pedal edema.  Neurological: + gross motor weakness. No sensory changes.  [ +] All other systems negative  [ ] Unable to assess ROS because ________    OBJECTIVE:  ICU Vital Signs Last 24 Hrs  T(C): 36.6 (10 May 2019 05:10), Max: 36.6 (10 May 2019 05:10)  T(F): 97.8 (10 May 2019 05:10), Max: 97.8 (10 May 2019 05:10)  HR: 54 (10 May 2019 05:10) (54 - 104)  BP: 161/84 (10 May 2019 05:10) (155/77 - 169/92)  BP(mean): --  ABP: --  ABP(mean): --  RR: 18 (10 May 2019 05:10) (18 - 20)  SpO2: 95% (10 May 2019 05:10) (95% - 100%)        05-09 @ 07:01  -  05-10 @ 05:46  --------------------------------------------------------  IN: 1020 mL / OUT: 1050 mL / NET: -30 mL      CAPILLARY BLOOD GLUCOSE      POCT Blood Glucose.: 245 mg/dL (09 May 2019 22:04)      PHYSICAL EXAM: NAD in bed on O2  General: Awake, alert, oriented X 3.   HEENT: Atraumatic, normocephalic.                 Mallampatti Grade 3                No nasal congestion.                No tonsillar or pharyngeal exudates.  Lymph Nodes: No palpable lymphadenopathy  Neck: No JVD. No carotid bruit.   Respiratory: Normal chest expansion                         Normal percussion                         Normal and equal air entry                         + exp wheeze,no rhonchi or rales.  Cardiovascular: S1 S2 normal. No murmurs, rubs or gallops.   Abdomen: Soft, non-tender, non-distended. No organomegaly. Normoactive bowel sounds.  Extremities: Warm to touch. Peripheral pulse palpable. + pedal edema.   Skin: No rashes or skin lesions  Neurological: Motor and sensory examination equal and normal in all four extremities.  Psychiatry: Appropriate mood and affect.    HOSPITAL MEDICATIONS:  MEDICATIONS  (STANDING):  ALBUTerol/ipratropium for Nebulization. 3 milliLiter(s) Nebulizer every 6 hours  ALPRAZolam 0.25 milliGRAM(s) Oral two times a day  amiodarone    Tablet 200 milliGRAM(s) Oral daily  carvedilol 6.25 milliGRAM(s) Oral every 12 hours  cholecalciferol 1000 Unit(s) Oral daily  dextrose 5%. 1000 milliLiter(s) (50 mL/Hr) IV Continuous <Continuous>  dextrose 50% Injectable 12.5 Gram(s) IV Push once  dextrose 50% Injectable 25 Gram(s) IV Push once  dextrose 50% Injectable 25 Gram(s) IV Push once  docusate sodium 100 milliGRAM(s) Oral two times a day  DULoxetine 60 milliGRAM(s) Oral daily  fenofibrate Tablet 145 milliGRAM(s) Oral daily  furosemide   Injectable 40 milliGRAM(s) IV Push every 12 hours  guaiFENesin  milliGRAM(s) Oral every 12 hours  insulin glargine Injectable (LANTUS) 54 Unit(s) SubCutaneous at bedtime  insulin lispro (HumaLOG) corrective regimen sliding scale   SubCutaneous three times a day before meals  insulin lispro (HumaLOG) corrective regimen sliding scale   SubCutaneous at bedtime  insulin lispro Injectable (HumaLOG) 10 Unit(s) SubCutaneous three times a day before meals  isosorbide   mononitrate ER Tablet (IMDUR) 30 milliGRAM(s) Oral daily  levothyroxine 88 MICROGram(s) Oral daily  levothyroxine 100 MICROGram(s) Oral daily  melatonin 10 milliGRAM(s) Oral at bedtime  mesalamine DR (24-Hour) Tablet 2.4 Gram(s) Oral daily  methylPREDNISolone sodium succinate Injectable 40 milliGRAM(s) IV Push every 8 hours  multivitamin 1 Tablet(s) Oral daily  pantoprazole    Tablet 40 milliGRAM(s) Oral before breakfast  senna 2 Tablet(s) Oral at bedtime  spironolactone 25 milliGRAM(s) Oral daily    MEDICATIONS  (PRN):  acetaminophen   Tablet .. 650 milliGRAM(s) Oral every 6 hours PRN Temp greater or equal to 38C (100.4F), Mild Pain (1 - 3)  ALBUTerol/ipratropium for Nebulization 3 milliLiter(s) Nebulizer every 4 hours PRN Shortness of Breath and/or Wheezing  bisacodyl Suppository 10 milliGRAM(s) Rectal daily PRN Constipation  dextrose 40% Gel 15 Gram(s) Oral once PRN Blood Glucose LESS THAN 70 milliGRAM(s)/deciliter  glucagon  Injectable 1 milliGRAM(s) IntraMuscular once PRN Glucose LESS THAN 70 milligrams/deciliter  polyethylene glycol 3350 17 Gram(s) Oral daily PRN Constipation  saline laxative (FLEET) Rectal Enema 1 Enema Rectal daily PRN for constipation      LABS:                        9.1    7.05  )-----------( 159      ( 09 May 2019 07:43 )             31.2     05-08    138  |  95<L>  |  44<H>  ----------------------------<  110<H>  4.4   |  29  |  1.87<H>    Ca    9.5      08 May 2019 16:58  Mg     2.1     05-08    TPro  7.3  /  Alb  3.8  /  TBili  0.8  /  DBili  x   /  AST  51<H>  /  ALT  57<H>  /  AlkPhos  69  05-08    PT/INR - ( 09 May 2019 08:53 )   PT: 27.2 sec;   INR: 2.34 ratio         PTT - ( 08 May 2019 16:58 )  PTT:34.5 sec    Arterial Blood Gas:  05-09 @ 23:38  7.35/54/89/29/96/3.2  ABG lactate: --        MICROBIOLOGY:     RADIOLOGY:  [ ] Reviewed and interpreted by me    Point of Care Ultrasound Findings:    PFT:    EKG:

## 2019-05-10 NOTE — PROGRESS NOTE ADULT - SUBJECTIVE AND OBJECTIVE BOX
Infectious Diseases progress note:    Subjective: Awake, alert, NAD.  Still c/o wheeze/sob    ROS:  CONSTITUTIONAL:  No fever, chills, rigors  CARDIOVASCULAR:  No chest pain or palpitations  RESPIRATORY:   No SOB, cough, dyspnea on exertion.  No wheezing  GASTROINTESTINAL:  No abd pain, N/V, diarrhea/constipation  EXTREMITIES:  No swelling or joint pain  GENITOURINARY:  No burning on urination, increased frequency or urgency.  No flank pain  NEUROLOGIC:  No HA, visual disturbances  SKIN: No rashes    Allergies    Augmentin (Swelling)  cephalexin (Swelling)  latex (Rash)    Intolerances        ANTIBIOTICS/RELEVANT:  antimicrobials    immunologic:    OTHER:  acetaminophen   Tablet .. 650 milliGRAM(s) Oral every 6 hours PRN  ALBUTerol/ipratropium for Nebulization 3 milliLiter(s) Nebulizer every 4 hours PRN  ALBUTerol/ipratropium for Nebulization. 3 milliLiter(s) Nebulizer every 6 hours  ALPRAZolam 0.25 milliGRAM(s) Oral two times a day  amiodarone    Tablet 200 milliGRAM(s) Oral daily  bisacodyl Suppository 10 milliGRAM(s) Rectal daily PRN  carvedilol 6.25 milliGRAM(s) Oral every 12 hours  cholecalciferol 1000 Unit(s) Oral daily  dextrose 40% Gel 15 Gram(s) Oral once PRN  dextrose 5%. 1000 milliLiter(s) IV Continuous <Continuous>  dextrose 50% Injectable 12.5 Gram(s) IV Push once  dextrose 50% Injectable 25 Gram(s) IV Push once  dextrose 50% Injectable 25 Gram(s) IV Push once  docusate sodium 100 milliGRAM(s) Oral two times a day  DULoxetine 60 milliGRAM(s) Oral daily  fenofibrate Tablet 145 milliGRAM(s) Oral daily  furosemide    Tablet 40 milliGRAM(s) Oral two times a day  glucagon  Injectable 1 milliGRAM(s) IntraMuscular once PRN  guaiFENesin  milliGRAM(s) Oral every 12 hours  insulin glargine Injectable (LANTUS) 54 Unit(s) SubCutaneous at bedtime  insulin lispro (HumaLOG) corrective regimen sliding scale   SubCutaneous three times a day before meals  insulin lispro (HumaLOG) corrective regimen sliding scale   SubCutaneous at bedtime  insulin lispro Injectable (HumaLOG) 10 Unit(s) SubCutaneous three times a day before meals  isosorbide   mononitrate ER Tablet (IMDUR) 30 milliGRAM(s) Oral daily  levothyroxine 88 MICROGram(s) Oral daily  levothyroxine 100 MICROGram(s) Oral daily  melatonin 10 milliGRAM(s) Oral at bedtime  mesalamine DR (24-Hour) Tablet 2.4 Gram(s) Oral daily  methylPREDNISolone sodium succinate Injectable 40 milliGRAM(s) IV Push every 8 hours  multivitamin 1 Tablet(s) Oral daily  pantoprazole    Tablet 40 milliGRAM(s) Oral before breakfast  polyethylene glycol 3350 17 Gram(s) Oral daily PRN  saline laxative (FLEET) Rectal Enema 1 Enema Rectal daily PRN  senna 2 Tablet(s) Oral at bedtime  spironolactone 25 milliGRAM(s) Oral daily      Objective:  Vital Signs Last 24 Hrs  T(C): 36.6 (10 May 2019 05:10), Max: 36.6 (10 May 2019 05:10)  T(F): 97.8 (10 May 2019 05:10), Max: 97.8 (10 May 2019 05:10)  HR: 88 (10 May 2019 11:29) (54 - 104)  BP: 179/97 (10 May 2019 11:29) (149/80 - 179/97)  BP(mean): --  RR: 18 (10 May 2019 05:10) (18 - 20)  SpO2: 95% (10 May 2019 11:29) (95% - 99%)    PHYSICAL EXAM:  Constitutional:NAD  Eyes:HANNAH, EOMI  Ear/Nose/Throat: no thrush, mucositis.  Moist mucous membranes	  Neck:no JVD, no lymphadenopathy, supple  Respiratory: (+) wheeze  Cardiovascular: S1S2 RRR, no murmurs  Gastrointestinal:soft, nontender,  nondistended (+) BS  Extremities:no e/e/c, rt LE leg brace, surgical site c/d/i  Skin:  no rashes, open wounds or ulcerations        LABS:                        10.4   8.96  )-----------( 196      ( 10 May 2019 08:04 )             34.2     05-10    133<L>  |  92<L>  |  54<H>  ----------------------------<  256<H>  4.8   |  26  |  1.82<H>    Ca    9.5      10 May 2019 06:23  Phos  4.4     05-10  Mg     2.3     05-10    TPro  7.3  /  Alb  3.8  /  TBili  0.8  /  DBili  x   /  AST  51<H>  /  ALT  57<H>  /  AlkPhos  69  05-08    PT/INR - ( 10 May 2019 09:17 )   PT: 33.0 sec;   INR: 2.83 ratio         PTT - ( 08 May 2019 16:58 )  PTT:34.5 sec      Procalcitonin, Serum: 0.05 (05-09 @ 06:40)            Rapid RVP Result: Detected          MICROBIOLOGY:    Rapid Respiratory Viral Panel (05.08.19 @ 17:35)    Rapid RVP Result: Detected: This Respiratory Panel uses polymerase chain reaction (PCR) to detect for  adenovirus; coronavirus (HKU1, NL63, 229E, OC43); human metapneumovirus  (hMPV); human enterovirus/rhinovirus (Entero/RV); influenza A; influenza  A/H1; influenza A/H3; influenza A/H1-2009; influenza B; parainfluenza  viruses 1, 2, 3, 4; respiratory syncytial virus; Mycoplasma pneumoniae;  and Chlamydophila pneumoniae.    hMPV (RapRVP): Detected          RADIOLOGY & ADDITIONAL STUDIES:    < from: Xray Chest 1 View- PORTABLE-Urgent (05.09.19 @ 23:16) >    FINDINGS:    Lines/Tubes: None.    Lungs: The lungs are clear.    Pleura: No pleural effusion.    Mediastinum: Median sternotomy. Heart valve replacement. Heart size   cannot be assessed.    Skeletal: No acute osseous findings.      IMPRESSION:  1.  Clear lungs.    < end of copied text >      < from: Xray Chest 1 View- PORTABLE-Urgent (05.08.19 @ 17:03) >  FINDINGS:     Patient is status post median sternotomy. Status post CABG. Status post   mitral valve repair.    Bibasilar linear subsegmental atelectasis.  Minimal pulmonary edema.  No pleural effusion or pneumothorax.   Cardiac size cannot be accurately assessed in this projection. Visualized   osseous structures are unremarkable.    IMPRESSION:   Bibasilar linear subsegmental atelectasis.    < end of copied text >

## 2019-05-10 NOTE — PHYSICAL THERAPY INITIAL EVALUATION ADULT - PERTINENT HX OF CURRENT PROBLEM, REHAB EVAL
79 yo female with h/o morbid obesity with functional paraplegia, restrictive lung disease, ILD, VICKY/OHS with chronic hypercapnic and hypoxic respiratory failure on 3L NC (not on CPAP), PAD/PVD with chronic LE wounds, h/o MS s/p bioprosthetic MVR, dCHF, HTN, DM 2, CKD III, anemia, post menopausal vaginal bleeding a/w tracheobronchitis & acute on chronic diastolic CHF. (cont.)

## 2019-05-10 NOTE — PHYSICAL THERAPY INITIAL EVALUATION ADULT - NS ASR WT BEARING DETAIL RLE
weight-bearing as tolerated/in R al brace locked in extension (per ortho note 4/14 - last hospital admission)

## 2019-05-10 NOTE — PHYSICAL THERAPY INITIAL EVALUATION ADULT - ADDITIONAL COMMENTS
PTA, pt at subacute rehab. Prior, pt living at home with spouse in private home with ramp entrance, first floor setup within. Pt reports she ambulates short distances with rolling walker and assist of spouse at baseline, uses w/c to mobilize community distances.

## 2019-05-10 NOTE — PROVIDER CONTACT NOTE (OTHER) - ASSESSMENT
Patient is A&Ox4; educated on the importance of bipap use by respiratory therapist and primary RN; patient continues to refuse, stating she will wear it tomorrow.

## 2019-05-10 NOTE — PROGRESS NOTE ADULT - ASSESSMENT
79 yo woman w multiple medical problems outline in [place] being admitted for HMPV tracheobronchitis and acute on chronic diastolic CHF.   Respiratory status w ongoing bronchospasm today, cont  medrol 40 mg q8H, change  lasix 40 mg to po, acute on chronic CHF resolved,  duonebs q4h prn, cont observing off ABx, close watch to her finger sticks 2/2 being on steroids.  CKD3-4, creatinine is a baseline  anemia is multifactoral, 2/2 iron def from chronic blood loss and 2/2 renal dz. epogen up to renal  card, endo, pulm, wound, id and renal consults appreciated  ptn had a recent Echo no need to repeat it.   cont coumadin and daily Pt/INR check.,   cont rest of outptn meds.

## 2019-05-10 NOTE — PROGRESS NOTE ADULT - ATTENDING COMMENTS
Reactive airway disease in setting of hMPV infection. Would monitor off antibiotics. If no improvement in respiratory symptoms may need to consider increasing steroid dose to Solumedrol 20mg Q6-8hrs. Will make Duonebs Q6 ATC for now.     Dr. Montgomery to follow as above-  Multifactorial resp insufficiency- obesity hypoventilation, copd/asthma, Cards, debility--unable to tolerate bipap--continue O2 NC  Reactive airway disease in setting of hMPV infection. Would monitor off antibiotics. continue Solumedrol 40mg Q8hrs. Will make Duonebs Q6 ATC for now and add pulmicort l.5 bid  Cards-as per Nelli  OSAS-refused w/up and rx  ILDZ-CT unrevealing  PPI/OOB/PT  Fito Montgomery MD-Pulmonary   [phone]

## 2019-05-10 NOTE — PROGRESS NOTE ADULT - SUBJECTIVE AND OBJECTIVE BOX
Patient is a 78y old  Female who presents with a chief complaint of DYSPNEA, COUGH (10 May 2019 17:11)      SUBJECTIVE / OVERNIGHT EVENTS: " i feel terrible" ongoing cough and dyspnea     MEDICATIONS  (STANDING):  ALBUTerol/ipratropium for Nebulization. 3 milliLiter(s) Nebulizer every 6 hours  ALPRAZolam 0.25 milliGRAM(s) Oral two times a day  amiodarone    Tablet 200 milliGRAM(s) Oral daily  carvedilol 6.25 milliGRAM(s) Oral every 12 hours  cholecalciferol 1000 Unit(s) Oral daily  dextrose 5%. 1000 milliLiter(s) (50 mL/Hr) IV Continuous <Continuous>  dextrose 50% Injectable 12.5 Gram(s) IV Push once  dextrose 50% Injectable 25 Gram(s) IV Push once  dextrose 50% Injectable 25 Gram(s) IV Push once  docusate sodium 100 milliGRAM(s) Oral two times a day  DULoxetine 60 milliGRAM(s) Oral daily  fenofibrate Tablet 145 milliGRAM(s) Oral daily  furosemide    Tablet 40 milliGRAM(s) Oral two times a day  guaiFENesin  milliGRAM(s) Oral every 12 hours  insulin glargine Injectable (LANTUS) 60 Unit(s) SubCutaneous at bedtime  insulin lispro (HumaLOG) corrective regimen sliding scale   SubCutaneous three times a day before meals  insulin lispro (HumaLOG) corrective regimen sliding scale   SubCutaneous at bedtime  insulin lispro Injectable (HumaLOG) 14 Unit(s) SubCutaneous three times a day before meals  isosorbide   mononitrate ER Tablet (IMDUR) 30 milliGRAM(s) Oral daily  levothyroxine 88 MICROGram(s) Oral daily  levothyroxine 100 MICROGram(s) Oral daily  melatonin 10 milliGRAM(s) Oral at bedtime  mesalamine DR (24-Hour) Tablet 2.4 Gram(s) Oral daily  methylPREDNISolone sodium succinate Injectable 40 milliGRAM(s) IV Push every 8 hours  multivitamin 1 Tablet(s) Oral daily  pantoprazole    Tablet 40 milliGRAM(s) Oral before breakfast  senna 2 Tablet(s) Oral at bedtime  spironolactone 25 milliGRAM(s) Oral daily    MEDICATIONS  (PRN):  acetaminophen   Tablet .. 650 milliGRAM(s) Oral every 6 hours PRN Temp greater or equal to 38C (100.4F), Mild Pain (1 - 3)  ALBUTerol/ipratropium for Nebulization 3 milliLiter(s) Nebulizer every 4 hours PRN Shortness of Breath and/or Wheezing  bisacodyl Suppository 10 milliGRAM(s) Rectal daily PRN Constipation  dextrose 40% Gel 15 Gram(s) Oral once PRN Blood Glucose LESS THAN 70 milliGRAM(s)/deciliter  glucagon  Injectable 1 milliGRAM(s) IntraMuscular once PRN Glucose LESS THAN 70 milligrams/deciliter  polyethylene glycol 3350 17 Gram(s) Oral daily PRN Constipation  saline laxative (FLEET) Rectal Enema 1 Enema Rectal daily PRN for constipation  sodium chloride 0.65% Nasal 1 Spray(s) Both Nostrils three times a day PRN Nasal Congestion      Vital Signs Last 24 Hrs  T(F): 98 (05-10-19 @ 12:57), Max: 98 (05-10-19 @ 12:57)  HR: 105 (05-10-19 @ 17:48) (54 - 105)  BP: 143/83 (05-10-19 @ 17:48) (137/79 - 179/97)  RR: 20 (05-10-19 @ 12:57) (18 - 20)  SpO2: 95% (05-10-19 @ 12:57) (95% - 96%)  Telemetry:   CAPILLARY BLOOD GLUCOSE      POCT Blood Glucose.: 172 mg/dL (10 May 2019 17:14)  POCT Blood Glucose.: 240 mg/dL (10 May 2019 11:41)  POCT Blood Glucose.: 261 mg/dL (10 May 2019 08:01)  POCT Blood Glucose.: 245 mg/dL (09 May 2019 22:04)    I&O's Summary    09 May 2019 07:01  -  10 May 2019 07:00  --------------------------------------------------------  IN: 1130 mL / OUT: 1550 mL / NET: -420 mL    10 May 2019 07:01  -  10 May 2019 20:32  --------------------------------------------------------  IN: 960 mL / OUT: 300 mL / NET: 660 mL        PHYSICAL EXAM:  GENERAL: NAD, well-developed  HEAD:  Atraumatic, Normocephalic  EYES: EOMI, PERRLA, conjunctiva and sclera clear  NECK: Supple, No JVD  CHEST/LUNG: Clear to auscultation bilaterally; No wheeze  HEART: Regular rate and rhythm; No murmurs, rubs, or gallops  ABDOMEN: Soft, Nontender, Nondistended; Bowel sounds present  EXTREMITIES:  2+ Peripheral Pulses, No clubbing, cyanosis, or edema  PSYCH: AAOx3  NEUROLOGY: non-focal  SKIN: No rashes or lesions    LABS:                        10.4   8.96  )-----------( 196      ( 10 May 2019 08:04 )             34.2     05-10    133<L>  |  92<L>  |  54<H>  ----------------------------<  256<H>  4.8   |  26  |  1.82<H>    Ca    9.5      10 May 2019 06:23  Phos  4.4     05-10  Mg     2.3     05-10      PT/INR - ( 10 May 2019 09:17 )   PT: 33.0 sec;   INR: 2.83 ratio                   RADIOLOGY & ADDITIONAL TESTS:    Imaging Personally Reviewed:    Consultant(s) Notes Reviewed:      Care Discussed with Consultants/Other Providers:

## 2019-05-10 NOTE — PROGRESS NOTE ADULT - ASSESSMENT
79 yo F w/ hx of moderate MS s/p bioprosthetic mitral valve, severe pulmonary HTN, chronic hypoxemic respiratory failure and suspected ILD, DM2, anemia, diastolic CHF, hypothyroidism, paroxysmal atrial fibrillation, HTN presenting with progressive SOB 2/2 human metapneumovirus 79 yo F w/ hx of moderate MS s/p bioprosthetic mitral valve, severe pulmonary HTN, chronic hypoxemic respiratory failure and suspected ILD, DM2, anemia, diastolic CHF, hypothyroidism, paroxysmal atrial fibrillation, HTN presenting with progressive SOB 2/2 human metapneumovirus    5/10-slightly better-(bipap unable to tolerate)

## 2019-05-10 NOTE — PHYSICAL THERAPY INITIAL EVALUATION ADULT - GENERAL OBSERVATIONS, REHAB EVAL
Pt received semi-supine in bed, (+) R al brace, 4L NCO2, external female catheter, NAD. Pt alert, agreeable to PT eval with some encouragement.

## 2019-05-10 NOTE — PHYSICAL THERAPY INITIAL EVALUATION ADULT - PRECAUTIONS/LIMITATIONS, REHAB EVAL
(cont. from above): Pt with recent hospital admission 3/30-4/15/19 for UTI, further uterine bleeding, and Right Quadricept tendon repair 2/2 traumatic rupture post mechanical fall. Pt admit from subacute rehab./fall precautions (cont. from above): Pt with recent hospital admission 3/30-4/15/19 for UTI, further uterine bleeding, and Right Quadricept tendon repair 2/2 traumatic rupture post mechanical fall. Pt admit from subacute rehab./fall precautions/isolation precautions

## 2019-05-10 NOTE — PROGRESS NOTE ADULT - ASSESSMENT
77 y/o Type 2 DM, hypothyroid, re-admitted after prolonged hospital stay for morbid obesity, ILD, VICKY/OHS PAD/PVD with chronic LE wounds, h/o MS s/p bioprosthetic MVR, dCHF, HTN, DM 2, CKD III, anemia, vaginal bleeding, repair of traumatic right leg quadriceps tendon, with increasing SOB due to HMPV infection started on high dose steroids.    Type 2 DM, morbid obesity, insulin resistant discharged on lantus 54 units daily, and humalog 20-24 units before meals. Pt's HbA1c on prior admission was 7.4 %.   She had decreased PO intake over the past two days, due to SOB, and FS have been low set on basal lantus dose and scale humalog alone. She was on PO steroids at rehab prior to transfer, however was started on solumedrol 40 mg IV Q8h from pm today. PO intake was moderate with dinned.    Hypothyroidism- treated with synthroid 175 mcg daily. During past admission dose increased to 188 mg daily, with normal TFT's by D/C.  C/O fatigue and weakness in rehab.       Diabetes:  Insulin requirements were low due to decreased PO intake, but expect significant hyperglycemia back on high dose steroids.  Patient more alert, PO intake better.  No taper of steroids dose.  Afebrile, SOB better, still with cough.  FS in the 200 range on current insulin dose.  Will increase Lantus 60 units at HS, Humalog 14 units per meal + scale.    Thyroid:  On synthroid 188 mcg daily now.  Free Thyroxine, Serum: 2.2 ng/dL (05.10.19 @ 08:05)  Thyroid Stimulating Hormone, Serum: 1.24 uIU/mL (05.10.19 @ 08:05)  Continue same dose.

## 2019-05-10 NOTE — PROGRESS NOTE ADULT - SUBJECTIVE AND OBJECTIVE BOX
Chief Complaint: 77 y/o Type 2 DM, hypothyroid, re-admitted after prolonged hospital stay for morbid obesity, ILD, VICKY/OHS PAD/PVD with chronic LE wounds, h/o MS s/p bioprosthetic MVR, dCHF, HTN, DM 2, CKD III, anemia, vaginal bleeding, repair of traumatic right leg quadriceps tendon, with increasing SOB due to HMPV infection started on high dose steroids.    Patient more alert, PO intake better.  No taper of steroids dose.  Afebrile, SOB better, still with cough.  FS in the 200 range on current insulin dose.    MEDICATIONS  (STANDING):  ALBUTerol/ipratropium for Nebulization. 3 milliLiter(s) Nebulizer every 6 hours  ALPRAZolam 0.25 milliGRAM(s) Oral two times a day  amiodarone    Tablet 200 milliGRAM(s) Oral daily  carvedilol 6.25 milliGRAM(s) Oral every 12 hours  cholecalciferol 1000 Unit(s) Oral daily  dextrose 5%. 1000 milliLiter(s) (50 mL/Hr) IV Continuous <Continuous>  dextrose 50% Injectable 12.5 Gram(s) IV Push once  dextrose 50% Injectable 25 Gram(s) IV Push once  dextrose 50% Injectable 25 Gram(s) IV Push once  docusate sodium 100 milliGRAM(s) Oral two times a day  DULoxetine 60 milliGRAM(s) Oral daily  fenofibrate Tablet 145 milliGRAM(s) Oral daily  furosemide    Tablet 40 milliGRAM(s) Oral two times a day  guaiFENesin  milliGRAM(s) Oral every 12 hours  insulin glargine Injectable (LANTUS) 54 Unit(s) SubCutaneous at bedtime  insulin lispro (HumaLOG) corrective regimen sliding scale   SubCutaneous three times a day before meals  insulin lispro (HumaLOG) corrective regimen sliding scale   SubCutaneous at bedtime  insulin lispro Injectable (HumaLOG) 10 Unit(s) SubCutaneous three times a day before meals  isosorbide   mononitrate ER Tablet (IMDUR) 30 milliGRAM(s) Oral daily  levothyroxine 88 MICROGram(s) Oral daily  levothyroxine 100 MICROGram(s) Oral daily  melatonin 10 milliGRAM(s) Oral at bedtime  mesalamine DR (24-Hour) Tablet 2.4 Gram(s) Oral daily  methylPREDNISolone sodium succinate Injectable 40 milliGRAM(s) IV Push every 8 hours  multivitamin 1 Tablet(s) Oral daily  pantoprazole    Tablet 40 milliGRAM(s) Oral before breakfast  senna 2 Tablet(s) Oral at bedtime  spironolactone 25 milliGRAM(s) Oral daily    MEDICATIONS  (PRN):  acetaminophen   Tablet .. 650 milliGRAM(s) Oral every 6 hours PRN Temp greater or equal to 38C (100.4F), Mild Pain (1 - 3)  ALBUTerol/ipratropium for Nebulization 3 milliLiter(s) Nebulizer every 4 hours PRN Shortness of Breath and/or Wheezing  bisacodyl Suppository 10 milliGRAM(s) Rectal daily PRN Constipation  dextrose 40% Gel 15 Gram(s) Oral once PRN Blood Glucose LESS THAN 70 milliGRAM(s)/deciliter  glucagon  Injectable 1 milliGRAM(s) IntraMuscular once PRN Glucose LESS THAN 70 milligrams/deciliter  polyethylene glycol 3350 17 Gram(s) Oral daily PRN Constipation  saline laxative (FLEET) Rectal Enema 1 Enema Rectal daily PRN for constipation  sodium chloride 0.65% Nasal 1 Spray(s) Both Nostrils three times a day PRN Nasal Congestion      Allergies    Augmentin (Swelling)  cephalexin (Swelling)  latex (Rash)    Intolerances        PHYSICAL EXAM:  VITALS: T(C): 36.7 (05-10-19 @ 12:57)  T(F): 98 (05-10-19 @ 12:57), Max: 98 (05-10-19 @ 12:57)  HR: 93 (05-10-19 @ 12:57) (54 - 104)  BP: 137/79 (05-10-19 @ 12:57) (137/79 - 179/97)  RR:  (18 - 20)  SpO2:  (95% - 96%)  GENERAL: morbid obesity, C/O SOB, cough  EYES: No proptosis, no lid lag, anicteric  HEENT:  Atraumatic, Normocephalic, moist mucous membranes  THYROID: Normal size, no palpable nodules  RESPIRATORY: decreased breath sounds, bilateral, rhonchi and wheezing  CARDIOVASCULAR: Regular rate and rhythm; No murmurs; + peripheral edema  GI: Soft, nontender, non distended, normal bowel sounds  SKIN: Dry, intact, bilateral foot chronic changes and edema, toe amputation left foot.  MUSCULOSKELETAL: right leg in brace, reduced strength.  NEURO: no focal deficits.  PSYCH: Alert and oriented x 3, normal affect, normal mood    POCT Blood Glucose.: 240 mg/dL (05-10-19 @ 11:41)  POCT Blood Glucose.: 261 mg/dL (05-10-19 @ 08:01)  POCT Blood Glucose.: 245 mg/dL (05-09-19 @ 22:04)  POCT Blood Glucose.: 211 mg/dL (05-09-19 @ 17:00)  POCT Blood Glucose.: 146 mg/dL (05-09-19 @ 11:42)  POCT Blood Glucose.: 127 mg/dL (05-09-19 @ 07:28)  POCT Blood Glucose.: 118 mg/dL (05-09-19 @ 02:09)  POCT Blood Glucose.: 122 mg/dL (05-08-19 @ 23:34)                            10.4   8.96  )-----------( 196      ( 10 May 2019 08:04 )             34.2       05-10    133<L>  |  92<L>  |  54<H>  ----------------------------<  256<H>  4.8   |  26  |  1.82<H>    EGFR if : 31<L>  EGFR if non : 27<L>    Ca    9.5      05-10  Mg     2.3     05-10  Phos  4.4     05-10    TPro  7.3  /  Alb  3.8  /  TBili  0.8  /  DBili  x   /  AST  51<H>  /  ALT  57<H>  /  AlkPhos  69  05-08      Thyroid Function Tests:  05-10 @ 08:05 TSH 1.24 FreeT4 2.2 T3 -- Anti TPO -- Anti Thyroglobulin Ab -- TSI --  04-12 @ 09:32 TSH 2.50 FreeT4 2.2 T3 -- Anti TPO -- Anti Thyroglobulin Ab -- TSI --      Hemoglobin A1C, Whole Blood: 7.4 % <H> [4.0 - 5.6] (03-25-19 @ 08:55)

## 2019-05-10 NOTE — PROGRESS NOTE ADULT - ASSESSMENT
79 yo woman w multiple medical problems  presents from [facility] w dyspnea and productive cough for 3 days, no fevers, no chills, no CP, no palpitations . RVP positive for HMPV. was started on IV Zosyn duonebs and po prednisone at [facility] on 5/7   Ptn was sent to [facility] post admission 3/30-4/15/19 with UTI, further uterine bleeding, and Right Quadricept tendon repair 2/2 traumatic rupture post mechanical fall.   PMH: morbid obesity with functional paraplegia, restrictive lung disease, ILD, VICKY/OHS with chronic hypercapnic and hypoxic respiratory failure on 3L NC (not on CPAP), PAD/PVD with chronic LE wounds, h/o MS s/p bioprosthetic MVR, dCHF, HTN, DM 2, CKD III, anemia, post menopausal vaginal bleeding s/p D&C in July 2018 and in April 2019 had D&C amd Mirena IUD placement, pathology findings benign, no further bleeding since. placed initially on Norethindrone and none at present. Ptn was  cleared to restart ELiquis for PAFI stable on amiodarone) prior to DC but now she is on coumadin, not sure why the medication was changed. also h/o , UC, Right knee Quadriceps tendon rupture , s/p repair in April ( last month), chronic hematuria, s/p cystoscopy w no findings of cystitis or polyp in april. chronic back 2/2 spinal stenosis with gait instability,  wheelchair dependent for the past 5-6 months. Requires assistance of her  for ADLS. (08 May 2019 20:59)    Pt afebrile, no leukocytosis.  PCT 0.05.  AST/ALT mildly elevated.  RVP (+) hMPV.  Cxr with mild pulm edema on prelim read. Pt off abx.  She c/o dry cough and wheezing.  No abd pain/diarrhea/dysuria/cp/HA.  c/o congestion and "blockage" or "stuffiness" in rt ear.        Recommend:    Viral URI:    - RVP (+) human metapneumovirus.  No role for antivirals.  Pt without fever or leukocytosis.  5/8 Cxr shows mild pulmonary edema, repeat on 5/9 is clear.   No evidence for superimposed bacterial pna.  No need for antibiotics at this time.    - Cont supportive care, nebulizers, supp O2 as needed.  Pt with wheezing, on solumedrol.  Cont steroid taper.      Will follow,    Christal Reinoso  [phone]

## 2019-05-11 LAB
ANION GAP SERPL CALC-SCNC: 15 MMOL/L — SIGNIFICANT CHANGE UP (ref 5–17)
BUN SERPL-MCNC: 60 MG/DL — HIGH (ref 7–23)
CALCIUM SERPL-MCNC: 9.9 MG/DL — SIGNIFICANT CHANGE UP (ref 8.4–10.5)
CHLORIDE SERPL-SCNC: 96 MMOL/L — SIGNIFICANT CHANGE UP (ref 96–108)
CO2 SERPL-SCNC: 28 MMOL/L — SIGNIFICANT CHANGE UP (ref 22–31)
CREAT SERPL-MCNC: 1.62 MG/DL — HIGH (ref 0.5–1.3)
GLUCOSE BLDC GLUCOMTR-MCNC: 107 MG/DL — HIGH (ref 70–99)
GLUCOSE BLDC GLUCOMTR-MCNC: 141 MG/DL — HIGH (ref 70–99)
GLUCOSE BLDC GLUCOMTR-MCNC: 144 MG/DL — HIGH (ref 70–99)
GLUCOSE BLDC GLUCOMTR-MCNC: 73 MG/DL — SIGNIFICANT CHANGE UP (ref 70–99)
GLUCOSE BLDC GLUCOMTR-MCNC: 92 MG/DL — SIGNIFICANT CHANGE UP (ref 70–99)
GLUCOSE SERPL-MCNC: 127 MG/DL — HIGH (ref 70–99)
HCT VFR BLD CALC: 32.4 % — LOW (ref 34.5–45)
HGB BLD-MCNC: 9.9 G/DL — LOW (ref 11.5–15.5)
INR BLD: 3.26 RATIO — HIGH (ref 0.88–1.16)
MCHC RBC-ENTMCNC: 30.5 PG — SIGNIFICANT CHANGE UP (ref 27–34)
MCHC RBC-ENTMCNC: 30.6 GM/DL — LOW (ref 32–36)
MCV RBC AUTO: 99.7 FL — SIGNIFICANT CHANGE UP (ref 80–100)
PLATELET # BLD AUTO: 179 K/UL — SIGNIFICANT CHANGE UP (ref 150–400)
POTASSIUM SERPL-MCNC: 4.5 MMOL/L — SIGNIFICANT CHANGE UP (ref 3.5–5.3)
POTASSIUM SERPL-SCNC: 4.5 MMOL/L — SIGNIFICANT CHANGE UP (ref 3.5–5.3)
PROTHROM AB SERPL-ACNC: 38.6 SEC — HIGH (ref 10–13.1)
RBC # BLD: 3.25 M/UL — LOW (ref 3.8–5.2)
RBC # FLD: 15.3 % — HIGH (ref 10.3–14.5)
SODIUM SERPL-SCNC: 139 MMOL/L — SIGNIFICANT CHANGE UP (ref 135–145)
WBC # BLD: 8.34 K/UL — SIGNIFICANT CHANGE UP (ref 3.8–10.5)
WBC # FLD AUTO: 8.34 K/UL — SIGNIFICANT CHANGE UP (ref 3.8–10.5)

## 2019-05-11 RX ORDER — INSULIN GLARGINE 100 [IU]/ML
40 INJECTION, SOLUTION SUBCUTANEOUS AT BEDTIME
Refills: 0 | Status: DISCONTINUED | OUTPATIENT
Start: 2019-05-12 | End: 2019-05-16

## 2019-05-11 RX ORDER — INSULIN LISPRO 100/ML
8 VIAL (ML) SUBCUTANEOUS
Refills: 0 | Status: DISCONTINUED | OUTPATIENT
Start: 2019-05-11 | End: 2019-05-16

## 2019-05-11 RX ORDER — WARFARIN SODIUM 2.5 MG/1
2 TABLET ORAL ONCE
Refills: 0 | Status: DISCONTINUED | OUTPATIENT
Start: 2019-05-11 | End: 2019-05-11

## 2019-05-11 RX ORDER — INSULIN GLARGINE 100 [IU]/ML
20 INJECTION, SOLUTION SUBCUTANEOUS ONCE
Refills: 0 | Status: COMPLETED | OUTPATIENT
Start: 2019-05-11 | End: 2019-05-11

## 2019-05-11 RX ADMIN — PANTOPRAZOLE SODIUM 40 MILLIGRAM(S): 20 TABLET, DELAYED RELEASE ORAL at 05:50

## 2019-05-11 RX ADMIN — Medication 145 MILLIGRAM(S): at 11:10

## 2019-05-11 RX ADMIN — Medication 100 MILLIGRAM(S): at 17:30

## 2019-05-11 RX ADMIN — Medication 0.25 MILLIGRAM(S): at 05:50

## 2019-05-11 RX ADMIN — Medication 3 MILLILITER(S): at 11:10

## 2019-05-11 RX ADMIN — Medication 40 MILLIGRAM(S): at 05:56

## 2019-05-11 RX ADMIN — Medication 100 MICROGRAM(S): at 05:49

## 2019-05-11 RX ADMIN — Medication 3 MILLILITER(S): at 23:14

## 2019-05-11 RX ADMIN — CARVEDILOL PHOSPHATE 6.25 MILLIGRAM(S): 80 CAPSULE, EXTENDED RELEASE ORAL at 17:29

## 2019-05-11 RX ADMIN — Medication 40 MILLIGRAM(S): at 21:59

## 2019-05-11 RX ADMIN — Medication 2.4 GRAM(S): at 11:11

## 2019-05-11 RX ADMIN — Medication 40 MILLIGRAM(S): at 05:50

## 2019-05-11 RX ADMIN — CARVEDILOL PHOSPHATE 6.25 MILLIGRAM(S): 80 CAPSULE, EXTENDED RELEASE ORAL at 05:49

## 2019-05-11 RX ADMIN — Medication 10 MILLIGRAM(S): at 21:59

## 2019-05-11 RX ADMIN — Medication 1 TABLET(S): at 11:11

## 2019-05-11 RX ADMIN — AMIODARONE HYDROCHLORIDE 200 MILLIGRAM(S): 400 TABLET ORAL at 05:49

## 2019-05-11 RX ADMIN — Medication 14 UNIT(S): at 11:43

## 2019-05-11 RX ADMIN — ISOSORBIDE MONONITRATE 30 MILLIGRAM(S): 60 TABLET, EXTENDED RELEASE ORAL at 11:11

## 2019-05-11 RX ADMIN — SPIRONOLACTONE 25 MILLIGRAM(S): 25 TABLET, FILM COATED ORAL at 05:49

## 2019-05-11 RX ADMIN — Medication 88 MICROGRAM(S): at 05:49

## 2019-05-11 RX ADMIN — Medication 100 MILLIGRAM(S): at 05:49

## 2019-05-11 RX ADMIN — SENNA PLUS 2 TABLET(S): 8.6 TABLET ORAL at 21:59

## 2019-05-11 RX ADMIN — Medication 3 MILLILITER(S): at 19:42

## 2019-05-11 RX ADMIN — Medication 600 MILLIGRAM(S): at 17:30

## 2019-05-11 RX ADMIN — Medication 0.25 MILLIGRAM(S): at 17:40

## 2019-05-11 RX ADMIN — DULOXETINE HYDROCHLORIDE 60 MILLIGRAM(S): 30 CAPSULE, DELAYED RELEASE ORAL at 11:11

## 2019-05-11 RX ADMIN — Medication 40 MILLIGRAM(S): at 17:30

## 2019-05-11 RX ADMIN — Medication 14 UNIT(S): at 07:50

## 2019-05-11 RX ADMIN — Medication 3 MILLILITER(S): at 05:56

## 2019-05-11 RX ADMIN — Medication 1000 UNIT(S): at 11:10

## 2019-05-11 RX ADMIN — Medication 14 UNIT(S): at 17:29

## 2019-05-11 RX ADMIN — Medication 1 SPRAY(S): at 05:50

## 2019-05-11 RX ADMIN — Medication 600 MILLIGRAM(S): at 05:49

## 2019-05-11 RX ADMIN — Medication 3 MILLILITER(S): at 17:29

## 2019-05-11 RX ADMIN — INSULIN GLARGINE 20 UNIT(S): 100 INJECTION, SOLUTION SUBCUTANEOUS at 23:14

## 2019-05-11 RX ADMIN — Medication 40 MILLIGRAM(S): at 14:03

## 2019-05-11 NOTE — PROGRESS NOTE ADULT - SUBJECTIVE AND OBJECTIVE BOX
Patient is a 78y old  Female who presents with a chief complaint of DYSPNEA, COUGH (11 May 2019 16:09)      SUBJECTIVE / OVERNIGHT EVENTS: no new c/o, still wheezing and SOB    MEDICATIONS  (STANDING):  ALBUTerol/ipratropium for Nebulization. 3 milliLiter(s) Nebulizer every 6 hours  ALPRAZolam 0.25 milliGRAM(s) Oral two times a day  amiodarone    Tablet 200 milliGRAM(s) Oral daily  carvedilol 6.25 milliGRAM(s) Oral every 12 hours  cholecalciferol 1000 Unit(s) Oral daily  dextrose 5%. 1000 milliLiter(s) (50 mL/Hr) IV Continuous <Continuous>  dextrose 50% Injectable 12.5 Gram(s) IV Push once  dextrose 50% Injectable 25 Gram(s) IV Push once  dextrose 50% Injectable 25 Gram(s) IV Push once  docusate sodium 100 milliGRAM(s) Oral two times a day  DULoxetine 60 milliGRAM(s) Oral daily  fenofibrate Tablet 145 milliGRAM(s) Oral daily  furosemide    Tablet 40 milliGRAM(s) Oral two times a day  guaiFENesin  milliGRAM(s) Oral every 12 hours  insulin glargine Injectable (LANTUS) 60 Unit(s) SubCutaneous at bedtime  insulin lispro (HumaLOG) corrective regimen sliding scale   SubCutaneous three times a day before meals  insulin lispro (HumaLOG) corrective regimen sliding scale   SubCutaneous at bedtime  insulin lispro Injectable (HumaLOG) 14 Unit(s) SubCutaneous three times a day before meals  isosorbide   mononitrate ER Tablet (IMDUR) 30 milliGRAM(s) Oral daily  levothyroxine 88 MICROGram(s) Oral daily  levothyroxine 100 MICROGram(s) Oral daily  melatonin 10 milliGRAM(s) Oral at bedtime  mesalamine DR (24-Hour) Tablet 2.4 Gram(s) Oral daily  methylPREDNISolone sodium succinate Injectable 40 milliGRAM(s) IV Push every 8 hours  multivitamin 1 Tablet(s) Oral daily  pantoprazole    Tablet 40 milliGRAM(s) Oral before breakfast  senna 2 Tablet(s) Oral at bedtime  spironolactone 25 milliGRAM(s) Oral daily    MEDICATIONS  (PRN):  acetaminophen   Tablet .. 650 milliGRAM(s) Oral every 6 hours PRN Temp greater or equal to 38C (100.4F), Mild Pain (1 - 3)  ALBUTerol/ipratropium for Nebulization 3 milliLiter(s) Nebulizer every 4 hours PRN Shortness of Breath and/or Wheezing  bisacodyl Suppository 10 milliGRAM(s) Rectal daily PRN Constipation  dextrose 40% Gel 15 Gram(s) Oral once PRN Blood Glucose LESS THAN 70 milliGRAM(s)/deciliter  glucagon  Injectable 1 milliGRAM(s) IntraMuscular once PRN Glucose LESS THAN 70 milligrams/deciliter  polyethylene glycol 3350 17 Gram(s) Oral daily PRN Constipation  saline laxative (FLEET) Rectal Enema 1 Enema Rectal daily PRN for constipation  sodium chloride 0.65% Nasal 1 Spray(s) Both Nostrils three times a day PRN Nasal Congestion      Vital Signs Last 24 Hrs  T(F): 97.9 (05-11-19 @ 12:02), Max: 97.9 (05-10-19 @ 21:27)  HR: 104 (05-11-19 @ 19:38) (74 - 105)  BP: 156/82 (05-11-19 @ 19:38) (121/75 - 156/82)  RR: 20 (05-11-19 @ 19:38) (19 - 20)  SpO2: 96% (05-11-19 @ 19:38) (94% - 98%)  Telemetry:   CAPILLARY BLOOD GLUCOSE      POCT Blood Glucose.: 107 mg/dL (11 May 2019 17:07)  POCT Blood Glucose.: 141 mg/dL (11 May 2019 11:17)  POCT Blood Glucose.: 144 mg/dL (11 May 2019 07:15)  POCT Blood Glucose.: 153 mg/dL (10 May 2019 21:38)    I&O's Summary    10 May 2019 07:01  -  11 May 2019 07:00  --------------------------------------------------------  IN: 960 mL / OUT: 1200 mL / NET: -240 mL    11 May 2019 07:01  -  11 May 2019 21:21  --------------------------------------------------------  IN: 840 mL / OUT: 1550 mL / NET: -710 mL        PHYSICAL EXAM:  GENERAL: NAD, well-developed  HEAD:  Atraumatic, Normocephalic  EYES: EOMI, PERRLA, conjunctiva and sclera clear  NECK: Supple, No JVD  CHEST/LUNG: Clear to auscultation bilaterally; No wheeze  HEART: Regular rate and rhythm; No murmurs, rubs, or gallops  ABDOMEN: Soft, Nontender, Nondistended; Bowel sounds present  EXTREMITIES:  2+ Peripheral Pulses, No clubbing, cyanosis, or edema  PSYCH: AAOx3  NEUROLOGY: non-focal  SKIN: No rashes or lesions    LABS:                        9.9    8.34  )-----------( 179      ( 11 May 2019 10:19 )             32.4     05-11    139  |  96  |  60<H>  ----------------------------<  127<H>  4.5   |  28  |  1.62<H>    Ca    9.9      11 May 2019 06:14  Phos  4.4     05-10  Mg     2.3     05-10      PT/INR - ( 11 May 2019 09:03 )   PT: 38.6 sec;   INR: 3.26 ratio                   RADIOLOGY & ADDITIONAL TESTS:    Imaging Personally Reviewed:    Consultant(s) Notes Reviewed:      Care Discussed with Consultants/Other Providers:

## 2019-05-11 NOTE — PROGRESS NOTE ADULT - SUBJECTIVE AND OBJECTIVE BOX
TANIKA OBRIEN    Still with dry cough and wheezing.  Breathing improved.      Allergies    Augmentin (Swelling)  cephalexin (Swelling)  latex (Rash)    MEDICATIONS  (STANDING):  ALBUTerol/ipratropium for Nebulization. 3 milliLiter(s) Nebulizer every 6 hours  ALPRAZolam 0.25 milliGRAM(s) Oral two times a day  amiodarone    Tablet 200 milliGRAM(s) Oral daily  carvedilol 6.25 milliGRAM(s) Oral every 12 hours  cholecalciferol 1000 Unit(s) Oral daily  dextrose 5%. 1000 milliLiter(s) (50 mL/Hr) IV Continuous <Continuous>  dextrose 50% Injectable 12.5 Gram(s) IV Push once  dextrose 50% Injectable 25 Gram(s) IV Push once  dextrose 50% Injectable 25 Gram(s) IV Push once  docusate sodium 100 milliGRAM(s) Oral two times a day  DULoxetine 60 milliGRAM(s) Oral daily  fenofibrate Tablet 145 milliGRAM(s) Oral daily  furosemide    Tablet 40 milliGRAM(s) Oral two times a day  guaiFENesin  milliGRAM(s) Oral every 12 hours  insulin glargine Injectable (LANTUS) 60 Unit(s) SubCutaneous at bedtime  insulin lispro (HumaLOG) corrective regimen sliding scale   SubCutaneous three times a day before meals  insulin lispro (HumaLOG) corrective regimen sliding scale   SubCutaneous at bedtime  insulin lispro Injectable (HumaLOG) 14 Unit(s) SubCutaneous three times a day before meals  isosorbide   mononitrate ER Tablet (IMDUR) 30 milliGRAM(s) Oral daily  levothyroxine 88 MICROGram(s) Oral daily  levothyroxine 100 MICROGram(s) Oral daily  melatonin 10 milliGRAM(s) Oral at bedtime  mesalamine DR (24-Hour) Tablet 2.4 Gram(s) Oral daily  methylPREDNISolone sodium succinate Injectable 40 milliGRAM(s) IV Push every 8 hours  multivitamin 1 Tablet(s) Oral daily  pantoprazole    Tablet 40 milliGRAM(s) Oral before breakfast  senna 2 Tablet(s) Oral at bedtime  spironolactone 25 milliGRAM(s) Oral daily  warfarin 2 milliGRAM(s) Oral once    MEDICATIONS  (PRN):  acetaminophen   Tablet .. 650 milliGRAM(s) Oral every 6 hours PRN Temp greater or equal to 38C (100.4F), Mild Pain (1 - 3)  ALBUTerol/ipratropium for Nebulization 3 milliLiter(s) Nebulizer every 4 hours PRN Shortness of Breath and/or Wheezing  bisacodyl Suppository 10 milliGRAM(s) Rectal daily PRN Constipation  dextrose 40% Gel 15 Gram(s) Oral once PRN Blood Glucose LESS THAN 70 milliGRAM(s)/deciliter  glucagon  Injectable 1 milliGRAM(s) IntraMuscular once PRN Glucose LESS THAN 70 milligrams/deciliter  polyethylene glycol 3350 17 Gram(s) Oral daily PRN Constipation  saline laxative (FLEET) Rectal Enema 1 Enema Rectal daily PRN for constipation  sodium chloride 0.65% Nasal 1 Spray(s) Both Nostrils three times a day PRN Nasal Congestion    PHYSICAL EXAM:  Vital Signs Last 24 Hrs  T(C): 36.6 (11 May 2019 12:02), Max: 36.6 (10 May 2019 21:27)  T(F): 97.9 (11 May 2019 12:02), Max: 97.9 (10 May 2019 21:27)  HR: 100 (11 May 2019 12:02) (74 - 105)  BP: 130/85 (11 May 2019 12:02) (121/75 - 143/83)  BP(mean): --  RR: 19 (11 May 2019 12:02) (19 - 20)  SpO2: 98% (11 May 2019 12:02) (94% - 98%)  Daily     Daily Weight in k (11 May 2019 07:57)  I&O's Summary    10 May 2019 07:  -  11 May 2019 07:00  --------------------------------------------------------  IN: 960 mL / OUT: 1200 mL / NET: -240 mL    11 May 2019 07:  -  11 May 2019 14:30  --------------------------------------------------------  IN: 360 mL / OUT: 650 mL / NET: -290 mL    General Appearance: 	 Alert, cooperative, comfortable at rest  Neck: no JVD  Lungs:  Bilateral wheeze  Cor:  pmi 5th ICS MCL, Irregular rhythm, S1 normal intensity, S2 normal intensity  Abdomen:	 soft, non-tender; bowel sounds normal  Extremities: RLE surgical   No significant LLE edema    EKG:  Telemetry:  Atrial flutter 90-110s    Labs:  CBC Full  -  ( 11 May 2019 10:19 )  WBC Count : 8.34 K/uL  RBC Count : 3.25 M/uL  Hemoglobin : 9.9 g/dL  Hematocrit : 32.4 %  Platelet Count - Automated : 179 K/uL  Mean Cell Volume : 99.7 fl  Mean Cell Hemoglobin : 30.5 pg  Mean Cell Hemoglobin Concentration : 30.6 gm/dL  Auto Neutrophil # : x  Auto Lymphocyte # : x  Auto Monocyte # : x  Auto Eosinophil # : x  Auto Basophil # : x  Auto Neutrophil % : x  Auto Lymphocyte % : x  Auto Monocyte % : x  Auto Eosinophil % : x  Auto Basophil % : x        PT/INR - ( 11 May 2019 09:03 )   PT: 38.6 sec;   INR: 3.26 ratio

## 2019-05-11 NOTE — PROGRESS NOTE ADULT - ASSESSMENT
78y Female with history of bioprosthetic MVR, chronic diastolic CHF, PAF, VICKY/Obesity, CKD, DM and PAD sent to ER due to SOB. Found to have MHNP viral bronchitis.     Rec:  Nebs and steroids as per pulmonary   Continue other CV meds  Continue oral lasix  Keep INR 2-3  If heart rate remains high, will consider adding diltiazem.

## 2019-05-11 NOTE — CHART NOTE - NSCHARTNOTEFT_GEN_A_CORE
called by RN as night time blood sugar 73. Pt's blood sugar has been in the 100's today. Pt's appetite is low and has been having poor PO intake.   D/W Dr. Nunn endocrine, will give Lantus 20 units tonight. Will also decrease premeal insulin to 8 units 3X day with meals.  Will start Lantus 40 units at bedtime tomorrow. Will also provide pt with bedtime snack. Will continue to monitor pt.     Oni Finch NP  [id number]

## 2019-05-11 NOTE — PROGRESS NOTE ADULT - ASSESSMENT
77 y/o Type 2 DM, hypothyroid, re-admitted after prolonged hospital stay for morbid obesity, ILD, VICKY/OHS PAD/PVD with chronic LE wounds, h/o MS s/p bioprosthetic MVR, dCHF, HTN, DM 2, CKD III, anemia, vaginal bleeding, repair of traumatic right leg quadriceps tendon, with increasing SOB due to HMPV infection started on high dose steroids.    Type 2 DM, morbid obesity, insulin resistant discharged on lantus 54 units daily, and humalog 20-24 units before meals. Pt's HbA1c on prior admission was 7.4 %.   She had decreased PO intake over the past two days, due to SOB, and FS have been low set on basal lantus dose and scale humalog alone. She was on PO steroids at rehab prior to transfer, however was started on solumedrol 40 mg IV Q8h from pm today. PO intake was moderate with dinned.    Hypothyroidism- treated with synthroid 175 mcg daily. During past admission dose increased to 188 mg daily, with normal TFT's by D/C.  C/O fatigue and weakness in rehab.       Diabetes:  Patient more alert, PO intake still moderate.  No taper of steroids dose IV solumedrol 40 mg Q8H.  Afebrile, SOB better, still with cough.  FS lower last 24 hours post adjustment Lantus 60 units at HS, humalog 14 units per meal.  Insulin requirement are much lower then expected for this insulin resistant patient on high dose steroids m/p due to decreased PO diet.   Will keep Lantus 60 units at HS, Humalog 14 units per meal + mid scale.    Thyroid:  On synthroid 188 mcg daily now.  Free Thyroxine, Serum: 2.2 ng/dL (05.10.19 @ 08:05)  Thyroid Stimulating Hormone, Serum: 1.24 uIU/mL (05.10.19 @ 08:05)  Continue same dose.

## 2019-05-11 NOTE — PROGRESS NOTE ADULT - SUBJECTIVE AND OBJECTIVE BOX
Infectious Diseases progress note:    Subjective: NAD, afebrile.  No acute o/n events.  Dry cough    ROS:  CONSTITUTIONAL:  No fever, chills, rigors  CARDIOVASCULAR:  No chest pain or palpitations  RESPIRATORY:   No SOB, cough, dyspnea on exertion.  No wheezing  GASTROINTESTINAL:  No abd pain, N/V, diarrhea/constipation  EXTREMITIES:  No swelling or joint pain  GENITOURINARY:  No burning on urination, increased frequency or urgency.  No flank pain  NEUROLOGIC:  No HA, visual disturbances  SKIN: No rashes    Allergies    Augmentin (Swelling)  cephalexin (Swelling)  latex (Rash)    Intolerances        ANTIBIOTICS/RELEVANT:  antimicrobials    immunologic:    OTHER:  acetaminophen   Tablet .. 650 milliGRAM(s) Oral every 6 hours PRN  ALBUTerol/ipratropium for Nebulization 3 milliLiter(s) Nebulizer every 4 hours PRN  ALBUTerol/ipratropium for Nebulization. 3 milliLiter(s) Nebulizer every 6 hours  ALPRAZolam 0.25 milliGRAM(s) Oral two times a day  amiodarone    Tablet 200 milliGRAM(s) Oral daily  bisacodyl Suppository 10 milliGRAM(s) Rectal daily PRN  carvedilol 6.25 milliGRAM(s) Oral every 12 hours  cholecalciferol 1000 Unit(s) Oral daily  dextrose 40% Gel 15 Gram(s) Oral once PRN  dextrose 5%. 1000 milliLiter(s) IV Continuous <Continuous>  dextrose 50% Injectable 12.5 Gram(s) IV Push once  dextrose 50% Injectable 25 Gram(s) IV Push once  dextrose 50% Injectable 25 Gram(s) IV Push once  docusate sodium 100 milliGRAM(s) Oral two times a day  DULoxetine 60 milliGRAM(s) Oral daily  fenofibrate Tablet 145 milliGRAM(s) Oral daily  furosemide    Tablet 40 milliGRAM(s) Oral two times a day  glucagon  Injectable 1 milliGRAM(s) IntraMuscular once PRN  guaiFENesin  milliGRAM(s) Oral every 12 hours  insulin glargine Injectable (LANTUS) 60 Unit(s) SubCutaneous at bedtime  insulin lispro (HumaLOG) corrective regimen sliding scale   SubCutaneous three times a day before meals  insulin lispro (HumaLOG) corrective regimen sliding scale   SubCutaneous at bedtime  insulin lispro Injectable (HumaLOG) 14 Unit(s) SubCutaneous three times a day before meals  isosorbide   mononitrate ER Tablet (IMDUR) 30 milliGRAM(s) Oral daily  levothyroxine 88 MICROGram(s) Oral daily  levothyroxine 100 MICROGram(s) Oral daily  melatonin 10 milliGRAM(s) Oral at bedtime  mesalamine DR (24-Hour) Tablet 2.4 Gram(s) Oral daily  methylPREDNISolone sodium succinate Injectable 40 milliGRAM(s) IV Push every 8 hours  multivitamin 1 Tablet(s) Oral daily  pantoprazole    Tablet 40 milliGRAM(s) Oral before breakfast  polyethylene glycol 3350 17 Gram(s) Oral daily PRN  saline laxative (FLEET) Rectal Enema 1 Enema Rectal daily PRN  senna 2 Tablet(s) Oral at bedtime  sodium chloride 0.65% Nasal 1 Spray(s) Both Nostrils three times a day PRN  spironolactone 25 milliGRAM(s) Oral daily  warfarin 2 milliGRAM(s) Oral once      Objective:  Vital Signs Last 24 Hrs  T(C): 36.5 (11 May 2019 05:10), Max: 36.7 (10 May 2019 12:57)  T(F): 97.7 (11 May 2019 05:10), Max: 98 (10 May 2019 12:57)  HR: 94 (11 May 2019 09:03) (74 - 105)  BP: 122/82 (11 May 2019 05:10) (121/75 - 143/83)  BP(mean): --  RR: 20 (11 May 2019 05:10) (20 - 20)  SpO2: 97% (11 May 2019 09:03) (94% - 98%)    PHYSICAL EXAM:  Constitutional:NAD  Eyes:HANNAH, EOMI  Ear/Nose/Throat: no thrush, mucositis.  Moist mucous membranes	  Neck:no JVD, no lymphadenopathy, supple  Respiratory: CTA saad  Cardiovascular: S1S2 RRR, no murmurs  Gastrointestinal:soft, nontender,  nondistended (+) BS  Extremities:no e/e/c, RLE immobilizer  Skin:  no rashes, open wounds or ulcerations        LABS:                        9.9    8.34  )-----------( 179      ( 11 May 2019 10:19 )             32.4     05-11    139  |  96  |  60<H>  ----------------------------<  127<H>  4.5   |  28  |  1.62<H>    Ca    9.9      11 May 2019 06:14  Phos  4.4     05-10  Mg     2.3     05-10      PT/INR - ( 10 May 2019 09:17 )   PT: 33.0 sec;   INR: 2.83 ratio               Procalcitonin, Serum: 0.05 (05-09 @ 06:40)            Rapid RVP Result: Detected          MICROBIOLOGY:              RADIOLOGY & ADDITIONAL STUDIES:    < from: Xray Chest 1 View- PORTABLE-Urgent (05.09.19 @ 23:16) >  FINDINGS:    Lines/Tubes: None.    Lungs: The lungs are clear.    Pleura: No pleural effusion.    Mediastinum: Median sternotomy. Heart valve replacement. Heart size   cannot be assessed.    Skeletal: No acute osseous findings.      IMPRESSION:  1.  Clear lungs.    < end of copied text >

## 2019-05-11 NOTE — PROGRESS NOTE ADULT - ASSESSMENT
77 yo woman w multiple medical problems  presents from [facility] w dyspnea and productive cough for 3 days, no fevers, no chills, no CP, no palpitations . RVP positive for HMPV. was started on IV Zosyn duonebs and po prednisone at [facility] on 5/7   Ptn was sent to [facility] post admission 3/30-4/15/19 with UTI, further uterine bleeding, and Right Quadricept tendon repair 2/2 traumatic rupture post mechanical fall.   PMH: morbid obesity with functional paraplegia, restrictive lung disease, ILD, VICKY/OHS with chronic hypercapnic and hypoxic respiratory failure on 3L NC (not on CPAP), PAD/PVD with chronic LE wounds, h/o MS s/p bioprosthetic MVR, dCHF, HTN, DM 2, CKD III, anemia, post menopausal vaginal bleeding s/p D&C in July 2018 and in April 2019 had D&C amd Mirena IUD placement, pathology findings benign, no further bleeding since. placed initially on Norethindrone and none at present. Ptn was  cleared to restart ELiquis for PAFI stable on amiodarone) prior to DC but now she is on coumadin, not sure why the medication was changed. also h/o , UC, Right knee Quadriceps tendon rupture , s/p repair in April ( last month), chronic hematuria, s/p cystoscopy w no findings of cystitis or polyp in april. chronic back 2/2 spinal stenosis with gait instability,  wheelchair dependent for the past 5-6 months. Requires assistance of her  for ADLS. (08 May 2019 20:59)    Pt afebrile, no leukocytosis.  PCT 0.05.  AST/ALT mildly elevated.  RVP (+) hMPV.  Cxr with mild pulm edema on prelim read. Pt off abx.  She c/o dry cough and wheezing.  No abd pain/diarrhea/dysuria/cp/HA.  c/o congestion and "blockage" or "stuffiness" in rt ear.        Recommend:    Viral URI:    - RVP (+) human metapneumovirus.  No role for antivirals.  Pt without fever or leukocytosis.  5/8 Cxr shows mild pulmonary edema, repeat on 5/9 is clear.   No evidence for superimposed bacterial pna.  No need for antibiotics at this time.    - Cont supportive care, nebulizers, supp O2 as needed.  Pt with wheezing, on solumedrol.  Cont steroid taper/lasix      Will follow,    Christal Reinoso  [phone]

## 2019-05-11 NOTE — PROGRESS NOTE ADULT - SUBJECTIVE AND OBJECTIVE BOX
Chief Complaint: 79 y/o Type 2 DM, hypothyroid, re-admitted from Rehab after prolonged hospital stay for morbid obesity, ILD, VICKY/OHS PAD/PVD with chronic LE wounds, h/o MS s/p bioprosthetic MVR, dCHF, HTN, DM 2, CKD III, anemia, vaginal bleeding, repair of traumatic right leg quadriceps tendon, with increasing SOB due to HMPV infection started on high dose steroids.    Patient more alert, PO intake still moderate.  No taper of steroids dose IV solumedrol 40 mg Q8H.  Afebrile, SOB better, still with cough.  FS lower last 24 hours post adjustment Lantus 60 units at HS, humalog 14 units per meal.      MEDICATIONS  (STANDING):  ALBUTerol/ipratropium for Nebulization. 3 milliLiter(s) Nebulizer every 6 hours  ALPRAZolam 0.25 milliGRAM(s) Oral two times a day  amiodarone    Tablet 200 milliGRAM(s) Oral daily  carvedilol 6.25 milliGRAM(s) Oral every 12 hours  cholecalciferol 1000 Unit(s) Oral daily  dextrose 5%. 1000 milliLiter(s) (50 mL/Hr) IV Continuous <Continuous>  dextrose 50% Injectable 12.5 Gram(s) IV Push once  dextrose 50% Injectable 25 Gram(s) IV Push once  dextrose 50% Injectable 25 Gram(s) IV Push once  docusate sodium 100 milliGRAM(s) Oral two times a day  DULoxetine 60 milliGRAM(s) Oral daily  fenofibrate Tablet 145 milliGRAM(s) Oral daily  furosemide    Tablet 40 milliGRAM(s) Oral two times a day  guaiFENesin  milliGRAM(s) Oral every 12 hours  insulin glargine Injectable (LANTUS) 60 Unit(s) SubCutaneous at bedtime  insulin lispro (HumaLOG) corrective regimen sliding scale   SubCutaneous three times a day before meals  insulin lispro (HumaLOG) corrective regimen sliding scale   SubCutaneous at bedtime  insulin lispro Injectable (HumaLOG) 14 Unit(s) SubCutaneous three times a day before meals  isosorbide   mononitrate ER Tablet (IMDUR) 30 milliGRAM(s) Oral daily  levothyroxine 88 MICROGram(s) Oral daily  levothyroxine 100 MICROGram(s) Oral daily  melatonin 10 milliGRAM(s) Oral at bedtime  mesalamine DR (24-Hour) Tablet 2.4 Gram(s) Oral daily  methylPREDNISolone sodium succinate Injectable 40 milliGRAM(s) IV Push every 8 hours  multivitamin 1 Tablet(s) Oral daily  pantoprazole    Tablet 40 milliGRAM(s) Oral before breakfast  senna 2 Tablet(s) Oral at bedtime  spironolactone 25 milliGRAM(s) Oral daily  warfarin 2 milliGRAM(s) Oral once    MEDICATIONS  (PRN):  acetaminophen   Tablet .. 650 milliGRAM(s) Oral every 6 hours PRN Temp greater or equal to 38C (100.4F), Mild Pain (1 - 3)  ALBUTerol/ipratropium for Nebulization 3 milliLiter(s) Nebulizer every 4 hours PRN Shortness of Breath and/or Wheezing  bisacodyl Suppository 10 milliGRAM(s) Rectal daily PRN Constipation  dextrose 40% Gel 15 Gram(s) Oral once PRN Blood Glucose LESS THAN 70 milliGRAM(s)/deciliter  glucagon  Injectable 1 milliGRAM(s) IntraMuscular once PRN Glucose LESS THAN 70 milligrams/deciliter  polyethylene glycol 3350 17 Gram(s) Oral daily PRN Constipation  saline laxative (FLEET) Rectal Enema 1 Enema Rectal daily PRN for constipation  sodium chloride 0.65% Nasal 1 Spray(s) Both Nostrils three times a day PRN Nasal Congestion      Allergies    Augmentin (Swelling)  cephalexin (Swelling)  latex (Rash)    Intolerances        PHYSICAL EXAM:  VITALS: T(C): 36.6 (05-11-19 @ 12:02)  T(F): 97.9 (05-11-19 @ 12:02), Max: 97.9 (05-10-19 @ 21:27)  HR: 100 (05-11-19 @ 12:02) (74 - 105)  BP: 130/85 (05-11-19 @ 12:02) (121/75 - 143/83)  RR:  (19 - 20)  SpO2:  (94% - 98%)  GENERAL: morbid obesity, C/O SOB, cough  EYES: No proptosis, no lid lag, anicteric  HEENT:  Atraumatic, Normocephalic, moist mucous membranes  THYROID: Normal size, no palpable nodules  RESPIRATORY: decreased breath sounds, bilateral, rhonchi and wheezing  CARDIOVASCULAR: Regular rate and rhythm; No murmurs; + peripheral edema  GI: Soft, nontender, non distended, normal bowel sounds  SKIN: Dry, intact, bilateral foot chronic changes and edema, toe amputation left foot.  MUSCULOSKELETAL: right leg in brace, reduced strength.  NEURO: no focal deficits.  PSYCH: Alert and oriented x 3, normal affect, normal mood      POCT Blood Glucose.: 141 mg/dL (05-11-19 @ 11:17)  POCT Blood Glucose.: 144 mg/dL (05-11-19 @ 07:15)  POCT Blood Glucose.: 153 mg/dL (05-10-19 @ 21:38)  POCT Blood Glucose.: 172 mg/dL (05-10-19 @ 17:14)  POCT Blood Glucose.: 240 mg/dL (05-10-19 @ 11:41)  POCT Blood Glucose.: 261 mg/dL (05-10-19 @ 08:01)  POCT Blood Glucose.: 245 mg/dL (05-09-19 @ 22:04)  POCT Blood Glucose.: 211 mg/dL (05-09-19 @ 17:00)  POCT Blood Glucose.: 146 mg/dL (05-09-19 @ 11:42)  POCT Blood Glucose.: 127 mg/dL (05-09-19 @ 07:28)  POCT Blood Glucose.: 118 mg/dL (05-09-19 @ 02:09)  POCT Blood Glucose.: 122 mg/dL (05-08-19 @ 23:34)                            9.9    8.34  )-----------( 179      ( 11 May 2019 10:19 )             32.4       05-11    139  |  96  |  60<H>  ----------------------------<  127<H>  4.5   |  28  |  1.62<H>    EGFR if : 35<L>  EGFR if non : 31<L>    Ca    9.9      05-11  Mg     2.3     05-10  Phos  4.4     05-10    TPro  7.3  /  Alb  3.8  /  TBili  0.8  /  DBili  x   /  AST  51<H>  /  ALT  57<H>  /  AlkPhos  69  05-08      Thyroid Function Tests:  05-10 @ 08:05 TSH 1.24 FreeT4 2.2 T3 -- Anti TPO -- Anti Thyroglobulin Ab -- TSI --  04-12 @ 09:32 TSH 2.50 FreeT4 2.2 T3 -- Anti TPO -- Anti Thyroglobulin Ab -- TSI --      Hemoglobin A1C, Whole Blood: 7.4 % <H> [4.0 - 5.6] (03-25-19 @ 08:55)

## 2019-05-11 NOTE — PROGRESS NOTE ADULT - ASSESSMENT
77 yo woman w multiple medical problems outline in [facility] being admitted for HMPV tracheobronchitis and acute on chronic diastolic CHF.   Respiratory status w ongoing bronchospasm today, cont  medrol 40 mg q8H, change  lasix 40 mg to po, acute on chronic CHF resolved,  duonebs q4h prn, cont observing off ABx, close watch to her finger sticks 2/2 being on steroids.  CKD3-4, creatinine is a baseline  anemia is multifactoral, 2/2 iron def from chronic blood loss and 2/2 renal dz. epogen up to renal  card, endo, pulm, wound, id and renal consults appreciated  ptn had a recent Echo no need to repeat it.   cont coumadin and daily Pt/INR check.,   cont rest of outptn meds.

## 2019-05-11 NOTE — PROGRESS NOTE ADULT - SUBJECTIVE AND OBJECTIVE BOX
NEPHROLOGY    Patient seen and examined.    MEDICATIONS  (STANDING):  ALBUTerol/ipratropium for Nebulization. 3 milliLiter(s) Nebulizer every 6 hours  ALPRAZolam 0.25 milliGRAM(s) Oral two times a day  amiodarone    Tablet 200 milliGRAM(s) Oral daily  carvedilol 6.25 milliGRAM(s) Oral every 12 hours  cholecalciferol 1000 Unit(s) Oral daily  dextrose 5%. 1000 milliLiter(s) (50 mL/Hr) IV Continuous <Continuous>  dextrose 50% Injectable 12.5 Gram(s) IV Push once  dextrose 50% Injectable 25 Gram(s) IV Push once  dextrose 50% Injectable 25 Gram(s) IV Push once  docusate sodium 100 milliGRAM(s) Oral two times a day  DULoxetine 60 milliGRAM(s) Oral daily  fenofibrate Tablet 145 milliGRAM(s) Oral daily  furosemide    Tablet 40 milliGRAM(s) Oral two times a day  guaiFENesin  milliGRAM(s) Oral every 12 hours  insulin glargine Injectable (LANTUS) 60 Unit(s) SubCutaneous at bedtime  insulin lispro (HumaLOG) corrective regimen sliding scale   SubCutaneous three times a day before meals  insulin lispro (HumaLOG) corrective regimen sliding scale   SubCutaneous at bedtime  insulin lispro Injectable (HumaLOG) 14 Unit(s) SubCutaneous three times a day before meals  isosorbide   mononitrate ER Tablet (IMDUR) 30 milliGRAM(s) Oral daily  levothyroxine 88 MICROGram(s) Oral daily  levothyroxine 100 MICROGram(s) Oral daily  melatonin 10 milliGRAM(s) Oral at bedtime  mesalamine DR (24-Hour) Tablet 2.4 Gram(s) Oral daily  methylPREDNISolone sodium succinate Injectable 40 milliGRAM(s) IV Push every 8 hours  multivitamin 1 Tablet(s) Oral daily  pantoprazole    Tablet 40 milliGRAM(s) Oral before breakfast  senna 2 Tablet(s) Oral at bedtime  spironolactone 25 milliGRAM(s) Oral daily    VITALS:  T(C): , Max: 36.7 (05-10-19 @ 12:57)  T(F): , Max: 98 (05-10-19 @ 12:57)  HR: 94 (05-11-19 @ 09:03)  BP: 122/82 (05-11-19 @ 05:10)  BP(mean): --  RR: 20 (05-11-19 @ 05:10)  SpO2: 97% (05-11-19 @ 09:03)  Wt(kg): --  I and O's:    05-10 @ 07:01  -  05-11 @ 07:00  --------------------------------------------------------  IN: 960 mL / OUT: 1200 mL / NET: -240 mL          REVIEW OF SYSTEMS:  Full ROS done and were negative unless otherwise indicated in HPI/assessment.     PHYSICAL EXAM:  Constitutional: NAD  Respiratory: CTA B/L  Cardiovascular: S1 and S2, RRR  Gastrointestinal: + BS, soft, NT, ND  Extremities: no peripheral edema  Neurological: AAO x 3  : no [place]  Access: HD tunneled catheter, temp HD catheter, AVG/AVF    LABS:                        10.4   8.96  )-----------( 196      ( 10 May 2019 08:04 )             34.2     05-11    139  |  96  |  60<H>  ----------------------------<  127<H>  4.5   |  28  |  1.62<H>    Ca    9.9      11 May 2019 06:14  Phos  4.4     05-10  Mg     2.3     05-10        Urine Studies:        RADIOLOGY & ADDITIONAL STUDIES:      ASSESSMENT / RECOMMEND    Treva Blake NP  [facility] [employer]  [phone] NEPHROLOGY    Patient seen and examined.  NAD    MEDICATIONS  (STANDING):  ALBUTerol/ipratropium for Nebulization. 3 milliLiter(s) Nebulizer every 6 hours  ALPRAZolam 0.25 milliGRAM(s) Oral two times a day  amiodarone    Tablet 200 milliGRAM(s) Oral daily  carvedilol 6.25 milliGRAM(s) Oral every 12 hours  cholecalciferol 1000 Unit(s) Oral daily  docusate sodium 100 milliGRAM(s) Oral two times a day  DULoxetine 60 milliGRAM(s) Oral daily  fenofibrate Tablet 145 milliGRAM(s) Oral daily  furosemide    Tablet 40 milliGRAM(s) Oral two times a day  guaiFENesin  milliGRAM(s) Oral every 12 hours  isosorbide   mononitrate ER Tablet (IMDUR) 30 milliGRAM(s) Oral daily  levothyroxine 88 MICROGram(s) Oral daily  levothyroxine 100 MICROGram(s) Oral daily  melatonin 10 milliGRAM(s) Oral at bedtime  mesalamine DR (24-Hour) Tablet 2.4 Gram(s) Oral daily  methylPREDNISolone sodium succinate Injectable 40 milliGRAM(s) IV Push every 8 hours  multivitamin 1 Tablet(s) Oral daily  pantoprazole    Tablet 40 milliGRAM(s) Oral before breakfast  senna 2 Tablet(s) Oral at bedtime  spironolactone 25 milliGRAM(s) Oral daily    VITALS:  T(C): , Max: 36.7 (05-10-19 @ 12:57)  T(F): , Max: 98 (05-10-19 @ 12:57)  HR: 94 (05-11-19 @ 09:03)  BP: 122/82 (05-11-19 @ 05:10)  BP(mean): --  RR: 20 (05-11-19 @ 05:10)  SpO2: 97% (05-11-19 @ 09:03)  Wt(kg): --  I and O's:    05-10 @ 07:01  -  05-11 @ 07:00  --------------------------------------------------------  IN: 960 mL / OUT: 1200 mL / NET: -240 mL    PHYSICAL EXAM:  Constitutional: NAD  Respiratory: diminished B/L  Cardiovascular: S1 and S2, RRR  Gastrointestinal: + BS, soft, NT, ND  Extremities: + edema  Neurological: awake and interactive  : no [place]    LABS:                        10.4   8.96  )-----------( 196      ( 10 May 2019 08:04 )             34.2     05-11    139  |  96  |  60<H>  ----------------------------<  127<H>  4.5   |  28  |  1.62<H>    Ca    9.9      11 May 2019 06:14  Phos  4.4     05-10  Mg     2.3     05-10    ASSESSMENT   78F w/ HTN, DM2, HFpEF, morbid obesity, CKD, ulcerative colitis, and valvular heart disease s/p MVR, 5/8/19 a/w HMPV bronchopneumonia  Renal - CKD 3-4, with non-nephrotic range proteinuria - due to DM/HTN. At/near baseline GFR.   Anemia - hgb acceptable     RECOMMEND:  - continue aldactone 25 mg po daily  - continue furosemide 40 mg po bid  - steroids per pulmonary  - daily bmp while admitted   - defer epogen for now   - d/c planning per primary team    Treva Blake, [facility]  [place]  [phone]

## 2019-05-12 DIAGNOSIS — F43.20 ADJUSTMENT DISORDER, UNSPECIFIED: ICD-10-CM

## 2019-05-12 LAB
ANION GAP SERPL CALC-SCNC: 12 MMOL/L — SIGNIFICANT CHANGE UP (ref 5–17)
BUN SERPL-MCNC: 61 MG/DL — HIGH (ref 7–23)
CALCIUM SERPL-MCNC: 9.9 MG/DL — SIGNIFICANT CHANGE UP (ref 8.4–10.5)
CHLORIDE SERPL-SCNC: 96 MMOL/L — SIGNIFICANT CHANGE UP (ref 96–108)
CO2 SERPL-SCNC: 31 MMOL/L — SIGNIFICANT CHANGE UP (ref 22–31)
CREAT SERPL-MCNC: 1.53 MG/DL — HIGH (ref 0.5–1.3)
GLUCOSE BLDC GLUCOMTR-MCNC: 127 MG/DL — HIGH (ref 70–99)
GLUCOSE BLDC GLUCOMTR-MCNC: 172 MG/DL — HIGH (ref 70–99)
GLUCOSE BLDC GLUCOMTR-MCNC: 184 MG/DL — HIGH (ref 70–99)
GLUCOSE BLDC GLUCOMTR-MCNC: 271 MG/DL — HIGH (ref 70–99)
GLUCOSE SERPL-MCNC: 130 MG/DL — HIGH (ref 70–99)
HCT VFR BLD CALC: 33.5 % — LOW (ref 34.5–45)
HGB BLD-MCNC: 10.2 G/DL — LOW (ref 11.5–15.5)
INR BLD: 3.5 RATIO — HIGH (ref 0.88–1.16)
MCHC RBC-ENTMCNC: 30.4 GM/DL — LOW (ref 32–36)
MCHC RBC-ENTMCNC: 30.4 PG — SIGNIFICANT CHANGE UP (ref 27–34)
MCV RBC AUTO: 99.7 FL — SIGNIFICANT CHANGE UP (ref 80–100)
PLATELET # BLD AUTO: 170 K/UL — SIGNIFICANT CHANGE UP (ref 150–400)
POTASSIUM SERPL-MCNC: 4.4 MMOL/L — SIGNIFICANT CHANGE UP (ref 3.5–5.3)
POTASSIUM SERPL-SCNC: 4.4 MMOL/L — SIGNIFICANT CHANGE UP (ref 3.5–5.3)
PROTHROM AB SERPL-ACNC: 41.9 SEC — HIGH (ref 10–13.1)
RBC # BLD: 3.36 M/UL — LOW (ref 3.8–5.2)
RBC # FLD: 15.2 % — HIGH (ref 10.3–14.5)
SODIUM SERPL-SCNC: 139 MMOL/L — SIGNIFICANT CHANGE UP (ref 135–145)
WBC # BLD: 8.89 K/UL — SIGNIFICANT CHANGE UP (ref 3.8–10.5)
WBC # FLD AUTO: 8.89 K/UL — SIGNIFICANT CHANGE UP (ref 3.8–10.5)

## 2019-05-12 PROCEDURE — 99233 SBSQ HOSP IP/OBS HIGH 50: CPT | Mod: GC

## 2019-05-12 PROCEDURE — 99222 1ST HOSP IP/OBS MODERATE 55: CPT

## 2019-05-12 RX ADMIN — DULOXETINE HYDROCHLORIDE 60 MILLIGRAM(S): 30 CAPSULE, DELAYED RELEASE ORAL at 12:33

## 2019-05-12 RX ADMIN — Medication 88 MICROGRAM(S): at 06:03

## 2019-05-12 RX ADMIN — SPIRONOLACTONE 25 MILLIGRAM(S): 25 TABLET, FILM COATED ORAL at 06:03

## 2019-05-12 RX ADMIN — Medication 2.4 GRAM(S): at 12:31

## 2019-05-12 RX ADMIN — Medication 3 MILLILITER(S): at 12:31

## 2019-05-12 RX ADMIN — Medication 40 MILLIGRAM(S): at 06:03

## 2019-05-12 RX ADMIN — Medication 145 MILLIGRAM(S): at 12:33

## 2019-05-12 RX ADMIN — Medication 0.25 MILLIGRAM(S): at 06:03

## 2019-05-12 RX ADMIN — Medication 40 MILLIGRAM(S): at 22:18

## 2019-05-12 RX ADMIN — Medication 100 MILLIGRAM(S): at 06:03

## 2019-05-12 RX ADMIN — Medication 600 MILLIGRAM(S): at 06:03

## 2019-05-12 RX ADMIN — Medication 1 SPRAY(S): at 10:02

## 2019-05-12 RX ADMIN — PANTOPRAZOLE SODIUM 40 MILLIGRAM(S): 20 TABLET, DELAYED RELEASE ORAL at 06:04

## 2019-05-12 RX ADMIN — Medication 40 MILLIGRAM(S): at 14:13

## 2019-05-12 RX ADMIN — Medication 0.25 MILLIGRAM(S): at 19:38

## 2019-05-12 RX ADMIN — Medication 1000 UNIT(S): at 12:31

## 2019-05-12 RX ADMIN — Medication 2: at 22:16

## 2019-05-12 RX ADMIN — ISOSORBIDE MONONITRATE 30 MILLIGRAM(S): 60 TABLET, EXTENDED RELEASE ORAL at 12:32

## 2019-05-12 RX ADMIN — SENNA PLUS 2 TABLET(S): 8.6 TABLET ORAL at 22:18

## 2019-05-12 RX ADMIN — Medication 8 UNIT(S): at 12:31

## 2019-05-12 RX ADMIN — Medication 3 MILLILITER(S): at 17:51

## 2019-05-12 RX ADMIN — Medication 100 MILLIGRAM(S): at 17:51

## 2019-05-12 RX ADMIN — Medication 3 MILLILITER(S): at 23:02

## 2019-05-12 RX ADMIN — Medication 100 MICROGRAM(S): at 06:03

## 2019-05-12 RX ADMIN — Medication 2: at 17:51

## 2019-05-12 RX ADMIN — Medication 2: at 12:30

## 2019-05-12 RX ADMIN — Medication 200 MILLIGRAM(S): at 22:55

## 2019-05-12 RX ADMIN — CARVEDILOL PHOSPHATE 6.25 MILLIGRAM(S): 80 CAPSULE, EXTENDED RELEASE ORAL at 06:03

## 2019-05-12 RX ADMIN — AMIODARONE HYDROCHLORIDE 200 MILLIGRAM(S): 400 TABLET ORAL at 06:04

## 2019-05-12 RX ADMIN — Medication 3 MILLILITER(S): at 06:05

## 2019-05-12 RX ADMIN — Medication 1 TABLET(S): at 12:32

## 2019-05-12 RX ADMIN — Medication 40 MILLIGRAM(S): at 17:51

## 2019-05-12 RX ADMIN — Medication 10 MILLIGRAM(S): at 22:19

## 2019-05-12 RX ADMIN — CARVEDILOL PHOSPHATE 6.25 MILLIGRAM(S): 80 CAPSULE, EXTENDED RELEASE ORAL at 17:51

## 2019-05-12 RX ADMIN — INSULIN GLARGINE 40 UNIT(S): 100 INJECTION, SOLUTION SUBCUTANEOUS at 22:18

## 2019-05-12 RX ADMIN — Medication 8 UNIT(S): at 07:48

## 2019-05-12 RX ADMIN — Medication 8 UNIT(S): at 17:51

## 2019-05-12 RX ADMIN — Medication 200 MILLIGRAM(S): at 10:02

## 2019-05-12 NOTE — BEHAVIORAL HEALTH ASSESSMENT NOTE - OTHER
currently living in [facility], previously lived in private residence with  living with  prior to SAMANTHA did not assess; patient in leg brace "upset"

## 2019-05-12 NOTE — BEHAVIORAL HEALTH ASSESSMENT NOTE - NSBHMEDSOTHERFT_PSY_A_CORE
Furosemide, Humalog, basaglar, levothyroxine, senna, docusate, coenzyme Q10, ipratropium inhaler, Coumadin, Imdur, Coreg, amiodarone, Cymbalta, Metanx, omeprazole, vitamin d3, fenofibrate, norethindrone, spironolactone, torsemide, Tylenol, Xanax, melatonin, mesalamine, metolazone

## 2019-05-12 NOTE — PROGRESS NOTE ADULT - ATTENDING COMMENTS
79 yo woman w multiple medical problems outline in [facility] being admitted for HMPV tracheobronchitis and acute on chronic diastolic CHF.   Respiratory status w ongoing bronchospasm today, cont  medrol 40 mg q8H, change  lasix 40 mg to po, acute on chronic CHF resolved,  duonebs q4h prn, cont observing off ABx, close watch to her finger sticks 2/2 being on steroids.  CKD3-4, creatinine is a baseline  anemia is multifactoral, 2/2 iron def from chronic blood loss and 2/2 renal dz. epogen up to renal  card, endo, pulm, wound, id and renal consults appreciated  ptn had a recent Echo no need to repeat it.   cont coumadin and daily Pt/INR check.,   Nebs and steroids will taper the steroids in am  Continuediuretics  Keep INR 2-3

## 2019-05-12 NOTE — BEHAVIORAL HEALTH ASSESSMENT NOTE - NSBHCHARTREVIEWVS_PSY_A_CORE FT
Vital Signs Last 24 Hrs  T(C): 36.8 (12 May 2019 04:31), Max: 36.8 (12 May 2019 04:31)  T(F): 98.3 (12 May 2019 04:31), Max: 98.3 (12 May 2019 04:31)  HR: 103 (12 May 2019 04:31) (95 - 104)  BP: 149/84 (12 May 2019 04:31) (130/85 - 156/82)  BP(mean): --  RR: 22 (12 May 2019 04:31) (19 - 22)  SpO2: 98% (12 May 2019 04:31) (96% - 98%)

## 2019-05-12 NOTE — PROGRESS NOTE ADULT - SUBJECTIVE AND OBJECTIVE BOX
Patient is a 78y old  Female who presents with a chief complaint of DYSPNEA, COUGH (12 May 2019 17:41)      SUBJECTIVE / OVERNIGHT EVENTS: ongoing wheezing, cough    MEDICATIONS  (STANDING):  ALBUTerol/ipratropium for Nebulization. 3 milliLiter(s) Nebulizer every 6 hours  ALPRAZolam 0.25 milliGRAM(s) Oral two times a day  amiodarone    Tablet 200 milliGRAM(s) Oral daily  carvedilol 6.25 milliGRAM(s) Oral every 12 hours  cholecalciferol 1000 Unit(s) Oral daily  dextrose 5%. 1000 milliLiter(s) (50 mL/Hr) IV Continuous <Continuous>  dextrose 50% Injectable 12.5 Gram(s) IV Push once  dextrose 50% Injectable 25 Gram(s) IV Push once  dextrose 50% Injectable 25 Gram(s) IV Push once  docusate sodium 100 milliGRAM(s) Oral two times a day  DULoxetine 60 milliGRAM(s) Oral daily  fenofibrate Tablet 145 milliGRAM(s) Oral daily  furosemide    Tablet 40 milliGRAM(s) Oral two times a day  insulin glargine Injectable (LANTUS) 40 Unit(s) SubCutaneous at bedtime  insulin lispro (HumaLOG) corrective regimen sliding scale   SubCutaneous three times a day before meals  insulin lispro (HumaLOG) corrective regimen sliding scale   SubCutaneous at bedtime  insulin lispro Injectable (HumaLOG) 8 Unit(s) SubCutaneous three times a day before meals  isosorbide   mononitrate ER Tablet (IMDUR) 30 milliGRAM(s) Oral daily  levothyroxine 88 MICROGram(s) Oral daily  levothyroxine 100 MICROGram(s) Oral daily  melatonin 10 milliGRAM(s) Oral at bedtime  mesalamine DR (24-Hour) Tablet 2.4 Gram(s) Oral daily  methylPREDNISolone sodium succinate Injectable 40 milliGRAM(s) IV Push every 8 hours  multivitamin 1 Tablet(s) Oral daily  pantoprazole    Tablet 40 milliGRAM(s) Oral before breakfast  senna 2 Tablet(s) Oral at bedtime  spironolactone 25 milliGRAM(s) Oral daily    MEDICATIONS  (PRN):  acetaminophen   Tablet .. 650 milliGRAM(s) Oral every 6 hours PRN Temp greater or equal to 38C (100.4F), Mild Pain (1 - 3)  ALBUTerol/ipratropium for Nebulization 3 milliLiter(s) Nebulizer every 4 hours PRN Shortness of Breath and/or Wheezing  bisacodyl Suppository 10 milliGRAM(s) Rectal daily PRN Constipation  dextrose 40% Gel 15 Gram(s) Oral once PRN Blood Glucose LESS THAN 70 milliGRAM(s)/deciliter  glucagon  Injectable 1 milliGRAM(s) IntraMuscular once PRN Glucose LESS THAN 70 milligrams/deciliter  guaiFENesin    Syrup 200 milliGRAM(s) Oral every 6 hours PRN Cough  polyethylene glycol 3350 17 Gram(s) Oral daily PRN Constipation  saline laxative (FLEET) Rectal Enema 1 Enema Rectal daily PRN for constipation  sodium chloride 0.65% Nasal 1 Spray(s) Both Nostrils three times a day PRN Nasal Congestion      Vital Signs Last 24 Hrs  T(F): 97.9 (05-12-19 @ 13:50), Max: 98.3 (05-12-19 @ 04:31)  HR: 99 (05-12-19 @ 17:50) (95 - 104)  BP: 133/83 (05-12-19 @ 17:50) (125/64 - 156/82)  RR: 18 (05-12-19 @ 13:50) (18 - 22)  SpO2: 94% (05-12-19 @ 13:50) (94% - 99%)  Telemetry:   CAPILLARY BLOOD GLUCOSE      POCT Blood Glucose.: 184 mg/dL (12 May 2019 17:02)  POCT Blood Glucose.: 172 mg/dL (12 May 2019 11:44)  POCT Blood Glucose.: 127 mg/dL (12 May 2019 07:18)  POCT Blood Glucose.: 92 mg/dL (11 May 2019 22:45)  POCT Blood Glucose.: 73 mg/dL (11 May 2019 21:38)    I&O's Summary    11 May 2019 07:01  -  12 May 2019 07:00  --------------------------------------------------------  IN: 1200 mL / OUT: 2350 mL / NET: -1150 mL    12 May 2019 07:01  -  12 May 2019 19:16  --------------------------------------------------------  IN: 500 mL / OUT: 600 mL / NET: -100 mL        PHYSICAL EXAM:  GENERAL: NAD, well-developed  HEAD:  Atraumatic, Normocephalic  EYES: EOMI, PERRLA, conjunctiva and sclera clear  NECK: Supple, No JVD  CHEST/LUNG: Clear to auscultation bilaterally; No wheeze  HEART: Regular rate and rhythm; No murmurs, rubs, or gallops  ABDOMEN: Soft, Nontender, Nondistended; Bowel sounds present  EXTREMITIES:  2+ Peripheral Pulses, No clubbing, cyanosis, or edema  PSYCH: AAOx3  NEUROLOGY: non-focal  SKIN: No rashes or lesions    LABS:                        10.2   8.89  )-----------( 170      ( 12 May 2019 09:37 )             33.5     05-12    139  |  96  |  61<H>  ----------------------------<  130<H>  4.4   |  31  |  1.53<H>    Ca    9.9      12 May 2019 05:09      PT/INR - ( 12 May 2019 09:14 )   PT: 41.9 sec;   INR: 3.50 ratio                   RADIOLOGY & ADDITIONAL TESTS:    Imaging Personally Reviewed:    Consultant(s) Notes Reviewed:      Care Discussed with Consultants/Other Providers:

## 2019-05-12 NOTE — BEHAVIORAL HEALTH ASSESSMENT NOTE - HPI (INCLUDE ILLNESS QUALITY, SEVERITY, DURATION, TIMING, CONTEXT, MODIFYING FACTORS, ASSOCIATED SIGNS AND SYMPTOMS)
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 Patient is seen and evaluated by the psychiatry consult team. Patient reports that she hasn't "felt well" since her room mate at sub-acute rehab  about 1 week ago. She says that she wasn't particularly close with the room mate, but that she believes the room mate was mistreated by staff at the [facility] and feels very upset about that. She says she has been thinking constantly about this woman and how she was treated and has began worrying about herself and whether she will die similarly. Patient denies "depression," but endorses mild anhedonia and poor appetite. She denies changes in sleep, feelings of guilt or worthlessness, changes in energy level, changes in concentration, psychomotor changes, and suicidal ideation. Patient reports mild anxiety about her health. Denies symptoms of nery. Denies AVH. Denies substance use.

## 2019-05-12 NOTE — BEHAVIORAL HEALTH ASSESSMENT NOTE - NSBHCHARTREVIEWINVESTIGATE_PSY_A_CORE FT
< from: 12 Lead ECG (05.09.19 @ 09:23) >      Ventricular Rate 95 BPM    Atrial Rate 96 BPM    QRS Duration 82 ms    Q-T Interval 394 ms    QTC Calculation(Bezet) 495 ms    R Axis 52 degrees    T Axis 135 degrees    Diagnosis Line SINUS TACHYCARDIA  ST & T WAVE ABNORMALITY, CONSIDER LATERAL ISCHEMIA    < end of copied text >

## 2019-05-12 NOTE — BEHAVIORAL HEALTH ASSESSMENT NOTE - SUMMARY
Patient is a 79 y/o woman, currently living in sub-acute rehab, previously living with her , retired , with PMHx significant for CHF, CKD, DM2, HLD, HTN, and hypothyroidism, no PPHx, presented from [facility] with shortness of breath and productive cough, admitted with human metapneumovirus, consulted psychiatry for anxiety. Patient reported feeling upset in the context of a psychosocial stressor. She did not meet criteria for MDD. Patient denied ever seeing psychiatrist, having psychiatric diagnosis, or being on any psychiatric medications, though her home medications list includes Cymbalta. Patient is likely experiencing adjustment disorder 2/2 psychosocial stressor.

## 2019-05-12 NOTE — PROGRESS NOTE ADULT - SUBJECTIVE AND OBJECTIVE BOX
CHIEF COMPLAINT:     Patient is a 78y old  Female who presents with a chief complaint of DYSPNEA, COUGH (11 May 2019 16:09)       OVERNIGHT EVENTS: no new c/o, still wheezing and SOB  but reports  she feels better      OBJECTIVE:  ICU Vital Signs Last 24 Hrs  T(C): 36.6 (12 May 2019 13:50), Max: 36.8 (12 May 2019 04:31)  T(F): 97.9 (12 May 2019 13:50), Max: 98.3 (12 May 2019 04:31)  HR: 101 (12 May 2019 13:50) (95 - 104)  BP: 125/64 (12 May 2019 13:50) (125/64 - 156/82)  BP(mean): --  ABP: --  ABP(mean): --  RR: 18 (12 May 2019 13:50) (18 - 22)  SpO2: 94% (12 May 2019 13:50) (94% - 99%)        05-11 @ 07:01  -  05-12 @ 07:00  --------------------------------------------------------  IN: 1200 mL / OUT: 2350 mL / NET: -1150 mL    05-12 @ 07:01  -  05-12 @ 17:41  --------------------------------------------------------  IN: 500 mL / OUT: 600 mL / NET: -100 mL      CAPILLARY BLOOD GLUCOSE      POCT Blood Glucose.: 184 mg/dL (12 May 2019 17:02)          General Appearance: 	 Alert, cooperative, comfortable at rest  Neck: no JVD  Lungs:  Bilateral wheeze  Cor:  pmi 5th ICS MCL, Irregular rhythm, S1 normal intensity, S2 normal intensity  Abdomen:	 soft, non-tender; bowel sounds normal  Extremities: RLE surgical   No significant LLE edema        HOSPITAL MEDICATIONS:  MEDICATIONS  (STANDING):  ALBUTerol/ipratropium for Nebulization. 3 milliLiter(s) Nebulizer every 6 hours  ALPRAZolam 0.25 milliGRAM(s) Oral two times a day  amiodarone    Tablet 200 milliGRAM(s) Oral daily  carvedilol 6.25 milliGRAM(s) Oral every 12 hours  cholecalciferol 1000 Unit(s) Oral daily  dextrose 5%. 1000 milliLiter(s) (50 mL/Hr) IV Continuous <Continuous>  dextrose 50% Injectable 12.5 Gram(s) IV Push once  dextrose 50% Injectable 25 Gram(s) IV Push once  dextrose 50% Injectable 25 Gram(s) IV Push once  docusate sodium 100 milliGRAM(s) Oral two times a day  DULoxetine 60 milliGRAM(s) Oral daily  fenofibrate Tablet 145 milliGRAM(s) Oral daily  furosemide    Tablet 40 milliGRAM(s) Oral two times a day  insulin glargine Injectable (LANTUS) 40 Unit(s) SubCutaneous at bedtime  insulin lispro (HumaLOG) corrective regimen sliding scale   SubCutaneous three times a day before meals  insulin lispro (HumaLOG) corrective regimen sliding scale   SubCutaneous at bedtime  insulin lispro Injectable (HumaLOG) 8 Unit(s) SubCutaneous three times a day before meals  isosorbide   mononitrate ER Tablet (IMDUR) 30 milliGRAM(s) Oral daily  levothyroxine 88 MICROGram(s) Oral daily  levothyroxine 100 MICROGram(s) Oral daily  melatonin 10 milliGRAM(s) Oral at bedtime  mesalamine DR (24-Hour) Tablet 2.4 Gram(s) Oral daily  methylPREDNISolone sodium succinate Injectable 40 milliGRAM(s) IV Push every 8 hours  multivitamin 1 Tablet(s) Oral daily  pantoprazole    Tablet 40 milliGRAM(s) Oral before breakfast  senna 2 Tablet(s) Oral at bedtime  spironolactone 25 milliGRAM(s) Oral daily    MEDICATIONS  (PRN):  acetaminophen   Tablet .. 650 milliGRAM(s) Oral every 6 hours PRN Temp greater or equal to 38C (100.4F), Mild Pain (1 - 3)  ALBUTerol/ipratropium for Nebulization 3 milliLiter(s) Nebulizer every 4 hours PRN Shortness of Breath and/or Wheezing  bisacodyl Suppository 10 milliGRAM(s) Rectal daily PRN Constipation  dextrose 40% Gel 15 Gram(s) Oral once PRN Blood Glucose LESS THAN 70 milliGRAM(s)/deciliter  glucagon  Injectable 1 milliGRAM(s) IntraMuscular once PRN Glucose LESS THAN 70 milligrams/deciliter  guaiFENesin    Syrup 200 milliGRAM(s) Oral every 6 hours PRN Cough  polyethylene glycol 3350 17 Gram(s) Oral daily PRN Constipation  saline laxative (FLEET) Rectal Enema 1 Enema Rectal daily PRN for constipation  sodium chloride 0.65% Nasal 1 Spray(s) Both Nostrils three times a day PRN Nasal Congestion      LABS:                        10.2   8.89  )-----------( 170      ( 12 May 2019 09:37 )             33.5     05-12    139  |  96  |  61<H>  ----------------------------<  130<H>  4.4   |  31  |  1.53<H>    Ca    9.9      12 May 2019 05:09      PT/INR - ( 12 May 2019 09:14 )   PT: 41.9 sec;   INR: 3.50 ratio             < from: Xray Chest 1 View- PORTABLE-Urgent (05.09.19 @ 23:16) >      INTERPRETATION:  CLINICAL INFORMATION: Shortness of breath.    EXAM: Frontal radiograph of the chest.    COMPARISON: Chest radiograph from 5/8/2019.    FINDINGS:    Lines/Tubes: None.    Lungs: The lungs are clear.    Pleura: No pleural effusion.    Mediastinum: Median sternotomy. Heart valve replacement. Heart size   cannot be assessed.    Skeletal: No acute osseous findings.      IMPRESSION:  1.  Clear lungs.    < end of copied text >        MICROBIOLOGY:     RADIOLOGY:  [ ] Reviewed and interpreted by me    PULMONARY FUNCTION TESTS:    EKG:

## 2019-05-12 NOTE — PROGRESS NOTE ADULT - ASSESSMENT
79 yo woman w multiple medical problems outline in [facility] being admitted for HMPV tracheobronchitis and acute on chronic diastolic CHF.   Respiratory status w ongoing bronchospasm today, cont  medrol 40 mg q8H, cont  lasix 40 mg to po, s/p acute on chronic CHF, now resolved,  duonebs q4h prn, cont observing off ABx, close watch to her finger sticks 2/2 being on steroids. 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 epogen up to renal  card, endo, pulm, wound, id and renal consults appreciated  ptn had a recent Echo no need to repeat it.   cont coumadin and daily Pt/INR check.,   cont rest of outptn meds.

## 2019-05-12 NOTE — BEHAVIORAL HEALTH ASSESSMENT NOTE - NSBHCHARTREVIEWLAB_PSY_A_CORE FT
Complete Blood Count in AM (05.12.19 @ 09:37)    WBC Count: 8.89 K/uL    RBC Count: 3.36 M/uL    Hemoglobin: 10.2 g/dL    Hematocrit: 33.5 %    Mean Cell Volume: 99.7 fl    Mean Cell Hemoglobin: 30.4 pg    Mean Cell Hemoglobin Conc: 30.4 gm/dL    Red Cell Distrib Width: 15.2 %    Platelet Count - Automated: 170 K/uL  Comprehensive Metabolic Panel (05.08.19 @ 16:58)    Sodium, Serum: 138 mmol/L    Potassium, Serum: 4.4 mmol/L    Chloride, Serum: 95 mmol/L    Carbon Dioxide, Serum: 29 mmol/L    Anion Gap, Serum: 14 mmol/L    Blood Urea Nitrogen, Serum: 44 mg/dL    Creatinine, Serum: 1.87 mg/dL    Glucose, Serum: 110 mg/dL    Calcium, Total Serum: 9.5 mg/dL    Protein Total, Serum: 7.3 g/dL    Albumin, Serum: 3.8 g/dL    Bilirubin Total, Serum: 0.8 mg/dL    Alkaline Phosphatase, Serum: 69 U/L    Aspartate Aminotransferase (AST/SGOT): 51 U/L    Alanine Aminotransferase (ALT/SGPT): 57 U/L    eGFR if Non : 26  Thyroid Stimulating Hormone, Serum in AM (05.10.19 @ 08:05)    Thyroid Stimulating Hormone, Serum: 1.24 uIU/mL

## 2019-05-12 NOTE — BEHAVIORAL HEALTH ASSESSMENT NOTE - CASE SUMMARY
Pt discussed with Dr. Goldman and seen and examined independently and agree with assessment and plan as above.

## 2019-05-12 NOTE — BEHAVIORAL HEALTH ASSESSMENT NOTE - DESCRIPTION
Asthma PFT (4/2018), Chronic diastolic CHF (congestive heart failure) EF 56% (4/2017), Chronic kidney disease (CKD), Diabetes mellitus T2DM last A1C 6.7 mg/dl in January fs range 59- 200 mg/dl, Dyslipidemia, GERD, hypothyroidism, Herniated disc lumbar, postmenopausal bleeding, Hypertension, Macular degeneration of left eye, Morbid obesity, Neuropathy, Paroxysmal atrial fibrillation h/o rapid ventricular rate 2 years ago, Peripheral arterial disease s/p remote stent, Ulcerative colitis.

## 2019-05-12 NOTE — BEHAVIORAL HEALTH ASSESSMENT NOTE - DIFFERENTIAL
Differential diagnoses include adjustment disorder vs mood disorder NOS. Other risk factors include anhedonia and acute and chronic medical issues. Protective factors include strong social support from family, lack of substance use, and no history of SI/SA.

## 2019-05-12 NOTE — BEHAVIORAL HEALTH ASSESSMENT NOTE - NSBHSUICPROTECTFACT_PSY_A_CORE
Identifies reasons for living/Supportive social network or family/Responsibility to family and others/Positive therapeutic relationships

## 2019-05-13 LAB
APPEARANCE UR: CLEAR — SIGNIFICANT CHANGE UP
APTT BLD: 30.8 SEC — SIGNIFICANT CHANGE UP (ref 27.5–36.3)
BILIRUB UR-MCNC: NEGATIVE — SIGNIFICANT CHANGE UP
COLOR SPEC: YELLOW — SIGNIFICANT CHANGE UP
DIFF PNL FLD: SIGNIFICANT CHANGE UP
GLUCOSE BLDC GLUCOMTR-MCNC: 236 MG/DL — HIGH (ref 70–99)
GLUCOSE BLDC GLUCOMTR-MCNC: 242 MG/DL — HIGH (ref 70–99)
GLUCOSE BLDC GLUCOMTR-MCNC: 252 MG/DL — HIGH (ref 70–99)
GLUCOSE BLDC GLUCOMTR-MCNC: 278 MG/DL — HIGH (ref 70–99)
GLUCOSE UR QL: ABNORMAL
INR BLD: 2.15 RATIO — HIGH (ref 0.88–1.16)
KETONES UR-MCNC: NEGATIVE — SIGNIFICANT CHANGE UP
LEUKOCYTE ESTERASE UR-ACNC: ABNORMAL
NITRITE UR-MCNC: NEGATIVE — SIGNIFICANT CHANGE UP
PH UR: 6 — SIGNIFICANT CHANGE UP (ref 5–8)
PROT UR-MCNC: SIGNIFICANT CHANGE UP
PROTHROM AB SERPL-ACNC: 25.1 SEC — HIGH (ref 10–13.1)
SP GR SPEC: 1.02 — SIGNIFICANT CHANGE UP (ref 1.01–1.02)
UROBILINOGEN FLD QL: SIGNIFICANT CHANGE UP

## 2019-05-13 PROCEDURE — 99232 SBSQ HOSP IP/OBS MODERATE 35: CPT

## 2019-05-13 RX ORDER — BUDESONIDE, MICRONIZED 100 %
0.5 POWDER (GRAM) MISCELLANEOUS EVERY 12 HOURS
Refills: 0 | Status: DISCONTINUED | OUTPATIENT
Start: 2019-05-13 | End: 2019-05-16

## 2019-05-13 RX ADMIN — Medication 145 MILLIGRAM(S): at 12:30

## 2019-05-13 RX ADMIN — SPIRONOLACTONE 25 MILLIGRAM(S): 25 TABLET, FILM COATED ORAL at 06:01

## 2019-05-13 RX ADMIN — Medication 100 MICROGRAM(S): at 06:01

## 2019-05-13 RX ADMIN — CARVEDILOL PHOSPHATE 6.25 MILLIGRAM(S): 80 CAPSULE, EXTENDED RELEASE ORAL at 18:06

## 2019-05-13 RX ADMIN — Medication 3 MILLILITER(S): at 18:06

## 2019-05-13 RX ADMIN — Medication 3 MILLILITER(S): at 06:01

## 2019-05-13 RX ADMIN — Medication 8 UNIT(S): at 12:31

## 2019-05-13 RX ADMIN — Medication 200 MILLIGRAM(S): at 08:47

## 2019-05-13 RX ADMIN — Medication 0.5 MILLIGRAM(S): at 06:10

## 2019-05-13 RX ADMIN — Medication 0.25 MILLIGRAM(S): at 06:00

## 2019-05-13 RX ADMIN — Medication 8 UNIT(S): at 08:09

## 2019-05-13 RX ADMIN — Medication 4: at 08:09

## 2019-05-13 RX ADMIN — Medication 6: at 18:05

## 2019-05-13 RX ADMIN — CARVEDILOL PHOSPHATE 6.25 MILLIGRAM(S): 80 CAPSULE, EXTENDED RELEASE ORAL at 06:01

## 2019-05-13 RX ADMIN — Medication 20 MILLIGRAM(S): at 22:07

## 2019-05-13 RX ADMIN — Medication 0.25 MILLIGRAM(S): at 18:06

## 2019-05-13 RX ADMIN — Medication 1000 UNIT(S): at 12:30

## 2019-05-13 RX ADMIN — Medication 8 UNIT(S): at 18:05

## 2019-05-13 RX ADMIN — PANTOPRAZOLE SODIUM 40 MILLIGRAM(S): 20 TABLET, DELAYED RELEASE ORAL at 06:01

## 2019-05-13 RX ADMIN — SENNA PLUS 2 TABLET(S): 8.6 TABLET ORAL at 22:06

## 2019-05-13 RX ADMIN — Medication 1 TABLET(S): at 12:30

## 2019-05-13 RX ADMIN — Medication 20 MILLIGRAM(S): at 12:31

## 2019-05-13 RX ADMIN — Medication 88 MICROGRAM(S): at 06:01

## 2019-05-13 RX ADMIN — Medication 0.5 MILLIGRAM(S): at 18:06

## 2019-05-13 RX ADMIN — DULOXETINE HYDROCHLORIDE 60 MILLIGRAM(S): 30 CAPSULE, DELAYED RELEASE ORAL at 12:30

## 2019-05-13 RX ADMIN — Medication 3 MILLILITER(S): at 12:31

## 2019-05-13 RX ADMIN — Medication 100 MILLIGRAM(S): at 18:06

## 2019-05-13 RX ADMIN — ISOSORBIDE MONONITRATE 30 MILLIGRAM(S): 60 TABLET, EXTENDED RELEASE ORAL at 12:30

## 2019-05-13 RX ADMIN — AMIODARONE HYDROCHLORIDE 200 MILLIGRAM(S): 400 TABLET ORAL at 06:01

## 2019-05-13 RX ADMIN — Medication 40 MILLIGRAM(S): at 18:06

## 2019-05-13 RX ADMIN — Medication 20 MILLIGRAM(S): at 06:00

## 2019-05-13 RX ADMIN — Medication 4: at 12:31

## 2019-05-13 RX ADMIN — Medication 40 MILLIGRAM(S): at 06:01

## 2019-05-13 RX ADMIN — INSULIN GLARGINE 40 UNIT(S): 100 INJECTION, SOLUTION SUBCUTANEOUS at 22:06

## 2019-05-13 RX ADMIN — Medication 2.4 GRAM(S): at 12:30

## 2019-05-13 RX ADMIN — Medication 2: at 22:07

## 2019-05-13 RX ADMIN — Medication 100 MILLIGRAM(S): at 06:01

## 2019-05-13 NOTE — PROGRESS NOTE ADULT - SUBJECTIVE AND OBJECTIVE BOX
Infectious Diseases progress note:    Subjective:  No acute o/n events.  Intermittent cough.  No fever/chills/productive phlegm.      ROS:  CONSTITUTIONAL:  No fever, chills, rigors  CARDIOVASCULAR:  No chest pain or palpitations  RESPIRATORY:   No SOB, cough, dyspnea on exertion.  No wheezing  GASTROINTESTINAL:  No abd pain, N/V, diarrhea/constipation  EXTREMITIES:  No swelling or joint pain  GENITOURINARY:  No burning on urination, increased frequency or urgency.  No flank pain  NEUROLOGIC:  No HA, visual disturbances  SKIN: No rashes    Allergies    Augmentin (Swelling)  cephalexin (Swelling)  latex (Rash)    Intolerances        ANTIBIOTICS/RELEVANT:  antimicrobials    immunologic:    OTHER:  acetaminophen   Tablet .. 650 milliGRAM(s) Oral every 6 hours PRN  ALBUTerol/ipratropium for Nebulization 3 milliLiter(s) Nebulizer every 4 hours PRN  ALBUTerol/ipratropium for Nebulization. 3 milliLiter(s) Nebulizer every 6 hours  ALPRAZolam 0.25 milliGRAM(s) Oral two times a day  amiodarone    Tablet 200 milliGRAM(s) Oral daily  bisacodyl Suppository 10 milliGRAM(s) Rectal daily PRN  buDESOnide   0.5 milliGRAM(s) Respule 0.5 milliGRAM(s) Inhalation every 12 hours  carvedilol 6.25 milliGRAM(s) Oral every 12 hours  cholecalciferol 1000 Unit(s) Oral daily  dextrose 40% Gel 15 Gram(s) Oral once PRN  dextrose 5%. 1000 milliLiter(s) IV Continuous <Continuous>  dextrose 50% Injectable 12.5 Gram(s) IV Push once  dextrose 50% Injectable 25 Gram(s) IV Push once  dextrose 50% Injectable 25 Gram(s) IV Push once  docusate sodium 100 milliGRAM(s) Oral two times a day  DULoxetine 60 milliGRAM(s) Oral daily  fenofibrate Tablet 145 milliGRAM(s) Oral daily  furosemide    Tablet 40 milliGRAM(s) Oral two times a day  glucagon  Injectable 1 milliGRAM(s) IntraMuscular once PRN  guaiFENesin    Syrup 200 milliGRAM(s) Oral every 6 hours PRN  insulin glargine Injectable (LANTUS) 40 Unit(s) SubCutaneous at bedtime  insulin lispro (HumaLOG) corrective regimen sliding scale   SubCutaneous three times a day before meals  insulin lispro (HumaLOG) corrective regimen sliding scale   SubCutaneous at bedtime  insulin lispro Injectable (HumaLOG) 8 Unit(s) SubCutaneous three times a day before meals  isosorbide   mononitrate ER Tablet (IMDUR) 30 milliGRAM(s) Oral daily  levothyroxine 88 MICROGram(s) Oral daily  levothyroxine 100 MICROGram(s) Oral daily  melatonin 10 milliGRAM(s) Oral at bedtime  mesalamine DR (24-Hour) Tablet 2.4 Gram(s) Oral daily  methylPREDNISolone sodium succinate Injectable 20 milliGRAM(s) IV Push every 8 hours  methylPREDNISolone sodium succinate Injectable   IV Push   multivitamin 1 Tablet(s) Oral daily  pantoprazole    Tablet 40 milliGRAM(s) Oral before breakfast  polyethylene glycol 3350 17 Gram(s) Oral daily PRN  saline laxative (FLEET) Rectal Enema 1 Enema Rectal daily PRN  senna 2 Tablet(s) Oral at bedtime  sodium chloride 0.65% Nasal 1 Spray(s) Both Nostrils three times a day PRN  spironolactone 25 milliGRAM(s) Oral daily      Objective:  Vital Signs Last 24 Hrs  T(C): 36.6 (14 May 2019 04:14), Max: 37.1 (13 May 2019 14:11)  T(F): 97.9 (14 May 2019 04:14), Max: 98.7 (13 May 2019 14:11)  HR: 105 (14 May 2019 04:14) (100 - 105)  BP: 153/91 (14 May 2019 04:14) (121/68 - 156/85)  BP(mean): --  RR: 20 (14 May 2019 04:14) (19 - 20)  SpO2: 100% (14 May 2019 04:14) (96% - 100%)    PHYSICAL EXAM:  Constitutional:NAD  Eyes:HANNAH, EOMI  Ear/Nose/Throat: no thrush, mucositis.  Moist mucous membranes	  Neck:no JVD, no lymphadenopathy, supple  Respiratory: mild exp wheeze b/l  Cardiovascular: S1S2 RRR, no murmurs  Gastrointestinal:soft, nontender,  nondistended (+) BS  Extremities: rt LE immobilizer  Skin:  no rashes, open wounds or ulcerations        LABS:                        10.2   8.89  )-----------( 170      ( 12 May 2019 09:37 )             33.5     05-14    140  |  97  |  61<H>  ----------------------------<  166<H>  4.6   |  30  |  1.35<H>    Ca    9.9      14 May 2019 06:00      PT/INR - ( 13 May 2019 08:46 )   PT: 25.1 sec;   INR: 2.15 ratio         PTT - ( 13 May 2019 08:46 )  PTT:30.8 sec  Urinalysis Basic - ( 13 May 2019 06:51 )    Color: Yellow / Appearance: Clear / S.016 / pH: x  Gluc: x / Ketone: Negative  / Bili: Negative / Urobili: <2 mg/dL   Blood: x / Protein: Trace / Nitrite: Negative   Leuk Esterase: Moderate / RBC: 2 /HPF / WBC 9 /HPF   Sq Epi: x / Non Sq Epi: 1 /HPF / Bacteria: Occasional        Procalcitonin, Serum: 0.05 ( @ 06:40)            Rapid RVP Result: Detected          MICROBIOLOGY:            RADIOLOGY & ADDITIONAL STUDIES:    < from: Xray Chest 1 View- PORTABLE-Urgent (19 @ 23:16) >  FINDINGS:    Lines/Tubes: None.    Lungs: The lungs are clear.    Pleura: No pleural effusion.    Mediastinum: Median sternotomy. Heart valve replacement. Heart size   cannot be assessed.    Skeletal: No acute osseous findings.      IMPRESSION:  1.  Clear lungs.    < end of copied text >

## 2019-05-13 NOTE — PROGRESS NOTE BEHAVIORAL HEALTH - SUMMARY
Patient is a 79 y/o woman, currently living in sub-acute rehab, previously living with her , retired , with PMHx significant for CHF, CKD, DM2, HLD, HTN, and hypothyroidism, no PPHx, presented from [facility] with shortness of breath and productive cough, admitted with human metapneumovirus, consulted psychiatry for anxiety. Patient reported feeling upset in the context of a psychosocial stressor. She did not meet criteria for MDD. Patient denied ever seeing psychiatrist, having psychiatric diagnosis, or being on any psychiatric medications, though her home medications list includes Cymbalta. Patient is likely experiencing adjustment disorder 2/2 psychosocial stressor.

## 2019-05-13 NOTE — PROGRESS NOTE BEHAVIORAL HEALTH - NSBHFUPINTERVALHXFT_PSY_A_CORE
pt states she feels depressed due to her medical issues, recent death of neighbor in the rehab. pt denies si/hi. pt reports fair sleep, appetite. no agitation, no confusion. no prns.

## 2019-05-13 NOTE — PROGRESS NOTE BEHAVIORAL HEALTH - NSBHCONSULTFOLLOWAFTERCARE_PSY_A_CORE FT
- - -
- - -
Follow up outpatient with Geriatric psychiatry/psychology for adjustment disorder. [phone]

## 2019-05-13 NOTE — PROGRESS NOTE BEHAVIORAL HEALTH - NSBHCHARTREVIEWLAB_PSY_A_CORE FT
10.2   8.89  )-----------( 170      ( 12 May 2019 09:37 )             33.5     05-12    139  |  96  |  61<H>  ----------------------------<  130<H>  4.4   |  31  |  1.53<H>    Ca    9.9      12 May 2019 05:09

## 2019-05-13 NOTE — PROGRESS NOTE ADULT - ASSESSMENT
79 yo woman w multiple medical problems outline in [facility] being admitted for HMPV tracheobronchitiswith acute on chronic hypoxemic respiratory failure and acute on chronic diastolic CHF.   Respiratory status w ongoing bronchospasm today though improving , decrease to medrol 20 mg q8H as per pulmonary, cont  lasix 40 mg to po, s/p acute on chronic CHF, now resolved,  duonebs q4h prn, cont observing off ABx, close watch to her finger sticks 2/2 being on steroids. 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 epogen up to renal  card, endo, pulm, wound, id and renal consults appreciated  ptn had a recent Echo no need to repeat it.   cont coumadin and daily Pt/INR check.,   cont rest of outptn meds.

## 2019-05-13 NOTE — PROGRESS NOTE ADULT - ATTENDING COMMENTS
as above-improved since 5/10  Multifactorial resp insufficiency- obesity hypoventilation, copd/asthma, Cards, debility--unable to tolerate bipap--continue O2 NC  Reactive airway disease in setting of hMPV infection. Would monitor off antibiotics. Taper Solumedrol 20mg Q8hrs. Will make Duonebs Q6 ATC for now and  pulmicort l.5 bid  Cards-as per Nelli  OSAS-refused w/up and rx  ILDZ-CT unrevealing  PPI/OOB/PT                           DC planning -middle of week.  Fito Montgomery MD-Pulmonary   [phone]

## 2019-05-13 NOTE — PROGRESS NOTE ADULT - ASSESSMENT
77 yo woman w multiple medical problems  presents from [facility] w dyspnea and productive cough for 3 days, no fevers, no chills, no CP, no palpitations . RVP positive for HMPV. was started on IV Zosyn duonebs and po prednisone at [facility] on 5/7   Ptn was sent to [facility] post admission 3/30-4/15/19 with UTI, further uterine bleeding, and Right Quadricept tendon repair 2/2 traumatic rupture post mechanical fall.   PMH: morbid obesity with functional paraplegia, restrictive lung disease, ILD, VICKY/OHS with chronic hypercapnic and hypoxic respiratory failure on 3L NC (not on CPAP), PAD/PVD with chronic LE wounds, h/o MS s/p bioprosthetic MVR, dCHF, HTN, DM 2, CKD III, anemia, post menopausal vaginal bleeding s/p D&C in July 2018 and in April 2019 had D&C amd Mirena IUD placement, pathology findings benign, no further bleeding since. placed initially on Norethindrone and none at present. Ptn was  cleared to restart ELiquis for PAFI stable on amiodarone) prior to DC but now she is on coumadin, not sure why the medication was changed. also h/o , UC, Right knee Quadriceps tendon rupture , s/p repair in April ( last month), chronic hematuria, s/p cystoscopy w no findings of cystitis or polyp in april. chronic back 2/2 spinal stenosis with gait instability,  wheelchair dependent for the past 5-6 months. Requires assistance of her  for ADLS. (08 May 2019 20:59)    Pt afebrile, no leukocytosis.  PCT 0.05.  AST/ALT mildly elevated.  RVP (+) hMPV.  Cxr with mild pulm edema on prelim read. Pt off abx.  She c/o dry cough and wheezing.  No abd pain/diarrhea/dysuria/cp/HA.  c/o congestion and "blockage" or "stuffiness" in rt ear.        Recommend:    Viral URI:    - RVP (+) human metapneumovirus.  No role for antivirals.  Pt without fever or leukocytosis.  5/8 Cxr shows mild pulmonary edema, repeat on 5/9 is clear.   No evidence for superimposed bacterial pna.  No need for antibiotics at this time.    -  Pt with dry cough, and viral induced bronchospasm: Cont supportive care, nebulizers, supp O2 as needed.  Pt with wheezing, on solumedrol.  Cont steroid taper/lasix      Will follow,    Christal Reinoso  [phone]

## 2019-05-13 NOTE — PROGRESS NOTE ADULT - SUBJECTIVE AND OBJECTIVE BOX
Patient is a 78y old  Female who presents with a chief complaint of DYSPNEA, COUGH (13 May 2019 09:19)      SUBJECTIVE / OVERNIGHT EVENTS:"i feel awful" but has easier time speaking, able to chew w mouth closed for prolonged time. ptn observed.  at bedside. steroids dropped to MEDROL iv 20 mg q8H from 40 mg q8H    MEDICATIONS  (STANDING):  ALBUTerol/ipratropium for Nebulization. 3 milliLiter(s) Nebulizer every 6 hours  ALPRAZolam 0.25 milliGRAM(s) Oral two times a day  amiodarone    Tablet 200 milliGRAM(s) Oral daily  buDESOnide   0.5 milliGRAM(s) Respule 0.5 milliGRAM(s) Inhalation every 12 hours  carvedilol 6.25 milliGRAM(s) Oral every 12 hours  cholecalciferol 1000 Unit(s) Oral daily  dextrose 5%. 1000 milliLiter(s) (50 mL/Hr) IV Continuous <Continuous>  dextrose 50% Injectable 12.5 Gram(s) IV Push once  dextrose 50% Injectable 25 Gram(s) IV Push once  dextrose 50% Injectable 25 Gram(s) IV Push once  docusate sodium 100 milliGRAM(s) Oral two times a day  DULoxetine 60 milliGRAM(s) Oral daily  fenofibrate Tablet 145 milliGRAM(s) Oral daily  furosemide    Tablet 40 milliGRAM(s) Oral two times a day  insulin glargine Injectable (LANTUS) 40 Unit(s) SubCutaneous at bedtime  insulin lispro (HumaLOG) corrective regimen sliding scale   SubCutaneous three times a day before meals  insulin lispro (HumaLOG) corrective regimen sliding scale   SubCutaneous at bedtime  insulin lispro Injectable (HumaLOG) 8 Unit(s) SubCutaneous three times a day before meals  isosorbide   mononitrate ER Tablet (IMDUR) 30 milliGRAM(s) Oral daily  levothyroxine 88 MICROGram(s) Oral daily  levothyroxine 100 MICROGram(s) Oral daily  melatonin 10 milliGRAM(s) Oral at bedtime  mesalamine DR (24-Hour) Tablet 2.4 Gram(s) Oral daily  methylPREDNISolone sodium succinate Injectable 20 milliGRAM(s) IV Push every 8 hours  methylPREDNISolone sodium succinate Injectable   IV Push   multivitamin 1 Tablet(s) Oral daily  pantoprazole    Tablet 40 milliGRAM(s) Oral before breakfast  senna 2 Tablet(s) Oral at bedtime  spironolactone 25 milliGRAM(s) Oral daily    MEDICATIONS  (PRN):  acetaminophen   Tablet .. 650 milliGRAM(s) Oral every 6 hours PRN Temp greater or equal to 38C (100.4F), Mild Pain (1 - 3)  ALBUTerol/ipratropium for Nebulization 3 milliLiter(s) Nebulizer every 4 hours PRN Shortness of Breath and/or Wheezing  bisacodyl Suppository 10 milliGRAM(s) Rectal daily PRN Constipation  dextrose 40% Gel 15 Gram(s) Oral once PRN Blood Glucose LESS THAN 70 milliGRAM(s)/deciliter  glucagon  Injectable 1 milliGRAM(s) IntraMuscular once PRN Glucose LESS THAN 70 milligrams/deciliter  guaiFENesin    Syrup 200 milliGRAM(s) Oral every 6 hours PRN Cough  polyethylene glycol 3350 17 Gram(s) Oral daily PRN Constipation  saline laxative (FLEET) Rectal Enema 1 Enema Rectal daily PRN for constipation  sodium chloride 0.65% Nasal 1 Spray(s) Both Nostrils three times a day PRN Nasal Congestion      Vital Signs Last 24 Hrs  T(F): 98.7 (05-13-19 @ 14:11), Max: 98.7 (05-13-19 @ 14:11)  HR: 100 (05-13-19 @ 14:11) (99 - 102)  BP: 156/85 (05-13-19 @ 14:11) (133/83 - 165/89)  RR: 20 (05-13-19 @ 14:11) (20 - 20)  SpO2: 96% (05-13-19 @ 14:11) (95% - 98%)  Telemetry:   CAPILLARY BLOOD GLUCOSE      POCT Blood Glucose.: 242 mg/dL (13 May 2019 11:49)  POCT Blood Glucose.: 236 mg/dL (13 May 2019 07:23)  POCT Blood Glucose.: 271 mg/dL (12 May 2019 21:57)  POCT Blood Glucose.: 184 mg/dL (12 May 2019 17:02)    I&O's Summary    12 May 2019 07:01  -  13 May 2019 07:00  --------------------------------------------------------  IN: 920 mL / OUT: 2050 mL / NET: -1130 mL    13 May 2019 07:01  -  13 May 2019 14:40  --------------------------------------------------------  IN: 480 mL / OUT: 0 mL / NET: 480 mL        PHYSICAL EXAM:  GENERAL: NAD, well-developed  HEAD:  Atraumatic, Normocephalic  EYES: EOMI, PERRLA, conjunctiva and sclera clear  NECK: Supple, No JVD  CHEST/LUNG: Clear to auscultation bilaterally; No wheeze  HEART: Regular rate and rhythm; No murmurs, rubs, or gallops  ABDOMEN: Soft, Nontender, Nondistended; Bowel sounds present  EXTREMITIES:  2+ Peripheral Pulses, No clubbing, cyanosis, or edema  PSYCH: AAOx3  NEUROLOGY: non-focal  SKIN: No rashes or lesions    LABS:                        10.2   8.89  )-----------( 170      ( 12 May 2019 09:37 )             33.5     05-12    139  |  96  |  61<H>  ----------------------------<  130<H>  4.4   |  31  |  1.53<H>    Ca    9.9      12 May 2019 05:09      PT/INR - ( 13 May 2019 08:46 )   PT: 25.1 sec;   INR: 2.15 ratio         PTT - ( 13 May 2019 08:46 )  PTT:30.8 sec      Urinalysis Basic - ( 13 May 2019 06:51 )    Color: x / Appearance: x / SG: x / pH: x  Gluc: x / Ketone: x  / Bili: x / Urobili: x   Blood: x / Protein: x / Nitrite: x   Leuk Esterase: x / RBC: 2 /HPF / WBC 9 /HPF   Sq Epi: x / Non Sq Epi: 1 /HPF / Bacteria: Occasional        RADIOLOGY & ADDITIONAL TESTS:    Imaging Personally Reviewed:    Consultant(s) Notes Reviewed:      Care Discussed with Consultants/Other Providers:

## 2019-05-13 NOTE — PROGRESS NOTE ADULT - SUBJECTIVE AND OBJECTIVE BOX
No pain, no shortness of breath      VITAL:  T(C): , Max: 36.7 (05-12-19 @ 21:59)  T(F): , Max: 98.1 (05-12-19 @ 21:59)  HR: 102 (05-13-19 @ 04:47)  BP: 157/82 (05-13-19 @ 04:47)  RR: 20 (05-13-19 @ 04:47)  SpO2: 98% (05-13-19 @ 04:47)      PHYSICAL EXAM:  Constitutional: NAD, Alert; obese  HEENT: NCAT, DMM  Neck: Supple, No JVD  Respiratory: (+)b/l wheeze  Cardiovascular: irreg s1s2  Gastrointestinal: BS+, soft, NT/ND  Extremities: (+)1-2+ B/L LE edema  Neurological: RLE in brace; reduced generalized strength  Back: no CVAT b/l  Skin: No rashes, no nevi      LABS:                        10.2   8.89  )-----------( 170      ( 12 May 2019 09:37 )             33.5     Na(139)/K(4.4)/Cl(96)/HCO3(31)/BUN(61)/Cr(1.53)Glu(130)/Ca(9.9)/Mg(--)/PO4(--)    05-12 @ 05:09  Na(139)/K(4.5)/Cl(96)/HCO3(28)/BUN(60)/Cr(1.62)Glu(127)/Ca(9.9)/Mg(--)/PO4(--)    05-11 @ 06:14    Urinalysis Basic - ( 13 May 2019 06:51 )    Color: x / Appearance: x / SG: x / pH: x  Gluc: x / Ketone: x  / Bili: x / Urobili: x   Blood: x / Protein: x / Nitrite: x   Leuk Esterase: x / RBC: 2 /HPF / WBC 9 /HPF   Sq Epi: x / Non Sq Epi: 1 /HPF / Bacteria: Occasional      IMPRESSION: 78F w/ HTN, DM2, HFpEF, morbid obesity, CKD, ulcerative colitis, and valvular heart disease s/p MVR, 5/8/19 a/w HMPV bronchopneumonia    (1)Renal - CKD 3-4, with non-nephrotic range proteinuria - due to DM/HTN. Resolving/resolved mild superimposed prerenally mediated SAPPHIRE   (2)Pulm - HMPV/reactive airway disease flare - Pulmonary on board/slowly improving  (3)CV - acceptable volume status, on PO Lasix + Spironolactone      RECOMMEND:  (1)Dose new meds for GFR 30-40ml/min  (2)Diuretics as ordered  (3)Steroids per primary team/Pulmonary  (4)BMP daily  (5)Could f/u at my office 1-2 months after discharge          Juan Alberto Hills MD  [facility]  [phone] No pain, no shortness of breath      VITAL:  T(C): , Max: 36.7 (05-12-19 @ 21:59)  T(F): , Max: 98.1 (05-12-19 @ 21:59)  HR: 102 (05-13-19 @ 04:47)  BP: 157/82 (05-13-19 @ 04:47)  RR: 20 (05-13-19 @ 04:47)  SpO2: 98% (05-13-19 @ 04:47)      PHYSICAL EXAM:  Constitutional: NAD, Alert; obese  HEENT: NCAT, DMM  Neck: Supple, No JVD  Respiratory: (+)b/l wheeze  Cardiovascular: irreg s1s2  Gastrointestinal: BS+, soft, NT/ND  Extremities: (+)1-2+ B/L LE edema  Neurological: RLE in brace; reduced generalized strength  Back: no CVAT b/l  Skin: No rashes, no nevi      LABS:                        10.2   8.89  )-----------( 170      ( 12 May 2019 09:37 )             33.5     Na(139)/K(4.4)/Cl(96)/HCO3(31)/BUN(61)/Cr(1.53)Glu(130)/Ca(9.9)/Mg(--)/PO4(--)    05-12 @ 05:09  Na(139)/K(4.5)/Cl(96)/HCO3(28)/BUN(60)/Cr(1.62)Glu(127)/Ca(9.9)/Mg(--)/PO4(--)    05-11 @ 06:14    Urinalysis Basic - ( 13 May 2019 06:51 )    Color: x / Appearance: x / SG: x / pH: x  Gluc: x / Ketone: x  / Bili: x / Urobili: x   Blood: x / Protein: x / Nitrite: x   Leuk Esterase: x / RBC: 2 /HPF / WBC 9 /HPF   Sq Epi: x / Non Sq Epi: 1 /HPF / Bacteria: Occasional      IMPRESSION: 78F w/ HTN, DM2, HFpEF, morbid obesity, CKD, ulcerative colitis, and valvular heart disease s/p MVR, 5/8/19 a/w HMPV bronchopneumonia    (1)Renal - CKD 3-4, with non-nephrotic range proteinuria - due to DM/HTN. Resolving/resolved mild superimposed prerenally mediated SAPPHIRE   (2)Pulm - HMPV/reactive airway disease flare - Pulmonary on board/slowly improving  (3)CV - acceptable volume status, on PO Lasix + Spironolactone      RECOMMEND:  (1)Dose new meds for GFR 30-40ml/min  (2)Diuretics as ordered  (3)Steroids per primary team/Pulmonary  (4)BMP daily  (5)F/U with her outside nephrologist 1-2 months after discharge          Juan Alberto Hills MD  [employer]  [phone] Complains of persistent cough and shortness of breath      VITAL:  T(C): , Max: 36.7 (05-12-19 @ 21:59)  T(F): , Max: 98.1 (05-12-19 @ 21:59)  HR: 102 (05-13-19 @ 04:47)  BP: 157/82 (05-13-19 @ 04:47)  RR: 20 (05-13-19 @ 04:47)  SpO2: 98% (05-13-19 @ 04:47)      PHYSICAL EXAM:  Constitutional: NAD, Alert; obese  HEENT: NCAT, DMM  Neck: Supple, No JVD  Respiratory: minimal b/l wheeze (improving); fair aeration  Cardiovascular: irreg s1s2  Gastrointestinal: BS+, soft, NT/ND  Extremities: (+)1-2+ B/L LE edema  Neurological: RLE in brace; reduced generalized strength  Back: no CVAT b/l  Skin: No rashes, no nevi      LABS:                        10.2   8.89  )-----------( 170      ( 12 May 2019 09:37 )             33.5     Na(139)/K(4.4)/Cl(96)/HCO3(31)/BUN(61)/Cr(1.53)Glu(130)/Ca(9.9)/Mg(--)/PO4(--)    05-12 @ 05:09  Na(139)/K(4.5)/Cl(96)/HCO3(28)/BUN(60)/Cr(1.62)Glu(127)/Ca(9.9)/Mg(--)/PO4(--)    05-11 @ 06:14    Urinalysis Basic - ( 13 May 2019 06:51 )    Color: x / Appearance: x / SG: x / pH: x  Gluc: x / Ketone: x  / Bili: x / Urobili: x   Blood: x / Protein: x / Nitrite: x   Leuk Esterase: x / RBC: 2 /HPF / WBC 9 /HPF   Sq Epi: x / Non Sq Epi: 1 /HPF / Bacteria: Occasional      IMPRESSION: 78F w/ HTN, DM2, HFpEF, morbid obesity, CKD, ulcerative colitis, and valvular heart disease s/p MVR, 5/8/19 a/w HMPV bronchopneumonia    (1)Renal - CKD 3-4, with non-nephrotic range proteinuria - due to DM/HTN. Resolving/resolved mild superimposed prerenally mediated SAPPHIRE   (2)Pulm - HMPV/reactive airway disease flare - Pulmonary on board; although she complains that she still feels as sick as she did when she came in, on exam her lungs sounds better than late last week.  (3)CV - acceptable volume status, on PO Lasix + Spironolactone      RECOMMEND:  (1)Dose new meds for GFR 30-40ml/min  (2)Diuretics as ordered  (3)Steroids per primary team/Pulmonary  (4)BMP daily  (5)F/U with her outside nephrologist 1-2 months after discharge          Juan Alberto Hills MD  [employer]  [phone]

## 2019-05-13 NOTE — PROGRESS NOTE BEHAVIORAL HEALTH - NSBHCHARTREVIEWVS_PSY_A_CORE FT
Vital Signs Last 24 Hrs  T(C): 36.4 (13 May 2019 04:47), Max: 36.7 (12 May 2019 21:59)  T(F): 97.6 (13 May 2019 04:47), Max: 98.1 (12 May 2019 21:59)  HR: 102 (13 May 2019 04:47) (99 - 102)  BP: 157/82 (13 May 2019 04:47) (125/64 - 165/89)  BP(mean): --  RR: 20 (13 May 2019 04:47) (18 - 20)  SpO2: 98% (13 May 2019 04:47) (94% - 98%)

## 2019-05-13 NOTE — PROGRESS NOTE ADULT - SUBJECTIVE AND OBJECTIVE BOX
CHIEF COMPLAINT: f/up sob, HMPV infection, PAH, obesity hypoventilation, copd exacerbation, ILDZ-slightly better since Friday, still cough and wheeze    Interval Events: none    REVIEW OF SYSTEMS:  Constitutional: No fevers or chills. No weight loss. + fatigue or generalized malaise.  Eyes: No itching or discharge from the eyes  ENT: No ear pain. No ear discharge. No nasal congestion. No post nasal drip. No epistaxis. No throat pain. No sore throat. No difficulty swallowing.   CV: No chest pain. No palpitations. No lightheadedness or dizziness.   Resp: No dyspnea at rest. + dyspnea on exertion. No orthopnea. No wheezing. + cough. No stridor. No sputum production. No chest pain with respiration.  GI: No nausea. No vomiting. No diarrhea.  MSK: + joint pain or pain in any extremities  Integumentary: No skin lesions. No pedal edema.  Neurological: + gross motor weakness. No sensory changes.  [ +] All other systems negative  [ ] Unable to assess ROS because ________    OBJECTIVE:  ICU Vital Signs Last 24 Hrs  T(C): 36.4 (13 May 2019 04:47), Max: 36.7 (12 May 2019 21:59)  T(F): 97.6 (13 May 2019 04:47), Max: 98.1 (12 May 2019 21:59)  HR: 102 (13 May 2019 04:47) (96 - 104)  BP: 157/82 (13 May 2019 04:47) (125/64 - 165/89)  BP(mean): --  ABP: --  ABP(mean): --  RR: 20 (13 May 2019 04:47) (18 - 22)  SpO2: 98% (13 May 2019 04:47) (94% - 99%)        05-11 @ 07:01  -  05-12 @ 07:00  --------------------------------------------------------  IN: 1200 mL / OUT: 2350 mL / NET: -1150 mL    05-12 @ 07:01  -  05-13 @ 05:09  --------------------------------------------------------  IN: 920 mL / OUT: 1250 mL / NET: -330 mL      CAPILLARY BLOOD GLUCOSE      POCT Blood Glucose.: 271 mg/dL (12 May 2019 21:57)      PHYSICAL EXAM: NAD in bed on NC O2  General: Awake, alert, oriented X 3.   HEENT: Atraumatic, normocephalic.                 Mallampatti Grade 3                No nasal congestion.                No tonsillar or pharyngeal exudates.  Lymph Nodes: No palpable lymphadenopathy  Neck: No JVD. No carotid bruit.   Respiratory: Normal chest expansion                         Normal percussion                         Normal and equal air entry                         + mild exp wheeze,no rhonchi or rales.  Cardiovascular: S1 S2 normal. No murmurs, rubs or gallops.   Abdomen: Soft, non-tender, non-distended. No organomegaly. Normoactive bowel sounds.  Extremities: Warm to touch. Peripheral pulse palpable. No pedal edema.   Skin: No rashes or skin lesions  Neurological: Motor and sensory examination unequal and abnormal in right sided extremities.  Psychiatry: Appropriate mood and affect.    HOSPITAL MEDICATIONS:  MEDICATIONS  (STANDING):  ALBUTerol/ipratropium for Nebulization. 3 milliLiter(s) Nebulizer every 6 hours  ALPRAZolam 0.25 milliGRAM(s) Oral two times a day  amiodarone    Tablet 200 milliGRAM(s) Oral daily  carvedilol 6.25 milliGRAM(s) Oral every 12 hours  cholecalciferol 1000 Unit(s) Oral daily  dextrose 5%. 1000 milliLiter(s) (50 mL/Hr) IV Continuous <Continuous>  dextrose 50% Injectable 12.5 Gram(s) IV Push once  dextrose 50% Injectable 25 Gram(s) IV Push once  dextrose 50% Injectable 25 Gram(s) IV Push once  docusate sodium 100 milliGRAM(s) Oral two times a day  DULoxetine 60 milliGRAM(s) Oral daily  fenofibrate Tablet 145 milliGRAM(s) Oral daily  furosemide    Tablet 40 milliGRAM(s) Oral two times a day  insulin glargine Injectable (LANTUS) 40 Unit(s) SubCutaneous at bedtime  insulin lispro (HumaLOG) corrective regimen sliding scale   SubCutaneous three times a day before meals  insulin lispro (HumaLOG) corrective regimen sliding scale   SubCutaneous at bedtime  insulin lispro Injectable (HumaLOG) 8 Unit(s) SubCutaneous three times a day before meals  isosorbide   mononitrate ER Tablet (IMDUR) 30 milliGRAM(s) Oral daily  levothyroxine 88 MICROGram(s) Oral daily  levothyroxine 100 MICROGram(s) Oral daily  melatonin 10 milliGRAM(s) Oral at bedtime  mesalamine DR (24-Hour) Tablet 2.4 Gram(s) Oral daily  methylPREDNISolone sodium succinate Injectable 40 milliGRAM(s) IV Push every 8 hours  multivitamin 1 Tablet(s) Oral daily  pantoprazole    Tablet 40 milliGRAM(s) Oral before breakfast  senna 2 Tablet(s) Oral at bedtime  spironolactone 25 milliGRAM(s) Oral daily    MEDICATIONS  (PRN):  acetaminophen   Tablet .. 650 milliGRAM(s) Oral every 6 hours PRN Temp greater or equal to 38C (100.4F), Mild Pain (1 - 3)  ALBUTerol/ipratropium for Nebulization 3 milliLiter(s) Nebulizer every 4 hours PRN Shortness of Breath and/or Wheezing  bisacodyl Suppository 10 milliGRAM(s) Rectal daily PRN Constipation  dextrose 40% Gel 15 Gram(s) Oral once PRN Blood Glucose LESS THAN 70 milliGRAM(s)/deciliter  glucagon  Injectable 1 milliGRAM(s) IntraMuscular once PRN Glucose LESS THAN 70 milligrams/deciliter  guaiFENesin    Syrup 200 milliGRAM(s) Oral every 6 hours PRN Cough  polyethylene glycol 3350 17 Gram(s) Oral daily PRN Constipation  saline laxative (FLEET) Rectal Enema 1 Enema Rectal daily PRN for constipation  sodium chloride 0.65% Nasal 1 Spray(s) Both Nostrils three times a day PRN Nasal Congestion      LABS:                        10.2   8.89  )-----------( 170      ( 12 May 2019 09:37 )             33.5     05-12    139  |  96  |  61<H>  ----------------------------<  130<H>  4.4   |  31  |  1.53<H>    Ca    9.9      12 May 2019 05:09      PT/INR - ( 12 May 2019 09:14 )   PT: 41.9 sec;   INR: 3.50 ratio                   MICROBIOLOGY:     RADIOLOGY:  [ ] Reviewed and interpreted by me    Point of Care Ultrasound Findings:    PFT:    EKG:

## 2019-05-13 NOTE — PROGRESS NOTE ADULT - ASSESSMENT
77 yo F w/ hx of moderate MS s/p bioprosthetic mitral valve, severe pulmonary HTN, chronic hypoxemic respiratory failure and suspected ILD, DM2, anemia, diastolic CHF, hypothyroidism, paroxysmal atrial fibrillation, HTN presenting with progressive SOB 2/2 human metapneumovirus    5/10-slightly better-(bipap unable to tolerate)  5/13-slightly better

## 2019-05-14 LAB
ANION GAP SERPL CALC-SCNC: 13 MMOL/L — SIGNIFICANT CHANGE UP (ref 5–17)
BUN SERPL-MCNC: 61 MG/DL — HIGH (ref 7–23)
CALCIUM SERPL-MCNC: 9.9 MG/DL — SIGNIFICANT CHANGE UP (ref 8.4–10.5)
CHLORIDE SERPL-SCNC: 97 MMOL/L — SIGNIFICANT CHANGE UP (ref 96–108)
CO2 SERPL-SCNC: 30 MMOL/L — SIGNIFICANT CHANGE UP (ref 22–31)
CREAT SERPL-MCNC: 1.35 MG/DL — HIGH (ref 0.5–1.3)
GLUCOSE BLDC GLUCOMTR-MCNC: 172 MG/DL — HIGH (ref 70–99)
GLUCOSE BLDC GLUCOMTR-MCNC: 240 MG/DL — HIGH (ref 70–99)
GLUCOSE BLDC GLUCOMTR-MCNC: 245 MG/DL — HIGH (ref 70–99)
GLUCOSE BLDC GLUCOMTR-MCNC: 252 MG/DL — HIGH (ref 70–99)
GLUCOSE SERPL-MCNC: 166 MG/DL — HIGH (ref 70–99)
INR BLD: 1.41 RATIO — HIGH (ref 0.88–1.16)
POTASSIUM SERPL-MCNC: 4.6 MMOL/L — SIGNIFICANT CHANGE UP (ref 3.5–5.3)
POTASSIUM SERPL-SCNC: 4.6 MMOL/L — SIGNIFICANT CHANGE UP (ref 3.5–5.3)
PROTHROM AB SERPL-ACNC: 16.4 SEC — HIGH (ref 10–13.1)
SODIUM SERPL-SCNC: 140 MMOL/L — SIGNIFICANT CHANGE UP (ref 135–145)

## 2019-05-14 PROCEDURE — 99232 SBSQ HOSP IP/OBS MODERATE 35: CPT

## 2019-05-14 RX ORDER — WARFARIN SODIUM 2.5 MG/1
3.5 TABLET ORAL ONCE
Refills: 0 | Status: COMPLETED | OUTPATIENT
Start: 2019-05-14 | End: 2019-05-14

## 2019-05-14 RX ADMIN — Medication 8 UNIT(S): at 07:40

## 2019-05-14 RX ADMIN — Medication 6: at 11:29

## 2019-05-14 RX ADMIN — Medication 20 MILLIGRAM(S): at 14:28

## 2019-05-14 RX ADMIN — Medication 100 MICROGRAM(S): at 05:07

## 2019-05-14 RX ADMIN — Medication 3 MILLILITER(S): at 17:25

## 2019-05-14 RX ADMIN — Medication 0.25 MILLIGRAM(S): at 05:07

## 2019-05-14 RX ADMIN — Medication 1000 UNIT(S): at 11:28

## 2019-05-14 RX ADMIN — Medication 4: at 17:25

## 2019-05-14 RX ADMIN — WARFARIN SODIUM 3.5 MILLIGRAM(S): 2.5 TABLET ORAL at 21:45

## 2019-05-14 RX ADMIN — Medication 3 MILLILITER(S): at 11:28

## 2019-05-14 RX ADMIN — Medication 20 MILLIGRAM(S): at 05:07

## 2019-05-14 RX ADMIN — DULOXETINE HYDROCHLORIDE 60 MILLIGRAM(S): 30 CAPSULE, DELAYED RELEASE ORAL at 11:28

## 2019-05-14 RX ADMIN — Medication 88 MICROGRAM(S): at 05:07

## 2019-05-14 RX ADMIN — SPIRONOLACTONE 25 MILLIGRAM(S): 25 TABLET, FILM COATED ORAL at 05:07

## 2019-05-14 RX ADMIN — CARVEDILOL PHOSPHATE 6.25 MILLIGRAM(S): 80 CAPSULE, EXTENDED RELEASE ORAL at 17:25

## 2019-05-14 RX ADMIN — Medication 2.4 GRAM(S): at 11:29

## 2019-05-14 RX ADMIN — Medication 2: at 07:40

## 2019-05-14 RX ADMIN — Medication 100 MILLIGRAM(S): at 05:07

## 2019-05-14 RX ADMIN — ISOSORBIDE MONONITRATE 30 MILLIGRAM(S): 60 TABLET, EXTENDED RELEASE ORAL at 11:28

## 2019-05-14 RX ADMIN — Medication 0.5 MILLIGRAM(S): at 05:14

## 2019-05-14 RX ADMIN — Medication 100 MILLIGRAM(S): at 17:25

## 2019-05-14 RX ADMIN — CARVEDILOL PHOSPHATE 6.25 MILLIGRAM(S): 80 CAPSULE, EXTENDED RELEASE ORAL at 05:07

## 2019-05-14 RX ADMIN — Medication 20 MILLIGRAM(S): at 21:45

## 2019-05-14 RX ADMIN — AMIODARONE HYDROCHLORIDE 200 MILLIGRAM(S): 400 TABLET ORAL at 05:07

## 2019-05-14 RX ADMIN — Medication 8 UNIT(S): at 11:29

## 2019-05-14 RX ADMIN — Medication 8 UNIT(S): at 17:25

## 2019-05-14 RX ADMIN — Medication 40 MILLIGRAM(S): at 17:25

## 2019-05-14 RX ADMIN — Medication 3 MILLILITER(S): at 05:08

## 2019-05-14 RX ADMIN — Medication 40 MILLIGRAM(S): at 05:07

## 2019-05-14 RX ADMIN — Medication 10 MILLIGRAM(S): at 21:45

## 2019-05-14 RX ADMIN — PANTOPRAZOLE SODIUM 40 MILLIGRAM(S): 20 TABLET, DELAYED RELEASE ORAL at 05:07

## 2019-05-14 RX ADMIN — Medication 0.25 MILLIGRAM(S): at 17:31

## 2019-05-14 RX ADMIN — Medication 1 TABLET(S): at 11:27

## 2019-05-14 RX ADMIN — INSULIN GLARGINE 40 UNIT(S): 100 INJECTION, SOLUTION SUBCUTANEOUS at 22:10

## 2019-05-14 RX ADMIN — Medication 145 MILLIGRAM(S): at 11:28

## 2019-05-14 RX ADMIN — Medication 0.5 MILLIGRAM(S): at 17:30

## 2019-05-14 NOTE — PROGRESS NOTE ADULT - SUBJECTIVE AND OBJECTIVE BOX
CHIEF COMPLAINT: f/up sob, HMPV infection, PAH, obesity hypoventilation, copd exacerbation, ILDZ-slightly better but still coughing and sob    Interval Events: none    REVIEW OF SYSTEMS:  Constitutional: No fevers or chills. No weight loss. + fatigue or generalized malaise.  Eyes: No itching or discharge from the eyes  ENT: No ear pain. No ear discharge. No nasal congestion. No post nasal drip. No epistaxis. No throat pain. No sore throat. No difficulty swallowing.   CV: No chest pain. No palpitations. No lightheadedness or dizziness.   Resp: No dyspnea at rest. + dyspnea on exertion. No orthopnea. + wheezing. + cough. No stridor. No sputum production. No chest pain with respiration.  GI: No nausea. No vomiting. No diarrhea.  MSK: No joint pain or pain in any extremities  Integumentary: No skin lesions. + pedal edema.  Neurological: + gross motor weakness. No sensory changes.  [+ ] All other systems negative  [ ] Unable to assess ROS because ________    OBJECTIVE:  ICU Vital Signs Last 24 Hrs  T(C): 36.6 (14 May 2019 04:14), Max: 37.1 (13 May 2019 14:11)  T(F): 97.9 (14 May 2019 04:14), Max: 98.7 (13 May 2019 14:11)  HR: 105 (14 May 2019 04:14) (100 - 105)  BP: 153/91 (14 May 2019 04:14) (121/68 - 156/85)  BP(mean): --  ABP: --  ABP(mean): --  RR: 20 (14 May 2019 04:14) (19 - 20)  SpO2: 100% (14 May 2019 04:14) (96% - 100%)        12 @ 07:  -  -13 @ 07:00  --------------------------------------------------------  IN: 920 mL / OUT: 2050 mL / NET: -1130 mL     @ 07:  -  14 @ 06:08  --------------------------------------------------------  IN: 960 mL / OUT: 1500 mL / NET: -540 mL      CAPILLARY BLOOD GLUCOSE      POCT Blood Glucose.: 278 mg/dL (13 May 2019 21:35)      PHYSICAL EXAM: NAD in chair  General: Awake, alert, oriented X 3.   HEENT: Atraumatic, normocephalic.                 Mallampatti Grade 3                No nasal congestion.                No tonsillar or pharyngeal exudates.  Lymph Nodes: No palpable lymphadenopathy  Neck: No JVD. No carotid bruit.   Respiratory: Normal chest expansion                         Normal percussion                         Normal and equal air entry                         + wheeze and rhonchi no rales.  Cardiovascular: S1 S2 normal. No murmurs, rubs or gallops.   Abdomen: Soft, non-tender, non-distended. No organomegaly. Normoactive bowel sounds.  Extremities: Warm to touch. Peripheral pulse palpable. + pedal edema.   Skin: No rashes or skin lesions  Neurological: Motor and sensory examination equal and abnormal in lower extremities.  Psychiatry: Appropriate mood and affect.    HOSPITAL MEDICATIONS:  MEDICATIONS  (STANDING):  ALBUTerol/ipratropium for Nebulization. 3 milliLiter(s) Nebulizer every 6 hours  ALPRAZolam 0.25 milliGRAM(s) Oral two times a day  amiodarone    Tablet 200 milliGRAM(s) Oral daily  buDESOnide   0.5 milliGRAM(s) Respule 0.5 milliGRAM(s) Inhalation every 12 hours  carvedilol 6.25 milliGRAM(s) Oral every 12 hours  cholecalciferol 1000 Unit(s) Oral daily  dextrose 5%. 1000 milliLiter(s) (50 mL/Hr) IV Continuous <Continuous>  dextrose 50% Injectable 12.5 Gram(s) IV Push once  dextrose 50% Injectable 25 Gram(s) IV Push once  dextrose 50% Injectable 25 Gram(s) IV Push once  docusate sodium 100 milliGRAM(s) Oral two times a day  DULoxetine 60 milliGRAM(s) Oral daily  fenofibrate Tablet 145 milliGRAM(s) Oral daily  furosemide    Tablet 40 milliGRAM(s) Oral two times a day  insulin glargine Injectable (LANTUS) 40 Unit(s) SubCutaneous at bedtime  insulin lispro (HumaLOG) corrective regimen sliding scale   SubCutaneous three times a day before meals  insulin lispro (HumaLOG) corrective regimen sliding scale   SubCutaneous at bedtime  insulin lispro Injectable (HumaLOG) 8 Unit(s) SubCutaneous three times a day before meals  isosorbide   mononitrate ER Tablet (IMDUR) 30 milliGRAM(s) Oral daily  levothyroxine 88 MICROGram(s) Oral daily  levothyroxine 100 MICROGram(s) Oral daily  melatonin 10 milliGRAM(s) Oral at bedtime  mesalamine DR (24-Hour) Tablet 2.4 Gram(s) Oral daily  methylPREDNISolone sodium succinate Injectable 20 milliGRAM(s) IV Push every 8 hours  methylPREDNISolone sodium succinate Injectable   IV Push   multivitamin 1 Tablet(s) Oral daily  pantoprazole    Tablet 40 milliGRAM(s) Oral before breakfast  senna 2 Tablet(s) Oral at bedtime  spironolactone 25 milliGRAM(s) Oral daily    MEDICATIONS  (PRN):  acetaminophen   Tablet .. 650 milliGRAM(s) Oral every 6 hours PRN Temp greater or equal to 38C (100.4F), Mild Pain (1 - 3)  ALBUTerol/ipratropium for Nebulization 3 milliLiter(s) Nebulizer every 4 hours PRN Shortness of Breath and/or Wheezing  bisacodyl Suppository 10 milliGRAM(s) Rectal daily PRN Constipation  dextrose 40% Gel 15 Gram(s) Oral once PRN Blood Glucose LESS THAN 70 milliGRAM(s)/deciliter  glucagon  Injectable 1 milliGRAM(s) IntraMuscular once PRN Glucose LESS THAN 70 milligrams/deciliter  guaiFENesin    Syrup 200 milliGRAM(s) Oral every 6 hours PRN Cough  polyethylene glycol 3350 17 Gram(s) Oral daily PRN Constipation  saline laxative (FLEET) Rectal Enema 1 Enema Rectal daily PRN for constipation  sodium chloride 0.65% Nasal 1 Spray(s) Both Nostrils three times a day PRN Nasal Congestion      LABS:                        10.2   8.89  )-----------( 170      ( 12 May 2019 09:37 )             33.5           PT/INR - ( 13 May 2019 08:46 )   PT: 25.1 sec;   INR: 2.15 ratio         PTT - ( 13 May 2019 08:46 )  PTT:30.8 sec  Urinalysis Basic - ( 13 May 2019 06:51 )    Color: Yellow / Appearance: Clear / S.016 / pH: x  Gluc: x / Ketone: Negative  / Bili: Negative / Urobili: <2 mg/dL   Blood: x / Protein: Trace / Nitrite: Negative   Leuk Esterase: Moderate / RBC: 2 /HPF / WBC 9 /HPF   Sq Epi: x / Non Sq Epi: 1 /HPF / Bacteria: Occasional            MICROBIOLOGY:     RADIOLOGY:  [ ] Reviewed and interpreted by me    Point of Care Ultrasound Findings:    PFT:    EKG:

## 2019-05-14 NOTE — CONSULT NOTE ADULT - CONSULT REQUESTED DATE/TIME
09-May-2019 11:00
09-May-2019 06:40
09-May-2019 07:35
09-May-2019 09:27
09-May-2019 15:55
09-May-2019 18:02
14-May-2019 17:01

## 2019-05-14 NOTE — PROGRESS NOTE ADULT - SUBJECTIVE AND OBJECTIVE BOX
Infectious Diseases progress note:    Subjective:  NAD, still with cough, wheezing improving.  On solumedrol.  No new fevers.      ROS:  CONSTITUTIONAL:  No fever, chills, rigors  CARDIOVASCULAR:  No chest pain or palpitations  RESPIRATORY:   No SOB, cough, dyspnea on exertion.  No wheezing  GASTROINTESTINAL:  No abd pain, N/V, diarrhea/constipation  EXTREMITIES:  No swelling or joint pain  GENITOURINARY:  No burning on urination, increased frequency or urgency.  No flank pain  NEUROLOGIC:  No HA, visual disturbances  SKIN: No rashes    Allergies    Augmentin (Swelling)  cephalexin (Swelling)  latex (Rash)    Intolerances        ANTIBIOTICS/RELEVANT:  antimicrobials    immunologic:    OTHER:  acetaminophen   Tablet .. 650 milliGRAM(s) Oral every 6 hours PRN  ALBUTerol/ipratropium for Nebulization 3 milliLiter(s) Nebulizer every 4 hours PRN  ALBUTerol/ipratropium for Nebulization. 3 milliLiter(s) Nebulizer every 6 hours  ALPRAZolam 0.25 milliGRAM(s) Oral two times a day  amiodarone    Tablet 200 milliGRAM(s) Oral daily  bisacodyl Suppository 10 milliGRAM(s) Rectal daily PRN  buDESOnide   0.5 milliGRAM(s) Respule 0.5 milliGRAM(s) Inhalation every 12 hours  carvedilol 6.25 milliGRAM(s) Oral every 12 hours  cholecalciferol 1000 Unit(s) Oral daily  dextrose 40% Gel 15 Gram(s) Oral once PRN  dextrose 5%. 1000 milliLiter(s) IV Continuous <Continuous>  dextrose 50% Injectable 12.5 Gram(s) IV Push once  dextrose 50% Injectable 25 Gram(s) IV Push once  dextrose 50% Injectable 25 Gram(s) IV Push once  docusate sodium 100 milliGRAM(s) Oral two times a day  DULoxetine 60 milliGRAM(s) Oral daily  fenofibrate Tablet 145 milliGRAM(s) Oral daily  furosemide    Tablet 40 milliGRAM(s) Oral two times a day  glucagon  Injectable 1 milliGRAM(s) IntraMuscular once PRN  guaiFENesin    Syrup 200 milliGRAM(s) Oral every 6 hours PRN  insulin glargine Injectable (LANTUS) 40 Unit(s) SubCutaneous at bedtime  insulin lispro (HumaLOG) corrective regimen sliding scale   SubCutaneous three times a day before meals  insulin lispro (HumaLOG) corrective regimen sliding scale   SubCutaneous at bedtime  insulin lispro Injectable (HumaLOG) 8 Unit(s) SubCutaneous three times a day before meals  isosorbide   mononitrate ER Tablet (IMDUR) 30 milliGRAM(s) Oral daily  levothyroxine 88 MICROGram(s) Oral daily  levothyroxine 100 MICROGram(s) Oral daily  melatonin 10 milliGRAM(s) Oral at bedtime  mesalamine DR (24-Hour) Tablet 2.4 Gram(s) Oral daily  methylPREDNISolone sodium succinate Injectable 20 milliGRAM(s) IV Push every 8 hours  methylPREDNISolone sodium succinate Injectable   IV Push   multivitamin 1 Tablet(s) Oral daily  pantoprazole    Tablet 40 milliGRAM(s) Oral before breakfast  polyethylene glycol 3350 17 Gram(s) Oral daily PRN  saline laxative (FLEET) Rectal Enema 1 Enema Rectal daily PRN  senna 2 Tablet(s) Oral at bedtime  sodium chloride 0.65% Nasal 1 Spray(s) Both Nostrils three times a day PRN  spironolactone 25 milliGRAM(s) Oral daily      Objective:  Vital Signs Last 24 Hrs  T(C): 36.7 (14 May 2019 20:09), Max: 36.9 (14 May 2019 12:05)  T(F): 98.1 (14 May 2019 20:09), Max: 98.5 (14 May 2019 12:05)  HR: 101 (14 May 2019 20:09) (100 - 109)  BP: 150/78 (14 May 2019 20:09) (150/78 - 158/85)  BP(mean): --  RR: 20 (14 May 2019 20:09) (20 - 20)  SpO2: 98% (14 May 2019 20:09) (98% - 100%)    PHYSICAL EXAM:  Constitutional:NAD  Eyes:HANNAH, EOMI  Ear/Nose/Throat: no thrush, mucositis.  Moist mucous membranes	  Neck:no JVD, no lymphadenopathy, supple  Respiratory: CTA saad  Cardiovascular: S1S2 RRR, no murmurs  Gastrointestinal:soft, nontender,  nondistended (+) BS  Extremities: RLE brace in place  Skin:  no rashes, open wounds or ulcerations        LABS:        140  |  97  |  61<H>  ----------------------------<  166<H>  4.6   |  30  |  1.35<H>    Ca    9.9      14 May 2019 06:00      PT/INR - ( 14 May 2019 08:54 )   PT: 16.4 sec;   INR: 1.41 ratio         PTT - ( 13 May 2019 08:46 )  PTT:30.8 sec  Urinalysis Basic - ( 13 May 2019 06:51 )    Color: Yellow / Appearance: Clear / S.016 / pH: x  Gluc: x / Ketone: Negative  / Bili: Negative / Urobili: <2 mg/dL   Blood: x / Protein: Trace / Nitrite: Negative   Leuk Esterase: Moderate / RBC: 2 /HPF / WBC 9 /HPF   Sq Epi: x / Non Sq Epi: 1 /HPF / Bacteria: Occasional        Procalcitonin, Serum: 0.05 ( @ 06:40)            Rapid RVP Result: Detected          MICROBIOLOGY:          RADIOLOGY & ADDITIONAL STUDIES:

## 2019-05-14 NOTE — PROGRESS NOTE ADULT - ATTENDING COMMENTS
as above-improved since 5/10-no changes in pulm meds today  Multifactorial resp insufficiency- obesity hypoventilation, copd/asthma, Cards, debility--unable to tolerate bipap--continue O2 NC  Reactive airway disease in setting of hMPV infection. Would monitor off antibiotics. Taper Solumedrol 20mg Q8hrs. Will make Duonebs Q6 ATC for now and  pulmicort l.5 bid  Cards-as per Nelli  OSAS-refused w/up and rx  ILDZ-CT unrevealing  PPI/OOB/PT                           DC planning -middle of week.  Fito Montgomery MD-Pulmonary   [phone]

## 2019-05-14 NOTE — PROGRESS NOTE ADULT - SUBJECTIVE AND OBJECTIVE BOX
Patient is a 78y old  Female who presents with a chief complaint of DYSPNEA, COUGH (14 May 2019 17:01)      SUBJECTIVE / OVERNIGHT EVENTS: feels better, wants the RLE post op al brace  removed, however needs to remain on for 2 more weeks    MEDICATIONS  (STANDING):  ALBUTerol/ipratropium for Nebulization. 3 milliLiter(s) Nebulizer every 6 hours  ALPRAZolam 0.25 milliGRAM(s) Oral two times a day  amiodarone    Tablet 200 milliGRAM(s) Oral daily  buDESOnide   0.5 milliGRAM(s) Respule 0.5 milliGRAM(s) Inhalation every 12 hours  carvedilol 6.25 milliGRAM(s) Oral every 12 hours  cholecalciferol 1000 Unit(s) Oral daily  dextrose 5%. 1000 milliLiter(s) (50 mL/Hr) IV Continuous <Continuous>  dextrose 50% Injectable 12.5 Gram(s) IV Push once  dextrose 50% Injectable 25 Gram(s) IV Push once  dextrose 50% Injectable 25 Gram(s) IV Push once  docusate sodium 100 milliGRAM(s) Oral two times a day  DULoxetine 60 milliGRAM(s) Oral daily  fenofibrate Tablet 145 milliGRAM(s) Oral daily  furosemide    Tablet 40 milliGRAM(s) Oral two times a day  insulin glargine Injectable (LANTUS) 40 Unit(s) SubCutaneous at bedtime  insulin lispro (HumaLOG) corrective regimen sliding scale   SubCutaneous three times a day before meals  insulin lispro (HumaLOG) corrective regimen sliding scale   SubCutaneous at bedtime  insulin lispro Injectable (HumaLOG) 8 Unit(s) SubCutaneous three times a day before meals  isosorbide   mononitrate ER Tablet (IMDUR) 30 milliGRAM(s) Oral daily  levothyroxine 88 MICROGram(s) Oral daily  levothyroxine 100 MICROGram(s) Oral daily  melatonin 10 milliGRAM(s) Oral at bedtime  mesalamine DR (24-Hour) Tablet 2.4 Gram(s) Oral daily  methylPREDNISolone sodium succinate Injectable 20 milliGRAM(s) IV Push every 8 hours  methylPREDNISolone sodium succinate Injectable   IV Push   multivitamin 1 Tablet(s) Oral daily  pantoprazole    Tablet 40 milliGRAM(s) Oral before breakfast  senna 2 Tablet(s) Oral at bedtime  spironolactone 25 milliGRAM(s) Oral daily  warfarin 3.5 milliGRAM(s) Oral once    MEDICATIONS  (PRN):  acetaminophen   Tablet .. 650 milliGRAM(s) Oral every 6 hours PRN Temp greater or equal to 38C (100.4F), Mild Pain (1 - 3)  ALBUTerol/ipratropium for Nebulization 3 milliLiter(s) Nebulizer every 4 hours PRN Shortness of Breath and/or Wheezing  bisacodyl Suppository 10 milliGRAM(s) Rectal daily PRN Constipation  dextrose 40% Gel 15 Gram(s) Oral once PRN Blood Glucose LESS THAN 70 milliGRAM(s)/deciliter  glucagon  Injectable 1 milliGRAM(s) IntraMuscular once PRN Glucose LESS THAN 70 milligrams/deciliter  guaiFENesin    Syrup 200 milliGRAM(s) Oral every 6 hours PRN Cough  polyethylene glycol 3350 17 Gram(s) Oral daily PRN Constipation  saline laxative (FLEET) Rectal Enema 1 Enema Rectal daily PRN for constipation  sodium chloride 0.65% Nasal 1 Spray(s) Both Nostrils three times a day PRN Nasal Congestion      Vital Signs Last 24 Hrs  T(F): 98.5 (19 @ 12:05), Max: 98.5 (19 @ 12:05)  HR: 109 (19 @ 17:32) (100 - 109)  BP: 153/90 (19 @ 17:32) (124/78 - 158/85)  RR: 20 (19 @ 12:05) (20 - 20)  SpO2: 100% (19 @ 12:05) (98% - 100%)  Telemetry:   CAPILLARY BLOOD GLUCOSE      POCT Blood Glucose.: 245 mg/dL (14 May 2019 17:04)  POCT Blood Glucose.: 252 mg/dL (14 May 2019 11:25)  POCT Blood Glucose.: 172 mg/dL (14 May 2019 07:15)  POCT Blood Glucose.: 278 mg/dL (13 May 2019 21:35)    I&O's Summary    13 May 2019 07:01  -  14 May 2019 07:00  --------------------------------------------------------  IN: 960 mL / OUT: 1500 mL / NET: -540 mL    14 May 2019 07:01  -  14 May 2019 18:25  --------------------------------------------------------  IN: 960 mL / OUT: 800 mL / NET: 160 mL        PHYSICAL EXAM:  GENERAL: NAD, well-developed  HEAD:  Atraumatic, Normocephalic  EYES: EOMI, PERRLA, conjunctiva and sclera clear  NECK: Supple, No JVD  CHEST/LUNG: Clear to auscultation bilaterally; No wheeze  HEART: Regular rate and rhythm; No murmurs, rubs, or gallops  ABDOMEN: Soft, Nontender, Nondistended; Bowel sounds present  EXTREMITIES:  2+ Peripheral Pulses, No clubbing, cyanosis, or edema  PSYCH: AAOx3  NEUROLOGY: non-focal  SKIN: No rashes or lesions    LABS:        140  |  97  |  61<H>  ----------------------------<  166<H>  4.6   |  30  |  1.35<H>    Ca    9.9      14 May 2019 06:00      PT/INR - ( 14 May 2019 08:54 )   PT: 16.4 sec;   INR: 1.41 ratio         PTT - ( 13 May 2019 08:46 )  PTT:30.8 sec      Urinalysis Basic - ( 13 May 2019 06:51 )    Color: Yellow / Appearance: Clear / S.016 / pH: x  Gluc: x / Ketone: Negative  / Bili: Negative / Urobili: <2 mg/dL   Blood: x / Protein: Trace / Nitrite: Negative   Leuk Esterase: Moderate / RBC: 2 /HPF / WBC 9 /HPF   Sq Epi: x / Non Sq Epi: 1 /HPF / Bacteria: Occasional        RADIOLOGY & ADDITIONAL TESTS:    Imaging Personally Reviewed:    Consultant(s) Notes Reviewed:      Care Discussed with Consultants/Other Providers:

## 2019-05-14 NOTE — CONSULT NOTE ADULT - CONSULT REASON
cough, viral URI
Azotemia
BLE wounds
Human Metapneumovirus    (Evaluation on behalf of Dr. Fito Montgomery)
Right quadriceps tendon repair on 4/11/19
SOB
Type 2 DM uncontrolled.  Hypothyroidism

## 2019-05-14 NOTE — PROGRESS NOTE ADULT - ASSESSMENT
77 yo F w/ hx of moderate MS s/p bioprosthetic mitral valve, severe pulmonary HTN, chronic hypoxemic respiratory failure and suspected ILD, DM2, anemia, diastolic CHF, hypothyroidism, paroxysmal atrial fibrillation, HTN presenting with progressive SOB 2/2 human metapneumovirus    5/10-slightly better-(bipap unable to tolerate)  5/13-slightly better  5/14-remains sob/brochospastic

## 2019-05-14 NOTE — PROGRESS NOTE ADULT - SUBJECTIVE AND OBJECTIVE BOX
No pain, no shortness of breath      VITAL:  T(C): , Max: 37.1 (19 @ 14:11)  T(F): , Max: 98.7 (19 @ 14:11)  HR: 105 (19 @ 04:14)  BP: 153/91 (19 @ 04:14)  RR: 20 (19 @ 04:14)  SpO2: 100% (19 @ 04:14)      PHYSICAL EXAM:  Constitutional: NAD, Alert; obese  HEENT: NCAT, DMM  Neck: Supple, No JVD  Respiratory: minimal b/l wheeze (improving); fair aeration  Cardiovascular: irreg s1s2  Gastrointestinal: BS+, soft, NT/ND  Extremities: (+)1-2+ B/L LE edema  Neurological: RLE in brace; reduced generalized strength  Back: no CVAT b/l  Skin: No rashes, no nevi      LABS:    Na(140)/K(4.6)/Cl(97)/HCO3(30)/BUN(61)/Cr(1.35)Glu(166)/Ca(9.9)/Mg(--)/PO4(--)     @ 06:00  Na(139)/K(4.4)/Cl(96)/HCO3(31)/BUN(61)/Cr(1.53)Glu(130)/Ca(9.9)/Mg(--)/PO4(--)     @ 05:09    Urinalysis Basic - ( 13 May 2019 06:51 )  Color: Yellow / Appearance: Clear / S.016 / pH: x  Gluc: x / Ketone: Negative  / Bili: Negative / Urobili: <2 mg/dL   Blood: x / Protein: Trace / Nitrite: Negative   Leuk Esterase: Moderate / RBC: 2 /HPF / WBC 9 /HPF   Sq Epi: x / Non Sq Epi: 1 /HPF / Bacteria: Occasional      IMPRESSION: 78F w/ HTN, DM2, HFpEF, morbid obesity, CKD, ulcerative colitis, and valvular heart disease s/p MVR, 19 a/w HMPV bronchopneumonia    (1)Renal - CKD3 from to DM/HTN. Resolving/resolved mild superimposed prerenally mediated SAPPHIRE. High BUN/creatinine ratio in setting of steroids  (2)Pulm - HMPV/reactive airway disease flare   (3)CV - acceptable volume status, on PO Lasix + Spironolactone      RECOMMEND:  (1)Dose new meds for GFR 40-50ml/min  (2)Diuretics as ordered  (3)Steroids per primary team/Pulmonary  (4)BMP daily  (5)F/U with her outside nephrologist 1-2 months after discharge            Juan Alberto Hills MD  [facility]  [phone] Feeling better today - less dyspnic.      VITAL:  T(C): , Max: 37.1 (19 @ 14:11)  T(F): , Max: 98.7 (19 @ 14:11)  HR: 105 (19 @ 04:14)  BP: 153/91 (19 @ 04:14)  RR: 20 (19 @ 04:14)  SpO2: 100% (19 @ 04:14)      PHYSICAL EXAM:  Constitutional: NAD at rest on NCO2  HEENT: NCAT, DMM  Neck: Supple, No JVD  Respiratory: minimal b/l wheeze (improving); fair aeration  Cardiovascular: irreg s1s2  Gastrointestinal: BS+, soft, NT/ND  Extremities: (+)1-2+ B/L LE edema  Neurological: RLE in brace; reduced generalized strength  Back: no CVAT b/l  Skin: No rashes, no nevi      LABS:    Na(140)/K(4.6)/Cl(97)/HCO3(30)/BUN(61)/Cr(1.35)Glu(166)/Ca(9.9)/Mg(--)/PO4(--)     @ 06:00  Na(139)/K(4.4)/Cl(96)/HCO3(31)/BUN(61)/Cr(1.53)Glu(130)/Ca(9.9)/Mg(--)/PO4(--)     @ 05:09    Urinalysis Basic - ( 13 May 2019 06:51 )  Color: Yellow / Appearance: Clear / S.016 / pH: x  Gluc: x / Ketone: Negative  / Bili: Negative / Urobili: <2 mg/dL   Blood: x / Protein: Trace / Nitrite: Negative   Leuk Esterase: Moderate / RBC: 2 /HPF / WBC 9 /HPF   Sq Epi: x / Non Sq Epi: 1 /HPF / Bacteria: Occasional      IMPRESSION: 78F w/ HTN, DM2, HFpEF, morbid obesity, CKD, ulcerative colitis, and valvular heart disease s/p MVR, 19 a/w HMPV bronchopneumonia    (1)Renal - CKD3 from to DM/HTN. Resolving/resolved mild superimposed prerenally mediated SAPPHIRE. High BUN/creatinine ratio in setting of steroids  (2)Pulm - HMPV/reactive airway disease flare   (3)CV - acceptable volume status, on PO Lasix + Spironolactone      RECOMMEND:  (1)Dose new meds for GFR 40-50ml/min  (2)Diuretics as ordered  (3)Steroids per primary team/Pulmonary  (4)BMP daily  (5)F/U with her outside nephrologist 1-2 months after discharge            Juan Alberto Hills MD  [employer]  [phone]

## 2019-05-14 NOTE — PROGRESS NOTE ADULT - ASSESSMENT
77 yo woman w multiple medical problems  presents from [facility] w dyspnea and productive cough for 3 days, no fevers, no chills, no CP, no palpitations . RVP positive for HMPV. was started on IV Zosyn duonebs and po prednisone at [facility] on 5/7   Ptn was sent to [facility] post admission 3/30-4/15/19 with UTI, further uterine bleeding, and Right Quadricept tendon repair 2/2 traumatic rupture post mechanical fall.   PMH: morbid obesity with functional paraplegia, restrictive lung disease, ILD, VICKY/OHS with chronic hypercapnic and hypoxic respiratory failure on 3L NC (not on CPAP), PAD/PVD with chronic LE wounds, h/o MS s/p bioprosthetic MVR, dCHF, HTN, DM 2, CKD III, anemia, post menopausal vaginal bleeding s/p D&C in July 2018 and in April 2019 had D&C amd Mirena IUD placement, pathology findings benign, no further bleeding since. placed initially on Norethindrone and none at present. Ptn was  cleared to restart ELiquis for PAFI stable on amiodarone) prior to DC but now she is on coumadin, not sure why the medication was changed. also h/o , UC, Right knee Quadriceps tendon rupture , s/p repair in April ( last month), chronic hematuria, s/p cystoscopy w no findings of cystitis or polyp in april. chronic back 2/2 spinal stenosis with gait instability,  wheelchair dependent for the past 5-6 months. Requires assistance of her  for ADLS. (08 May 2019 20:59)    Pt afebrile, no leukocytosis.  PCT 0.05.  AST/ALT mildly elevated.  RVP (+) hMPV.  Cxr with mild pulm edema on prelim read. Pt off abx.  She c/o dry cough and wheezing.  No abd pain/diarrhea/dysuria/cp/HA.  c/o congestion and "blockage" or "stuffiness" in rt ear.        Recommend:    Viral URI:    - RVP (+) human metapneumovirus.  No role for antivirals.  Pt without fever or leukocytosis.  5/8 Cxr shows mild pulmonary edema, repeat on 5/9 is clear.   No evidence for superimposed bacterial pna.  No need for antibiotics at this time.    -  Pt with dry cough, and viral induced bronchospasm: Cont supportive care, nebulizers, supp O2 as needed.  Pt p/w wheezing, on solumedrol - wheezing resolving.  Cont steroid taper/lasix        Christal Reinoso  [phone]

## 2019-05-14 NOTE — CONSULT NOTE ADULT - SUBJECTIVE AND OBJECTIVE BOX
Orthopaedic Surgery Consult Note    Patient is a 78y old Female who presents from [facility] w dyspnea and productive cough for 3 days, no fevers, no chills, no CP, no palpitations . RVP positive for HMPV. was started on IV Zosyn duonebs and po prednisone at [facility] on . Patient had right quadriceps tendon repair on 2019 with Dr. Guzman. Patient was put into al brace locked in extension and must continue to wear this brace. Last seen with Dr. Guzman on     PAST MEDICAL & SURGICAL HISTORY:  Chronic kidney disease (CKD)  Anemia of chronic disease  On home oxygen therapy: 2  liters nasal cannula  Asthma: PFT (2018)  History of postmenopausal bleeding  Dyslipidemia associated with type 2 diabetes mellitus  Paroxysmal atrial fibrillation: h/o rapid ventricular rate 2 years ago, admitted at [facility], controlled with medication as per patient.  last INR 2.0  Morbid obesity with BMI of 45.0-49.9, adult  H/O: hypothyroidism  Chronic diastolic CHF (congestive heart failure): EF 56% (2017)  Depression (emotion)  Arthritis of spine  Macular degeneration of left eye: receiving injection  Neuropathy  Peripheral arterial disease: s/p remote stent. not on antiplatelet meds for a while.  Hypertension  Edema  GERD (gastroesophageal reflux disease)  Chronic low back pain  Herniated disc: lumbar  VICKY (obstructive sleep apnea)  Ulcerative colitis  Diabetes mellitus: T2DM last A1C 6.7 mg/dl in January   fs range 59- 200 mg/dl  History of mitral valve replacement with bioprosthetic valve: x 4 years ago  H/O  section: x 2  Amputated toe    [] No significant past history as reviewed with the patient and family    FAMILY HISTORY:  FH: heart disease: mother    [] Family history not pertinent as reviewed with the patient and family    SOCIAL HISTORY:    MEDICATIONS  (STANDING):  ALBUTerol/ipratropium for Nebulization. 3 milliLiter(s) Nebulizer every 6 hours  ALPRAZolam 0.25 milliGRAM(s) Oral two times a day  amiodarone    Tablet 200 milliGRAM(s) Oral daily  buDESOnide   0.5 milliGRAM(s) Respule 0.5 milliGRAM(s) Inhalation every 12 hours  carvedilol 6.25 milliGRAM(s) Oral every 12 hours  cholecalciferol 1000 Unit(s) Oral daily  dextrose 5%. 1000 milliLiter(s) (50 mL/Hr) IV Continuous <Continuous>  dextrose 50% Injectable 12.5 Gram(s) IV Push once  dextrose 50% Injectable 25 Gram(s) IV Push once  dextrose 50% Injectable 25 Gram(s) IV Push once  docusate sodium 100 milliGRAM(s) Oral two times a day  DULoxetine 60 milliGRAM(s) Oral daily  fenofibrate Tablet 145 milliGRAM(s) Oral daily  furosemide    Tablet 40 milliGRAM(s) Oral two times a day  insulin glargine Injectable (LANTUS) 40 Unit(s) SubCutaneous at bedtime  insulin lispro (HumaLOG) corrective regimen sliding scale   SubCutaneous three times a day before meals  insulin lispro (HumaLOG) corrective regimen sliding scale   SubCutaneous at bedtime  insulin lispro Injectable (HumaLOG) 8 Unit(s) SubCutaneous three times a day before meals  isosorbide   mononitrate ER Tablet (IMDUR) 30 milliGRAM(s) Oral daily  levothyroxine 88 MICROGram(s) Oral daily  levothyroxine 100 MICROGram(s) Oral daily  melatonin 10 milliGRAM(s) Oral at bedtime  mesalamine DR (24-Hour) Tablet 2.4 Gram(s) Oral daily  methylPREDNISolone sodium succinate Injectable 20 milliGRAM(s) IV Push every 8 hours  methylPREDNISolone sodium succinate Injectable   IV Push   multivitamin 1 Tablet(s) Oral daily  pantoprazole    Tablet 40 milliGRAM(s) Oral before breakfast  senna 2 Tablet(s) Oral at bedtime  spironolactone 25 milliGRAM(s) Oral daily  warfarin 3.5 milliGRAM(s) Oral once    MEDICATIONS  (PRN):  acetaminophen   Tablet .. 650 milliGRAM(s) Oral every 6 hours PRN Temp greater or equal to 38C (100.4F), Mild Pain (1 - 3)  ALBUTerol/ipratropium for Nebulization 3 milliLiter(s) Nebulizer every 4 hours PRN Shortness of Breath and/or Wheezing  bisacodyl Suppository 10 milliGRAM(s) Rectal daily PRN Constipation  dextrose 40% Gel 15 Gram(s) Oral once PRN Blood Glucose LESS THAN 70 milliGRAM(s)/deciliter  glucagon  Injectable 1 milliGRAM(s) IntraMuscular once PRN Glucose LESS THAN 70 milligrams/deciliter  guaiFENesin    Syrup 200 milliGRAM(s) Oral every 6 hours PRN Cough  polyethylene glycol 3350 17 Gram(s) Oral daily PRN Constipation  saline laxative (FLEET) Rectal Enema 1 Enema Rectal daily PRN for constipation  sodium chloride 0.65% Nasal 1 Spray(s) Both Nostrils three times a day PRN Nasal Congestion    Allergies    Augmentin (Swelling)  cephalexin (Swelling)  latex (Rash)    Intolerances    Vital Signs Last 24 Hrs  T(C): 36.9 (14 May 2019 12:05), Max: 36.9 (14 May 2019 12:05)  T(F): 98.5 (14 May 2019 12:05), Max: 98.5 (14 May 2019 12:05)  HR: 100 (14 May 2019 12:05) (100 - 105)  BP: 158/85 (14 May 2019 12:05) (121/68 - 158/85)  BP(mean): --  RR: 20 (14 May 2019 12:05) (19 - 20)  SpO2: 100% (14 May 2019 12:05) (98% - 100%)    13 @ 07:  -  14 @ 07:00  --------------------------------------------------------  IN: 960 mL / OUT: 1500 mL / NET: -540 mL     @ 07:14 @ 17:02  --------------------------------------------------------  IN: 720 mL / OUT: 800 mL / NET: -80 mL    PHYSICAL EXAM:  NAD  RLE:  skin intact, incision healed well, no purulence, steristrips removed, brace present   no TTP over patella or over knee  ROM not tested, patient to remain locked in extenion  DP/PT palpable  L2-S1 SILT, motor intact        140  |  97  |  61<H>  ----------------------------<  166<H>  4.6   |  30  |  1.35<H>    Ca    9.9      14 May 2019 06:00      PT/INR - ( 14 May 2019 08:54 )   PT: 16.4 sec;   INR: 1.41 ratio         PTT - ( 13 May 2019 08:46 )  PTT:30.8 sec  Urinalysis Basic - ( 13 May 2019 06:51 )    Color: Yellow / Appearance: Clear / S.016 / pH: x  Gluc: x / Ketone: Negative  / Bili: Negative / Urobili: <2 mg/dL   Blood: x / Protein: Trace / Nitrite: Negative   Leuk Esterase: Moderate / RBC: 2 /HPF / WBC 9 /HPF   Sq Epi: x / Non Sq Epi: 1 /HPF / Bacteria: Occasional    IMAGING STUDIES: pending knee xr    78y Female with R quadriceps tendon repair  - brace to remain on at all times when out of bed, locked in extension  - al brace to remain on for 2 more weeks  - follow-up with Dr. Guzman in 1-2 weeks after discharge  - no further intervention, call [phone] for appointment Orthopaedic Surgery Consult Note    Patient is a 78y old Female who presents from [facility] w dyspnea and productive cough for 3 days, no fevers, no chills, no CP, no palpitations . RVP positive for HMPV. was started on IV Zosyn duonebs and po prednisone at [facility] on . Patient had right quadriceps tendon repair on 2019 with Dr. Guzman. Patient was put into al brace locked in extension and must continue to wear this brace. Last seen with Dr. Guzman on     PAST MEDICAL & SURGICAL HISTORY:  Chronic kidney disease (CKD)  Anemia of chronic disease  On home oxygen therapy: 2  liters nasal cannula  Asthma: PFT (2018)  History of postmenopausal bleeding  Dyslipidemia associated with type 2 diabetes mellitus  Paroxysmal atrial fibrillation: h/o rapid ventricular rate 2 years ago, admitted at [facility], controlled with medication as per patient.  last INR 2.0  Morbid obesity with BMI of 45.0-49.9, adult  H/O: hypothyroidism  Chronic diastolic CHF (congestive heart failure): EF 56% (2017)  Depression (emotion)  Arthritis of spine  Macular degeneration of left eye: receiving injection  Neuropathy  Peripheral arterial disease: s/p remote stent. not on antiplatelet meds for a while.  Hypertension  Edema  GERD (gastroesophageal reflux disease)  Chronic low back pain  Herniated disc: lumbar  VICKY (obstructive sleep apnea)  Ulcerative colitis  Diabetes mellitus: T2DM last A1C 6.7 mg/dl in January   fs range 59- 200 mg/dl  History of mitral valve replacement with bioprosthetic valve: x 4 years ago  H/O  section: x 2  Amputated toe    [] No significant past history as reviewed with the patient and family    FAMILY HISTORY:  FH: heart disease: mother    [] Family history not pertinent as reviewed with the patient and family    SOCIAL HISTORY:    MEDICATIONS  (STANDING):  ALBUTerol/ipratropium for Nebulization. 3 milliLiter(s) Nebulizer every 6 hours  ALPRAZolam 0.25 milliGRAM(s) Oral two times a day  amiodarone    Tablet 200 milliGRAM(s) Oral daily  buDESOnide   0.5 milliGRAM(s) Respule 0.5 milliGRAM(s) Inhalation every 12 hours  carvedilol 6.25 milliGRAM(s) Oral every 12 hours  cholecalciferol 1000 Unit(s) Oral daily  dextrose 5%. 1000 milliLiter(s) (50 mL/Hr) IV Continuous <Continuous>  dextrose 50% Injectable 12.5 Gram(s) IV Push once  dextrose 50% Injectable 25 Gram(s) IV Push once  dextrose 50% Injectable 25 Gram(s) IV Push once  docusate sodium 100 milliGRAM(s) Oral two times a day  DULoxetine 60 milliGRAM(s) Oral daily  fenofibrate Tablet 145 milliGRAM(s) Oral daily  furosemide    Tablet 40 milliGRAM(s) Oral two times a day  insulin glargine Injectable (LANTUS) 40 Unit(s) SubCutaneous at bedtime  insulin lispro (HumaLOG) corrective regimen sliding scale   SubCutaneous three times a day before meals  insulin lispro (HumaLOG) corrective regimen sliding scale   SubCutaneous at bedtime  insulin lispro Injectable (HumaLOG) 8 Unit(s) SubCutaneous three times a day before meals  isosorbide   mononitrate ER Tablet (IMDUR) 30 milliGRAM(s) Oral daily  levothyroxine 88 MICROGram(s) Oral daily  levothyroxine 100 MICROGram(s) Oral daily  melatonin 10 milliGRAM(s) Oral at bedtime  mesalamine DR (24-Hour) Tablet 2.4 Gram(s) Oral daily  methylPREDNISolone sodium succinate Injectable 20 milliGRAM(s) IV Push every 8 hours  methylPREDNISolone sodium succinate Injectable   IV Push   multivitamin 1 Tablet(s) Oral daily  pantoprazole    Tablet 40 milliGRAM(s) Oral before breakfast  senna 2 Tablet(s) Oral at bedtime  spironolactone 25 milliGRAM(s) Oral daily  warfarin 3.5 milliGRAM(s) Oral once    MEDICATIONS  (PRN):  acetaminophen   Tablet .. 650 milliGRAM(s) Oral every 6 hours PRN Temp greater or equal to 38C (100.4F), Mild Pain (1 - 3)  ALBUTerol/ipratropium for Nebulization 3 milliLiter(s) Nebulizer every 4 hours PRN Shortness of Breath and/or Wheezing  bisacodyl Suppository 10 milliGRAM(s) Rectal daily PRN Constipation  dextrose 40% Gel 15 Gram(s) Oral once PRN Blood Glucose LESS THAN 70 milliGRAM(s)/deciliter  glucagon  Injectable 1 milliGRAM(s) IntraMuscular once PRN Glucose LESS THAN 70 milligrams/deciliter  guaiFENesin    Syrup 200 milliGRAM(s) Oral every 6 hours PRN Cough  polyethylene glycol 3350 17 Gram(s) Oral daily PRN Constipation  saline laxative (FLEET) Rectal Enema 1 Enema Rectal daily PRN for constipation  sodium chloride 0.65% Nasal 1 Spray(s) Both Nostrils three times a day PRN Nasal Congestion    Allergies    Augmentin (Swelling)  cephalexin (Swelling)  latex (Rash)    Intolerances    Vital Signs Last 24 Hrs  T(C): 36.9 (14 May 2019 12:05), Max: 36.9 (14 May 2019 12:05)  T(F): 98.5 (14 May 2019 12:05), Max: 98.5 (14 May 2019 12:05)  HR: 100 (14 May 2019 12:05) (100 - 105)  BP: 158/85 (14 May 2019 12:05) (121/68 - 158/85)  BP(mean): --  RR: 20 (14 May 2019 12:05) (19 - 20)  SpO2: 100% (14 May 2019 12:05) (98% - 100%)    13 @ 07:  -  14 @ 07:00  --------------------------------------------------------  IN: 960 mL / OUT: 1500 mL / NET: -540 mL     @ 07:14 @ 17:02  --------------------------------------------------------  IN: 720 mL / OUT: 800 mL / NET: -80 mL    PHYSICAL EXAM:  NAD  RLE:  skin intact, incision healed well, no purulence, steristrips removed, brace present   no TTP over patella or over knee  ROM not tested, patient to remain locked in extenion  DP/PT palpable  L2-S1 SILT, motor intact        140  |  97  |  61<H>  ----------------------------<  166<H>  4.6   |  30  |  1.35<H>    Ca    9.9      14 May 2019 06:00      PT/INR - ( 14 May 2019 08:54 )   PT: 16.4 sec;   INR: 1.41 ratio         PTT - ( 13 May 2019 08:46 )  PTT:30.8 sec  Urinalysis Basic - ( 13 May 2019 06:51 )    Color: Yellow / Appearance: Clear / S.016 / pH: x  Gluc: x / Ketone: Negative  / Bili: Negative / Urobili: <2 mg/dL   Blood: x / Protein: Trace / Nitrite: Negative   Leuk Esterase: Moderate / RBC: 2 /HPF / WBC 9 /HPF   Sq Epi: x / Non Sq Epi: 1 /HPF / Bacteria: Occasional    IMAGING STUDIES: pending knee xr    78y Female with R quadriceps tendon repair  - brace to remain on at all times when out of bed, locked in extension  - al brace to remain on for 2 more weeks  - follow-up with Dr. Guzman in 1-2 weeks after discharge  - no further intervention, call [phone] for appointment  - WBAT w brace locked in extension

## 2019-05-14 NOTE — CONSULT NOTE ADULT - REASON FOR ADMISSION
DYSPNEA, COUGH

## 2019-05-14 NOTE — PROGRESS NOTE ADULT - ASSESSMENT
77 yo woman w multiple medical problems outline in [place] being admitted for HMPV tracheobronchitiswith acute on chronic hypoxemic respiratory failure and acute on chronic diastolic CHF.   Respiratory status w ongoing bronchospasm today though improving , decrease to medrol 20 mg q8H as per pulmonary, cont  lasix 40 mg to po, s/p acute on chronic CHF, now resolved,  duonebs q4h prn, cont observing off ABx, close watch to her finger sticks 2/2 being on steroids. 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 epogen up to renal  card, endo, pulm, wound, id and renal consults appreciated  ptn had a recent Echo no need to repeat it.   cont coumadin and daily Pt/INR check.,   post RLE quad tendon reconstruction, post op al brace to remain on for 2 more weeks.  cont rest of outptn meds.

## 2019-05-15 LAB
ANION GAP SERPL CALC-SCNC: 11 MMOL/L — SIGNIFICANT CHANGE UP (ref 5–17)
BUN SERPL-MCNC: 61 MG/DL — HIGH (ref 7–23)
CALCIUM SERPL-MCNC: 9.7 MG/DL — SIGNIFICANT CHANGE UP (ref 8.4–10.5)
CHLORIDE SERPL-SCNC: 94 MMOL/L — LOW (ref 96–108)
CO2 SERPL-SCNC: 31 MMOL/L — SIGNIFICANT CHANGE UP (ref 22–31)
CREAT SERPL-MCNC: 1.24 MG/DL — SIGNIFICANT CHANGE UP (ref 0.5–1.3)
GLUCOSE BLDC GLUCOMTR-MCNC: 197 MG/DL — HIGH (ref 70–99)
GLUCOSE BLDC GLUCOMTR-MCNC: 221 MG/DL — HIGH (ref 70–99)
GLUCOSE BLDC GLUCOMTR-MCNC: 233 MG/DL — HIGH (ref 70–99)
GLUCOSE BLDC GLUCOMTR-MCNC: 291 MG/DL — HIGH (ref 70–99)
GLUCOSE SERPL-MCNC: 215 MG/DL — HIGH (ref 70–99)
HCT VFR BLD CALC: 34.5 % — SIGNIFICANT CHANGE UP (ref 34.5–45)
HGB BLD-MCNC: 10.7 G/DL — LOW (ref 11.5–15.5)
INR BLD: 1.32 RATIO — HIGH (ref 0.88–1.16)
MCHC RBC-ENTMCNC: 30.5 PG — SIGNIFICANT CHANGE UP (ref 27–34)
MCHC RBC-ENTMCNC: 31 GM/DL — LOW (ref 32–36)
MCV RBC AUTO: 98.3 FL — SIGNIFICANT CHANGE UP (ref 80–100)
PLATELET # BLD AUTO: 168 K/UL — SIGNIFICANT CHANGE UP (ref 150–400)
POTASSIUM SERPL-MCNC: 4.4 MMOL/L — SIGNIFICANT CHANGE UP (ref 3.5–5.3)
POTASSIUM SERPL-SCNC: 4.4 MMOL/L — SIGNIFICANT CHANGE UP (ref 3.5–5.3)
PROTHROM AB SERPL-ACNC: 15.3 SEC — HIGH (ref 10–13.1)
RBC # BLD: 3.51 M/UL — LOW (ref 3.8–5.2)
RBC # FLD: 15.2 % — HIGH (ref 10.3–14.5)
SODIUM SERPL-SCNC: 136 MMOL/L — SIGNIFICANT CHANGE UP (ref 135–145)
WBC # BLD: 15.16 K/UL — HIGH (ref 3.8–10.5)
WBC # FLD AUTO: 15.16 K/UL — HIGH (ref 3.8–10.5)

## 2019-05-15 PROCEDURE — 99232 SBSQ HOSP IP/OBS MODERATE 35: CPT

## 2019-05-15 RX ORDER — ALPRAZOLAM 0.25 MG
0.25 TABLET ORAL
Refills: 0 | Status: DISCONTINUED | OUTPATIENT
Start: 2019-05-16 | End: 2019-05-16

## 2019-05-15 RX ORDER — WARFARIN SODIUM 2.5 MG/1
3.5 TABLET ORAL ONCE
Refills: 0 | Status: COMPLETED | OUTPATIENT
Start: 2019-05-15 | End: 2019-05-15

## 2019-05-15 RX ADMIN — ISOSORBIDE MONONITRATE 30 MILLIGRAM(S): 60 TABLET, EXTENDED RELEASE ORAL at 12:19

## 2019-05-15 RX ADMIN — Medication 2: at 07:32

## 2019-05-15 RX ADMIN — Medication 40 MILLIGRAM(S): at 05:34

## 2019-05-15 RX ADMIN — Medication 4: at 11:44

## 2019-05-15 RX ADMIN — Medication 10 MILLIGRAM(S): at 22:00

## 2019-05-15 RX ADMIN — Medication 3 MILLILITER(S): at 05:35

## 2019-05-15 RX ADMIN — Medication 88 MICROGRAM(S): at 05:34

## 2019-05-15 RX ADMIN — Medication 20 MILLIGRAM(S): at 05:34

## 2019-05-15 RX ADMIN — Medication 20 MILLIGRAM(S): at 22:00

## 2019-05-15 RX ADMIN — Medication 1 TABLET(S): at 12:20

## 2019-05-15 RX ADMIN — SENNA PLUS 2 TABLET(S): 8.6 TABLET ORAL at 22:00

## 2019-05-15 RX ADMIN — Medication 145 MILLIGRAM(S): at 12:17

## 2019-05-15 RX ADMIN — Medication 8 UNIT(S): at 11:45

## 2019-05-15 RX ADMIN — AMIODARONE HYDROCHLORIDE 200 MILLIGRAM(S): 400 TABLET ORAL at 05:34

## 2019-05-15 RX ADMIN — Medication 100 MILLIGRAM(S): at 05:34

## 2019-05-15 RX ADMIN — Medication 2.4 GRAM(S): at 12:19

## 2019-05-15 RX ADMIN — Medication 20 MILLIGRAM(S): at 14:34

## 2019-05-15 RX ADMIN — Medication 8 UNIT(S): at 07:34

## 2019-05-15 RX ADMIN — CARVEDILOL PHOSPHATE 6.25 MILLIGRAM(S): 80 CAPSULE, EXTENDED RELEASE ORAL at 05:34

## 2019-05-15 RX ADMIN — Medication 4: at 17:43

## 2019-05-15 RX ADMIN — Medication 40 MILLIGRAM(S): at 17:44

## 2019-05-15 RX ADMIN — Medication 100 MICROGRAM(S): at 05:34

## 2019-05-15 RX ADMIN — Medication 0.25 MILLIGRAM(S): at 17:42

## 2019-05-15 RX ADMIN — SPIRONOLACTONE 25 MILLIGRAM(S): 25 TABLET, FILM COATED ORAL at 05:34

## 2019-05-15 RX ADMIN — PANTOPRAZOLE SODIUM 40 MILLIGRAM(S): 20 TABLET, DELAYED RELEASE ORAL at 05:34

## 2019-05-15 RX ADMIN — INSULIN GLARGINE 40 UNIT(S): 100 INJECTION, SOLUTION SUBCUTANEOUS at 21:59

## 2019-05-15 RX ADMIN — WARFARIN SODIUM 3.5 MILLIGRAM(S): 2.5 TABLET ORAL at 22:00

## 2019-05-15 RX ADMIN — DULOXETINE HYDROCHLORIDE 60 MILLIGRAM(S): 30 CAPSULE, DELAYED RELEASE ORAL at 12:16

## 2019-05-15 RX ADMIN — Medication 2: at 21:59

## 2019-05-15 RX ADMIN — Medication 1000 UNIT(S): at 12:14

## 2019-05-15 RX ADMIN — Medication 100 MILLIGRAM(S): at 17:43

## 2019-05-15 RX ADMIN — Medication 8 UNIT(S): at 17:43

## 2019-05-15 RX ADMIN — CARVEDILOL PHOSPHATE 6.25 MILLIGRAM(S): 80 CAPSULE, EXTENDED RELEASE ORAL at 17:43

## 2019-05-15 RX ADMIN — Medication 0.5 MILLIGRAM(S): at 05:34

## 2019-05-15 RX ADMIN — Medication 0.25 MILLIGRAM(S): at 05:34

## 2019-05-15 NOTE — PROGRESS NOTE ADULT - SUBJECTIVE AND OBJECTIVE BOX
No pain, no shortness of breath      VITAL:  T(C): , Max: 36.7 (05-14-19 @ 20:09)  T(F): , Max: 98.1 (05-14-19 @ 20:09)  HR: 106 (05-15-19 @ 12:00)  BP: 160/80 (05-15-19 @ 12:00)  RR: 20 (05-15-19 @ 12:00)  SpO2: 100% (05-15-19 @ 12:48)      PHYSICAL EXAM:  Constitutional: NAD at rest on NCO2  HEENT: NCAT, DMM  Neck: Supple, No JVD  Respiratory: grossly clear b/l  Cardiovascular: irreg s1s2  Gastrointestinal: BS+, soft, NT/ND  Extremities: (+)1-2+ B/L LE edema  Neurological: RLE in brace; reduced generalized strength  Back: no CVAT b/l  Skin: No rashes, no nevi      LABS:                        10.7   15.16 )-----------( 168      ( 15 May 2019 10:23 )             34.5     Na(136)/K(4.4)/Cl(94)/HCO3(31)/BUN(61)/Cr(1.24)Glu(215)/Ca(9.7)/Mg(--)/PO4(--)    05-15 @ 05:48  Na(140)/K(4.6)/Cl(97)/HCO3(30)/BUN(61)/Cr(1.35)Glu(166)/Ca(9.9)/Mg(--)/PO4(--)    05-14 @ 06:00      IMPRESSION: 78F w/ HTN, DM2, HFpEF, morbid obesity, CKD, ulcerative colitis, and valvular heart disease s/p MVR, 5/8/19 a/w HMPV bronchopneumonia    (1)Renal - CKD3 from to DM/HTN. Resolving/resolved mild superimposed prerenally mediated SAPPHIRE. High BUN/creatinine ratio in setting of steroids  (2)Pulm - HMPV/reactive airway disease flare   (3)CV - acceptable volume status, on PO Lasix + Spironolactone      RECOMMEND:  (1)Dose new meds for GFR 40-50ml/min  (2)Diuretics as ordered  (3)Steroid taper per primary team/Pulmonary  (4)BMP daily  (5)F/U with her outside nephrologist 1-2 months after discharge            Juan Alberto Hills MD  [facility]  [phone] No pain; (+)mild SOB; (+)cough      VITAL:  T(C): , Max: 36.7 (05-14-19 @ 20:09)  T(F): , Max: 98.1 (05-14-19 @ 20:09)  HR: 106 (05-15-19 @ 12:00)  BP: 160/80 (05-15-19 @ 12:00)  RR: 20 (05-15-19 @ 12:00)  SpO2: 100% (05-15-19 @ 12:48)      PHYSICAL EXAM:  Constitutional: NAD at rest on NCO2  HEENT: NCAT, DMM  Neck: Supple, No JVD  Respiratory: grossly clear b/l  Cardiovascular: irreg s1s2  Gastrointestinal: BS+, soft, NT/ND  Extremities: (+)1-2+ B/L LE edema  Neurological: RLE in brace; reduced generalized strength  Back: no CVAT b/l  Skin: No rashes, no nevi      LABS:                        10.7   15.16 )-----------( 168      ( 15 May 2019 10:23 )             34.5     Na(136)/K(4.4)/Cl(94)/HCO3(31)/BUN(61)/Cr(1.24)Glu(215)/Ca(9.7)/Mg(--)/PO4(--)    05-15 @ 05:48  Na(140)/K(4.6)/Cl(97)/HCO3(30)/BUN(61)/Cr(1.35)Glu(166)/Ca(9.9)/Mg(--)/PO4(--)    05-14 @ 06:00      IMPRESSION: 78F w/ HTN, DM2, HFpEF, morbid obesity, CKD, ulcerative colitis, and valvular heart disease s/p MVR, 5/8/19 a/w HMPV bronchopneumonia    (1)Renal - CKD3 from to DM/HTN. Resolving/resolved mild superimposed prerenally mediated SAPPHIRE. High BUN/creatinine ratio in setting of steroids  (2)Pulm - HMPV/reactive airway disease flare   (3)CV - acceptable volume status, on PO Lasix + Spironolactone      RECOMMEND:  (1)Dose new meds for GFR 40-50ml/min  (2)Diuretics as ordered  (3)Steroid taper per primary team/Pulmonary  (4)BMP daily  (5)F/U with her outside nephrologist 1-2 months after discharge            Juan Alberto Hills MD  [address] [facility]  [phone]

## 2019-05-15 NOTE — PROGRESS NOTE ADULT - SUBJECTIVE AND OBJECTIVE BOX
Patient is a 78y old  Female who presents with a chief complaint of DYSPNEA, COUGH (15 May 2019 14:22)      SUBJECTIVE / OVERNIGHT EVENTS: feels better today, awaiting transfer to [facility]    MEDICATIONS  (STANDING):  ALBUTerol/ipratropium for Nebulization. 3 milliLiter(s) Nebulizer every 6 hours  amiodarone    Tablet 200 milliGRAM(s) Oral daily  buDESOnide   0.5 milliGRAM(s) Respule 0.5 milliGRAM(s) Inhalation every 12 hours  carvedilol 6.25 milliGRAM(s) Oral every 12 hours  cholecalciferol 1000 Unit(s) Oral daily  dextrose 5%. 1000 milliLiter(s) (50 mL/Hr) IV Continuous <Continuous>  dextrose 50% Injectable 12.5 Gram(s) IV Push once  dextrose 50% Injectable 25 Gram(s) IV Push once  dextrose 50% Injectable 25 Gram(s) IV Push once  docusate sodium 100 milliGRAM(s) Oral two times a day  DULoxetine 60 milliGRAM(s) Oral daily  fenofibrate Tablet 145 milliGRAM(s) Oral daily  furosemide    Tablet 40 milliGRAM(s) Oral two times a day  insulin glargine Injectable (LANTUS) 40 Unit(s) SubCutaneous at bedtime  insulin lispro (HumaLOG) corrective regimen sliding scale   SubCutaneous three times a day before meals  insulin lispro (HumaLOG) corrective regimen sliding scale   SubCutaneous at bedtime  insulin lispro Injectable (HumaLOG) 8 Unit(s) SubCutaneous three times a day before meals  isosorbide   mononitrate ER Tablet (IMDUR) 30 milliGRAM(s) Oral daily  levothyroxine 88 MICROGram(s) Oral daily  levothyroxine 100 MICROGram(s) Oral daily  melatonin 10 milliGRAM(s) Oral at bedtime  mesalamine DR (24-Hour) Tablet 2.4 Gram(s) Oral daily  methylPREDNISolone sodium succinate Injectable 20 milliGRAM(s) IV Push every 8 hours  methylPREDNISolone sodium succinate Injectable   IV Push   multivitamin 1 Tablet(s) Oral daily  pantoprazole    Tablet 40 milliGRAM(s) Oral before breakfast  senna 2 Tablet(s) Oral at bedtime  spironolactone 25 milliGRAM(s) Oral daily  warfarin 3.5 milliGRAM(s) Oral once    MEDICATIONS  (PRN):  acetaminophen   Tablet .. 650 milliGRAM(s) Oral every 6 hours PRN Temp greater or equal to 38C (100.4F), Mild Pain (1 - 3)  ALBUTerol/ipratropium for Nebulization 3 milliLiter(s) Nebulizer every 4 hours PRN Shortness of Breath and/or Wheezing  bisacodyl Suppository 10 milliGRAM(s) Rectal daily PRN Constipation  dextrose 40% Gel 15 Gram(s) Oral once PRN Blood Glucose LESS THAN 70 milliGRAM(s)/deciliter  glucagon  Injectable 1 milliGRAM(s) IntraMuscular once PRN Glucose LESS THAN 70 milligrams/deciliter  guaiFENesin    Syrup 200 milliGRAM(s) Oral every 6 hours PRN Cough  polyethylene glycol 3350 17 Gram(s) Oral daily PRN Constipation  saline laxative (FLEET) Rectal Enema 1 Enema Rectal daily PRN for constipation  sodium chloride 0.65% Nasal 1 Spray(s) Both Nostrils three times a day PRN Nasal Congestion      Vital Signs Last 24 Hrs  T(F): 97.5 (05-15-19 @ 12:00), Max: 98.1 (05-14-19 @ 20:09)  HR: 101 (05-15-19 @ 17:38) (100 - 106)  BP: 156/98 (05-15-19 @ 17:38) (143/84 - 160/80)  RR: 20 (05-15-19 @ 12:00) (20 - 20)  SpO2: 100% (05-15-19 @ 12:48) (90% - 100%)  Telemetry:   CAPILLARY BLOOD GLUCOSE      POCT Blood Glucose.: 221 mg/dL (15 May 2019 17:29)  POCT Blood Glucose.: 233 mg/dL (15 May 2019 11:36)  POCT Blood Glucose.: 197 mg/dL (15 May 2019 07:08)  POCT Blood Glucose.: 240 mg/dL (14 May 2019 21:27)    I&O's Summary    14 May 2019 07:01  -  15 May 2019 07:00  --------------------------------------------------------  IN: 960 mL / OUT: 2500 mL / NET: -1540 mL    15 May 2019 07:01  -  15 May 2019 18:11  --------------------------------------------------------  IN: 780 mL / OUT: 1100 mL / NET: -320 mL        PHYSICAL EXAM:  GENERAL: NAD, well-developed  HEAD:  Atraumatic, Normocephalic  EYES: EOMI, PERRLA, conjunctiva and sclera clear  NECK: Supple, No JVD  CHEST/LUNG: Clear to auscultation bilaterally; No wheeze  HEART: Regular rate and rhythm; No murmurs, rubs, or gallops  ABDOMEN: Soft, Nontender, Nondistended; Bowel sounds present  EXTREMITIES:  2+ Peripheral Pulses, No clubbing, cyanosis, or edema  PSYCH: AAOx3  NEUROLOGY: non-focal  SKIN: No rashes or lesions    LABS:                        10.7   15.16 )-----------( 168      ( 15 May 2019 10:23 )             34.5     05-15    136  |  94<L>  |  61<H>  ----------------------------<  215<H>  4.4   |  31  |  1.24    Ca    9.7      15 May 2019 05:48      PT/INR - ( 15 May 2019 09:31 )   PT: 15.3 sec;   INR: 1.32 ratio                   RADIOLOGY & ADDITIONAL TESTS:    Imaging Personally Reviewed:    Consultant(s) Notes Reviewed:      Care Discussed with Consultants/Other Providers:

## 2019-05-15 NOTE — DIETITIAN INITIAL EVALUATION ADULT. - PERTINENT MEDS FT
Lantus, Humalog, solumedrol, lasix, colace, synthroid, multivitamin, Protonix, Miralax PRN, FLEET enema PRN, senna, aldacton Lantus, Humalog, solumedrol, lasix, colace, synthroid, multivitamin, Protonix, Miralax PRN, FLEET enema PRN, senna, aldactone

## 2019-05-15 NOTE — PROGRESS NOTE ADULT - ASSESSMENT
79 yo woman w multiple medical problems outline in [place] being admitted for HMPV tracheobronchitiswith acute on chronic hypoxemic respiratory failure and acute on chronic diastolic CHF.   Respiratory status w ongoing bronchospasm today though improving , decrease to medrol 20 mg q8H as per pulmonary, cont  lasix 40 mg to po, s/p acute on chronic CHF, now resolved,  duonebs q4h prn, cont observing off ABx, close watch to her finger sticks 2/2 being on steroids. 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 epogen up to renal  card, endo, pulm, wound, id and renal consults appreciated  ptn had a recent Echo no need to repeat it.   cont coumadin and daily Pt/INR check.,   post RLE quad tendon reconstruction, post op al brace to remain on for 2 more weeks.  cont rest of outptn meds.

## 2019-05-15 NOTE — PROGRESS NOTE ADULT - ATTENDING COMMENTS
as above-improved since 5/10-can change pred 40mg today  Multifactorial resp insufficiency- obesity hypoventilation, copd/asthma, Cards, debility--unable to tolerate bipap--continue O2 NC  Reactive airway disease in setting of hMPV infection. Would monitor off antibiotics. Taper to pred 40mg per day. Duonebs Q6 ATC for now and  pulmicort l.5 bid  Cards-as per Nelli  OSAS-refused w/up and rx  ILDZ-CT unrevealing  PPI/OOB/PT                           DC planning -middle of week.  Vasc f/up; PT  Fito Montgomery MD-Pulmonary   [phone]

## 2019-05-15 NOTE — PROGRESS NOTE ADULT - SUBJECTIVE AND OBJECTIVE BOX
CHIEF COMPLAINT: f/up sob, HMPV infection, PAH, obesity hypoventilation, copd exacerbation, ILDZ-improvement continues--want brace off      Interval Events: none    REVIEW OF SYSTEMS:  Constitutional: No fevers or chills. No weight loss. + fatigue or generalized malaise.  Eyes: No itching or discharge from the eyes  ENT: No ear pain. No ear discharge. No nasal congestion. No post nasal drip. No epistaxis. No throat pain. No sore throat. No difficulty swallowing.   CV: No chest pain. No palpitations. No lightheadedness or dizziness.   Resp: No dyspnea at rest. + dyspnea on exertion. No orthopnea. No wheezing. + cough. No stridor. No sputum production. No chest pain with respiration.  GI: No nausea. No vomiting. No diarrhea.  MSK: + joint pain or pain in any extremities  Integumentary: No skin lesions. + pedal edema.  Neurological: + gross motor weakness. No sensory changes.  [+ ] All other systems negative  [ ] Unable to assess ROS because ________    OBJECTIVE:  ICU Vital Signs Last 24 Hrs  T(C): 36.6 (15 May 2019 05:11), Max: 36.9 (14 May 2019 12:05)  T(F): 97.8 (15 May 2019 05:11), Max: 98.5 (14 May 2019 12:05)  HR: 100 (15 May 2019 05:11) (100 - 109)  BP: 156/84 (15 May 2019 05:11) (150/78 - 158/85)  BP(mean): --  ABP: --  ABP(mean): --  RR: 20 (15 May 2019 05:11) (20 - 20)  SpO2: 100% (15 May 2019 05:11) (98% - 100%)        13 @ 07:  -  -14 @ 07:00  --------------------------------------------------------  IN: 960 mL / OUT: 1500 mL / NET: -540 mL     @ 07:01  -  15 @ 05:11  --------------------------------------------------------  IN: 960 mL / OUT: 1300 mL / NET: -340 mL      CAPILLARY BLOOD GLUCOSE      POCT Blood Glucose.: 240 mg/dL (14 May 2019 21:27)      PHYSICAL EXAM: NAD   General: Awake, alert, oriented X 3.   HEENT: Atraumatic, normocephalic.                 Mallampatti Grade 3                No nasal congestion.                No tonsillar or pharyngeal exudates.  Lymph Nodes: No palpable lymphadenopathy  Neck: No JVD. No carotid bruit.   Respiratory: Normal chest expansion                         Normal percussion                         Normal and equal air entry                         + wheeze, rhonchi or rales.  Cardiovascular: S1 S2 normal. No murmurs, rubs or gallops.   Abdomen: Soft, non-tender, non-distended. No organomegaly. Normoactive bowel sounds.  Extremities: Warm to touch. Peripheral pulse palpable. + pedal edema.   Skin: No rashes or skin lesions  Neurological: Motor and sensory examination equal and normal in lower extremities.  Psychiatry: Appropriate mood and affect.    HOSPITAL MEDICATIONS:  MEDICATIONS  (STANDING):  ALBUTerol/ipratropium for Nebulization. 3 milliLiter(s) Nebulizer every 6 hours  ALPRAZolam 0.25 milliGRAM(s) Oral two times a day  amiodarone    Tablet 200 milliGRAM(s) Oral daily  buDESOnide   0.5 milliGRAM(s) Respule 0.5 milliGRAM(s) Inhalation every 12 hours  carvedilol 6.25 milliGRAM(s) Oral every 12 hours  cholecalciferol 1000 Unit(s) Oral daily  dextrose 5%. 1000 milliLiter(s) (50 mL/Hr) IV Continuous <Continuous>  dextrose 50% Injectable 12.5 Gram(s) IV Push once  dextrose 50% Injectable 25 Gram(s) IV Push once  dextrose 50% Injectable 25 Gram(s) IV Push once  docusate sodium 100 milliGRAM(s) Oral two times a day  DULoxetine 60 milliGRAM(s) Oral daily  fenofibrate Tablet 145 milliGRAM(s) Oral daily  furosemide    Tablet 40 milliGRAM(s) Oral two times a day  insulin glargine Injectable (LANTUS) 40 Unit(s) SubCutaneous at bedtime  insulin lispro (HumaLOG) corrective regimen sliding scale   SubCutaneous three times a day before meals  insulin lispro (HumaLOG) corrective regimen sliding scale   SubCutaneous at bedtime  insulin lispro Injectable (HumaLOG) 8 Unit(s) SubCutaneous three times a day before meals  isosorbide   mononitrate ER Tablet (IMDUR) 30 milliGRAM(s) Oral daily  levothyroxine 88 MICROGram(s) Oral daily  levothyroxine 100 MICROGram(s) Oral daily  melatonin 10 milliGRAM(s) Oral at bedtime  mesalamine DR (24-Hour) Tablet 2.4 Gram(s) Oral daily  methylPREDNISolone sodium succinate Injectable 20 milliGRAM(s) IV Push every 8 hours  methylPREDNISolone sodium succinate Injectable   IV Push   multivitamin 1 Tablet(s) Oral daily  pantoprazole    Tablet 40 milliGRAM(s) Oral before breakfast  senna 2 Tablet(s) Oral at bedtime  spironolactone 25 milliGRAM(s) Oral daily    MEDICATIONS  (PRN):  acetaminophen   Tablet .. 650 milliGRAM(s) Oral every 6 hours PRN Temp greater or equal to 38C (100.4F), Mild Pain (1 - 3)  ALBUTerol/ipratropium for Nebulization 3 milliLiter(s) Nebulizer every 4 hours PRN Shortness of Breath and/or Wheezing  bisacodyl Suppository 10 milliGRAM(s) Rectal daily PRN Constipation  dextrose 40% Gel 15 Gram(s) Oral once PRN Blood Glucose LESS THAN 70 milliGRAM(s)/deciliter  glucagon  Injectable 1 milliGRAM(s) IntraMuscular once PRN Glucose LESS THAN 70 milligrams/deciliter  guaiFENesin    Syrup 200 milliGRAM(s) Oral every 6 hours PRN Cough  polyethylene glycol 3350 17 Gram(s) Oral daily PRN Constipation  saline laxative (FLEET) Rectal Enema 1 Enema Rectal daily PRN for constipation  sodium chloride 0.65% Nasal 1 Spray(s) Both Nostrils three times a day PRN Nasal Congestion      LABS:        140  |  97  |  61<H>  ----------------------------<  166<H>  4.6   |  30  |  1.35<H>    Ca    9.9      14 May 2019 06:00      PT/INR - ( 14 May 2019 08:54 )   PT: 16.4 sec;   INR: 1.41 ratio         PTT - ( 13 May 2019 08:46 )  PTT:30.8 sec  Urinalysis Basic - ( 13 May 2019 06:51 )    Color: Yellow / Appearance: Clear / S.016 / pH: x  Gluc: x / Ketone: Negative  / Bili: Negative / Urobili: <2 mg/dL   Blood: x / Protein: Trace / Nitrite: Negative   Leuk Esterase: Moderate / RBC: 2 /HPF / WBC 9 /HPF   Sq Epi: x / Non Sq Epi: 1 /HPF / Bacteria: Occasional            MICROBIOLOGY:     RADIOLOGY:  [ ] Reviewed and interpreted by me    Point of Care Ultrasound Findings:    PFT:    EKG:

## 2019-05-15 NOTE — CHART NOTE - NSCHARTNOTEFT_GEN_A_CORE
Notified by RN of pt's INR 1.31. Spoke with Dr. Lopez, as per attending pt to continue with coumadin, no need to bridge with heparin. Will continue to monitor.       Matt Rivera NP  [phone]

## 2019-05-15 NOTE — DIETITIAN INITIAL EVALUATION ADULT. - SOURCE
electronic medical record/patient [No falls in past year] : Patient reported no falls in the past year [] : No [de-identified] : cardio

## 2019-05-15 NOTE — DIETITIAN INITIAL EVALUATION ADULT. - NS AS NUTRI INTERV ED CONTENT
RD reinforced low Na education - encouraging no use of salt at the table, label reading, "low sodium," <140 mg sodium per serving.  Also reviewed consistent carbohydrate intake using hospital menu as example, encouraged avoidance of concentrated sweets, portion control for BG and weight loss, pairing protein with carbohydrate, adequate protein intake/sources of protein.  Pt declined written materials for T2DM and sodium.  RD reviewed vitaminK/coumadin - encouraged consistent intake, reviewed high vitamin K foods - encouraged raw vs. cooked, mixing to extend portion size of salads.  Coumadin booklet provided.  Pt made aware RD remains available PRN.

## 2019-05-15 NOTE — DIETITIAN INITIAL EVALUATION ADULT. - NS AS NUTRI INTERV MEDICAL AND FOOD SUPPLEMENTS
Pt amenable to sugar free high protein gelatin; made aware RD remains available for preferences to optimize intake.

## 2019-05-15 NOTE — PROGRESS NOTE ADULT - SUBJECTIVE AND OBJECTIVE BOX
Patient is a 78y old  Female who presents with a chief complaint of DYSPNEA, COUGH (15 May 2019 05:11)    She feels improved. Reduced cough. She denies c/o chest pain, SOB or palpitations.    Allergies    Augmentin (Swelling)  cephalexin (Swelling)  latex (Rash)    Intolerances      MEDICATIONS  (STANDING):  ALBUTerol/ipratropium for Nebulization. 3 milliLiter(s) Nebulizer every 6 hours  ALPRAZolam 0.25 milliGRAM(s) Oral two times a day  amiodarone    Tablet 200 milliGRAM(s) Oral daily  buDESOnide   0.5 milliGRAM(s) Respule 0.5 milliGRAM(s) Inhalation every 12 hours  carvedilol 6.25 milliGRAM(s) Oral every 12 hours  cholecalciferol 1000 Unit(s) Oral daily  dextrose 5%. 1000 milliLiter(s) (50 mL/Hr) IV Continuous <Continuous>  dextrose 50% Injectable 12.5 Gram(s) IV Push once  dextrose 50% Injectable 25 Gram(s) IV Push once  dextrose 50% Injectable 25 Gram(s) IV Push once  docusate sodium 100 milliGRAM(s) Oral two times a day  DULoxetine 60 milliGRAM(s) Oral daily  fenofibrate Tablet 145 milliGRAM(s) Oral daily  furosemide    Tablet 40 milliGRAM(s) Oral two times a day  insulin glargine Injectable (LANTUS) 40 Unit(s) SubCutaneous at bedtime  insulin lispro (HumaLOG) corrective regimen sliding scale   SubCutaneous three times a day before meals  insulin lispro (HumaLOG) corrective regimen sliding scale   SubCutaneous at bedtime  insulin lispro Injectable (HumaLOG) 8 Unit(s) SubCutaneous three times a day before meals  isosorbide   mononitrate ER Tablet (IMDUR) 30 milliGRAM(s) Oral daily  levothyroxine 88 MICROGram(s) Oral daily  levothyroxine 100 MICROGram(s) Oral daily  melatonin 10 milliGRAM(s) Oral at bedtime  mesalamine DR (24-Hour) Tablet 2.4 Gram(s) Oral daily  methylPREDNISolone sodium succinate Injectable 20 milliGRAM(s) IV Push every 8 hours  methylPREDNISolone sodium succinate Injectable   IV Push   multivitamin 1 Tablet(s) Oral daily  pantoprazole    Tablet 40 milliGRAM(s) Oral before breakfast  senna 2 Tablet(s) Oral at bedtime  spironolactone 25 milliGRAM(s) Oral daily    MEDICATIONS  (PRN):  acetaminophen   Tablet .. 650 milliGRAM(s) Oral every 6 hours PRN Temp greater or equal to 38C (100.4F), Mild Pain (1 - 3)  ALBUTerol/ipratropium for Nebulization 3 milliLiter(s) Nebulizer every 4 hours PRN Shortness of Breath and/or Wheezing  bisacodyl Suppository 10 milliGRAM(s) Rectal daily PRN Constipation  dextrose 40% Gel 15 Gram(s) Oral once PRN Blood Glucose LESS THAN 70 milliGRAM(s)/deciliter  glucagon  Injectable 1 milliGRAM(s) IntraMuscular once PRN Glucose LESS THAN 70 milligrams/deciliter  guaiFENesin    Syrup 200 milliGRAM(s) Oral every 6 hours PRN Cough  polyethylene glycol 3350 17 Gram(s) Oral daily PRN Constipation  saline laxative (FLEET) Rectal Enema 1 Enema Rectal daily PRN for constipation  sodium chloride 0.65% Nasal 1 Spray(s) Both Nostrils three times a day PRN Nasal Congestion      PHYSICAL EXAM:  Vital Signs Last 24 Hrs  T(C): 36.6 (15 May 2019 05:11), Max: 36.9 (14 May 2019 12:05)  T(F): 97.8 (15 May 2019 05:11), Max: 98.5 (14 May 2019 12:05)  HR: 100 (15 May 2019 05:11) (100 - 109)  BP: 156/84 (15 May 2019 05:11) (150/78 - 158/85)  BP(mean): --  RR: 20 (15 May 2019 05:11) (20 - 20)  SpO2: 100% (15 May 2019 05:11) (98% - 100%)  Daily     Daily   I&O's Summary    13 May 2019 07:01  -  14 May 2019 07:00  --------------------------------------------------------  IN: 960 mL / OUT: 1500 mL / NET: -540 mL    14 May 2019 07:01  -  15 May 2019 06:54  --------------------------------------------------------  IN: 960 mL / OUT: 1300 mL / NET: -340 mL        General Appearance: 	 Alert, cooperative, no distress  HEENT: normocephalic, atraumatic  Neck: no JVD,  carotid 2+  bilaterally without bruit  Lungs: few coarse BS bilaterally  Cor:  pmi 5th ICS MCL, regular rate and rhythm, S1 normal intensity, S2 normal intensity, no gallops, murmurs or rubs  Abdomen: soft, non-tender; bowel sounds normal; no masses,  no organomegaly  Extremities: without cyanosis, clubbing, 1+ LE edema  Vasc: 1-+ PT and DP pulses; venous stasis changes    Labs:    05-15    136  |  94<L>  |  61<H>  ----------------------------<  215<H>  4.4   |  31  |  1.24    Ca    9.7      15 May 2019 05:48          PT/INR - ( 14 May 2019 08:54 )   PT: 16.4 sec;   INR: 1.41 ratio         PTT - ( 13 May 2019 08:46 )  PTT:30.8 sec      Radiology/Imaging:                Feliz Aiken MD [facility]  [phone]

## 2019-05-15 NOTE — DIETITIAN INITIAL EVALUATION ADULT. - ORAL INTAKE PTA
Pt admitted from [facility], states appetite/intake were "okay."  Pt confirms NKFA, and denies micronutrient supplementation PTA./fair

## 2019-05-15 NOTE — PROGRESS NOTE ADULT - ASSESSMENT
78y Female with history of bioprosthetic MVR, chronic diastolic CHF, PAF, VICKY/Obesity, CKD, DM and PAD sent to ER due to SOB. Micro c/w MHNP viral infection. She is improved. No CHF, Renal function stable.    Rec:  Continue CV meds  Treatment per pulmonary and ID  Monitor BT  D/C planning likely to [facility]

## 2019-05-15 NOTE — CHART NOTE - NSCHARTNOTEFT_GEN_A_CORE
Patient has not been able to tolerate Bipap since admission 5/9.  Provider is being asked by Respiratory to D/C bipap order per their policy as pt has not been using it.  Currently patient on 02 therapy via nasal canula, tolerating well, 02 sat 98%. HD stable. Denies complaints at this time.  Patient being followed by Pulmonary who also notes pt unable to tolerate Bipap and recommending 02 via nasal canula.   Will D/C order at this time and if pt's condition changes, reorder it as needed.  Continue with steroids and nebs.  F/u with Pulmonary in am  F/u with primary team in am  Discussed with RN to notify provider with any changes in pt's status    Rebecca Gardiner NP  Medicine  [phone]

## 2019-05-15 NOTE — DIETITIAN INITIAL EVALUATION ADULT. - OTHER INFO
Pt seen for initial nutrition assessment for hospital length of stay / BMI >40.  Pt seen by RD during previous admission (April 2019) and able to teach back points from low Na education.  RD reinforced not using salt at the table, "low sodium" and label reading.  Also reinforced consistent carbohydrate intake using hospital menu as example.  Patient notes that she was eating "bad" PTA/prior to [facility] - ice cream, pastries.  Noted HgbA1c 7.4% in March 2019.  RD encouraged consistent carbohydrate intake, portion control, pairing carbohydrate and protein, adequate protein intake, avoidance of concentrated sweets for improved BG control and weight loss. Pt declined written materials.  Pt denies nausea/ vomiting, chewing/swallowing issues, last BM yesterday per flowsheets -denies diarrhea/constipation at present, however, noted bowel regimen ordered.  RD reviewed menu selections, obtained limited preferences - Pt amenable to jello (high protein, sugar free). Pt seen for initial nutrition assessment for hospital length of stay / BMI >40.  Pt seen by RD during previous admission (April 2019) and able to teach back points from low Na education.  RD reinforced not using salt at the table, "low sodium" and label reading.  Also reinforced consistent carbohydrate intake (especially in context of prednisone) using hospital menu as example.  Patient notes that she was eating "bad" PTA/prior to [facility] - ice cream, pastries.  Noted HgbA1c 7.4% in March 2019.  RD encouraged consistent carbohydrate intake, portion control, pairing carbohydrate and protein, adequate protein intake, avoidance of concentrated sweets for improved BG control and weight loss. Pt declined written materials.  Pt denies nausea/ vomiting, chewing/swallowing issues, last BM yesterday per flowsheets -denies diarrhea/constipation at present, however, noted bowel regimen ordered.  RD reviewed menu selections, obtained limited preferences - Pt amenable to jello (high protein, sugar free).  RD also reviewed coumadin booklet and vitamin K - encouraged consistent intake. Pt seen for initial nutrition assessment for hospital length of stay / BMI >40.  Pt seen by RD during previous admission (April 2019) and able to teach back points from low Na education.  RD reinforced not using salt at the table, "low sodium" and label reading.  Also reinforced consistent carbohydrate intake (especially in context of steroids) using hospital menu as example.  Patient notes that she was eating "bad" PTA/prior to [facility] - ice cream, pastries.  Noted HgbA1c 7.4% in March 2019.  RD encouraged consistent carbohydrate intake, portion control, pairing carbohydrate and protein, adequate protein intake, avoidance of concentrated sweets for improved BG control and weight loss. Pt declined written materials.  Pt denies nausea/ vomiting, chewing/swallowing issues, last BM yesterday per flowsheets -denies diarrhea/constipation at present, however, noted bowel regimen ordered.  RD reviewed menu selections, obtained limited preferences - Pt amenable to jello (high protein, sugar free).  RD also reviewed coumadin booklet and vitamin K - encouraged consistent intake.

## 2019-05-15 NOTE — DIETITIAN INITIAL EVALUATION ADULT. - ADHERENCE
At [facility], Pt believes she was on carbohydrate restriction and low salt (notes she did not receive salt with foods).

## 2019-05-15 NOTE — DIETITIAN INITIAL EVALUATION ADULT. - PHYSICAL APPEARANCE
obese/Pt with morbid obesity per MD notes.  BMI 41.6 kg/m2 per dosing weight.  Per [facility] records, weights relatively stable (fluctuations likely in context of CHF): 251.9 lbs on 3/31/19, 243.1 lbs on 4/7/19, 251.3 lbs on 5/9/19.

## 2019-05-15 NOTE — DIETITIAN INITIAL EVALUATION ADULT. - ENERGY NEEDS
ht: 65 inches, weight: 249.5 lbs, BMI: 41.6 kg/m2, IBW: 125 lbs (+/- 10%), %IBW: 200%  Edema: 2+ L/R leg  Skin per nursing documentation: "weeping bilateral leg wounds" (wound care note)  GI: last BM 5/14/19  Per chart: 78y Female with history of bioprosthetic MVR, chronic diastolic CHF, PAF, VICKY/Obesity, CKD, DM and PAD sent to ER due to SOB. Micro c/w  viral infection ht: 65 inches, weight: 249.5 lbs, BMI: 41.6 kg/m2, IBW: 125 lbs (+/- 10%), %IBW: 200%  Edema: 2+ L/R leg  Skin per nursing documentation: "weeping bilateral leg wounds" (wound care note)  GI: last BM 5/14/19  Per chart: 78y Female with history of bioprosthetic MVR, chronic diastolic CHF, PAF, VICKY/Obesity, CKD, DM and PAD sent to ER due to SOB. Micro c/w  viral infection.  Endocrinology following for insulin resistance.

## 2019-05-15 NOTE — DIETITIAN INITIAL EVALUATION ADULT. - FACTORS AFF FOOD INTAKE
Pt states appetite/intake are improving, but ate better at [facility].  Per flowsheets % intake overall - 60-80 most recently.

## 2019-05-15 NOTE — PROGRESS NOTE ADULT - ASSESSMENT
79 yo F w/ hx of moderate MS s/p bioprosthetic mitral valve, severe pulmonary HTN, chronic hypoxemic respiratory failure and suspected ILD, DM2, anemia, diastolic CHF, hypothyroidism, paroxysmal atrial fibrillation, HTN presenting with progressive SOB 2/2 human metapneumovirus    5/10-slightly better-(bipap unable to tolerate)  5/13-slightly better  5/14-remains sob/brochospastic  5/15-better--will change to PO prednisone 40

## 2019-05-16 ENCOUNTER — TRANSCRIPTION ENCOUNTER (OUTPATIENT)
Age: 79
End: 2019-05-16

## 2019-05-16 VITALS
TEMPERATURE: 98 F | DIASTOLIC BLOOD PRESSURE: 84 MMHG | SYSTOLIC BLOOD PRESSURE: 125 MMHG | HEART RATE: 101 BPM | OXYGEN SATURATION: 98 %

## 2019-05-16 LAB
ANION GAP SERPL CALC-SCNC: 13 MMOL/L — SIGNIFICANT CHANGE UP (ref 5–17)
BUN SERPL-MCNC: 61 MG/DL — HIGH (ref 7–23)
CALCIUM SERPL-MCNC: 9.9 MG/DL — SIGNIFICANT CHANGE UP (ref 8.4–10.5)
CHLORIDE SERPL-SCNC: 97 MMOL/L — SIGNIFICANT CHANGE UP (ref 96–108)
CO2 SERPL-SCNC: 30 MMOL/L — SIGNIFICANT CHANGE UP (ref 22–31)
CREAT SERPL-MCNC: 1.28 MG/DL — SIGNIFICANT CHANGE UP (ref 0.5–1.3)
GLUCOSE BLDC GLUCOMTR-MCNC: 228 MG/DL — HIGH (ref 70–99)
GLUCOSE BLDC GLUCOMTR-MCNC: 235 MG/DL — HIGH (ref 70–99)
GLUCOSE SERPL-MCNC: 276 MG/DL — HIGH (ref 70–99)
INR BLD: 1.28 RATIO — HIGH (ref 0.88–1.16)
POTASSIUM SERPL-MCNC: 4.5 MMOL/L — SIGNIFICANT CHANGE UP (ref 3.5–5.3)
POTASSIUM SERPL-SCNC: 4.5 MMOL/L — SIGNIFICANT CHANGE UP (ref 3.5–5.3)
PROTHROM AB SERPL-ACNC: 14.7 SEC — HIGH (ref 10–13.1)
SODIUM SERPL-SCNC: 140 MMOL/L — SIGNIFICANT CHANGE UP (ref 135–145)

## 2019-05-16 PROCEDURE — 83550 IRON BINDING TEST: CPT

## 2019-05-16 PROCEDURE — 85730 THROMBOPLASTIN TIME PARTIAL: CPT

## 2019-05-16 PROCEDURE — 85610 PROTHROMBIN TIME: CPT

## 2019-05-16 PROCEDURE — 93005 ELECTROCARDIOGRAM TRACING: CPT

## 2019-05-16 PROCEDURE — 97116 GAIT TRAINING THERAPY: CPT

## 2019-05-16 PROCEDURE — 83880 ASSAY OF NATRIURETIC PEPTIDE: CPT

## 2019-05-16 PROCEDURE — 99285 EMERGENCY DEPT VISIT HI MDM: CPT

## 2019-05-16 PROCEDURE — 87581 M.PNEUMON DNA AMP PROBE: CPT

## 2019-05-16 PROCEDURE — 36600 WITHDRAWAL OF ARTERIAL BLOOD: CPT

## 2019-05-16 PROCEDURE — 83735 ASSAY OF MAGNESIUM: CPT

## 2019-05-16 PROCEDURE — 84439 ASSAY OF FREE THYROXINE: CPT

## 2019-05-16 PROCEDURE — 82728 ASSAY OF FERRITIN: CPT

## 2019-05-16 PROCEDURE — 71045 X-RAY EXAM CHEST 1 VIEW: CPT

## 2019-05-16 PROCEDURE — 84100 ASSAY OF PHOSPHORUS: CPT

## 2019-05-16 PROCEDURE — 87633 RESP VIRUS 12-25 TARGETS: CPT

## 2019-05-16 PROCEDURE — 84443 ASSAY THYROID STIM HORMONE: CPT

## 2019-05-16 PROCEDURE — 83540 ASSAY OF IRON: CPT

## 2019-05-16 PROCEDURE — 82803 BLOOD GASES ANY COMBINATION: CPT

## 2019-05-16 PROCEDURE — 87486 CHLMYD PNEUM DNA AMP PROBE: CPT

## 2019-05-16 PROCEDURE — 84145 PROCALCITONIN (PCT): CPT

## 2019-05-16 PROCEDURE — 97162 PT EVAL MOD COMPLEX 30 MIN: CPT

## 2019-05-16 PROCEDURE — 85027 COMPLETE CBC AUTOMATED: CPT

## 2019-05-16 PROCEDURE — 99232 SBSQ HOSP IP/OBS MODERATE 35: CPT

## 2019-05-16 PROCEDURE — 82962 GLUCOSE BLOOD TEST: CPT

## 2019-05-16 PROCEDURE — 94660 CPAP INITIATION&MGMT: CPT

## 2019-05-16 PROCEDURE — 80048 BASIC METABOLIC PNL TOTAL CA: CPT

## 2019-05-16 PROCEDURE — 81001 URINALYSIS AUTO W/SCOPE: CPT

## 2019-05-16 PROCEDURE — 87798 DETECT AGENT NOS DNA AMP: CPT

## 2019-05-16 PROCEDURE — 84484 ASSAY OF TROPONIN QUANT: CPT

## 2019-05-16 PROCEDURE — 94640 AIRWAY INHALATION TREATMENT: CPT

## 2019-05-16 PROCEDURE — 80053 COMPREHEN METABOLIC PANEL: CPT

## 2019-05-16 RX ORDER — INSULIN LISPRO 100/ML
0 VIAL (ML) SUBCUTANEOUS
Qty: 0 | Refills: 0 | DISCHARGE

## 2019-05-16 RX ORDER — MESALAMINE 400 MG
2 TABLET, DELAYED RELEASE (ENTERIC COATED) ORAL
Qty: 0 | Refills: 0 | DISCHARGE

## 2019-05-16 RX ORDER — DOCUSATE SODIUM 100 MG
1 CAPSULE ORAL
Qty: 0 | Refills: 0 | DISCHARGE
Start: 2019-05-16

## 2019-05-16 RX ORDER — ACETAMINOPHEN 500 MG
2 TABLET ORAL
Qty: 0 | Refills: 0 | DISCHARGE

## 2019-05-16 RX ORDER — IPRATROPIUM/ALBUTEROL SULFATE 18-103MCG
3 AEROSOL WITH ADAPTER (GRAM) INHALATION
Qty: 0 | Refills: 0 | DISCHARGE
Start: 2019-05-16

## 2019-05-16 RX ORDER — SPIRONOLACTONE 25 MG/1
1 TABLET, FILM COATED ORAL
Qty: 0 | Refills: 0 | DISCHARGE

## 2019-05-16 RX ORDER — INSULIN LISPRO 100/ML
24 VIAL (ML) SUBCUTANEOUS
Qty: 0 | Refills: 0 | DISCHARGE

## 2019-05-16 RX ORDER — IPRATROPIUM BROMIDE 0.2 MG/ML
2.5 SOLUTION, NON-ORAL INHALATION
Qty: 0 | Refills: 0 | DISCHARGE

## 2019-05-16 RX ORDER — WARFARIN SODIUM 2.5 MG/1
1 TABLET ORAL
Qty: 0 | Refills: 0 | DISCHARGE

## 2019-05-16 RX ORDER — INSULIN LISPRO 100/ML
8 VIAL (ML) SUBCUTANEOUS
Qty: 0 | Refills: 0 | DISCHARGE

## 2019-05-16 RX ORDER — OMEPRAZOLE 10 MG/1
1 CAPSULE, DELAYED RELEASE ORAL
Qty: 0 | Refills: 0 | DISCHARGE

## 2019-05-16 RX ORDER — SPIRONOLACTONE 25 MG/1
1 TABLET, FILM COATED ORAL
Qty: 0 | Refills: 0 | DISCHARGE
Start: 2019-05-16

## 2019-05-16 RX ORDER — LANOLIN/MINERAL OIL
1 LOTION (ML) TOPICAL
Qty: 0 | Refills: 0 | DISCHARGE

## 2019-05-16 RX ORDER — MAGNESIUM HYDROXIDE 400 MG/1
30 TABLET, CHEWABLE ORAL
Qty: 0 | Refills: 0 | DISCHARGE

## 2019-05-16 RX ORDER — LANOLIN ALCOHOL/MO/W.PET/CERES
2 CREAM (GRAM) TOPICAL
Qty: 0 | Refills: 0 | DISCHARGE

## 2019-05-16 RX ORDER — WARFARIN SODIUM 2.5 MG/1
0.5 TABLET ORAL
Qty: 0 | Refills: 0 | DISCHARGE

## 2019-05-16 RX ORDER — LANOLIN ALCOHOL/MO/W.PET/CERES
2 CREAM (GRAM) TOPICAL
Qty: 0 | Refills: 0 | DISCHARGE
Start: 2019-05-16

## 2019-05-16 RX ORDER — FUROSEMIDE 40 MG
1 TABLET ORAL
Qty: 0 | Refills: 0 | DISCHARGE
Start: 2019-05-16

## 2019-05-16 RX ADMIN — DULOXETINE HYDROCHLORIDE 60 MILLIGRAM(S): 30 CAPSULE, DELAYED RELEASE ORAL at 12:15

## 2019-05-16 RX ADMIN — ISOSORBIDE MONONITRATE 30 MILLIGRAM(S): 60 TABLET, EXTENDED RELEASE ORAL at 12:15

## 2019-05-16 RX ADMIN — Medication 4: at 12:04

## 2019-05-16 RX ADMIN — Medication 88 MICROGRAM(S): at 06:07

## 2019-05-16 RX ADMIN — Medication 40 MILLIGRAM(S): at 12:10

## 2019-05-16 RX ADMIN — Medication 40 MILLIGRAM(S): at 06:07

## 2019-05-16 RX ADMIN — Medication 8 UNIT(S): at 12:05

## 2019-05-16 RX ADMIN — Medication 145 MILLIGRAM(S): at 12:15

## 2019-05-16 RX ADMIN — Medication 1 TABLET(S): at 12:14

## 2019-05-16 RX ADMIN — PANTOPRAZOLE SODIUM 40 MILLIGRAM(S): 20 TABLET, DELAYED RELEASE ORAL at 06:07

## 2019-05-16 RX ADMIN — Medication 4: at 07:45

## 2019-05-16 RX ADMIN — Medication 0.25 MILLIGRAM(S): at 06:08

## 2019-05-16 RX ADMIN — AMIODARONE HYDROCHLORIDE 200 MILLIGRAM(S): 400 TABLET ORAL at 06:08

## 2019-05-16 RX ADMIN — Medication 1000 UNIT(S): at 12:14

## 2019-05-16 RX ADMIN — Medication 3 MILLILITER(S): at 12:13

## 2019-05-16 RX ADMIN — SPIRONOLACTONE 25 MILLIGRAM(S): 25 TABLET, FILM COATED ORAL at 06:07

## 2019-05-16 RX ADMIN — CARVEDILOL PHOSPHATE 6.25 MILLIGRAM(S): 80 CAPSULE, EXTENDED RELEASE ORAL at 06:07

## 2019-05-16 RX ADMIN — Medication 100 MILLIGRAM(S): at 06:07

## 2019-05-16 RX ADMIN — Medication 2.4 GRAM(S): at 12:14

## 2019-05-16 RX ADMIN — Medication 8 UNIT(S): at 07:45

## 2019-05-16 RX ADMIN — Medication 100 MICROGRAM(S): at 06:07

## 2019-05-16 NOTE — PROGRESS NOTE ADULT - PROBLEM SELECTOR PROBLEM 5
Morbid obesity with BMI of 45.0-49.9, adult

## 2019-05-16 NOTE — DISCHARGE NOTE PROVIDER - REASON FOR ADMISSION
DYSPNEA, COUGH - + metapneumovirus w/ tracheobronchitis, acue on chronic diastolic CHF, AOCD, quadricep tendon repair on 4/11 w/ brace on R leg when OOB & WBAT, chronic vaginal bleeding - has IUD from 4/9/2019

## 2019-05-16 NOTE — PROGRESS NOTE ADULT - ASSESSMENT
77 yo F w/ hx of moderate MS s/p bioprosthetic mitral valve, severe pulmonary HTN, chronic hypoxemic respiratory failure and suspected ILD, DM2, anemia, diastolic CHF, hypothyroidism, paroxysmal atrial fibrillation, HTN presenting with progressive SOB 2/2 human metapneumovirus    5/10-slightly better-(bipap unable to tolerate)  5/13-slightly better  5/14-remains sob/brochospastic  5/15-better--will change to PO prednisone 40  5/16-slow improvements

## 2019-05-16 NOTE — PROGRESS NOTE ADULT - SUBJECTIVE AND OBJECTIVE BOX
Patient is a 78y old  Female who presents with a chief complaint of DYSPNEA, COUGH - + metapneumovirus w/ tracheobronchitis, acue on chronic diastolic CHF, AOCD, quadricep tendon repair on 4/11 w/ brace on R leg when OOB & WBAT, chronic vaginal bleeding - has IUD from 4/9/2019 (16 May 2019 16:00)      SUBJECTIVE / OVERNIGHT EVENTS: no new events, awaiting DC to [facility]    MEDICATIONS  (STANDING):  ALBUTerol/ipratropium for Nebulization. 3 milliLiter(s) Nebulizer every 6 hours  ALPRAZolam 0.25 milliGRAM(s) Oral two times a day  amiodarone    Tablet 200 milliGRAM(s) Oral daily  buDESOnide   0.5 milliGRAM(s) Respule 0.5 milliGRAM(s) Inhalation every 12 hours  carvedilol 6.25 milliGRAM(s) Oral every 12 hours  cholecalciferol 1000 Unit(s) Oral daily  dextrose 5%. 1000 milliLiter(s) (50 mL/Hr) IV Continuous <Continuous>  dextrose 50% Injectable 12.5 Gram(s) IV Push once  dextrose 50% Injectable 25 Gram(s) IV Push once  dextrose 50% Injectable 25 Gram(s) IV Push once  docusate sodium 100 milliGRAM(s) Oral two times a day  DULoxetine 60 milliGRAM(s) Oral daily  fenofibrate Tablet 145 milliGRAM(s) Oral daily  furosemide    Tablet 40 milliGRAM(s) Oral two times a day  insulin glargine Injectable (LANTUS) 40 Unit(s) SubCutaneous at bedtime  insulin lispro (HumaLOG) corrective regimen sliding scale   SubCutaneous three times a day before meals  insulin lispro (HumaLOG) corrective regimen sliding scale   SubCutaneous at bedtime  insulin lispro Injectable (HumaLOG) 8 Unit(s) SubCutaneous three times a day before meals  isosorbide   mononitrate ER Tablet (IMDUR) 30 milliGRAM(s) Oral daily  levothyroxine 88 MICROGram(s) Oral daily  levothyroxine 100 MICROGram(s) Oral daily  melatonin 10 milliGRAM(s) Oral at bedtime  mesalamine DR (24-Hour) Tablet 2.4 Gram(s) Oral daily  multivitamin 1 Tablet(s) Oral daily  pantoprazole    Tablet 40 milliGRAM(s) Oral before breakfast  predniSONE   Tablet   Oral   predniSONE   Tablet 40 milliGRAM(s) Oral daily  senna 2 Tablet(s) Oral at bedtime  spironolactone 25 milliGRAM(s) Oral daily    MEDICATIONS  (PRN):  acetaminophen   Tablet .. 650 milliGRAM(s) Oral every 6 hours PRN Temp greater or equal to 38C (100.4F), Mild Pain (1 - 3)  ALBUTerol/ipratropium for Nebulization 3 milliLiter(s) Nebulizer every 4 hours PRN Shortness of Breath and/or Wheezing  bisacodyl Suppository 10 milliGRAM(s) Rectal daily PRN Constipation  dextrose 40% Gel 15 Gram(s) Oral once PRN Blood Glucose LESS THAN 70 milliGRAM(s)/deciliter  glucagon  Injectable 1 milliGRAM(s) IntraMuscular once PRN Glucose LESS THAN 70 milligrams/deciliter  guaiFENesin    Syrup 200 milliGRAM(s) Oral every 6 hours PRN Cough  polyethylene glycol 3350 17 Gram(s) Oral daily PRN Constipation  saline laxative (FLEET) Rectal Enema 1 Enema Rectal daily PRN for constipation  sodium chloride 0.65% Nasal 1 Spray(s) Both Nostrils three times a day PRN Nasal Congestion      Vital Signs Last 24 Hrs  T(F): 98.3 (05-16-19 @ 12:24), Max: 98.3 (05-16-19 @ 12:24)  HR: 101 (05-16-19 @ 12:24) (101 - 102)  BP: 125/84 (05-16-19 @ 12:24) (125/84 - 156/88)  RR: 18 (05-16-19 @ 04:22) (18 - 20)  SpO2: 98% (05-16-19 @ 12:24) (98% - 100%)  Telemetry:   CAPILLARY BLOOD GLUCOSE      POCT Blood Glucose.: 228 mg/dL (16 May 2019 11:07)  POCT Blood Glucose.: 235 mg/dL (16 May 2019 07:20)  POCT Blood Glucose.: 291 mg/dL (15 May 2019 21:21)    I&O's Summary    15 May 2019 07:01  -  16 May 2019 07:00  --------------------------------------------------------  IN: 1260 mL / OUT: 2850 mL / NET: -1590 mL    16 May 2019 07:01  -  16 May 2019 18:12  --------------------------------------------------------  IN: 840 mL / OUT: 450 mL / NET: 390 mL        PHYSICAL EXAM:  GENERAL: NAD, well-developed  HEAD:  Atraumatic, Normocephalic  EYES: EOMI, PERRLA, conjunctiva and sclera clear  NECK: Supple, No JVD  CHEST/LUNG: Clear to auscultation bilaterally; No wheeze  HEART: Regular rate and rhythm; No murmurs, rubs, or gallops  ABDOMEN: Soft, Nontender, Nondistended; Bowel sounds present  EXTREMITIES:  2+ Peripheral Pulses, No clubbing, cyanosis, or edema  PSYCH: AAOx3  NEUROLOGY: non-focal  SKIN: No rashes or lesions    LABS:                        10.7   15.16 )-----------( 168      ( 15 May 2019 10:23 )             34.5     05-16    140  |  97  |  61<H>  ----------------------------<  276<H>  4.5   |  30  |  1.28    Ca    9.9      16 May 2019 05:48      PT/INR - ( 16 May 2019 09:08 )   PT: 14.7 sec;   INR: 1.28 ratio                   RADIOLOGY & ADDITIONAL TESTS:    Imaging Personally Reviewed:    Consultant(s) Notes Reviewed:      Care Discussed with Consultants/Other Providers:

## 2019-05-16 NOTE — PROGRESS NOTE ADULT - SUBJECTIVE AND OBJECTIVE BOX
CHIEF COMPLAINT: f/up sob, HMPV infection, PAH, obesity hypoventilation, copd exacerbation, ILDZ-improvement continues-still cough in supine position    Interval Events: brace off    REVIEW OF SYSTEMS:  Constitutional: No fevers or chills. No weight loss. + fatigue or generalized malaise.  Eyes: No itching or discharge from the eyes  ENT: No ear pain. No ear discharge. No nasal congestion. No post nasal drip. No epistaxis. No throat pain. No sore throat. No difficulty swallowing.   CV: No chest pain. No palpitations. No lightheadedness or dizziness.   Resp: No dyspnea at rest. + dyspnea on exertion. No orthopnea. No wheezing. + cough. No stridor. No sputum production. No chest pain with respiration.  GI: No nausea. No vomiting. No diarrhea.  MSK: No joint pain or pain in any extremities  Integumentary: No skin lesions. No pedal edema.  Neurological: + gross motor weakness. No sensory changes.  [+ ] All other systems negative  [ ] Unable to assess ROS because ________    OBJECTIVE:  ICU Vital Signs Last 24 Hrs  T(C): 36.6 (16 May 2019 04:22), Max: 36.6 (15 May 2019 05:11)  T(F): 97.8 (16 May 2019 04:22), Max: 97.8 (15 May 2019 05:11)  HR: 102 (16 May 2019 04:22) (100 - 106)  BP: 156/88 (16 May 2019 04:22) (139/84 - 160/80)  BP(mean): --  ABP: --  ABP(mean): --  RR: 18 (16 May 2019 04:22) (18 - 20)  SpO2: 100% (16 May 2019 04:22) (90% - 100%)        05-14 @ 07:01  -  05-15 @ 07:00  --------------------------------------------------------  IN: 960 mL / OUT: 2500 mL / NET: -1540 mL    05-15 @ 07:01  -  05-16 @ 05:02  --------------------------------------------------------  IN: 1260 mL / OUT: 1650 mL / NET: -390 mL      CAPILLARY BLOOD GLUCOSE      POCT Blood Glucose.: 291 mg/dL (15 May 2019 21:21)      PHYSICAL EXAM: NAD n bed  General: Awake, alert, oriented X 3.   HEENT: Atraumatic, normocephalic.                 Mallampatti Grade 3                No nasal congestion.                No tonsillar or pharyngeal exudates.  Lymph Nodes: No palpable lymphadenopathy  Neck: No JVD. No carotid bruit.   Respiratory: Normal chest expansion                         Normal percussion                         Normal and equal air entry                         + wheeze, no rhonchi or rales.  Cardiovascular: S1 S2 normal. No murmurs, rubs or gallops.   Abdomen: Soft, non-tender, non-distended. No organomegaly. Normoactive bowel sounds.  Extremities: Warm to touch. Peripheral pulse palpable. + pedal edema.   Skin: No rashes or skin lesions  Neurological: Motor and sensory examination equal and normal in all four extremities.  Psychiatry: Appropriate mood and affect.    HOSPITAL MEDICATIONS:  MEDICATIONS  (STANDING):  ALBUTerol/ipratropium for Nebulization. 3 milliLiter(s) Nebulizer every 6 hours  ALPRAZolam 0.25 milliGRAM(s) Oral two times a day  amiodarone    Tablet 200 milliGRAM(s) Oral daily  buDESOnide   0.5 milliGRAM(s) Respule 0.5 milliGRAM(s) Inhalation every 12 hours  carvedilol 6.25 milliGRAM(s) Oral every 12 hours  cholecalciferol 1000 Unit(s) Oral daily  dextrose 5%. 1000 milliLiter(s) (50 mL/Hr) IV Continuous <Continuous>  dextrose 50% Injectable 12.5 Gram(s) IV Push once  dextrose 50% Injectable 25 Gram(s) IV Push once  dextrose 50% Injectable 25 Gram(s) IV Push once  docusate sodium 100 milliGRAM(s) Oral two times a day  DULoxetine 60 milliGRAM(s) Oral daily  fenofibrate Tablet 145 milliGRAM(s) Oral daily  furosemide    Tablet 40 milliGRAM(s) Oral two times a day  insulin glargine Injectable (LANTUS) 40 Unit(s) SubCutaneous at bedtime  insulin lispro (HumaLOG) corrective regimen sliding scale   SubCutaneous three times a day before meals  insulin lispro (HumaLOG) corrective regimen sliding scale   SubCutaneous at bedtime  insulin lispro Injectable (HumaLOG) 8 Unit(s) SubCutaneous three times a day before meals  isosorbide   mononitrate ER Tablet (IMDUR) 30 milliGRAM(s) Oral daily  levothyroxine 88 MICROGram(s) Oral daily  levothyroxine 100 MICROGram(s) Oral daily  melatonin 10 milliGRAM(s) Oral at bedtime  mesalamine DR (24-Hour) Tablet 2.4 Gram(s) Oral daily  multivitamin 1 Tablet(s) Oral daily  pantoprazole    Tablet 40 milliGRAM(s) Oral before breakfast  senna 2 Tablet(s) Oral at bedtime  spironolactone 25 milliGRAM(s) Oral daily    MEDICATIONS  (PRN):  acetaminophen   Tablet .. 650 milliGRAM(s) Oral every 6 hours PRN Temp greater or equal to 38C (100.4F), Mild Pain (1 - 3)  ALBUTerol/ipratropium for Nebulization 3 milliLiter(s) Nebulizer every 4 hours PRN Shortness of Breath and/or Wheezing  bisacodyl Suppository 10 milliGRAM(s) Rectal daily PRN Constipation  dextrose 40% Gel 15 Gram(s) Oral once PRN Blood Glucose LESS THAN 70 milliGRAM(s)/deciliter  glucagon  Injectable 1 milliGRAM(s) IntraMuscular once PRN Glucose LESS THAN 70 milligrams/deciliter  guaiFENesin    Syrup 200 milliGRAM(s) Oral every 6 hours PRN Cough  polyethylene glycol 3350 17 Gram(s) Oral daily PRN Constipation  saline laxative (FLEET) Rectal Enema 1 Enema Rectal daily PRN for constipation  sodium chloride 0.65% Nasal 1 Spray(s) Both Nostrils three times a day PRN Nasal Congestion      LABS:                        10.7   15.16 )-----------( 168      ( 15 May 2019 10:23 )             34.5     05-15    136  |  94<L>  |  61<H>  ----------------------------<  215<H>  4.4   |  31  |  1.24    Ca    9.7      15 May 2019 05:48      PT/INR - ( 15 May 2019 09:31 )   PT: 15.3 sec;   INR: 1.32 ratio                   MICROBIOLOGY:     RADIOLOGY:  [ ] Reviewed and interpreted by me    Point of Care Ultrasound Findings:    PFT:    EKG:

## 2019-05-16 NOTE — DISCHARGE NOTE PROVIDER - CARE PROVIDER_API CALL
Patrick Guzman)  Orthopaedic Surgery  [address], [address]  [place] [address]  Phone: [phone]  Fax: [phone]  Follow Up Time:     Feliz Aiken)  Cardiovascular Disease; Internal Medicine; Nuclear Cardiology  [address], [address]  [place], [place] [id number]  Phone: [phone]  Fax: [phone]  Follow Up Time:     Juan Alberto Hills)  Internal Medicine; Nephrology  [address]  [place] [address]  Phone: [phone]  Fax: [phone]  Follow Up Time:     Fito Montgomery)  Internal Medicine; Pulmonary Disease  [address], [address]  [place] [address]  Phone: [phone]  Fax: [phone]  Follow Up Time:     Romana Chavira)  Obstetrics and Gynecology  [address], [address]  [place] [address]  Phone: [phone]  Fax: [phone]  Follow Up Time:

## 2019-05-16 NOTE — DISCHARGE NOTE NURSING/CASE MANAGEMENT/SOCIAL WORK - NSDCDPATPORTLINK_GEN_ALL_CORE
You can access the [facility] Patient Portal, offered by [facility], by registering with the following website: [email or web address]

## 2019-05-16 NOTE — PROGRESS NOTE ADULT - PROBLEM SELECTOR PROBLEM 4
Acute on chronic congestive heart failure, unspecified heart failure type

## 2019-05-16 NOTE — PROGRESS NOTE ADULT - ATTENDING COMMENTS
as above-improved since 5/10-on pred 40mg (would continue 4 more days and then taper by 10mg every 3 days to off)  Multifactorial resp insufficiency- obesity hypoventilation, copd/asthma, Cards, debility--unable to tolerate bipap--continue O2 NC  Reactive airway disease in setting of hMPV infection. Would monitor off antibiotics. Taper to pred 40mg per day. Duonebs Q6 ATC for now and  pulmicort l.5 bid  Cards-as per Nelli  OSAS-refused w/up and rx  ILDZ-CT unrevealing  PPI/OOB/PT                           DC planning -pulm stable.  Vasc f/up; PT  Fito Montgomery MD-Pulmonary   [phone]

## 2019-05-16 NOTE — PROGRESS NOTE ADULT - PROBLEM SELECTOR PLAN 3
bipap unable here-will make due with O2-NC

## 2019-05-16 NOTE — DISCHARGE NOTE PROVIDER - NSDCCPCAREPLAN_GEN_ALL_CORE_FT
PRINCIPAL DISCHARGE DIAGNOSIS  Diagnosis: Acute on chronic congestive heart failure, unspecified heart failure type  Assessment and Plan of Treatment: Weigh yourself daily.  If you gain 3lbs in 3 days, or 5lbs in a week call your Health Care Provider.  Do not eat or drink foods containing more than 2000mg of salt (sodium) in your diet every day.  Call your Health Care Provider if you have any swelling or increased swelling in your feet, ankles, and/or stomach.  Take all of your medication as directed.  If you become dizzy call your Health Care Provider.  pt on home oxygen 2-3 L/min - discharge @ 2L/min        SECONDARY DISCHARGE DIAGNOSES  Diagnosis: Diabetes mellitus  Assessment and Plan of Treatment: HgA1C this admission was 7.4%  Make sure you get your HgA1c checked every three months.  If you take insulin, check your blood glucose before meals and at bedtime.  It's important not to skip any meals.  Keep a log of your blood glucose results and always take it with you to your doctor appointments.  Keep a list of your current medications including injectables and over the counter medications and bring this medication list with you to all your doctor appointments.  If you have not seen your opthalmologist this year call for appointment.  Check your feet daily for redness, sores, or openings. Do not self treat. If no improvement in two days call your primary care physician for an appointment.  Low blood sugar (hypoglycemia) is a blood sugar below 70mg/dl. Check your blood sugar if you feel signs/symptoms of hypoglycemia. If your blood sugar is below 70 take 15 grams of carbohydrates (ex 4 oz of apple juice, 3-4 glucosr tablets, or 4-6 oz of regular soda) wait 15 minutes and repeat blood sugar to make sure it comes up above 70.  If your blood sugar is above 70 and you are due for a meal, have a meal.  If you are not due for a meal have a snack.  This snack helps keeps your blood sugar at a safe range.  please monitor fingersticks since pt on Prednisone taper - Lantus 58 QHS & humalog 24 before breakfast & 20 before lunch & dinner prior to this admission.  Now on lower doses but Predinisone taper started today    Diagnosis: Atrial fibrillation  Assessment and Plan of Treatment: Atrial fibrillation is the most common heart rhythm problem & has the risk of stroke & heart attack  It helps if you control your blood pressure, not drink more than 1-2 alcohol drinks per day, cut down on caffeine, getting treatment for over active thyroid gland, & getting exercise  Call your doctor if you feel your heart racing or beating unusually, chest tightness or pain, lightheaded, faint, shortness of breath especially with exercise  It is important to take your heart medication as prescribed  You may be on anticoagulation which is very important to take as directed - you may need blood work to monitor drug levels  Coumadin 5 daily - do daily PT/INR until stable therapeutic INR  Coumadin is used to thin the blood so clots will not form and to keep existing ones from getting bigger.  Take this medication daily as prescribed by your health care provider.  Take this medication with food to prevent upset stomach.  If you miss a dose call your health care provider or pharmacist right away.  Tell your doctor you use this drug before you have a spinal or epidural procedure  Tell dentists, surgeon, and other doctors that you use this drug.  You may bleed more easily.  Be careful and avoid injury.  Use a soft toothbrush and an electric razor.  PT has CHRONIC vaginal bleeding      Diagnosis: S/P tendon repair  Assessment and Plan of Treatment: WBAT  PT/OT as tolerated  follow up with orthopedics w/ designated appointment  brace on R leg when OOB  check skin & use Aquaphor for moisture when off    Diagnosis: Vaginal bleeding  Assessment and Plan of Treatment: chronic vaginal bleeding - blood tinged  has IUD from 4/9/2019  no role for Norethindrone at this time as per attending & GYN    Diagnosis: Anemia of chronic disease  Assessment and Plan of Treatment: follow up with PCP as directed    Diagnosis: Morbid obesity with BMI of 45.0-49.9, adult  Assessment and Plan of Treatment: Morbid obesity with BMI of 45.0-49.9, adult  weight loss management strategies    Diagnosis: Human metapneumovirus (hMPV) as cause of disease classified elsewhere  Assessment and Plan of Treatment: Human metapneumovirus (hMPV) as cause of disease classified elsewhere  supportive care  Prednisone taper as prescribed    Diagnosis: Chronic kidney disease (CKD)  Assessment and Plan of Treatment: Avoid taking (NSAIDs) - (ex: Ibuprofen, Advil, Celebrex, Naprosyn)  Avoid taking any nephrotoxic agents (can harm kidneys) - Intravenous contrast for diagnostic testing, combination cold medications.  Have all medications adjusted for your renal function by your Health Care Provider.  Blood pressure control is important.  Take all medication as prescribed. PRINCIPAL DISCHARGE DIAGNOSIS  Diagnosis: Acute on chronic congestive heart failure, unspecified heart failure type  Assessment and Plan of Treatment: Weigh yourself daily.  If you gain 3lbs in 3 days, or 5lbs in a week call your Health Care Provider.  Do not eat or drink foods containing more than 2000mg of salt (sodium) in your diet every day.  Call your Health Care Provider if you have any swelling or increased swelling in your feet, ankles, and/or stomach.  Take all of your medication as directed.  If you become dizzy call your Health Care Provider.  pt on home oxygen 2-3 L/min - discharge @ 2L/min        SECONDARY DISCHARGE DIAGNOSES  Diagnosis: Hypothyroid  Assessment and Plan of Treatment: you do not make enough thyroid hormone  signs & symptoms of low levels of thyroid hormone - tired, getting cold easily, coarse or thin hair, constipation, shortness of breath, swelling, irregular periods  your doctor will do thyroid hormone blood tests at least once a year to monitor if medication dose is adequate  take your thyroid medicine as directed by your doctor & on empty stomach      Diagnosis: Diabetes mellitus  Assessment and Plan of Treatment: HgA1C this admission was 7.4%  Make sure you get your HgA1c checked every three months.  If you take insulin, check your blood glucose before meals and at bedtime.  It's important not to skip any meals.  Keep a log of your blood glucose results and always take it with you to your doctor appointments.  Keep a list of your current medications including injectables and over the counter medications and bring this medication list with you to all your doctor appointments.  If you have not seen your opthalmologist this year call for appointment.  Check your feet daily for redness, sores, or openings. Do not self treat. If no improvement in two days call your primary care physician for an appointment.  Low blood sugar (hypoglycemia) is a blood sugar below 70mg/dl. Check your blood sugar if you feel signs/symptoms of hypoglycemia. If your blood sugar is below 70 take 15 grams of carbohydrates (ex 4 oz of apple juice, 3-4 glucosr tablets, or 4-6 oz of regular soda) wait 15 minutes and repeat blood sugar to make sure it comes up above 70.  If your blood sugar is above 70 and you are due for a meal, have a meal.  If you are not due for a meal have a snack.  This snack helps keeps your blood sugar at a safe range.  please monitor fingersticks since pt on Prednisone taper - Lantus 58 QHS & humalog 24 before breakfast & 20 before lunch & dinner prior to this admission.  Now on lower doses but Predinisone taper started today    Diagnosis: Atrial fibrillation  Assessment and Plan of Treatment: Atrial fibrillation is the most common heart rhythm problem & has the risk of stroke & heart attack  It helps if you control your blood pressure, not drink more than 1-2 alcohol drinks per day, cut down on caffeine, getting treatment for over active thyroid gland, & getting exercise  Call your doctor if you feel your heart racing or beating unusually, chest tightness or pain, lightheaded, faint, shortness of breath especially with exercise  It is important to take your heart medication as prescribed  You may be on anticoagulation which is very important to take as directed - you may need blood work to monitor drug levels  Coumadin 5 daily - do daily PT/INR until stable therapeutic INR  Coumadin is used to thin the blood so clots will not form and to keep existing ones from getting bigger.  Take this medication daily as prescribed by your health care provider.  Take this medication with food to prevent upset stomach.  If you miss a dose call your health care provider or pharmacist right away.  Tell your doctor you use this drug before you have a spinal or epidural procedure  Tell dentists, surgeon, and other doctors that you use this drug.  You may bleed more easily.  Be careful and avoid injury.  Use a soft toothbrush and an electric razor.  PT has CHRONIC vaginal bleeding      Diagnosis: S/P tendon repair  Assessment and Plan of Treatment: WBAT  PT/OT as tolerated  follow up with orthopedics w/ designated appointment  brace on R leg when OOB  check skin & use Aquaphor for moisture when off    Diagnosis: Vaginal bleeding  Assessment and Plan of Treatment: chronic vaginal bleeding - blood tinged  has IUD from 4/9/2019  no role for Norethindrone at this time as per attending & GYN    Diagnosis: Anemia of chronic disease  Assessment and Plan of Treatment: follow up with PCP as directed    Diagnosis: Morbid obesity with BMI of 45.0-49.9, adult  Assessment and Plan of Treatment: Morbid obesity with BMI of 45.0-49.9, adult  weight loss management strategies    Diagnosis: Human metapneumovirus (hMPV) as cause of disease classified elsewhere  Assessment and Plan of Treatment: Human metapneumovirus (hMPV) as cause of disease classified elsewhere  supportive care  Prednisone taper as prescribed    Diagnosis: Chronic kidney disease (CKD)  Assessment and Plan of Treatment: Avoid taking (NSAIDs) - (ex: Ibuprofen, Advil, Celebrex, Naprosyn)  Avoid taking any nephrotoxic agents (can harm kidneys) - Intravenous contrast for diagnostic testing, combination cold medications.  Have all medications adjusted for your renal function by your Health Care Provider.  Blood pressure control is important.  Take all medication as prescribed. PRINCIPAL DISCHARGE DIAGNOSIS  Diagnosis: Acute on chronic congestive heart failure, unspecified heart failure type  Assessment and Plan of Treatment: Weigh yourself daily.  If you gain 3lbs in 3 days, or 5lbs in a week call your Health Care Provider.  Do not eat or drink foods containing more than 2000mg of salt (sodium) in your diet every day.  Call your Health Care Provider if you have any swelling or increased swelling in your feet, ankles, and/or stomach.  Take all of your medication as directed.  If you become dizzy call your Health Care Provider.  pt on home oxygen 2-3 L/min - discharge @ 2L/min        SECONDARY DISCHARGE DIAGNOSES  Diagnosis: Hypothyroid  Assessment and Plan of Treatment: you do not make enough thyroid hormone  signs & symptoms of low levels of thyroid hormone - tired, getting cold easily, coarse or thin hair, constipation, shortness of breath, swelling, irregular periods  your doctor will do thyroid hormone blood tests at least once a year to monitor if medication dose is adequate  take your thyroid medicine as directed by your doctor & on empty stomach      Diagnosis: Diabetes mellitus  Assessment and Plan of Treatment: HgA1C this admission was 7.4%  Make sure you get your HgA1c checked every three months.  If you take insulin, check your blood glucose before meals and at bedtime.  It's important not to skip any meals.  Keep a log of your blood glucose results and always take it with you to your doctor appointments.  Keep a list of your current medications including injectables and over the counter medications and bring this medication list with you to all your doctor appointments.  If you have not seen your opthalmologist this year call for appointment.  Check your feet daily for redness, sores, or openings. Do not self treat. If no improvement in two days call your primary care physician for an appointment.  Low blood sugar (hypoglycemia) is a blood sugar below 70mg/dl. Check your blood sugar if you feel signs/symptoms of hypoglycemia. If your blood sugar is below 70 take 15 grams of carbohydrates (ex 4 oz of apple juice, 3-4 glucosr tablets, or 4-6 oz of regular soda) wait 15 minutes and repeat blood sugar to make sure it comes up above 70.  If your blood sugar is above 70 and you are due for a meal, have a meal.  If you are not due for a meal have a snack.  This snack helps keeps your blood sugar at a safe range.  please monitor fingersticks since pt on Prednisone taper - Lantus 58 QHS & humalog 24 before breakfast & 20 before lunch & dinner prior to this admission.  Now on lower doses but Predinisone taper started today    Diagnosis: Atrial fibrillation  Assessment and Plan of Treatment: Atrial fibrillation is the most common heart rhythm problem & has the risk of stroke & heart attack  It helps if you control your blood pressure, not drink more than 1-2 alcohol drinks per day, cut down on caffeine, getting treatment for over active thyroid gland, & getting exercise  Call your doctor if you feel your heart racing or beating unusually, chest tightness or pain, lightheaded, faint, shortness of breath especially with exercise  It is important to take your heart medication as prescribed  You may be on anticoagulation which is very important to take as directed - you may need blood work to monitor drug levels  Coumadin 5 daily - do daily PT/INR until stable therapeutic INR  Coumadin is used to thin the blood so clots will not form and to keep existing ones from getting bigger.  Take this medication daily as prescribed by your health care provider.  Take this medication with food to prevent upset stomach.  If you miss a dose call your health care provider or pharmacist right away.  Tell your doctor you use this drug before you have a spinal or epidural procedure  Tell dentists, surgeon, and other doctors that you use this drug.  You may bleed more easily.  Be careful and avoid injury.  Use a soft toothbrush and an electric razor.  PT has CHRONIC vaginal bleeding      Diagnosis: S/P tendon repair  Assessment and Plan of Treatment: WBAT  PT/OT as tolerated  follow up with orthopedics w/ designated appointment  brace on R leg when OOB  check skin & use Aquaphor for moisture when off  Aquaphor to legs - dry skin as prescribed    Diagnosis: Vaginal bleeding  Assessment and Plan of Treatment: chronic vaginal bleeding - blood tinged  has IUD from 4/9/2019  no role for Norethindrone at this time as per attending & GYN    Diagnosis: Anemia of chronic disease  Assessment and Plan of Treatment: follow up with PCP as directed    Diagnosis: Morbid obesity with BMI of 45.0-49.9, adult  Assessment and Plan of Treatment: Morbid obesity with BMI of 45.0-49.9, adult  weight loss management strategies    Diagnosis: Human metapneumovirus (hMPV) as cause of disease classified elsewhere  Assessment and Plan of Treatment: Human metapneumovirus (hMPV) as cause of disease classified elsewhere  supportive care  Prednisone taper as prescribed    Diagnosis: Chronic kidney disease (CKD)  Assessment and Plan of Treatment: Avoid taking (NSAIDs) - (ex: Ibuprofen, Advil, Celebrex, Naprosyn)  Avoid taking any nephrotoxic agents (can harm kidneys) - Intravenous contrast for diagnostic testing, combination cold medications.  Have all medications adjusted for your renal function by your Health Care Provider.  Blood pressure control is important.  Take all medication as prescribed.

## 2019-05-16 NOTE — PROGRESS NOTE ADULT - PROBLEM SELECTOR PLAN 8
duoneb via HHN q 6  add pulmicort .5 via HHN q 6  PO pred 40mg per day
duoneb via HHN q 6  add pulmicort .5 via HHN q 6  solumedrol 20mg IVSS q 8
duoneb via HHN q 6  add pulmicort .5 via HHN q 6  solumedrol 20mg IVSS q 8--transition to PO pred 40mg per day
duoneb via HHN q 6  add pulmicort .5 via HHN q 6  solumedrol 40 q 8--taper today to 20mg IVSS q 8
duoneb via HHN q 6  add pulmicort .5 via N q 6  solumedrol 40 q 8--taper as able as of tomorrow to 30 q 8

## 2019-05-16 NOTE — PROGRESS NOTE ADULT - PROBLEM SELECTOR PLAN 6
refused cpap/bipap or work up

## 2019-05-16 NOTE — PROGRESS NOTE ADULT - PROBLEM SELECTOR PROBLEM 6
VICKY (obstructive sleep apnea)

## 2019-05-16 NOTE — DISCHARGE NOTE PROVIDER - HOSPITAL COURSE
79 yo woman w multiple medical problems outline in [place] being admitted for HMPV tracheobronchitiswith acute on chronic hypoxemic respiratory failure and acute on chronic diastolic CHF.     Respiratory status w ongoing bronchospasm today though improving , decrease to medrol 20 mg q8H as per pulmonary, cont  lasix 40 mg to po, s/p acute on chronic CHF, now resolved,  duonebs q4h prn, cont observing off ABx, close watch to her finger sticks 2/2 being on steroids. 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 epogen up to renal    card, endo, pulm, wound, id and renal consults appreciated    ptn had a recent Echo no need to repeat it.     cont coumadin and daily Pt/INR check.,     post RLE quad tendon reconstruction, post op al brace to remain on for 2 more weeks.    cont rest of outptn meds. DYSPNEA, COUGH - + metapneumovirus w/ tracheobronchitis, acue on chronic diastolic CHF, AOCD, quadricep tendon repair on 4/11 w/ brace on R leg when OOB & WBAT, chronic vaginal bleeding - has IUD from 4/9/2019            79 yo woman w multiple medical problems outline in [facility] being admitted for HMPV tracheobronchitiswith acute on chronic hypoxemic respiratory failure and acute on chronic diastolic CHF.     Respiratory status w ongoing bronchospasm today though improving , decrease to medrol 20 mg q8H as per pulmonary, cont  lasix 40 mg to po, s/p acute on chronic CHF, now resolved,  duonebs q4h prn, cont observing off ABx, close watch to her finger sticks 2/2 being on steroids. 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 epogen up to renal    card, endo, pulm, wound, id and renal consults appreciated    ptn had a recent Echo no need to repeat it.     cont coumadin and daily Pt/INR check.,     post RLE quad tendon reconstruction, post op al brace to remain on for 2 more weeks.    cont rest of outptn meds.

## 2019-05-16 NOTE — PROGRESS NOTE ADULT - SUBJECTIVE AND OBJECTIVE BOX
No pain, no shortness of breath      VITAL:  T(C): , Max: 36.6 (05-15-19 @ 21:23)  T(F): , Max: 97.8 (05-15-19 @ 21:23)  HR: 102 (05-16-19 @ 04:22)  BP: 156/88 (05-16-19 @ 04:22)  BP(mean): --  RR: 18 (05-16-19 @ 04:22)  SpO2: 100% (05-16-19 @ 04:22)      PHYSICAL EXAM:  Constitutional: NAD at rest on NCO2  HEENT: NCAT, DMM  Neck: Supple, No JVD  Respiratory: grossly clear b/l  Cardiovascular: irreg s1s2  Gastrointestinal: BS+, soft, NT/ND  Extremities: (+)1-2+ B/L LE edema  Neurological: RLE in brace; reduced generalized strength  Back: no CVAT b/l  Skin: No rashes, no nevi      LABS:                        10.7   15.16 )-----------( 168      ( 15 May 2019 10:23 )             34.5     Na(140)/K(4.5)/Cl(97)/HCO3(30)/BUN(61)/Cr(1.28)Glu(276)/Ca(9.9)/Mg(--)/PO4(--)    05-16 @ 05:48  Na(136)/K(4.4)/Cl(94)/HCO3(31)/BUN(61)/Cr(1.24)Glu(215)/Ca(9.7)/Mg(--)/PO4(--)    05-15 @ 05:48  Na(140)/K(4.6)/Cl(97)/HCO3(30)/BUN(61)/Cr(1.35)Glu(166)/Ca(9.9)/Mg(--)/PO4(--)    05-14 @ 06:00      IMPRESSION: 78F w/ HTN, DM2, HFpEF, morbid obesity, CKD, ulcerative colitis, and valvular heart disease s/p MVR, 5/8/19 a/w HMPV bronchopneumonia    (1)Renal - CKD3 from to DM/HTN. Resolving/resolved mild superimposed prerenally mediated SAPPHIRE. High BUN/creatinine ratio in setting of steroids  (2)Pulm - HMPV/reactive airway disease flare   (3)CV - acceptable volume status, on PO Lasix + Spironolactone      RECOMMEND:  (1)Dose new meds for GFR 40-50ml/min  (2)Diuretics as ordered  (3)Steroid taper per primary team/Pulmonary  (4)BMP daily  (5)F/U with her outside nephrologist 1-2 months after discharge              Juan Alberto Hills MD  [facility]  [phone] No pain, SOB improved      VITAL:  T(C): , Max: 36.6 (05-15-19 @ 21:23)  T(F): , Max: 97.8 (05-15-19 @ 21:23)  HR: 102 (05-16-19 @ 04:22)  BP: 156/88 (05-16-19 @ 04:22)  RR: 18 (05-16-19 @ 04:22)  SpO2: 100% (05-16-19 @ 04:22)      PHYSICAL EXAM:  Constitutional: NAD at rest on NCO2; obese  HEENT: NCAT, DMM  Neck: Supple, No JVD  Respiratory: grossly clear b/l  Cardiovascular: irreg tachy s1s2  Gastrointestinal: BS+, soft, NT/ND  Extremities: (+)1-2+ B/L LE edema  Neurological: RLE in brace; reduced generalized strength  Back: no CVAT b/l  Skin: No rashes, no nevi      LABS:                        10.7   15.16 )-----------( 168      ( 15 May 2019 10:23 )             34.5     Na(140)/K(4.5)/Cl(97)/HCO3(30)/BUN(61)/Cr(1.28)Glu(276)/Ca(9.9)/Mg(--)/PO4(--)    05-16 @ 05:48  Na(136)/K(4.4)/Cl(94)/HCO3(31)/BUN(61)/Cr(1.24)Glu(215)/Ca(9.7)/Mg(--)/PO4(--)    05-15 @ 05:48  Na(140)/K(4.6)/Cl(97)/HCO3(30)/BUN(61)/Cr(1.35)Glu(166)/Ca(9.9)/Mg(--)/PO4(--)    05-14 @ 06:00      IMPRESSION: 78F w/ HTN, DM2, HFpEF, morbid obesity, CKD, ulcerative colitis, and valvular heart disease s/p MVR, 5/8/19 a/w HMPV bronchopneumonia    (1)Renal - CKD3 from to DM/HTN. Resolving/resolved mild superimposed prerenally mediated SAPPHIRE. High BUN/creatinine ratio in setting of steroids  (2)Pulm - HMPV/reactive airway disease flare   (3)CV - acceptable volume status, on PO Lasix + Spironolactone      RECOMMEND:  (1)Dose new meds for GFR 40-50ml/min  (2)Diuretics as ordered  (3)Steroid taper per primary team/Pulmonary  (4)BMP daily  (5)F/U with her outside nephrologist 1-2 months after discharge              Juan Alberto Hills MD  [facility]  [phone]

## 2019-05-16 NOTE — PROGRESS NOTE ADULT - PROBLEM SELECTOR PROBLEM 2
Acquired hypothyroidism
Acquired hypothyroidism
Human metapneumovirus (hMPV) as cause of disease classified elsewhere

## 2019-05-16 NOTE — PROGRESS NOTE ADULT - PROBLEM/PLAN-5
Patient c/o painful rash to b/l UE, LE and back. Discharged from [facility] 1/23/17 after admission for same with dermatology follow up. However, patients medicaid has not gone through and was unable to follow up or get prescriptions. History of ETOH abuse- Admits drinking 4-5 beers around 11am today.
DISPLAY PLAN FREE TEXT

## 2019-05-16 NOTE — PROGRESS NOTE ADULT - NSHPATTENDINGPLANDISCUSS_GEN_ALL_CORE
patient
ptn, ID, NP, ER team, endo
ptn, ID, NP, renal, endo
patient.
patient

## 2019-05-16 NOTE — PROGRESS NOTE ADULT - PROBLEM SELECTOR PLAN 1
vsityanuwlluaa-shwbgyd-uhrzsbiacwknhdo, copd/ILDZ, pah, cad--HMPV infection-O2 NC-sat above 90%
whclqgzlthgoyo-emqjqiy-ifkwrusmkfkmpbn, copd/ILDZ, pah, cad--HMPV infection-O2 NC-sat above 90%
akpeohumhemzit-nlwvqsq-zydrkuxywgmyuld, copd/ILDZ, pah, cad--HMPV infection-O2 NC-sat above 90%
ptumhjlznqzlxw-jthdloq-mwrfjglglmbvjkz, copd/ILDZ, pah, cad--HMPV infection-O2 NC-sat above 90%
ucuowuydxwynog-pytviaz-isnwdbsujfolzhv, copd/ILDZ, pah, cad--HMPV infection-O2 NC-sat above 90%

## 2019-05-16 NOTE — PROGRESS NOTE ADULT - REASON FOR ADMISSION
DYSPNEA, COUGH

## 2019-05-16 NOTE — PROGRESS NOTE ADULT - ASSESSMENT
79 yo woman w multiple medical problems outline in [facility] being admitted for HMPV tracheobronchitiswith acute on chronic hypoxemic respiratory failure and acute on chronic diastolic CHF.   Respiratory status w ongoing bronchospasm today though improving , switched to po prednisone as per pulmonary, cont  lasix 40 mg to po, s/p acute on chronic CHF, now resolved,  duonebs q4h prn, cont observing off ABx, close watch to her finger sticks 2/2 being on steroids. #s are improving. dc to [facility] today  CKD3-4, creatinine is a baseline  anemia is multifactoral, 2/2 iron def from chronic blood loss and 2/2 renal dz. epogen up to renal  card, endo, pulm, wound, id and renal consults appreciated  ptn had a recent Echo no need to repeat it.   cont coumadin and daily Pt/INR check.,   post RLE quad tendon reconstruction, post op al brace to remain on for 2 more weeks.  cont rest of outptn meds.

## 2019-05-16 NOTE — DISCHARGE NOTE PROVIDER - CARE PROVIDERS DIRECT ADDRESSES
[email or web address],DirectAddress_Unknown,[email or web address]

## 2019-05-16 NOTE — DISCHARGE NOTE PROVIDER - PROVIDER TOKENS
PROVIDER:[TOKEN:[[id number]]],PROVIDER:[TOKEN:[[id number]]],PROVIDER:[TOKEN:[[id number]]],PROVIDER:[TOKEN:[[id number]]],PROVIDER:[TOKEN:[[id number]]]

## 2019-05-16 NOTE — PROGRESS NOTE ADULT - PROBLEM SELECTOR PROBLEM 3
Chronic hypoxemic respiratory failure

## 2019-05-16 NOTE — PROGRESS NOTE ADULT - PROVIDER SPECIALTY LIST ADULT
Cardiology
Cardiology
Endocrinology
Infectious Disease
Internal Medicine
Nephrology
Pulmonology
Infectious Disease
Endocrinology
Pulmonology

## 2019-05-16 NOTE — PROGRESS NOTE ADULT - PROBLEM SELECTOR PLAN 5
nutrition evaluation

## 2019-05-21 ENCOUNTER — APPOINTMENT (OUTPATIENT)
Dept: ORTHOPEDIC SURGERY | Facility: CLINIC | Age: 79
End: 2019-05-21
Payer: MEDICARE

## 2019-05-21 DIAGNOSIS — S76.111A STRAIN OF RIGHT QUADRICEPS MUSCLE, FASCIA AND TENDON, INITIAL ENCOUNTER: ICD-10-CM

## 2019-05-21 PROCEDURE — 99024 POSTOP FOLLOW-UP VISIT: CPT

## 2019-05-21 NOTE — HISTORY OF PRESENT ILLNESS
[de-identified] : S/P Right quad tendon repair 4/11/19  [de-identified] : 77 yo F COPD on home O2 S/P Right quad tendon repair 4/11/19 presents for first post op followup. She was recently readmitted and is now in a rehabilitation facility.She is currently WBAT locked in extension.  [de-identified] : Right LE \par Incision is fully healed \par SILT TN SPN DPN SN \par not yet able to SLR against gravity \par knee ROM not tested \par 2+ distal pulses NVID  [de-identified] : none  [de-identified] : S/P Right quad tendon repair 4/11/19 doing well  [de-identified] : S/P Right quad tendon repair 4/11/19 \par doing well \par P: \par - Discontinue brace\par - Continue WBAT \par - F/U in 4 weeks \par -Range of motion as tolerated

## 2019-05-22 ENCOUNTER — INBOUND DOCUMENT (OUTPATIENT)
Age: 79
End: 2019-05-22

## 2019-05-23 ENCOUNTER — INPATIENT (INPATIENT)
Facility: HOSPITAL | Age: 79
LOS: 0 days | End: 2019-05-23
Attending: STUDENT IN AN ORGANIZED HEALTH CARE EDUCATION/TRAINING PROGRAM | Admitting: STUDENT IN AN ORGANIZED HEALTH CARE EDUCATION/TRAINING PROGRAM
Payer: MEDICARE

## 2019-05-23 ENCOUNTER — EMERGENCY (EMERGENCY)
Facility: HOSPITAL | Age: 79
LOS: 1 days | End: 2019-05-23
Admitting: EMERGENCY MEDICINE
Payer: COMMERCIAL

## 2019-05-23 VITALS
TEMPERATURE: 100 F | OXYGEN SATURATION: 100 % | SYSTOLIC BLOOD PRESSURE: 75 MMHG | RESPIRATION RATE: 100 BRPM | DIASTOLIC BLOOD PRESSURE: 54 MMHG | WEIGHT: 251.11 LBS | HEART RATE: 109 BPM | HEIGHT: 64 IN

## 2019-05-23 DIAGNOSIS — A41.9 SEPSIS, UNSPECIFIED ORGANISM: ICD-10-CM

## 2019-05-23 DIAGNOSIS — Z98.891 HISTORY OF UTERINE SCAR FROM PREVIOUS SURGERY: Chronic | ICD-10-CM

## 2019-05-23 DIAGNOSIS — Z95.3 PRESENCE OF XENOGENIC HEART VALVE: Chronic | ICD-10-CM

## 2019-05-23 LAB
ALBUMIN SERPL ELPH-MCNC: 1.9 G/DL — LOW (ref 3.3–5)
ALP SERPL-CCNC: 113 U/L — SIGNIFICANT CHANGE UP (ref 30–120)
ALT FLD-CCNC: 806 U/L DA — HIGH (ref 10–60)
ANION GAP SERPL CALC-SCNC: 7 MMOL/L — SIGNIFICANT CHANGE UP (ref 5–17)
APPEARANCE UR: ABNORMAL
APTT BLD: 43.8 SEC — HIGH (ref 28.5–37)
AST SERPL-CCNC: 884 U/L — HIGH (ref 10–40)
BASOPHILS # BLD AUTO: 0 K/UL — SIGNIFICANT CHANGE UP (ref 0–0.2)
BASOPHILS NFR BLD AUTO: 0 % — SIGNIFICANT CHANGE UP (ref 0–2)
BILIRUB SERPL-MCNC: 1.2 MG/DL — SIGNIFICANT CHANGE UP (ref 0.2–1.2)
BILIRUB UR-MCNC: NEGATIVE — SIGNIFICANT CHANGE UP
BUN SERPL-MCNC: 110 MG/DL — HIGH (ref 7–23)
CALCIUM SERPL-MCNC: 9.7 MG/DL — SIGNIFICANT CHANGE UP (ref 8.4–10.5)
CHLORIDE SERPL-SCNC: 93 MMOL/L — LOW (ref 96–108)
CO2 SERPL-SCNC: 30 MMOL/L — SIGNIFICANT CHANGE UP (ref 22–31)
COLOR SPEC: YELLOW — SIGNIFICANT CHANGE UP
CREAT SERPL-MCNC: 3.73 MG/DL — HIGH (ref 0.5–1.3)
DIFF PNL FLD: ABNORMAL
EOSINOPHIL # BLD AUTO: 0 K/UL — SIGNIFICANT CHANGE UP (ref 0–0.5)
EOSINOPHIL NFR BLD AUTO: 0 % — SIGNIFICANT CHANGE UP (ref 0–6)
ERYTHROCYTE [SEDIMENTATION RATE] IN BLOOD: 108 MM/HR — HIGH (ref 0–20)
GLUCOSE SERPL-MCNC: 233 MG/DL — HIGH (ref 70–99)
GLUCOSE UR QL: NEGATIVE MG/DL — SIGNIFICANT CHANGE UP
HCT VFR BLD CALC: 30.4 % — LOW (ref 34.5–45)
HGB BLD-MCNC: 9.8 G/DL — LOW (ref 11.5–15.5)
INR BLD: 9.53 RATIO — CRITICAL HIGH (ref 0.88–1.16)
KETONES UR-MCNC: NEGATIVE — SIGNIFICANT CHANGE UP
LACTATE SERPL-SCNC: 2.7 MMOL/L — HIGH (ref 0.7–2)
LEUKOCYTE ESTERASE UR-ACNC: NEGATIVE — SIGNIFICANT CHANGE UP
LYMPHOCYTES # BLD AUTO: 0.82 K/UL — LOW (ref 1–3.3)
LYMPHOCYTES # BLD AUTO: 4 % — LOW (ref 13–44)
MCHC RBC-ENTMCNC: 30.7 PG — SIGNIFICANT CHANGE UP (ref 27–34)
MCHC RBC-ENTMCNC: 32.2 GM/DL — SIGNIFICANT CHANGE UP (ref 32–36)
MCV RBC AUTO: 95.3 FL — SIGNIFICANT CHANGE UP (ref 80–100)
MONOCYTES # BLD AUTO: 0.62 K/UL — SIGNIFICANT CHANGE UP (ref 0–0.9)
MONOCYTES NFR BLD AUTO: 3 % — SIGNIFICANT CHANGE UP (ref 2–14)
NEUTROPHILS # BLD AUTO: 19.08 K/UL — HIGH (ref 1.8–7.4)
NEUTROPHILS NFR BLD AUTO: 87 % — HIGH (ref 43–77)
NITRITE UR-MCNC: NEGATIVE — SIGNIFICANT CHANGE UP
NRBC # BLD: SIGNIFICANT CHANGE UP /100 WBCS (ref 0–0)
NT-PROBNP SERPL-SCNC: HIGH PG/ML (ref 0–450)
PH UR: 5 — SIGNIFICANT CHANGE UP (ref 5–8)
PLATELET # BLD AUTO: 125 K/UL — LOW (ref 150–400)
POTASSIUM SERPL-MCNC: 4.8 MMOL/L — SIGNIFICANT CHANGE UP (ref 3.5–5.3)
POTASSIUM SERPL-SCNC: 4.8 MMOL/L — SIGNIFICANT CHANGE UP (ref 3.5–5.3)
PROCALCITONIN SERPL-MCNC: 2.2 NG/ML — HIGH (ref 0.02–0.1)
PROT SERPL-MCNC: 6.4 G/DL — SIGNIFICANT CHANGE UP (ref 6–8.3)
PROT UR-MCNC: 15 MG/DL
PROTHROM AB SERPL-ACNC: 111.4 SEC — HIGH (ref 10–12.9)
RBC # BLD: 3.19 M/UL — LOW (ref 3.8–5.2)
RBC # FLD: 15.5 % — HIGH (ref 10.3–14.5)
SODIUM SERPL-SCNC: 130 MMOL/L — LOW (ref 135–145)
SP GR SPEC: 1.01 — SIGNIFICANT CHANGE UP (ref 1.01–1.02)
TROPONIN I SERPL-MCNC: 0.41 NG/ML — HIGH (ref 0.02–0.06)
UROBILINOGEN FLD QL: NEGATIVE MG/DL — SIGNIFICANT CHANGE UP
WBC # BLD: 20.52 K/UL — HIGH (ref 3.8–10.5)
WBC # FLD AUTO: 20.52 K/UL — HIGH (ref 3.8–10.5)

## 2019-05-23 PROCEDURE — 83605 ASSAY OF LACTIC ACID: CPT

## 2019-05-23 PROCEDURE — 81001 URINALYSIS AUTO W/SCOPE: CPT

## 2019-05-23 PROCEDURE — 87040 BLOOD CULTURE FOR BACTERIA: CPT

## 2019-05-23 PROCEDURE — 99291 CRITICAL CARE FIRST HOUR: CPT

## 2019-05-23 PROCEDURE — C1751: CPT

## 2019-05-23 PROCEDURE — 87086 URINE CULTURE/COLONY COUNT: CPT

## 2019-05-23 PROCEDURE — 86140 C-REACTIVE PROTEIN: CPT

## 2019-05-23 PROCEDURE — 85027 COMPLETE CBC AUTOMATED: CPT

## 2019-05-23 PROCEDURE — 83880 ASSAY OF NATRIURETIC PEPTIDE: CPT

## 2019-05-23 PROCEDURE — 96361 HYDRATE IV INFUSION ADD-ON: CPT

## 2019-05-23 PROCEDURE — 36556 INSERT NON-TUNNEL CV CATH: CPT | Mod: RT

## 2019-05-23 PROCEDURE — 99285 EMERGENCY DEPT VISIT HI MDM: CPT

## 2019-05-23 PROCEDURE — 96365 THER/PROPH/DIAG IV INF INIT: CPT

## 2019-05-23 PROCEDURE — 93970 EXTREMITY STUDY: CPT | Mod: 26

## 2019-05-23 PROCEDURE — 84443 ASSAY THYROID STIM HORMONE: CPT

## 2019-05-23 PROCEDURE — 71045 X-RAY EXAM CHEST 1 VIEW: CPT | Mod: 26

## 2019-05-23 PROCEDURE — 36415 COLL VENOUS BLD VENIPUNCTURE: CPT

## 2019-05-23 PROCEDURE — 85610 PROTHROMBIN TIME: CPT

## 2019-05-23 PROCEDURE — 74176 CT ABD & PELVIS W/O CONTRAST: CPT

## 2019-05-23 PROCEDURE — 96367 TX/PROPH/DG ADDL SEQ IV INF: CPT

## 2019-05-23 PROCEDURE — 71045 X-RAY EXAM CHEST 1 VIEW: CPT

## 2019-05-23 PROCEDURE — 93970 EXTREMITY STUDY: CPT

## 2019-05-23 PROCEDURE — 85730 THROMBOPLASTIN TIME PARTIAL: CPT

## 2019-05-23 PROCEDURE — 85652 RBC SED RATE AUTOMATED: CPT

## 2019-05-23 PROCEDURE — 70450 CT HEAD/BRAIN W/O DYE: CPT

## 2019-05-23 PROCEDURE — 71250 CT THORAX DX C-: CPT

## 2019-05-23 PROCEDURE — 80053 COMPREHEN METABOLIC PANEL: CPT

## 2019-05-23 PROCEDURE — 84484 ASSAY OF TROPONIN QUANT: CPT

## 2019-05-23 PROCEDURE — 87150 DNA/RNA AMPLIFIED PROBE: CPT

## 2019-05-23 PROCEDURE — 36556 INSERT NON-TUNNEL CV CATH: CPT

## 2019-05-23 PROCEDURE — 84145 PROCALCITONIN (PCT): CPT

## 2019-05-23 PROCEDURE — 87186 SC STD MICRODIL/AGAR DIL: CPT

## 2019-05-23 PROCEDURE — 93005 ELECTROCARDIOGRAM TRACING: CPT

## 2019-05-23 PROCEDURE — 99285 EMERGENCY DEPT VISIT HI MDM: CPT | Mod: 25

## 2019-05-23 RX ORDER — SODIUM CHLORIDE 9 MG/ML
1000 INJECTION INTRAMUSCULAR; INTRAVENOUS; SUBCUTANEOUS ONCE
Refills: 0 | Status: COMPLETED | OUTPATIENT
Start: 2019-05-23 | End: 2019-05-23

## 2019-05-23 RX ORDER — PIPERACILLIN AND TAZOBACTAM 4; .5 G/20ML; G/20ML
3.38 INJECTION, POWDER, LYOPHILIZED, FOR SOLUTION INTRAVENOUS ONCE
Refills: 0 | Status: COMPLETED | OUTPATIENT
Start: 2019-05-23 | End: 2019-05-23

## 2019-05-23 RX ORDER — VANCOMYCIN HCL 1 G
1000 VIAL (EA) INTRAVENOUS ONCE
Refills: 0 | Status: COMPLETED | OUTPATIENT
Start: 2019-05-23 | End: 2019-05-23

## 2019-05-23 RX ADMIN — SODIUM CHLORIDE 1000 MILLILITER(S): 9 INJECTION INTRAMUSCULAR; INTRAVENOUS; SUBCUTANEOUS at 13:33

## 2019-05-23 RX ADMIN — Medication 1000 MILLIGRAM(S): at 15:02

## 2019-05-23 RX ADMIN — PIPERACILLIN AND TAZOBACTAM 200 GRAM(S): 4; .5 INJECTION, POWDER, LYOPHILIZED, FOR SOLUTION INTRAVENOUS at 13:31

## 2019-05-23 RX ADMIN — SODIUM CHLORIDE 1000 MILLILITER(S): 9 INJECTION INTRAMUSCULAR; INTRAVENOUS; SUBCUTANEOUS at 11:58

## 2019-05-23 RX ADMIN — SODIUM CHLORIDE 1000 MILLILITER(S): 9 INJECTION INTRAMUSCULAR; INTRAVENOUS; SUBCUTANEOUS at 13:27

## 2019-05-23 RX ADMIN — SODIUM CHLORIDE 1000 MILLILITER(S): 9 INJECTION INTRAMUSCULAR; INTRAVENOUS; SUBCUTANEOUS at 16:00

## 2019-05-23 RX ADMIN — PIPERACILLIN AND TAZOBACTAM 3.38 GRAM(S): 4; .5 INJECTION, POWDER, LYOPHILIZED, FOR SOLUTION INTRAVENOUS at 14:10

## 2019-05-23 RX ADMIN — Medication 250 MILLIGRAM(S): at 14:32

## 2019-05-23 NOTE — ED ADULT NURSE REASSESSMENT NOTE - NS ED NURSE REASSESS COMMENT FT1
1158 1 L NS hung for sbp of 75 as per md order
1430 pt awake and disoriented nsr on monitor family at bedside pt 100% o2 sat on nasal cannula at 2L [place] cath in place 20 ml of urine out   1445 Md requested that iv fluids be decreased due to responsive sbp at 120
1502 pt noted to be bradycardiac md called to bedside and lost pulses pt in cardiac arrest see paper chart for code data  1556 pr pronounced by MD Rivera no palpable pulse and pt asystolic on cardiac monitor family at bedside. emotional support provided
at 1225  pt awake and confused. has patent 20 devon iv cath in lt ac infusing iv fluids ns osculated breath sounds no respiratory distress noted.  as per md pt to recieved 500 additional ml ns

## 2019-05-23 NOTE — ED ADULT NURSE NOTE - SEPSIS SUSPICION, FT, MLM
Suspicion of infection is present.  Consider obtaining; lactic acid, blood cultures, CBC with diff, basic chemistry, bilirubin.  At the physician's discretion obtain:  UA, chest x-ray, amylase, lipase, ABG, CRP, CT scan. None

## 2019-05-23 NOTE — ED ADULT NURSE NOTE - OBJECTIVE STATEMENT
pt arrives lethargic and confused complaining of sob decreased bp with sbp 75 cellulitis to left lower extremity area red and warm to touch with ruptured blister present. blister present to right lateral aspect of lower leg. pt arrives lethargic and confused complaining of sob decreased bp with sbp 75 elevated INR as per ems report from facility   cellulitis to left lower extremity area red and warm to touch with ruptured blister present. blister present to right lateral aspect of lower leg.  pt has red to sacral fortino with scan red blood noted in diaper. pt arrives lethargic and confused complaining of sob decreased bp with sbp 75 elevated INR as per ems report from facility   cellulitis to left lower extremity area red and warm to touch with ruptured blister present to medial and top of lower leg . blister present to right lateral aspect of lower leg.  pt has red to sacral fortino with scant red blood noted in diaper. family reports decreased po intake.  pt was dc'd from [facility] inpatient  to rehab 1 week ago

## 2019-05-23 NOTE — ED ADULT NURSE NOTE - SEPSIS SCREEN SIGNS AND SYMPTOMS, MLM
respiratory rate greater than 20 breaths/min/heart rate greater than 90 beats/min/acutely altered mental status

## 2019-05-23 NOTE — ED PROVIDER NOTE - OBJECTIVE STATEMENT
78 year old female with history of CHF, chronic kidney disease, A-fib, DM, depression, anxiety, HLD, HTN, hypothyroid, ulcerative colitis, and gerd presents with swelling and redness to left lower leg for the past 2-3 days with confusion starting yesterday evening which was worse today. Per , does not recognize him or know where she is at which is new.  Current resident at [facility] in [place] subacute rehab.   PCP Feliz Smith (but is seeing Dr. Mcadams at facility). Has seen Dr. Praveen Mcdowell (ID) at facility     Patient originally 78 year old female with history of CHF, chronic kidney disease, A-fib, DM, depression, anxiety, HLD, HTN, hypothyroid, ulcerative colitis, and gerd presents with swelling and redness to left lower leg for the past 2-3 days with confusion starting yesterday evening which was worse today. Per , does not recognize him or know where she is at which is new.  Current resident at [facility] in [place] subacute rehab.   PCP Feliz Smith (but is seeing Dr. Mcadams at facility). Has seen Dr. Praveen Mcdowell (ID) at facility     Patient originally fell on March 29th. Was seen at [facility] ED, had x-rays performed which showed no fx, and discharged home. Fell again at home the next day and sent to [facility] ED. Admitted for 16 days. D&C performed on april 9th for excessive bleeding and had surgery on right knee for torn ligaments on April 11th (ortho monson). Was discharged to [facility] and developed a URI after 23 days (per  believes he contracted something with person she was sharing room with). Transported back to [facility] and admitted for 9 days. Discharged to [facility] rehab last week. noticed some redness to left lower leg 5 days ago. Was started on abx on Monday (3 days ago).  went to see her yesterday evening and seemed confused. confusion worse today. does not know who he is or where she is at.   Per nursing facility records, INR elevated yesterday at 7.21. coumadin was held yesterday and vit K 5 mg was given today at 10:30

## 2019-05-23 NOTE — ED PROVIDER NOTE - CARE PLAN
Principal Discharge DX:	Sepsis, due to unspecified organism  Secondary Diagnosis:	Altered mental status, unspecified altered mental status type

## 2019-05-23 NOTE — ED PROVIDER NOTE - ATTENDING CONTRIBUTION TO CARE
77 yo female hx of chf, ckd, afib, dm, depression sent from [facility] for fever, weakness, AMS.  Being treated for left lower extremity cellulitis on doxy and zosyn, found to have INR 7.21 at facility received 5mg vit. K PO.  As per family patient not acting baseline.  Initially hypotensive here in ED, states they d/c'ed her lasix.      Gen: obese, lethargic appearing  Head/eyes: NC/AT, PERRL, EOMI  ENT: airway patent  Neck: supple, no tenderness/meningismus/JVD, Trachea midline  Pulm/lung: Bilateral clear BS, normal resp effort, no wheeze/stridor/retractions  CV/heart: RRR, no M/R/G, +2 dist pulses (radial, pedal DP/PT, popliteal)  GI/Abd: soft, NT/ND, +BS, no guarding/rebound tenderness, subcutaneous ecchymosis marks noted  Musculoskeletal: no edema/erythema/cyanosis, FROM in all extremities, no C/T/L spine ttp  Skin: +LLE erythema/open blisters/warmth, +RLE blisters nonruptured  Neuro: altered, AAOx2, answering questions, moving all extremities    wbk 20k, SAPPHIRE, Xray showing atelectasis, CT showing pna, possible cholecystitis, IV abx given, ICU consult, admit, BP improved after IV fluids (120's systolic, improved mentation) 77 yo female hx of chf, ckd, afib, dm, depression sent from [facility] for fever, weakness, AMS.  Being treated for left lower extremity cellulitis on doxy and zosyn, found to have INR 7.21 at facility received 5mg vit. K PO.  As per family patient not acting baseline.  Initially hypotensive here in ED, states they d/c'ed her lasix.      Gen: obese, lethargic appearing  Head/eyes: NC/AT, PERRL, EOMI  ENT: airway patent  Neck: supple, no tenderness/meningismus/JVD, Trachea midline  Pulm/lung: Bilateral clear BS, normal resp effort, no wheeze/stridor/retractions  CV/heart: RRR, no M/R/G, +2 dist pulses (radial, pedal DP/PT, popliteal)  GI/Abd: soft, NT/ND, +BS, no guarding/rebound tenderness, subcutaneous ecchymosis marks noted  Musculoskeletal: no edema/erythema/cyanosis, FROM in all extremities, no C/T/L spine ttp  Skin: +LLE erythema/open blisters/warmth, +RLE blisters nonruptured  Neuro: altered, AAOx2, answering questions, moving all extremities    wbk 20k, SAPPHIRE, Xray showing atelectasis, CT showing pna, possible cholecystitis, IV abx given, ICU consult, admit, BP improved after IV fluids (120's systolic, improved mentation), CT head negative, INR increased to 9

## 2019-05-23 NOTE — ED PROVIDER NOTE - CLINICAL SUMMARY MEDICAL DECISION MAKING FREE TEXT BOX
altered mental status since yesterday. hypotensive on arrival. concerns for sepsis. will order labs, blood culture, lactate, EKG, CXR, CT head, CT abd/pelvis, UA, and dopplers. will likely need admission. will start IVF and continue to closely monitor

## 2019-05-23 NOTE — ED ADULT NURSE NOTE - NSIMPLEMENTINTERV_GEN_ALL_ED
Implemented All Fall with Harm Risk Interventions:  [place] to call system. Call bell, personal items and telephone within reach. Instruct patient to call for assistance. Room bathroom lighting operational. Non-slip footwear when patient is off stretcher. Physically safe environment: no spills, clutter or unnecessary equipment. Stretcher in lowest position, wheels locked, appropriate side rails in place. Provide visual cue, wrist band, yellow gown, etc. Monitor gait and stability. Monitor for mental status changes and reorient to person, place, and time. Review medications for side effects contributing to fall risk. Reinforce activity limits and safety measures with patient and family. Provide visual clues: red socks.

## 2019-05-23 NOTE — ED ADULT NURSE NOTE - PMH
Anemia of chronic disease    Arthritis of spine    Asthma  PFT (4/2018)  Chronic diastolic CHF (congestive heart failure)  EF 56% (4/2017)  Chronic kidney disease (CKD)    Chronic low back pain    Depression (emotion)    Diabetes mellitus  T2DM last A1C 6.7 mg/dl in January   fs range 59- 200 mg/dl  Dyslipidemia associated with type 2 diabetes mellitus    Edema    GERD (gastroesophageal reflux disease)    H/O: hypothyroidism    Herniated disc  lumbar  History of postmenopausal bleeding    Hypertension    Macular degeneration of left eye  receiving injection  Morbid obesity with BMI of 45.0-49.9, adult    Neuropathy    On home oxygen therapy  2  liters nasal cannula  VICKY (obstructive sleep apnea)    Paroxysmal atrial fibrillation  h/o rapid ventricular rate 2 years ago, admitted at [facility], controlled with medication as per patient.  last INR 2.0  Peripheral arterial disease  s/p remote stent. not on antiplatelet meds for a while.  Ulcerative colitis

## 2019-05-23 NOTE — CHART NOTE - NSCHARTNOTEFT_GEN_A_CORE
H&P / Death Summary / CODE Note    78F with CHF, CAD, HTN, HLD, DM II, Atrial Fibrillation, MVR, and Obesity was brought in from [facility] for lethargy, confusion and also some redness in b/l lower extremities. The patient was evaluated by our ER and was found to be in shock, thought to be from Sepsis vs. Cardiogenic and was in the process of being recusitated as she was hypotensive.  Patient had responded well to IVF as her BP was improving.  I was called to evaluate for ICU admission.  During my encounter with the patient, the patient was very lethargic and could not really give me any information or history and most of it was obtained from the chart and the family that was surrounding her. The patient then immediately started to experience difficutly breathing and started to simi down and became pulseless.      We begain ACLS protocol on her and she received several rounds of Epinephrine, Atropine and also one shock for V. Fib noted on the monitor.  A Femoral Line was inserted and she was intubated. Despite our efforts the patient  and did not make it and was pronounced at 15:56.  CC services performed during this encounter was 75 minutes with most of it at the bedside.         PHYSICAL EXAM:  Vital Signs Last 24 Hrs  T(C): 37.7 (23 May 2019 11:45), Max: 37.7 (23 May 2019 11:45)  T(F): 99.8 (23 May 2019 11:45), Max: 99.8 (23 May 2019 11:45)  HR: 0 (23 May 2019 15:56) (0 - 109)  BP: 0/0 (23 May 2019 15:56) (0/0 - 123/62)  BP(mean): --  RR: 0 (23 May 2019 15:56) (0 - 100)  SpO2: 100% (23 May 2019 14:25) (99% - 100%)    GENERAL: Altered and Lethargic.   HEAD:  Atraumatic, Normocephalic  EYES: Pale conjunctiva, 4mm Pupil Bilateral  ENMT: Excessive secretions noted.   NECK: Supple, No JVD, Normal thyroid  CHEST/LUNG: Coarse BS Bilateral  HEART: Bradycardia  ABDOMEN: Soft, Nontender, Nondistended;   EXTREMITIES:  3+ Pitting edema. Redness and erythema b/l lower extremities   SKIN: LLE shows skin lesions with peeling.

## 2019-05-23 NOTE — ED PROVIDER NOTE - PROGRESS NOTE DETAILS
Spoke with Dr. Cavazos (critical care). will see patient in ED, can be here after 2. blood pressure improved to 98/74 after 1 liter of fluids. previous records showed decent ejection fraction. will give additional liter of fluids and continue to closely monitor blood pressure improved to 98/74 after 1 liter of fluids. previous records showed decent ejection fraction. will give additional liter of fluids and continue to closely monitor  starting to become more responsive. answers simple questions and talking in short word sentences   Dr. Mcadams at bedside. will need be admit to hospitalist as patient will need ICU care Dr. Asif (hospitalist) at bedside. will admit. blood pressure improved to 116/50 Cap. Refill:   <2 se    Pulses:    Full     Skin:     Normal     Lungs:     Rales    Heart:   Regular   I have re-evaluated the patient's fluid status and reviewed the vital signs.  Clinical evaluation demonstrates an appropriate response to fluid resuscitation. spoke with Dr. Morales. will see patient in ED Spoke with Dr. Cavazos (critical care). will see patient in ED, can be here after 2.  Spoke with Dr. Capellan, has seen patient at facility. will see patient in ED blood pressure improved to 98/74 after 1 liter of fluids. previous records showed decent ejection fraction. will give additional liter of fluids and continue to closely monitor. due to history of CHF, may not be able to tolerate 30 cc per kilo. antibiotics infusing (zosyn and vanco ordered)  starting to become more responsive. answers simple questions and talking in short word sentences   Dr. Mcadams at bedside. will need be admit to hospitalist as patient will need ICU care Dr. Asif (hospitalist) at bedside. will admit. blood pressure improved to 116/50. patient more alert and responsive spoke with Dr. Chong (has seen patient in the past). will see patient in ED

## 2019-05-24 LAB
-  COAGULASE NEGATIVE STAPHYLOCOCCUS: SIGNIFICANT CHANGE UP
CRP SERPL-MCNC: 40.61 MG/DL — HIGH (ref 0–0.4)
GRAM STN FLD: SIGNIFICANT CHANGE UP
METHOD TYPE: SIGNIFICANT CHANGE UP
SPECIMEN SOURCE: SIGNIFICANT CHANGE UP
TSH SERPL-MCNC: 0.02 UIU/ML — LOW (ref 0.27–4.2)

## 2019-05-25 LAB
-  AMIKACIN: SIGNIFICANT CHANGE UP
-  AMPICILLIN/SULBACTAM: SIGNIFICANT CHANGE UP
-  AMPICILLIN: SIGNIFICANT CHANGE UP
-  AZTREONAM: SIGNIFICANT CHANGE UP
-  CEFEPIME: SIGNIFICANT CHANGE UP
-  CEFOXITIN: SIGNIFICANT CHANGE UP
-  CEFTRIAXONE: SIGNIFICANT CHANGE UP
-  CIPROFLOXACIN: SIGNIFICANT CHANGE UP
-  ERTAPENEM: SIGNIFICANT CHANGE UP
-  GENTAMICIN: SIGNIFICANT CHANGE UP
-  IMIPENEM: SIGNIFICANT CHANGE UP
-  LEVOFLOXACIN: SIGNIFICANT CHANGE UP
-  MEROPENEM: SIGNIFICANT CHANGE UP
-  NITROFURANTOIN: SIGNIFICANT CHANGE UP
-  PIPERACILLIN/TAZOBACTAM: SIGNIFICANT CHANGE UP
-  TIGECYCLINE: SIGNIFICANT CHANGE UP
-  TOBRAMYCIN: SIGNIFICANT CHANGE UP
-  TRIMETHOPRIM/SULFAMETHOXAZOLE: SIGNIFICANT CHANGE UP
CULTURE RESULTS: SIGNIFICANT CHANGE UP
CULTURE RESULTS: SIGNIFICANT CHANGE UP
METHOD TYPE: SIGNIFICANT CHANGE UP
ORGANISM # SPEC MICROSCOPIC CNT: SIGNIFICANT CHANGE UP
SPECIMEN SOURCE: SIGNIFICANT CHANGE UP
SPECIMEN SOURCE: SIGNIFICANT CHANGE UP

## 2019-05-28 LAB
CULTURE RESULTS: SIGNIFICANT CHANGE UP
SPECIMEN SOURCE: SIGNIFICANT CHANGE UP

## 2019-06-26 NOTE — DISCHARGE NOTE NURSING/CASE MANAGEMENT/SOCIAL WORK - NSCORESITESY/N_GEN_A_CORE_RD
Patient is discharging today(06/26/2019) and needs to schedule appointment for TCM. Patient has not been seen but was scheduled to establish care today that was canceled due to being inpatient.    Please call to discuss and ok to leave detailed message.     No

## 2019-07-10 ENCOUNTER — APPOINTMENT (OUTPATIENT)
Dept: ORTHOPEDIC SURGERY | Facility: CLINIC | Age: 79
End: 2019-07-10

## 2019-12-06 NOTE — PROVIDER CONTACT NOTE (OTHER) - SITUATION
[FreeTextEntry1] : Here for followup\par Currently on meds for Hep C-->will know if curative in March 2020\par On Labetalol 400 TID and Nifedipine 60 mg BID\par EKG reviewed and normal\par No chest pain, dyspnea or edema
Pt K is 3.6 pt is on torsemide and spironolactone. NP aware. Pt's H/H stable around 8.3/26. Pt resting comfortably without any symptoms at this time.

## 2020-07-09 NOTE — PROGRESS NOTE ADULT - ASSESSMENT
Impression:    1. Acute on chronic diastolic CHF  2. Stable CRI  3. Chronic anemia of chronic disease and blood loss  4. History of bioprosthetic MVR for MS, multifactorial pulmonary HTN  5. Probable obesity hypoventilation  6. PAF in SR on amio      Plan:  Continue IV diuresis  Transfusions as per hematology; monitor for further vaginal bleeding  Pulmonary consultation-pt of Dr. Montgomery  Echo if not done recently in office; will check tonight.  Dose coumadin per INR    Russell Jmienez MD  [facility] Impression:    1. Acute on chronic diastolic CHF  2. Stable CRI  3. Anemia of chronic disease and due to acute blood loss from vaginal bleeding  4. History of bioprosthetic MVR for MS, multifactorial pulmonary HTN  5. Obesity hypoventilation syndrome  6. PAF in SR on amio      Plan:  Continue IV diuresis  Transfusions as per hematology; monitor for further vaginal bleeding  Pulmonary consultation-pt of Dr. Montgomery  Echo if not done recently in office; will check tonight.  Dose coumadin per INR  GYN evaluation    Russell Jimenez MD  [facility] no

## 2020-07-20 NOTE — ED ADULT TRIAGE NOTE - PAIN: PRESENCE, MLM
complains of pain/discomfort
Render In Strict Bullet Format?: No
Detail Level: Zone
Continue Regimen: ORALS AND. TOPICALS; DISCUSSED AT LENGTH TRYING TO TAPER ORAL AB’S WHILE CONTINUING TOPICAL THERAPY.

## 2020-07-31 NOTE — ED ADULT NURSE NOTE - DOES PATIENT HAVE ADVANCE DIRECTIVE
Message left on voicemail regarding testing positive for the Covid-19 virus and surgery being delayed.    KK   No

## 2020-12-18 NOTE — H&P PST ADULT - ACTIVITY
Aure Joy comes into clinic today at the request of ESTELA Zamudio MD Ordering Provider for Med Injection only ketamine.     Medication checked by: Estrella Fuentes RN  Prior to administration pt identified by name and : Yes    Confirmed with patient that she is expecting an increased dose today, we discussed that the effects may be stronger.     Appearance: casual      Mood: good  Affect: congruent to mood  Eye contact: good  Side effects: sedation   Level of cooperation: cooperative  Education: Increased dose and stronger effects  Next appt: in 2020 at 1pm     QIDS-SR16 score: 17     Medication provided by Pharmacy  LOT: [id number]  EXP: 2022        This service provided today was under the supervising provider of the day Elizabeth Gordon MD, who was available if needed.   able to bath and feed self, but mostly sedentary

## 2021-03-11 PROBLEM — E66.2 OBESITY HYPOVENTILATION SYNDROME: Status: ACTIVE | Noted: 2018-05-01

## 2021-05-13 NOTE — H&P ADULT - NSICDXPASTMEDICALHX_GEN_ALL_CORE_FT
PAST MEDICAL HISTORY:  Anemia of chronic disease     Arthritis of spine     Asthma PFT (4/2018)    Chronic diastolic CHF (congestive heart failure) EF 56% (4/2017)    Chronic kidney disease (CKD)     Chronic low back pain     Depression (emotion)     Diabetes mellitus T2DM last A1C 6.7 mg/dl in January   fs range 59- 200 mg/dl    Dyslipidemia associated with type 2 diabetes mellitus     Edema     GERD (gastroesophageal reflux disease)     H/O: hypothyroidism     Herniated disc lumbar    History of postmenopausal bleeding     Hypertension     Macular degeneration of left eye receiving injection    Morbid obesity with BMI of 45.0-49.9, adult     Neuropathy     On home oxygen therapy 2  liters nasal cannula    VICKY (obstructive sleep apnea)     Paroxysmal atrial fibrillation h/o rapid ventricular rate 2 years ago, admitted at [facility], controlled with medication as per patient.  last INR 2.0    Peripheral arterial disease s/p remote stent. not on antiplatelet meds for a while.    Ulcerative colitis Bi-Rhombic Flap Text: The defect edges were debeveled with a #15 scalpel blade.  Given the location of the defect and the proximity to free margins a bi-rhombic flap was deemed most appropriate.  Using a sterile surgical marker, an appropriate rhombic flap was drawn incorporating the defect. The area thus outlined was incised deep to adipose tissue with a #15 scalpel blade.  The skin margins were undermined to an appropriate distance in all directions utilizing iris scissors.

## 2021-05-21 NOTE — PATIENT PROFILE ADULT - HAS THE PATIENT RECEIVED THE INFLUENZA VACCINE THIS SEASON?
yes...
Gastroenterology at [facility]  Gastroenterology  [address] [place] [address]  Phone: [phone]  Fax:   Follow Up Time: Routine    [facility]  Gastroenterology  [address] [address]  Phone: [phone]  Fax:   Follow Up Time: Routine

## 2021-08-02 NOTE — PATIENT PROFILE ADULT - NSPROMUTINFOINDIVIDFT_GEN_A_NUR
Neurosurgery Office Note  Agatha Flores 50 y o  female MRN: [id number]      Assessment/Plan     Lumbar radiculopathy  March 31 kidney stone  Left back/hip pain  N/T LLE post to knee, ant to knee  Decreased sensation LLE except increased sensation lateral calf  Pain with lumbar extension    Tarlov cyst  Small S2 tarlov cyst, discovered incidentally on MRI pelvis during abdominal/pelvic pain work up  · Patient c/o right sided back pain and N/T radiating down anterior and posterior thigh to knee, worse when at rest and improves with ambulation  · Recent history of kidney stone    Imaging:  · MRI pelvis w/wo, 7/22/21: small Tarlov cyst S2    Plan:  · We discussed tarlov cysts and potential treatments  · There is a possibility of surgical excision vs drainage in IR; would not recommend as this is unlikely the cause of her symptoms  · Will order MRI lumbar spine for further evaluation of back and RLE radiculopathy  · Continue physical therapy and follow up with pain management as instructed  · Follow up after MRI lumbar spine for further evaluation; patient would like to avoid surgery if possible       Diagnoses and all orders for this visit:    Lumbar radiculopathy  -     MRI lumbar spine without contrast; Future    Tarlov cyst  -     Ambulatory referral to Neurosurgery    Other orders  -     Biotin 1000 MCG CHEW; Chew daily            CHIEF COMPLAINT    Chief Complaint   Patient presents with    Consult       HISTORY      This is a 79-year-old female with past medical history significant for bariatric surgery and kidney stones who is here today for evaluation of a Tarlov cyst   She has had ongoing problems with right-sided abdominal, flank, and pelvic pain since March 31, 2021  She was found to have a kidney stone  Unfortunately her pain has persisted  She has followed up with multiple specialists including gynecology  Nobody can find a source for her continued pain    She states that it is localized in her left back and flank and occasionally radiates into her right hip and pelvis region  She also has numbness and tingling which radiates down her posterior and anterior right thigh to her knee  She states that feels like she has no circulation  This occurs more at rest than with movement  She does follow with Pain Management and has had 1 injection in the region of S2 which elated her pain for 5 days  She also is participating in physical therapy  She is not interested in pursuing surgical intervention if she does not have too but is very frustrated that nobody can find the source of her pain  An MRI pelvis was ordered by gynecology to rule out any other pathology  This showed an incidental finding of a S2 Tarlov cyst   This is small in nature and noncompressive  Is unlikely this is the cause of her pain  However, due to her symptoms I would like to order an MRI lumbar spine for further evaluation  We will see her back after this is completed to review the imaging and discuss any further intervention  REVIEW OF SYSTEMS    Review of Systems   Constitutional: Negative  HENT: Negative  Eyes: Negative  Respiratory: Negative  Cardiovascular: Negative  Gastrointestinal: Positive for constipation (pain/pressure)  Endocrine: Negative  Genitourinary: Positive for difficulty urinating  Slight incontinence   Musculoskeletal: Positive for back pain (right side hip travels down leg into thigh)  Skin: Negative  Allergic/Immunologic: Negative  Neurological: Positive for numbness (right thigh at times)  Psychiatric/Behavioral: Negative        ROS was personally reviewed and changes made as needed     Meds/Allergies     Current Outpatient Medications   Medication Sig Dispense Refill    albuterol (VENTOLIN HFA) 90 mcg/act inhaler Inhale 2 puffs every 6 (six) hours as needed for wheezing (Patient taking differently: Inhale 2 puffs as needed for wheezing ) 1 Inhaler 3    aspirin-acetaminophen-caffeine (EXCEDRIN MIGRAINE) 250-250-65 MG per tablet Take 1 tablet by mouth every 6 (six) hours as needed for headaches      Biotin 1000 MCG CHEW Chew daily      buPROPion (WELLBUTRIN XL) 150 mg 24 hr tablet Take 1 tablet (150 mg total) by mouth daily 30 tablet 1    cetirizine (ZyrTEC) 10 mg tablet Take 1 tablet (10 mg total) by mouth daily 30 tablet 3    cholecalciferol (VITAMIN D3) 1,000 units tablet Take 1 tablet (1,000 Units total) by mouth daily 100 tablet 3    dicyclomine (BENTYL) 20 mg tablet Take 1 tablet (20 mg total) by mouth every 6 (six) hours as needed (abdominal pain) 60 tablet 2    fluconazole (DIFLUCAN) 150 mg tablet TAKE ONE TABLET BY MOUTH EVERY THIRD DAY FOR 2 DOSES      fluticasone (FLONASE) 50 mcg/act nasal spray 2 sprays into each nostril daily (Patient taking differently: 2 sprays into each nostril as needed ) 1 Bottle 1    ibuprofen (MOTRIN) 800 mg tablet Take 1 tablet (800 mg total) by mouth every 8 (eight) hours as needed for mild pain 30 tablet 0    NON FORMULARY Medical marijuana capsule at HS   norethindrone-ethinyl estradiol-iron (MICROGESTIN FE1 5/30) 1 5-30 MG-MCG tablet Take 1 tablet by mouth daily      clotrimazole-betamethasone (LOTRISONE) 1-0 05 % cream APPLY CREAM TOPICALLY TO AFFECTED AREA TWICE DAILY (Patient not taking: Reported on 8/2/2021)      gabapentin (NEURONTIN) 100 mg capsule Take 1 capsule (100 mg total) by mouth 3 (three) times a day (Patient not taking: Reported on 8/2/2021) 90 capsule 1     No current facility-administered medications for this visit         Allergies   Allergen Reactions    Other Hives    Penicillins Other (See Comments)     Passed out    Pollen Extract Sneezing and Nasal Congestion    Shellfish-Derived Products - Food Allergy Hives    Adhesive [Medical Tape] Rash       PAST HISTORY    Past Medical History:   Diagnosis Date    Asthma     seasonal    Back pain     Kidney stone     Obesity     Postgastrectomy malabsorption        Past Surgical History:   Procedure Laterality Date    CHOLECYSTECTOMY      COLONOSCOPY      GASTRECTOMY SLEEVE LAPAROSCOPIC      KNEE ARTHROSCOPY W/ MENISCAL REPAIR Left     NEUROPLASTY / TRANSPOSITION MEDIAN NERVE AT CARPAL TUNNEL BILATERAL      ID ESOPHAGOGASTRODUODENOSCOPY TRANSORAL DIAGNOSTIC N/A 4/30/2018    Procedure: ESOPHAGOGASTRODUODENOSCOPY (EGD); Surgeon: Shakir Bryan MD;  Location: [facility]; Service: Gastroenterology       Social History     Tobacco Use    Smoking status: Never Smoker    Smokeless tobacco: Never Used   Vaping Use    Vaping Use: Former    Substances: THC, CBD   Substance Use Topics    Alcohol use: Yes     Comment: only on birthday    Drug use: Yes     Types: Marijuana     Comment: uses pill  (medical marijuania card)       Family History   Problem Relation Age of Onset    Diabetes Mother     Kidney disease Mother         end stage   Dulce Maria Álvarez Diabetes Father     Other Father         gangrene    Hypertension Father     Nephrolithiasis Family          Above history personally reviewed  EXAM    Vitals:Blood pressure 108/82, pulse 73, temperature (!) 97 2 °F (36 2 °C), temperature source Tympanic, resp  rate 16, height 5' 5" (1 651 m), weight 81 4 kg (179 lb 8 oz), not currently breastfeeding  ,Body mass index is 29 87 kg/m²  Physical Exam  Vitals and nursing note reviewed  Constitutional:       Appearance: Normal appearance  She is well-developed and normal weight  HENT:      Head: Normocephalic and atraumatic  Eyes:      Extraocular Movements: Extraocular movements intact  Pupils: Pupils are equal, round, and reactive to light  Cardiovascular:      Rate and Rhythm: Normal rate  Pulmonary:      Effort: Pulmonary effort is normal  No respiratory distress  Abdominal:      Palpations: Abdomen is soft  Musculoskeletal:         General: Normal range of motion  Cervical back: Normal range of motion  Skin:     General: Skin is warm and dry  Neurological:      General: No focal deficit present  Mental Status: She is alert and oriented to person, place, and time  Gait: Gait is intact  Tandem walk normal       Deep Tendon Reflexes: Strength normal       Reflex Scores:       Bicep reflexes are 2+ on the right side and 2+ on the left side  Brachioradialis reflexes are 2+ on the right side and 2+ on the left side  Patellar reflexes are 2+ on the right side and 2+ on the left side  Psychiatric:         Mood and Affect: Mood normal          Speech: Speech normal          Behavior: Behavior normal          Thought Content: Thought content normal          Judgment: Judgment normal          Neurologic Exam     Mental Status   Oriented to person, place, and time  Follows 2 step commands  Attention: normal  Concentration: normal    Speech: speech is normal   Level of consciousness: alert  Knowledge: good  Able to repeat  Normal comprehension  Cranial Nerves   Cranial nerves II through XII intact  CN III, IV, VI   Pupils are equal, round, and reactive to light  Motor Exam   Muscle bulk: normal  Overall muscle tone: normal    Strength   Strength 5/5 throughout  Sensory Exam   Decreased to LT entire RLE except increased sensation right lateral calf  Pain with lumbar extension     Gait, Coordination, and Reflexes     Gait  Gait: normal    Coordination   Tandem walking coordination: normal    Tremor   Resting tremor: absent    Reflexes   Right brachioradialis: 2+  Left brachioradialis: 2+  Right biceps: 2+  Left biceps: 2+  Right patellar: 2+  Left patellar: 2+  Right Ruiz: absent  Left Ruiz: absent  Right ankle clonus: absent  Left ankle clonus: absent        MEDICAL DECISION MAKING    Imaging Studies:     No results found  I have personally reviewed pertinent reports  none

## 2021-08-19 NOTE — PROGRESS NOTE ADULT - PROBLEM SELECTOR PROBLEM 1
Chief Complaint  Obesity (Discuss Weight Management)    Subjective          Bianca Lara is a 52 y.o. female who presents to [facility]  History of Present Illness    Pt wants to start nutrisystem. Pt reports insurance will pay if she has rx.   Pt is drinking 40 oz of water per day.   Pt is doing exercise, riding bike 4 miles two times per week.   Pt has made dietary modifications. Less bread, more bread and fruit.     Pt is down 20 lbs.     Patient has degenerative disc disease ongoing since prior to 2020 patient's x-ray then in in June noted some spur formation.  Patient has taken diclofenac and muscle relaxers without improvement.  Patient reports this is keeping her from her full exercise potential.    PHQ-2 Total Score: 0   PHQ-9 Total Score: 0       Review of Systems   Constitutional: Negative for chills, fatigue and fever.   Respiratory: Negative for cough and shortness of breath.    Cardiovascular: Negative for chest pain and palpitations.   Gastrointestinal: Negative for constipation, diarrhea, nausea and vomiting.   Genitourinary: Negative for difficulty urinating.   Musculoskeletal: Positive for back pain. Negative for neck pain.   Skin: Negative for rash.   Neurological: Negative for dizziness, numbness and headaches.          Medical History: has a past medical history of Allergic rhinitis, Anxiety (09/11/2018), Blood in stool, Condition not found, Depression (09/11/2018), Dysmenorrhea, Gross hematuria, Helicobacter positive gastritis (05/26/2016), Irregular menses, Low back pain (07/09/2014), Microscopic hematuria (06/10/2014), Night sweats, Onychomycosis of toenail (07/09/2014), Pap smear for cervical cancer screening (05/16/2016), Right sided abdominal pain (06/12/2014), Screening mammogram, encounter for (11/09/2018), STD exposure (1993), Tobacco abuse, and Vitamin D deficiency.     Surgical History: has a past surgical history that includes Breast biopsy 
"(Left, 01/09/2012); Cholecystectomy; Colonoscopy (08/15/2016); Cyst Removal; and Cystoscopy (06/27/2014).     Family History: family history includes Alzheimer's disease in her mother; Cancer in her maternal grandmother; Diabetes in her mother and another family member; Heart disease in her brother, sister, and another family member; Leukemia in her father and paternal grandfather; Stomach cancer in her mother; Stroke in an other family member.     Social History: reports that she has been smoking. She has been smoking about 0.25 packs per day. She has never used smokeless tobacco. She reports current alcohol use. She reports that she does not use drugs.    Allergies: Duloxetine hcl, Lisinopril, and Penicillins      Health Maintenance Due   Topic Date Due   • ANNUAL PHYSICAL  Never done   • Pneumococcal Vaccine 0-64 (1 of 2 - PPSV23) Never done   • TDAP/TD VACCINES (1 - Tdap) Never done   • ZOSTER VACCINE (1 of 2) Never done   • HEPATITIS C SCREENING  Never done   • PAP SMEAR  06/18/2021            Current Outpatient Medications:   •  valACYclovir (VALTREX) 500 MG tablet, , Disp: , Rfl:   •  terbinafine (lamiSIL) 250 MG tablet, , Disp: , Rfl:       Immunization History   Administered Date(s) Administered   • COVID-19 (MODERNA) 01/07/2021, 02/04/2021   • Flu Vaccine Quad PF >18YRS 10/29/2020         Objective       Vitals:    08/19/21 0838   BP: 136/88   Pulse: 76   Resp: 18   Temp: 97.5 °F (36.4 °C)   TempSrc: Temporal   SpO2: 98%   Weight: 94.8 kg (209 lb)   Height: 160 cm (63\")   PainSc: 10-Worst pain ever   PainLoc: Back      Body mass index is 37.02 kg/m².   Wt Readings from Last 3 Encounters:   08/19/21 94.8 kg (209 lb)   02/23/21 104 kg (229 lb 4 oz)   01/14/21 103 kg (227 lb 8 oz)      BP Readings from Last 3 Encounters:   08/19/21 136/88   02/23/21 (!) 147/110   01/14/21 126/97        Physical Exam  Vitals reviewed.   Constitutional:       Appearance: Normal appearance. She is well-developed.   HENT:      "
Uncontrolled type 2 diabetes mellitus with hyperglycemia
Head: Normocephalic and atraumatic.   Cardiovascular:      Rate and Rhythm: Normal rate and regular rhythm.      Heart sounds: Normal heart sounds. No murmur heard.     Pulmonary:      Effort: Pulmonary effort is normal.      Breath sounds: Normal breath sounds. No wheezing or rhonchi.   Abdominal:      General: Bowel sounds are normal. There is no distension.      Palpations: Abdomen is soft.      Tenderness: There is no abdominal tenderness.   Musculoskeletal:      Lumbar back: Tenderness and bony tenderness present. Positive right straight leg raise test (15 degrees) and positive left straight leg raise test (30 degrees).   Skin:     General: Skin is warm and dry.   Neurological:      Mental Status: She is alert and oriented to person, place, and time.   Psychiatric:         Mood and Affect: Mood and affect normal.         Behavior: Behavior normal.         Thought Content: Thought content normal.         Judgment: Judgment normal.             Result Review :     CMP    CMP 11/12/20 4/2/21   Glucose 103 (A)    BUN 11    Creatinine 0.79    Sodium 138    Potassium 4.0    Chloride 102    Calcium 9.1    Albumin 4.3 3.9   Total Bilirubin 0.18 (A) <0.15 (A)   Alkaline Phosphatase 107 (A) 97   AST (SGOT) 21 20   ALT (SGPT) 18 14   (A) Abnormal value            Data reviewed: XR lumbar spine 6.2020           Assessment and Plan        Diagnoses and all orders for this visit:    1. Class 2 obesity due to excess calories without serious comorbidity with body mass index (BMI) of 37.0 to 37.9 in adult (Primary)  Comments:  Rx given for nutrisystem.    2. DDD (degenerative disc disease), lumbar  -     MRI Lumbar Spine Without Contrast; Future          Follow Up     Return if symptoms worsen or fail to improve.     The patient is asked to make an attempt to improve diet and exercise patterns to aid in medical management of these diagnoses.    Patient was given instructions and counseling regarding her condition or for health 
Shortness of breath
Uncontrolled type 2 diabetes mellitus with hyperglycemia
maintenance advice. Please see specific information pulled into the AVS if appropriate.     TESSA Cerrato    
Shortness of breath

## 2022-02-20 NOTE — CONSULT NOTE ADULT - PROBLEM SELECTOR PROBLEM 3
AF (paroxysmal atrial fibrillation) Pt to follow up with recent outpatient providers when willing to resume care; additional resources faxed

## 2022-04-09 NOTE — PROGRESS NOTE ADULT - PROBLEM SELECTOR PROBLEM 4
Goal Outcome Evaluation:      Patient has had no complaint of pain this shift. Her covid swab came back not detected. She has been able to maintain oxygen saturation above 90% on room air this afternoon.            
Chronic diastolic CHF (congestive heart failure)
History of postmenopausal bleeding
History of postmenopausal bleeding
Interstitial lung disease
Interstitial lung disease
R/O Anemia, unspecified type
Chronic diastolic CHF (congestive heart failure)
R/O Anemia, unspecified type

## 2022-04-13 NOTE — BRIEF OPERATIVE NOTE - IV INFUSIONS - CRYSTALLOIDS
[address] [facility] [address] [place] [address] [facility] [place]    Office [phone]  Fax [phone] [facility] Res[facility] Attending Addendum:  I saw and evaluated the patient on the day of the encounter with Dr. Shanice Ibrahim, performing the key elements of the service. I discussed the findings, assessment and plan with the resident and agree with the resident's findings and plan as documented in the resident's note. Patient comfortable at bedside. Alert and oriented. Refusing labs and medications. Daughter did come in and will have goals of care discussion today. Cassandra Jean MD, FAAFP, CAQSM, RMSK           Assessment and Plan   Steven Vieira a 82 y. o. male with a PMHx of prostate CA, HTN, Gout, hx of lymphoma who presents to the ED with SOB and dyspnea.     Overnight events: Had goals of care discussion with patient and his daughter. Patient now DNR. Hospice and palliative consulted for further assistance. Patient good candidate for SNF and was previously in agreement with it but has since changed his mind. Refusing medical treatment. Telemetry reviewed: NSR in the 60s-70s. Occasional PVCs    Severe sepsis w/ SIRS 3/4 (WBC:17.5 RR:24 HR:98) w/ AHRF: Improving. LA 1.9. Pt with a recent hx of 7 days hospitalization d/t aspiration pneumonia. CXR: Right lower lobe pneumonia. Chest CT: Bilateral pleural effusion Procal:1.26. COVID rapid and PCR neg. Pro-BNP 14,829. D-dimer:8.20 UA: Negative RVP: Negative. Was not given fluid bolus as he has possible HF and his pro-BNP is elevated. S/p flagyl/vanc/cefepime. Legionella, strep pneumo neg.  - Continue levaquin for 10 day course until 12/10  - CBC and CMP daily  - Sputum culture prelim normal raine  - Blood Culture NGTD/NGTD  - MRSA neg  - Wean O2 as tolerated     AHRF 2/2 HAP vs HF exacerbation: Complaint of SOB which has been present since last admission d/t Aspiration PNA. CXR: Right lower lobe pneumonia Procal:1.26. Rapid covid test neg. Pro-BNP [id number]. ECHO on 11/21/20 EF: 60-65%. Repeat ECHO: LVEF is 55 - 60%. Normal cavity size, wall thickness and EF. Moderate mitral annular calcification. Primary etiology likely HAP causing HF exacerbation and AHRF. Trop <0.05  - Pulm c/s appreciate recs   - Will transition to oral prednisone today and complete  Total of 5 day course  - Cards c/s appreciate recs       - Albuterol neb PRN       - IV lasix QD  - Supplemental O2 as needed     BPPV: Vertigo reproducible with modified Jed Hallpike maneuver but no nystagmus  - meclizine for vertigo  - PT not trained to perform epley maneuver    Dysphagia: Patient with h/o dysphagia and aspiration PNA on previous admission.  - Speech therapy consulted, appreciate recs   - Liquid form or crushed pills when possible   - Upright when swallowing    Hematuria: Patient presented with urinary incontinence. UA showed trace LE and moderate blood. UCx no growth, UTI ruled out  - Follow up outpt, repeat UA with PCP     Covid neg: Pt with SOB and Sputum production.  - Discontinue COVID labs and meds     At risk of BENJI in the setting of CKD III: Improved POA CR: 2.24 (BL:2.0). Likely 2/2 dehydration.   - CMP daily   - Avoid nephrotoxic agents     Elevated D-dimer - no PE: Likely increased as acute phase reactant 2/2 HAP vs UTI. D-dimer:8.20>4.10>3.07  Low suspicious for DVT or PE, pt had a CTA done last admission which was negative for PE. Wells' score for DVT: 1 point (Mod risk). Well's score for PE:1.5 Low risk. Repeat CTA neg for PE     Spinal stenosis: recurrent history of weakness, dizziness and falls.   - Follow out OP     Hypertension On home atenolol 50 mg tablet everyday. - Continuing home Atenolol 50 mg daily   - Will continue to monitor at this time and readjust as BP's trend.     Anemia: POA 11.8 (BL: 13) likely 2/2 CKD. No hx of active bleeding.  - CBC daily     Gout: stable. Home Allopurinol 150 mg daily   - Continue Allopurinol 50 mg every other day.  Dose changed based on patient renal function.      H/o B Cell Lymphoma: Stable; remission normal PET scan in . Bone Marrow Bx on 20: Hypercellular marrow with maturing trilineage hematopoiesis   No morphologic or immunophenotypic evidence of B cell non-Hodgkin's lymphoma   Flow cytometry shows left shifted myeloid maturation with less than 5% blasts   - Follow as OP.      Prostate Cancer: stable; s/p prostatectomy       FEN/GI - Renal diet. No fluids  Activity - Out of bed w/ assistance. DVT prophylaxis - Heparin  GI prophylaxis - Not indicated at this time  Fall prophylaxis - Fall precautions ordered. Disposition -. Plan to d/c to SNF. Consulting PT, OT, CM, palliative, hospice  Code Status - DNR. Discussed with patient / caregivers. Next of Rafi Cedeno Name and Ezekiel Ramirez MD  Family Medicine Resident         Subjective / Objective     Subjective Reports SOB better this am. Still weak and complaining of dizziness. Patient is refusing labs and treatment this am. Denies CP/and pain/nausea/vomiting/abd pain/dysuria. No new complaints    Denies fever, chills, chest pain, abdominal pain, nausea, vomiting. Temp (24hrs), Av.7 °F (36.5 °C), Min:97.5 °F (36.4 °C), Max:98.2 °F (36.8 °C)     Objective:  Vital signs: (most recent): Blood pressure 110/62, pulse 77, temperature 97.5 °F (36.4 °C), resp. rate 19, height 5' 3\" (1.6 m), weight 143 lb 1.3 oz (64.9 kg), SpO2 98 %. Respiratory: O2 Flow Rate (L/min): 1 l/min O2 Device: Nasal cannula   Visit Vitals  /62 (BP 1 Location: Right arm, BP Patient Position: At rest)   Pulse 77   Temp 97.5 °F (36.4 °C)   Resp 19   Ht 5' 3\" (1.6 m)   Wt 143 lb 1.3 oz (64.9 kg)   SpO2 98%   BMI 25.35 kg/m²     Saturating well on ROOM AIR. Not on NC    Physical Exam  Constitutional:       Appearance: Normal appearance. HENT:      Head: Normocephalic and atraumatic.       Nose: Nose normal.      Mouth: Mucous membranes are moist.      Pharynx: Oropharynx is clear. Eyes:      Pupils: Pupils are equal, round  Neck:      Musculoskeletal: Normal range of motion and neck supple. Cardiovascular:      Rate and Rhythm: Normal rate and regular rhythm. Pulses: Normal pulses. Heart sounds: Normal heart sounds. No murmur. No friction rub. No gallop. Pulmonary:      Effort: Pulmonary effort is normal.      Breath sounds: Breath sounds diminished bilaterally in lower fields. Transmitted breath sounds from upper resp tract. Abdominal:      General: Abdomen is flat. Bowel sounds are normal.      Palpations: Abdomen is soft. Musculoskeletal: Normal range of motion. Skin:     General: Skin is warm and dry. Capillary Refill: Capillary refill takes less than 2 seconds. Neurological:      General: No focal deficit present. Mental Status: He is alert and oriented to person, place, and time. Psychiatric:         Mood and Affect: Mood normal.     I/O:  Date 12/03/20 1900 - 12/04/20 0659 12/04/20 0700 - 12/05/20 0659   Shift 6937-7995 24 Hour Total 3973-5323 4704-3402 24 Hour Total   INTAKE   P.O. 420 1020 640  640     P. O. 420 1020 640  640   I. V.(mL/kg/hr) 0 0        I.V. 0 0      Blood 0 0        Autotransfused 0 0      Other 0 0        Other 0 0      Shift Total(mL/kg) 420(6.5) 1020(15.7) 640(9.9)  640(9.9)   OUTPUT   Urine(mL/kg/hr) 670 1240 855(1.1)  855     Urine Voided 670 1240 855  855     Urine Occurrence(s)   4 x  4 x   Emesis/NG output 0 0        Emesis 0 0      Other 0 0        Other Output 0 0      Stool 0 0        Stool Occurrence(s)   1 x  1 x     Stool 0 0      Blood 0 0        Quantitative Blood Loss 0 0        Blood 0 0      Shift Total(mL/kg) 670(10.3) 1240(19.1) 855(13.2)  855(13.2)   NET -250 -220 -215  -215   Weight (kg) 64.9 64.9 64.9 64.9 64.9       CBC:  Recent Labs     12/04/20  0640 12/03/20  0634 12/02/20  2125   WBC 15.3* 9.7 12.7*   HGB 10.1* 10.0* 10.3*   HCT 29.1* 29.7* 30.3*    212 484       Metabolic Panel:  Recent Labs     12/04/20  0640 12/03/20  0634 12/02/20  2125  12/02/20  0430    138 137   < > 139   K 2.8* 3.5 3.2*   < > 2.8*    103 105   < > 106   CO2 27 27 23   < > 24   BUN 40* 42* 42*   < > 46*   CREA 1.79* 1.92* 1.80*   < > 1.89*   * 127* 125*   < > 103*   CA 8.3* 8.0* 8.5   < > 8.5   MG 2.0 1.7  --   --  1.7   PHOS 3.4 5.1*  --   --  2.3*   ALB 2.5* 2.3*  --   --  2.2*   ALT 16 15  --   --  13    < > = values in this interval not displayed.           For Billing    Chief Complaint   Patient presents with    Shortness of [address] Problems  Date Reviewed: 12/1/2020          Codes Class Noted POA    BPPV (benign paroxysmal positional vertigo) ICD-10-CM: H81.10  ICD-9-CM: 386.11  12/3/2020 No        CHF (congestive heart failure) ([facility] ICD-10-CM: I50.9  ICD-9-CM: 428.0  12/1/2020 Unknown        * (Principal) Acute hypoxemic respiratory failure ([facility] ICD-10-CM: J96.01  ICD-9-CM: 518.81  12/1/2020 Unknown        Severe sepsis (HCC) ICD-10-CM: A41.9, R65.20  ICD-9-CM: 038.9, 995.92  12/1/2020 Unknown        Person under investigation for COVID-19 ICD-10-CM: Z20.828  ICD-9-CM: V01.79  12/1/2020 Unknown        HAP (hospital-acquired pneumonia) ICD-10-CM: J18.9, Y95  ICD-9-CM: 194  11/30/2020 Unknown        SIRS (systemic inflammatory response syndrome) ([facility] ICD-10-CM: R65.10  ICD-9-CM: 995.90  11/30/2020 Unknown        History of B-cell lymphoma ICD-10-CM: Z85.72  ICD-9-CM: V10.79  11/22/2020 Yes        Anemia ICD-10-CM: D64.9  ICD-9-CM: 285.9  11/21/2020 Yes        CRI (chronic renal insufficiency) (Chronic) ICD-10-CM: N18.9  ICD-9-CM: 585.9  12/13/2012 Yes        Lymphoma ([facility] ICD-10-CM: C85.90  ICD-9-CM: 202.80  1/4/2011 Yes        HTN (hypertension) ICD-10-CM: I10  ICD-9-CM: 401.9  1/4/2011 Yes        Gout ICD-10-CM: M10.9  ICD-9-CM: 274.9  1/4/2011 Yes 200 13-Apr-2022 07:15

## 2022-05-04 NOTE — PROGRESS NOTE ADULT - PROBLEM/PLAN-5
+Nausea, no urinary symptoms.  Normal BM
DISPLAY PLAN FREE TEXT

## 2022-09-07 NOTE — ED ADULT NURSE NOTE - EXTENSIONS OF SELF_ADULT
· None recently  · Recommend f/u with opthamologist to ensure no vision disease contributing  · Cont to provide adequate lighting, safe environment
service made aware of consult
None

## 2022-09-20 NOTE — PATIENT PROFILE ADULT - IS THERE A SUSPICION OF ABUSE/NEGLIGENCE?
"Oncology Rooming Note    September 20, 2022 1:03 PM   Carlos Eng is a 48 year old female who presents for:    Chief Complaint   Patient presents with     Oncology Clinic Visit     New Patient Consult     Initial Vitals: /66   Pulse 79   Temp 98.7  F (37.1  C) (Tympanic)   Resp 16   Ht 1.473 m (4' 10\")   Wt 41.5 kg (91 lb 8 oz)   SpO2 100%   BMI 19.12 kg/m   Estimated body mass index is 19.12 kg/m  as calculated from the following:    Height as of this encounter: 1.473 m (4' 10\").    Weight as of this encounter: 41.5 kg (91 lb 8 oz). Body surface area is 1.3 meters squared.  No Pain (0) Comment: Data Unavailable   No LMP recorded. (Menstrual status: Irregular Periods).  Allergies reviewed: Yes  Medications reviewed: Yes    Medications: Medication refills not needed today.  Pharmacy name entered into [facility]: [id number] [address]    Clinical concerns: New Patient       Nancy Sorensen CMA          "
no

## 2022-10-06 NOTE — PROGRESS NOTE ADULT - NSHPATTENDINGPLANDISCUSS_GEN_ALL_CORE
Negative
patient.
Patient, RN, , card
Patient, RN, 
Patient, RN, , card
pt
pt
Patient, RN, , card

## 2023-03-28 NOTE — CONSULT NOTE ADULT - PROBLEM SELECTOR PROBLEM 2
Acquired hypothyroidism
Acute on chronic diastolic congestive heart failure
Anemia due to blood loss
Negative

## 2023-03-30 NOTE — ED ADULT NURSE REASSESSMENT NOTE - REASSESS COMMUNICATION
ED physician notified
ED physician notified
Referred To Otolaryngology For Closure Text (Leave Blank If You Do Not Want): After obtaining clear surgical margins the patient was sent to otolaryngology for surgical repair.  The patient understands they will receive post-surgical care and follow-up from the referring physician's office.

## 2023-04-03 NOTE — DISCHARGE NOTE PROVIDER - NSDCFUADDAPPT_GEN_ALL_CORE_FT
follow up care as per subacute rehab facility protocol  follow up with cardiology/PCP Dr. Aiken after rehab stay  follow up with pulmonary Dr. Lopez after rehab stay  follow up with orthopedics Dr. Guzman with your appointment   follow up with GYN Dr. Chavira as directed 110

## 2024-01-04 NOTE — DISCHARGE NOTE NURSING/CASE MANAGEMENT/SOCIAL WORK - NSPROEXTENSIONSOFSELF_GEN_A_NUR
Prenatal Visit: Jose L was called with the results of her ultrasound results. The CRL is measuring 2 weeks behind with a slower fetal heart rate of 55 bpm. Concern for an inevitable miscarriage was discussed. Her bleeding is mostly pink colored and not requiring a pad. Bleeding precautions were given and I recommend a short interval follow up visit with Dr. Mattson in one week. If the bleeding increases I recommend coming back sooner than her scheduled follow up.  Total  telephone visit time: 5 minutes  Patient's location: Home  Provider Location: [place]    Malissa Felix MD     none

## 2024-01-25 NOTE — ED ADULT NURSE NOTE - PAIN: PRECIPITATING FACTORS
swelling.   Tolerated Mobic during hospital admission for chest pain without any issues 1/19/2023.      Prior to Visit Medications    Medication Sig Taking? Authorizing Provider   meloxicam (MOBIC) 7.5 MG tablet Take 1 tablet by mouth daily Yes Marleni Saeed APRN - NP   prazosin (MINIPRESS) 1 MG capsule Take 1 capsule by mouth nightly Yes Maura Delgado APRN - CNP   cariprazine hcl (VRAYLAR) 1.5 MG capsule Take 1 capsule by mouth daily Yes Maura Delgado APRN - CNP   hydrOXYzine HCl (ATARAX) 25 MG tablet Take 1 tablet by mouth 3 times daily as needed for Anxiety Yes Maura Delgado APRN - CNP   lamoTRIgine (LAMICTAL) 100 MG tablet Take 1 tablet by mouth daily Yes Maura Delgado APRN - CNP   fluticasone (FLONASE) 50 MCG/ACT nasal spray 2 sprays by Each Nostril route daily Yes Marta Moore MD   ondansetron (ZOFRAN-ODT) 8 MG TBDP disintegrating tablet PLACE 1 TABLET ON THE TONGUE EVERY 8 HOURS AS NEEDED FOR NAUSEA AND VOMITING Yes Provider, MD Quinn   bisoprolol (ZEBETA) 5 MG tablet Take 1 tablet by mouth 2 times daily Yes Aurora Landers APRN   ondansetron (ZOFRAN-ODT) 4 MG disintegrating tablet Take 1 tablet by mouth 3 times daily as needed for Nausea or Vomiting Yes Earlene Viveros MD   vitamin D (ERGOCALCIFEROL) 1.25 MG (26557 UT) CAPS capsule Take 1 capsule by mouth once a week Yes Marleni Saeed APRN - NP   sucralfate (CARAFATE) 1 GM tablet Take 1 tablet by mouth 4 times daily Yes Marleni Saeed APRN - NP   aspirin 81 MG EC tablet Take 1 tablet by mouth daily Yes Provider, MD Shara Ball (Including outside providers/suppliers regularly involved in providing care):   Patient Care Team:  Marleni Saeed APRN - NP as PCP - General (Nurse Practitioner Family)  Marleni Saeed APRN - NP as PCP - Empaneled Provider  Kira Braun APRN as Advanced Practice Nurse (Neurology)  Nico Cobos MD as Consulting Physician (Interventional Cardiology)  George Bobby 
previous diagnosis of lower extremity cellulitis

## 2024-11-14 NOTE — PROGRESS NOTE ADULT - SUBJECTIVE AND OBJECTIVE BOX
RHEUMATOLOGY PROGRESS NOTE  Date: 2024    Name: Ольга To  : 1946    SUBJECTIVE    History of Present Illness:  Ольга To is a 78 year old female who is seen in a follow up visit. Has osteopenia. No falls, no Fx since the last visit. On Ca, vit D, vit D level was toxic in the past.  Has OA and osteopenia, no Fx. Was in PT for knee pain and OT. Knee pain is better. Uses Voltaren gel for the pain.  In he past, long time ago, she was on Alendronate for the bones but had problems with the teeth and she stopped it.    Past Medical History:  Past Medical History:   Diagnosis Date    Breast cancer  (CMD)     OA  Osteopenia  HL    Past Surgical History:  L breast  Hysterectomy 19 years ago.     Social History:  Social History     Socioeconomic History    Marital status: /Civil Union     Spouse name: Not on file    Number of children: Not on file    Years of education: Not on file    Highest education level: Not on file   Occupational History    Not on file   Tobacco Use    Smoking status: Never    Smokeless tobacco: Never   Vaping Use    Vaping status: never used   Substance and Sexual Activity    Alcohol use: Never    Drug use: Never    Sexual activity: Not on file   Other Topics Concern    Not on file   Social History Narrative    Not on file     Social Determinants of Health     Financial Resource Strain: Not on file   Food Insecurity: Not on file   Transportation Needs: Not on file   Physical Activity: Not on file   Stress: Not on file   Social Connections: Not on file   Interpersonal Safety: Not on file   .  Family History:  No family history on file.       Allergies:  ALLERGIES:  Patient has no known allergies.    Current Medications:  Current Outpatient Medications   Medication Sig Dispense Refill    Vitamin D, Ergocalciferol, 1.25 mg (50,000 units) capsule Take 1.25 mg by mouth 1 day a week.      ketoconazole (NIZORAL) 2 % shampoo APPLY TOPICALLY 3 TIMES A WEEK      clotrimazole  (LOTRIMIN) 1 % cream APPLY TO TOES EVERY DAY      dicyclomine (BENTYL) 10 MG capsule Take 1 capsule by mouth.      fluticasone (FLONASE) 50 MCG/ACT nasal spray INHALE 2 PUFFS IN EACH NOSTRIL DAILY      rosuvastatin (CRESTOR) 5 MG tablet Take 1 tablet by mouth daily.      diclofenac (VOLTAREN) 1 % gel Apply 2 g topically 4 times daily. On the knees and hands 600 g 1    calcium citrate (CALCITRATE) 950 (200 Ca) MG tablet Take 1 tablet by mouth daily. 30 tablet 11     No current facility-administered medications for this visit.        REVIEW OF SYSTEMS :   Neurological:  Patient denies jaw pain, weakness, numbness, slurred speech  Skin:  Patient denies any rashes     Respiratory:  Patient denies shortness of breath, cough, wheezing or hemoptysis.    Cardiovascular:  Patient denies history of arrhythmia, palpitations, chest pain, paroxysmal nocturnal dyspnea or swelling of the extremities.    Gastrointestinal:  no GERD   Genitourinary:  Patient denies dysuria, frequency, hematuria or flank pain, stone.    Musculoskeletal:  Patient denies back pain.    Hematologic/lymphatic:  Patient denies nose bleeds, easy/excessive bruising, bleeding gums or blood clots in legs/other areas of the body.      LAB  Dexa: 3.24: T was -2.3 in the hip  Blood from 10.24:  Vit D 77, Ca 9.1, creat 0.71, CBC WNL, TSH 2.3         OBJECTIVE      PHYSICAL EXAM :  Vital Signs:  Visit Vitals  /58 (BP Location: LUE - Left upper extremity, Patient Position: Sitting, Cuff Size: Regular)   Pulse 78   Temp 97.6 °F (36.4 °C) (Temporal)   Resp 15   Ht 4' 9\" (1.448 m)   Wt 46.3 kg (102 lb)   SpO2 100%   BMI 22.07 kg/m²     General:  Alert, awake, oriented x 3, not in acute distress.  Pleasant   Eyes: not iritis, anicteric sclerae, ANISHA  Skin: no rashes, no nodules, no tophi, no stasis dermatitis, no sclerodactyly  HEENT: No malar rash, oronasal ulcerations or parotid gland enlargement, no thyromegaly, no salivary submandibular glands enlargement,  KEESHAWENDI MOJICAE    Patient is a 78y old  Female who presents with a chief complaint of fall, right knee pain (08 Apr 2019 06:22)    Clinically stable.  Feels better after BM after enema.  Breathing stable.  No chest pain or palpitations.  Right knee pain controlled at rest.  For MRI today.    Allergies    Augmentin (Swelling)  cephalexin (Swelling)    MEDICATIONS  (STANDING):  ALPRAZolam 0.5 milliGRAM(s) Oral once  amiodarone    Tablet 200 milliGRAM(s) Oral daily  buDESOnide 160 MICROgram(s)/formoterol 4.5 MICROgram(s) Inhaler 2 Puff(s) Inhalation two times a day  carvedilol 6.25 milliGRAM(s) Oral every 12 hours  dextrose 5%. 1000 milliLiter(s) (50 mL/Hr) IV Continuous <Continuous>  dextrose 50% Injectable 12.5 Gram(s) IV Push once  dextrose 50% Injectable 25 Gram(s) IV Push once  dextrose 50% Injectable 25 Gram(s) IV Push once  docusate sodium 100 milliGRAM(s) Oral three times a day  DULoxetine 60 milliGRAM(s) Oral daily  insulin glargine Injectable (LANTUS) 44 Unit(s) SubCutaneous at bedtime  insulin lispro (HumaLOG) corrective regimen sliding scale   SubCutaneous three times a day before meals  insulin lispro (HumaLOG) corrective regimen sliding scale   SubCutaneous at bedtime  insulin lispro Injectable (HumaLOG) 15 Unit(s) SubCutaneous before dinner  insulin lispro Injectable (HumaLOG) 20 Unit(s) SubCutaneous before breakfast  insulin lispro Injectable (HumaLOG) 15 Unit(s) SubCutaneous before lunch  isosorbide   mononitrate ER Tablet (IMDUR) 30 milliGRAM(s) Oral daily  levothyroxine 188 MICROGram(s) Oral daily  mesalamine DR (24-Hour) Tablet 2.4 Gram(s) Oral daily  metolazone 2.5 milliGRAM(s) Oral <User Schedule>  norethindrone acetate 5 milliGRAM(s) Oral daily  pantoprazole    Tablet 40 milliGRAM(s) Oral before breakfast  polyethylene glycol 3350 17 Gram(s) Oral daily  senna 2 Tablet(s) Oral at bedtime  spironolactone 25 milliGRAM(s) Oral daily  tiotropium 18 MICROgram(s) Capsule 1 Capsule(s) Inhalation daily  torsemide 50 milliGRAM(s) Oral daily    MEDICATIONS  (PRN):  acetaminophen   Tablet .. 650 milliGRAM(s) Oral every 6 hours PRN Mild Pain (1 - 3), Moderate Pain (4 - 6)  bisacodyl Suppository 10 milliGRAM(s) Rectal once PRN Constipation  dextrose 40% Gel 15 Gram(s) Oral once PRN Blood Glucose LESS THAN 70 milliGRAM(s)/deciliter  glucagon  Injectable 1 milliGRAM(s) IntraMuscular once PRN Glucose LESS THAN 70 milligrams/deciliter    PHYSICAL EXAM:  Vital Signs Last 24 Hrs  T(C): 36.5 (08 Apr 2019 06:31), Max: 36.8 (07 Apr 2019 12:14)  T(F): 97.7 (08 Apr 2019 06:31), Max: 98.2 (07 Apr 2019 12:14)  HR: 100 (08 Apr 2019 06:31) (99 - 100)  BP: 151/95 (08 Apr 2019 06:31) (124/77 - 151/95)  BP(mean): --  RR: 18 (08 Apr 2019 06:31) (18 - 18)  SpO2: 99% (08 Apr 2019 06:31) (97% - 100%)  Daily     Daily   I&O's Summary    07 Apr 2019 07:01  -  08 Apr 2019 07:00  --------------------------------------------------------  IN: 240 mL / OUT: 1000 mL / NET: -760 mL    08 Apr 2019 07:01  -  08 Apr 2019 11:39  --------------------------------------------------------  IN: 0 mL / OUT: 250 mL / NET: -250 mL      General Appearance: 	 Alert, cooperative, no distress  Neck: no JVD  Lungs:  clear to auscultation and percussion bilaterally  Cor:  pmi 5th ICS MCL, regular rate and rhythm, S1 normal intensity, S2 normal intensity, hsm at LSB  Abdomen:	 soft, non-tender; bowel sounds normal  Extremities: without cyanosis, clubbing or edema      EKG:  Telemetry:    Labs:  CBC Full  -  ( 08 Apr 2019 08:54 )  WBC Count : 11.11 K/uL  RBC Count : 2.99 M/uL  Hemoglobin : 9.2 g/dL  Hematocrit : 28.9 %  Platelet Count - Automated : 208 K/uL  Mean Cell Volume : 96.7 fl  Mean Cell Hemoglobin : 30.8 pg  Mean Cell Hemoglobin Concentration : 31.8 gm/dL  Auto Neutrophil # : x  Auto Lymphocyte # : x  Auto Monocyte # : x  Auto Eosinophil # : x  Auto Basophil # : x  Auto Neutrophil % : x  Auto Lymphocyte % : x  Auto Monocyte % : x  Auto Eosinophil % : x  Auto Basophil % : x moist buccal mucosa.    Lungs:  No rales or wheezing. Clear to auscultation. Good air entry.   Heart:  No gallop. S1 and S2 is heard without murmur, RR.  Extremities:  No bipedal edema, cyanosis or clubbing. No digital ulcerations. No Gottron's papule or telangiectasia.  No bilateral puffy fingers.  Musculoskeletal:  has crepitus in the knees, GROM. No synovitis, has mild H and B nodes present.        ASSESSMENT & PLAN:  Patient with osteopenia, no Hx Fx, on Ca and vit D. In the past was on Alendronate but had problems with the teeth. Cont Ca and vit D, take vit D 50,000 QOWk, in the past she had toxicity.  Has OA knees, was in PT, continue to exercise, using Voltaren gel too.  Next Dexa 3.26.      Evie Harrell MD  11/14/2024   KEESHAWENDI MOJICAE    Patient is a 78y old  Female who presents with a chief complaint of fall, right knee pain (08 Apr 2019 06:22)    Clinically stable.  Feels better after BM after enema.  Breathing stable.  No chest pain or palpitations.  Right knee pain controlled at rest.  For MRI today.    Allergies    Augmentin (Swelling)  cephalexin (Swelling)    MEDICATIONS  (STANDING):  ALPRAZolam 0.5 milliGRAM(s) Oral once  amiodarone    Tablet 200 milliGRAM(s) Oral daily  buDESOnide 160 MICROgram(s)/formoterol 4.5 MICROgram(s) Inhaler 2 Puff(s) Inhalation two times a day  carvedilol 6.25 milliGRAM(s) Oral every 12 hours  dextrose 5%. 1000 milliLiter(s) (50 mL/Hr) IV Continuous <Continuous>  dextrose 50% Injectable 12.5 Gram(s) IV Push once  dextrose 50% Injectable 25 Gram(s) IV Push once  dextrose 50% Injectable 25 Gram(s) IV Push once  docusate sodium 100 milliGRAM(s) Oral three times a day  DULoxetine 60 milliGRAM(s) Oral daily  insulin glargine Injectable (LANTUS) 44 Unit(s) SubCutaneous at bedtime  insulin lispro (HumaLOG) corrective regimen sliding scale   SubCutaneous three times a day before meals  insulin lispro (HumaLOG) corrective regimen sliding scale   SubCutaneous at bedtime  insulin lispro Injectable (HumaLOG) 15 Unit(s) SubCutaneous before dinner  insulin lispro Injectable (HumaLOG) 20 Unit(s) SubCutaneous before breakfast  insulin lispro Injectable (HumaLOG) 15 Unit(s) SubCutaneous before lunch  isosorbide   mononitrate ER Tablet (IMDUR) 30 milliGRAM(s) Oral daily  levothyroxine 188 MICROGram(s) Oral daily  mesalamine DR (24-Hour) Tablet 2.4 Gram(s) Oral daily  metolazone 2.5 milliGRAM(s) Oral <User Schedule>  norethindrone acetate 5 milliGRAM(s) Oral daily  pantoprazole    Tablet 40 milliGRAM(s) Oral before breakfast  polyethylene glycol 3350 17 Gram(s) Oral daily  senna 2 Tablet(s) Oral at bedtime  spironolactone 25 milliGRAM(s) Oral daily  tiotropium 18 MICROgram(s) Capsule 1 Capsule(s) Inhalation daily  torsemide 50 milliGRAM(s) Oral daily    MEDICATIONS  (PRN):  acetaminophen   Tablet .. 650 milliGRAM(s) Oral every 6 hours PRN Mild Pain (1 - 3), Moderate Pain (4 - 6)  bisacodyl Suppository 10 milliGRAM(s) Rectal once PRN Constipation  dextrose 40% Gel 15 Gram(s) Oral once PRN Blood Glucose LESS THAN 70 milliGRAM(s)/deciliter  glucagon  Injectable 1 milliGRAM(s) IntraMuscular once PRN Glucose LESS THAN 70 milligrams/deciliter    PHYSICAL EXAM:  Vital Signs Last 24 Hrs  T(C): 36.5 (08 Apr 2019 06:31), Max: 36.8 (07 Apr 2019 12:14)  T(F): 97.7 (08 Apr 2019 06:31), Max: 98.2 (07 Apr 2019 12:14)  HR: 100 (08 Apr 2019 06:31) (99 - 100)  BP: 151/95 (08 Apr 2019 06:31) (124/77 - 151/95)  BP(mean): --  RR: 18 (08 Apr 2019 06:31) (18 - 18)  SpO2: 99% (08 Apr 2019 06:31) (97% - 100%)  Daily     Daily   I&O's Summary    07 Apr 2019 07:01  -  08 Apr 2019 07:00  --------------------------------------------------------  IN: 240 mL / OUT: 1000 mL / NET: -760 mL    08 Apr 2019 07:01  -  08 Apr 2019 11:39  --------------------------------------------------------  IN: 0 mL / OUT: 250 mL / NET: -250 mL      General Appearance: 	 Alert, cooperative, no distress  Neck: no JVD  Lungs:  clear to auscultation and percussion bilaterally  Cor:  pmi 5th ICS MCL, regular rate and rhythm, S1 normal intensity, S2 normal intensity  Abdomen:	 soft, non-tender; bowel sounds normal  Extremities: without cyanosis, clubbing or edema      EKG:  Telemetry:    Labs:  CBC Full  -  ( 08 Apr 2019 08:54 )  WBC Count : 11.11 K/uL  RBC Count : 2.99 M/uL  Hemoglobin : 9.2 g/dL  Hematocrit : 28.9 %  Platelet Count - Automated : 208 K/uL  Mean Cell Volume : 96.7 fl  Mean Cell Hemoglobin : 30.8 pg  Mean Cell Hemoglobin Concentration : 31.8 gm/dL  Auto Neutrophil # : x  Auto Lymphocyte # : x  Auto Monocyte # : x  Auto Eosinophil # : x  Auto Basophil # : x  Auto Neutrophil % : x  Auto Lymphocyte % : x  Auto Monocyte % : x  Auto Eosinophil % : x  Auto Basophil % : x

## 2025-04-09 NOTE — PROGRESS NOTE ADULT - PROBLEM SELECTOR PLAN 10
-c/w home mesalamine   -denies breakthrough GI sx
-c/w home mesalamine   -denies breakthrough GI sx
supervision/verbal cues/1 person assist

## 2025-04-22 NOTE — PATIENT PROFILE ADULT - DO YOU FEEL LIKE HURTING OTHERS?
----- Message from Mónica MITCHELL sent at 4/22/2025  7:05 AM CDT -----  Regarding: lab orders  Please enter desired lab orders for upcoming appointment or contact patient if appointment is not needed at this time.  
Lab appointment has been canceled and patient was advised.  
no

## 2025-05-14 NOTE — PROGRESS NOTE ADULT - SUBJECTIVE AND OBJECTIVE BOX
Pharmacy needs clarification on Insulin Lispro. Please advise on how many times a day.    Patient is a 78y old  Female who presents with a chief complaint of fall, right knee pain (31 Mar 2019 15:43)      SUBJECTIVE / OVERNIGHT EVENTS: awaiting PT to be completed, no dispo recommendation made, ptn c/o R knee pain, able to have flex and extend the knee, has djd on xrays, LLE blisters are chronic, wound care pending. vag bleed resolved     MEDICATIONS  (STANDING):  amiodarone    Tablet 200 milliGRAM(s) Oral daily  aztreonam  IVPB 1000 milliGRAM(s) IV Intermittent every 12 hours  buDESOnide 160 MICROgram(s)/formoterol 4.5 MICROgram(s) Inhaler 2 Puff(s) Inhalation two times a day  carvedilol 6.25 milliGRAM(s) Oral every 12 hours  dextrose 5%. 1000 milliLiter(s) (50 mL/Hr) IV Continuous <Continuous>  dextrose 50% Injectable 12.5 Gram(s) IV Push once  dextrose 50% Injectable 25 Gram(s) IV Push once  dextrose 50% Injectable 25 Gram(s) IV Push once  docusate sodium 100 milliGRAM(s) Oral three times a day  DULoxetine 60 milliGRAM(s) Oral daily  insulin glargine Injectable (LANTUS) 20 Unit(s) SubCutaneous at bedtime  insulin lispro (HumaLOG) corrective regimen sliding scale   SubCutaneous three times a day before meals  insulin lispro (HumaLOG) corrective regimen sliding scale   SubCutaneous at bedtime  insulin lispro Injectable (HumaLOG) 5 Unit(s) SubCutaneous three times a day before meals  isosorbide   mononitrate ER Tablet (IMDUR) 30 milliGRAM(s) Oral daily  levothyroxine 175 MICROGram(s) Oral daily  mesalamine DR (24-Hour) Tablet 2.4 Gram(s) Oral daily  metolazone 2.5 milliGRAM(s) Oral <User Schedule>  pantoprazole    Tablet 40 milliGRAM(s) Oral before breakfast  spironolactone 25 milliGRAM(s) Oral daily  tiotropium 18 MICROgram(s) Capsule 1 Capsule(s) Inhalation daily  torsemide 50 milliGRAM(s) Oral every 12 hours  vancomycin  IVPB 1000 milliGRAM(s) IV Intermittent every 24 hours    MEDICATIONS  (PRN):  acetaminophen   Tablet .. 650 milliGRAM(s) Oral every 6 hours PRN Mild Pain (1 - 3), Moderate Pain (4 - 6)  dextrose 40% Gel 15 Gram(s) Oral once PRN Blood Glucose LESS THAN 70 milliGRAM(s)/deciliter  glucagon  Injectable 1 milliGRAM(s) IntraMuscular once PRN Glucose LESS THAN 70 milligrams/deciliter      Vital Signs Last 24 Hrs  T(F): 98.7 (19 @ 12:00), Max: 99 (19 @ 23:39)  HR: 96 (19 @ 17:44) (91 - 98)  BP: 133/65 (19 @ 17:44) (122/64 - 138/85)  RR: 18 (19 @ 12:00) (18 - 20)  SpO2: 100% (19 @ 12:00) (96% - 100%)  Telemetry:   CAPILLARY BLOOD GLUCOSE      POCT Blood Glucose.: 220 mg/dL (31 Mar 2019 17:06)  POCT Blood Glucose.: 268 mg/dL (31 Mar 2019 11:49)  POCT Blood Glucose.: 231 mg/dL (31 Mar 2019 08:23)  POCT Blood Glucose.: 350 mg/dL (31 Mar 2019 01:02)  POCT Blood Glucose.: 345 mg/dL (30 Mar 2019 21:45)    I&O's Summary    31 Mar 2019 07:01  -  31 Mar 2019 19:40  --------------------------------------------------------  IN: 120 mL / OUT: 0 mL / NET: 120 mL        PHYSICAL EXAM:  GENERAL: NAD, well-developed  HEAD:  Atraumatic, Normocephalic  EYES: EOMI, PERRLA, conjunctiva and sclera clear  NECK: Supple, No JVD  CHEST/LUNG: Clear to auscultation bilaterally; No wheeze  HEART: Regular rate and rhythm; No murmurs, rubs, or gallops  ABDOMEN: Soft, Nontender, Nondistended; Bowel sounds present  EXTREMITIES:  2+ Peripheral Pulses, No clubbing, cyanosis, or edema  PSYCH: AAOx3  NEUROLOGY: non-focal  SKIN: No rashes or lesions    LABS:                        8.3    9.01  )-----------( 162      ( 31 Mar 2019 09:08 )             27.0         139  |  96  |  51<H>  ----------------------------<  240<H>  3.6   |  31  |  1.92<H>    Ca    9.7      31 Mar 2019 06:52  Phos  1.7       Mg     2.1         TPro  7.4  /  Alb  3.6  /  TBili  0.6  /  DBili  x   /  AST  30  /  ALT  25  /  AlkPhos  76  03-30    PT/INR - ( 30 Mar 2019 18:25 )   PT: 14.2 sec;   INR: 1.23 ratio         PTT - ( 30 Mar 2019 18:25 )  PTT:27.5 sec  CARDIAC MARKERS ( 30 Mar 2019 16:26 )  x     / x     / 134 U/L / x     / x          Urinalysis Basic - ( 30 Mar 2019 18:05 )    Color: Yellow / Appearance: Turbid / S.016 / pH: x  Gluc: x / Ketone: Negative  / Bili: Negative / Urobili: Negative   Blood: x / Protein: 100 / Nitrite: Negative   Leuk Esterase: Large / RBC: 15-25 /hpf / WBC >50 /HPF   Sq Epi: x / Non Sq Epi: OCC / Bacteria: Few        RADIOLOGY & ADDITIONAL TESTS:    Imaging Personally Reviewed:    Consultant(s) Notes Reviewed:      Care Discussed with Consultants/Other Providers:

## 2025-06-16 NOTE — ED PROVIDER NOTE - NS ED ATTENDING STATEMENT MOD
Refill Decision Note  Quick DC. Request already responded to by other means (e.g. phone or fax)    Dignaalva Sandersobdulia  is requesting a refill authorization.  Brief Assessment and Rationale for Refill:  Quick Discontinue     Medication Therapy Plan:       Medication Reconciliation Completed: No   Comments:           Note composed:7:35 AM 06/16/2025            
I have personally performed a face to face diagnostic evaluation on this patient. I have reviewed the ACP note and agree with the history, exam and plan of care, except as noted.
